# Patient Record
Sex: FEMALE | Race: WHITE | NOT HISPANIC OR LATINO | Employment: OTHER | URBAN - METROPOLITAN AREA
[De-identification: names, ages, dates, MRNs, and addresses within clinical notes are randomized per-mention and may not be internally consistent; named-entity substitution may affect disease eponyms.]

---

## 2017-01-24 ENCOUNTER — HOSPITAL ENCOUNTER (OUTPATIENT)
Dept: LAB | Facility: HOSPITAL | Age: 62
Setting detail: SPECIMEN
Discharge: HOME | End: 2017-01-24
Attending: INTERNAL MEDICINE | Admitting: INTERNAL MEDICINE

## 2017-01-24 LAB
ALBUMIN SERPL-MCNC: 4.2 G/DL (ref 3.5–4.8)
ALBUMIN/GLOB SERPL: 1.3 {RATIO} (ref 1–1.7)
ALP SERPL-CCNC: 50 IU/L (ref 32–91)
ALT SERPL-CCNC: 26 IU/L (ref 14–54)
ANION GAP SERPL CALC-SCNC: 9.9 MMOL/L (ref 10–20)
AST SERPL-CCNC: 22 IU/L (ref 15–41)
BILIRUB SERPL-MCNC: 1 MG/DL (ref 0.3–1.2)
BUN SERPL-MCNC: 9 MG/DL (ref 8–20)
BUN/CREAT SERPL: 12.9 (ref 5.4–26.2)
CALCIUM SERPL-MCNC: 10.2 MG/DL (ref 8.9–10.3)
CHLORIDE SERPL-SCNC: 109 MMOL/L (ref 101–111)
CONV CO2: 27 MMOL/L (ref 22–32)
CONV MICROALBUM.,U,RANDOM: 8 MG/L
CONV TOTAL PROTEIN: 7.5 G/DL (ref 6.1–7.9)
CREAT 24H UR-MCNC: 71.2 MG/DL
CREAT UR-MCNC: 0.7 MG/DL (ref 0.4–1)
GLOBULIN UR ELPH-MCNC: 3.3 G/DL (ref 2.5–3.8)
GLUCOSE SERPL-MCNC: 98 MG/DL (ref 65–99)
MICROALBUMIN/CREAT UR: 11.2 UG/MG
POTASSIUM SERPL-SCNC: 3.9 MMOL/L (ref 3.6–5.1)
SODIUM SERPL-SCNC: 142 MMOL/L (ref 136–144)

## 2017-07-24 ENCOUNTER — HOSPITAL ENCOUNTER (OUTPATIENT)
Dept: LAB | Facility: HOSPITAL | Age: 62
Setting detail: SPECIMEN
Discharge: HOME | End: 2017-07-24
Attending: NURSE PRACTITIONER | Admitting: NURSE PRACTITIONER

## 2017-07-24 LAB
ALBUMIN SERPL-MCNC: 4.3 G/DL (ref 3.5–4.8)
ALBUMIN/GLOB SERPL: 1.4 {RATIO} (ref 1–1.7)
ALP SERPL-CCNC: 50 IU/L (ref 32–91)
ALT SERPL-CCNC: 27 IU/L (ref 14–54)
ANION GAP SERPL CALC-SCNC: 11.8 MMOL/L (ref 10–20)
AST SERPL-CCNC: 20 IU/L (ref 15–41)
BILIRUB SERPL-MCNC: 0.7 MG/DL (ref 0.3–1.2)
BUN SERPL-MCNC: 10 MG/DL (ref 8–20)
BUN/CREAT SERPL: 12.5 (ref 5.4–26.2)
CALCIUM SERPL-MCNC: 9.8 MG/DL (ref 8.9–10.3)
CHLORIDE SERPL-SCNC: 110 MMOL/L (ref 101–111)
CHOLEST SERPL-MCNC: 134 MG/DL
CHOLEST/HDLC SERPL: 3.5 {RATIO}
CONV CO2: 25 MMOL/L (ref 22–32)
CONV LDL CHOLESTEROL DIRECT: 74 MG/DL (ref 0–100)
CONV MICROALBUM.,U,RANDOM: 20 MG/L
CONV TOTAL PROTEIN: 7.4 G/DL (ref 6.1–7.9)
CREAT 24H UR-MCNC: 86.1 MG/DL
CREAT UR-MCNC: 0.8 MG/DL (ref 0.4–1)
GLOBULIN UR ELPH-MCNC: 3.1 G/DL (ref 2.5–3.8)
GLUCOSE SERPL-MCNC: 113 MG/DL (ref 65–99)
HDLC SERPL-MCNC: 38 MG/DL
LDLC/HDLC SERPL: 1.9 {RATIO}
LIPID INTERPRETATION: ABNORMAL
MICROALBUMIN/CREAT UR: 23.2 UG/MG
POTASSIUM SERPL-SCNC: 3.8 MMOL/L (ref 3.6–5.1)
SODIUM SERPL-SCNC: 143 MMOL/L (ref 136–144)
TRIGL SERPL-MCNC: 127 MG/DL
VLDLC SERPL CALC-MCNC: 21.4 MG/DL

## 2017-07-25 ENCOUNTER — HOSPITAL ENCOUNTER (OUTPATIENT)
Dept: GENERAL RADIOLOGY | Facility: HOSPITAL | Age: 62
Discharge: HOME | End: 2017-07-25
Attending: FAMILY MEDICINE | Admitting: FAMILY MEDICINE

## 2017-08-03 ENCOUNTER — HOSPITAL ENCOUNTER (OUTPATIENT)
Dept: CARDIOLOGY | Facility: HOSPITAL | Age: 62
Discharge: HOME | End: 2017-08-03
Attending: INTERNAL MEDICINE | Admitting: INTERNAL MEDICINE

## 2017-09-06 ENCOUNTER — HOSPITAL ENCOUNTER (OUTPATIENT)
Dept: ULTRASOUND IMAGING | Facility: HOSPITAL | Age: 62
Discharge: HOME | End: 2017-09-06
Attending: OBSTETRICS & GYNECOLOGY | Admitting: OBSTETRICS & GYNECOLOGY

## 2017-11-03 ENCOUNTER — HOSPITAL ENCOUNTER (OUTPATIENT)
Dept: FAMILY MEDICINE CLINIC | Facility: CLINIC | Age: 62
Setting detail: SPECIMEN
Discharge: HOME | End: 2017-11-03
Attending: FAMILY MEDICINE | Admitting: FAMILY MEDICINE

## 2017-11-03 LAB
ALBUMIN SERPL-MCNC: 4.3 G/DL (ref 3.5–4.8)
ALBUMIN/GLOB SERPL: 1.5 {RATIO} (ref 1–1.7)
ALP SERPL-CCNC: 46 IU/L (ref 32–91)
ALT SERPL-CCNC: 28 IU/L (ref 14–54)
ANION GAP SERPL CALC-SCNC: 13.1 MMOL/L (ref 10–20)
AST SERPL-CCNC: 25 IU/L (ref 15–41)
BASOPHILS # BLD AUTO: 0.1 10*3/UL (ref 0–0.2)
BASOPHILS NFR BLD AUTO: 1 % (ref 0–2)
BILIRUB SERPL-MCNC: 0.5 MG/DL (ref 0.3–1.2)
BUN SERPL-MCNC: 10 MG/DL (ref 8–20)
BUN/CREAT SERPL: 11.1 (ref 5.4–26.2)
CALCIUM SERPL-MCNC: 10 MG/DL (ref 8.9–10.3)
CHLORIDE SERPL-SCNC: 108 MMOL/L (ref 101–111)
CONV CO2: 26 MMOL/L (ref 22–32)
CONV TOTAL PROTEIN: 7.2 G/DL (ref 6.1–7.9)
CREAT UR-MCNC: 0.9 MG/DL (ref 0.4–1)
DIFFERENTIAL METHOD BLD: (no result)
EOSINOPHIL # BLD AUTO: 0.1 10*3/UL (ref 0–0.3)
EOSINOPHIL # BLD AUTO: 1 % (ref 0–3)
ERYTHROCYTE [DISTWIDTH] IN BLOOD BY AUTOMATED COUNT: 14.5 % (ref 11.5–14.5)
ERYTHROCYTE [SEDIMENTATION RATE] IN BLOOD BY WESTERGREN METHOD: 9 MM/HR (ref 0–30)
GLOBULIN UR ELPH-MCNC: 2.9 G/DL (ref 2.5–3.8)
GLUCOSE SERPL-MCNC: 101 MG/DL (ref 65–99)
HCT VFR BLD AUTO: 46.9 % (ref 35–49)
HGB BLD-MCNC: 15.6 G/DL (ref 12–15)
LYMPHOCYTES # BLD AUTO: 3.3 10*3/UL (ref 0.8–4.8)
LYMPHOCYTES NFR BLD AUTO: 35 % (ref 18–42)
MCH RBC QN AUTO: 30.8 PG (ref 26–32)
MCHC RBC AUTO-ENTMCNC: 33.3 G/DL (ref 32–36)
MCV RBC AUTO: 92.3 FL (ref 80–94)
MONOCYTES # BLD AUTO: 0.8 10*3/UL (ref 0.1–1.3)
MONOCYTES NFR BLD AUTO: 8 % (ref 2–11)
NEUTROPHILS # BLD AUTO: 5.1 10*3/UL (ref 2.3–8.6)
NEUTROPHILS NFR BLD AUTO: 55 % (ref 50–75)
NRBC BLD AUTO-RTO: 0 /100{WBCS}
NRBC/RBC NFR BLD MANUAL: 0 10*3/UL
PLATELET # BLD AUTO: 299 10*3/UL (ref 150–450)
PMV BLD AUTO: 9.6 FL (ref 7.4–10.4)
POTASSIUM SERPL-SCNC: 4.1 MMOL/L (ref 3.6–5.1)
RBC # BLD AUTO: 5.09 10*6/UL (ref 4–5.4)
SODIUM SERPL-SCNC: 143 MMOL/L (ref 136–144)
URATE SERPL-MCNC: 3.9 MG/DL (ref 2.6–8)
WBC # BLD AUTO: 9.3 10*3/UL (ref 4.5–11.5)

## 2017-11-06 LAB
ANA SER QL IA: NORMAL
CHROMATIN AB SERPL-ACNC: <20 [IU]/ML (ref 0–20)

## 2017-11-15 ENCOUNTER — HOSPITAL ENCOUNTER (OUTPATIENT)
Dept: NUCLEAR MEDICINE | Facility: HOSPITAL | Age: 62
Discharge: HOME | End: 2017-11-15
Attending: FAMILY MEDICINE | Admitting: FAMILY MEDICINE

## 2018-01-23 ENCOUNTER — HOSPITAL ENCOUNTER (OUTPATIENT)
Dept: LAB | Facility: HOSPITAL | Age: 63
Setting detail: SPECIMEN
Discharge: HOME | End: 2018-01-23
Attending: INTERNAL MEDICINE | Admitting: INTERNAL MEDICINE

## 2018-01-23 LAB
ALBUMIN SERPL-MCNC: 4.3 G/DL (ref 3.5–4.8)
ALBUMIN/GLOB SERPL: 1.4 {RATIO} (ref 1–1.7)
ALP SERPL-CCNC: 47 IU/L (ref 32–91)
ALT SERPL-CCNC: 27 IU/L (ref 14–54)
ANION GAP SERPL CALC-SCNC: 13.9 MMOL/L (ref 10–20)
AST SERPL-CCNC: 25 IU/L (ref 15–41)
BILIRUB SERPL-MCNC: 0.8 MG/DL (ref 0.3–1.2)
BUN SERPL-MCNC: 11 MG/DL (ref 8–20)
BUN/CREAT SERPL: 13.8 (ref 5.4–26.2)
CALCIUM SERPL-MCNC: 9.8 MG/DL (ref 8.9–10.3)
CHLORIDE SERPL-SCNC: 107 MMOL/L (ref 101–111)
CHOLEST SERPL-MCNC: 125 MG/DL
CHOLEST/HDLC SERPL: 2.7 {RATIO}
CONV CO2: 24 MMOL/L (ref 22–32)
CONV LDL CHOLESTEROL DIRECT: 72 MG/DL (ref 0–100)
CONV MICROALBUM.,U,RANDOM: 19 MG/L
CONV TOTAL PROTEIN: 7.3 G/DL (ref 6.1–7.9)
CREAT 24H UR-MCNC: 50.4 MG/DL
CREAT UR-MCNC: 0.8 MG/DL (ref 0.4–1)
GLOBULIN UR ELPH-MCNC: 3 G/DL (ref 2.5–3.8)
GLUCOSE SERPL-MCNC: 102 MG/DL (ref 65–99)
HDLC SERPL-MCNC: 46 MG/DL
LDLC/HDLC SERPL: 1.6 {RATIO}
LIPID INTERPRETATION: NORMAL
MICROALBUMIN/CREAT UR: 37.7 UG/MG
POTASSIUM SERPL-SCNC: 3.9 MMOL/L (ref 3.6–5.1)
SODIUM SERPL-SCNC: 141 MMOL/L (ref 136–144)
TRIGL SERPL-MCNC: 89 MG/DL
VLDLC SERPL CALC-MCNC: 6.7 MG/DL

## 2018-01-30 ENCOUNTER — HOSPITAL ENCOUNTER (OUTPATIENT)
Dept: LAB | Facility: HOSPITAL | Age: 63
Setting detail: SPECIMEN
Discharge: HOME | End: 2018-01-30
Attending: NURSE PRACTITIONER | Admitting: NURSE PRACTITIONER

## 2018-06-05 ENCOUNTER — HOSPITAL ENCOUNTER (OUTPATIENT)
Dept: FAMILY MEDICINE CLINIC | Facility: CLINIC | Age: 63
Setting detail: SPECIMEN
Discharge: HOME | End: 2018-06-05
Attending: FAMILY MEDICINE | Admitting: FAMILY MEDICINE

## 2018-06-05 LAB
ALBUMIN SERPL-MCNC: 4 G/DL (ref 3.5–4.8)
ALBUMIN/GLOB SERPL: 1.3 {RATIO} (ref 1–1.7)
ALP SERPL-CCNC: 46 IU/L (ref 32–91)
ALT SERPL-CCNC: 34 IU/L (ref 14–54)
ANION GAP SERPL CALC-SCNC: 11.8 MMOL/L (ref 10–20)
AST SERPL-CCNC: 26 IU/L (ref 15–41)
BILIRUB SERPL-MCNC: 0.6 MG/DL (ref 0.3–1.2)
BUN SERPL-MCNC: 7 MG/DL (ref 8–20)
BUN/CREAT SERPL: 8.8 (ref 5.4–26.2)
CALCIUM SERPL-MCNC: 9.9 MG/DL (ref 8.9–10.3)
CHLORIDE SERPL-SCNC: 109 MMOL/L (ref 101–111)
CONV CO2: 25 MMOL/L (ref 22–32)
CONV TOTAL PROTEIN: 7 G/DL (ref 6.1–7.9)
CREAT UR-MCNC: 0.8 MG/DL (ref 0.4–1)
GLOBULIN UR ELPH-MCNC: 3 G/DL (ref 2.5–3.8)
GLUCOSE SERPL-MCNC: 112 MG/DL (ref 65–99)
POTASSIUM SERPL-SCNC: 3.8 MMOL/L (ref 3.6–5.1)
SODIUM SERPL-SCNC: 142 MMOL/L (ref 136–144)

## 2018-07-26 ENCOUNTER — HOSPITAL ENCOUNTER (OUTPATIENT)
Dept: LAB | Facility: HOSPITAL | Age: 63
Setting detail: SPECIMEN
Discharge: HOME | End: 2018-07-26
Attending: NURSE PRACTITIONER | Admitting: NURSE PRACTITIONER

## 2018-07-26 LAB
ALBUMIN SERPL-MCNC: 4.2 G/DL (ref 3.5–4.8)
ALBUMIN/GLOB SERPL: 1.3 {RATIO} (ref 1–1.7)
ALP SERPL-CCNC: 40 IU/L (ref 32–91)
ALT SERPL-CCNC: 31 IU/L (ref 14–54)
ANION GAP SERPL CALC-SCNC: 7.4 MMOL/L (ref 10–20)
AST SERPL-CCNC: 24 IU/L (ref 15–41)
BILIRUB SERPL-MCNC: 0.5 MG/DL (ref 0.3–1.2)
BUN SERPL-MCNC: 13 MG/DL (ref 8–20)
BUN/CREAT SERPL: 18.6 (ref 5.4–26.2)
CALCIUM SERPL-MCNC: 9.6 MG/DL (ref 8.9–10.3)
CHLORIDE SERPL-SCNC: 109 MMOL/L (ref 101–111)
CHOLEST SERPL-MCNC: 142 MG/DL
CHOLEST/HDLC SERPL: 4.1 {RATIO}
CONV CO2: 28 MMOL/L (ref 22–32)
CONV LDL CHOLESTEROL DIRECT: 82 MG/DL (ref 0–100)
CONV MICROALBUM.,U,RANDOM: 11 MG/L
CONV TOTAL PROTEIN: 7.4 G/DL (ref 6.1–7.9)
CREAT 24H UR-MCNC: 49.2 MG/DL
CREAT UR-MCNC: 0.7 MG/DL (ref 0.4–1)
GLOBULIN UR ELPH-MCNC: 3.2 G/DL (ref 2.5–3.8)
GLUCOSE SERPL-MCNC: 122 MG/DL (ref 65–99)
HDLC SERPL-MCNC: 34 MG/DL
LDLC/HDLC SERPL: 2.4 {RATIO}
LIPID INTERPRETATION: ABNORMAL
MICROALBUMIN/CREAT UR: 22.4 UG/MG
POTASSIUM SERPL-SCNC: 3.4 MMOL/L (ref 3.6–5.1)
SODIUM SERPL-SCNC: 141 MMOL/L (ref 136–144)
TRIGL SERPL-MCNC: 165 MG/DL
VLDLC SERPL CALC-MCNC: 25.3 MG/DL

## 2018-08-08 ENCOUNTER — HOSPITAL ENCOUNTER (OUTPATIENT)
Dept: FAMILY MEDICINE CLINIC | Facility: CLINIC | Age: 63
Setting detail: SPECIMEN
Discharge: HOME | End: 2018-08-08
Attending: FAMILY MEDICINE | Admitting: FAMILY MEDICINE

## 2018-08-08 LAB
ANION GAP SERPL CALC-SCNC: 10.4 MMOL/L (ref 10–20)
BUN SERPL-MCNC: 12 MG/DL (ref 8–20)
BUN/CREAT SERPL: 10.9 (ref 5.4–26.2)
CALCIUM SERPL-MCNC: 9.8 MG/DL (ref 8.9–10.3)
CHLORIDE SERPL-SCNC: 105 MMOL/L (ref 101–111)
CONV CO2: 28 MMOL/L (ref 22–32)
CREAT UR-MCNC: 1.1 MG/DL (ref 0.4–1)
GLUCOSE SERPL-MCNC: 127 MG/DL (ref 65–99)
MAGNESIUM SERPL-MCNC: 1.3 MG/DL (ref 1.8–2.5)
POTASSIUM SERPL-SCNC: 3.4 MMOL/L (ref 3.6–5.1)
SODIUM SERPL-SCNC: 140 MMOL/L (ref 136–144)

## 2018-09-14 ENCOUNTER — HOSPITAL ENCOUNTER (OUTPATIENT)
Dept: LAB | Facility: HOSPITAL | Age: 63
Setting detail: SPECIMEN
Discharge: HOME | End: 2018-09-14
Attending: NURSE PRACTITIONER | Admitting: NURSE PRACTITIONER

## 2018-09-14 LAB
MAGNESIUM SERPL-MCNC: 1.4 MG/DL (ref 1.8–2.5)
POTASSIUM SERPL-SCNC: 3.5 MMOL/L (ref 3.6–5.1)

## 2018-10-23 ENCOUNTER — HOSPITAL ENCOUNTER (OUTPATIENT)
Dept: MAMMOGRAPHY | Facility: HOSPITAL | Age: 63
Discharge: HOME | End: 2018-10-23
Attending: OBSTETRICS & GYNECOLOGY | Admitting: OBSTETRICS & GYNECOLOGY

## 2019-01-02 ENCOUNTER — HOSPITAL ENCOUNTER (OUTPATIENT)
Dept: FAMILY MEDICINE CLINIC | Facility: CLINIC | Age: 64
Setting detail: SPECIMEN
Discharge: HOME | End: 2019-01-02
Attending: FAMILY MEDICINE | Admitting: FAMILY MEDICINE

## 2019-01-04 ENCOUNTER — HOSPITAL ENCOUNTER (OUTPATIENT)
Dept: GENERAL RADIOLOGY | Facility: HOSPITAL | Age: 64
Discharge: HOME | End: 2019-01-04
Attending: FAMILY MEDICINE | Admitting: FAMILY MEDICINE

## 2019-02-05 ENCOUNTER — HOSPITAL ENCOUNTER (OUTPATIENT)
Dept: LAB | Facility: HOSPITAL | Age: 64
Setting detail: SPECIMEN
Discharge: HOME | End: 2019-02-05
Attending: NURSE PRACTITIONER | Admitting: NURSE PRACTITIONER

## 2019-02-05 LAB
ALBUMIN SERPL-MCNC: 3.8 G/DL (ref 3.5–4.8)
ALBUMIN/GLOB SERPL: 1.2 {RATIO} (ref 1–1.7)
ALP SERPL-CCNC: 44 IU/L (ref 32–91)
ALT SERPL-CCNC: 35 IU/L (ref 14–54)
ANION GAP SERPL CALC-SCNC: 13.6 MMOL/L (ref 10–20)
AST SERPL-CCNC: 28 IU/L (ref 15–41)
BILIRUB SERPL-MCNC: 0.5 MG/DL (ref 0.3–1.2)
BUN SERPL-MCNC: 14 MG/DL (ref 8–20)
BUN/CREAT SERPL: 20 (ref 5.4–26.2)
CALCIUM SERPL-MCNC: 9.4 MG/DL (ref 8.9–10.3)
CHLORIDE SERPL-SCNC: 103 MMOL/L (ref 101–111)
CHOLEST SERPL-MCNC: 137 MG/DL
CHOLEST/HDLC SERPL: 3.9 {RATIO}
CONV CO2: 25 MMOL/L (ref 22–32)
CONV LDL CHOLESTEROL DIRECT: 80 MG/DL (ref 0–100)
CONV MICROALBUM.,U,RANDOM: 3 MG/L
CONV TOTAL PROTEIN: 6.9 G/DL (ref 6.1–7.9)
CREAT 24H UR-MCNC: 56 MG/DL
CREAT UR-MCNC: 0.7 MG/DL (ref 0.4–1)
GLOBULIN UR ELPH-MCNC: 3.1 G/DL (ref 2.5–3.8)
GLUCOSE SERPL-MCNC: 129 MG/DL (ref 65–99)
HDLC SERPL-MCNC: 35 MG/DL
LDLC/HDLC SERPL: 2.3 {RATIO}
LIPID INTERPRETATION: ABNORMAL
MICROALBUMIN/CREAT UR: 5.4 UG/MG
POTASSIUM SERPL-SCNC: 3.6 MMOL/L (ref 3.6–5.1)
SODIUM SERPL-SCNC: 138 MMOL/L (ref 136–144)
TRIGL SERPL-MCNC: 158 MG/DL
VLDLC SERPL CALC-MCNC: 21.6 MG/DL

## 2019-05-08 ENCOUNTER — HOSPITAL ENCOUNTER (OUTPATIENT)
Dept: LAB | Facility: HOSPITAL | Age: 64
Setting detail: SPECIMEN
Discharge: HOME | End: 2019-05-08
Attending: INTERNAL MEDICINE | Admitting: INTERNAL MEDICINE

## 2019-05-08 LAB
ALBUMIN SERPL-MCNC: 3.9 G/DL (ref 3.5–4.8)
ALBUMIN/GLOB SERPL: 1.4 {RATIO} (ref 1–1.7)
ALP SERPL-CCNC: 45 IU/L (ref 32–91)
ALT SERPL-CCNC: 45 IU/L (ref 14–54)
ANION GAP SERPL CALC-SCNC: 14.7 MMOL/L (ref 10–20)
AST SERPL-CCNC: 27 IU/L (ref 15–41)
BILIRUB SERPL-MCNC: 0.6 MG/DL (ref 0.3–1.2)
BUN SERPL-MCNC: 11 MG/DL (ref 8–20)
BUN/CREAT SERPL: 13.8 (ref 5.4–26.2)
CALCIUM SERPL-MCNC: 9.9 MG/DL (ref 8.9–10.3)
CHLORIDE SERPL-SCNC: 107 MMOL/L (ref 101–111)
CONV CO2: 24 MMOL/L (ref 22–32)
CONV TOTAL PROTEIN: 6.7 G/DL (ref 6.1–7.9)
CREAT UR-MCNC: 0.8 MG/DL (ref 0.4–1)
GLOBULIN UR ELPH-MCNC: 2.8 G/DL (ref 2.5–3.8)
GLUCOSE SERPL-MCNC: 122 MG/DL (ref 65–99)
HBA1C MFR BLD: 6.8 % (ref 0–5.6)
POTASSIUM SERPL-SCNC: 3.7 MMOL/L (ref 3.6–5.1)
SODIUM SERPL-SCNC: 142 MMOL/L (ref 136–144)

## 2019-05-31 ENCOUNTER — CONVERSION ENCOUNTER (OUTPATIENT)
Dept: FAMILY MEDICINE CLINIC | Facility: CLINIC | Age: 64
End: 2019-05-31

## 2019-06-04 VITALS — WEIGHT: 201 LBS | BODY MASS INDEX: 35.61 KG/M2 | OXYGEN SATURATION: 96 % | HEART RATE: 77 BPM

## 2019-06-06 NOTE — PROGRESS NOTES
Visit Type:  Acute Visit  Referring Provider:  Lou Curran MD  Primary Provider:  Bina Blake MD    CC:  rt arn pain, not able to use it x 1wk, feels better today, could not walk the week before for rt knee and leg pain and memory loss and shoulder pain.    History of Present Illness:         This is a 63 years old female who presents with shoulder pain.  The problem began 1 week ago.  No fall.  No injury.  Feeling better since restarted magnesium supplement.  The patient presents with right shoulder pain, but denies neck pain and upper   back pain.  The shoulder pain is also associated with impaired ROM.  The patient describes the pain as sharp.  Pain is better with rest.  Significant past history includes shoulder pain.        Past Medical History:     Reviewed history from 2018 and no changes required:        Arthritis        [facility] - Dr. Rob        Diabetes, Type 2 - sees Dr. Archibald at [place]        G E R D        Hypertension        Sleep Apnea - wears CPAP machine, sees Darron        DJD,possible herniated disc, sees Pain Mgmt in [place]        Cataracts        Hyperlipidemia                GYN - Dr. Montero        Bradycardia - Dr. Charles        minimal CAD on heart cath done 18    Past Surgical History:     Reviewed history from 08/15/2018 and no changes required:        c section x 1        ovarian cyst        Colostomy and reversal in her 20's due to problems with childbirth        Rotator Cuff Repair x2, left, approx         T A H and B S O approx         Heart Cath and Angiograph     Family History Summary:      Reviewed history Last on 05/15/2019 and no changes required:2019  Brother - Has Family History of Diabetes - Entered On: 2015  Mother - Has Family History of Hypertension - Entered On: 2015  Mother - Has Family History of Heart Disease - Entered On: 2015  Father - Has Family History of Stroke - Entered On: 2015  Father - Has Family  History of Lung/Respiratory Disease - Entered On: 11/18/2015  Father - Has Family History of Hypertension - Entered On: 11/18/2015  Father - Has Family History of Heart Disease - Entered On: 11/18/2015  Brother - Has Family History of Hypertension - Entered On: 11/18/2015    General Comments - FH:  FH Diabetes  FH Heart Disease  FH Hypertension  FH High Cholesterol  FH Stroke- Both parents  Cancer- Sister  FH Seizure- Mom      Social History:     Reviewed history from 11/03/2017 and no changes required:        Patient currently smokes every day.        Patient has been counseled to quit.        Passive Smoke: Y        Alcohol Use: N        Drug Use: N        HIV/High Risk: N        Regular Exercise: N        Marital Status:         Children: 2        Occupation: housewife        Risk Factors:     Smoked Tobacco Use:  Current every day smoker     Cigarettes:  Yes -- 1+ pack(s) per day,      Year started:  1976  Smokeless Tobacco Use:  Never     Counseled to quit/cut down:  yes  Passive smoke exposure:  yes  Drug use:  no  HIV high-risk behavior:  no  Caffeine use:  4 drinks per day  Alcohol use:  no  Exercise:  no  Seatbelt use:  100 %  Sun Exposure:  occasionally    Family History Risk Factors:     Family History of MI in females < 65 years old:  yes     Family History of MI in males < 55 years old:  no    Previous Tobacco Use: Signed On - 05/15/2019  Smoked Tobacco Use:  Current every day smoker     Cigarettes:  Yes -- 1+ pack(s) per day,      Year started:  1976  Smokeless Tobacco Use:  Never     Counseled to quit/cut down:  yes  Passive smoke exposure:  yes  Drug use:  no  HIV high-risk behavior:  no  Caffeine use:  4 drinks per day    Previous Alcohol Use: Signed On - 05/15/2019  Alcohol use:  no  Exercise:  no  Seatbelt use:  100 %  Sun Exposure:  occasionally    Family History Risk Factors:     Family History of MI in females < 65 years old:  yes     Family History of MI in males < 55 years old:   no    Colonoscopy History:     Date of Last Colonoscopy:  05/14/2012    Mammogram History:     Date of Last Mammogram:  09/01/2016      Review of Systems        See HPI      Vital Signs:    Patient Profile:    63 Years Old Female  Height:     63 inches  Weight:     201 pounds  BMI:        35.60     O2 Sat:     96 %  Temp:       98.8 degrees F oral  Pulse rate: 77 / minute  (left arm)    Cuff size:  regular      Problems: Active problems were reviewed with the patient during this visit.  Medications: Medications were reviewed with the patient during this visit.  Allergies: Allergies were reviewed with the patient during this visit.        Vitals Entered By: Hesham Ambrosio CMA (May 31, 2019 8:22 AM)      Physical Exam    General:      well developed, well nourished, in no acute distress.    Neck:      no masses, thyromegaly, or abnormal cervical nodes.    Lungs:      clear bilaterally to auscultation.    Heart:      non-displaced PMI, chest non-tender; regular rate and rhythm, S1, S2 without murmurs, rubs, or gallops  Msk:      joint tenderness.  mild, right shoulder  Psych:      easily distracted.        Labwork:   Most Recent Lab Results:   LDL: 80 mg/dL 02/05/2019  HbA1c: : 6.8 % 05/08/2019    Active Medications (reviewed today):  ACCU-CHEK FASTCLIX LANCETS (LANCETS) use to check BS 2 times daily Dx code E11.65  ADULT ASPIRIN EC LOW STRENGTH 81 MG ORAL TABLET DELAYED RELEASE (ASPIRIN)   ACCU-CHEK CARLIE PLUS TEST STRP (GLUCOSE BLOOD) USE TO CHECK BLOOD SUGAR TWICE A DAY  CO Q 10 100 MG ORAL CAPSULE (COENZYME Q10) take 1 capsule1  daily  [employer]S D3 1000 UNIT CAPS (CHOLECALCIFEROL) TAKE 1 CAPSULE BY MOUTH TWICE A DAY  DILTIAZEM 24HR  MG CAP (DILTIAZEM HCL COATED BEADS) TAKE 1 CAPSULE BY MOUTH EVERY DAY  ELIQUIS 5 MG TABLET (APIXABAN) TAKE 1 TABLET BY MOUTH TWICE A DAY  FENOFIBRATE 145 MG TABLET (FENOFIBRATE) TAKE 1 TABLET BY MOUTH EVERY DAY  HYDRALAZINE 50 MG TABLET (HYDRALAZINE HCL) TAKE ONE TABLET BY MOUTH TWICE A  DAY  HYDROCHLOROTHIAZIDE 25 MG ORAL TABLET (HYDROCHLOROTHIAZIDE) 1/2 tablet by mouth every am.  JARDIANCE 10 MG ORAL TABLET (EMPAGLIFLOZIN) take 1 tablet by  mouth daily  KLOR-CON 10 10 MEQ ORAL TABLET EXTENDED RELEASE (POTASSIUM CHLORIDE) Take one (1) tablet by mouth twice a day  LIPITOR 40 MG ORAL TABLET (ATORVASTATIN CALCIUM) TAKE 1/2 TABLET BY MOUTH DAILY  LISINOPRIL 20 MG TABLET (LISINOPRIL) TAKE 1 TABLET BY MOUTH TWICE A DAY  LORTAB 7.5-325 MG ORAL TABLET (HYDROCODONE-ACETAMINOPHEN) Take one (1) tablet by mouth twice a day / states she takes three to four a day  MAGNESIUM OXIDE 400 (240 MG) MG ORAL TABLET (MAGNESIUM OXIDE) Take one (1) tablet by mouth twice a day  METFORMIN HCL 1000 MG ORAL TABLET (METFORMIN HCL) Take one (1) tablet by mouth twice daily.  SYMBICORT 160-4.5 MCG/ACT INHALATION AEROSOL (BUDESONIDE-FORMOTEROL FUMARATE) inhale 2 puffs twice daily  VENTOLIN HFA 90 MCG INHALER (ALBUTEROL SULFATE) INHALE 1-2 PUFFS EVERY 4 HOURS AS NEEDED FOR COUGH OR FOR SHORTNESS OF BREATH    Current Allergies (reviewed today):  * TRAMADOL (Critical)  * LYRICA (Critical)  IBUPROFEN (Severe)  PREDNISONE (PREDNISONE TABS) (Severe)  BACTRIM DS (SULFAMETHOXAZOLE-TRIMETHOPRIM) (SULFAMETHOXAZOLE-TRIMETHOPRIM) (Moderate)  LEVAQUIN (Moderate)        Impression & Recommendations:    Problem # 1:  Shoulder pain, right (ICD-719.41) (LYK06-I59.511)  Assessment: New    Problem # 2:  MEMORY LOSS (ICD-780.93) (JQP79-U21.3)  Assessment: Deteriorated    Orders:  Neuro Psych Evaluation (NeuroEval)                Medications:  MAGNESIUM OXIDE 400 (240 MG) MG ORAL TABLET (MAGNESIUM OXIDE) Take one (1) tablet by mouth twice a day  #180[Tablet] x 3      Entered by: Hesham Ambrosio CMA      Authorized by:  Lou Curran MD      Electronically signed by:   Lou Curran MD on 05/31/2019      Method used:    Electronically to               [facility]/pharmacy #[id number]* (retail)              [address] [place] [address]  [id number]               Ph: [phone]              Fax: [phone]      RxID:   [id number]                Medication Administration    Orders Added:  1)  Neuro Psych Evaluation [NeuroEval]  2)  Ofc Vst, Est Level III [26328]  ]      Electronically signed by Lou Curran MD on 05/31/2019 at 8:40 AM  ________________________________________________________________________       Disclaimer: Converted Note message may not contain all data elements that existed in the [employer] source system. Please see [employer] for the original note details.

## 2019-06-18 RX ORDER — DILTIAZEM HYDROCHLORIDE 120 MG/1
CAPSULE, COATED, EXTENDED RELEASE ORAL
Qty: 90 CAPSULE | Refills: 3 | Status: ON HOLD | OUTPATIENT
Start: 2019-06-18 | End: 2019-12-10

## 2019-06-20 ENCOUNTER — TELEPHONE (OUTPATIENT)
Dept: FAMILY MEDICINE CLINIC | Facility: CLINIC | Age: 64
End: 2019-06-20

## 2019-06-21 ENCOUNTER — TELEPHONE (OUTPATIENT)
Dept: FAMILY MEDICINE CLINIC | Facility: CLINIC | Age: 64
End: 2019-06-21

## 2019-06-21 NOTE — TELEPHONE ENCOUNTER
Pt Left message on voicemail stating that she cannot use Breo because it always gives her thrush. Will you send in a diff med?  The other med that is cover per ins is Air Duo if I remember correctly.

## 2019-06-25 RX ORDER — FLUTICASONE PROPIONATE AND SALMETEROL 113; 14 UG/1; UG/1
1 POWDER, METERED RESPIRATORY (INHALATION) 2 TIMES DAILY
Qty: 1 EACH | Refills: 5 | Status: SHIPPED | OUTPATIENT
Start: 2019-06-25 | End: 2019-08-21

## 2019-07-08 ENCOUNTER — OFFICE VISIT (OUTPATIENT)
Dept: FAMILY MEDICINE CLINIC | Facility: CLINIC | Age: 64
End: 2019-07-08

## 2019-07-08 VITALS
BODY MASS INDEX: 33.46 KG/M2 | TEMPERATURE: 98.7 F | OXYGEN SATURATION: 95 % | DIASTOLIC BLOOD PRESSURE: 43 MMHG | HEART RATE: 83 BPM | SYSTOLIC BLOOD PRESSURE: 143 MMHG | WEIGHT: 196 LBS | HEIGHT: 64 IN

## 2019-07-08 DIAGNOSIS — I48.0 PAROXYSMAL ATRIAL FIBRILLATION (HCC): ICD-10-CM

## 2019-07-08 DIAGNOSIS — G89.29 CHRONIC RIGHT SHOULDER PAIN: Primary | ICD-10-CM

## 2019-07-08 DIAGNOSIS — M19.90 ARTHRITIS: ICD-10-CM

## 2019-07-08 DIAGNOSIS — M25.511 CHRONIC RIGHT SHOULDER PAIN: Primary | ICD-10-CM

## 2019-07-08 PROBLEM — J44.9 CHRONIC OBSTRUCTIVE PULMONARY DISEASE (HCC): Status: ACTIVE | Noted: 2019-07-08

## 2019-07-08 PROBLEM — F32.A DEPRESSION: Status: ACTIVE | Noted: 2019-07-08

## 2019-07-08 PROBLEM — R00.1 BRADYCARDIA: Status: ACTIVE | Noted: 2017-01-31

## 2019-07-08 PROBLEM — E87.6 HYPOKALEMIA: Status: ACTIVE | Noted: 2018-08-08

## 2019-07-08 PROBLEM — I10 HYPERTENSION: Status: ACTIVE | Noted: 2019-07-08

## 2019-07-08 PROBLEM — G47.30 SLEEP APNEA: Status: ACTIVE | Noted: 2019-07-08

## 2019-07-08 PROBLEM — K21.9 GASTROESOPHAGEAL REFLUX DISEASE: Status: ACTIVE | Noted: 2019-07-08

## 2019-07-08 PROBLEM — E83.42 HYPOMAGNESEMIA: Status: ACTIVE | Noted: 2018-08-08

## 2019-07-08 PROCEDURE — 99214 OFFICE O/P EST MOD 30 MIN: CPT | Performed by: FAMILY MEDICINE

## 2019-07-08 RX ORDER — ATORVASTATIN CALCIUM 40 MG/1
20 TABLET, FILM COATED ORAL EVERY 24 HOURS
COMMUNITY
Start: 2017-07-18 | End: 2019-09-22 | Stop reason: SDUPTHER

## 2019-07-08 RX ORDER — HYDRALAZINE HYDROCHLORIDE 50 MG/1
25 TABLET, FILM COATED ORAL EVERY 12 HOURS
COMMUNITY
Start: 2018-01-05 | End: 2019-08-07 | Stop reason: SDUPTHER

## 2019-07-08 RX ORDER — ASPIRIN 81 MG/1
81 TABLET ORAL DAILY
COMMUNITY
Start: 2011-11-14 | End: 2019-08-15

## 2019-07-08 RX ORDER — LANCETS
EACH MISCELLANEOUS
Refills: 4 | COMMUNITY
Start: 2019-04-20 | End: 2020-09-14 | Stop reason: SDUPTHER

## 2019-07-08 RX ORDER — HYDROCHLOROTHIAZIDE 25 MG/1
TABLET ORAL EVERY 24 HOURS
COMMUNITY
Start: 2018-05-01 | End: 2019-07-24

## 2019-07-08 RX ORDER — LISINOPRIL 20 MG/1
TABLET ORAL DAILY
COMMUNITY
Start: 2018-02-13 | End: 2019-07-24

## 2019-07-08 RX ORDER — MAGNESIUM OXIDE 400 MG/1
1 TABLET ORAL 2 TIMES DAILY
Refills: 1 | COMMUNITY
Start: 2019-07-05 | End: 2019-08-28 | Stop reason: SDUPTHER

## 2019-07-08 RX ORDER — POTASSIUM CHLORIDE 750 MG/1
TABLET, FILM COATED, EXTENDED RELEASE ORAL EVERY 12 HOURS
COMMUNITY
Start: 2018-08-15 | End: 2019-07-09

## 2019-07-08 RX ORDER — HYDROCODONE BITARTRATE AND ACETAMINOPHEN 7.5; 325 MG/1; MG/1
1 TABLET ORAL EVERY 8 HOURS PRN
COMMUNITY
Start: 2014-10-27 | End: 2020-03-10

## 2019-07-08 RX ORDER — FENOFIBRATE 145 MG/1
145 TABLET, COATED ORAL EVERY 24 HOURS
COMMUNITY
Start: 2017-12-31 | End: 2019-12-04 | Stop reason: SDUPTHER

## 2019-07-08 RX ORDER — ALBUTEROL SULFATE 90 UG/1
AEROSOL, METERED RESPIRATORY (INHALATION)
COMMUNITY
Start: 2019-04-08 | End: 2019-08-07

## 2019-07-08 RX ORDER — BUDESONIDE AND FORMOTEROL FUMARATE DIHYDRATE 160; 4.5 UG/1; UG/1
2 AEROSOL RESPIRATORY (INHALATION) 2 TIMES DAILY
Refills: 4 | COMMUNITY
Start: 2019-06-25 | End: 2019-08-21 | Stop reason: SDUPTHER

## 2019-07-08 RX ORDER — BLOOD SUGAR DIAGNOSTIC
STRIP MISCELLANEOUS
Refills: 4 | COMMUNITY
Start: 2019-06-16 | End: 2020-02-17

## 2019-07-08 NOTE — PROGRESS NOTES
Subjective   Chief Complaint   Patient presents with   • Arm Pain     rt arm x 3 wks   • Hand Pain     B/L   • Foot Pain     rt foot cramps and pain     Caterina Mason is a 63 y.o. female.     Arm Pain    The incident occurred more than 1 week ago. There was no injury mechanism. The pain is present in the right shoulder and upper right arm. The quality of the pain is described as aching. The pain radiates to the right arm and back. The pain is moderate. The pain has been constant since the incident. Pertinent negatives include no chest pain, numbness or tingling. The symptoms are aggravated by movement and lifting. She has tried nothing for the symptoms.   Hand Pain    The incident occurred 12 to 24 hours ago. There was no injury mechanism. The pain is present in the right fingers and left fingers. The quality of the pain is described as aching. The pain does not radiate. The pain is moderate. Pertinent negatives include no chest pain, numbness or tingling. She has tried nothing for the symptoms.   Foot Pain   The current episode started today. The problem occurs constantly. The problem has been gradually worsening. Associated symptoms include arthralgias and myalgias. Pertinent negatives include no abdominal pain, chest pain, chills, coughing, fever, numbness, rash or sore throat. The symptoms are aggravated by standing. She has tried nothing for the symptoms.      Past Medical History:   Diagnosis Date   • Arthritis    • Bradycardia     Dr. Charles   • Cataract    • COPD (chronic obstructive pulmonary disease) (CMS/[facility])     Dr. Rob   • Diabetes mellitus, type 2 (CMS/[facility])     sees Dr. Archibald at [place]   • DJD (degenerative joint disease)     possible herniated disc, sees Pain Mgmt in [place]   •     • GERD (gastroesophageal reflux disease)    • History of combined right and left heart catheterization 2018    minimal CAD on heart cath done    • Hyperlipidemia    • Hypertension    • Sleep apnea      wears CPAP machine, sees Darron     Past Surgical History:   Procedure Laterality Date   • CARDIAC CATHETERIZATION  2018    and Angiograph    •  SECTION      x 1   • COLOSTOMY      and reversal in her 20's due to problems with childbirth   • OVARIAN CYST SURGERY     • ROTATOR CUFF REPAIR Left 2009    x2    • TOTAL ABDOMINAL HYSTERECTOMY WITH SALPINGO OOPHORECTOMY  2005     Allergies   Allergen Reactions   • Ibuprofen GI Intolerance   • Prednisone Other (See Comments)   • Pregabalin Other (See Comments)     jerking   • Tramadol Other (See Comments)     Legs jerked   • Levofloxacin Other (See Comments)     Increase sunburn   • Sulfamethoxazole-Trimethoprim Swelling     Social History     Socioeconomic History   • Marital status:      Spouse name: Not on file   • Number of children: Not on file   • Years of education: Not on file   • Highest education level: Not on file   Tobacco Use   • Smoking status: Current Every Day Smoker     Packs/day: 1.50     Years: 42.00     Pack years: 63.00   • Smokeless tobacco: Never Used   Substance and Sexual Activity   • Alcohol use: No     Frequency: Never   • Drug use: No     Social History     Tobacco Use   Smoking Status Current Every Day Smoker   • Packs/day: 1.50   • Years: 42.00   • Pack years: 63.00   Smokeless Tobacco Never Used       family history includes Cancer in her sister; Diabetes in her brother; Heart disease in her father and mother; Hyperlipidemia in her other; Hypertension in her brother, father, and mother; Lung disease in her father; Seizures in her mother; Stroke in her father and mother.  Current Outpatient Medications on File Prior to Visit   Medication Sig Dispense Refill   • ACCU-CHEK CARLIE PLUS test strip USE TO CHECK BLOOD SUGAR TWICE A DAY  4   • ACCU-CHEK SOFTCLIX LANCETS lancets USE TO CHECK BLOOD SUGAR TWICE A DAY  4   • albuterol sulfate HFA (VENTOLIN HFA) 108 (90 Base) MCG/ACT inhaler VENTOLIN  (90 Base) MCG/ACT AERS     •  apixaban (ELIQUIS) 5 MG tablet tablet ELIQUIS 5 MG TABS     • aspirin (ADULT ASPIRIN EC LOW STRENGTH) 81 MG EC tablet ADULT ASPIRIN EC LOW STRENGTH 81 MG ORAL TABLET DELAYED RELEASE     • atorvastatin (LIPITOR) 40 MG tablet Daily.     • Cholecalciferol (CVS D3) 1000 units capsule 1 capsule Every 12 (Twelve) Hours.     • Empagliflozin (JARDIANCE) 10 MG tablet Daily.     • fenofibrate (TRICOR) 145 MG tablet Daily.     • hydrALAZINE (APRESOLINE) 50 MG tablet Every 12 (Twelve) Hours.     • hydrochlorothiazide (HYDRODIURIL) 25 MG tablet Daily.     • HYDROcodone-acetaminophen (LORTAB) 7.5-325 MG per tablet LORTAB 7.5-325 MG ORAL TABLET     • lisinopril (PRINIVIL,ZESTRIL) 20 MG tablet Every 12 (Twelve) Hours.     • metFORMIN (GLUCOPHAGE) 1000 MG tablet Every 12 (Twelve) Hours.     • potassium chloride (KLOR-CON) 10 MEQ CR tablet Every 12 (Twelve) Hours.     • SYMBICORT 160-4.5 MCG/ACT inhaler Inhale 2 puffs 2 (Two) Times a Day.  4   • diltiaZEM CD (CARDIZEM CD) 120 MG 24 hr capsule TAKE 1 CAPSULE BY MOUTH EVERY DAY 90 capsule 3   • Fluticasone-Salmeterol (AIRDUO RESPICLICK 113/14) 113-14 MCG/ACT aerosol powder  Inhale 1 puff 2 (Two) Times a Day. 1 each 5   • magnesium oxide (MAG-OX) 400 MG tablet Take 1 tablet by mouth 2 (Two) Times a Day.  1     No current facility-administered medications on file prior to visit.      Patient Active Problem List   Diagnosis   • Arthritis   • Bradycardia   • Chronic obstructive pulmonary disease (CMS/HCC)   • Depression   • Diabetic peripheral neuropathy (CMS/HCC)   • Encounter for general adult medical examination without abnormal findings   • Gastroesophageal reflux disease   • Hypercalcemia   • Hyperlipidemia   • Hypertension   • Hypokalemia   • Hypomagnesemia   • Lumbar radiculopathy   • Myalgia   • Obesity   • Paroxysmal atrial fibrillation (CMS/HCC)   • Shoulder pain, right   • Sleep apnea   • Tobacco use disorder, continuous   • Type 2 diabetes mellitus with hyperglycemia (CMS/HCC)  "  • Vitamin D deficiency       The following portions of the patient's history were reviewed and updated as appropriate: allergies, current medications, past family history, past medical history, past social history, past surgical history and problem list.    Review of Systems   Constitutional: Negative for chills and fever.   HENT: Negative for sinus pressure and sore throat.    Eyes: Negative for blurred vision.   Respiratory: Negative for cough and shortness of breath.    Cardiovascular: Negative for chest pain and palpitations.   Gastrointestinal: Negative for abdominal pain.   Endocrine: Negative for polyuria.   Musculoskeletal: Positive for arthralgias and myalgias.   Skin: Negative for rash.   Neurological: Negative for dizziness, tingling, numbness and headache.   Hematological: Negative for adenopathy.   Psychiatric/Behavioral: Negative for depressed mood.       Objective   /43 (BP Location: Right arm, Patient Position: Sitting, Cuff Size: Adult)   Pulse 83   Temp 98.7 °F (37.1 °C) (Oral)   Ht 161.3 cm (63.5\")   Wt 88.9 kg (196 lb)   SpO2 95%   BMI 34.18 kg/m²   Physical Exam   Constitutional: She is oriented to person, place, and time. She appears well-developed. No distress.   HENT:   Head: Normocephalic.   Eyes: Conjunctivae and lids are normal.   Neck: Trachea normal. No thyroid mass and no thyromegaly present.   Cardiovascular: Normal rate, regular rhythm and normal heart sounds.   Pulmonary/Chest: Effort normal and breath sounds normal.   Musculoskeletal:        Right shoulder: She exhibits decreased range of motion and tenderness. She exhibits no deformity.   Lymphadenopathy:     She has no cervical adenopathy.   Neurological: She is alert and oriented to person, place, and time.   Skin: Skin is warm and dry.   Psychiatric: She has a normal mood and affect. Her speech is normal and behavior is normal. She is attentive.     Assessment/Plan   Problems Addressed this Visit        " Cardiovascular and Mediastinum    Paroxysmal atrial fibrillation (CMS/HCC)     Regular sinus rhythm now but she had an episode of elevated heart rate up to 144 recently.  Appointment scheduled tomorrow with Dr. Charles's office.            Nervous and Auditory    Shoulder pain, right - Primary     Check MRI         Relevant Orders    MRI Shoulder Right Without Contrast       Musculoskeletal and Integument    Arthritis    Relevant Orders    MRI Shoulder Right Without Contrast          Caterina was seen today for arm pain, hand pain and foot pain.    Diagnoses and all orders for this visit:    Chronic right shoulder pain  -     MRI Shoulder Right Without Contrast; Future    Arthritis  -     MRI Shoulder Right Without Contrast; Future    Paroxysmal atrial fibrillation (CMS/HCC)

## 2019-07-08 NOTE — PATIENT INSTRUCTIONS
Shoulder Pain  Many things can cause shoulder pain, including:  · An injury.  · Moving the arm in the same way again and again (overuse).  · Joint pain (arthritis).    Follow these instructions at home:  Take these actions to help with your pain:  · Squeeze a soft ball or a foam pad as much as you can. This helps to prevent swelling. It also makes the arm stronger.  · Take over-the-counter and prescription medicines only as told by your doctor.  · If told, put ice on the area:  ? Put ice in a plastic bag.  ? Place a towel between your skin and the bag.  ? Leave the ice on for 20 minutes, 2-3 times per day. Stop putting on ice if it does not help with the pain.  · If you were given a shoulder sling or immobilizer:  ? Wear it as told.  ? Remove it to shower or bathe.  ? Move your arm as little as possible.  ? Keep your hand moving. This helps prevent swelling.    Contact a doctor if:  · Your pain gets worse.  · Medicine does not help your pain.  · You have new pain in your arm, hand, or fingers.  Get help right away if:  · Your arm, hand, or fingers:  ? Tingle.  ? Are numb.  ? Are swollen.  ? Are painful.  ? Turn white or blue.  This information is not intended to replace advice given to you by your health care provider. Make sure you discuss any questions you have with your health care provider.  Document Released: 06/05/2009 Document Revised: 08/13/2017 Document Reviewed: 04/11/2016  [employer] Interactive Patient Education © 2019 [employer]

## 2019-07-08 NOTE — ASSESSMENT & PLAN NOTE
Regular sinus rhythm now but she had an episode of elevated heart rate up to 144 recently.  Appointment scheduled tomorrow with Dr. Charles's office.

## 2019-07-09 ENCOUNTER — LAB (OUTPATIENT)
Dept: LAB | Facility: HOSPITAL | Age: 64
End: 2019-07-09

## 2019-07-09 ENCOUNTER — OFFICE VISIT (OUTPATIENT)
Dept: CARDIOLOGY | Facility: CLINIC | Age: 64
End: 2019-07-09

## 2019-07-09 VITALS
HEART RATE: 86 BPM | DIASTOLIC BLOOD PRESSURE: 64 MMHG | SYSTOLIC BLOOD PRESSURE: 104 MMHG | BODY MASS INDEX: 34.91 KG/M2 | WEIGHT: 197 LBS | HEIGHT: 63 IN

## 2019-07-09 DIAGNOSIS — I49.3 PVC'S (PREMATURE VENTRICULAR CONTRACTIONS): ICD-10-CM

## 2019-07-09 DIAGNOSIS — I10 ESSENTIAL HYPERTENSION: ICD-10-CM

## 2019-07-09 DIAGNOSIS — I48.0 PAROXYSMAL ATRIAL FIBRILLATION (HCC): ICD-10-CM

## 2019-07-09 DIAGNOSIS — R00.2 PALPITATIONS: Primary | ICD-10-CM

## 2019-07-09 LAB
ANION GAP SERPL CALCULATED.3IONS-SCNC: 15.8 MMOL/L (ref 5–15)
BUN BLD-MCNC: 15 MG/DL (ref 8–20)
BUN/CREAT SERPL: 18.8 (ref 5.4–26.2)
CALCIUM SPEC-SCNC: 9.9 MG/DL (ref 8.9–10.3)
CHLORIDE SERPL-SCNC: 105 MMOL/L (ref 101–111)
CO2 SERPL-SCNC: 24 MMOL/L (ref 22–32)
CREAT BLD-MCNC: 0.8 MG/DL (ref 0.4–1)
GFR SERPL CREATININE-BSD FRML MDRD: 72 ML/MIN/1.73
GLUCOSE BLD-MCNC: 103 MG/DL (ref 65–99)
MAGNESIUM SERPL-MCNC: 1.8 MG/DL (ref 1.8–2.5)
POTASSIUM BLD-SCNC: 3.8 MMOL/L (ref 3.6–5.1)
SODIUM BLD-SCNC: 141 MMOL/L (ref 136–144)
TSH SERPL DL<=0.05 MIU/L-ACNC: 0.81 MIU/ML (ref 0.34–5.6)

## 2019-07-09 PROCEDURE — 84443 ASSAY THYROID STIM HORMONE: CPT | Performed by: NURSE PRACTITIONER

## 2019-07-09 PROCEDURE — 80048 BASIC METABOLIC PNL TOTAL CA: CPT | Performed by: NURSE PRACTITIONER

## 2019-07-09 PROCEDURE — 83735 ASSAY OF MAGNESIUM: CPT | Performed by: NURSE PRACTITIONER

## 2019-07-09 PROCEDURE — 93000 ELECTROCARDIOGRAM COMPLETE: CPT | Performed by: NURSE PRACTITIONER

## 2019-07-09 PROCEDURE — 99214 OFFICE O/P EST MOD 30 MIN: CPT | Performed by: NURSE PRACTITIONER

## 2019-07-09 PROCEDURE — 36415 COLL VENOUS BLD VENIPUNCTURE: CPT

## 2019-07-09 RX ORDER — METOPROLOL SUCCINATE 25 MG/1
25 TABLET, EXTENDED RELEASE ORAL DAILY
Qty: 30 TABLET | Refills: 5 | Status: SHIPPED | OUTPATIENT
Start: 2019-07-09 | End: 2019-11-04 | Stop reason: SDUPTHER

## 2019-07-09 RX ORDER — POTASSIUM CHLORIDE 750 MG/1
10 CAPSULE, EXTENDED RELEASE ORAL DAILY
Refills: 0 | COMMUNITY
Start: 2019-05-17 | End: 2019-08-14 | Stop reason: SDUPTHER

## 2019-07-09 RX ORDER — LANOLIN ALCOHOL/MO/W.PET/CERES
CREAM (GRAM) TOPICAL EVERY 12 HOURS
COMMUNITY
Start: 2018-08-08 | End: 2019-08-07

## 2019-07-09 RX ORDER — LANCETS
EACH MISCELLANEOUS
COMMUNITY
Start: 2017-05-22 | End: 2019-08-15 | Stop reason: SDUPTHER

## 2019-07-09 RX ORDER — LIDOCAINE 50 MG/G
OINTMENT TOPICAL AS NEEDED
Refills: 0 | COMMUNITY
Start: 2019-04-24 | End: 2019-08-07

## 2019-07-09 RX ORDER — DILTIAZEM HYDROCHLORIDE 120 MG/1
1 CAPSULE, COATED, EXTENDED RELEASE ORAL EVERY 24 HOURS
COMMUNITY
Start: 2018-10-02 | End: 2019-07-09

## 2019-07-09 NOTE — PROGRESS NOTES
Cardiology Office Follow Up Visit      Primary Care Provider:  Lou Curran MD    Reason for f/u: Palpitations      Subjective     CC: Denies chest pain, complains of increasing palpitations    History of Present Illness       Caterina Mason is a 63 y.o. female.  She presents back today for follow-up regarding complaints of palpitations    Her past medical history significant for hypertension, hyperlipidemia, diabetes mellitus, COPD, obstructive sleep apnea, tobacco abuse.    In February 2017, patient was admitted at [facility] with a symptom of possible bradycardia and dizziness.  Patient was seen by endocrinologist and was thought to have bradycardia and was sent to the hospital.  In the hospital she was never documented to have bradycardia.  She was noted to have sinus rhythm with frequent PVCs and ventricular bigeminy rhythm.  Holter monitor showed frequent PVCs greater than 30,000.  Stress test was negative for ischemia and echocardiogram showed normal left ventricular size and function and LVEF was 55%.  No significant valvular heart disease was noted. In August 2017, patient underwent Holter monitor which showed sinus rhythm with bundle branch block pattern and patient had frequent PACs.    In August 2018, patient was admitted at [facility] again with symptom of dizziness and possible extreme bradycardia.  She was found to be in sinus rhythm with bigeminy.  Holter monitor showed frequent premature ventricular contraction but no significant pause.  Ventricular bigeminy rhythm was noted.  Patient underwent cardiac catheterization showed no significant CAD.    She has been undergoing evaluation for memory loss.    She reports increasing feelings of palpitations and her heart racing and beating irregularly.  At times on her home blood pressure monitor she says her heart rate has been in the 110's to 130s    Past Medical History:   Diagnosis Date   • Arthritis    • Bradycardia       [place]   • Cataract    • COPD (chronic obstructive pulmonary disease) (CMS/HCC)     Dr. Rob   • Diabetes mellitus, type 2 (CMS/HCC)     sees Dr. Archibald at [place]   • DJD (degenerative joint disease)     possible herniated disc, sees Pain Mgmt in [place]   •     • GERD (gastroesophageal reflux disease)    • History of combined right and left heart catheterization 2018    minimal CAD on heart cath done    • Hyperlipidemia    • Hypertension    • Sleep apnea     wears CPAP machine, sees Darron       Past Surgical History:   Procedure Laterality Date   • CARDIAC CATHETERIZATION      and Angiograph    •  SECTION      x 1   • COLOSTOMY      and reversal in her 20's due to problems with childbirth   • OVARIAN CYST SURGERY     • ROTATOR CUFF REPAIR Left 2009    x2    • TOTAL ABDOMINAL HYSTERECTOMY WITH SALPINGO OOPHORECTOMY           Current Outpatient Medications:   •  ACCU-CHEK CARLIE PLUS test strip, USE TO CHECK BLOOD SUGAR TWICE A DAY, Disp: , Rfl: 4  •  ACCU-CHEK FASTCLIX LANCETS misc, ACCU-CHEK FASTCLIX LANCETS, Disp: , Rfl:   •  ACCU-CHEK SOFTCLIX LANCETS lancets, USE TO CHECK BLOOD SUGAR TWICE A DAY, Disp: , Rfl: 4  •  albuterol sulfate HFA (VENTOLIN HFA) 108 (90 Base) MCG/ACT inhaler, VENTOLIN  (90 Base) MCG/ACT AERS, Disp: , Rfl:   •  apixaban (ELIQUIS) 5 MG tablet tablet, Take 5 mg by mouth 2 (Two) Times a Day., Disp: , Rfl:   •  aspirin (ADULT ASPIRIN EC LOW STRENGTH) 81 MG EC tablet, Take 81 mg by mouth Daily., Disp: , Rfl:   •  atorvastatin (LIPITOR) 40 MG tablet, Daily., Disp: , Rfl:   •  Cholecalciferol ([employer]S D3) 1000 units capsule, 1 capsule Every 12 (Twelve) Hours., Disp: , Rfl:   •  diltiaZEM CD (CARDIZEM CD) 120 MG 24 hr capsule, TAKE 1 CAPSULE BY MOUTH EVERY DAY, Disp: 90 capsule, Rfl: 3  •  Empagliflozin (JARDIANCE) 10 MG tablet, Daily., Disp: , Rfl:   •  fenofibrate (TRICOR) 145 MG tablet, Daily., Disp: , Rfl:   •  Fluticasone-Salmeterol (AIRDUO RESPICLICK 113/14)  113-14 MCG/ACT aerosol powder , Inhale 1 puff 2 (Two) Times a Day., Disp: 1 each, Rfl: 5  •  glucose blood (ACCU-CHEK CARLIE PLUS) test strip, ACCU-CHEK CARLIE PLUS STRP, Disp: , Rfl:   •  hydrALAZINE (APRESOLINE) 50 MG tablet, 25 mg Every 12 (Twelve) Hours., Disp: , Rfl:   •  hydrochlorothiazide (HYDRODIURIL) 25 MG tablet, Daily., Disp: , Rfl:   •  HYDROcodone-acetaminophen (LORTAB) 7.5-325 MG per tablet, LORTAB 7.5-325 MG ORAL TABLET, Disp: , Rfl:   •  lidocaine (XYLOCAINE) 5 % ointment, As Needed., Disp: , Rfl: 0  •  lisinopril (PRINIVIL,ZESTRIL) 20 MG tablet, Daily., Disp: , Rfl:   •  magnesium oxide (MAG-OX) 400 MG tablet, Take 1 tablet by mouth 2 (Two) Times a Day., Disp: , Rfl: 1  •  Magnesium Oxide 400 (240 Mg) MG tablet, Every 12 (Twelve) Hours., Disp: , Rfl:   •  metFORMIN (GLUCOPHAGE) 1000 MG tablet, Every 12 (Twelve) Hours., Disp: , Rfl:   •  potassium chloride (MICRO-K) 10 MEQ CR capsule, Take 10 mEq by mouth Daily., Disp: , Rfl: 0  •  SYMBICORT 160-4.5 MCG/ACT inhaler, Inhale 2 puffs 2 (Two) Times a Day., Disp: , Rfl: 4  •  metoprolol succinate XL (TOPROL-XL) 25 MG 24 hr tablet, Take 1 tablet by mouth Daily., Disp: 30 tablet, Rfl: 5    Social History     Socioeconomic History   • Marital status:      Spouse name: Not on file   • Number of children: Not on file   • Years of education: Not on file   • Highest education level: Not on file   Tobacco Use   • Smoking status: Current Every Day Smoker     Packs/day: 1.50     Years: 42.00     Pack years: 63.00   • Smokeless tobacco: Never Used   Substance and Sexual Activity   • Alcohol use: No     Frequency: Never   • Drug use: No       Family History   Problem Relation Age of Onset   • Heart disease Mother    • Hypertension Mother    • Stroke Mother    • Seizures Mother    • Heart disease Father    • Hypertension Father    • Lung disease Father    • Stroke Father    • Cancer Sister    • Hypertension Brother    • Diabetes Brother    • Hyperlipidemia Other  "       The following portions of the patient's history were reviewed and updated as appropriate: allergies, current medications, past family history, past medical history, past social history, past surgical history and problem list.    Review of Systems   Constitution: Negative for decreased appetite, diaphoresis and weakness.   HENT: Negative for congestion, hearing loss and nosebleeds.    Cardiovascular: Positive for irregular heartbeat and palpitations. Negative for chest pain, claudication, dyspnea on exertion, leg swelling, near-syncope, orthopnea, paroxysmal nocturnal dyspnea and syncope.   Respiratory: Negative for cough, shortness of breath and sleep disturbances due to breathing.    Endocrine: Negative for polyuria.   Hematologic/Lymphatic: Does not bruise/bleed easily.   Skin: Negative for itching and rash.   Musculoskeletal: Negative for back pain, muscle weakness and myalgias.   Gastrointestinal: Negative for abdominal pain, change in bowel habit and nausea.   Genitourinary: Negative for dysuria, flank pain, frequency and hesitancy.   Neurological: Negative for dizziness and tremors.   Psychiatric/Behavioral: Negative for altered mental status. The patient does not have insomnia.      /64   Pulse 86   Ht 160 cm (63\")   Wt 89.4 kg (197 lb)   BMI 34.90 kg/m² .  Objective     Physical Exam   Constitutional: She is oriented to person, place, and time. She appears well-developed and well-nourished. No distress.   HENT:   Head: Normocephalic and atraumatic.   Eyes: Pupils are equal, round, and reactive to light.   Neck: Normal range of motion. Neck supple. No JVD present.   Cardiovascular: Normal rate, S1 normal, S2 normal, normal heart sounds and intact distal pulses. An irregular rhythm present.   No murmur heard.  Pulmonary/Chest: Effort normal and breath sounds normal.   Abdominal: Soft. Normal appearance. She exhibits no distension. There is no tenderness.   Musculoskeletal: Normal range of " motion. She exhibits no edema.   Neurological: She is alert and oriented to person, place, and time.   Skin: Skin is warm and dry.   Psychiatric: She has a normal mood and affect.         EKG: normal sinus rhythm, RBBB, frequent PVC's noted, heart rate 86.      Diagnoses and all orders for this visit:    1. Palpitations (Primary)  Comments:  Complains of increasing palpitations    2. PVC's (premature ventricular contractions)  Comments:  Frequent unifocal PVCs noted on EKG, previous Holter with very frequent PVCs in 2017.  Orders:  -     ECG 12 Lead  -     Holter Monitor - 24 Hour; Future  -     Basic Metabolic Panel; Future  -     Magnesium; Future  -     TSH; Future    3. Essential hypertension  Comments:  Stable on current medication, will reduce hydralazine and lisinopril to allow addition of low-dose beta blocker.    4. Paroxysmal atrial fibrillation (CMS/HCC)  Comments:  No evidence of atrial fibrillation    Other orders  -     metoprolol succinate XL (TOPROL-XL) 25 MG 24 hr tablet; Take 1 tablet by mouth Daily.  Dispense: 30 tablet; Refill: 5        Plan:  Decrease lisinopril to 20 mg once daily  Decrease hydralazine to 25 mg twice daily  Add Toprol-XL 25 mg once daily  Check BMP, magnesium, TSH today  Place Holter monitor today to quantitate PVC burden  Anticipate referral to electrophysiology if PVC burden is elevated.  Advised to reduce or limit caffeine and alcohol and encouraged to stop smoking  Return to clinic in 4 weeks         Next follow-up appointment: 4 weeks

## 2019-07-12 RX ORDER — APIXABAN 5 MG/1
TABLET, FILM COATED ORAL
Qty: 60 TABLET | Refills: 3 | Status: SHIPPED | OUTPATIENT
Start: 2019-07-12 | End: 2019-11-03 | Stop reason: SDUPTHER

## 2019-07-16 ENCOUNTER — TELEPHONE (OUTPATIENT)
Dept: FAMILY MEDICINE CLINIC | Facility: CLINIC | Age: 64
End: 2019-07-16

## 2019-07-16 NOTE — TELEPHONE ENCOUNTER
The pts MRI was denied by insurance.  I have cancelled the appt with the hospital and I have contacted the patient.  The patient stated that she is in pain and that something definitely messed up.  What do you want to do now?

## 2019-07-17 ENCOUNTER — APPOINTMENT (OUTPATIENT)
Dept: MRI IMAGING | Facility: HOSPITAL | Age: 64
End: 2019-07-17

## 2019-07-23 ENCOUNTER — TELEPHONE (OUTPATIENT)
Dept: FAMILY MEDICINE CLINIC | Facility: CLINIC | Age: 64
End: 2019-07-23

## 2019-07-23 NOTE — TELEPHONE ENCOUNTER
[place] with [facility] called asking that you consider Zestoretic combo med for the pt to meet compliance instead of the separate Linsinopril and HTCZ rx's that she is currently taking. If agreed pls send rx to pt pharmacy  [phone]

## 2019-07-24 RX ORDER — LISINOPRIL AND HYDROCHLOROTHIAZIDE 25; 20 MG/1; MG/1
1 TABLET ORAL DAILY
Qty: 90 TABLET | Refills: 3 | Status: SHIPPED | OUTPATIENT
Start: 2019-07-24 | End: 2019-08-07

## 2019-07-29 ENCOUNTER — HOSPITAL ENCOUNTER (EMERGENCY)
Facility: HOSPITAL | Age: 64
Discharge: HOME | End: 2019-07-29
Attending: EMERGENCY MEDICINE | Admitting: EMERGENCY MEDICINE

## 2019-07-29 ENCOUNTER — APPOINTMENT (OUTPATIENT)
Dept: GENERAL RADIOLOGY | Facility: HOSPITAL | Age: 64
End: 2019-07-29

## 2019-07-29 VITALS
OXYGEN SATURATION: 97 % | WEIGHT: 196.65 LBS | RESPIRATION RATE: 18 BRPM | SYSTOLIC BLOOD PRESSURE: 126 MMHG | HEART RATE: 76 BPM | HEIGHT: 64 IN | DIASTOLIC BLOOD PRESSURE: 57 MMHG | TEMPERATURE: 98.6 F | BODY MASS INDEX: 33.57 KG/M2

## 2019-07-29 DIAGNOSIS — I49.3 PVC (PREMATURE VENTRICULAR CONTRACTION): ICD-10-CM

## 2019-07-29 DIAGNOSIS — E87.6 HYPOKALEMIA: ICD-10-CM

## 2019-07-29 DIAGNOSIS — M54.12 CERVICAL RADICULITIS: Primary | ICD-10-CM

## 2019-07-29 LAB
ANION GAP SERPL CALCULATED.3IONS-SCNC: 14.5 MMOL/L (ref 5–15)
BUN BLD-MCNC: 16 MG/DL (ref 8–20)
BUN/CREAT SERPL: 17.8 (ref 5.4–26.2)
CALCIUM SPEC-SCNC: 9.4 MG/DL (ref 8.9–10.3)
CHLORIDE SERPL-SCNC: 105 MMOL/L (ref 101–111)
CO2 SERPL-SCNC: 22 MMOL/L (ref 22–32)
CREAT BLD-MCNC: 0.9 MG/DL (ref 0.4–1)
GFR SERPL CREATININE-BSD FRML MDRD: 63 ML/MIN/1.73
GLUCOSE BLD-MCNC: 140 MG/DL (ref 65–99)
POTASSIUM BLD-SCNC: 3.5 MMOL/L (ref 3.6–5.1)
SODIUM BLD-SCNC: 138 MMOL/L (ref 136–144)
WHOLE BLOOD HOLD SPECIMEN: NORMAL

## 2019-07-29 PROCEDURE — 99283 EMERGENCY DEPT VISIT LOW MDM: CPT

## 2019-07-29 PROCEDURE — 80048 BASIC METABOLIC PNL TOTAL CA: CPT | Performed by: EMERGENCY MEDICINE

## 2019-07-29 PROCEDURE — 93005 ELECTROCARDIOGRAM TRACING: CPT | Performed by: EMERGENCY MEDICINE

## 2019-07-29 PROCEDURE — 96374 THER/PROPH/DIAG INJ IV PUSH: CPT

## 2019-07-29 PROCEDURE — 25010000002 HYDROMORPHONE PER 4 MG: Performed by: EMERGENCY MEDICINE

## 2019-07-29 PROCEDURE — 72040 X-RAY EXAM NECK SPINE 2-3 VW: CPT

## 2019-07-29 RX ORDER — POTASSIUM CHLORIDE 20 MEQ/1
40 TABLET, EXTENDED RELEASE ORAL ONCE
Status: DISCONTINUED | OUTPATIENT
Start: 2019-07-29 | End: 2019-07-30 | Stop reason: HOSPADM

## 2019-07-29 RX ORDER — HYDROMORPHONE HCL 110MG/55ML
1 PATIENT CONTROLLED ANALGESIA SYRINGE INTRAVENOUS ONCE
Status: COMPLETED | OUTPATIENT
Start: 2019-07-29 | End: 2019-07-29

## 2019-07-29 RX ADMIN — HYDROMORPHONE HYDROCHLORIDE 1 MG: 2 INJECTION, SOLUTION INTRAMUSCULAR; INTRAVENOUS; SUBCUTANEOUS at 20:22

## 2019-08-02 ENCOUNTER — OFFICE VISIT (OUTPATIENT)
Dept: FAMILY MEDICINE CLINIC | Facility: CLINIC | Age: 64
End: 2019-08-02

## 2019-08-02 VITALS
DIASTOLIC BLOOD PRESSURE: 72 MMHG | WEIGHT: 194 LBS | BODY MASS INDEX: 33.72 KG/M2 | SYSTOLIC BLOOD PRESSURE: 138 MMHG | OXYGEN SATURATION: 95 % | HEART RATE: 76 BPM | TEMPERATURE: 99 F

## 2019-08-02 DIAGNOSIS — M54.12 CERVICAL RADICULITIS: Primary | ICD-10-CM

## 2019-08-02 PROCEDURE — 99213 OFFICE O/P EST LOW 20 MIN: CPT | Performed by: FAMILY MEDICINE

## 2019-08-04 RX ORDER — HYDROCHLOROTHIAZIDE 25 MG/1
TABLET ORAL
Qty: 45 TABLET | Refills: 1 | Status: ON HOLD | OUTPATIENT
Start: 2019-08-04 | End: 2019-12-10

## 2019-08-07 ENCOUNTER — OFFICE VISIT (OUTPATIENT)
Dept: CARDIOLOGY | Facility: CLINIC | Age: 64
End: 2019-08-07

## 2019-08-07 VITALS
SYSTOLIC BLOOD PRESSURE: 116 MMHG | HEART RATE: 74 BPM | HEIGHT: 63 IN | WEIGHT: 195 LBS | DIASTOLIC BLOOD PRESSURE: 60 MMHG | BODY MASS INDEX: 34.55 KG/M2

## 2019-08-07 DIAGNOSIS — I10 ESSENTIAL HYPERTENSION: ICD-10-CM

## 2019-08-07 DIAGNOSIS — E78.2 MIXED HYPERLIPIDEMIA: Primary | ICD-10-CM

## 2019-08-07 DIAGNOSIS — I48.0 PAROXYSMAL ATRIAL FIBRILLATION (HCC): ICD-10-CM

## 2019-08-07 DIAGNOSIS — R00.2 PALPITATIONS: ICD-10-CM

## 2019-08-07 DIAGNOSIS — E11.65 TYPE 2 DIABETES MELLITUS WITH HYPERGLYCEMIA, UNSPECIFIED WHETHER LONG TERM INSULIN USE (HCC): ICD-10-CM

## 2019-08-07 PROCEDURE — 99214 OFFICE O/P EST MOD 30 MIN: CPT | Performed by: INTERNAL MEDICINE

## 2019-08-07 RX ORDER — LISINOPRIL 20 MG/1
20 TABLET ORAL DAILY
COMMUNITY
End: 2019-08-23 | Stop reason: SDUPTHER

## 2019-08-07 RX ORDER — HYDRALAZINE HYDROCHLORIDE 50 MG/1
TABLET, FILM COATED ORAL
Qty: 60 TABLET | Refills: 1 | Status: SHIPPED | OUTPATIENT
Start: 2019-08-07 | End: 2019-09-04 | Stop reason: SDUPTHER

## 2019-08-07 RX ORDER — ALBUTEROL SULFATE 90 UG/1
2 AEROSOL, METERED RESPIRATORY (INHALATION) EVERY 4 HOURS PRN
COMMUNITY
End: 2019-08-30 | Stop reason: SDUPTHER

## 2019-08-07 NOTE — PROGRESS NOTES
Date of Office Visit: 2019  Encounter Provider: Dr. Jesse Charles  Place of Service: [facility] CARDIOLOGY [place]  Patient Name: Caterina Mason  :1955  Lou Curran MD    Chief Complaint   Patient presents with   • Chest Pain  Atrial fibrillation  Premature ventricular contraction       hospital follow up holter monitor     History of Present Illness    Caterina Mason is a 63 y.o. female.  She presents back today for follow-up regarding complaints of palpitations     Her past medical history significant for hypertension, hyperlipidemia, diabetes mellitus, COPD, obstructive sleep apnea, tobacco abuse.     In 2017, patient was admitted at [facility] with a symptom of possible bradycardia and dizziness.  Patient was seen by endocrinologist and was thought to have bradycardia and was sent to the hospital.  In the hospital she was never documented to have bradycardia.  She was noted to have sinus rhythm with frequent PVCs and ventricular bigeminy rhythm.  Holter monitor showed frequent PVCs greater than 30,000.  Stress test was negative for ischemia and echocardiogram showed normal left ventricular size and function and LVEF was 55%.  No significant valvular heart disease was noted. In 2017, patient underwent Holter monitor which showed sinus rhythm with bundle branch block pattern and patient had frequent PACs.     In 2018, patient was admitted at [facility] again with symptom of dizziness and possible extreme bradycardia.  She was found to be in sinus rhythm with bigeminy.  Holter monitor showed frequent premature ventricular contraction but no significant pause.  Ventricular bigeminy rhythm was noted.  Patient underwent cardiac catheterization showed no significant CAD..  In 2019, patient underwent Holter monitor it showed frequent PVCs greater than 18,000.  No significant bradycardia noted.  No SVT or VT was present    In 2019, patient  was evaluated at emergency room of [facility] with symptom of chest pain.  She was evaluated and was released.  Since then she is doing well.  She denies any symptom of angina pectoris or congestive heart failure.  No orthopnea, PND, syncope or presyncope.  No leg edema noted.  No significant palpitation.    At this stage I will continue current treatment.  Her symptom of fatigue and tiredness is most likely due to sleep apnea.  Patient does not have significant bradycardia at this stage.    Past Medical History:   Diagnosis Date   • Arthritis    • Bradycardia     Dr. Charles   • Cataract    • COPD (chronic obstructive pulmonary disease) (CMS/[facility])     Dr. Rob   • Diabetes mellitus, type 2 (CMS/[facility])     sees Dr. Archibald at [place]   • DJD (degenerative joint disease)     possible herniated disc, sees Pain Mgmt in [place]   •     • GERD (gastroesophageal reflux disease)    • History of combined right and left heart catheterization 2018    minimal CAD on heart cath done    • Hyperlipidemia    • Hypertension    • Sleep apnea     wears CPAP machine, sees Darron         Past Surgical History:   Procedure Laterality Date   • CARDIAC CATHETERIZATION      and Angiograph    •  SECTION      x 1   • COLOSTOMY      and reversal in her 20's due to problems with childbirth   • OVARIAN CYST SURGERY     • ROTATOR CUFF REPAIR Left 2009    x2    • TOTAL ABDOMINAL HYSTERECTOMY WITH SALPINGO OOPHORECTOMY             Current Outpatient Medications:   •  ACCU-CHEK CARLIE PLUS test strip, USE TO CHECK BLOOD SUGAR TWICE A DAY, Disp: , Rfl: 4  •  ACCU-CHEK FASTCLIX LANCETS misc, ACCU-CHEK FASTCLIX LANCETS, Disp: , Rfl:   •  ACCU-CHEK SOFTCLIX LANCETS lancets, USE TO CHECK BLOOD SUGAR TWICE A DAY, Disp: , Rfl: 4  •  albuterol sulfate  (90 Base) MCG/ACT inhaler, Inhale 2 puffs Every 4 (Four) Hours As Needed for Wheezing., Disp: , Rfl:   •  aspirin (ADULT ASPIRIN EC LOW STRENGTH) 81 MG EC tablet, Take  81 mg by mouth Daily., Disp: , Rfl:   •  atorvastatin (LIPITOR) 40 MG tablet, Take 20 mg by mouth Daily., Disp: , Rfl:   •  Cholecalciferol (CVS D3) 1000 units capsule, 1 capsule Every 12 (Twelve) Hours., Disp: , Rfl:   •  diltiaZEM CD (CARDIZEM CD) 120 MG 24 hr capsule, TAKE 1 CAPSULE BY MOUTH EVERY DAY, Disp: 90 capsule, Rfl: 3  •  ELIQUIS 5 MG tablet tablet, TAKE 1 TABLET BY MOUTH TWICE A DAY, Disp: 60 tablet, Rfl: 3  •  Empagliflozin (JARDIANCE) 10 MG tablet, 10 mg Daily., Disp: , Rfl:   •  fenofibrate (TRICOR) 145 MG tablet, Take 145 mg by mouth Daily., Disp: , Rfl:   •  Fluticasone-Salmeterol (AIRDUO RESPICLICK 113/14) 113-14 MCG/ACT aerosol powder , Inhale 1 puff 2 (Two) Times a Day., Disp: 1 each, Rfl: 5  •  hydrALAZINE (APRESOLINE) 50 MG tablet, 25 mg Every 12 (Twelve) Hours., Disp: , Rfl:   •  hydrochlorothiazide (HYDRODIURIL) 25 MG tablet, TAKE 1/2 TABLET BY MOUTH EVERY MORNING, Disp: 45 tablet, Rfl: 1  •  HYDROcodone-acetaminophen (LORTAB) 7.5-325 MG per tablet, LORTAB 7.5-325 MG ORAL TABLET, Disp: , Rfl:   •  lisinopril (PRINIVIL,ZESTRIL) 20 MG tablet, Take 20 mg by mouth Daily., Disp: , Rfl:   •  magnesium oxide (MAG-OX) 400 MG tablet, Take 1 tablet by mouth 2 (Two) Times a Day., Disp: , Rfl: 1  •  metFORMIN (GLUCOPHAGE) 1000 MG tablet, Every 12 (Twelve) Hours., Disp: , Rfl:   •  metoprolol succinate XL (TOPROL-XL) 25 MG 24 hr tablet, Take 1 tablet by mouth Daily., Disp: 30 tablet, Rfl: 5  •  potassium chloride (MICRO-K) 10 MEQ CR capsule, Take 10 mEq by mouth Daily., Disp: , Rfl: 0  •  SYMBICORT 160-4.5 MCG/ACT inhaler, Inhale 2 puffs 2 (Two) Times a Day., Disp: , Rfl: 4      Social History     Socioeconomic History   • Marital status:      Spouse name: Not on file   • Number of children: Not on file   • Years of education: Not on file   • Highest education level: Not on file   Tobacco Use   • Smoking status: Current Every Day Smoker     Packs/day: 1.50     Years: 42.00     Pack years: 63.00  "  • Smokeless tobacco: Never Used   Substance and Sexual Activity   • Alcohol use: No     Frequency: Never   • Drug use: No   • Sexual activity: Defer         Review of Systems   Constitution: Positive for malaise/fatigue. Negative for chills and fever.   HENT: Negative for ear discharge and nosebleeds.    Eyes: Negative for discharge and redness.   Cardiovascular: Negative for chest pain, orthopnea, palpitations, paroxysmal nocturnal dyspnea and syncope.   Respiratory: Positive for shortness of breath. Negative for cough and wheezing.    Endocrine: Negative for heat intolerance.   Skin: Negative for rash.   Musculoskeletal: Positive for arthritis. Negative for myalgias.   Gastrointestinal: Negative for abdominal pain, melena, nausea and vomiting.   Genitourinary: Negative for dysuria and hematuria.   Neurological: Negative for dizziness, light-headedness, numbness and tremors.   Psychiatric/Behavioral: Negative for depression. The patient is not nervous/anxious.        Procedures    Procedures    No orders to display           Objective:    /60   Pulse 74   Ht 160 cm (63\")   Wt 88.5 kg (195 lb)   BMI 34.54 kg/m²         Physical Exam   Constitutional: She is oriented to person, place, and time. She appears well-developed and well-nourished.   HENT:   Head: Normocephalic and atraumatic.   Eyes: No scleral icterus.   Neck: No thyromegaly present.   Cardiovascular: Normal rate, regular rhythm and normal heart sounds. Exam reveals no gallop and no friction rub.   No murmur heard.  Pulmonary/Chest: Effort normal and breath sounds normal. No respiratory distress. She has no wheezes. She has no rales.   Abdominal: There is no tenderness.   Musculoskeletal: She exhibits no edema.   Lymphadenopathy:     She has no cervical adenopathy.   Neurological: She is alert and oriented to person, place, and time.   Skin: No rash noted. No erythema.   Psychiatric: She has a normal mood and affect.           Assessment:       " Diagnosis Plan   1. Mixed hyperlipidemia     2. Paroxysmal atrial fibrillation (CMS/HCC)     3. Palpitations     4. Essential hypertension              Plan:       At present from a cardiac standpoint, patient is doing well.  Patient is maintaining sinus rhythm.  I will continue Eliquis.  Continue Cardizem.  Her symptom of fatigue and tiredness is most likely due to underlying sleep apnea.

## 2019-08-08 ENCOUNTER — LAB (OUTPATIENT)
Dept: LAB | Facility: HOSPITAL | Age: 64
End: 2019-08-08

## 2019-08-08 DIAGNOSIS — E78.2 MIXED HYPERLIPIDEMIA: ICD-10-CM

## 2019-08-08 DIAGNOSIS — I10 ESSENTIAL HYPERTENSION: ICD-10-CM

## 2019-08-08 DIAGNOSIS — E11.65 TYPE 2 DIABETES MELLITUS WITH HYPERGLYCEMIA, UNSPECIFIED WHETHER LONG TERM INSULIN USE (HCC): ICD-10-CM

## 2019-08-08 LAB
ALBUMIN SERPL-MCNC: 4.2 G/DL (ref 3.5–4.8)
ALBUMIN UR-MCNC: 8 MG/L
ALBUMIN/GLOB SERPL: 1.4 G/DL (ref 1–1.7)
ALP SERPL-CCNC: 46 U/L (ref 32–91)
ALT SERPL W P-5'-P-CCNC: 32 U/L (ref 14–54)
ANION GAP SERPL CALCULATED.3IONS-SCNC: 12.7 MMOL/L (ref 5–15)
ARTICHOKE IGE QN: 82 MG/DL (ref 0–100)
AST SERPL-CCNC: 25 U/L (ref 15–41)
BILIRUB SERPL-MCNC: 0.6 MG/DL (ref 0.3–1.2)
BUN BLD-MCNC: 15 MG/DL (ref 8–20)
BUN/CREAT SERPL: 21.4 (ref 5.4–26.2)
CALCIUM SPEC-SCNC: 9.9 MG/DL (ref 8.9–10.3)
CHLORIDE SERPL-SCNC: 106 MMOL/L (ref 101–111)
CHOLEST SERPL-MCNC: 133 MG/DL
CO2 SERPL-SCNC: 25 MMOL/L (ref 22–32)
CREAT BLD-MCNC: 0.7 MG/DL (ref 0.4–1)
CREAT UR-MCNC: 102.9 MG/DL
GFR SERPL CREATININE-BSD FRML MDRD: 85 ML/MIN/1.73
GLOBULIN UR ELPH-MCNC: 3 GM/DL (ref 2.5–3.8)
GLUCOSE BLD-MCNC: 125 MG/DL (ref 65–99)
HBA1C MFR BLD: 6.9 % (ref 3.5–5.6)
HDLC SERPL QL: 4.29
HDLC SERPL-MCNC: 31 MG/DL
LDLC/HDLC SERPL: 2.14 {RATIO}
MICROALBUMIN/CREAT UR: 7.8 UG/MG
POTASSIUM BLD-SCNC: 3.7 MMOL/L (ref 3.6–5.1)
PROT SERPL-MCNC: 7.2 G/DL (ref 6.1–7.9)
SODIUM BLD-SCNC: 140 MMOL/L (ref 136–144)
TRIGL SERPL-MCNC: 178 MG/DL
VLDLC SERPL-MCNC: 35.6 MG/DL

## 2019-08-08 PROCEDURE — 36415 COLL VENOUS BLD VENIPUNCTURE: CPT

## 2019-08-08 PROCEDURE — 83036 HEMOGLOBIN GLYCOSYLATED A1C: CPT | Performed by: INTERNAL MEDICINE

## 2019-08-08 PROCEDURE — 82043 UR ALBUMIN QUANTITATIVE: CPT | Performed by: INTERNAL MEDICINE

## 2019-08-08 PROCEDURE — 82570 ASSAY OF URINE CREATININE: CPT | Performed by: INTERNAL MEDICINE

## 2019-08-08 PROCEDURE — 80061 LIPID PANEL: CPT | Performed by: INTERNAL MEDICINE

## 2019-08-08 PROCEDURE — 80053 COMPREHEN METABOLIC PANEL: CPT | Performed by: INTERNAL MEDICINE

## 2019-08-12 ENCOUNTER — TELEPHONE (OUTPATIENT)
Dept: FAMILY MEDICINE CLINIC | Facility: CLINIC | Age: 64
End: 2019-08-12

## 2019-08-12 RX ORDER — FLUCONAZOLE 150 MG/1
150 TABLET ORAL ONCE
Qty: 1 TABLET | Refills: 0 | Status: SHIPPED | OUTPATIENT
Start: 2019-08-12 | End: 2019-08-12

## 2019-08-15 ENCOUNTER — OFFICE VISIT (OUTPATIENT)
Dept: ENDOCRINOLOGY | Facility: CLINIC | Age: 64
End: 2019-08-15

## 2019-08-15 VITALS
SYSTOLIC BLOOD PRESSURE: 122 MMHG | HEART RATE: 71 BPM | OXYGEN SATURATION: 95 % | BODY MASS INDEX: 34.02 KG/M2 | WEIGHT: 192 LBS | DIASTOLIC BLOOD PRESSURE: 65 MMHG | HEIGHT: 63 IN

## 2019-08-15 DIAGNOSIS — E78.5 HYPERLIPIDEMIA, UNSPECIFIED HYPERLIPIDEMIA TYPE: ICD-10-CM

## 2019-08-15 DIAGNOSIS — I10 ESSENTIAL HYPERTENSION: ICD-10-CM

## 2019-08-15 DIAGNOSIS — E11.65 TYPE 2 DIABETES MELLITUS WITH HYPERGLYCEMIA, WITHOUT LONG-TERM CURRENT USE OF INSULIN (HCC): Primary | ICD-10-CM

## 2019-08-15 PROCEDURE — 99214 OFFICE O/P EST MOD 30 MIN: CPT | Performed by: INTERNAL MEDICINE

## 2019-08-15 RX ORDER — LANCETS
1 EACH MISCELLANEOUS 2 TIMES DAILY
Qty: 100 EACH | Refills: 3 | Status: SHIPPED | OUTPATIENT
Start: 2019-08-15 | End: 2020-09-14 | Stop reason: SDUPTHER

## 2019-08-15 NOTE — PROGRESS NOTES
Endocrine Progress Note Outpatient     Patient Care Team:  Lou Curran MD as PCP - General  Provider, No Known as PCP - Family Medicine  Jesse Charles MD as Consulting Physician (Cardiology)  Bautista Archibald MD as Consulting Physician (Endocrinology)  Raymond Piper MD as Consulting Physician (Pulmonary Disease)  Jane Montero MD as Consulting Physician (Obstetrics and Gynecology)    Chief Complaint: Follow-up type 2 diabetes    HPI: 63-year-old female with history of type 2 diabetes, hypertension and hyperlipidemia is here for follow-up.  For type 2 diabetes she is currently on metformin 1000 g twice a day and Jardiance 10 mg p.o. daily.  She is tolerating her medications well.  Sugars at home are doing fairly well unfortunately did not bring blood sugar records for review.  Hypertension: Well-controlled  Hyperlipidemia: On atorvastatin and fenofibrate.    Past Medical History:   Diagnosis Date   • Arthritis    • Bradycardia     Dr. Charles   • Cataract    • COPD (chronic obstructive pulmonary disease) (CMS/[facility])     Dr. Rob   • Diabetes mellitus, type 2 (CMS/[facility])     sees Dr. Archibald at [place]   • DJD (degenerative joint disease)     possible herniated disc, sees Pain Mgmt in [place]   •     • GERD (gastroesophageal reflux disease)    • History of combined right and left heart catheterization 2018    minimal CAD on heart cath done    • Hyperlipidemia    • Hypertension    • Pain    • Sleep apnea     wears CPAP machine, sees Darron       Social History     Socioeconomic History   • Marital status:      Spouse name: Not on file   • Number of children: Not on file   • Years of education: Not on file   • Highest education level: Not on file   Tobacco Use   • Smoking status: Current Every Day Smoker     Packs/day: 1.50     Years: 42.00     Pack years: 63.00   • Smokeless tobacco: Never Used   Substance and Sexual Activity   • Alcohol use: No     Frequency: Never   • Drug use: No   • Sexual  activity: Defer       Family History   Problem Relation Age of Onset   • Heart disease Mother    • Hypertension Mother    • Stroke Mother    • Seizures Mother    • Heart disease Father    • Hypertension Father    • Lung disease Father    • Stroke Father    • Cancer Sister    • Hypertension Brother    • Diabetes Brother    • Hyperlipidemia Other        Allergies   Allergen Reactions   • Ibuprofen GI Intolerance   • Prednisone Other (See Comments)   • Pregabalin Other (See Comments)     jerking   • Tramadol Other (See Comments)     Legs jerked   • Levofloxacin Other (See Comments)     Increase sunburn   • Sulfamethoxazole-Trimethoprim Swelling       ROS:   Constitutional:  Admit fatigue, tiredness.    Eyes:  Denies change in visual acuity   HENT:  Denies nasal congestion or sore throat   Respiratory: denies cough, shortness of breath.   Cardiovascular:  denies chest pain, edema   GI:  Denies abdominal pain, nausea, vomiting.   Musculoskeletal:   Admit muscle aches and cramps.  Integument:  Denies dry skin and rash   Neurologic:  Denies headache, focal weakness or sensory changes   Endocrine:  Denies polyuria or polydipsia   Psychiatric:  Denies depression or anxiety      Vitals:    08/15/19 1000   BP: 122/65   Pulse: 71   SpO2: 95%       Physical Exam:  GEN: NAD, conversant  EYES: EOMI, PERRL, no conjunctival erythema  NECK: no thyromegaly, full ROM   CV: RRR, no murmurs/rubs/gallops, no peripheral edema  LUNG: CTAB, no wheezes/rales/ronchi  SKIN: no rashes, no acanthosis  MSK: no deformities, full ROM of all extremities  NEURO: no tremors, DTR normal  PSYCH: AOX3, appropriate mood, affect normal      Results Review:     I reviewed the patient's new clinical results.    Lab Results   Component Value Date    HGBA1C 6.9 (H) 08/08/2019    HGBA1C 6.8 (H) 05/08/2019      Lab Results   Component Value Date    GLUCOSE 125 (H) 08/08/2019    BUN 15 08/08/2019    CREATININE 0.70 08/08/2019    EGFRIFNONA 85 08/08/2019    BCR  21.4 08/08/2019    K 3.7 08/08/2019    CO2 25.0 08/08/2019    CALCIUM 9.9 08/08/2019    ALBUMIN 4.20 08/08/2019    LABIL2 1.4 05/08/2019    AST 25 08/08/2019    ALT 32 08/08/2019    CHOL 133 08/08/2019    TRIG 178 (H) 08/08/2019    LDL 82 08/08/2019    HDL 31 (L) 08/08/2019     Lab Results   Component Value Date    TSH 0.810 07/09/2019         Medication Review: Reviewed.       Current Outpatient Medications:   •  ACCU-CHEK CARLIE PLUS test strip, USE TO CHECK BLOOD SUGAR TWICE A DAY, Disp: , Rfl: 4  •  ACCU-CHEK FASTCLIX LANCETS misc, ACCU-CHEK FASTCLIX LANCETS, Disp: , Rfl:   •  ACCU-CHEK SOFTCLIX LANCETS lancets, USE TO CHECK BLOOD SUGAR TWICE A DAY, Disp: , Rfl: 4  •  albuterol sulfate  (90 Base) MCG/ACT inhaler, Inhale 2 puffs Every 4 (Four) Hours As Needed for Wheezing., Disp: , Rfl:   •  atorvastatin (LIPITOR) 40 MG tablet, Take 20 mg by mouth Daily., Disp: , Rfl:   •  Cholecalciferol ([employer]S D3) 1000 units capsule, 1 capsule Every 12 (Twelve) Hours., Disp: , Rfl:   •  diltiaZEM CD (CARDIZEM CD) 120 MG 24 hr capsule, TAKE 1 CAPSULE BY MOUTH EVERY DAY, Disp: 90 capsule, Rfl: 3  •  ELIQUIS 5 MG tablet tablet, TAKE 1 TABLET BY MOUTH TWICE A DAY, Disp: 60 tablet, Rfl: 3  •  Empagliflozin (JARDIANCE) 10 MG tablet, 10 mg Daily., Disp: , Rfl:   •  fenofibrate (TRICOR) 145 MG tablet, Take 145 mg by mouth Daily., Disp: , Rfl:   •  Fluticasone-Salmeterol (AIRDUO RESPICLICK 113/14) 113-14 MCG/ACT aerosol powder , Inhale 1 puff 2 (Two) Times a Day., Disp: 1 each, Rfl: 5  •  hydrALAZINE (APRESOLINE) 50 MG tablet, TAKE ONE TABLET BY MOUTH TWICE A DAY, Disp: 60 tablet, Rfl: 1  •  hydrochlorothiazide (HYDRODIURIL) 25 MG tablet, TAKE 1/2 TABLET BY MOUTH EVERY MORNING, Disp: 45 tablet, Rfl: 1  •  HYDROcodone-acetaminophen (LORTAB) 7.5-325 MG per tablet, LORTAB 7.5-325 MG ORAL TABLET, Disp: , Rfl:   •  lisinopril (PRINIVIL,ZESTRIL) 20 MG tablet, Take 20 mg by mouth Daily., Disp: , Rfl:   •  magnesium oxide (MAG-OX) 400 MG  "tablet, Take 1 tablet by mouth 2 (Two) Times a Day., Disp: , Rfl: 1  •  metFORMIN (GLUCOPHAGE) 1000 MG tablet, Every 12 (Twelve) Hours., Disp: , Rfl:   •  metoprolol succinate XL (TOPROL-XL) 25 MG 24 hr tablet, Take 1 tablet by mouth Daily., Disp: 30 tablet, Rfl: 5  •  potassium chloride (MICRO-K) 10 MEQ CR capsule, Take 10 mEq by mouth Daily., Disp: , Rfl: 0  •  SYMBICORT 160-4.5 MCG/ACT inhaler, Inhale 2 puffs 2 (Two) Times a Day., Disp: , Rfl: 4      Assessment/Plan   Diabetes mellitus type II: Excellent control, continue current medications  Hypertension: Well-controlled, continue current medication  Hyperlipidemia: Well-controlled however she is having some aches and pains in her ankles and wrists, I have asked her to stop Lipitor for 2 weeks and let me know if the aches and pains gets better.            Bautista Archibald MD FACE.  06/15/19  4:34 PM      EMR Dragon / transcription disclaimer:     \"Dictated utilizing Dragon dictation\".                 "

## 2019-08-15 NOTE — PATIENT INSTRUCTIONS
Hold Lipitor for 2 weeks and if your aches and pains gets better please call me however if they do not get better then go back on Lipitor.  So follow-up with your family physician for your continues aches and pains.

## 2019-08-16 RX ORDER — POTASSIUM CHLORIDE 750 MG/1
CAPSULE, EXTENDED RELEASE ORAL
Qty: 90 CAPSULE | Refills: 1 | Status: SHIPPED | OUTPATIENT
Start: 2019-08-16 | End: 2020-02-06 | Stop reason: SDUPTHER

## 2019-08-21 RX ORDER — BUDESONIDE AND FORMOTEROL FUMARATE DIHYDRATE 160; 4.5 UG/1; UG/1
AEROSOL RESPIRATORY (INHALATION)
Qty: 10.2 INHALER | Refills: 4 | Status: SHIPPED | OUTPATIENT
Start: 2019-08-21 | End: 2020-03-03

## 2019-08-23 RX ORDER — LISINOPRIL 20 MG/1
TABLET ORAL
Qty: 180 TABLET | Refills: 0 | Status: SHIPPED | OUTPATIENT
Start: 2019-08-23 | End: 2019-11-08

## 2019-08-28 RX ORDER — MAGNESIUM OXIDE 400 MG/1
1 TABLET ORAL 2 TIMES DAILY
Qty: 180 TABLET | Refills: 1 | Status: SHIPPED | OUTPATIENT
Start: 2019-08-28 | End: 2020-04-02

## 2019-08-29 ENCOUNTER — TELEPHONE (OUTPATIENT)
Dept: ENDOCRINOLOGY | Facility: CLINIC | Age: 64
End: 2019-08-29

## 2019-08-30 RX ORDER — ALBUTEROL SULFATE 90 UG/1
AEROSOL, METERED RESPIRATORY (INHALATION)
Qty: 18 INHALER | Refills: 2 | Status: SHIPPED | OUTPATIENT
Start: 2019-08-30 | End: 2019-11-20 | Stop reason: SDUPTHER

## 2019-09-04 ENCOUNTER — OFFICE VISIT (OUTPATIENT)
Dept: FAMILY MEDICINE CLINIC | Facility: CLINIC | Age: 64
End: 2019-09-04

## 2019-09-04 VITALS — OXYGEN SATURATION: 95 % | HEART RATE: 71 BPM | WEIGHT: 191 LBS | BODY MASS INDEX: 33.83 KG/M2 | TEMPERATURE: 98.5 F

## 2019-09-04 DIAGNOSIS — M65.9 TENOSYNOVITIS OF RIGHT WRIST: Primary | ICD-10-CM

## 2019-09-04 DIAGNOSIS — B37.31 CANDIDIASIS OF VAGINA: ICD-10-CM

## 2019-09-04 PROCEDURE — 99214 OFFICE O/P EST MOD 30 MIN: CPT | Performed by: FAMILY MEDICINE

## 2019-09-04 RX ORDER — HYDRALAZINE HYDROCHLORIDE 50 MG/1
TABLET, FILM COATED ORAL
Qty: 60 TABLET | Refills: 1 | Status: SHIPPED | OUTPATIENT
Start: 2019-09-04 | End: 2019-09-26 | Stop reason: SDUPTHER

## 2019-09-04 NOTE — PROGRESS NOTES
Subjective     Chief Complaint   Patient presents with   • Arm Pain     feels a knot in her wrist, she can't do much with it, hurts to  anything   • Vaginitis       Caterina Mason 64 y.o.  complains of vaginal discharge/itch.  ONSET: one week  PATTERN: worsening  Caterina denies vaginal itching  She reports vaginal itching  Prior treatments included: none  She denies any discharge or bleeding or lesions.    The following portions of the patient's history were reviewed and updated as appropriate:vital signs, allergies, current medications, past medical history, past social history, past surgical history and problem list.    Objective      Pulse 71   Temp 98.5 °F (36.9 °C) (Oral)   Wt 86.6 kg (191 lb)   SpO2 95%   BMI 33.83 kg/m²     Physical Exam   Constitutional: She appears well-developed and well-nourished.   HENT:   Head: Normocephalic and atraumatic.   Cardiovascular: Normal rate and regular rhythm.   Pulmonary/Chest: Effort normal and breath sounds normal.   Musculoskeletal:   Right forearm pain.   Neurological: She is alert.   Skin: Skin is warm.       Physical Exam:   General Appearance: awake, alert, oriented, in no acute distress and in no acute distress  Abdomen: Soft, non-tender, normal bowel sounds; no bruits, organomegaly or masses.  Pelvic Exam: pt declines pelvic exam    Lab Review   Labs: No data reviewed     Imaging   No data reviewed    Assessment/Plan     ASSESSMENT      No diagnosis found.    PLAN    1. No orders of the defined types were placed in this encounter.      2. Medications prescribed this encounter:       Current Outpatient Medications   Medication Sig Dispense Refill   • ACCU-CHEK CARLIE PLUS test strip USE TO CHECK BLOOD SUGAR TWICE A DAY  4   • ACCU-CHEK FASTCLIX LANCETS misc 1 each by Other route 2 (Two) Times a Day. 100 each 3   • ACCU-CHEK SOFTCLIX LANCETS lancets USE TO CHECK BLOOD SUGAR TWICE A DAY  4   • ALBUTEROL SULFATE  (90 Base) MCG/ACT inhaler INHALE  1-2 PUFFS EVERY 4 HOURS AS NEEDED FOR COUGH OR FOR SHORTNESS OF BREATH 18 inhaler 2   • atorvastatin (LIPITOR) 40 MG tablet Take 20 mg by mouth Daily.     • Cholecalciferol (CVS D3) 1000 units capsule 1 capsule Every 12 (Twelve) Hours.     • diltiaZEM CD (CARDIZEM CD) 120 MG 24 hr capsule TAKE 1 CAPSULE BY MOUTH EVERY DAY 90 capsule 3   • ELIQUIS 5 MG tablet tablet TAKE 1 TABLET BY MOUTH TWICE A DAY 60 tablet 3   • Empagliflozin (JARDIANCE) 10 MG tablet 10 mg Daily.     • fenofibrate (TRICOR) 145 MG tablet Take 145 mg by mouth Daily.     • glucose blood (ACCU-CHEK GUIDE) test strip 1 each by Other route 2 (Two) Times a Day. Use as instructed     • glucose blood (ACCU-CHEK GUIDE) test strip Use as instructed 100 each 3   • hydrALAZINE (APRESOLINE) 50 MG tablet TAKE ONE TABLET BY MOUTH TWICE A DAY 60 tablet 1   • hydrochlorothiazide (HYDRODIURIL) 25 MG tablet TAKE 1/2 TABLET BY MOUTH EVERY MORNING 45 tablet 1   • HYDROcodone-acetaminophen (LORTAB) 7.5-325 MG per tablet LORTAB 7.5-325 MG ORAL TABLET     • lisinopril (PRINIVIL,ZESTRIL) 20 MG tablet TAKE 1 TABLET BY MOUTH TWICE A  tablet 0   • magnesium oxide (MAG-OX) 400 MG tablet Take 1 tablet by mouth 2 (Two) Times a Day. 180 tablet 1   • metFORMIN (GLUCOPHAGE) 1000 MG tablet Every 12 (Twelve) Hours.     • metoprolol succinate XL (TOPROL-XL) 25 MG 24 hr tablet Take 1 tablet by mouth Daily. 30 tablet 5   • potassium chloride (MICRO-K) 10 MEQ CR capsule TAKE ONE CAPSULE BY MOUTH EVERY DAY 90 capsule 1   • SYMBICORT 160-4.5 MCG/ACT inhaler TAKE 2 PUFFS BY MOUTH TWICE A DAY 10.2 inhaler 4     No current facility-administered medications for this visit.        3.     Follow up: as needed     Lou Curran MD  9/4/2019

## 2019-09-04 NOTE — PROGRESS NOTES
Subjective   Chief Complaint   Patient presents with   • Arm Pain     feels a knot in her wrist, she can't do much with it, hurts to  anything   • Vaginitis     Caterina Mason is a 64 y.o. female.     Arm Pain    The incident occurred more than 1 week ago. There was no injury mechanism. The pain is present in the right forearm. The quality of the pain is described as aching. The pain does not radiate. The pain has been constant since the incident. Associated symptoms include muscle weakness. Pertinent negatives include no chest pain or tingling. The symptoms are aggravated by movement. She has tried nothing for the symptoms.   Vaginitis   Pertinent negatives include no abdominal pain, chest pain, chills, coughing, fever, rash or sore throat.      Past Medical History:   Diagnosis Date   • Arthritis    • Bradycardia     Dr. Charles   • Cataract    • COPD (chronic obstructive pulmonary disease) (CMS/[facility])     Dr. Rob   • Diabetes mellitus, type 2 (CMS/[facility])     sees Dr. Archibald at [place]   • DJD (degenerative joint disease)     possible herniated disc, sees Pain Mgmt in [place]   •     • GERD (gastroesophageal reflux disease)    • History of combined right and left heart catheterization 2018    minimal CAD on heart cath done    • Hyperlipidemia    • Hypertension    • Pain    • Sleep apnea     wears CPAP machine, sees Darron     Past Surgical History:   Procedure Laterality Date   • CARDIAC CATHETERIZATION      and Angiograph    •  SECTION      x 1   • COLOSTOMY      and reversal in her 20's due to problems with childbirth   • OVARIAN CYST SURGERY     • ROTATOR CUFF REPAIR Left 2009    x2    • TOTAL ABDOMINAL HYSTERECTOMY WITH SALPINGO OOPHORECTOMY       Allergies   Allergen Reactions   • Ibuprofen GI Intolerance   • Prednisone Other (See Comments)   • Pregabalin Other (See Comments)     jerking   • Tramadol Other (See Comments)     Legs jerked   • Levofloxacin Other (See Comments)      Increase sunburn   • Sulfamethoxazole-Trimethoprim Swelling     Social History     Socioeconomic History   • Marital status:      Spouse name: Not on file   • Number of children: Not on file   • Years of education: Not on file   • Highest education level: Not on file   Tobacco Use   • Smoking status: Current Every Day Smoker     Packs/day: 1.50     Years: 42.00     Pack years: 63.00     Types: Cigarettes   • Smokeless tobacco: Never Used   Substance and Sexual Activity   • Alcohol use: No     Frequency: Never   • Drug use: No   • Sexual activity: Defer     Social History     Tobacco Use   Smoking Status Current Every Day Smoker   • Packs/day: 1.50   • Years: 42.00   • Pack years: 63.00   • Types: Cigarettes   Smokeless Tobacco Never Used       family history includes Cancer in her sister; Diabetes in her brother; Heart disease in her father and mother; Hyperlipidemia in her other; Hypertension in her brother, father, and mother; Lung disease in her father; Seizures in her mother; Stroke in her father and mother.  Current Outpatient Medications on File Prior to Visit   Medication Sig Dispense Refill   • ACCU-CHEK CARLIE PLUS test strip USE TO CHECK BLOOD SUGAR TWICE A DAY  4   • ACCU-CHEK FASTCLIX LANCETS misc 1 each by Other route 2 (Two) Times a Day. 100 each 3   • ACCU-CHEK SOFTCLIX LANCETS lancets USE TO CHECK BLOOD SUGAR TWICE A DAY  4   • ALBUTEROL SULFATE  (90 Base) MCG/ACT inhaler INHALE 1-2 PUFFS EVERY 4 HOURS AS NEEDED FOR COUGH OR FOR SHORTNESS OF BREATH 18 inhaler 2   • atorvastatin (LIPITOR) 40 MG tablet Take 20 mg by mouth Daily.     • Cholecalciferol (CVS D3) 1000 units capsule 1 capsule Every 12 (Twelve) Hours.     • diltiaZEM CD (CARDIZEM CD) 120 MG 24 hr capsule TAKE 1 CAPSULE BY MOUTH EVERY DAY 90 capsule 3   • ELIQUIS 5 MG tablet tablet TAKE 1 TABLET BY MOUTH TWICE A DAY 60 tablet 3   • Empagliflozin (JARDIANCE) 10 MG tablet 10 mg Daily.     • fenofibrate (TRICOR) 145 MG tablet Take  145 mg by mouth Daily.     • glucose blood (ACCU-CHEK GUIDE) test strip 1 each by Other route 2 (Two) Times a Day. Use as instructed     • glucose blood (ACCU-CHEK GUIDE) test strip Use as instructed 100 each 3   • hydrochlorothiazide (HYDRODIURIL) 25 MG tablet TAKE 1/2 TABLET BY MOUTH EVERY MORNING 45 tablet 1   • HYDROcodone-acetaminophen (LORTAB) 7.5-325 MG per tablet LORTAB 7.5-325 MG ORAL TABLET     • lisinopril (PRINIVIL,ZESTRIL) 20 MG tablet TAKE 1 TABLET BY MOUTH TWICE A  tablet 0   • magnesium oxide (MAG-OX) 400 MG tablet Take 1 tablet by mouth 2 (Two) Times a Day. 180 tablet 1   • metFORMIN (GLUCOPHAGE) 1000 MG tablet Every 12 (Twelve) Hours.     • metoprolol succinate XL (TOPROL-XL) 25 MG 24 hr tablet Take 1 tablet by mouth Daily. 30 tablet 5   • potassium chloride (MICRO-K) 10 MEQ CR capsule TAKE ONE CAPSULE BY MOUTH EVERY DAY 90 capsule 1   • SYMBICORT 160-4.5 MCG/ACT inhaler TAKE 2 PUFFS BY MOUTH TWICE A DAY 10.2 inhaler 4   • [DISCONTINUED] hydrALAZINE (APRESOLINE) 50 MG tablet TAKE ONE TABLET BY MOUTH TWICE A DAY 60 tablet 1     No current facility-administered medications on file prior to visit.      Patient Active Problem List   Diagnosis   • Arthritis   • Bradycardia   • Chronic obstructive pulmonary disease (CMS/HCC)   • Depression   • Diabetic peripheral neuropathy (CMS/HCC)   • Encounter for general adult medical examination without abnormal findings   • Gastroesophageal reflux disease   • Hypercalcemia   • Hyperlipidemia   • Hypokalemia   • Hypomagnesemia   • Lumbar radiculopathy   • Myalgia   • Obesity   • Paroxysmal atrial fibrillation (CMS/HCC)   • Shoulder pain, right   • Sleep apnea   • Tobacco use disorder, continuous   • Type 2 diabetes mellitus with hyperglycemia (CMS/HCC)   • Vitamin D deficiency   • Palpitations   • PVC's (premature ventricular contractions)   • Essential hypertension   • Cervical radiculitis       The following portions of the patient's history were reviewed  and updated as appropriate: allergies, current medications, past family history, past medical history, past social history, past surgical history and problem list.    Review of Systems   Constitutional: Negative for chills and fever.   HENT: Negative for sinus pressure and sore throat.    Eyes: Negative for blurred vision.   Respiratory: Negative for cough and shortness of breath.    Cardiovascular: Negative for chest pain and palpitations.   Gastrointestinal: Negative for abdominal pain.   Endocrine: Negative for polyuria.   Skin: Negative for rash.   Neurological: Negative for dizziness, tingling and headache.   Hematological: Negative for adenopathy.   Psychiatric/Behavioral: Negative for depressed mood.       Objective   Pulse 71   Temp 98.5 °F (36.9 °C) (Oral)   Wt 86.6 kg (191 lb)   SpO2 95%   BMI 33.83 kg/m²   Physical Exam    No visits with results within 1 Week(s) from this visit.   Latest known visit with results is:   Lab on 08/08/2019   Component Date Value Ref Range Status   • Glucose 08/08/2019 125* 65 - 99 mg/dL Final   • BUN 08/08/2019 15  8 - 20 mg/dL Final   • Creatinine 08/08/2019 0.70  0.40 - 1.00 mg/dL Final   • Sodium 08/08/2019 140  136 - 144 mmol/L Final   • Potassium 08/08/2019 3.7  3.6 - 5.1 mmol/L Final   • Chloride 08/08/2019 106  101 - 111 mmol/L Final   • CO2 08/08/2019 25.0  22.0 - 32.0 mmol/L Final   • Calcium 08/08/2019 9.9  8.9 - 10.3 mg/dL Final   • Total Protein 08/08/2019 7.2  6.1 - 7.9 g/dL Final   • Albumin 08/08/2019 4.20  3.50 - 4.80 g/dL Final   • ALT (SGPT) 08/08/2019 32  14 - 54 U/L Final   • AST (SGOT) 08/08/2019 25  15 - 41 U/L Final   • Alkaline Phosphatase 08/08/2019 46  32 - 91 U/L Final   • Total Bilirubin 08/08/2019 0.6  0.3 - 1.2 mg/dL Final   • eGFR Non  Amer 08/08/2019 85  >60 mL/min/1.73 Final   • Globulin 08/08/2019 3.0  2.5 - 3.8 gm/dL Final   • A/G Ratio 08/08/2019 1.4  1.0 - 1.7 g/dL Final   • BUN/Creatinine Ratio 08/08/2019 21.4  5.4 - 26.2 Final    • Anion Gap 08/08/2019 12.7  5.0 - 15.0 mmol/L Final   • Hemoglobin A1C 08/08/2019 6.9* 3.5 - 5.6 % Final   • Total Cholesterol 08/08/2019 133  <=200 mg/dL Final   • Triglycerides 08/08/2019 178* <=150 mg/dL Final   • HDL Cholesterol 08/08/2019 31* >=39 mg/dL Final   • LDL Cholesterol  08/08/2019 82  0 - 100 mg/dL Final   • VLDL Cholesterol 08/08/2019 35.6  mg/dL Final   • LDL/HDL Ratio 08/08/2019 2.14   Final   • Chol/HDL Ratio 08/08/2019 4.29   Final   • Creatinine, Urine 08/08/2019 102.9  mg/dL Final   • Microalbumin, Urine 08/08/2019 8.0  <=20.0 mg/L Final   • Microalbumin/Creatinine Ratio 08/08/2019 7.8  <=30.0 ug/mg Final           Assessment/Plan   Problems Addressed this Visit     None      Visit Diagnoses     Tenosynovitis of right wrist    -  Primary    Relevant Orders    Ambulatory Referral to Hand Surgery    Candidiasis of vagina        Relevant Medications    terconazole (TERAZOL 3) 0.8 % vaginal cream          Caterina was seen today for arm pain and vaginitis.    Diagnoses and all orders for this visit:    Tenosynovitis of right wrist  -     Ambulatory Referral to Hand Surgery    Candidiasis of vagina  -     terconazole (TERAZOL 3) 0.8 % vaginal cream; Insert 1 applicator into the vagina Every Night.

## 2019-09-23 RX ORDER — ATORVASTATIN CALCIUM 40 MG/1
TABLET, FILM COATED ORAL
Qty: 45 TABLET | Refills: 1 | Status: SHIPPED | OUTPATIENT
Start: 2019-09-23 | End: 2020-03-10

## 2019-09-26 RX ORDER — HYDRALAZINE HYDROCHLORIDE 50 MG/1
TABLET, FILM COATED ORAL
Qty: 60 TABLET | Refills: 1 | Status: SHIPPED | OUTPATIENT
Start: 2019-09-26 | End: 2019-10-27 | Stop reason: SDUPTHER

## 2019-10-28 RX ORDER — HYDRALAZINE HYDROCHLORIDE 50 MG/1
TABLET, FILM COATED ORAL
Qty: 60 TABLET | Refills: 1 | Status: SHIPPED | OUTPATIENT
Start: 2019-10-28 | End: 2019-11-08

## 2019-11-04 RX ORDER — APIXABAN 5 MG/1
TABLET, FILM COATED ORAL
Qty: 60 TABLET | Refills: 3 | Status: SHIPPED | OUTPATIENT
Start: 2019-11-04 | End: 2020-03-03

## 2019-11-04 RX ORDER — EMPAGLIFLOZIN 10 MG/1
TABLET, FILM COATED ORAL
Qty: 30 TABLET | Refills: 1 | Status: SHIPPED | OUTPATIENT
Start: 2019-11-04 | End: 2019-12-05

## 2019-11-04 RX ORDER — METOPROLOL SUCCINATE 25 MG/1
25 TABLET, EXTENDED RELEASE ORAL DAILY
Qty: 90 TABLET | Refills: 3 | Status: ON HOLD | OUTPATIENT
Start: 2019-11-04 | End: 2019-12-10

## 2019-11-05 ENCOUNTER — TRANSCRIBE ORDERS (OUTPATIENT)
Dept: ADMINISTRATIVE | Facility: HOSPITAL | Age: 64
End: 2019-11-05

## 2019-11-05 DIAGNOSIS — Z12.39 SCREENING BREAST EXAMINATION: Primary | ICD-10-CM

## 2019-11-08 ENCOUNTER — OFFICE VISIT (OUTPATIENT)
Dept: CARDIOLOGY | Facility: CLINIC | Age: 64
End: 2019-11-08

## 2019-11-08 VITALS
HEART RATE: 73 BPM | WEIGHT: 190 LBS | SYSTOLIC BLOOD PRESSURE: 129 MMHG | HEIGHT: 63 IN | BODY MASS INDEX: 33.66 KG/M2 | DIASTOLIC BLOOD PRESSURE: 74 MMHG

## 2019-11-08 DIAGNOSIS — I49.3 PVC'S (PREMATURE VENTRICULAR CONTRACTIONS): ICD-10-CM

## 2019-11-08 DIAGNOSIS — I48.0 PAROXYSMAL ATRIAL FIBRILLATION (HCC): ICD-10-CM

## 2019-11-08 DIAGNOSIS — R00.2 PALPITATIONS: ICD-10-CM

## 2019-11-08 DIAGNOSIS — I10 ESSENTIAL HYPERTENSION: ICD-10-CM

## 2019-11-08 DIAGNOSIS — E78.2 MIXED HYPERLIPIDEMIA: Primary | ICD-10-CM

## 2019-11-08 PROCEDURE — 93000 ELECTROCARDIOGRAM COMPLETE: CPT | Performed by: INTERNAL MEDICINE

## 2019-11-08 PROCEDURE — 99214 OFFICE O/P EST MOD 30 MIN: CPT | Performed by: INTERNAL MEDICINE

## 2019-11-08 RX ORDER — MELOXICAM 15 MG/1
15 TABLET ORAL DAILY
Refills: 2 | COMMUNITY
Start: 2019-10-09 | End: 2019-11-08

## 2019-11-08 RX ORDER — LISINOPRIL 20 MG/1
20 TABLET ORAL DAILY
COMMUNITY
End: 2020-07-20 | Stop reason: SDUPTHER

## 2019-11-08 RX ORDER — HYDRALAZINE HYDROCHLORIDE 50 MG/1
25 TABLET, FILM COATED ORAL 2 TIMES DAILY
COMMUNITY
End: 2019-12-23

## 2019-11-08 RX ORDER — LISINOPRIL AND HYDROCHLOROTHIAZIDE 25; 20 MG/1; MG/1
1 TABLET ORAL DAILY
Refills: 3 | COMMUNITY
Start: 2019-10-20 | End: 2019-11-08

## 2019-11-08 NOTE — PROGRESS NOTES
Date of Office Visit: 2019  Encounter Provider: Dr. Jesse Charles  Place of Service: [facility] CARDIOLOGY [place]  Patient Name: Caterina Mason  :1955  Lou Curran MD    Chief Complaint   Patient presents with   • Atrial Fibrillation     3 month follow up /holter   • Palpitations   • Hypertension   • Hyperlipidemia   • Slow Heart Rate     History of Present Illness    Caterina Mason is a 64 y.o. female.  She presents back today for follow-up regarding complaints of palpitations     Her past medical history significant for hypertension, hyperlipidemia, diabetes mellitus, COPD, obstructive sleep apnea, tobacco abuse.     In 2017, patient was admitted at [facility] with a symptom of possible bradycardia and dizziness.  Patient was seen by endocrinologist and was thought to have bradycardia and was sent to the hospital.  In the hospital she was never documented to have bradycardia.  She was noted to have sinus rhythm with frequent PVCs and ventricular bigeminy rhythm.  Holter monitor showed frequent PVCs greater than 30,000.  Stress test was negative for ischemia and echocardiogram showed normal left ventricular size and function and LVEF was 55%.  No significant valvular heart disease was noted. In 2017, patient underwent Holter monitor which showed sinus rhythm with right bundle branch block pattern and patient had frequent PACs.     In 2018, patient was admitted at [facility] again with symptom of dizziness and possible extreme bradycardia.  She was found to be in sinus rhythm with bigeminy.  Holter monitor showed frequent premature ventricular contraction but no significant pause.  Ventricular bigeminy rhythm was noted.  Patient underwent cardiac catheterization showed no significant CAD..  In 2019, patient underwent Holter monitor it showed frequent PVCs greater than 18,000.  No significant bradycardia noted.  No SVT or VT was  present.    In 2019, patient underwent Holter monitor which showed predominantly sinus rhythm with right bundle branch block.  Frequent PVCs and bigeminy was noted.  Patient had 26% of PVC burden.    Patient continues to have symptom of palpitation.  Patient denies any chest pain.  No syncope or presyncope.  No dizziness or lightheadedness.  Patient complain of occasional dizziness with palpitation.  Patient has underlying shortness of breath.  Patient denies orthopnea, PND,'s leg edema.    EKG showed sinus rhythm with frequent PVCs.  PVCs appears to be unifocal and appears to be coming from right ventricular outflow tract.    At this stage, I would prefer the patient to EP for possible PVC ablation therapy.  She has significant PVC burden.  At this stage I believe that patient probably would benefit from PVC ablative therapy.    Past Medical History:   Diagnosis Date   • Arthritis    • Atrial fibrillation (CMS/HCC)    • Bradycardia     Dr. Charles   • Cataract    • COPD (chronic obstructive pulmonary disease) (CMS/HCC)     Dr. Rob   • Diabetes mellitus, type 2 (CMS/HCC)     sees Dr. Archibald at [place]   • DJD (degenerative joint disease)     possible herniated disc, sees Pain Mgmt in [place]   •     • GERD (gastroesophageal reflux disease)    • History of combined right and left heart catheterization 2018    minimal CAD on heart cath done    • Hyperlipidemia    • Hypertension    • Pain    • Sleep apnea     wears CPAP machine, sees Darron         Past Surgical History:   Procedure Laterality Date   • CARDIAC CATHETERIZATION      and Angiograph    •  SECTION      x 1   • COLOSTOMY      and reversal in her 20's due to problems with childbirth   • OVARIAN CYST SURGERY     • ROTATOR CUFF REPAIR Left 2009    x2    • TOTAL ABDOMINAL HYSTERECTOMY WITH SALPINGO OOPHORECTOMY             Current Outpatient Medications:   •  ACCU-CHEK CARLIE PLUS test strip, USE TO CHECK BLOOD SUGAR TWICE A DAY,  Disp: , Rfl: 4  •  ACCU-CHEK FASTCLIX LANCETS misc, 1 each by Other route 2 (Two) Times a Day., Disp: 100 each, Rfl: 3  •  ACCU-CHEK SOFTCLIX LANCETS lancets, USE TO CHECK BLOOD SUGAR TWICE A DAY, Disp: , Rfl: 4  •  ALBUTEROL SULFATE  (90 Base) MCG/ACT inhaler, INHALE 1-2 PUFFS EVERY 4 HOURS AS NEEDED FOR COUGH OR FOR SHORTNESS OF BREATH, Disp: 18 inhaler, Rfl: 2  •  atorvastatin (LIPITOR) 40 MG tablet, TAKE 1/2 TABLET BY MOUTH DAILY, Disp: 45 tablet, Rfl: 1  •  Cholecalciferol ([employer] D3) 1000 units capsule, 1 capsule Every 12 (Twelve) Hours., Disp: , Rfl:   •  diltiaZEM CD (CARDIZEM CD) 120 MG 24 hr capsule, TAKE 1 CAPSULE BY MOUTH EVERY DAY, Disp: 90 capsule, Rfl: 3  •  ELIQUIS 5 MG tablet tablet, TAKE 1 TABLET BY MOUTH TWICE A DAY, Disp: 60 tablet, Rfl: 3  •  fenofibrate (TRICOR) 145 MG tablet, Take 145 mg by mouth Daily., Disp: , Rfl:   •  glucose blood (ACCU-CHEK GUIDE) test strip, 1 each by Other route 2 (Two) Times a Day. Use as instructed, Disp: , Rfl:   •  glucose blood (ACCU-CHEK GUIDE) test strip, Use as instructed, Disp: 100 each, Rfl: 3  •  hydrALAZINE (APRESOLINE) 50 MG tablet, Take 25 mg by mouth 2 (Two) Times a Day., Disp: , Rfl:   •  hydrochlorothiazide (HYDRODIURIL) 25 MG tablet, TAKE 1/2 TABLET BY MOUTH EVERY MORNING, Disp: 45 tablet, Rfl: 1  •  HYDROcodone-acetaminophen (LORTAB) 7.5-325 MG per tablet, LORTAB 7.5-325 MG ORAL TABLET, Disp: , Rfl:   •  JARDIANCE 10 MG tablet, TAKE 1 TABLET BY MOUTH EVERY DAY, Disp: 30 tablet, Rfl: 1  •  lisinopril (PRINIVIL,ZESTRIL) 20 MG tablet, Take 20 mg by mouth Daily., Disp: , Rfl:   •  magnesium oxide (MAG-OX) 400 MG tablet, Take 1 tablet by mouth 2 (Two) Times a Day., Disp: 180 tablet, Rfl: 1  •  metFORMIN (GLUCOPHAGE) 1000 MG tablet, Every 12 (Twelve) Hours., Disp: , Rfl:   •  metoprolol succinate XL (TOPROL-XL) 25 MG 24 hr tablet, Take 1 tablet by mouth Daily., Disp: 90 tablet, Rfl: 3  •  potassium chloride (MICRO-K) 10 MEQ CR capsule, TAKE ONE CAPSULE  BY MOUTH EVERY DAY, Disp: 90 capsule, Rfl: 1  •  SYMBICORT 160-4.5 MCG/ACT inhaler, TAKE 2 PUFFS BY MOUTH TWICE A DAY, Disp: 10.2 inhaler, Rfl: 4  •  terconazole (TERAZOL 3) 0.8 % vaginal cream, Insert 1 applicator into the vagina Every Night., Disp: 20 g, Rfl: 2      Social History     Socioeconomic History   • Marital status:      Spouse name: Not on file   • Number of children: Not on file   • Years of education: Not on file   • Highest education level: Not on file   Tobacco Use   • Smoking status: Current Every Day Smoker     Packs/day: 1.50     Years: 42.00     Pack years: 63.00     Types: Cigarettes   • Smokeless tobacco: Never Used   Substance and Sexual Activity   • Alcohol use: No     Frequency: Never   • Drug use: No   • Sexual activity: Defer         Review of Systems   Constitution: Positive for malaise/fatigue. Negative for chills and fever.   HENT: Negative for ear discharge and nosebleeds.    Eyes: Negative for discharge and redness.   Cardiovascular: Positive for palpitations. Negative for chest pain, orthopnea, paroxysmal nocturnal dyspnea and syncope.   Respiratory: Positive for shortness of breath. Negative for cough and wheezing.    Endocrine: Negative for heat intolerance.   Skin: Negative for rash.   Musculoskeletal: Positive for arthritis and joint pain. Negative for myalgias.   Gastrointestinal: Negative for abdominal pain, melena, nausea and vomiting.   Genitourinary: Negative for dysuria and hematuria.   Neurological: Negative for dizziness, light-headedness, numbness and tremors.   Psychiatric/Behavioral: Negative for depression. The patient is not nervous/anxious.        Procedures      ECG 12 Lead  Date/Time: 11/8/2019 2:24 PM  Performed by: Jesse Charles MD  Authorized by: Jesse Charles MD   Comparison: compared with previous ECG   Similar to previous ECG  Rhythm: sinus rhythm  Ectopy: unifocal PVCs  Conduction: right bundle branch block    Clinical impression: abnormal  "EKG            ECG 12 Lead    (Results Pending)           Objective:    /74   Pulse 73   Ht 160 cm (63\")   Wt 86.2 kg (190 lb)   BMI 33.66 kg/m²         Physical Exam   Constitutional: She is oriented to person, place, and time. She appears well-developed and well-nourished.   HENT:   Head: Normocephalic and atraumatic.   Eyes: No scleral icterus.   Neck: No thyromegaly present.   Cardiovascular: Normal rate, regular rhythm and normal heart sounds. Exam reveals no gallop and no friction rub.   No murmur heard.  Pulmonary/Chest: Effort normal and breath sounds normal. No respiratory distress. She has no wheezes. She has no rales.   Abdominal: There is no tenderness.   Musculoskeletal: She exhibits no edema.   Lymphadenopathy:     She has no cervical adenopathy.   Neurological: She is alert and oriented to person, place, and time.   Skin: No rash noted. No erythema.   Psychiatric: She has a normal mood and affect.           Assessment:       Diagnosis Plan   1. Mixed hyperlipidemia  ECG 12 Lead   2. Paroxysmal atrial fibrillation (CMS/HCC)  ECG 12 Lead   3. Palpitations  ECG 12 Lead   4. Essential hypertension  ECG 12 Lead   5. PVC's (premature ventricular contractions)  ECG 12 Lead            Plan:       Patient is having frequent PVCs.  I would recommend to proceed with EP consultation for possible PVC ablative therapy.  Patient would see Dr. Iglesias.  Continue current treatment.  I will see the patient in 6 months  "

## 2019-11-18 ENCOUNTER — PREP FOR SURGERY (OUTPATIENT)
Dept: OTHER | Facility: HOSPITAL | Age: 64
End: 2019-11-18

## 2019-11-18 ENCOUNTER — OFFICE VISIT (OUTPATIENT)
Dept: CARDIOLOGY | Facility: CLINIC | Age: 64
End: 2019-11-18

## 2019-11-18 VITALS
WEIGHT: 193 LBS | DIASTOLIC BLOOD PRESSURE: 80 MMHG | BODY MASS INDEX: 34.19 KG/M2 | HEART RATE: 72 BPM | SYSTOLIC BLOOD PRESSURE: 147 MMHG

## 2019-11-18 DIAGNOSIS — R00.2 PALPITATIONS: Primary | ICD-10-CM

## 2019-11-18 DIAGNOSIS — I10 ESSENTIAL HYPERTENSION: ICD-10-CM

## 2019-11-18 DIAGNOSIS — I48.0 PAROXYSMAL ATRIAL FIBRILLATION (HCC): ICD-10-CM

## 2019-11-18 DIAGNOSIS — I49.3 PVC'S (PREMATURE VENTRICULAR CONTRACTIONS): ICD-10-CM

## 2019-11-18 PROCEDURE — 99214 OFFICE O/P EST MOD 30 MIN: CPT | Performed by: INTERNAL MEDICINE

## 2019-11-18 RX ORDER — SODIUM CHLORIDE 0.9 % (FLUSH) 0.9 %
3 SYRINGE (ML) INJECTION EVERY 12 HOURS SCHEDULED
Status: CANCELLED | OUTPATIENT
Start: 2019-11-18

## 2019-11-18 RX ORDER — SODIUM CHLORIDE 0.9 % (FLUSH) 0.9 %
10 SYRINGE (ML) INJECTION AS NEEDED
Status: CANCELLED | OUTPATIENT
Start: 2019-11-18

## 2019-11-18 NOTE — PROGRESS NOTES
CC--frequent and symptomatic PVCs    Sub--  64-year-old female patient with chronic medical problems of hypertension, hyperlipidemia, diabetes, COPD, obstructive sleep apnea and heavy tobacco abuse has been referred for symptomatic increasing unifocal PVCs--patient has high-volume PVCs on Holter recording and has symptoms of palpitations and fatigue and neck fullness--she also feels some tightness in her chest without any prior history of syncope  Her ejection fraction is close to normal and she had 26% of PVC burden not Holter recording  She underwent cardiac catheterization without significant coronary artery disease  Denied any worsening angina or any worsening dyspnea or significant lower extremity edema or claudication  There is also some history of atrial fibrillation without any recurrence in the recent past        Past Medical History:   Diagnosis Date   • Arthritis    • Atrial fibrillation (CMS/[facility])    • Bradycardia     Dr. Charles   • Cataract    • COPD (chronic obstructive pulmonary disease) (CMS/[facility])     Dr. Rob   • Diabetes mellitus, type 2 (CMS/[facility])     sees Dr. Archibald at [place]   • DJD (degenerative joint disease)     possible herniated disc, sees Pain Mgmt in [place]   •     • GERD (gastroesophageal reflux disease)    • History of combined right and left heart catheterization 2018    minimal CAD on heart cath done    • Hyperlipidemia    • Hypertension    • Pain    • Sleep apnea     wears CPAP machine, sees Darron     Past Surgical History:   Procedure Laterality Date   • CARDIAC CATHETERIZATION      and Angiograph    •  SECTION      x 1   • COLOSTOMY      and reversal in her 20's due to problems with childbirth   • OVARIAN CYST SURGERY     • ROTATOR CUFF REPAIR Left 2009    x2    • TOTAL ABDOMINAL HYSTERECTOMY WITH SALPINGO OOPHORECTOMY       Family History   Problem Relation Age of Onset   • Heart disease Mother    • Hypertension Mother    • Stroke Mother    • Seizures  Mother    • Heart disease Father    • Hypertension Father    • Lung disease Father    • Stroke Father    • Cancer Sister    • Hypertension Brother    • Diabetes Brother    • Hyperlipidemia Other      Social History     Tobacco Use   • Smoking status: Current Every Day Smoker     Packs/day: 1.50     Years: 42.00     Pack years: 63.00     Types: Cigarettes   • Smokeless tobacco: Never Used   Substance Use Topics   • Alcohol use: No     Frequency: Never   • Drug use: No       (Not in a hospital admission)  Allergies:  Ibuprofen; Prednisone; Pregabalin; Tramadol; Levofloxacin; and Sulfamethoxazole-trimethoprim    Review of Systems   General:  positive for fatigue and tiredness  Eyes: No redness  Cardiovascular: No chest pain, positive for  palpitations  Respiratory:   positive for class 3 shortness of breath  Gastrointestinal: No nausea or vomiting, bleeding  Genitourinary: no hematuria or dysuria  Musculoskeletal: No arthralgia or myalgia  Skin: No rash  Neurologic: No numbness, tingling, syncope  Hematologic/Lymphatic: No abnormal bleeding      Physical Exam  VITALS REVIEWED--blood pressure 147/80 pulse rate 70 patient afebrile respiration 12 times a minute    General:      well developed, well nourished, in no acute distress.    Head:      normocephalic and atraumatic.    Eyes:      PERRL/EOM intact, conjunctiva and sclera clear with out nystagmus.    Neck:      no masses, thyromegaly,  trachea central with normal respiratory effort and PMI non displaced   Lungs:      clear bilaterally to auscultation.    Heart:       underlying sinus rhythm with frequent PVCs  without any murmurs gallops or rubs  Msk:      no deformity or scoliosis noted of thoracic or lumbar spine.    Pulses:      pulses normal in all 4 extremities.    Extremities:       no cyanosis or clubbing--trace left pedal edema and trace right pedal edema.    Neurologic:      no focal deficits.   alert oriented x3  Skin:      intact without lesions or  matthew.    Psych:      alert and cooperative; normal mood and affect; normal attention span and concentration.          Assessment and plan    Frequent symptomatic PVCs--high-volume PVCs which are unifocal and appeared to come from outflow tract  COPD with heavy tobacco abuse  Obstructive sleep apnea  Hypertension  Diabetes    Patient to be scheduled for EP study and PVC ablation and risks and benefits and outcomes of PVC ablation educated  Complete smoking cessation was educated multiple times during this clinic visit

## 2019-11-19 ENCOUNTER — HOSPITAL ENCOUNTER (OUTPATIENT)
Dept: MAMMOGRAPHY | Facility: HOSPITAL | Age: 64
Discharge: HOME | End: 2019-11-19
Admitting: OBSTETRICS & GYNECOLOGY

## 2019-11-19 DIAGNOSIS — Z12.39 SCREENING BREAST EXAMINATION: ICD-10-CM

## 2019-11-19 PROCEDURE — 77067 SCR MAMMO BI INCL CAD: CPT

## 2019-11-19 PROCEDURE — 77063 BREAST TOMOSYNTHESIS BI: CPT

## 2019-11-20 RX ORDER — ALBUTEROL SULFATE 90 UG/1
AEROSOL, METERED RESPIRATORY (INHALATION)
Qty: 18 INHALER | Refills: 2 | Status: SHIPPED | OUTPATIENT
Start: 2019-11-20 | End: 2020-02-17

## 2019-11-26 RX ORDER — LISINOPRIL 20 MG/1
TABLET ORAL
Qty: 180 TABLET | Refills: 0 | Status: SHIPPED | OUTPATIENT
Start: 2019-11-26 | End: 2019-12-09

## 2019-11-26 RX ORDER — HYDRALAZINE HYDROCHLORIDE 50 MG/1
TABLET, FILM COATED ORAL
Qty: 60 TABLET | Refills: 1 | Status: SHIPPED | OUTPATIENT
Start: 2019-11-26 | End: 2019-12-09

## 2019-12-03 ENCOUNTER — TELEPHONE (OUTPATIENT)
Dept: ENDOCRINOLOGY | Facility: CLINIC | Age: 64
End: 2019-12-03

## 2019-12-03 NOTE — TELEPHONE ENCOUNTER
Patient called and LVM complaining of finger, hand, wrist and arm pain. I tried calling her back to get more information and had to leave her a VM.

## 2019-12-04 RX ORDER — FENOFIBRATE 145 MG/1
TABLET, COATED ORAL
Qty: 90 TABLET | Refills: 2 | Status: SHIPPED | OUTPATIENT
Start: 2019-12-04 | End: 2020-03-03

## 2019-12-04 NOTE — TELEPHONE ENCOUNTER
Pt notified. She saw PCP and they are sending her to hand doctor.       She also brought up how she's on Jardiance and has a yeast infection that wont go away. She stated it also can cause joint pain. Please advise.

## 2019-12-05 RX ORDER — BLOOD-GLUCOSE METER
EACH MISCELLANEOUS
Qty: 1 KIT | Refills: 0 | Status: SHIPPED | OUTPATIENT
Start: 2019-12-05 | End: 2021-10-05

## 2019-12-05 RX ORDER — LANCETS
EACH MISCELLANEOUS
Qty: 100 EACH | Refills: 2 | Status: SHIPPED | OUTPATIENT
Start: 2019-12-05 | End: 2020-10-13

## 2019-12-05 NOTE — TELEPHONE ENCOUNTER
She can stop Jardiance, send a prescription for Diflucan 150 mg p.o. x1 and ask her to send blood sugars in few weeks for review.

## 2019-12-09 ENCOUNTER — LAB (OUTPATIENT)
Dept: LAB | Facility: HOSPITAL | Age: 64
End: 2019-12-09

## 2019-12-09 ENCOUNTER — ANESTHESIA EVENT (OUTPATIENT)
Dept: CARDIOLOGY | Facility: HOSPITAL | Age: 64
End: 2019-12-09

## 2019-12-09 ENCOUNTER — TELEPHONE (OUTPATIENT)
Dept: ENDOCRINOLOGY | Facility: CLINIC | Age: 64
End: 2019-12-09

## 2019-12-09 DIAGNOSIS — I49.3 PVC'S (PREMATURE VENTRICULAR CONTRACTIONS): ICD-10-CM

## 2019-12-09 DIAGNOSIS — R00.2 PALPITATIONS: ICD-10-CM

## 2019-12-09 LAB
ALBUMIN SERPL-MCNC: 4.3 G/DL (ref 3.5–5.2)
ALBUMIN/GLOB SERPL: 1.2 G/DL
ALP SERPL-CCNC: 49 U/L (ref 39–117)
ALT SERPL W P-5'-P-CCNC: 21 U/L (ref 1–33)
ANION GAP SERPL CALCULATED.3IONS-SCNC: 13.5 MMOL/L (ref 5–15)
AST SERPL-CCNC: 19 U/L (ref 1–32)
BASOPHILS # BLD AUTO: 0.04 10*3/MM3 (ref 0–0.2)
BASOPHILS NFR BLD AUTO: 0.5 % (ref 0–1.5)
BILIRUB SERPL-MCNC: 0.5 MG/DL (ref 0.2–1.2)
BUN BLD-MCNC: 15 MG/DL (ref 8–23)
BUN/CREAT SERPL: 20 (ref 7–25)
CALCIUM SPEC-SCNC: 9.6 MG/DL (ref 8.6–10.5)
CHLORIDE SERPL-SCNC: 104 MMOL/L (ref 98–107)
CO2 SERPL-SCNC: 26.5 MMOL/L (ref 22–29)
CREAT BLD-MCNC: 0.75 MG/DL (ref 0.57–1)
DEPRECATED RDW RBC AUTO: 44.7 FL (ref 37–54)
EOSINOPHIL # BLD AUTO: 0.11 10*3/MM3 (ref 0–0.4)
EOSINOPHIL NFR BLD AUTO: 1.5 % (ref 0.3–6.2)
ERYTHROCYTE [DISTWIDTH] IN BLOOD BY AUTOMATED COUNT: 14.4 % (ref 12.3–15.4)
GFR SERPL CREATININE-BSD FRML MDRD: 78 ML/MIN/1.73
GLOBULIN UR ELPH-MCNC: 3.5 GM/DL
GLUCOSE BLD-MCNC: 123 MG/DL (ref 65–99)
HCT VFR BLD AUTO: 46.5 % (ref 34–46.6)
HGB BLD-MCNC: 15.7 G/DL (ref 12–15.9)
IMM GRANULOCYTES # BLD AUTO: 0.04 10*3/MM3 (ref 0–0.05)
IMM GRANULOCYTES NFR BLD AUTO: 0.5 % (ref 0–0.5)
LYMPHOCYTES # BLD AUTO: 2.7 10*3/MM3 (ref 0.7–3.1)
LYMPHOCYTES NFR BLD AUTO: 36.7 % (ref 19.6–45.3)
MAGNESIUM SERPL-MCNC: 1.8 MG/DL (ref 1.6–2.4)
MCH RBC QN AUTO: 29.1 PG (ref 26.6–33)
MCHC RBC AUTO-ENTMCNC: 33.8 G/DL (ref 31.5–35.7)
MCV RBC AUTO: 86.1 FL (ref 79–97)
MONOCYTES # BLD AUTO: 0.82 10*3/MM3 (ref 0.1–0.9)
MONOCYTES NFR BLD AUTO: 11.2 % (ref 5–12)
NEUTROPHILS # BLD AUTO: 3.64 10*3/MM3 (ref 1.7–7)
NEUTROPHILS NFR BLD AUTO: 49.6 % (ref 42.7–76)
NRBC BLD AUTO-RTO: 0 /100 WBC (ref 0–0.2)
PLATELET # BLD AUTO: 337 10*3/MM3 (ref 140–450)
PMV BLD AUTO: 10.9 FL (ref 6–12)
POTASSIUM BLD-SCNC: 3.8 MMOL/L (ref 3.5–5.2)
PROT SERPL-MCNC: 7.8 G/DL (ref 6–8.5)
RBC # BLD AUTO: 5.4 10*6/MM3 (ref 3.77–5.28)
SODIUM BLD-SCNC: 144 MMOL/L (ref 136–145)
WBC NRBC COR # BLD: 7.35 10*3/MM3 (ref 3.4–10.8)

## 2019-12-09 PROCEDURE — 83735 ASSAY OF MAGNESIUM: CPT

## 2019-12-09 PROCEDURE — 85025 COMPLETE CBC W/AUTO DIFF WBC: CPT

## 2019-12-09 PROCEDURE — 36415 COLL VENOUS BLD VENIPUNCTURE: CPT

## 2019-12-09 PROCEDURE — 80053 COMPREHEN METABOLIC PANEL: CPT

## 2019-12-10 ENCOUNTER — ANESTHESIA (OUTPATIENT)
Dept: CARDIOLOGY | Facility: HOSPITAL | Age: 64
End: 2019-12-10

## 2019-12-10 ENCOUNTER — HOSPITAL ENCOUNTER (OUTPATIENT)
Facility: HOSPITAL | Age: 64
Discharge: HOME | End: 2019-12-11
Attending: INTERNAL MEDICINE | Admitting: INTERNAL MEDICINE

## 2019-12-10 DIAGNOSIS — I49.3 PVC'S (PREMATURE VENTRICULAR CONTRACTIONS): ICD-10-CM

## 2019-12-10 DIAGNOSIS — R00.2 PALPITATIONS: ICD-10-CM

## 2019-12-10 LAB
ACT BLD: 131 SECONDS (ref 89–137)
ACT BLD: 131 SECONDS (ref 89–137)
ACT BLD: 191 SECONDS (ref 89–137)
ACT BLD: 230 SECONDS (ref 89–137)
ACT BLD: 235 SECONDS (ref 89–137)
ACT BLD: 235 SECONDS (ref 89–137)
ACT BLD: 274 SECONDS (ref 89–137)
GLUCOSE BLDC GLUCOMTR-MCNC: 115 MG/DL (ref 70–105)
GLUCOSE BLDC GLUCOMTR-MCNC: 133 MG/DL (ref 70–105)
GLUCOSE BLDC GLUCOMTR-MCNC: 141 MG/DL (ref 70–105)

## 2019-12-10 PROCEDURE — 25010000002 PROPOFOL 10 MG/ML EMULSION: Performed by: ANESTHESIOLOGY

## 2019-12-10 PROCEDURE — 25010000002 PROTAMINE SULFATE PER 10 MG: Performed by: ANESTHESIOLOGY

## 2019-12-10 PROCEDURE — C1730 CATH, EP, 19 OR FEW ELECT: HCPCS | Performed by: INTERNAL MEDICINE

## 2019-12-10 PROCEDURE — 93654 COMPRE EP EVAL TX VT: CPT | Performed by: INTERNAL MEDICINE

## 2019-12-10 PROCEDURE — 25010000002 HEPARIN (PORCINE) PER 1000 UNITS: Performed by: ANESTHESIOLOGY

## 2019-12-10 PROCEDURE — 93662 INTRACARDIAC ECG (ICE): CPT | Performed by: INTERNAL MEDICINE

## 2019-12-10 PROCEDURE — 25010000002 HYDROMORPHONE PER 4 MG: Performed by: ANESTHESIOLOGY

## 2019-12-10 PROCEDURE — 93655 ICAR CATH ABLTJ DSCRT ARRHYT: CPT | Performed by: INTERNAL MEDICINE

## 2019-12-10 PROCEDURE — 25010000002 DEXAMETHASONE PER 1 MG: Performed by: ANESTHESIOLOGY

## 2019-12-10 PROCEDURE — C1894 INTRO/SHEATH, NON-LASER: HCPCS | Performed by: INTERNAL MEDICINE

## 2019-12-10 PROCEDURE — 25010000002 MIDAZOLAM PER 1 MG: Performed by: ANESTHESIOLOGY

## 2019-12-10 PROCEDURE — 82962 GLUCOSE BLOOD TEST: CPT

## 2019-12-10 PROCEDURE — C1732 CATH, EP, DIAG/ABL, 3D/VECT: HCPCS | Performed by: INTERNAL MEDICINE

## 2019-12-10 PROCEDURE — 85347 COAGULATION TIME ACTIVATED: CPT

## 2019-12-10 PROCEDURE — 25010000002 FENTANYL CITRATE (PF) 100 MCG/2ML SOLUTION: Performed by: ANESTHESIOLOGY

## 2019-12-10 PROCEDURE — 93623 PRGRMD STIMJ&PACG IV RX NFS: CPT | Performed by: INTERNAL MEDICINE

## 2019-12-10 PROCEDURE — C1759 CATH, INTRA ECHOCARDIOGRAPHY: HCPCS | Performed by: INTERNAL MEDICINE

## 2019-12-10 PROCEDURE — G0378 HOSPITAL OBSERVATION PER HR: HCPCS

## 2019-12-10 RX ORDER — HEPARIN SODIUM 1000 [USP'U]/ML
INJECTION, SOLUTION INTRAVENOUS; SUBCUTANEOUS AS NEEDED
Status: DISCONTINUED | OUTPATIENT
Start: 2019-12-10 | End: 2019-12-10 | Stop reason: SURG

## 2019-12-10 RX ORDER — SODIUM CHLORIDE 9 MG/ML
9 INJECTION, SOLUTION INTRAVENOUS CONTINUOUS PRN
Status: DISCONTINUED | OUTPATIENT
Start: 2019-12-10 | End: 2019-12-11 | Stop reason: HOSPADM

## 2019-12-10 RX ORDER — NALOXONE HCL 0.4 MG/ML
0.4 VIAL (ML) INJECTION
Status: DISCONTINUED | OUTPATIENT
Start: 2019-12-10 | End: 2019-12-11 | Stop reason: HOSPADM

## 2019-12-10 RX ORDER — ACETAMINOPHEN 650 MG/1
650 SUPPOSITORY RECTAL EVERY 4 HOURS PRN
Status: DISCONTINUED | OUTPATIENT
Start: 2019-12-10 | End: 2019-12-11 | Stop reason: HOSPADM

## 2019-12-10 RX ORDER — MEPERIDINE HYDROCHLORIDE 25 MG/ML
12.5 INJECTION INTRAMUSCULAR; INTRAVENOUS; SUBCUTANEOUS
Status: DISCONTINUED | OUTPATIENT
Start: 2019-12-10 | End: 2019-12-11 | Stop reason: HOSPADM

## 2019-12-10 RX ORDER — HYDRALAZINE HYDROCHLORIDE 25 MG/1
25 TABLET, FILM COATED ORAL 2 TIMES DAILY
Status: DISCONTINUED | OUTPATIENT
Start: 2019-12-10 | End: 2019-12-11 | Stop reason: HOSPADM

## 2019-12-10 RX ORDER — IPRATROPIUM BROMIDE AND ALBUTEROL SULFATE 2.5; .5 MG/3ML; MG/3ML
3 SOLUTION RESPIRATORY (INHALATION) ONCE AS NEEDED
Status: DISCONTINUED | OUTPATIENT
Start: 2019-12-10 | End: 2019-12-11 | Stop reason: HOSPADM

## 2019-12-10 RX ORDER — KETAMINE HYDROCHLORIDE 10 MG/ML
INJECTION INTRAMUSCULAR; INTRAVENOUS AS NEEDED
Status: DISCONTINUED | OUTPATIENT
Start: 2019-12-10 | End: 2019-12-10 | Stop reason: SURG

## 2019-12-10 RX ORDER — ALBUTEROL SULFATE 90 UG/1
AEROSOL, METERED RESPIRATORY (INHALATION) AS NEEDED
Status: DISCONTINUED | OUTPATIENT
Start: 2019-12-10 | End: 2019-12-10 | Stop reason: SURG

## 2019-12-10 RX ORDER — HYDROMORPHONE HCL 110MG/55ML
PATIENT CONTROLLED ANALGESIA SYRINGE INTRAVENOUS AS NEEDED
Status: DISCONTINUED | OUTPATIENT
Start: 2019-12-10 | End: 2019-12-10 | Stop reason: SURG

## 2019-12-10 RX ORDER — POTASSIUM CHLORIDE 750 MG/1
10 TABLET, FILM COATED, EXTENDED RELEASE ORAL DAILY
Status: DISCONTINUED | OUTPATIENT
Start: 2019-12-11 | End: 2019-12-11 | Stop reason: HOSPADM

## 2019-12-10 RX ORDER — SODIUM CHLORIDE 0.9 % (FLUSH) 0.9 %
3 SYRINGE (ML) INJECTION EVERY 12 HOURS SCHEDULED
Status: DISCONTINUED | OUTPATIENT
Start: 2019-12-10 | End: 2019-12-10

## 2019-12-10 RX ORDER — ONDANSETRON 2 MG/ML
4 INJECTION INTRAMUSCULAR; INTRAVENOUS EVERY 6 HOURS PRN
Status: DISCONTINUED | OUTPATIENT
Start: 2019-12-10 | End: 2019-12-11 | Stop reason: HOSPADM

## 2019-12-10 RX ORDER — ALBUTEROL SULFATE 2.5 MG/3ML
2.5 SOLUTION RESPIRATORY (INHALATION) EVERY 6 HOURS PRN
Status: DISCONTINUED | OUTPATIENT
Start: 2019-12-10 | End: 2019-12-11 | Stop reason: HOSPADM

## 2019-12-10 RX ORDER — LISINOPRIL 20 MG/1
20 TABLET ORAL DAILY
Status: DISCONTINUED | OUTPATIENT
Start: 2019-12-11 | End: 2019-12-11 | Stop reason: HOSPADM

## 2019-12-10 RX ORDER — LIDOCAINE HYDROCHLORIDE 20 MG/ML
INJECTION, SOLUTION EPIDURAL; INFILTRATION; INTRACAUDAL; PERINEURAL AS NEEDED
Status: DISCONTINUED | OUTPATIENT
Start: 2019-12-10 | End: 2019-12-10 | Stop reason: SURG

## 2019-12-10 RX ORDER — PHENYLEPHRINE HCL IN 0.9% NACL 0.5 MG/5ML
SYRINGE (ML) INTRAVENOUS AS NEEDED
Status: DISCONTINUED | OUTPATIENT
Start: 2019-12-10 | End: 2019-12-10 | Stop reason: SURG

## 2019-12-10 RX ORDER — ATORVASTATIN CALCIUM 20 MG/1
20 TABLET, FILM COATED ORAL NIGHTLY
Status: DISCONTINUED | OUTPATIENT
Start: 2019-12-10 | End: 2019-12-11 | Stop reason: HOSPADM

## 2019-12-10 RX ORDER — ONDANSETRON 2 MG/ML
4 INJECTION INTRAMUSCULAR; INTRAVENOUS ONCE AS NEEDED
Status: DISCONTINUED | OUTPATIENT
Start: 2019-12-10 | End: 2019-12-11 | Stop reason: HOSPADM

## 2019-12-10 RX ORDER — ACETAMINOPHEN 325 MG/1
650 TABLET ORAL EVERY 4 HOURS PRN
Status: DISCONTINUED | OUTPATIENT
Start: 2019-12-10 | End: 2019-12-11 | Stop reason: HOSPADM

## 2019-12-10 RX ORDER — PROPOFOL 10 MG/ML
VIAL (ML) INTRAVENOUS AS NEEDED
Status: DISCONTINUED | OUTPATIENT
Start: 2019-12-10 | End: 2019-12-10 | Stop reason: SURG

## 2019-12-10 RX ORDER — FENTANYL CITRATE 50 UG/ML
INJECTION, SOLUTION INTRAMUSCULAR; INTRAVENOUS AS NEEDED
Status: DISCONTINUED | OUTPATIENT
Start: 2019-12-10 | End: 2019-12-10 | Stop reason: SURG

## 2019-12-10 RX ORDER — FENOFIBRATE 145 MG/1
145 TABLET, COATED ORAL DAILY
Status: DISCONTINUED | OUTPATIENT
Start: 2019-12-11 | End: 2019-12-11 | Stop reason: HOSPADM

## 2019-12-10 RX ORDER — DEXAMETHASONE SODIUM PHOSPHATE 4 MG/ML
INJECTION, SOLUTION INTRA-ARTICULAR; INTRALESIONAL; INTRAMUSCULAR; INTRAVENOUS; SOFT TISSUE AS NEEDED
Status: DISCONTINUED | OUTPATIENT
Start: 2019-12-10 | End: 2019-12-10 | Stop reason: SURG

## 2019-12-10 RX ORDER — BUDESONIDE AND FORMOTEROL FUMARATE DIHYDRATE 160; 4.5 UG/1; UG/1
2 AEROSOL RESPIRATORY (INHALATION)
Status: DISCONTINUED | OUTPATIENT
Start: 2019-12-10 | End: 2019-12-11 | Stop reason: HOSPADM

## 2019-12-10 RX ORDER — MIDAZOLAM HYDROCHLORIDE 1 MG/ML
INJECTION INTRAMUSCULAR; INTRAVENOUS AS NEEDED
Status: DISCONTINUED | OUTPATIENT
Start: 2019-12-10 | End: 2019-12-10 | Stop reason: SURG

## 2019-12-10 RX ORDER — SODIUM CHLORIDE 0.9 % (FLUSH) 0.9 %
10 SYRINGE (ML) INJECTION AS NEEDED
Status: DISCONTINUED | OUTPATIENT
Start: 2019-12-10 | End: 2019-12-10

## 2019-12-10 RX ORDER — MORPHINE SULFATE 4 MG/ML
1 INJECTION, SOLUTION INTRAMUSCULAR; INTRAVENOUS EVERY 4 HOURS PRN
Status: DISCONTINUED | OUTPATIENT
Start: 2019-12-10 | End: 2019-12-11 | Stop reason: HOSPADM

## 2019-12-10 RX ORDER — SODIUM CHLORIDE 0.9 % (FLUSH) 0.9 %
10 SYRINGE (ML) INJECTION EVERY 12 HOURS SCHEDULED
Status: DISCONTINUED | OUTPATIENT
Start: 2019-12-10 | End: 2019-12-10

## 2019-12-10 RX ORDER — PROTAMINE SULFATE 10 MG/ML
INJECTION, SOLUTION INTRAVENOUS AS NEEDED
Status: DISCONTINUED | OUTPATIENT
Start: 2019-12-10 | End: 2019-12-10 | Stop reason: SURG

## 2019-12-10 RX ORDER — HYDROCODONE BITARTRATE AND ACETAMINOPHEN 7.5; 325 MG/1; MG/1
1 TABLET ORAL EVERY 8 HOURS PRN
Status: DISCONTINUED | OUTPATIENT
Start: 2019-12-10 | End: 2019-12-11 | Stop reason: HOSPADM

## 2019-12-10 RX ORDER — LIDOCAINE HYDROCHLORIDE 10 MG/ML
INJECTION, SOLUTION EPIDURAL; INFILTRATION; INTRACAUDAL; PERINEURAL AS NEEDED
Status: DISCONTINUED | OUTPATIENT
Start: 2019-12-10 | End: 2019-12-10 | Stop reason: HOSPADM

## 2019-12-10 RX ADMIN — KETAMINE HYDROCHLORIDE 20 MG: 10 INJECTION INTRAMUSCULAR; INTRAVENOUS at 12:05

## 2019-12-10 RX ADMIN — PHENYLEPHRINE HYDROCHLORIDE 100 MCG: 10 INJECTION INTRAVENOUS at 13:14

## 2019-12-10 RX ADMIN — PHENYLEPHRINE HYDROCHLORIDE 100 MCG: 10 INJECTION INTRAVENOUS at 13:59

## 2019-12-10 RX ADMIN — SODIUM CHLORIDE: 0.9 INJECTION, SOLUTION INTRAVENOUS at 11:50

## 2019-12-10 RX ADMIN — HEPARIN SODIUM 5000 UNITS: 1000 INJECTION, SOLUTION INTRAVENOUS; SUBCUTANEOUS at 12:45

## 2019-12-10 RX ADMIN — PROPOFOL 50 MG: 10 INJECTION, EMULSION INTRAVENOUS at 11:51

## 2019-12-10 RX ADMIN — PHENYLEPHRINE HYDROCHLORIDE 100 MCG: 10 INJECTION INTRAVENOUS at 14:02

## 2019-12-10 RX ADMIN — ALBUTEROL SULFATE 4 PUFF: 90 AEROSOL, METERED RESPIRATORY (INHALATION) at 12:05

## 2019-12-10 RX ADMIN — PHENYLEPHRINE HYDROCHLORIDE 300 MCG: 10 INJECTION INTRAVENOUS at 12:50

## 2019-12-10 RX ADMIN — HYDRALAZINE HYDROCHLORIDE 25 MG: 25 TABLET, FILM COATED ORAL at 21:28

## 2019-12-10 RX ADMIN — PHENYLEPHRINE HYDROCHLORIDE 200 MCG: 10 INJECTION INTRAVENOUS at 12:58

## 2019-12-10 RX ADMIN — KETAMINE HYDROCHLORIDE 30 MG: 10 INJECTION INTRAMUSCULAR; INTRAVENOUS at 11:56

## 2019-12-10 RX ADMIN — LIDOCAINE HYDROCHLORIDE 100 MG: 20 INJECTION, SOLUTION EPIDURAL; INFILTRATION; INTRACAUDAL; PERINEURAL at 12:06

## 2019-12-10 RX ADMIN — SODIUM CHLORIDE: 0.9 INJECTION, SOLUTION INTRAVENOUS at 14:20

## 2019-12-10 RX ADMIN — PROPOFOL 100 MCG/KG/MIN: 10 INJECTION, EMULSION INTRAVENOUS at 11:53

## 2019-12-10 RX ADMIN — HYDROMORPHONE HYDROCHLORIDE 1 MG: 2 INJECTION INTRAMUSCULAR; INTRAVENOUS; SUBCUTANEOUS at 13:34

## 2019-12-10 RX ADMIN — HEPARIN SODIUM 2000 UNITS: 1000 INJECTION, SOLUTION INTRAVENOUS; SUBCUTANEOUS at 13:00

## 2019-12-10 RX ADMIN — HEPARIN SODIUM 5000 UNITS: 1000 INJECTION, SOLUTION INTRAVENOUS; SUBCUTANEOUS at 12:23

## 2019-12-10 RX ADMIN — FENTANYL CITRATE 25 MCG: 50 INJECTION, SOLUTION INTRAMUSCULAR; INTRAVENOUS at 12:37

## 2019-12-10 RX ADMIN — DEXAMETHASONE SODIUM PHOSPHATE 4 MG: 4 INJECTION, SOLUTION INTRAMUSCULAR; INTRAVENOUS at 12:09

## 2019-12-10 RX ADMIN — ALBUTEROL SULFATE 4 PUFF: 90 AEROSOL, METERED RESPIRATORY (INHALATION) at 11:59

## 2019-12-10 RX ADMIN — ALBUTEROL SULFATE 4 PUFF: 90 AEROSOL, METERED RESPIRATORY (INHALATION) at 11:55

## 2019-12-10 RX ADMIN — FENTANYL CITRATE 50 MCG: 50 INJECTION, SOLUTION INTRAMUSCULAR; INTRAVENOUS at 13:29

## 2019-12-10 RX ADMIN — FENTANYL CITRATE 50 MCG: 50 INJECTION, SOLUTION INTRAMUSCULAR; INTRAVENOUS at 13:14

## 2019-12-10 RX ADMIN — PHENYLEPHRINE HYDROCHLORIDE 200 MCG: 10 INJECTION INTRAVENOUS at 13:52

## 2019-12-10 RX ADMIN — HYDROCODONE BITARTRATE AND ACETAMINOPHEN 1 TABLET: 7.5; 325 TABLET ORAL at 21:28

## 2019-12-10 RX ADMIN — ACETAMINOPHEN 650 MG: 325 TABLET ORAL at 16:01

## 2019-12-10 RX ADMIN — FENTANYL CITRATE 25 MCG: 50 INJECTION, SOLUTION INTRAMUSCULAR; INTRAVENOUS at 12:20

## 2019-12-10 RX ADMIN — MIDAZOLAM 2 MG: 1 INJECTION INTRAMUSCULAR; INTRAVENOUS at 13:13

## 2019-12-10 RX ADMIN — HEPARIN SODIUM 4000 UNITS: 1000 INJECTION, SOLUTION INTRAVENOUS; SUBCUTANEOUS at 13:40

## 2019-12-10 RX ADMIN — FENTANYL CITRATE 50 MCG: 50 INJECTION, SOLUTION INTRAMUSCULAR; INTRAVENOUS at 12:11

## 2019-12-10 RX ADMIN — PROTAMINE SULFATE 100 MG: 10 INJECTION, SOLUTION INTRAVENOUS at 14:20

## 2019-12-10 RX ADMIN — ALBUTEROL SULFATE 4 PUFF: 90 AEROSOL, METERED RESPIRATORY (INHALATION) at 12:09

## 2019-12-10 RX ADMIN — PHENYLEPHRINE HYDROCHLORIDE 100 MCG: 10 INJECTION INTRAVENOUS at 12:42

## 2019-12-10 RX ADMIN — ISOPROTERENOL HYDROCHLORIDE 1 MCG/MIN: 1 INJECTION, SOLUTION INTRACARDIAC; INTRAMUSCULAR; INTRAVENOUS; SUBCUTANEOUS at 13:12

## 2019-12-10 RX ADMIN — ATORVASTATIN CALCIUM 20 MG: 20 TABLET, FILM COATED ORAL at 21:28

## 2019-12-10 RX ADMIN — SODIUM CHLORIDE: 0.9 INJECTION, SOLUTION INTRAVENOUS at 13:07

## 2019-12-10 NOTE — ANESTHESIA PREPROCEDURE EVALUATION
Anesthesia Evaluation     Patient summary reviewed and Nursing notes reviewed   NPO Solid Status: > 8 hours  NPO Liquid Status: > 8 hours           Airway   Mallampati: I  TM distance: >3 FB  Neck ROM: full  No difficulty expected  Dental - normal exam   (+) edentulous    Pulmonary - normal exam   (+) a smoker Current Abstained day of surgery, COPD moderate, sleep apnea,   Cardiovascular - normal exam    (+) hypertension well controlled, dysrhythmias Atrial Fib, hyperlipidemia,       Neuro/Psych  GI/Hepatic/Renal/Endo    (+) morbid obesity, GERD well controlled,  diabetes mellitus type 2 well controlled,     Musculoskeletal     Abdominal  - normal exam    Bowel sounds: normal.   Substance History      OB/GYN          Other                      Anesthesia Plan    ASA 3     general     intravenous induction     Anesthetic plan, all risks, benefits, and alternatives have been provided, discussed and informed consent has been obtained with: patient.

## 2019-12-10 NOTE — PLAN OF CARE
Problem: Patient Care Overview  Goal: Plan of Care Review  Outcome: Ongoing (interventions implemented as appropriate)  Flowsheets (Taken 12/10/2019 1640)  Progress: improving  Plan of Care Reviewed With: patient  Outcome Summary: Ablation procedure complete. Vitals stable, thought still on oxygen. Groin sites intact. Hopeful to be discharged in the morning.

## 2019-12-11 VITALS
OXYGEN SATURATION: 96 % | WEIGHT: 190.04 LBS | DIASTOLIC BLOOD PRESSURE: 59 MMHG | HEART RATE: 71 BPM | BODY MASS INDEX: 32.44 KG/M2 | TEMPERATURE: 98.2 F | RESPIRATION RATE: 19 BRPM | SYSTOLIC BLOOD PRESSURE: 142 MMHG | HEIGHT: 64 IN

## 2019-12-11 PROCEDURE — G0378 HOSPITAL OBSERVATION PER HR: HCPCS

## 2019-12-11 PROCEDURE — 93005 ELECTROCARDIOGRAM TRACING: CPT | Performed by: INTERNAL MEDICINE

## 2019-12-11 PROCEDURE — 99217 PR OBSERVATION CARE DISCHARGE MANAGEMENT: CPT | Performed by: INTERNAL MEDICINE

## 2019-12-11 RX ORDER — ACEBUTOLOL HYDROCHLORIDE 200 MG/1
200 CAPSULE ORAL 2 TIMES DAILY
Qty: 60 CAPSULE | Refills: 3 | Status: SHIPPED | OUTPATIENT
Start: 2019-12-11 | End: 2020-02-05

## 2019-12-11 RX ADMIN — HYDRALAZINE HYDROCHLORIDE 25 MG: 25 TABLET, FILM COATED ORAL at 07:35

## 2019-12-11 RX ADMIN — FENOFIBRATE 145 MG: 145 TABLET ORAL at 07:35

## 2019-12-11 RX ADMIN — POTASSIUM CHLORIDE 10 MEQ: 10 TABLET, EXTENDED RELEASE ORAL at 07:34

## 2019-12-11 RX ADMIN — LISINOPRIL 20 MG: 20 TABLET ORAL at 07:34

## 2019-12-11 NOTE — DISCHARGE SUMMARY
Admitting diagnosis    Symptomatic recurrent PVCs  Tobacco abuse with obesity  Hypertension hyperlipidemia     discharging diagnosis  Status post attempted unifocal PVC ablation arising from aortic and mitral continuity area  Above diagnosis     consultants  Not applicable     procedures done  PVC ablation     condition at discharge  good      Physical Exam  VITALS REVIEWED--pulse rate is 70 patient is afebrile    General:      well developed, well nourished, in no acute distress.    Head:      normocephalic and atraumatic.    Eyes:      PERRL/EOM intact, conjunctiva and sclera clear with out nystagmus.    Neck:      no masses, thyromegaly,  trachea central with normal respiratory effort   Lungs:      clear bilaterally to auscultation.    Heart:       Sinus rhythm with occasional PVCs without any murmurs or gallops and right groin is soft without hematoma   Msk:      no deformity or scoliosis noted of thoracic or lumbar spine.    Pulses:      pulses normal in all 4 extremities.    Extremities:       no cyanosis or clubbing--trace left pedal edema and trace right pedal edema.    Neurologic:      no focal deficits.   alert oriented x3  Skin:      intact without lesions or rashes.    Psych:      alert and cooperative; normal mood and affect; normal attention span and concentration.       Hospital course--  Patient came for elective PVC ablation and extensive mapping of the PVC was noted to be arising epicardially attempted ablation from retroaortic approach was partially successful and attempted ablation via trans-coronary sinus was not feasible because of high impedance--overnight observation was done patient continued to have on and off PVCs without any symptoms and patient was taking metoprolol and diltiazem which has been stopped and started on acebutolol postprocedure instructions and follow-up appointments made     medications and allergies reviewed     post procedure instructions given     discharge care  discussed with the nurse and the patient     post procedure appointments made

## 2019-12-11 NOTE — PLAN OF CARE
Pt ambulated multiple times throughout night, tolerated well.  Site clear, 0 s/s bleeding or hematoma.

## 2019-12-11 NOTE — DISCHARGE INSTRUCTIONS
Post Cath Instructions      1) Drink plenty of fluids for the next 24 hours.  This helps to eliminate the dye used in your procedure through urination.  You may resume a normal diet; however, try to avoid foods that would cause gas or constipation.    2) Sedative medication given to you during your catheterization may decrease your judgement and reaction time for up to 24-48 hours.  Therefore:  a. DO NOT drive or operate hazardous machinery (48 hours)  b. DO NOT consume alcoholic beverages  c. DO NOT make any important/legal decisions  d. Have someone stay with you for at least 24 hours    3) To allow proper healing and prevent bleeding, the following activities are to be strictly avoided for the next 24-48 hours:  a. Excessive bending at wound site  b. Straining (anything that would tense up muscles around the affected puncture site)  c. Lifting objects greater than 5 pounds, pushing, or pulling for 5 days  i. For Arm Cases:  1. No flexing at the puncture site, such as hammering, golfing, bowling, or swinging any objects  ii. For Groin Cases:  1. Refrain from sexual activity  2. Refrain from running or vigorous walking  3. No prolonged sitting or standing  4. Limit stair climbing as much as possible    4) Keep the puncture site clean and dry.  You may remove the dressing tomorrow and replace it with a band-aid for at least one additional day.  Gently clean the site with mild soap and water.  No scrubbing/rubbing and lightly pat the area dry.  Showers are acceptable; however, avoid submerging in water (tub baths, hot tubs, swimming pools, dishwater, etc…) for at least one week.  The site should be completely healed before resuming these activities to reduce the risk of infection.  Check the site often.  Watch for signs and symptoms of infection and notify your physician if any of the following occur:  a. Bleeding or an increase in swelling at the puncture site  b. Fever  c. Increased soreness around puncture  site  d. Foul odor or significant drainage from the puncture site  e. Swelling, redness, or warmth at the puncture site    **A bruise or small “pea sized” lump under the skin at the puncture site is not unusual.  This should disappear within 3-4 weeks.**  5) CONTACT YOUR PHYSICIAN OR CALL 911 IF YOU EXPERIENCE ANY OF THE FOLLOWING:  a. Increased angina (chest pain) or frequent sensations of pressure, burning, pain, or other discomfort in the chest, arm, jaws, or stomach  b. Lightheadedness, dizziness, faint feeling, sweating, or difficulty breathing  c. Odd sensation changes like numbness, tingling, coldness, or pain in the arm or leg in which the catheter was inserted  d. Limb in which the catheter was inserted becomes pale/bluish in color    IMPORTANT:  Although this occurs very rarely, if you should develop bright red or excessive bleeding, feel a “pop” inside at the insertion site, or notice a sudden increase in swelling larger than a walnut, you should call 911.  Hold continuous firm pressure to the access site until emergency personnel arrive.  It is best if someone else can do this for you.

## 2019-12-13 PROCEDURE — 93010 ELECTROCARDIOGRAM REPORT: CPT | Performed by: INTERNAL MEDICINE

## 2019-12-18 ENCOUNTER — OFFICE VISIT (OUTPATIENT)
Dept: CARDIOLOGY | Facility: CLINIC | Age: 64
End: 2019-12-18

## 2019-12-18 VITALS
WEIGHT: 192 LBS | DIASTOLIC BLOOD PRESSURE: 74 MMHG | HEART RATE: 63 BPM | SYSTOLIC BLOOD PRESSURE: 123 MMHG | BODY MASS INDEX: 32.96 KG/M2

## 2019-12-18 DIAGNOSIS — I10 ESSENTIAL HYPERTENSION: ICD-10-CM

## 2019-12-18 DIAGNOSIS — I49.3 FREQUENT PVCS: Primary | ICD-10-CM

## 2019-12-18 DIAGNOSIS — I49.3 PVC'S (PREMATURE VENTRICULAR CONTRACTIONS): ICD-10-CM

## 2019-12-18 PROCEDURE — 99214 OFFICE O/P EST MOD 30 MIN: CPT | Performed by: NURSE PRACTITIONER

## 2019-12-18 PROCEDURE — 93000 ELECTROCARDIOGRAM COMPLETE: CPT | Performed by: NURSE PRACTITIONER

## 2019-12-18 NOTE — PROGRESS NOTES
Cardiology Office Follow Up Visit      Primary Care Provider:  Lou Curran MD    Reason for f/u: PVCs       Lou Curran MD    History of Present Illness     It was nice to see Caterina Mason in follow-up for symptomatic PVCs. She is a 64 y.o. female of Dr. Charles's with a history of increasing symptomatic unifocal PVCs that was found to have high volume PVCs on a Holter recording with 26% of PVC burden with symptoms of palpitations, fatigue, and neck fullness with some tightness in her chest.  Chronic medical issues include hypertension, hyperlipidemia, COPD-O2 at bedtime, RUSLAN-CPAP compliant, remote paroxysmal atrial fibrillation, osteoarthritis, GERD, chronic pain issues, tobacco abuse, diabetes mellitus type 2 followed by Dr. Archibald.  She underwent comprehensive EP study with endocardial VT ablation via a retroaortic approach on 12/10/2019, ablation was partially successful and attempted ablation via trans-coronary sinus was not feasible because of the high impedance.  She continued to have on and off PVCs without symptoms and Dr. Iglesias placed her on acebutolol 200 mg twice daily and her metoprolol and diltiazem were discontinued.  She comes in today for routine follow-up for post procedure evaluation.  She denies shortness of breath, chest/back pain, syncopal or near syncopal events since her procedure.  She states that she no longer has any symptomology.    Social history: Smokes 1 pack/day which is decreased from 2 packs/day, denies EtOH, denies illicits.  Lives with spouse of 35 years, occasionally utilizes a cane for mobility    8/2018 cardiac cath: No obstructive coronary artery disease      Past Medical History:   Diagnosis Date   • Arthritis    • Atrial fibrillation (CMS/HCC)    • Bradycardia     Dr. Charles   • Cataract    • COPD (chronic obstructive pulmonary disease) (CMS/HCC)     Dr. Rob   • Diabetes mellitus, type 2 (CMS/HCC)     sees Dr. Archibald at [place]   • DJD (degenerative  joint disease)     possible herniated disc, sees Pain Mgmt in [place]   •     • GERD (gastroesophageal reflux disease)    • History of combined right and left heart catheterization 2018    minimal CAD on heart cath done    • Hyperlipidemia    • Hypertension    • Pain    • Sleep apnea     wears CPAP machine, flora Rob       Past Surgical History:   Procedure Laterality Date   • ABLATION OF DYSRHYTHMIC FOCUS     • CARDIAC CATHETERIZATION      and Angiograph    • CARDIAC ELECTROPHYSIOLOGY PROCEDURE N/A 12/10/2019    Procedure: pvc ablation;  Surgeon: Alfredo Iglesias MD;  Location: [facility] INVAS[facility] LOC[facility];  Service: Cardiovascular   •  SECTION      x 1   • COLON RESECTION     • COLOSTOMY      and reversal in her 20's due to problems with childbirth   • COLOSTOMY CLOSURE     • OVARIAN CYST SURGERY     • ROTATOR CUFF REPAIR Left 2009    x2    • TOTAL ABDOMINAL HYSTERECTOMY WITH SALPINGO OOPHORECTOMY           Current Outpatient Medications:   •  ACCU-CHEK CARLIE PLUS test strip, USE TO CHECK BLOOD SUGAR TWICE A DAY, Disp: , Rfl: 4  •  ACCU-CHEK FASTCLIX LANCETS misc, 1 each by Other route 2 (Two) Times a Day., Disp: 100 each, Rfl: 3  •  ACCU-CHEK SOFTCLIX LANCETS lancets, USE TO CHECK BLOOD SUGAR TWICE A DAY, Disp: , Rfl: 4  •  ACCU-CHEK SOFTCLIX LANCETS lancets, USE TO CHECK BS 3 TIMES DAILY, Disp: 100 each, Rfl: 2  •  acebutolol (SECTRAL) 200 MG capsule, Take 1 capsule by mouth 2 (Two) Times a Day., Disp: 60 capsule, Rfl: 3  •  ALBUTEROL SULFATE  (90 Base) MCG/ACT inhaler, INHALE 1-2 PUFFS EVERY 4 HOURS AS NEEDED FOR COUGH OR FOR SHORTNESS OF BREATH, Disp: 18 inhaler, Rfl: 2  •  atorvastatin (LIPITOR) 40 MG tablet, TAKE 1/2 TABLET BY MOUTH DAILY, Disp: 45 tablet, Rfl: 1  •  Blood Glucose Monitoring Suppl (ACCU-CHEK CARLIE PLUS) w/Device kit, USE TO CHECK BS 3 TIMES DAILY, Disp: 1 kit, Rfl: 0  •  Cholecalciferol ([employer] D3) 1000 units capsule, 1 capsule Every 12 (Twelve) Hours.,  Disp: , Rfl:   •  ELIQUIS 5 MG tablet tablet, TAKE 1 TABLET BY MOUTH TWICE A DAY, Disp: 60 tablet, Rfl: 3  •  fenofibrate (TRICOR) 145 MG tablet, TAKE 1 TABLET BY MOUTH EVERY DAY, Disp: 90 tablet, Rfl: 2  •  glucose blood (ACCU-CHEK CARLIE PLUS) test strip, USE TO CHECK BS 3 TIMES DAILY, Disp: 100 each, Rfl: 2  •  glucose blood (ACCU-CHEK GUIDE) test strip, 1 each by Other route 2 (Two) Times a Day. Use as instructed, Disp: , Rfl:   •  glucose blood (ACCU-CHEK GUIDE) test strip, Use as instructed, Disp: 100 each, Rfl: 3  •  hydrALAZINE (APRESOLINE) 50 MG tablet, Take 25 mg by mouth 2 (Two) Times a Day., Disp: , Rfl:   •  HYDROcodone-acetaminophen (LORTAB) 7.5-325 MG per tablet, Take 1 tablet by mouth Every 8 (Eight) Hours As Needed for Moderate Pain ., Disp: , Rfl:   •  lisinopril (PRINIVIL,ZESTRIL) 20 MG tablet, Take 20 mg by mouth Daily., Disp: , Rfl:   •  magnesium oxide (MAG-OX) 400 MG tablet, Take 1 tablet by mouth 2 (Two) Times a Day., Disp: 180 tablet, Rfl: 1  •  metFORMIN (GLUCOPHAGE) 1000 MG tablet, TAKE 1 TABLET BY MOUTH TWICE A DAY, Disp: 180 tablet, Rfl: 1  •  potassium chloride (MICRO-K) 10 MEQ CR capsule, TAKE ONE CAPSULE BY MOUTH EVERY DAY, Disp: 90 capsule, Rfl: 1  •  SYMBICORT 160-4.5 MCG/ACT inhaler, TAKE 2 PUFFS BY MOUTH TWICE A DAY, Disp: 10.2 inhaler, Rfl: 4    Social History     Socioeconomic History   • Marital status:      Spouse name: Not on file   • Number of children: Not on file   • Years of education: Not on file   • Highest education level: Not on file   Tobacco Use   • Smoking status: Current Every Day Smoker     Packs/day: 1.50     Years: 42.00     Pack years: 63.00     Types: Cigarettes   • Smokeless tobacco: Never Used   Substance and Sexual Activity   • Alcohol use: No     Frequency: Never   • Drug use: No   • Sexual activity: Defer       Family History   Problem Relation Age of Onset   • Heart disease Mother    • Hypertension Mother    • Stroke Mother    • Seizures Mother     • Heart disease Father    • Hypertension Father    • Lung disease Father    • Stroke Father    • Cancer Sister    • Hypertension Brother    • Diabetes Brother    • Hyperlipidemia Other        The following portions of the patient's history were reviewed and updated as appropriate: allergies, current medications, past family history, past medical history, past social history, past surgical history and problem list.        Review of Systems   Constitution: Negative for diaphoresis, malaise/fatigue, weight gain and weight loss.   Cardiovascular: Negative for chest pain, dyspnea on exertion, leg swelling, near-syncope, orthopnea, palpitations and syncope.   Respiratory: Negative for hemoptysis, shortness of breath, sputum production and wheezing.    Skin: Negative for rash.   Musculoskeletal: Positive for arthritis.   Gastrointestinal: Negative for abdominal pain, hematemesis, hematochezia, nausea and vomiting.   Neurological: Negative for dizziness, light-headedness, numbness and seizures.   Psychiatric/Behavioral: Negative.    All other systems reviewed and are negative.    /74   Pulse 63   Wt 87.1 kg (192 lb)   BMI 32.96 kg/m² .  Objective     Physical Exam   Constitutional: She is oriented to person, place, and time. She appears well-developed and well-nourished. She is cooperative.   Neck: Normal carotid pulses and no JVD present. Carotid bruit is not present.   Cardiovascular: Normal rate, regular rhythm, normal heart sounds and intact distal pulses. Exam reveals no gallop and no friction rub.   No murmur heard.  Pulses:       Carotid pulses are 2+ on the right side, and 2+ on the left side.       Radial pulses are 2+ on the right side, and 2+ on the left side.        Posterior tibial pulses are 2+ on the right side, and 2+ on the left side.   Pulmonary/Chest: Effort normal and breath sounds normal. She has no decreased breath sounds. She has no wheezes. She has no rhonchi. She has no rales.    Abdominal: Soft. Bowel sounds are normal. She exhibits no distension and no mass. There is no hepatosplenomegaly. There is no tenderness. There is no guarding.   Musculoskeletal: She exhibits no edema.   Neurological: She is alert and oriented to person, place, and time.   Skin: Skin is warm and dry. No rash noted. No erythema. No pallor.   Psychiatric: She has a normal mood and affect. Her speech is normal and behavior is normal. Judgment and thought content normal.           ECG 12 Lead  Date/Time: 12/18/2019 6:36 PM  Performed by: Karey Marinelli APRN  Authorized by: Karey Marinelli APRN   Comparison: compared with previous ECG from 12/11/2019  Similar to previous ECG  Comparison to previous ECG: Normal sinus rhythm with right bundle branch block with frequent PVCs on previous EKG  Rhythm: sinus rhythm  BPM: 65  Conduction: right bundle branch block        Assessment/Plan:    1.  Symptomatic PVCs status post partial PVC ablation 12/10/2019-----symptom resolution on acebutolol  2.  Hypertension----controlled on current medications  3.  COPD with ongoing tobacco use----encouraged cessation  4.  RUSLAN---CPAP compliant  5.  Osteoarthritis with mild mobility issues    Continue current treatment plan, return to clinic in 1 month for repeat evaluation.    YAW Rae  EP cardiology  **All problems new to this practitioner

## 2019-12-23 RX ORDER — HYDRALAZINE HYDROCHLORIDE 50 MG/1
TABLET, FILM COATED ORAL
Qty: 60 TABLET | Refills: 1 | Status: SHIPPED | OUTPATIENT
Start: 2019-12-23 | End: 2020-01-15

## 2020-01-06 RX ORDER — CHOLECALCIFEROL (VITAMIN D3) 25 MCG
CAPSULE ORAL
Qty: 180 CAPSULE | Refills: 2 | Status: SHIPPED | OUTPATIENT
Start: 2020-01-06 | End: 2020-03-10

## 2020-01-15 RX ORDER — HYDRALAZINE HYDROCHLORIDE 50 MG/1
TABLET, FILM COATED ORAL
Qty: 60 TABLET | Refills: 1 | Status: SHIPPED | OUTPATIENT
Start: 2020-01-15 | End: 2020-02-14

## 2020-01-24 ENCOUNTER — OFFICE VISIT (OUTPATIENT)
Dept: CARDIOLOGY | Facility: CLINIC | Age: 65
End: 2020-01-24

## 2020-01-24 VITALS
HEART RATE: 64 BPM | DIASTOLIC BLOOD PRESSURE: 80 MMHG | OXYGEN SATURATION: 97 % | SYSTOLIC BLOOD PRESSURE: 136 MMHG | BODY MASS INDEX: 34.5 KG/M2 | WEIGHT: 201 LBS

## 2020-01-24 DIAGNOSIS — R00.2 PALPITATIONS: ICD-10-CM

## 2020-01-24 DIAGNOSIS — I10 ESSENTIAL HYPERTENSION: ICD-10-CM

## 2020-01-24 DIAGNOSIS — E78.2 MIXED HYPERLIPIDEMIA: ICD-10-CM

## 2020-01-24 DIAGNOSIS — I49.3 PVC'S (PREMATURE VENTRICULAR CONTRACTIONS): Primary | ICD-10-CM

## 2020-01-24 DIAGNOSIS — M19.90 ARTHRITIS: ICD-10-CM

## 2020-01-24 PROCEDURE — 99214 OFFICE O/P EST MOD 30 MIN: CPT | Performed by: INTERNAL MEDICINE

## 2020-01-24 NOTE — PROGRESS NOTES
CC--frequent and symptomatic PVCs    Sub--  64-year-old female patient with chronic medical problems of hypertension, hyperlipidemia, diabetes, COPD, obstructive sleep apnea and heavy tobacco abuse has been referred for symptomatic increasing unifocal PVCs--patient has high-volume PVCs on Holter recording and has symptoms of palpitations and fatigue and neck fullness--she also feels some tightness in her chest without any prior history of syncope  Her ejection fraction is close to normal and she had 26% of PVC burden not Holter recording  She underwent cardiac catheterization without significant coronary artery disease  Denied any worsening angina or any worsening dyspnea or significant lower extremity edema or claudication  There is also some history of atrial fibrillation without any recurrence in the recent past  Patient underwent attempted PVC ablation and PVCs were noted come from [facility] area with only partial suppression and recurrence post ablation start on acebutolol and denies any new symptoms  sHe has significant bilateral hand arthritis and knee arthritis and chronic back problems  In 2017 her rheumatoid markers and ESR were within normal limits  She has seen a hand surgeon who recommended steroid injection but continues to have significant bilateral hand discomfort to the extent of unable to drive          Past Medical History:   Diagnosis Date   • Arthritis    • Atrial fibrillation (CMS/[facility])    • Bradycardia     Dr. Charles   • Cataract    • COPD (chronic obstructive pulmonary disease) (CMS/[facility])     Dr. Rob   • Diabetes mellitus, type 2 (CMS/[facility])     sees Dr. Archibald at [place]   • DJD (degenerative joint disease)     possible herniated disc, sees Pain Mgmt in [place]   •     • GERD (gastroesophageal reflux disease)    • History of combined right and left heart catheterization 2018    minimal CAD on heart cath done    • Hyperlipidemia    • Hypertension    • Pain    • Sleep apnea     wears CPAP  kianna, flora Rob     Past Surgical History:   Procedure Laterality Date   • ABLATION OF DYSRHYTHMIC FOCUS     • CARDIAC CATHETERIZATION  2018    and Angiograph    • CARDIAC ELECTROPHYSIOLOGY PROCEDURE N/A 12/10/2019    Procedure: pvc ablation;  Surgeon: Alfredo Iglesias MD;  Location: [facility] INVASIVE LOCATION;  Service: Cardiovascular   •  SECTION      x 1   • COLON RESECTION     • COLOSTOMY      and reversal in her 20's due to problems with childbirth   • COLOSTOMY CLOSURE     • OVARIAN CYST SURGERY     • ROTATOR CUFF REPAIR Left 2009    x2    • TOTAL ABDOMINAL HYSTERECTOMY WITH SALPINGO OOPHORECTOMY       Family History   Problem Relation Age of Onset   • Heart disease Mother    • Hypertension Mother    • Stroke Mother    • Seizures Mother    • Heart disease Father    • Hypertension Father    • Lung disease Father    • Stroke Father    • Cancer Sister    • Hypertension Brother    • Diabetes Brother    • Hyperlipidemia Other      Social History     Tobacco Use   • Smoking status: Current Every Day Smoker     Packs/day: 1.00     Years: 42.00     Pack years: 42.00     Types: Cigarettes   • Smokeless tobacco: Never Used   • Tobacco comment: Pt states she is trying to quit   Substance Use Topics   • Alcohol use: No     Frequency: Never   • Drug use: No       (Not in a hospital admission)  Allergies:  Ibuprofen; Prednisone; Pregabalin; Tramadol; Levofloxacin; and Sulfamethoxazole-trimethoprim    Review of Systems   General:  positive for fatigue and tiredness  Eyes: No redness  Cardiovascular: No chest pain, positive for  palpitations  Respiratory:   positive for class 3 shortness of breath  Gastrointestinal: No nausea or vomiting, bleeding  Genitourinary: no hematuria or dysuria  Musculoskeletal: No arthralgia or myalgia  Skin: No rash  Neurologic: No numbness, tingling, syncope  Hematologic/Lymphatic: No abnormal bleeding      Physical Exam  VITALS REVIEWED--blood pressure 136/80 pulse rate 64  patient afebrile respiration 12 times a minute    General:      well developed, well nourished, in no acute distress.    Head:      normocephalic and atraumatic.    Eyes:      PERRL/EOM intact, conjunctiva and sclera clear with out nystagmus.    Neck:      no masses, thyromegaly,  trachea central with normal respiratory effort and PMI non displaced   Lungs:      clear bilaterally to auscultation.    Heart:       underlying sinus rhythm with  PVCs  without any murmurs gallops or rubs  Msk:      no deformity or scoliosis noted of thoracic or lumbar spine.    Pulses:      pulses normal in all 4 extremities.    Extremities:       no cyanosis or clubbing--trace left pedal edema and trace right pedal edema.    Neurologic:      no focal deficits.   alert oriented x3  Skin:      intact without lesions or rashes.    Psych:      alert and cooperative; normal mood and affect; normal attention span and concentration.          Assessment and plan    Frequent symptomatic PVCs--high-volume PVCs which are unifocal and attempted ablation endocardially unsuccessful since PVCs were coming from [facility] area currently on acebutolol without any symptoms   COPD with heavy tobacco abuse  Obstructive sleep apnea  Hypertension  Diabetes  Complete smoking cessation was educated multiple times during this clinic visit  Significant hand arthritis, knee arthritis--labs ordered and referred to rheumatologist  Follow-up in few months and medications reviewed    Electronically signed by Alfredo Iglesias MD, 01/24/20, 1:00 PM.

## 2020-01-27 ENCOUNTER — LAB (OUTPATIENT)
Dept: LAB | Facility: HOSPITAL | Age: 65
End: 2020-01-27

## 2020-01-27 DIAGNOSIS — M19.90 ARTHRITIS: ICD-10-CM

## 2020-01-27 LAB
CHROMATIN AB SERPL-ACNC: <10 IU/ML (ref 0–14)
ERYTHROCYTE [SEDIMENTATION RATE] IN BLOOD: 8 MM/HR (ref 0–30)
URATE SERPL-MCNC: 4.4 MG/DL (ref 2.4–5.7)

## 2020-01-27 PROCEDURE — 86235 NUCLEAR ANTIGEN ANTIBODY: CPT

## 2020-01-27 PROCEDURE — 86431 RHEUMATOID FACTOR QUANT: CPT

## 2020-01-27 PROCEDURE — 86225 DNA ANTIBODY NATIVE: CPT

## 2020-01-27 PROCEDURE — 36415 COLL VENOUS BLD VENIPUNCTURE: CPT

## 2020-01-27 PROCEDURE — 85652 RBC SED RATE AUTOMATED: CPT

## 2020-01-27 PROCEDURE — 84550 ASSAY OF BLOOD/URIC ACID: CPT

## 2020-01-27 PROCEDURE — 83516 IMMUNOASSAY NONANTIBODY: CPT

## 2020-01-28 LAB
CENTROMERE B AB SER-ACNC: <0.2 AI (ref 0–0.9)
CHROMATIN AB SERPL-ACNC: 0.2 AI (ref 0–0.9)
DSDNA AB SER-ACNC: 12 IU/ML (ref 0–9)
ENA JO1 AB SER-ACNC: <0.2 AI (ref 0–0.9)
ENA RNP AB SER-ACNC: 0.2 AI (ref 0–0.9)
ENA SCL70 AB SER-ACNC: <0.2 AI (ref 0–0.9)
ENA SM AB SER-ACNC: 0.2 AI (ref 0–0.9)
ENA SS-A AB SER-ACNC: <0.2 AI (ref 0–0.9)
ENA SS-B AB SER-ACNC: <0.2 AI (ref 0–0.9)
Lab: ABNORMAL
RIBOSOMAL P AB SER-ACNC: <0.2 AI (ref 0–0.9)
RIBOSOMAL P AB SER-ACNC: <0.2 AI (ref 0–0.9)

## 2020-01-30 ENCOUNTER — TELEPHONE (OUTPATIENT)
Dept: CARDIOLOGY | Facility: CLINIC | Age: 65
End: 2020-01-30

## 2020-01-30 NOTE — TELEPHONE ENCOUNTER
Pt called regarding lab results and referral to Rheumatology.  I let pt know results and was discussing rheumatologist with pt she said she wants to wait because she does not want to go see the md in [place], I let her know it could be months before the rheumatologist we originally referred to would be able to see her.  She states she understands and will wait until then.

## 2020-01-31 ENCOUNTER — LAB (OUTPATIENT)
Dept: LAB | Facility: HOSPITAL | Age: 65
End: 2020-01-31

## 2020-01-31 DIAGNOSIS — E11.65 TYPE 2 DIABETES MELLITUS WITH HYPERGLYCEMIA, WITHOUT LONG-TERM CURRENT USE OF INSULIN (HCC): ICD-10-CM

## 2020-01-31 DIAGNOSIS — E78.5 HYPERLIPIDEMIA, UNSPECIFIED HYPERLIPIDEMIA TYPE: ICD-10-CM

## 2020-01-31 DIAGNOSIS — I10 ESSENTIAL HYPERTENSION: ICD-10-CM

## 2020-01-31 LAB
ALBUMIN SERPL-MCNC: 4.2 G/DL (ref 3.5–5.2)
ALBUMIN UR-MCNC: 2.4 MG/DL
ALBUMIN/GLOB SERPL: 1.6 G/DL
ALP SERPL-CCNC: 48 U/L (ref 39–117)
ALT SERPL W P-5'-P-CCNC: 35 U/L (ref 1–33)
ANION GAP SERPL CALCULATED.3IONS-SCNC: 13.2 MMOL/L (ref 5–15)
AST SERPL-CCNC: 20 U/L (ref 1–32)
BILIRUB SERPL-MCNC: 0.3 MG/DL (ref 0.2–1.2)
BUN BLD-MCNC: 15 MG/DL (ref 8–23)
BUN/CREAT SERPL: 18.1 (ref 7–25)
CALCIUM SPEC-SCNC: 9.9 MG/DL (ref 8.6–10.5)
CHLORIDE SERPL-SCNC: 105 MMOL/L (ref 98–107)
CHOLEST SERPL-MCNC: 107 MG/DL (ref 0–200)
CO2 SERPL-SCNC: 24.8 MMOL/L (ref 22–29)
CREAT BLD-MCNC: 0.83 MG/DL (ref 0.57–1)
CREAT UR-MCNC: 87.9 MG/DL
GFR SERPL CREATININE-BSD FRML MDRD: 69 ML/MIN/1.73
GLOBULIN UR ELPH-MCNC: 2.7 GM/DL
GLUCOSE BLD-MCNC: 151 MG/DL (ref 65–99)
HBA1C MFR BLD: 7.8 % (ref 3.5–5.6)
HDLC SERPL-MCNC: 34 MG/DL (ref 40–60)
LDLC SERPL CALC-MCNC: 35 MG/DL (ref 0–100)
LDLC/HDLC SERPL: 1.04 {RATIO}
MICROALBUMIN/CREAT UR: 27.3 MG/G
POTASSIUM BLD-SCNC: 3.9 MMOL/L (ref 3.5–5.2)
PROT SERPL-MCNC: 6.9 G/DL (ref 6–8.5)
SODIUM BLD-SCNC: 143 MMOL/L (ref 136–145)
TRIGL SERPL-MCNC: 188 MG/DL (ref 0–150)
VLDLC SERPL-MCNC: 37.6 MG/DL (ref 5–40)

## 2020-01-31 PROCEDURE — 83036 HEMOGLOBIN GLYCOSYLATED A1C: CPT

## 2020-01-31 PROCEDURE — 80061 LIPID PANEL: CPT

## 2020-01-31 PROCEDURE — 36415 COLL VENOUS BLD VENIPUNCTURE: CPT

## 2020-01-31 PROCEDURE — 82043 UR ALBUMIN QUANTITATIVE: CPT

## 2020-01-31 PROCEDURE — 82570 ASSAY OF URINE CREATININE: CPT

## 2020-01-31 PROCEDURE — 80053 COMPREHEN METABOLIC PANEL: CPT

## 2020-02-04 ENCOUNTER — HOSPITAL ENCOUNTER (EMERGENCY)
Facility: HOSPITAL | Age: 65
Discharge: HOME | End: 2020-02-04
Admitting: EMERGENCY MEDICINE

## 2020-02-04 ENCOUNTER — APPOINTMENT (OUTPATIENT)
Dept: GENERAL RADIOLOGY | Facility: HOSPITAL | Age: 65
End: 2020-02-04

## 2020-02-04 VITALS
BODY MASS INDEX: 34.02 KG/M2 | OXYGEN SATURATION: 98 % | HEIGHT: 64 IN | TEMPERATURE: 98.4 F | SYSTOLIC BLOOD PRESSURE: 139 MMHG | WEIGHT: 199.3 LBS | RESPIRATION RATE: 15 BRPM | HEART RATE: 72 BPM | DIASTOLIC BLOOD PRESSURE: 74 MMHG

## 2020-02-04 DIAGNOSIS — R68.84 JAW PAIN: Primary | ICD-10-CM

## 2020-02-04 LAB
BASOPHILS # BLD AUTO: 0.1 10*3/MM3 (ref 0–0.2)
BASOPHILS NFR BLD AUTO: 0.7 % (ref 0–1.5)
DEPRECATED RDW RBC AUTO: 45.9 FL (ref 37–54)
EOSINOPHIL # BLD AUTO: 0.1 10*3/MM3 (ref 0–0.4)
EOSINOPHIL NFR BLD AUTO: 1.4 % (ref 0.3–6.2)
ERYTHROCYTE [DISTWIDTH] IN BLOOD BY AUTOMATED COUNT: 14.6 % (ref 12.3–15.4)
ERYTHROCYTE [SEDIMENTATION RATE] IN BLOOD: 17 MM/HR (ref 0–30)
HCT VFR BLD AUTO: 43.1 % (ref 34–46.6)
HGB BLD-MCNC: 14.6 G/DL (ref 12–15.9)
HOLD SPECIMEN: NORMAL
LYMPHOCYTES # BLD AUTO: 2.7 10*3/MM3 (ref 0.7–3.1)
LYMPHOCYTES NFR BLD AUTO: 30.3 % (ref 19.6–45.3)
MCH RBC QN AUTO: 30.3 PG (ref 26.6–33)
MCHC RBC AUTO-ENTMCNC: 33.9 G/DL (ref 31.5–35.7)
MCV RBC AUTO: 89.5 FL (ref 79–97)
MONOCYTES # BLD AUTO: 1 10*3/MM3 (ref 0.1–0.9)
MONOCYTES NFR BLD AUTO: 10.9 % (ref 5–12)
NEUTROPHILS # BLD AUTO: 5 10*3/MM3 (ref 1.7–7)
NEUTROPHILS NFR BLD AUTO: 56.7 % (ref 42.7–76)
NRBC BLD AUTO-RTO: 0 /100 WBC (ref 0–0.2)
PLATELET # BLD AUTO: 325 10*3/MM3 (ref 140–450)
PMV BLD AUTO: 8.5 FL (ref 6–12)
RBC # BLD AUTO: 4.81 10*6/MM3 (ref 3.77–5.28)
WBC NRBC COR # BLD: 8.8 10*3/MM3 (ref 3.4–10.8)

## 2020-02-04 PROCEDURE — 85025 COMPLETE CBC W/AUTO DIFF WBC: CPT | Performed by: NURSE PRACTITIONER

## 2020-02-04 PROCEDURE — 70355 PANORAMIC X-RAY OF JAWS: CPT

## 2020-02-04 PROCEDURE — 99283 EMERGENCY DEPT VISIT LOW MDM: CPT

## 2020-02-04 PROCEDURE — 85652 RBC SED RATE AUTOMATED: CPT | Performed by: NURSE PRACTITIONER

## 2020-02-04 NOTE — ED PROVIDER NOTES
Subjective   Patient is a 64-year-old white female looking older than current age comes in with complaints of right ear pain more so on the outside by her jaw hurts to open and close her jaw took 2 hydrocodone did not help been having some hot flashes but no fever no chest pain no shortness of breath no nausea vomiting or diarrhea no dizziness no rash states is been going on for 3 days      Earache   Location:  Right  Behind ear:  No abnormality  Quality:  Aching  Severity:  Moderate  Onset quality:  Gradual  Duration:  3 days  Timing:  Constant  Progression:  Worsening  Chronicity:  Recurrent  Context: not direct blow, not elevation change, not foreign body in ear, not loud noise, not recent URI and not water in ear    Relieved by:  Nothing  Worsened by:  Nothing  Ineffective treatments: 2 hydrocodone.  Associated symptoms: no abdominal pain, no congestion, no cough, no diarrhea, no ear discharge, no fever, no headaches, no hearing loss, no neck pain, no rash, no rhinorrhea, no sore throat, no tinnitus and no vomiting    Risk factors: no recent travel, no chronic ear infection and no prior ear surgery        Review of Systems   Constitutional: Negative for activity change, appetite change, fatigue and fever.   HENT: Positive for ear pain. Negative for congestion, dental problem, drooling, ear discharge, facial swelling, hearing loss, rhinorrhea, sinus pressure, sinus pain, sore throat, tinnitus and trouble swallowing.    Eyes: Negative for discharge and redness.   Respiratory: Negative for apnea, cough, chest tightness, shortness of breath and stridor.    Cardiovascular: Negative for chest pain, palpitations and leg swelling.   Gastrointestinal: Negative for abdominal distention, abdominal pain, diarrhea, nausea and vomiting.   Musculoskeletal: Negative for back pain, joint swelling and neck pain.   Skin: Negative for color change, pallor and rash.   Neurological: Negative for dizziness, numbness and headaches.        Past Medical History:   Diagnosis Date   • Arthritis    • Atrial fibrillation (CMS/HCC)    • Bradycardia     Dr. Charles   • Cataract    • COPD (chronic obstructive pulmonary disease) (CMS/[facility])     Dr. Rob   • Diabetes mellitus, type 2 (CMS/HCC)     sees Dr. Archibald at [place]   • DJD (degenerative joint disease)     possible herniated disc, sees Pain Mgmt in [place]   •     • GERD (gastroesophageal reflux disease)    • History of combined right and left heart catheterization 2018    minimal CAD on heart cath done    • Hyperlipidemia    • Hypertension    • Pain    • Sleep apnea     wears CPAP machine, sees [facility]       Allergies   Allergen Reactions   • Ibuprofen GI Intolerance   • Prednisone Other (See Comments)   • Pregabalin Other (See Comments)     jerking   • Tramadol Other (See Comments)     Legs jerked   • Levofloxacin Other (See Comments)     Increase sunburn   • Sulfamethoxazole-Trimethoprim Swelling       Past Surgical History:   Procedure Laterality Date   • ABLATION OF DYSRHYTHMIC FOCUS     • CARDIAC CATHETERIZATION      and Angiograph    • CARDIAC ELECTROPHYSIOLOGY PROCEDURE N/A 12/10/2019    Procedure: pvc ablation;  Surgeon: Alfredo Iglesias MD;  Location: [facility] INVASIVE LOCATION;  Service: Cardiovascular   •  SECTION      x 1   • COLON RESECTION     • COLOSTOMY      and reversal in her 20's due to problems with childbirth   • COLOSTOMY CLOSURE     • OVARIAN CYST SURGERY     • ROTATOR CUFF REPAIR Left 2009    x2    • TOTAL ABDOMINAL HYSTERECTOMY WITH SALPINGO OOPHORECTOMY         Family History   Problem Relation Age of Onset   • Heart disease Mother    • Hypertension Mother    • Stroke Mother    • Seizures Mother    • Heart disease Father    • Hypertension Father    • Lung disease Father    • Stroke Father    • Cancer Sister    • Hypertension Brother    • Diabetes Brother    • Hyperlipidemia Other        Social History     Socioeconomic History   • Marital  status:      Spouse name: Not on file   • Number of children: Not on file   • Years of education: Not on file   • Highest education level: Not on file   Tobacco Use   • Smoking status: Current Every Day Smoker     Packs/day: 1.00     Years: 42.00     Pack years: 42.00     Types: Cigarettes   • Smokeless tobacco: Never Used   • Tobacco comment: Pt states she is trying to quit   Substance and Sexual Activity   • Alcohol use: No     Frequency: Never   • Drug use: No   • Sexual activity: Defer           Objective   Physical Exam   Constitutional: She is oriented to person, place, and time. She appears well-developed and well-nourished. No distress.   HENT:   Head: Normocephalic and atraumatic.   Right Ear: External ear normal.   Left Ear: External ear normal.   Nose: Nose normal.   Mouth/Throat: Oropharynx is clear and moist. No oropharyngeal exudate.   Eyes: Pupils are equal, round, and reactive to light. Conjunctivae and EOM are normal.   Neck: Normal range of motion. Neck supple.   Cardiovascular: Normal rate, regular rhythm, normal heart sounds and intact distal pulses. Exam reveals no gallop and no friction rub.   No murmur heard.  Pulmonary/Chest: Effort normal and breath sounds normal. No stridor. No respiratory distress. She has no wheezes. She has no rales. She exhibits no tenderness.   Abdominal: Soft. Bowel sounds are normal. She exhibits no distension. There is no tenderness.   Musculoskeletal: Normal range of motion.   Lymphadenopathy:     She has no cervical adenopathy.   Neurological: She is alert and oriented to person, place, and time.   Skin: Skin is warm and dry. No rash noted. She is not diaphoretic.   Psychiatric: She has a normal mood and affect. Her behavior is normal. Judgment and thought content normal.   Nursing note and vitals reviewed.      Procedures           ED Course  ED Course as of Feb 04 1843   Tue Feb 04, 2020   1833 Sed rate normal    []   1835 CBC normal    []      ED  Course User Index  [JM] Chante Roth, APRN      Xr Panorex    Result Date: 2/4/2020  1. No evidence of fracture or dislocation.  Electronically Signed By-Milton Carter On:2/4/2020 5:59 PM This report was finalized on [id number] by  Milton Carter, .    Medications - No data to display  Labs Reviewed   CBC WITH AUTO DIFFERENTIAL - Abnormal; Notable for the following components:       Result Value    Monocytes, Absolute 1.00 (*)     All other components within normal limits   SEDIMENTATION RATE - Normal   CBC AND DIFFERENTIAL    Narrative:     The following orders were created for panel order CBC & Differential.  Procedure                               Abnormality         Status                     ---------                               -----------         ------                     CBC Auto Differential[[id number]]        Abnormal            Final result                 Please view results for these tests on the individual orders.   EXTRA TUBES    Narrative:     The following orders were created for panel order Extra Tubes.  Procedure                               Abnormality         Status                     ---------                               -----------         ------                     Green Top (Gel)[[id number]]                                  Final result                 Please view results for these tests on the individual orders.   GREEN TOP                                              MDM  Number of Diagnoses or Management Options  Jaw pain:   Diagnosis management comments: Disposition: Discharged.    Patient discharged in stable condition.    Reviewed implications of results, diagnosis, meds, responsibility to follow up, warning signs and symptoms of possible worsening, potential complications and reasons to return to ER increased symptoms fever chills rash chest pain shortness of breath    Patient/Family voiced understanding of above instructions.    Discussed plan for discharge, as there  is no emergent indication for admission.  Pt/family is agreeable and understands need for follow up and repeat testing.  Pt is aware that discharge does not mean that nothing is wrong but it indicates no emergency is present and they must continue care with follow-up as given below or physician of their choice.     FOLLOW-UP  Lou Curran MD[address] [facility] [address] [id number] an appointment as soon as possible for a visit in 2 daysIf symptoms worsen  [facility] EMERGENCY [id number] [facility] [id number] symptoms worsen       Medication List      No changes were made to your prescriptions during this visit.            Amount and/or Complexity of Data Reviewed  Clinical lab tests: reviewed  Tests in the radiology section of CPT®: reviewed        Final diagnoses:   Jaw pain            Chante Roth, APRN  02/04/20 1845

## 2020-02-04 NOTE — DISCHARGE INSTRUCTIONS
Take Tylenol or ibuprofen use as directed on the bottle follow-up with PCP return to ED if increased symptoms

## 2020-02-04 NOTE — ED NOTES
Pt c/o pain in right jaw right in front of ear x2-3 days; states it hurts to talk, eat, etc.     Lou Espinoza RN  02/04/20 3947

## 2020-02-05 RX ORDER — ACEBUTOLOL HYDROCHLORIDE 200 MG/1
CAPSULE ORAL
Qty: 180 CAPSULE | Refills: 1 | Status: SHIPPED | OUTPATIENT
Start: 2020-02-05 | End: 2020-03-10

## 2020-02-06 ENCOUNTER — OFFICE VISIT (OUTPATIENT)
Dept: ENDOCRINOLOGY | Facility: CLINIC | Age: 65
End: 2020-02-06

## 2020-02-06 VITALS
SYSTOLIC BLOOD PRESSURE: 120 MMHG | HEART RATE: 38 BPM | DIASTOLIC BLOOD PRESSURE: 68 MMHG | WEIGHT: 199 LBS | OXYGEN SATURATION: 97 % | BODY MASS INDEX: 35.26 KG/M2 | HEIGHT: 63 IN

## 2020-02-06 DIAGNOSIS — E11.65 TYPE 2 DIABETES MELLITUS WITH HYPERGLYCEMIA, WITHOUT LONG-TERM CURRENT USE OF INSULIN (HCC): Primary | ICD-10-CM

## 2020-02-06 DIAGNOSIS — I10 ESSENTIAL HYPERTENSION: ICD-10-CM

## 2020-02-06 DIAGNOSIS — E78.2 MIXED HYPERLIPIDEMIA: ICD-10-CM

## 2020-02-06 DIAGNOSIS — E11.42 DIABETIC PERIPHERAL NEUROPATHY (HCC): ICD-10-CM

## 2020-02-06 PROCEDURE — 99214 OFFICE O/P EST MOD 30 MIN: CPT | Performed by: INTERNAL MEDICINE

## 2020-02-06 RX ORDER — LIDOCAINE 50 MG/G
OINTMENT TOPICAL
COMMUNITY
Start: 2020-01-17 | End: 2020-02-06

## 2020-02-06 RX ORDER — METHOCARBAMOL 750 MG/1
TABLET, FILM COATED ORAL
COMMUNITY
Start: 2020-01-08 | End: 2020-03-10

## 2020-02-06 RX ORDER — LISINOPRIL AND HYDROCHLOROTHIAZIDE 25; 20 MG/1; MG/1
TABLET ORAL
COMMUNITY
Start: 2020-01-09 | End: 2020-02-06

## 2020-02-06 RX ORDER — POTASSIUM CHLORIDE 750 MG/1
10 CAPSULE, EXTENDED RELEASE ORAL DAILY
Qty: 90 CAPSULE | Refills: 1 | Status: SHIPPED | OUTPATIENT
Start: 2020-02-06 | End: 2020-08-10

## 2020-02-06 NOTE — PROGRESS NOTES
Endocrine Progress Note Outpatient     Patient Care Team:  Lou Curran MD as PCP - General  Jesse Charles MD as Consulting Physician (Cardiology)  Bautista Archibald MD as Consulting Physician (Endocrinology)  Raymond Piper MD as Consulting Physician (Pulmonary Disease)  Jane Montero MD as Consulting Physician (Obstetrics and Gynecology)    Chief Complaint: Follow-up type 2 diabetes    HPI: 64-year-old female with history of type 2 diabetes, hypertension and hyperlipidemia is here for follow-up.  For type 2 diabetes she is currently on metformin 1000 g twice a day.  He stopped Jardiance due to excessive yeast infections.  She is tolerating her medications well.  Sugars at home are doing fairly well unfortunately did not bring blood sugar records for review.  Hypertension: Well-controlled  Hyperlipidemia: On atorvastatin and fenofibrate.    Past Medical History:   Diagnosis Date   • Arthritis    • Atrial fibrillation (CMS/[facility])    • Bradycardia     Dr. Charles   • Cataract    • COPD (chronic obstructive pulmonary disease) (CMS/[facility])     Dr. Rob   • Diabetes mellitus, type 2 (CMS/[facility])     sees Dr. Archibald at [place]   • DJD (degenerative joint disease)     possible herniated disc, sees Pain Mgmt in [place]   •     • GERD (gastroesophageal reflux disease)    • History of combined right and left heart catheterization 2018    minimal CAD on heart cath done    • Hyperlipidemia    • Hypertension    • Pain    • Sleep apnea     wears CPAP machine, sees Darron       Social History     Socioeconomic History   • Marital status:      Spouse name: Not on file   • Number of children: Not on file   • Years of education: Not on file   • Highest education level: Not on file   Tobacco Use   • Smoking status: Current Every Day Smoker     Packs/day: 1.00     Years: 42.00     Pack years: 42.00     Types: Cigarettes   • Smokeless tobacco: Never Used   • Tobacco comment: Pt states she is trying to quit   Substance  and Sexual Activity   • Alcohol use: No     Frequency: Never   • Drug use: No   • Sexual activity: Defer       Family History   Problem Relation Age of Onset   • Heart disease Mother    • Hypertension Mother    • Stroke Mother    • Seizures Mother    • Heart disease Father    • Hypertension Father    • Lung disease Father    • Stroke Father    • Cancer Sister    • Hypertension Brother    • Diabetes Brother    • Hyperlipidemia Other        Allergies   Allergen Reactions   • Ibuprofen GI Intolerance   • Prednisone Other (See Comments)   • Pregabalin Other (See Comments)     jerking   • Tramadol Other (See Comments)     Legs jerked   • Levofloxacin Other (See Comments)     Increase sunburn   • Sulfamethoxazole-Trimethoprim Swelling       ROS:   Constitutional:  Admit fatigue, tiredness.    Eyes:  Denies change in visual acuity   HENT:  Denies nasal congestion or sore throat   Respiratory: denies cough, admit shortness of breath.   Cardiovascular:  denies chest pain, edema   GI:  Denies abdominal pain, nausea, vomiting.   Musculoskeletal:   Admit muscle aches and cramps.  Integument:  Denies dry skin and rash   Neurologic:  Denies headache, focal weakness or sensory changes   Endocrine:  Denies polyuria or polydipsia   Psychiatric:  Denies depression or anxiety      Vitals:    02/06/20 0904   BP: 120/68   Pulse: (!) 38   SpO2: 97%       Physical Exam:  GEN: NAD, conversant  EYES: EOMI, PERRL, no conjunctival erythema  NECK: no thyromegaly, full ROM   CV: RRR, no murmurs/rubs/gallops, no peripheral edema  LUNG: CTAB, no wheezes/rales/ronchi  SKIN: no rashes, no acanthosis  MSK: no deformities, full ROM of all extremities  NEURO: no tremors, DTR normal  PSYCH: AOX3, appropriate mood, affect normal      Results Review:     I reviewed the patient's new clinical results.    Lab Results   Component Value Date    HGBA1C 7.8 (H) 01/31/2020    HGBA1C 6.9 (H) 08/08/2019    HGBA1C 6.8 (H) 05/08/2019      Lab Results   Component  Value Date    GLUCOSE 151 (H) 01/31/2020    BUN 15 01/31/2020    CREATININE 0.83 01/31/2020    EGFRIFNONA 69 01/31/2020    BCR 18.1 01/31/2020    K 3.9 01/31/2020    CO2 24.8 01/31/2020    CALCIUM 9.9 01/31/2020    ALBUMIN 4.20 01/31/2020    LABIL2 1.4 05/08/2019    AST 20 01/31/2020    ALT 35 (H) 01/31/2020    CHOL 107 01/31/2020    TRIG 188 (H) 01/31/2020    LDL 35 01/31/2020    HDL 34 (L) 01/31/2020     Lab Results   Component Value Date    TSH 0.810 07/09/2019         Medication Review: Reviewed.       Current Outpatient Medications:   •  ACCU-CHEK CARLIE PLUS test strip, USE TO CHECK BLOOD SUGAR TWICE A DAY, Disp: , Rfl: 4  •  ACCU-CHEK FASTCLIX LANCETS misc, 1 each by Other route 2 (Two) Times a Day., Disp: 100 each, Rfl: 3  •  ACCU-CHEK SOFTCLIX LANCETS lancets, USE TO CHECK BLOOD SUGAR TWICE A DAY, Disp: , Rfl: 4  •  ACCU-CHEK SOFTCLIX LANCETS lancets, USE TO CHECK BS 3 TIMES DAILY, Disp: 100 each, Rfl: 2  •  acebutolol (SECTRAL) 200 MG capsule, TAKE 1 CAPSULE BY MOUTH TWICE A DAY, Disp: 180 capsule, Rfl: 1  •  ALBUTEROL SULFATE  (90 Base) MCG/ACT inhaler, INHALE 1-2 PUFFS EVERY 4 HOURS AS NEEDED FOR COUGH OR FOR SHORTNESS OF BREATH, Disp: 18 inhaler, Rfl: 2  •  atorvastatin (LIPITOR) 40 MG tablet, TAKE 1/2 TABLET BY MOUTH DAILY, Disp: 45 tablet, Rfl: 1  •  Blood Glucose Monitoring Suppl (ACCU-CHEK CARLIE PLUS) w/Device kit, USE TO CHECK BS 3 TIMES DAILY, Disp: 1 kit, Rfl: 0  •  [employer] D3 25 MCG (1000 UT) capsule, TAKE 1 CAPSULE BY MOUTH TWICE A DAY, Disp: 180 capsule, Rfl: 2  •  ELIQUIS 5 MG tablet tablet, TAKE 1 TABLET BY MOUTH TWICE A DAY, Disp: 60 tablet, Rfl: 3  •  fenofibrate (TRICOR) 145 MG tablet, TAKE 1 TABLET BY MOUTH EVERY DAY, Disp: 90 tablet, Rfl: 2  •  glucose blood (ACCU-CHEK CARLIE PLUS) test strip, USE TO CHECK BS 3 TIMES DAILY, Disp: 100 each, Rfl: 2  •  glucose blood (ACCU-CHEK GUIDE) test strip, 1 each by Other route 2 (Two) Times a Day. Use as instructed, Disp: , Rfl:   •  glucose blood  (ACCU-CHEK GUIDE) test strip, Use as instructed, Disp: 100 each, Rfl: 3  •  hydrALAZINE (APRESOLINE) 50 MG tablet, TAKE ONE TABLET BY MOUTH TWICE A DAY, Disp: 60 tablet, Rfl: 1  •  HYDROcodone-acetaminophen (LORTAB) 7.5-325 MG per tablet, Take 1 tablet by mouth Every 8 (Eight) Hours As Needed for Moderate Pain ., Disp: , Rfl:   •  lisinopril (PRINIVIL,ZESTRIL) 20 MG tablet, Take 20 mg by mouth Daily., Disp: , Rfl:   •  magnesium oxide (MAG-OX) 400 MG tablet, Take 1 tablet by mouth 2 (Two) Times a Day., Disp: 180 tablet, Rfl: 1  •  metFORMIN (GLUCOPHAGE) 1000 MG tablet, TAKE 1 TABLET BY MOUTH TWICE A DAY, Disp: 180 tablet, Rfl: 1  •  methocarbamol (ROBAXIN) 750 MG tablet, , Disp: , Rfl:   •  potassium chloride (MICRO-K) 10 MEQ CR capsule, TAKE ONE CAPSULE BY MOUTH EVERY DAY, Disp: 90 capsule, Rfl: 1  •  SYMBICORT 160-4.5 MCG/ACT inhaler, TAKE 2 PUFFS BY MOUTH TWICE A DAY, Disp: 10.2 inhaler, Rfl: 4      Assessment/Plan   1.  Diabetes mellitus type II: Uncontrolled with high A1c of 7.8%.  She is off Jardiance due to yeast infections.  Will change metformin to Janumet XR 50/thousand, 2 tablets daily and continue to follow blood sugars and A1c.  She is advised to continue to work on diet.      2.  Hypertension: Well-controlled, continue current medication    3.  Hyperlipidemia: Well-controlled, continue current medication  4.  Bradycardia: Heart rate is 51.  She is asymptomatic.            Bautista Archibald MD FACE.

## 2020-02-06 NOTE — PATIENT INSTRUCTIONS
ANKUR metformin start Janumet 50/1000, 2 tablets p.o. daily  Please stop smoking  Please continue to work on your diet and activity  Follow-up in 4 months with labs.

## 2020-02-14 RX ORDER — HYDRALAZINE HYDROCHLORIDE 50 MG/1
TABLET, FILM COATED ORAL
Qty: 60 TABLET | Refills: 1 | Status: SHIPPED | OUTPATIENT
Start: 2020-02-14 | End: 2020-03-10

## 2020-02-17 DIAGNOSIS — E11.65 TYPE 2 DIABETES MELLITUS WITH HYPERGLYCEMIA, WITHOUT LONG-TERM CURRENT USE OF INSULIN (HCC): Primary | ICD-10-CM

## 2020-02-17 RX ORDER — ALBUTEROL SULFATE 90 UG/1
AEROSOL, METERED RESPIRATORY (INHALATION)
Qty: 18 INHALER | Refills: 2 | Status: SHIPPED | OUTPATIENT
Start: 2020-02-17 | End: 2020-03-10

## 2020-02-17 RX ORDER — BLOOD SUGAR DIAGNOSTIC
STRIP MISCELLANEOUS
Qty: 100 EACH | Refills: 3 | Status: SHIPPED | OUTPATIENT
Start: 2020-02-17 | End: 2020-12-18

## 2020-03-03 ENCOUNTER — TELEPHONE (OUTPATIENT)
Dept: FAMILY MEDICINE CLINIC | Facility: CLINIC | Age: 65
End: 2020-03-03

## 2020-03-03 ENCOUNTER — OFFICE VISIT (OUTPATIENT)
Dept: FAMILY MEDICINE CLINIC | Facility: CLINIC | Age: 65
End: 2020-03-03

## 2020-03-03 VITALS
HEART RATE: 38 BPM | DIASTOLIC BLOOD PRESSURE: 73 MMHG | OXYGEN SATURATION: 91 % | SYSTOLIC BLOOD PRESSURE: 133 MMHG | BODY MASS INDEX: 35.78 KG/M2 | WEIGHT: 202 LBS | TEMPERATURE: 98.4 F

## 2020-03-03 DIAGNOSIS — M19.90 ARTHRITIS: ICD-10-CM

## 2020-03-03 DIAGNOSIS — M26.621 ARTHRALGIA OF RIGHT TEMPOROMANDIBULAR JOINT: Primary | ICD-10-CM

## 2020-03-03 DIAGNOSIS — H66.001 NON-RECURRENT ACUTE SUPPURATIVE OTITIS MEDIA OF RIGHT EAR WITHOUT SPONTANEOUS RUPTURE OF TYMPANIC MEMBRANE: ICD-10-CM

## 2020-03-03 PROCEDURE — 99213 OFFICE O/P EST LOW 20 MIN: CPT | Performed by: FAMILY MEDICINE

## 2020-03-03 RX ORDER — OXYCODONE HYDROCHLORIDE AND ACETAMINOPHEN 5; 325 MG/1; MG/1
TABLET ORAL
COMMUNITY
Start: 2020-02-22 | End: 2020-03-10

## 2020-03-03 RX ORDER — APIXABAN 5 MG/1
TABLET, FILM COATED ORAL
Qty: 60 TABLET | Refills: 3 | Status: SHIPPED | OUTPATIENT
Start: 2020-03-03 | End: 2020-03-10

## 2020-03-03 RX ORDER — AMOXICILLIN 500 MG/1
500 CAPSULE ORAL 3 TIMES DAILY
Qty: 30 CAPSULE | Refills: 0 | Status: SHIPPED | OUTPATIENT
Start: 2020-03-03 | End: 2020-05-01

## 2020-03-03 RX ORDER — FENOFIBRATE 160 MG/1
160 TABLET ORAL DAILY
Qty: 90 TABLET | Refills: 3 | Status: SHIPPED | OUTPATIENT
Start: 2020-03-03 | End: 2021-02-12

## 2020-03-03 RX ORDER — MELOXICAM 15 MG/1
15 TABLET ORAL DAILY
Qty: 30 TABLET | Refills: 0 | Status: SHIPPED | OUTPATIENT
Start: 2020-03-03 | End: 2020-05-01

## 2020-03-03 NOTE — PROGRESS NOTES
Subjective   Chief Complaint   Patient presents with   • Cough   • Pain     pain in groin   • Sore Throat   • Jaw Pain     locks up and causes pain in ear     Caterina Mason is a 64 y.o. female.     Cough   This is a chronic problem. The current episode started 1 to 4 weeks ago. The problem has been gradually worsening. The problem occurs every few minutes. The cough is productive of sputum. Associated symptoms include ear pain and a sore throat. Pertinent negatives include no chest pain, chills, fever, nasal congestion, postnasal drip, rash or shortness of breath. Risk factors for lung disease include smoking/tobacco exposure. She has tried nothing for the symptoms.   Sore Throat    This is a new problem. The current episode started in the past 7 days. The problem has been waxing and waning. There has been no fever. Associated symptoms include coughing and ear pain. Pertinent negatives include no abdominal pain or shortness of breath. She has had no exposure to strep or mono. She has tried nothing for the symptoms.   Jaw Pain   This is a new problem. The current episode started in the past 7 days. The problem occurs constantly. The problem has been unchanged. Associated symptoms include coughing and a sore throat. Pertinent negatives include no abdominal pain, chest pain, chills, fever or rash. The symptoms are aggravated by eating.      Past Medical History:   Diagnosis Date   • Arthritis    • Atrial fibrillation (CMS/HCC)    • Bradycardia     Dr. Charles   • Cataract    • COPD (chronic obstructive pulmonary disease) (CMS/HCC)     Dr. Rob   • Diabetes mellitus, type 2 (CMS/HCC)     sees Dr. Archibald at [place]   • DJD (degenerative joint disease)     possible herniated disc, sees Pain Mgmt in [place]   •     • GERD (gastroesophageal reflux disease)    • History of combined right and left heart catheterization 2018    minimal CAD on heart cath done    • Hyperlipidemia    • Hypertension    • Pain    •  Sleep apnea     wears CPAP machine, sees Darron     Past Surgical History:   Procedure Laterality Date   • ABLATION OF DYSRHYTHMIC FOCUS     • CARDIAC CATHETERIZATION      and Angiograph    • CARDIAC ELECTROPHYSIOLOGY PROCEDURE N/A 12/10/2019    Procedure: pvc ablation;  Surgeon: Alfredo Iglesias MD;  Location: [facility] INVASIVE LOCATION;  Service: Cardiovascular   •  SECTION      x 1   • COLON RESECTION     • COLONOSCOPY  2012   • COLOSTOMY      and reversal in her 20's due to problems with childbirth   • COLOSTOMY CLOSURE     • OVARIAN CYST SURGERY     • ROTATOR CUFF REPAIR Left 2009    x2    • TOTAL ABDOMINAL HYSTERECTOMY WITH SALPINGO OOPHORECTOMY       Allergies   Allergen Reactions   • Ibuprofen GI Intolerance   • Prednisone Other (See Comments)   • Pregabalin Other (See Comments)     jerking   • Tramadol Other (See Comments)     Legs jerked   • Levofloxacin Other (See Comments)     Increase sunburn   • Sulfamethoxazole-Trimethoprim Swelling     Social History     Socioeconomic History   • Marital status:      Spouse name: Not on file   • Number of children: Not on file   • Years of education: Not on file   • Highest education level: Not on file   Tobacco Use   • Smoking status: Current Every Day Smoker     Packs/day: 1.00     Years: 42.00     Pack years: 42.00     Types: Cigarettes   • Smokeless tobacco: Never Used   • Tobacco comment: Pt states she is trying to quit   Substance and Sexual Activity   • Alcohol use: No     Frequency: Never   • Drug use: No   • Sexual activity: Defer     Social History     Tobacco Use   Smoking Status Current Every Day Smoker   • Packs/day: 1.00   • Years: 42.00   • Pack years: 42.00   • Types: Cigarettes   Smokeless Tobacco Never Used   Tobacco Comment    Pt states she is trying to quit       family history includes Cancer in her sister; Diabetes in her brother; Heart disease in her father and mother; Hyperlipidemia in an other family member;  Hypertension in her brother, father, and mother; Lung disease in her father; Seizures in her mother; Stroke in her father and mother.  Current Outpatient Medications on File Prior to Visit   Medication Sig Dispense Refill   • ACCU-CHEK CARLIE PLUS test strip USE TO CHECK BLOOD SUGAR TWICE A  each 3   • ACCU-CHEK FASTCLIX LANCETS misc 1 each by Other route 2 (Two) Times a Day. 100 each 3   • ACCU-CHEK SOFTCLIX LANCETS lancets USE TO CHECK BLOOD SUGAR TWICE A DAY  4   • ACCU-CHEK SOFTCLIX LANCETS lancets USE TO CHECK BS 3 TIMES DAILY 100 each 2   • Blood Glucose Monitoring Suppl (ACCU-CHEK CARLIE PLUS) w/Device kit USE TO CHECK BS 3 TIMES DAILY 1 kit 0   • glucose blood (ACCU-CHEK CARLIE PLUS) test strip USE TO CHECK BS 3 TIMES DAILY 100 each 2   • glucose blood (ACCU-CHEK GUIDE) test strip 1 each by Other route 2 (Two) Times a Day. Use as instructed     • glucose blood (ACCU-CHEK GUIDE) test strip Use as instructed 100 each 3   • lisinopril (PRINIVIL,ZESTRIL) 20 MG tablet Take 20 mg by mouth Daily.     • magnesium oxide (MAG-OX) 400 MG tablet Take 1 tablet by mouth 2 (Two) Times a Day. 180 tablet 1   • potassium chloride (MICRO-K) 10 MEQ CR capsule Take 1 capsule by mouth Daily. 90 capsule 1     No current facility-administered medications on file prior to visit.      Patient Active Problem List   Diagnosis   • Arthritis   • Bradycardia   • Chronic obstructive pulmonary disease (CMS/HCC)   • Depression   • Diabetic peripheral neuropathy (CMS/[facility])   • Encounter for general adult medical examination without abnormal findings   • Gastroesophageal reflux disease   • Hypercalcemia   • Mixed hyperlipidemia   • Hypokalemia   • Hypomagnesemia   • Lumbar radiculopathy   • Myalgia   • Obesity   • Paroxysmal atrial fibrillation (CMS/HCC)   • Shoulder pain, right   • Sleep apnea   • Tobacco use disorder, continuous   • Type 2 diabetes mellitus with hyperglycemia, without long-term current use of insulin (CMS/HCC)   • Vitamin  D deficiency   • Palpitations   • PVC's (premature ventricular contractions)   • Essential hypertension   • Cervical radiculitis   • Arthralgia of right temporomandibular joint   • Non-recurrent acute suppurative otitis media of right ear without spontaneous rupture of tympanic membrane   • Acute respiratory distress   • COPD exacerbation (CMS/HCC)   • Shortness of breath   • Bilateral leg edema   • Acute diastolic CHF (congestive heart failure) (CMS/HCC)       The following portions of the patient's history were reviewed and updated as appropriate: allergies, current medications, past family history, past medical history, past social history, past surgical history and problem list.    Review of Systems   Constitutional: Negative for chills and fever.   HENT: Positive for ear pain and sore throat. Negative for postnasal drip and sinus pressure.    Eyes: Negative for blurred vision.   Respiratory: Positive for cough. Negative for shortness of breath.    Cardiovascular: Negative for chest pain and palpitations.   Gastrointestinal: Negative for abdominal pain.   Endocrine: Negative for polyuria.   Skin: Negative for rash.   Neurological: Negative for dizziness and headache.   Hematological: Negative for adenopathy.   Psychiatric/Behavioral: Negative for depressed mood.       Objective   /73 (BP Location: Left arm, Patient Position: Sitting, Cuff Size: Adult)   Pulse (!) 38   Temp 98.4 °F (36.9 °C) (Oral)   Wt 91.6 kg (202 lb)   SpO2 91%   BMI 35.78 kg/m²   Physical Exam   Constitutional: She is oriented to person, place, and time. She appears well-developed. No distress.   HENT:   Head: Normocephalic.   Right Ear: Tympanic membrane is injected.   Mouth/Throat: Oropharynx is clear and moist.   Eyes: Conjunctivae and lids are normal.   Neck: Trachea normal and normal range of motion. No thyroid mass and no thyromegaly present.   Cardiovascular: Normal rate, regular rhythm and normal heart sounds.    Pulmonary/Chest: Effort normal and breath sounds normal.   Lymphadenopathy:     She has no cervical adenopathy.   Neurological: She is alert and oriented to person, place, and time.   Skin: Skin is warm and dry.   Psychiatric: She has a normal mood and affect. Her speech is normal and behavior is normal. She is attentive.       No visits with results within 1 Week(s) from this visit.   Latest known visit with results is:   Admission on 02/04/2020, Discharged on 02/04/2020   Component Date Value Ref Range Status   • Sed Rate 02/04/2020 17  0 - 30 mm/hr Final   • WBC 02/04/2020 8.80  3.40 - 10.80 10*3/mm3 Final   • RBC 02/04/2020 4.81  3.77 - 5.28 10*6/mm3 Final   • Hemoglobin 02/04/2020 14.6  12.0 - 15.9 g/dL Final   • Hematocrit 02/04/2020 43.1  34.0 - 46.6 % Final   • MCV 02/04/2020 89.5  79.0 - 97.0 fL Final   • MCH 02/04/2020 30.3  26.6 - 33.0 pg Final   • MCHC 02/04/2020 33.9  31.5 - 35.7 g/dL Final   • RDW 02/04/2020 14.6  12.3 - 15.4 % Final   • RDW-SD 02/04/2020 45.9  37.0 - 54.0 fl Final   • MPV 02/04/2020 8.5  6.0 - 12.0 fL Final   • Platelets 02/04/2020 325  140 - 450 10*3/mm3 Final   • Neutrophil % 02/04/2020 56.7  42.7 - 76.0 % Final   • Lymphocyte % 02/04/2020 30.3  19.6 - 45.3 % Final   • Monocyte % 02/04/2020 10.9  5.0 - 12.0 % Final   • Eosinophil % 02/04/2020 1.4  0.3 - 6.2 % Final   • Basophil % 02/04/2020 0.7  0.0 - 1.5 % Final   • Neutrophils, Absolute 02/04/2020 5.00  1.70 - 7.00 10*3/mm3 Final   • Lymphocytes, Absolute 02/04/2020 2.70  0.70 - 3.10 10*3/mm3 Final   • Monocytes, Absolute 02/04/2020 1.00* 0.10 - 0.90 10*3/mm3 Final   • Eosinophils, Absolute 02/04/2020 0.10  0.00 - 0.40 10*3/mm3 Final   • Basophils, Absolute 02/04/2020 0.10  0.00 - 0.20 10*3/mm3 Final   • nRBC 02/04/2020 0.0  0.0 - 0.2 /100 WBC Final   • Extra Tube 02/04/2020 Hold for add-ons.   Final    Auto resulted.           Assessment/Plan   Problems Addressed this Visit        Nervous and Auditory    Arthralgia of right  temporomandibular joint - Primary    Relevant Medications    meloxicam (MOBIC) 15 MG tablet    Non-recurrent acute suppurative otitis media of right ear without spontaneous rupture of tympanic membrane       Musculoskeletal and Integument    Arthritis

## 2020-03-03 NOTE — TELEPHONE ENCOUNTER
Patients insurance will not cover Fenofibrate 145mg, alternatives are Fenofibrate 134 mg, 160 mg, or 200 mg. Can this medication be switched?

## 2020-03-10 ENCOUNTER — APPOINTMENT (OUTPATIENT)
Dept: GENERAL RADIOLOGY | Facility: HOSPITAL | Age: 65
End: 2020-03-10

## 2020-03-10 ENCOUNTER — HOSPITAL ENCOUNTER (INPATIENT)
Facility: HOSPITAL | Age: 65
LOS: 2 days | Discharge: HOME | End: 2020-03-12
Attending: EMERGENCY MEDICINE | Admitting: INTERNAL MEDICINE

## 2020-03-10 DIAGNOSIS — R06.03 ACUTE RESPIRATORY DISTRESS: Primary | ICD-10-CM

## 2020-03-10 DIAGNOSIS — E11.65 TYPE 2 DIABETES MELLITUS WITH HYPERGLYCEMIA, WITHOUT LONG-TERM CURRENT USE OF INSULIN (HCC): ICD-10-CM

## 2020-03-10 DIAGNOSIS — I50.9 ACUTE CONGESTIVE HEART FAILURE, UNSPECIFIED HEART FAILURE TYPE (HCC): ICD-10-CM

## 2020-03-10 DIAGNOSIS — J44.1 CHRONIC OBSTRUCTIVE PULMONARY DISEASE WITH ACUTE EXACERBATION (HCC): ICD-10-CM

## 2020-03-10 LAB
ANION GAP SERPL CALCULATED.3IONS-SCNC: 12 MMOL/L (ref 5–15)
BASOPHILS # BLD AUTO: 0 10*3/MM3 (ref 0–0.2)
BASOPHILS NFR BLD AUTO: 0.6 % (ref 0–1.5)
BUN BLD-MCNC: 11 MG/DL (ref 8–23)
BUN/CREAT SERPL: 14.1 (ref 7–25)
CALCIUM SPEC-SCNC: 10.1 MG/DL (ref 8.6–10.5)
CHLORIDE SERPL-SCNC: 105 MMOL/L (ref 98–107)
CO2 SERPL-SCNC: 26 MMOL/L (ref 22–29)
CREAT BLD-MCNC: 0.78 MG/DL (ref 0.57–1)
DEPRECATED RDW RBC AUTO: 43.8 FL (ref 37–54)
EOSINOPHIL # BLD AUTO: 0.1 10*3/MM3 (ref 0–0.4)
EOSINOPHIL NFR BLD AUTO: 1.6 % (ref 0.3–6.2)
ERYTHROCYTE [DISTWIDTH] IN BLOOD BY AUTOMATED COUNT: 14 % (ref 12.3–15.4)
GFR SERPL CREATININE-BSD FRML MDRD: 74 ML/MIN/1.73
GLUCOSE BLD-MCNC: 255 MG/DL (ref 65–99)
HCT VFR BLD AUTO: 41.9 % (ref 34–46.6)
HGB BLD-MCNC: 14.5 G/DL (ref 12–15.9)
HOLD SPECIMEN: NORMAL
LYMPHOCYTES # BLD AUTO: 2.6 10*3/MM3 (ref 0.7–3.1)
LYMPHOCYTES NFR BLD AUTO: 34.8 % (ref 19.6–45.3)
MCH RBC QN AUTO: 30.7 PG (ref 26.6–33)
MCHC RBC AUTO-ENTMCNC: 34.7 G/DL (ref 31.5–35.7)
MCV RBC AUTO: 88.5 FL (ref 79–97)
MONOCYTES # BLD AUTO: 0.9 10*3/MM3 (ref 0.1–0.9)
MONOCYTES NFR BLD AUTO: 11.8 % (ref 5–12)
NEUTROPHILS # BLD AUTO: 3.8 10*3/MM3 (ref 1.7–7)
NEUTROPHILS NFR BLD AUTO: 51.2 % (ref 42.7–76)
NRBC BLD AUTO-RTO: 0.1 /100 WBC (ref 0–0.2)
NT-PROBNP SERPL-MCNC: 1863 PG/ML (ref 5–900)
PLATELET # BLD AUTO: 306 10*3/MM3 (ref 140–450)
PMV BLD AUTO: 8.8 FL (ref 6–12)
POTASSIUM BLD-SCNC: 3.6 MMOL/L (ref 3.5–5.2)
RBC # BLD AUTO: 4.73 10*6/MM3 (ref 3.77–5.28)
SODIUM BLD-SCNC: 143 MMOL/L (ref 136–145)
TROPONIN T SERPL-MCNC: <0.01 NG/ML (ref 0–0.03)
WBC NRBC COR # BLD: 7.4 10*3/MM3 (ref 3.4–10.8)

## 2020-03-10 PROCEDURE — 85025 COMPLETE CBC W/AUTO DIFF WBC: CPT | Performed by: EMERGENCY MEDICINE

## 2020-03-10 PROCEDURE — 80048 BASIC METABOLIC PNL TOTAL CA: CPT | Performed by: EMERGENCY MEDICINE

## 2020-03-10 PROCEDURE — 25010000002 FUROSEMIDE PER 20 MG: Performed by: EMERGENCY MEDICINE

## 2020-03-10 PROCEDURE — 99222 1ST HOSP IP/OBS MODERATE 55: CPT | Performed by: NURSE PRACTITIONER

## 2020-03-10 PROCEDURE — 94799 UNLISTED PULMONARY SVC/PX: CPT

## 2020-03-10 PROCEDURE — 83880 ASSAY OF NATRIURETIC PEPTIDE: CPT | Performed by: EMERGENCY MEDICINE

## 2020-03-10 PROCEDURE — 84484 ASSAY OF TROPONIN QUANT: CPT | Performed by: EMERGENCY MEDICINE

## 2020-03-10 PROCEDURE — 71045 X-RAY EXAM CHEST 1 VIEW: CPT

## 2020-03-10 PROCEDURE — 99284 EMERGENCY DEPT VISIT MOD MDM: CPT

## 2020-03-10 PROCEDURE — 93005 ELECTROCARDIOGRAM TRACING: CPT | Performed by: EMERGENCY MEDICINE

## 2020-03-10 PROCEDURE — 25010000002 METHYLPREDNISOLONE PER 125 MG: Performed by: EMERGENCY MEDICINE

## 2020-03-10 PROCEDURE — 94640 AIRWAY INHALATION TREATMENT: CPT

## 2020-03-10 PROCEDURE — 93005 ELECTROCARDIOGRAM TRACING: CPT

## 2020-03-10 RX ORDER — FUROSEMIDE 10 MG/ML
40 INJECTION INTRAMUSCULAR; INTRAVENOUS ONCE
Status: COMPLETED | OUTPATIENT
Start: 2020-03-10 | End: 2020-03-10

## 2020-03-10 RX ORDER — METHYLPREDNISOLONE SODIUM SUCCINATE 125 MG/2ML
125 INJECTION, POWDER, LYOPHILIZED, FOR SOLUTION INTRAMUSCULAR; INTRAVENOUS ONCE
Status: COMPLETED | OUTPATIENT
Start: 2020-03-10 | End: 2020-03-10

## 2020-03-10 RX ORDER — OXYCODONE HYDROCHLORIDE AND ACETAMINOPHEN 5; 325 MG/1; MG/1
1 TABLET ORAL EVERY 8 HOURS PRN
COMMUNITY
End: 2020-05-27

## 2020-03-10 RX ORDER — ALBUTEROL SULFATE 2.5 MG/3ML
2.5 SOLUTION RESPIRATORY (INHALATION) ONCE
Status: COMPLETED | OUTPATIENT
Start: 2020-03-10 | End: 2020-03-10

## 2020-03-10 RX ORDER — ACEBUTOLOL HYDROCHLORIDE 200 MG/1
200 CAPSULE ORAL 2 TIMES DAILY
Status: DISCONTINUED | OUTPATIENT
Start: 2020-03-10 | End: 2020-03-12

## 2020-03-10 RX ORDER — AMOXICILLIN 250 MG/1
500 CAPSULE ORAL EVERY 8 HOURS SCHEDULED
Status: DISPENSED | OUTPATIENT
Start: 2020-03-10 | End: 2020-03-11

## 2020-03-10 RX ORDER — HYDRALAZINE HYDROCHLORIDE 50 MG/1
50 TABLET, FILM COATED ORAL 2 TIMES DAILY
COMMUNITY
End: 2020-04-02

## 2020-03-10 RX ORDER — ALBUTEROL SULFATE 90 UG/1
2 AEROSOL, METERED RESPIRATORY (INHALATION) EVERY 4 HOURS PRN
COMMUNITY
End: 2020-08-17

## 2020-03-10 RX ORDER — HYDRALAZINE HYDROCHLORIDE 25 MG/1
50 TABLET, FILM COATED ORAL 2 TIMES DAILY
Status: DISCONTINUED | OUTPATIENT
Start: 2020-03-10 | End: 2020-03-12 | Stop reason: HOSPADM

## 2020-03-10 RX ORDER — OMEGA-3S/DHA/EPA/FISH OIL/D3 300MG-1000
1000 CAPSULE ORAL 2 TIMES DAILY
COMMUNITY
End: 2020-05-27

## 2020-03-10 RX ORDER — FENOFIBRATE 145 MG/1
145 TABLET, COATED ORAL DAILY
Status: DISCONTINUED | OUTPATIENT
Start: 2020-03-11 | End: 2020-03-12 | Stop reason: HOSPADM

## 2020-03-10 RX ORDER — MELOXICAM 15 MG/1
15 TABLET ORAL
Status: DISCONTINUED | OUTPATIENT
Start: 2020-03-11 | End: 2020-03-12 | Stop reason: HOSPADM

## 2020-03-10 RX ORDER — POTASSIUM CHLORIDE 20 MEQ/1
10 TABLET, EXTENDED RELEASE ORAL
Status: DISCONTINUED | OUTPATIENT
Start: 2020-03-11 | End: 2020-03-12 | Stop reason: HOSPADM

## 2020-03-10 RX ORDER — SODIUM CHLORIDE 0.9 % (FLUSH) 0.9 %
10 SYRINGE (ML) INJECTION AS NEEDED
Status: DISCONTINUED | OUTPATIENT
Start: 2020-03-10 | End: 2020-03-12 | Stop reason: HOSPADM

## 2020-03-10 RX ORDER — ATORVASTATIN CALCIUM 40 MG/1
20 TABLET, FILM COATED ORAL DAILY
COMMUNITY
End: 2020-03-20

## 2020-03-10 RX ORDER — ATORVASTATIN CALCIUM 20 MG/1
20 TABLET, FILM COATED ORAL NIGHTLY
Status: DISCONTINUED | OUTPATIENT
Start: 2020-03-11 | End: 2020-03-12 | Stop reason: HOSPADM

## 2020-03-10 RX ORDER — ACEBUTOLOL HYDROCHLORIDE 200 MG/1
200 CAPSULE ORAL 2 TIMES DAILY
Status: ON HOLD | COMMUNITY
End: 2020-03-12 | Stop reason: ALTCHOICE

## 2020-03-10 RX ORDER — LISINOPRIL 20 MG/1
20 TABLET ORAL DAILY
Status: DISCONTINUED | OUTPATIENT
Start: 2020-03-11 | End: 2020-03-12 | Stop reason: HOSPADM

## 2020-03-10 RX ORDER — ALBUTEROL SULFATE 2.5 MG/3ML
5 SOLUTION RESPIRATORY (INHALATION) EVERY 4 HOURS PRN
Status: DISCONTINUED | OUTPATIENT
Start: 2020-03-10 | End: 2020-03-12 | Stop reason: HOSPADM

## 2020-03-10 RX ORDER — IPRATROPIUM BROMIDE AND ALBUTEROL SULFATE 2.5; .5 MG/3ML; MG/3ML
3 SOLUTION RESPIRATORY (INHALATION) ONCE
Status: COMPLETED | OUTPATIENT
Start: 2020-03-10 | End: 2020-03-10

## 2020-03-10 RX ADMIN — ALBUTEROL SULFATE 2.5 MG: 2.5 SOLUTION RESPIRATORY (INHALATION) at 23:11

## 2020-03-10 RX ADMIN — FUROSEMIDE 40 MG: 10 INJECTION, SOLUTION INTRAMUSCULAR; INTRAVENOUS at 22:48

## 2020-03-10 RX ADMIN — METHYLPREDNISOLONE SODIUM SUCCINATE 125 MG: 125 INJECTION, POWDER, FOR SOLUTION INTRAMUSCULAR; INTRAVENOUS at 21:32

## 2020-03-10 RX ADMIN — IPRATROPIUM BROMIDE AND ALBUTEROL SULFATE 3 ML: .5; 3 SOLUTION RESPIRATORY (INHALATION) at 21:30

## 2020-03-10 RX ADMIN — ALBUTEROL SULFATE 2.5 MG: 2.5 SOLUTION RESPIRATORY (INHALATION) at 21:34

## 2020-03-11 PROBLEM — R60.0 BILATERAL LEG EDEMA: Status: ACTIVE | Noted: 2020-03-11

## 2020-03-11 PROBLEM — J44.1 COPD EXACERBATION (HCC): Status: ACTIVE | Noted: 2020-03-11

## 2020-03-11 PROBLEM — I50.31 ACUTE DIASTOLIC CHF (CONGESTIVE HEART FAILURE): Status: ACTIVE | Noted: 2020-03-11

## 2020-03-11 PROBLEM — R06.02 SHORTNESS OF BREATH: Status: ACTIVE | Noted: 2020-03-11

## 2020-03-11 LAB
ANION GAP SERPL CALCULATED.3IONS-SCNC: 15 MMOL/L (ref 5–15)
BUN BLD-MCNC: 14 MG/DL (ref 8–23)
BUN/CREAT SERPL: 16.5 (ref 7–25)
CALCIUM SPEC-SCNC: 9.8 MG/DL (ref 8.6–10.5)
CHLORIDE SERPL-SCNC: 102 MMOL/L (ref 98–107)
CO2 SERPL-SCNC: 24 MMOL/L (ref 22–29)
CREAT BLD-MCNC: 0.85 MG/DL (ref 0.57–1)
DEPRECATED RDW RBC AUTO: 42.9 FL (ref 37–54)
ERYTHROCYTE [DISTWIDTH] IN BLOOD BY AUTOMATED COUNT: 13.8 % (ref 12.3–15.4)
GFR SERPL CREATININE-BSD FRML MDRD: 67 ML/MIN/1.73
GLUCOSE BLD-MCNC: 307 MG/DL (ref 65–99)
GLUCOSE BLDC GLUCOMTR-MCNC: 197 MG/DL (ref 70–105)
GLUCOSE BLDC GLUCOMTR-MCNC: 230 MG/DL (ref 70–105)
GLUCOSE BLDC GLUCOMTR-MCNC: 254 MG/DL (ref 70–105)
GLUCOSE BLDC GLUCOMTR-MCNC: 272 MG/DL (ref 70–105)
GLUCOSE BLDC GLUCOMTR-MCNC: 273 MG/DL (ref 70–105)
HBA1C MFR BLD: 8.2 % (ref 3.5–5.6)
HCT VFR BLD AUTO: 43.3 % (ref 34–46.6)
HGB BLD-MCNC: 14.5 G/DL (ref 12–15.9)
MAGNESIUM SERPL-MCNC: 1.5 MG/DL (ref 1.6–2.4)
MCH RBC QN AUTO: 29.8 PG (ref 26.6–33)
MCHC RBC AUTO-ENTMCNC: 33.4 G/DL (ref 31.5–35.7)
MCV RBC AUTO: 89.1 FL (ref 79–97)
NT-PROBNP SERPL-MCNC: 2836 PG/ML (ref 5–900)
PLATELET # BLD AUTO: 298 10*3/MM3 (ref 140–450)
PMV BLD AUTO: 8.7 FL (ref 6–12)
POTASSIUM BLD-SCNC: 3.9 MMOL/L (ref 3.5–5.2)
RBC # BLD AUTO: 4.86 10*6/MM3 (ref 3.77–5.28)
SODIUM BLD-SCNC: 141 MMOL/L (ref 136–145)
TROPONIN T SERPL-MCNC: <0.01 NG/ML (ref 0–0.03)
TROPONIN T SERPL-MCNC: <0.01 NG/ML (ref 0–0.03)
WBC NRBC COR # BLD: 7.6 10*3/MM3 (ref 3.4–10.8)

## 2020-03-11 PROCEDURE — 94640 AIRWAY INHALATION TREATMENT: CPT

## 2020-03-11 PROCEDURE — 25010000002 FUROSEMIDE PER 20 MG: Performed by: HOSPITALIST

## 2020-03-11 PROCEDURE — 82962 GLUCOSE BLOOD TEST: CPT

## 2020-03-11 PROCEDURE — 94664 DEMO&/EVAL PT USE INHALER: CPT

## 2020-03-11 PROCEDURE — 83880 ASSAY OF NATRIURETIC PEPTIDE: CPT | Performed by: NURSE PRACTITIONER

## 2020-03-11 PROCEDURE — 80048 BASIC METABOLIC PNL TOTAL CA: CPT | Performed by: NURSE PRACTITIONER

## 2020-03-11 PROCEDURE — 94799 UNLISTED PULMONARY SVC/PX: CPT

## 2020-03-11 PROCEDURE — 99406 BEHAV CHNG SMOKING 3-10 MIN: CPT

## 2020-03-11 PROCEDURE — 5A09357 ASSISTANCE WITH RESPIRATORY VENTILATION, LESS THAN 24 CONSECUTIVE HOURS, CONTINUOUS POSITIVE AIRWAY PRESSURE: ICD-10-PCS | Performed by: HOSPITALIST

## 2020-03-11 PROCEDURE — 83036 HEMOGLOBIN GLYCOSYLATED A1C: CPT | Performed by: NURSE PRACTITIONER

## 2020-03-11 PROCEDURE — 63710000001 INSULIN LISPRO (HUMAN) PER 5 UNITS: Performed by: NURSE PRACTITIONER

## 2020-03-11 PROCEDURE — 94660 CPAP INITIATION&MGMT: CPT

## 2020-03-11 PROCEDURE — 85027 COMPLETE CBC AUTOMATED: CPT | Performed by: NURSE PRACTITIONER

## 2020-03-11 PROCEDURE — 84484 ASSAY OF TROPONIN QUANT: CPT | Performed by: NURSE PRACTITIONER

## 2020-03-11 PROCEDURE — 83735 ASSAY OF MAGNESIUM: CPT | Performed by: NURSE PRACTITIONER

## 2020-03-11 PROCEDURE — 99222 1ST HOSP IP/OBS MODERATE 55: CPT | Performed by: INTERNAL MEDICINE

## 2020-03-11 PROCEDURE — 99232 SBSQ HOSP IP/OBS MODERATE 35: CPT | Performed by: HOSPITALIST

## 2020-03-11 RX ORDER — NICOTINE 21 MG/24HR
1 PATCH, TRANSDERMAL 24 HOURS TRANSDERMAL
Status: DISCONTINUED | OUTPATIENT
Start: 2020-03-11 | End: 2020-03-12 | Stop reason: HOSPADM

## 2020-03-11 RX ORDER — DEXTROSE MONOHYDRATE 25 G/50ML
25 INJECTION, SOLUTION INTRAVENOUS
Status: DISCONTINUED | OUTPATIENT
Start: 2020-03-11 | End: 2020-03-12 | Stop reason: HOSPADM

## 2020-03-11 RX ORDER — GUAIFENESIN 600 MG/1
1200 TABLET, EXTENDED RELEASE ORAL EVERY 12 HOURS
Status: DISCONTINUED | OUTPATIENT
Start: 2020-03-11 | End: 2020-03-12 | Stop reason: HOSPADM

## 2020-03-11 RX ORDER — IPRATROPIUM BROMIDE AND ALBUTEROL SULFATE 2.5; .5 MG/3ML; MG/3ML
3 SOLUTION RESPIRATORY (INHALATION)
Status: DISCONTINUED | OUTPATIENT
Start: 2020-03-11 | End: 2020-03-12 | Stop reason: HOSPADM

## 2020-03-11 RX ORDER — NICOTINE POLACRILEX 4 MG
15 LOZENGE BUCCAL
Status: DISCONTINUED | OUTPATIENT
Start: 2020-03-11 | End: 2020-03-12 | Stop reason: HOSPADM

## 2020-03-11 RX ORDER — HYDROCODONE BITARTRATE AND ACETAMINOPHEN 5; 325 MG/1; MG/1
1 TABLET ORAL EVERY 8 HOURS PRN
Status: DISCONTINUED | OUTPATIENT
Start: 2020-03-11 | End: 2020-03-12 | Stop reason: HOSPADM

## 2020-03-11 RX ORDER — FUROSEMIDE 10 MG/ML
20 INJECTION INTRAMUSCULAR; INTRAVENOUS
Status: DISCONTINUED | OUTPATIENT
Start: 2020-03-11 | End: 2020-03-12 | Stop reason: HOSPADM

## 2020-03-11 RX ORDER — SODIUM CHLORIDE 0.9 % (FLUSH) 0.9 %
10 SYRINGE (ML) INJECTION EVERY 12 HOURS SCHEDULED
Status: DISCONTINUED | OUTPATIENT
Start: 2020-03-11 | End: 2020-03-12 | Stop reason: HOSPADM

## 2020-03-11 RX ORDER — SODIUM CHLORIDE 0.9 % (FLUSH) 0.9 %
10 SYRINGE (ML) INJECTION AS NEEDED
Status: DISCONTINUED | OUTPATIENT
Start: 2020-03-11 | End: 2020-03-12 | Stop reason: HOSPADM

## 2020-03-11 RX ADMIN — INSULIN LISPRO 4 UNITS: 100 INJECTION, SOLUTION INTRAVENOUS; SUBCUTANEOUS at 18:53

## 2020-03-11 RX ADMIN — IPRATROPIUM BROMIDE AND ALBUTEROL SULFATE 3 ML: .5; 3 SOLUTION RESPIRATORY (INHALATION) at 23:02

## 2020-03-11 RX ADMIN — APIXABAN 5 MG: 5 TABLET, FILM COATED ORAL at 08:12

## 2020-03-11 RX ADMIN — FENOFIBRATE 145 MG: 145 TABLET ORAL at 08:12

## 2020-03-11 RX ADMIN — AMOXICILLIN 500 MG: 250 CAPSULE ORAL at 06:24

## 2020-03-11 RX ADMIN — IPRATROPIUM BROMIDE AND ALBUTEROL SULFATE 3 ML: .5; 3 SOLUTION RESPIRATORY (INHALATION) at 07:41

## 2020-03-11 RX ADMIN — APIXABAN 5 MG: 5 TABLET, FILM COATED ORAL at 21:28

## 2020-03-11 RX ADMIN — LISINOPRIL 20 MG: 20 TABLET ORAL at 08:12

## 2020-03-11 RX ADMIN — Medication 10 ML: at 08:13

## 2020-03-11 RX ADMIN — HYDRALAZINE HYDROCHLORIDE 50 MG: 25 TABLET, FILM COATED ORAL at 21:28

## 2020-03-11 RX ADMIN — IPRATROPIUM BROMIDE AND ALBUTEROL SULFATE 3 ML: .5; 3 SOLUTION RESPIRATORY (INHALATION) at 12:07

## 2020-03-11 RX ADMIN — IPRATROPIUM BROMIDE AND ALBUTEROL SULFATE 3 ML: .5; 3 SOLUTION RESPIRATORY (INHALATION) at 03:40

## 2020-03-11 RX ADMIN — HYDROCODONE BITARTRATE AND ACETAMINOPHEN 1 TABLET: 5; 325 TABLET ORAL at 21:28

## 2020-03-11 RX ADMIN — HYDROCODONE BITARTRATE AND ACETAMINOPHEN 1 TABLET: 5; 325 TABLET ORAL at 02:34

## 2020-03-11 RX ADMIN — INSULIN LISPRO 4 UNITS: 100 INJECTION, SOLUTION INTRAVENOUS; SUBCUTANEOUS at 08:22

## 2020-03-11 RX ADMIN — MAGNESIUM OXIDE TAB 400 MG (241.3 MG ELEMENTAL MG) 400 MG: 400 (241.3 MG) TAB at 08:11

## 2020-03-11 RX ADMIN — HYDRALAZINE HYDROCHLORIDE 50 MG: 25 TABLET, FILM COATED ORAL at 08:12

## 2020-03-11 RX ADMIN — INSULIN LISPRO 4 UNITS: 100 INJECTION, SOLUTION INTRAVENOUS; SUBCUTANEOUS at 13:17

## 2020-03-11 RX ADMIN — FUROSEMIDE 20 MG: 10 INJECTION INTRAMUSCULAR; INTRAVENOUS at 13:16

## 2020-03-11 RX ADMIN — POTASSIUM CHLORIDE 10 MEQ: 1500 TABLET, EXTENDED RELEASE ORAL at 08:12

## 2020-03-11 RX ADMIN — MELOXICAM 15 MG: 15 TABLET ORAL at 08:12

## 2020-03-11 RX ADMIN — AMOXICILLIN 500 MG: 250 CAPSULE ORAL at 16:19

## 2020-03-11 RX ADMIN — MAGNESIUM OXIDE TAB 400 MG (241.3 MG ELEMENTAL MG) 400 MG: 400 (241.3 MG) TAB at 21:28

## 2020-03-11 RX ADMIN — Medication 10 ML: at 21:29

## 2020-03-11 RX ADMIN — Medication 10 ML: at 02:35

## 2020-03-11 RX ADMIN — IPRATROPIUM BROMIDE AND ALBUTEROL SULFATE 3 ML: .5; 3 SOLUTION RESPIRATORY (INHALATION) at 20:08

## 2020-03-11 RX ADMIN — GUAIFENESIN 1200 MG: 600 TABLET, EXTENDED RELEASE ORAL at 02:34

## 2020-03-11 RX ADMIN — INSULIN LISPRO 3 UNITS: 100 INJECTION, SOLUTION INTRAVENOUS; SUBCUTANEOUS at 21:29

## 2020-03-11 RX ADMIN — FUROSEMIDE 20 MG: 10 INJECTION INTRAMUSCULAR; INTRAVENOUS at 18:53

## 2020-03-11 RX ADMIN — IPRATROPIUM BROMIDE AND ALBUTEROL SULFATE 3 ML: .5; 3 SOLUTION RESPIRATORY (INHALATION) at 16:07

## 2020-03-11 RX ADMIN — ATORVASTATIN CALCIUM 20 MG: 20 TABLET, FILM COATED ORAL at 22:48

## 2020-03-11 RX ADMIN — GUAIFENESIN 1200 MG: 600 TABLET, EXTENDED RELEASE ORAL at 16:19

## 2020-03-11 NOTE — ED PROVIDER NOTES
Subjective   Chief complaint shortness of breath    History of present illness 64-year-old female with several health problems complains of a couple day history of cough congestion nonproductive and increasing shortness of breath.  Patient's been on amoxicillin without any improvement.  Denies any fever chills no foreign travels.  No recent long car ride plane or immobilization no leg pain or swelling.  Symptoms are moderate worse with exertion better with rest ongoing for the last couple days.  No chest pain neck arm or jaw pain.          Review of Systems   Constitutional: Negative for chills and fever.   HENT: Negative for congestion and sinus pressure.    Eyes: Negative for photophobia and visual disturbance.   Respiratory: Positive for cough and shortness of breath. Negative for chest tightness.    Cardiovascular: Negative for chest pain and leg swelling.   Gastrointestinal: Negative for abdominal pain and vomiting.   Endocrine: Negative for cold intolerance and heat intolerance.   Genitourinary: Negative for difficulty urinating and dysuria.   Musculoskeletal: Positive for arthralgias. Negative for back pain.   Skin: Negative for color change and pallor.   Neurological: Negative for dizziness and light-headedness.   Psychiatric/Behavioral: Negative for agitation and behavioral problems.       Past Medical History:   Diagnosis Date   • Arthritis    • Atrial fibrillation (CMS/[facility])    • Bradycardia     Dr. Charles   • Cataract    • COPD (chronic obstructive pulmonary disease) (CMS/[facility])     Dr. Rob   • Diabetes mellitus, type 2 (CMS/[facility])     sees Dr. Archibald at [place]   • DJD (degenerative joint disease)     possible herniated disc, sees Pain Mgmt in [place]   •     • GERD (gastroesophageal reflux disease)    • History of combined right and left heart catheterization 2018    minimal CAD on heart cath done    • Hyperlipidemia    • Hypertension    • Pain    • Sleep apnea     wears CPAP machine, sees [place]        Allergies   Allergen Reactions   • Ibuprofen GI Intolerance   • Prednisone Other (See Comments)   • Pregabalin Other (See Comments)     jerking   • Tramadol Other (See Comments)     Legs jerked   • Levofloxacin Other (See Comments)     Increase sunburn   • Sulfamethoxazole-Trimethoprim Swelling       Past Surgical History:   Procedure Laterality Date   • ABLATION OF DYSRHYTHMIC FOCUS     • CARDIAC CATHETERIZATION      and Angiograph    • CARDIAC ELECTROPHYSIOLOGY PROCEDURE N/A 12/10/2019    Procedure: pvc ablation;  Surgeon: Alfredo Iglesias MD;  Location: [facility] INVASIVE LOCATION;  Service: Cardiovascular   •  SECTION      x 1   • COLON RESECTION     • COLONOSCOPY  2012   • COLOSTOMY      and reversal in her 20's due to problems with childbirth   • COLOSTOMY CLOSURE     • OVARIAN CYST SURGERY     • ROTATOR CUFF REPAIR Left 2009    x2    • TOTAL ABDOMINAL HYSTERECTOMY WITH SALPINGO OOPHORECTOMY         Family History   Problem Relation Age of Onset   • Heart disease Mother    • Hypertension Mother    • Stroke Mother    • Seizures Mother    • Heart disease Father    • Hypertension Father    • Lung disease Father    • Stroke Father    • Cancer Sister    • Hypertension Brother    • Diabetes Brother    • Hyperlipidemia Other        Social History     Socioeconomic History   • Marital status:      Spouse name: Not on file   • Number of children: Not on file   • Years of education: Not on file   • Highest education level: Not on file   Tobacco Use   • Smoking status: Current Every Day Smoker     Packs/day: 1.00     Years: 42.00     Pack years: 42.00     Types: Cigarettes   • Smokeless tobacco: Never Used   • Tobacco comment: Pt states she is trying to quit   Substance and Sexual Activity   • Alcohol use: No     Frequency: Never   • Drug use: No   • Sexual activity: Defer     Prior to Admission medications    Medication Sig Start Date End Date Taking? Authorizing Provider    ACCU-CHEK CARLIE PLUS test strip USE TO CHECK BLOOD SUGAR TWICE A DAY 2/17/20   Bautista Archibald MD   ACCU-CHEK FASTCLIX LANCETS misc 1 each by Other route 2 (Two) Times a Day. 8/15/19   Bautista Archibald MD   ACCU-CHEK SOFTCLIX LANCETS lancets USE TO CHECK BLOOD SUGAR TWICE A DAY 4/20/19   Robson Boothe MD   ACCU-CHEK SOFTCLIX LANCETS lancets USE TO CHECK BS 3 TIMES DAILY 12/5/19 12/4/20  Bautista Archibald MD   acebutolol (SECTRAL) 200 MG capsule TAKE 1 CAPSULE BY MOUTH TWICE A DAY 2/5/20   Alfredo Iglesias MD   ALBUTEROL SULFATE  (90 Base) MCG/ACT inhaler INHALE 1-2 PUFFS EVERY 4 HOURS AS NEEDED FOR COUGH OR FOR SHORTNESS OF BREATH 2/17/20   Lou Curran MD   amoxicillin (AMOXIL) 500 MG capsule Take 1 capsule by mouth 3 (Three) Times a Day. 3/3/20   Lou Curran MD   atorvastatin (LIPITOR) 40 MG tablet TAKE 1/2 TABLET BY MOUTH DAILY 9/23/19   Lou Curran MD   Blood Glucose Monitoring Suppl (ACCU-CHEK CARLIE PLUS) w/Device kit USE TO CHECK BS 3 TIMES DAILY 12/5/19   Bautista Archibald MD   [employer] D3 25 MCG (1000 UT) capsule TAKE 1 CAPSULE BY MOUTH TWICE A DAY 1/6/20   Lou Curran MD   ELIQUIS 5 MG tablet tablet TAKE 1 TABLET BY MOUTH TWICE A DAY 3/3/20   Jesse Charles MD   fenofibrate 160 MG tablet Take 1 tablet by mouth Daily. 3/3/20   Lou Curran MD   glucose blood (ACCU-CHEK CARLIE PLUS) test strip USE TO CHECK BS 3 TIMES DAILY 12/5/19   Bautista Archibald MD   glucose blood (ACCU-CHEK GUIDE) test strip 1 each by Other route 2 (Two) Times a Day. Use as instructed    Robson Boothe MD   glucose blood (ACCU-CHEK GUIDE) test strip Use as instructed 8/15/19   Bautista Archibald MD   hydrALAZINE (APRESOLINE) 50 MG tablet TAKE ONE TABLET BY MOUTH TWICE A DAY 2/14/20   Jesse Charles MD   HYDROcodone-acetaminophen (LORTAB) 7.5-325 MG per tablet Take 1 tablet by mouth Every 8 (Eight) Hours As Needed for Moderate Pain . 10/27/14   Provider, MD Robson   lisinopril  (PRINIVIL,ZESTRIL) 20 MG tablet Take 20 mg by mouth Daily.    Robson Boothe MD   magnesium oxide (MAG-OX) 400 MG tablet Take 1 tablet by mouth 2 (Two) Times a Day. 8/28/19   Lou Curran MD   meloxicam (MOBIC) 15 MG tablet Take 1 tablet by mouth Daily. 3/3/20   Lou Curran MD   methocarbamol (ROBAXIN) 750 MG tablet  1/8/20   Robson Boothe MD   oxyCODONE-acetaminophen (PERCOCET) 5-325 MG per tablet TAKE 1 TABLE BY MOUTH THREE TIMES A DAY AS NEEDED. 2/22/20   Robson Boothe MD   potassium chloride (MICRO-K) 10 MEQ CR capsule Take 1 capsule by mouth Daily. 2/6/20   Bautista Archibald MD   SITagliptin-metFORMIN HCl ER (JANUMET XR)  MG tablet Take 2 tablets by mouth Daily. 3/3/20   Lou Curran MD     Patient does not take metformin      Objective   Physical Exam  64-year-old awake alert moderate distress.  HEENT extraocular muscles intact pupils equal reactive mouth is clear neck is supple no adenopathy no renal bruits lungs diffuse scattered wheezes and rales throughout no retractions heart regular without murmur abdomen soft without tenderness no pulsatile masses.  Extremities pulses are equal throughout upper and lower extremities no edema cords or Homans sign or evidence of DVT.  Patient's awake alert follows commands motor strength normal without focal weakness  Procedures           ED Course      Results for orders placed or performed during the hospital encounter of 03/10/20   Basic Metabolic Panel   Result Value Ref Range    Glucose 255 (H) 65 - 99 mg/dL    BUN 11 8 - 23 mg/dL    Creatinine 0.78 0.57 - 1.00 mg/dL    Sodium 143 136 - 145 mmol/L    Potassium 3.6 3.5 - 5.2 mmol/L    Chloride 105 98 - 107 mmol/L    CO2 26.0 22.0 - 29.0 mmol/L    Calcium 10.1 8.6 - 10.5 mg/dL    eGFR Non African Amer 74 >60 mL/min/1.73    BUN/Creatinine Ratio 14.1 7.0 - 25.0    Anion Gap 12.0 5.0 - 15.0 mmol/L   BNP   Result Value Ref Range    proBNP 1,863.0 (H) 5.0 - 900.0 pg/mL    Troponin   Result Value Ref Range    Troponin T <0.010 0.000 - 0.030 ng/mL   CBC Auto Differential   Result Value Ref Range    WBC 7.40 3.40 - 10.80 10*3/mm3    RBC 4.73 3.77 - 5.28 10*6/mm3    Hemoglobin 14.5 12.0 - 15.9 g/dL    Hematocrit 41.9 34.0 - 46.6 %    MCV 88.5 79.0 - 97.0 fL    MCH 30.7 26.6 - 33.0 pg    MCHC 34.7 31.5 - 35.7 g/dL    RDW 14.0 12.3 - 15.4 %    RDW-SD 43.8 37.0 - 54.0 fl    MPV 8.8 6.0 - 12.0 fL    Platelets 306 140 - 450 10*3/mm3    Neutrophil % 51.2 42.7 - 76.0 %    Lymphocyte % 34.8 19.6 - 45.3 %    Monocyte % 11.8 5.0 - 12.0 %    Eosinophil % 1.6 0.3 - 6.2 %    Basophil % 0.6 0.0 - 1.5 %    Neutrophils, Absolute 3.80 1.70 - 7.00 10*3/mm3    Lymphocytes, Absolute 2.60 0.70 - 3.10 10*3/mm3    Monocytes, Absolute 0.90 0.10 - 0.90 10*3/mm3    Eosinophils, Absolute 0.10 0.00 - 0.40 10*3/mm3    Basophils, Absolute 0.00 0.00 - 0.20 10*3/mm3    nRBC 0.1 0.0 - 0.2 /100 WBC   Gold Top - SST   Result Value Ref Range    Extra Tube Hold for add-ons.      Xr Chest 1 View    Result Date: 3/10/2020  Heart and megaly with mild pulmonary edema.  Electronically Signed By-Rhys Louise On:3/10/2020 9:59 PM This report was finalized on [id number] by  Rhys Louise, .    Medications   sodium chloride 0.9 % flush 10 mL (has no administration in time range)   furosemide (LASIX) injection 40 mg (has no administration in time range)   albuterol (PROVENTIL) nebulizer solution 0.083% 2.5 mg/3mL (has no administration in time range)   ipratropium-albuterol (DUO-NEB) nebulizer solution 3 mL (3 mL Nebulization Given 3/10/20 2130)   albuterol (PROVENTIL) nebulizer solution 0.083% 2.5 mg/3mL (2.5 mg Nebulization Given 3/10/20 2134)   methylPREDNISolone sodium succinate (SOLU-Medrol) injection 125 mg (125 mg Intravenous Given 3/10/20 2132)     EKG my interpretation normal sinus rhythm rate of 78 ventricular trigeminy right bundle branch block left anterior fascicular block QTC of 483 but no significant change from  previous abnormal                                       MDM  Number of Diagnoses or Management Options  Acute congestive heart failure, unspecified heart failure type (CMS/HCC):   Acute respiratory distress:   Chronic obstructive pulmonary disease with acute exacerbation (CMS/HCC):   Diagnosis management comments: Medical decision making.  Patient IV established placed on the monitor placed on nasal cannula of 3 L patient was given DuoNeb x1 albuterol x1 and 125 mg Solu-Medrol IV and had the above exam and evaluation.  EKG shows no acute findings she does have some PVCs and ventricular trigeminy the patient CBC was unremarkable proBNP 1863 troponin was negative blood sugar was 255.  Chest x-ray shows some low-grade edema.  Patient on repeat exams was feeling much better sats in the mid 90s.  She still diffuse scattered wheezes throughout but was improved.  She is given additional albuterol she was given Lasix 40 mg IV she was made aware of the findings.  I see no evidence that suggest acute DVT or pulmonary embolism or aortic dissection.  She will be placed in the hospital hospitalist nurse practitioner paged stable unremarkable improved ER course      Final diagnoses:   Acute respiratory distress   Chronic obstructive pulmonary disease with acute exacerbation (CMS/HCC)   Acute congestive heart failure, unspecified heart failure type (CMS/HCC)            Ronak Ruiz MD  03/10/20 [id number]

## 2020-03-11 NOTE — PROGRESS NOTES
Discharge Planning Assessment  [facility]     Patient Name: Caterina Mason  MRN: [id number]  Today's Date: 3/11/2020    Admit Date: 3/10/2020    Discharge Needs Assessment     Row Name 03/11/20 1106       Living Environment    Lives With  spouse    Current Living Arrangements  home/apartment/condo    Primary Care Provided by  self    Provides Primary Care For  no one    Family Caregiver if Needed  spouse    Quality of Family Relationships  helpful;supportive    Able to Return to Prior Arrangements  yes       Resource/Environmental Concerns    Resource/Environmental Concerns  none    Transportation Concerns  car, none       Transition Planning    Patient/Family Anticipates Transition to  home with family    Patient/Family Anticipated Services at Transition  durable medical equipment    Transportation Anticipated  family or friend will provide       Discharge Needs Assessment    Readmission Within the Last 30 Days  no previous admission in last 30 days    Concerns to be Addressed  denies needs/concerns at this time    Equipment Currently Used at Home  walker, rolling;cane, straight;bipap/cpap;glucometer;oxygen Oxygen at night only-[facility]    Anticipated Changes Related to Illness  none    Equipment Needed After Discharge  oxygen        Discharge Plan     Row Name 03/11/20 1109       Plan    Plan  D/C Plan: Home with family. 6 minute walk pending-current with [facility], referral sent in [employer] and [employer] notified.     Plan Comments  Spoke with patient at bedside. Patient IADLs and drives. Patient denies any difficulty affording meds. Patient is current with [facility] [place] for oxygen at night. Referral to [facility] [place] sent via [employer] and [employer], notified-6 minute walk pending. Barrier to D/C:  cardiology consult pending, O2 at 2L.         Durable Medical Equipment      Service Provider Request Status Selected Services Address Phone Number Fax Number    [facility] Pending - Request Sent N/A [address], [place]  [place] [address] [phone]        Expected Discharge Date and Time     Expected Discharge Date Expected Discharge Time    Mar 12, 2020         Demographic Summary     Row Name 03/11/20 1105       General Information    Admission Type  inpatient    Arrived From  emergency department    Referral Source  admission list    Reason for Consult  discharge planning    Preferred Language  English     Used During This Interaction  no        Functional Status     Row Name 03/11/20 1106       Functional Status    Usual Activity Tolerance  good    Current Activity Tolerance  good       Functional Status, IADL    Medications  independent    Meal Preparation  independent    Housekeeping  independent    Laundry  independent    Shopping  independent       Mental Status    General Appearance WDL  WDL       Mental Status Summary    Recent Changes in Mental Status/Cognitive Functioning  no changes        Charo Coello

## 2020-03-11 NOTE — CONSULTS
"Diabetes Education  Assessment/Teaching    Patient Name:  Caterina Mason  YOB: 1955  MRN: [id number]  Admit Date:  3/10/2020      Assessment Date:  3/11/2020    Most Recent Value   General Information    Referral From:  A1c, Other (comment) [On 3/11/2020 A1C was 8.2% and admission blood sugar of 255.]   Height  160 cm (63\")   Height Method  Stated   Weight  92.7 kg (204 lb 6.4 oz)   Weight Method  Standing scale   Pregnancy Assessment   Diabetes History   What type of diabetes do you have?  Type 2   Current DM knowledge  fair   Do you test your blood sugar at home?  yes   Frequency of checks  3X a day [Haven't been able to check for the last 2 weeks due to having no test strips.]   Meter type  Accu-chek a few months old   Who performs the test?  patient   Typical readings  >150   Have you had low blood sugar? (<70mg/dl)  no   Have you had high blood sugar? (>140mg/dl)  yes   How often do you have high blood sugar?  frequently   When was your last high blood sugar?  Admission blood sugar 255.   Do you have any diabetes complications?  heart disease   Education Preferences   What areas of diabetes would you like to learn about?  avoiding high blood sugar, diabetes complications, testing my blood sugar at home   Nutrition Information   Assessment Topics   Problem Solving - Assessment  Needs education   Reducing Risk - Assessment  Needs education   Monitoring - Assessment  Needs education   DM Goals   Problem Solving - Goal  Today   Reducing Risk - Goal  Today   Monitoring - Goal  Today            Most Recent Value   DM Education Needs   Meter  Has own   Meter Type  Accuchek   Frequency of Testing  AC meals   Medication  Oral [Patient on Janumet 50/1,000 2 tabs daily at home.  Patient states she just started this 2 weeks ago.]   Problem Solving  Hyperglycemia, Signs, Symptoms, Treatment   Reducing Risks  A1C testing [On 3/11/2020 A1C was 8.2%. A1C info sheet given with discussion on A1C target and " healthy blood sugar range.]   Healthy Eating  Other (comment) [Handouts given with discussion on healthy eating guidelines for adults, portion sizes, carbohydrate counting, healthy food choices, low carbohydrate snacks, reading food labels, and the one-plate method.]   Discharge Plan  Home   Motivation  Engaged, Strong   Teaching Method  Discussion, Handouts   Patient Response  Verbalized understanding            Other Comments:  Prescriptions started in discharge orders for Accu-chek Aurea plus test strips.        Electronically signed by:  Yenifer Hartley RN  03/11/20 16:49

## 2020-03-11 NOTE — PAYOR COMM NOTE
"AUTHORIZATION PENDING:   PLEASE CALL OR FAX DETERMINATION TO CONTACT BELOW. THANK YOU.        Deb Cherry RN MSN  /UR  [facility]  [phone] office  [phone] fax  [email or web address]    [facility]  NPI: [id number]  Tax: 365-      Martin Mason (64 y.o. Female) 1955  Member ID # [id number]        Date of Birth Social Security Number Address Home Phone MRN    1955  [id number]  [place] [address] [phone] [id number]    [employer] Marital Status          None        Admission Date Admission Type Admitting Provider Attending Provider Department, Room/Bed    3/10/20 Emergency Evans Sorensen MD Park, Hyung Dong, MD [facility] 3C MEDICAL INPATIENT, 367/1    Discharge Date Discharge Disposition Discharge Destination                       Attending Provider:  Evans Sorensen MD    Allergies:  Ibuprofen, Prednisone, Pregabalin, Tramadol, Levofloxacin, Sulfamethoxazole-trimethoprim    Isolation:  None   Infection:  None   Code Status:  CPR    Ht:  160 cm (63\")   Wt:  92.7 kg (204 lb 6.4 oz)    Admission Cmt:  None   Principal Problem:  None                Active Insurance as of 3/10/2020     Primary Coverage     Payor Plan Insurance Group Employer/Plan Group    [employer]HE[employer] MEDICAID [place] [employer] - AN[employer] [id number]     Payor Plan Address Payor Plan Phone Number Payor Plan Fax Number Effective Dates    MAIL STOP:   4/1/2017 - None Entered    [address] [address]       [facility] [address]       Subscriber Name Subscriber Birth Date Member ID       MARTIN MASON 1955 [id number]                 Emergency Contacts      (Rel.) Home Phone Work Phone Mobile Phone    LEIDY PEREZ (Daughter) [phone] -- [phone]    BARBARA MASON (Spouse) [phone] -- --        03/10/20 2241  Inpatient Admission Once    Completed   Level of Care: Telemetry    Diagnosis: Acute respiratory " distress [976182]    Admitting Physician: YEE DUKE [[id number]]    Attending Physician: YEE DUKE [[id number]]    Certification: I certify that inpatient hospital services are medically necessary for greater than 2 midnights.            DX:   Acute respiratory distress ICD-10-CM: R06.03     COPD exacerbation (CMS/[facility]) ICD-10-CM: J44.1     CHF (CMS/[facility]) ICD-10-CM: I50.9     Criteria Review: [facility]   Return to top of Chronic Obstructive Pulmonary Disease - [facility]     Clinical Indications for Admission to Inpatient Care     Return to top of Chronic Obstructive Pulmonary Disease - [facility]  •Admission is indicated for 1 or more of the following(1)(2)(3):?High-risk active acute comorbidity (eg, dysrhythmia, heart failure, pleural effusion, pneumothorax, myopathy, rib fracture) with new or worsened (eg, from baseline) signs or symptoms of COPD (eg, dyspnea, Tachypnea, cough, sputum production              History & Physical      Essing-Linnea Kwong APRN at 03/10/20 [address] [place] [address] Medicine Services      Patient Name: Caterina Mason  : 1955  MRN: [id number]  Primary Care Physician: Lou Curran MD  Date of admission: 3/10/2020    Patient Care Team:  Lou Curran MD as PCP - General  Jesse Charles MD as Consulting Physician (Cardiology)  Bautista Archibald MD as Consulting Physician (Endocrinology)  Raymond Piper MD as Consulting Physician (Pulmonary Disease)  Jane Montero MD as Consulting Physician (Obstetrics and Gynecology)          Subjective   History Present Illness     Chief Complaint:   Chief Complaint   Patient presents with   • Shortness of Breath                64 year old obese BMI 36 female with PMH of COPD and 2 ppd smoker, HFpEF , PVC arrhythmia s/p ablation 2019, DM2, RUSLAN CPap  Compliant, HLD, HTN, GERD, DJD presents with complaints of dyspnea past few days. She denies home oxygen use other than Cpap at night. She denies  "chest pain , dizziness, cough, congestion, nausea. She reports \" I just couldn't breath, I had to do something\". She reports dyspnea at rest. Troponin was negative, BNP 1863, now 2863 after Lasix 40 mg IV, serum glucose 255, lactic 1.5, wbc not elevated. CXR per radiology:    \"Heart is enlarged. There is mild pulmonary vascular congestion. There  are no focal infiltrates.     IMPRESSION:  Heart and megaly with mild pulmonary edema.\"    She was given duo nebs in ED. She is allergic to steroids. She will be admitted for CHF and COPD exacerbation .       Review of Systems   Constitution: Negative.   HENT: Negative.    Eyes: Negative.    Cardiovascular: Negative.    Respiratory: Positive for shortness of breath.    Endocrine: Negative.    Hematologic/Lymphatic: Negative.    Skin: Negative.    Musculoskeletal: Positive for arthritis.   Gastrointestinal: Negative.    Genitourinary: Negative.    Neurological: Negative.    Psychiatric/Behavioral: Negative.    Allergic/Immunologic: Negative.            Personal History     Past Medical History:   Past Medical History:   Diagnosis Date   • Arthritis    • Atrial fibrillation (CMS/[facility])    • Bradycardia     Dr. Charles   • Cataract    • COPD (chronic obstructive pulmonary disease) (CMS/HCC)     Dr. Rob   • Diabetes mellitus, type 2 (CMS/[facility])     sees Dr. Archibald at [place]   • DJD (degenerative joint disease)     possible herniated disc, sees Pain Mgmt in [place]   •     • GERD (gastroesophageal reflux disease)    • History of combined right and left heart catheterization 2018    minimal CAD on heart cath done    • Hyperlipidemia    • Hypertension    • Pain    • Sleep apnea     wears CPAP machine, sees Darron       Surgical History:      Past Surgical History:   Procedure Laterality Date   • ABLATION OF DYSRHYTHMIC FOCUS     • CARDIAC CATHETERIZATION      and Angiograph    • CARDIAC ELECTROPHYSIOLOGY PROCEDURE N/A 12/10/2019    Procedure: pvc ablation;  Surgeon: " Alfredo Iglesias MD;  Location: [facility] INVASIVE LOCATION;  Service: Cardiovascular   •  SECTION      x 1   • COLON RESECTION     • COLONOSCOPY  2012   • COLOSTOMY      and reversal in her 20's due to problems with childbirth   • COLOSTOMY CLOSURE     • OVARIAN CYST SURGERY     • ROTATOR CUFF REPAIR Left 2009    x2    • TOTAL ABDOMINAL HYSTERECTOMY WITH SALPINGO OOPHORECTOMY             Family History: family history includes Cancer in her sister; Diabetes in her brother; Heart disease in her father and mother; Hyperlipidemia in an other family member; Hypertension in her brother, father, and mother; Lung disease in her father; Seizures in her mother; Stroke in her father and mother. Otherwise pertinent FHx was reviewed and unremarkable.     Social History:  reports that she has been smoking cigarettes. She has a 42.00 pack-year smoking history. She has never used smokeless tobacco. She reports that she does not drink alcohol or use drugs.      Medications:  Prior to Admission medications    Medication Sig Start Date End Date Taking? Authorizing Provider   ACCU-CHEK CARLIE PLUS test strip USE TO CHECK BLOOD SUGAR TWICE A DAY 20  Yes Bautista Archibald MD   ACCU-CHEROBERT FASTCLIX LANCETS misc 1 each by Other route 2 (Two) Times a Day. 8/15/19  Yes Bautista Archibald MD   ACCU-CHEROBERT SOFTCLIX LANCETS lancets USE TO CHECK BLOOD SUGAR TWICE A DAY 19  Yes Robson Boothe MD   ACCU-CHEROBERT SOFTCLIX LANCETS lancets USE TO CHECK BS 3 TIMES DAILY 19 Yes Bautista Archibald MD   acebutolol (SECTRAL) 200 MG capsule Take 200 mg by mouth 2 (Two) Times a Day.   Yes Robson Boothe MD   albuterol sulfate  (90 Base) MCG/ACT inhaler Inhale 2 puffs Every 4 (Four) Hours As Needed for Wheezing.   Yes Robson Boothe MD   apixaban (ELIQUIS) 5 MG tablet tablet Take 5 mg by mouth 2 (Two) Times a Day.   Yes Robson Boothe MD   atorvastatin (LIPITOR) 40 MG tablet Take 20 mg by  mouth Daily.   Yes Robson Boothe MD   Blood Glucose Monitoring Suppl (ACCU-CHEK CARLIE PLUS) w/Device kit USE TO CHECK BS 3 TIMES DAILY 12/5/19  Yes Bautista Archibald MD   cholecalciferol (VITAMIN D3) 10 MCG (400 UNIT) tablet Take 1,000 Units by mouth 2 (Two) Times a Day.   Yes Robson Boothe MD   fenofibrate 160 MG tablet Take 1 tablet by mouth Daily. 3/3/20  Yes Lou Curran MD   glucose blood (ACCU-CHEK CARLIE PLUS) test strip USE TO CHECK BS 3 TIMES DAILY 12/5/19  Yes Bautista Archibald MD   glucose blood (ACCU-CHEK GUIDE) test strip 1 each by Other route 2 (Two) Times a Day. Use as instructed   Yes Robson Boothe MD   glucose blood (ACCU-CHEK GUIDE) test strip Use as instructed 8/15/19  Yes Bautista Archibald MD   hydrALAZINE (APRESOLINE) 50 MG tablet Take 50 mg by mouth 2 (Two) Times a Day.   Yes Robson Boothe MD   lisinopril (PRINIVIL,ZESTRIL) 20 MG tablet Take 20 mg by mouth Daily.   Yes Robson Boothe MD   magnesium oxide (MAG-OX) 400 MG tablet Take 1 tablet by mouth 2 (Two) Times a Day. 8/28/19  Yes Lou Curran MD   meloxicam (MOBIC) 15 MG tablet Take 1 tablet by mouth Daily. 3/3/20  Yes Lou Curran MD   oxyCODONE-acetaminophen (PERCOCET) 5-325 MG per tablet Take 1 tablet by mouth Every 8 (Eight) Hours As Needed.   Yes Robson Boothe MD   potassium chloride (MICRO-K) 10 MEQ CR capsule Take 1 capsule by mouth Daily. 2/6/20  Yes Bautista Archibald MD   SITagliptin-metFORMIN HCl ER (JANUMET XR)  MG tablet Take 2 tablets by mouth Daily. 3/3/20  Yes Lou Curran MD   amoxicillin (AMOXIL) 500 MG capsule Take 1 capsule by mouth 3 (Three) Times a Day. 3/3/20   Lou Curran MD   acebutolol (SECTRAL) 200 MG capsule TAKE 1 CAPSULE BY MOUTH TWICE A DAY 2/5/20 3/10/20  Alfredo Iglesias MD   ALBUTEROL SULFATE  (90 Base) MCG/ACT inhaler INHALE 1-2 PUFFS EVERY 4 HOURS AS NEEDED FOR COUGH OR FOR SHORTNESS OF BREATH 2/17/20 3/10/20  Bina  Lou Carrizales MD   atorvastatin (LIPITOR) 40 MG tablet TAKE 1/2 TABLET BY MOUTH DAILY 9/23/19 3/10/20  Lou Curran MD   [facility]S D3 25 MCG (1000 UT) capsule TAKE 1 CAPSULE BY MOUTH TWICE A DAY 1/6/20 3/10/20  Lou Curran MD   ELIQUIS 5 MG tablet tablet TAKE 1 TABLET BY MOUTH TWICE A DAY 3/3/20 3/10/20  Jesse Charles MD   hydrALAZINE (APRESOLINE) 50 MG tablet TAKE ONE TABLET BY MOUTH TWICE A DAY 2/14/20 3/10/20  Jesse Charles MD   HYDROcodone-acetaminophen (LORTAB) 7.5-325 MG per tablet Take 1 tablet by mouth Every 8 (Eight) Hours As Needed for Moderate Pain . 10/27/14 3/10/20  Robson Boothe MD   methocarbamol (ROBAXIN) 750 MG tablet  1/8/20 3/10/20  Robson Boothe MD   oxyCODONE-acetaminophen (PERCOCET) 5-325 MG per tablet TAKE 1 TABLE BY MOUTH THREE TIMES A DAY AS NEEDED. 2/22/20 3/10/20  Robson Boothe MD       Allergies:    Allergies   Allergen Reactions   • Ibuprofen GI Intolerance   • Prednisone Other (See Comments)   • Pregabalin Other (See Comments)     jerking   • Tramadol Other (See Comments)     Legs jerked   • Levofloxacin Other (See Comments)     Increase sunburn   • Sulfamethoxazole-Trimethoprim Swelling       Objective   Objective     Vital Signs  Temp:  [97.4 °F (36.3 °C)-98.9 °F (37.2 °C)] 97.4 °F (36.3 °C)  Heart Rate:  [67-75] 70  Resp:  [15-23] 23  BP: (151-176)/(57-78) 151/57  SpO2:  [94 %-100 %] 99 %  on  Flow (L/min):  [2] 2;   Device (Oxygen Therapy): NPPV/NIV  Body mass index is 36.21 kg/m².    Physical Exam   Constitutional: She is oriented to person, place, and time. She appears well-developed and well-nourished.   HENT:   Head: Normocephalic and atraumatic.   Eyes: Pupils are equal, round, and reactive to light. EOM are normal.   Neck: Normal range of motion. Neck supple.   Cardiovascular: Normal rate, regular rhythm and normal heart sounds.   Pulmonary/Chest: Effort normal. She has wheezes.   Abdominal: Soft. Bowel sounds are normal.   Genitourinary:      Genitourinary Comments: deferred   Musculoskeletal: Normal range of motion.   Neurological: She is alert and oriented to person, place, and time.   Skin: Skin is warm and dry.   Psychiatric: She has a normal mood and affect. Her behavior is normal. Judgment and thought content normal.   Vitals reviewed.      Results Review:  I have personally reviewed most recent radiology images and interpretations and agree with findings, most notably: CXR.    Results from last 7 days   Lab Units 03/11/20  0342   WBC 10*3/mm3 7.60   HEMOGLOBIN g/dL 14.5   HEMATOCRIT % 43.3   PLATELETS 10*3/mm3 298     Results from last 7 days   Lab Units 03/11/20  0341 03/10/20  2132   SODIUM mmol/L 141 143   POTASSIUM mmol/L 3.9 3.6   CHLORIDE mmol/L 102 105   CO2 mmol/L 24.0 26.0   BUN mg/dL 14 11   CREATININE mg/dL 0.85 0.78   GLUCOSE mg/dL 307* 255*   CALCIUM mg/dL 9.8 10.1   TROPONIN T ng/mL <0.010 <0.010   PROBNP pg/mL 2,836.0* 1,863.0*     Estimated Creatinine Clearance: 72.3 mL/min (by C-G formula based on SCr of 0.85 mg/dL).  Brief Urine Lab Results  (Last result in the past 365 days)      Color   Clarity   Blood   Leuk Est   Nitrite   Protein   CREAT   Urine HCG        01/31/20 0743             87.9             Microbiology Results (last 10 days)     ** No results found for the last 240 hours. **          ECG/EMG Results (most recent)     Procedure Component Value Units Date/Time    ECG 12 Lead [[id number]] Collected:  03/10/20 2114     Updated:  03/10/20 2116    Narrative:       HEART RATE= 78  bpm  RR Interval= 772  ms  IL Interval= 157  ms  P Horizontal Axis=   deg  P Front Axis= 41  deg  QRSD Interval= 161  ms  QT Interval= 424  ms  QRS Axis= -79  deg  T Wave Axis= 21  deg  - ABNORMAL ECG -  Sinus rhythm  Ventricular trigeminy  RBBB and LAFB  Electronically Signed By:   Date and Time of Study: 2020-03-10 21:14:51                    Xr Chest 1 View    Result Date: 3/10/2020  Heart and megaly with mild pulmonary edema.  Electronically  Signed By-Rhys Louise On:3/10/2020 9:59 PM This report was finalized on [id number] by  Rhys Louise, .        Estimated Creatinine Clearance: 72.3 mL/min (by C-G formula based on SCr of 0.85 mg/dL).    Assessment/Plan   Assessment/Plan       Active Hospital Problems    Diagnosis  POA   • COPD exacerbation (CMS/HCC) [J44.1]  Yes   • Acute respiratory distress [R06.03]  Yes      Resolved Hospital Problems   No resolved problems to display.     Dyspnea without chest pain troponin negative likely multifactorial :    1.COPD exacerbation - duo nebs , no steroids as allergic to prednisone no infiltrates per CXR and wbc not elevated may finish out po amoxicillin given outpatient , on home albuterol     2. CHF BNP 1863 cardiomegaly and vascular congestion on CXR, Lasix x1 40 mg IV, continue to monitor     DM2 with hyperglycemia 255 on home ( on Janumet per pt not on home list not ordered in case of contrast - add SSI prn and accuchecks ACHS and LCS diet     Cardiac arrhythmia - afib in past had PVC ablation in December , trigeminy tonight no chest pain or elevated troponin on apixaban and Sectral- not in pharmacy  to bring as patient specifically taken off metoprolol by cardiology in past     HLD- on statin and fenofibrate     HTN- controlled on BB, hydralazine, lisinopril     RUSLAN- using Cpap    Chronic pain on Norco     Hypo MG - on Mg check Mg     On K - replacement home dose K ok                    VTE Prophylaxis -   Mechanical Order History:     None      Pharmalogical Order History:     None          CODE STATUS:    Code Status and Medical Interventions:   Ordered at: 03/10/20 4133     Code Status:    CPR     Medical Interventions (Level of Support Prior to Arrest):    Full       Admission Status:  I believe this patient meets observation  criteria.      I discussed the patient's findings and my recommendations with patient.        Electronically signed by YAW Arreola, 03/10/20, 11:17  PM.  [facility] Hospitalist Team          Electronically signed by Linnea Acuña APRN at 03/11/20 0559          Emergency Department Notes      Nicky Sanchez LPN at 03/10/20 2123        Pt c/o soa x 1 week.     Nicky Sanchez LPN  03/10/20 2123      Electronically signed by Nicky Sanchez LPN at 03/10/20 2123     Ronak Ruiz MD at 03/10/20 2233          Subjective   Chief complaint shortness of breath    History of present illness 64-year-old female with several health problems complains of a couple day history of cough congestion nonproductive and increasing shortness of breath.  Patient's been on amoxicillin without any improvement.  Denies any fever chills no foreign travels.  No recent long car ride plane or immobilization no leg pain or swelling.  Symptoms are moderate worse with exertion better with rest ongoing for the last couple days.  No chest pain neck arm or jaw pain.          Review of Systems   Constitutional: Negative for chills and fever.   HENT: Negative for congestion and sinus pressure.    Eyes: Negative for photophobia and visual disturbance.   Respiratory: Positive for cough and shortness of breath. Negative for chest tightness.    Cardiovascular: Negative for chest pain and leg swelling.   Gastrointestinal: Negative for abdominal pain and vomiting.   Endocrine: Negative for cold intolerance and heat intolerance.   Genitourinary: Negative for difficulty urinating and dysuria.   Musculoskeletal: Positive for arthralgias. Negative for back pain.   Skin: Negative for color change and pallor.   Neurological: Negative for dizziness and light-headedness.   Psychiatric/Behavioral: Negative for agitation and behavioral problems.       Past Medical History:   Diagnosis Date   • Arthritis    • Atrial fibrillation (CMS/[facility])    • Bradycardia     Dr. Charles   • Cataract    • COPD (chronic obstructive pulmonary disease) (CMS/[facility])     Dr. Rob   • Diabetes mellitus, type 2 (CMS/[facility])      sees Dr. Archibald at [place]   • DJD (degenerative joint disease)     possible herniated disc, sees Pain Mgmt in [place]   •     • GERD (gastroesophageal reflux disease)    • History of combined right and left heart catheterization 2018    minimal CAD on heart cath done    • Hyperlipidemia    • Hypertension    • Pain    • Sleep apnea     wears CPAP machine, sees Darron       Allergies   Allergen Reactions   • Ibuprofen GI Intolerance   • Prednisone Other (See Comments)   • Pregabalin Other (See Comments)     jerking   • Tramadol Other (See Comments)     Legs jerked   • Levofloxacin Other (See Comments)     Increase sunburn   • Sulfamethoxazole-Trimethoprim Swelling       Past Surgical History:   Procedure Laterality Date   • ABLATION OF DYSRHYTHMIC FOCUS     • CARDIAC CATHETERIZATION      and Angiograph    • CARDIAC ELECTROPHYSIOLOGY PROCEDURE N/A 12/10/2019    Procedure: pvc ablation;  Surgeon: Alfredo Iglesias MD;  Location: [facility] INVASIVE LOCATION;  Service: Cardiovascular   •  SECTION      x 1   • COLON RESECTION     • COLONOSCOPY  2012   • COLOSTOMY      and reversal in her 20's due to problems with childbirth   • COLOSTOMY CLOSURE     • OVARIAN CYST SURGERY     • ROTATOR CUFF REPAIR Left 2009    x2    • TOTAL ABDOMINAL HYSTERECTOMY WITH SALPINGO OOPHORECTOMY         Family History   Problem Relation Age of Onset   • Heart disease Mother    • Hypertension Mother    • Stroke Mother    • Seizures Mother    • Heart disease Father    • Hypertension Father    • Lung disease Father    • Stroke Father    • Cancer Sister    • Hypertension Brother    • Diabetes Brother    • Hyperlipidemia Other        Social History     Socioeconomic History   • Marital status:      Spouse name: Not on file   • Number of children: Not on file   • Years of education: Not on file   • Highest education level: Not on file   Tobacco Use   • Smoking status: Current Every Day Smoker      Packs/day: 1.00     Years: 42.00     Pack years: 42.00     Types: Cigarettes   • Smokeless tobacco: Never Used   • Tobacco comment: Pt states she is trying to quit   Substance and Sexual Activity   • Alcohol use: No     Frequency: Never   • Drug use: No   • Sexual activity: Defer     Prior to Admission medications    Medication Sig Start Date End Date Taking? Authorizing Provider   ACCU-CHEK CARLIE PLUS test strip USE TO CHECK BLOOD SUGAR TWICE A DAY 2/17/20   Bautista Archibald MD   ACCU-CHEK FASTCLIX LANCETS misc 1 each by Other route 2 (Two) Times a Day. 8/15/19   Bautista Archibald MD   ACCU-CHEK SOFTCLIX LANCETS lancets USE TO CHECK BLOOD SUGAR TWICE A DAY 4/20/19   ProviderRobson MD   ACCU-CHEK SOFTCLIX LANCETS lancets USE TO CHECK BS 3 TIMES DAILY 12/5/19 12/4/20  Bautista Archibald MD   acebutolol (SECTRAL) 200 MG capsule TAKE 1 CAPSULE BY MOUTH TWICE A DAY 2/5/20   Alfredo Iglesias MD   ALBUTEROL SULFATE  (90 Base) MCG/ACT inhaler INHALE 1-2 PUFFS EVERY 4 HOURS AS NEEDED FOR COUGH OR FOR SHORTNESS OF BREATH 2/17/20   Lou Curran MD   amoxicillin (AMOXIL) 500 MG capsule Take 1 capsule by mouth 3 (Three) Times a Day. 3/3/20   Lou Curran MD   atorvastatin (LIPITOR) 40 MG tablet TAKE 1/2 TABLET BY MOUTH DAILY 9/23/19   Lou Curran MD   Blood Glucose Monitoring Suppl (ACCU-CHEK CARLIE PLUS) w/Device kit USE TO CHECK BS 3 TIMES DAILY 12/5/19   Bautista Archibald MD   CVS D3 25 MCG (1000 UT) capsule TAKE 1 CAPSULE BY MOUTH TWICE A DAY 1/6/20   Lou Curran MD   ELIQUIS 5 MG tablet tablet TAKE 1 TABLET BY MOUTH TWICE A DAY 3/3/20   Jesse Charles MD   fenofibrate 160 MG tablet Take 1 tablet by mouth Daily. 3/3/20   Lou Curran MD   glucose blood (ACCU-CHEK CARLIE PLUS) test strip USE TO CHECK BS 3 TIMES DAILY 12/5/19   Bautista Archibald MD   glucose blood (ACCU-CHEK GUIDE) test strip 1 each by Other route 2 (Two) Times a Day. Use as instructed    Provider, MD Robson   glucose  blood (ACCU-CHEK GUIDE) test strip Use as instructed 8/15/19   Bautista Archibald MD   hydrALAZINE (APRESOLINE) 50 MG tablet TAKE ONE TABLET BY MOUTH TWICE A DAY 2/14/20   Jesse Charles MD   HYDROcodone-acetaminophen (LORTAB) 7.5-325 MG per tablet Take 1 tablet by mouth Every 8 (Eight) Hours As Needed for Moderate Pain . 10/27/14   Robson Boothe MD   lisinopril (PRINIVIL,ZESTRIL) 20 MG tablet Take 20 mg by mouth Daily.    Robson Boothe MD   magnesium oxide (MAG-OX) 400 MG tablet Take 1 tablet by mouth 2 (Two) Times a Day. 8/28/19   Lou Curran MD   meloxicam (MOBIC) 15 MG tablet Take 1 tablet by mouth Daily. 3/3/20   Lou Curran MD   methocarbamol (ROBAXIN) 750 MG tablet  1/8/20   Robson Boothe MD   oxyCODONE-acetaminophen (PERCOCET) 5-325 MG per tablet TAKE 1 TABLE BY MOUTH THREE TIMES A DAY AS NEEDED. 2/22/20   Robson Boothe MD   potassium chloride (MICRO-K) 10 MEQ CR capsule Take 1 capsule by mouth Daily. 2/6/20   Bautista Archibald MD   SITagliptin-metFORMIN HCl ER (JANUMET XR)  MG tablet Take 2 tablets by mouth Daily. 3/3/20   Lou Curran MD     Patient does not take metformin      Objective   Physical Exam  64-year-old awake alert moderate distress.  HEENT extraocular muscles intact pupils equal reactive mouth is clear neck is supple no adenopathy no renal bruits lungs diffuse scattered wheezes and rales throughout no retractions heart regular without murmur abdomen soft without tenderness no pulsatile masses.  Extremities pulses are equal throughout upper and lower extremities no edema cords or Homans sign or evidence of DVT.  Patient's awake alert follows commands motor strength normal without focal weakness  Procedures          ED Course      Results for orders placed or performed during the hospital encounter of 03/10/20   Basic Metabolic Panel   Result Value Ref Range    Glucose 255 (H) 65 - 99 mg/dL    BUN 11 8 - 23 mg/dL    Creatinine 0.78 0.57 -  1.00 mg/dL    Sodium 143 136 - 145 mmol/L    Potassium 3.6 3.5 - 5.2 mmol/L    Chloride 105 98 - 107 mmol/L    CO2 26.0 22.0 - 29.0 mmol/L    Calcium 10.1 8.6 - 10.5 mg/dL    eGFR Non African Amer 74 >60 mL/min/1.73    BUN/Creatinine Ratio 14.1 7.0 - 25.0    Anion Gap 12.0 5.0 - 15.0 mmol/L   BNP   Result Value Ref Range    proBNP 1,863.0 (H) 5.0 - 900.0 pg/mL   Troponin   Result Value Ref Range    Troponin T <0.010 0.000 - 0.030 ng/mL   CBC Auto Differential   Result Value Ref Range    WBC 7.40 3.40 - 10.80 10*3/mm3    RBC 4.73 3.77 - 5.28 10*6/mm3    Hemoglobin 14.5 12.0 - 15.9 g/dL    Hematocrit 41.9 34.0 - 46.6 %    MCV 88.5 79.0 - 97.0 fL    MCH 30.7 26.6 - 33.0 pg    MCHC 34.7 31.5 - 35.7 g/dL    RDW 14.0 12.3 - 15.4 %    RDW-SD 43.8 37.0 - 54.0 fl    MPV 8.8 6.0 - 12.0 fL    Platelets 306 140 - 450 10*3/mm3    Neutrophil % 51.2 42.7 - 76.0 %    Lymphocyte % 34.8 19.6 - 45.3 %    Monocyte % 11.8 5.0 - 12.0 %    Eosinophil % 1.6 0.3 - 6.2 %    Basophil % 0.6 0.0 - 1.5 %    Neutrophils, Absolute 3.80 1.70 - 7.00 10*3/mm3    Lymphocytes, Absolute 2.60 0.70 - 3.10 10*3/mm3    Monocytes, Absolute 0.90 0.10 - 0.90 10*3/mm3    Eosinophils, Absolute 0.10 0.00 - 0.40 10*3/mm3    Basophils, Absolute 0.00 0.00 - 0.20 10*3/mm3    nRBC 0.1 0.0 - 0.2 /100 WBC   Gold Top - SST   Result Value Ref Range    Extra Tube Hold for add-ons.      Xr Chest 1 View    Result Date: 3/10/2020  Heart and megaly with mild pulmonary edema.  Electronically Signed By-Rhys Louise On:3/10/2020 9:59 PM This report was finalized on [id number] by  Rhys Louise, .    Medications   sodium chloride 0.9 % flush 10 mL (has no administration in time range)   furosemide (LASIX) injection 40 mg (has no administration in time range)   albuterol (PROVENTIL) nebulizer solution 0.083% 2.5 mg/3mL (has no administration in time range)   ipratropium-albuterol (DUO-NEB) nebulizer solution 3 mL (3 mL Nebulization Given 3/10/20 2130)   albuterol (PROVENTIL)  nebulizer solution 0.083% 2.5 mg/3mL (2.5 mg Nebulization Given 3/10/20 2134)   methylPREDNISolone sodium succinate (SOLU-Medrol) injection 125 mg (125 mg Intravenous Given 3/10/20 2132)     EKG my interpretation normal sinus rhythm rate of 78 ventricular trigeminy right bundle branch block left anterior fascicular block QTC of 483 but no significant change from previous abnormal                                       MDM  Number of Diagnoses or Management Options  Acute congestive heart failure, unspecified heart failure type (CMS/HCC):   Acute respiratory distress:   Chronic obstructive pulmonary disease with acute exacerbation (CMS/HCC):   Diagnosis management comments: Medical decision making.  Patient IV established placed on the monitor placed on nasal cannula of 3 L patient was given DuoNeb x1 albuterol x1 and 125 mg Solu-Medrol IV and had the above exam and evaluation.  EKG shows no acute findings she does have some PVCs and ventricular trigeminy the patient CBC was unremarkable proBNP 1863 troponin was negative blood sugar was 255.  Chest x-ray shows some low-grade edema.  Patient on repeat exams was feeling much better sats in the mid 90s.  She still diffuse scattered wheezes throughout but was improved.  She is given additional albuterol she was given Lasix 40 mg IV she was made aware of the findings.  I see no evidence that suggest acute DVT or pulmonary embolism or aortic dissection.  She will be placed in the hospital hospitalist nurse practitioner paged stable unremarkable improved ER course      Final diagnoses:   Acute respiratory distress   Chronic obstructive pulmonary disease with acute exacerbation (CMS/HCC)   Acute congestive heart failure, unspecified heart failure type (CMS/HCC)            Ronak Ruiz MD  03/10/20 2240      Electronically signed by Ronak Ruiz MD at 03/10/20 8821

## 2020-03-11 NOTE — PLAN OF CARE
Problem: Patient Care Overview  Goal: Plan of Care Review  Outcome: Ongoing (interventions implemented as appropriate)  Flowsheets  Taken 3/11/2020 0222 by [place] LPN  Progress: no change  Outcome Summary: new admitted  Taken 3/11/2020 0049 by Monroe Trotter RN  Plan of Care Reviewed With: patient     Problem: Breathing Pattern Ineffective (Adult)  Goal: Identify Related Risk Factors and Signs and Symptoms  Flowsheets (Taken 3/11/2020 0222)  Related Risk Factors (Breathing Pattern Ineffective): advanced age; fatigue; underlying condition  Goal: Effective Oxygenation/Ventilation  Flowsheets (Taken 3/11/2020 0222)  Effective Oxygenation/Ventilation: making progress toward outcome  Goal: Anxiety/Fear Reduction  Flowsheets (Taken 3/11/2020 0222)  Anxiety/Fear Reduction: making progress toward outcome     Problem: Skin Injury Risk (Adult)  Goal: Identify Related Risk Factors and Signs and Symptoms  Flowsheets (Taken 3/11/2020 0222)  Related Risk Factors (Skin Injury Risk): advanced age  Goal: Skin Health and Integrity  Flowsheets (Taken 3/11/2020 0222)  Skin Health and Integrity: making progress toward outcome     Problem: Fall Risk (Adult)  Goal: Identify Related Risk Factors and Signs and Symptoms  Flowsheets (Taken 3/11/2020 0222)  Related Risk Factors (Fall Risk): age-related changes; environment unfamiliar  Signs and Symptoms (Fall Risk): presence of risk factors  Goal: Absence of Fall  Flowsheets (Taken 3/11/2020 0222)  Absence of Fall: making progress toward outcome     Problem: Pain, Chronic (Adult)  Goal: Identify Related Risk Factors and Signs and Symptoms  Flowsheets (Taken 3/11/2020 0222)  Related Risk Factors (Chronic Pain): disease process; surgery  Signs and Symptoms (Chronic Pain): alteration in muscle tone; other (see comments)  Note:   Shoulder sx

## 2020-03-11 NOTE — PROGRESS NOTES
[address] [address]    Pulmonary Disease Educator  Discharge Planning    Patient Name: Caterina Mason  : 1955  Room #: 367/1  Pulmonologist: none  Admit Diagnosis: Acute Respiratory Distress  Current Admission Date: 3/10/2020   Previous Admit: Chest Pain  Previous Admission Date: 12-10-19  Body mass index is 36.21 kg/m².      Caterina Mason is a current smoker. PPY: 42    PFT’s in the last year?  No       Room Air O2 Sat: 91% Current O2: 3L 93%  Home O2: 3L at night  Home BiPap/CPAP: yes - home cpap  ABG: No results found for: SITE, ALLENTEST, PHART, FYX9SDX, PO2ART, UKU5HDQ, BASEEXCESS, O0LLOJTA, HGBBG, HCTABG, OXYHEMOGLOBI, METHHGBN, CARBOXYHGB, CO2CT, BAROMETRIC, MODALITY, FIO2      Current Home Medications:  Prior to Admission medications    Medication Sig Start Date End Date Taking? Authorizing Provider   ACCU-CHEK CARLIE PLUS test strip USE TO CHECK BLOOD SUGAR TWICE A DAY 20  Yes Bautista Archibald MD   ACCU-CHEK FASTCLIX LANCETS misc 1 each by Other route 2 (Two) Times a Day. 8/15/19  Yes Bautista Archibald MD   ACCU-CHEK SOFTCLIX LANCETS lancets USE TO CHECK BLOOD SUGAR TWICE A DAY 19  Yes Robson Boothe MD   ACCU-CHEK SOFTCLIX LANCETS lancets USE TO CHECK BS 3 TIMES DAILY 19 Yes Bautista Archibald MD   acebutolol (SECTRAL) 200 MG capsule Take 200 mg by mouth 2 (Two) Times a Day.   Yes Robson Boothe MD   albuterol sulfate  (90 Base) MCG/ACT inhaler Inhale 2 puffs Every 4 (Four) Hours As Needed for Wheezing.   Yes Robson Boothe MD   apixaban (ELIQUIS) 5 MG tablet tablet Take 5 mg by mouth 2 (Two) Times a Day.   Yes Robson Boothe MD   atorvastatin (LIPITOR) 40 MG tablet Take 20 mg by mouth Daily.   Yes Robson Boothe MD   Blood Glucose Monitoring Suppl (ACCU-CHEK CARLIE PLUS) w/Device kit USE TO CHECK BS 3 TIMES DAILY 19  Yes Bautista Archibald MD   cholecalciferol (VITAMIN D3) 10 MCG (400 UNIT)  tablet Take 1,000 Units by mouth 2 (Two) Times a Day.   Yes Robson Boothe MD   fenofibrate 160 MG tablet Take 1 tablet by mouth Daily. 3/3/20  Yes Lou Curran MD   glucose blood (ACCU-CHEK CARLIE PLUS) test strip USE TO CHECK BS 3 TIMES DAILY 12/5/19  Yes Bautista Archibald MD   glucose blood (ACCU-CHEK GUIDE) test strip 1 each by Other route 2 (Two) Times a Day. Use as instructed   Yes Robson Boothe MD   glucose blood (ACCU-CHEK GUIDE) test strip Use as instructed 8/15/19  Yes Bautista Archibald MD   hydrALAZINE (APRESOLINE) 50 MG tablet Take 50 mg by mouth 2 (Two) Times a Day.   Yes Robson Boothe MD   lisinopril (PRINIVIL,ZESTRIL) 20 MG tablet Take 20 mg by mouth Daily.   Yes Robson Boothe MD   magnesium oxide (MAG-OX) 400 MG tablet Take 1 tablet by mouth 2 (Two) Times a Day. 8/28/19  Yes Lou Curran MD   meloxicam (MOBIC) 15 MG tablet Take 1 tablet by mouth Daily. 3/3/20  Yes Lou Curran MD   oxyCODONE-acetaminophen (PERCOCET) 5-325 MG per tablet Take 1 tablet by mouth Every 8 (Eight) Hours As Needed.   Yes Robson Boothe MD   potassium chloride (MICRO-K) 10 MEQ CR capsule Take 1 capsule by mouth Daily. 2/6/20  Yes Bautista Archibald MD   SITagliptin-metFORMIN HCl ER (JANUMET XR)  MG tablet Take 2 tablets by mouth Daily. 3/3/20 3/11/20 Yes Lou Curran MD   amoxicillin (AMOXIL) 500 MG capsule Take 1 capsule by mouth 3 (Three) Times a Day. 3/3/20   Lou Curran MD       Recommendations/Testing:  Caterina Mason was seen by the pulmonary disease educator for evaluation of care gaps according to the COPD GOLD Guidelines.  After evaluation the patient's cardiopulmonary status, it is the recommendation of the care coordinator that the patient receive the following testing, equipment, or consults.    PFT    6 Minute Walk     Reconciled RT Home Medications:  After evaluation the patient's cardiopulmonary status, it is the recommendation of the care  coordinator that the patient receive the following medication changes or additions.    Duoneb Q4PRN with home neb    Prior COPD Education?   no Prior Pulmonary Rehab?   no  History of COPD admission in the past year?    no        Other Notes: Pt did very well on COPD education and breathing exercises. Recommending 6 minute walk to assess home 02 at all times and Home Neb with Duoneb Q6PRN. Pt states she is ready to quit smoking and talked to pt about smoking cessation and pt was given a smoking cessation packet. Pt states she is interested in nicotine patches. ROSALIO Nieves advised.    Insurance: [employer]       EDUCATION DONE WITH PATIENT:     IMT:  Smoking Cessation        COPD          Pursed Lip Breathing        Diaphragmatic Breathing  Harmonica  Relaxation Techniques      STOP BANG (=/> 3 is HIGH RISK):   Do you snore loudly?   Tired/Fatigued during the day?   Stop Breathing in sleeping?   High B/P or treated B/P?   BMI greater than 35?    Body mass index is 36.21 kg/m².  More than 50 years old?   64 y.o.  Neck Circumference > 40cm      Male?  female       TOTAL Pt has HOME CPAP    Has patient seen a sleep Dr.? (Who/When)   Sleep Study Done? (When)   Would you like a sleep tech talk to you about RUSLAN?

## 2020-03-11 NOTE — DISCHARGE PLACEMENT REQUEST
"Martin Mason (64 y.o. Female)     Date of Birth Social Security Number Address Home Phone MRN    1955  [id number]  [place] [address] [phone] [id number]    Restorationist Marital Status          None        Admission Date Admission Type Admitting Provider Attending Provider Department, Room/Bed    3/10/20 Emergency Evans Sorensen MD Park, Hyung Dong, MD [facility] 3C MEDICAL INPATIENT, 367/1    Discharge Date Discharge Disposition Discharge Destination                       Attending Provider:  Evans Sorensen MD    Allergies:  Ibuprofen, Prednisone, Pregabalin, Tramadol, Levofloxacin, Sulfamethoxazole-trimethoprim    Isolation:  None   Infection:  None   Code Status:  CPR    Ht:  160 cm (63\")   Wt:  92.7 kg (204 lb 6.4 oz)    Admission Cmt:  None   Principal Problem:  None                Active Insurance as of 3/10/2020     Primary Coverage     Payor Plan Insurance Group Employer/Plan Group    [employer] [place] [id number]     Payor Plan Address Payor Plan Phone Number Payor Plan Fax Number Effective Dates    MAIL STOP:   4/1/2017 - None Entered    [address] [address]       [facility] [id number]       Subscriber Name Subscriber Birth Date Member ID       MARTIN MASON 1955 [id number]                 Emergency Contacts      (Rel.) Home Phone Work Phone Mobile Phone    LEIDY PEREZ (Daughter) [phone] -- [phone]    INDIOBARBARA (Spouse) [phone] -- --          "

## 2020-03-11 NOTE — PLAN OF CARE
Pt will need a 6 min walk before DC.  She will also need a home nebulizer and duo neb.  She had requested an order for a nicotine patch but then refused the application.   Pt has cardiology on board now.   Will cont to monitor.

## 2020-03-11 NOTE — PROGRESS NOTES
"      [facility] Medicine Services Daily Progress Note      Hospitalist Team  LOS 1 days      Patient Care Team:  Lou Curran MD as PCP - General  Jesse Charles MD as Consulting Physician (Cardiology)  Bautista Archibald MD as Consulting Physician (Endocrinology)  Raymond Piper MD as Consulting Physician (Pulmonary Disease)  Jane Montero MD as Consulting Physician (Obstetrics and Gynecology)    Patient Location: 367/1      Subjective   Subjective   Patient feels better with short of breath, denies for any chest pain, no nausea or vomiting.       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 She denies home oxygen use other than Cpap at night. She denies chest pain , dizziness, cough, congestion, nausea. She reports \" I just couldn't breath, I had to do something\". She reports dyspnea at rest. Troponin was negative, BNP 1863, now 2863 after Lasix 40 mg IV, serum glucose 255, lactic 1.5, wbc not elevated. CXR per radiology:     \"Heart is enlarged. There is mild pulmonary vascular congestion. There  are no focal infiltrates.     IMPRESSION:  Heart and megaly with mild pulmonary edema.\"     She was given duo nebs in ED. She is allergic to steroids. She will be admitted for CHF and COPD exacerbation .              Date::          ROS      Objective   Objective      Vital Signs  Temp:  [97.4 °F (36.3 °C)-98.9 °F (37.2 °C)] 97.4 °F (36.3 °C)  Heart Rate:  [67-76] 76  Resp:  [15-23] 20  BP: (149-176)/(57-79) 149/79  Oxygen Therapy  SpO2: 97 %  Pulse Oximetry Type: Intermittent  Device (Oxygen Therapy): nasal cannula  Flow (L/min): 2  Oxygen Concentration (%): 40  Flowsheet Rows      " "First Filed Value   Admission Height  160 cm (63\") Documented at 03/10/2020 2102   Admission Weight  95.3 kg (210 lb 1.6 oz) Documented at 03/10/2020 2102        Intake & Output (last 3 days)       03/08 0701 - 03/09 0700 03/09 0701 - 03/10 0700 03/10 0701 - 03/11 0700 03/11 0701 - 03/12 0700    P.O.    240    Total Intake(mL/kg)    240 (2.6)    Net    +240                Lines, Drains & Airways    Active LDAs     Name:   Placement date:   Placement time:   Site:   Days:    Peripheral IV 03/10/20 2123 Right Arm   03/10/20    2123    Arm   less than 1                  Physical Exam:    Physical Exam   Constitutional: She is oriented to person, place, and time. She appears well-developed and well-nourished. No distress.   HENT:   Head: Normocephalic and atraumatic.   Right Ear: External ear normal.   Left Ear: External ear normal.   Nose: Nose normal.   Mouth/Throat: Oropharynx is clear and moist. No oropharyngeal exudate.   Eyes: Pupils are equal, round, and reactive to light. Conjunctivae and EOM are normal. Right eye exhibits no discharge. Left eye exhibits no discharge. No scleral icterus.   Neck: Normal range of motion. No JVD present. No tracheal deviation present. No thyromegaly present.   Cardiovascular: Normal rate, regular rhythm, normal heart sounds and intact distal pulses. Exam reveals no gallop and no friction rub.   No murmur heard.  Pulmonary/Chest: Effort normal. No stridor. No respiratory distress. She has no wheezes. She has rales. She exhibits no tenderness.   Abdominal: Soft. Bowel sounds are normal. She exhibits no distension and no mass. There is no tenderness. There is no rebound and no guarding. No hernia.   Musculoskeletal: Normal range of motion. She exhibits no edema, tenderness or deformity.   Lymphadenopathy:     She has no cervical adenopathy.   Neurological: She is alert and oriented to person, place, and time. No cranial nerve deficit or sensory deficit. She exhibits normal muscle " tone. Coordination normal.   Skin: Skin is warm and dry. No rash noted. She is not diaphoretic. No erythema.   Psychiatric: She has a normal mood and affect. Her behavior is normal.   Nursing note and vitals reviewed.            Procedures:              Results Review:     I reviewed the patient's new clinical results.      Lab Results (last 24 hours)     Procedure Component Value Units Date/Time    Troponin [[id number]]  (Normal) Collected:  03/11/20 0919    Specimen:  Blood Updated:  03/11/20 1123     Troponin T <0.010 ng/mL     Narrative:       Troponin T Reference Range:  <= 0.03 ng/mL-   Negative for AMI  >0.03 ng/mL-     Abnormal for myocardial necrosis.  Clinicians would have to utilize clinical acumen, EKG, Troponin and serial changes to determine if it is an Acute Myocardial Infarction or myocardial injury due to an underlying chronic condition.       Results may be falsely decreased if patient taking Biotin.      POC Glucose Once [[id number]]  (Abnormal) Collected:  03/11/20 0718    Specimen:  Blood Updated:  03/11/20 0723     Glucose 254 mg/dL      Comment: Serial Number: [id number]:  [id number]       Basic Metabolic Panel [[id number]]  (Abnormal) Collected:  03/11/20 0341    Specimen:  Blood Updated:  03/11/20 0443     Glucose 307 mg/dL      BUN 14 mg/dL      Creatinine 0.85 mg/dL      Sodium 141 mmol/L      Potassium 3.9 mmol/L      Chloride 102 mmol/L      CO2 24.0 mmol/L      Calcium 9.8 mg/dL      eGFR Non African Amer 67 mL/min/1.73      BUN/Creatinine Ratio 16.5     Anion Gap 15.0 mmol/L     Narrative:       GFR Normal >60  Chronic Kidney Disease <60  Kidney Failure <15      Magnesium [[id number]]  (Abnormal) Collected:  03/11/20 0341    Specimen:  Blood Updated:  03/11/20 0443     Magnesium 1.5 mg/dL     Troponin [[id number]]  (Normal) Collected:  03/11/20 0341    Specimen:  Blood Updated:  03/11/20 0443     Troponin T <0.010 ng/mL     Narrative:       Troponin T Reference Range:  <= 0.03 ng/mL-    Negative for AMI  >0.03 ng/mL-     Abnormal for myocardial necrosis.  Clinicians would have to utilize clinical acumen, EKG, Troponin and serial changes to determine if it is an Acute Myocardial Infarction or myocardial injury due to an underlying chronic condition.       Results may be falsely decreased if patient taking Biotin.      BNP [[id number]]  (Abnormal) Collected:  03/11/20 0341    Specimen:  Blood Updated:  03/11/20 0438     proBNP 2,836.0 pg/mL     Narrative:       Among patients with dyspnea, NT-proBNP is highly sensitive for the detection of acute congestive heart failure. In addition NT-proBNP of <300 pg/ml effectively rules out acute congestive heart failure with 99% negative predictive value.    Results may be falsely decreased if patient taking Biotin.      CBC (No Diff) [[id number]]  (Normal) Collected:  03/11/20 0342    Specimen:  Blood Updated:  03/11/20 0422     WBC 7.60 10*3/mm3      RBC 4.86 10*6/mm3      Hemoglobin 14.5 g/dL      Hematocrit 43.3 %      MCV 89.1 fL      MCH 29.8 pg      MCHC 33.4 g/dL      RDW 13.8 %      RDW-SD 42.9 fl      MPV 8.7 fL      Platelets 298 10*3/mm3     Hemoglobin A1c [[id number]] Collected:  03/11/20 0342    Specimen:  Blood Updated:  03/11/20 0407    POC Glucose Once [[id number]]  (Abnormal) Collected:  03/11/20 0015    Specimen:  Blood Updated:  03/11/20 0016     Glucose 197 mg/dL      Comment: Serial Number: [id number]:  [id number]       Extra Tubes [[id number]] Collected:  03/10/20 2130    Specimen:  Blood, Venous Line Updated:  03/10/20 2230    Narrative:       The following orders were created for panel order Extra Tubes.  Procedure                               Abnormality         Status                     ---------                               -----------         ------                     Light Blue Top[[id number]]                                                              Gold Top - [facility][[id number]]                                   Final result                  Please view results for these tests on the individual orders.    Gold Top - SST [[id number]] Collected:  03/10/20 2130    Specimen:  Blood Updated:  03/10/20 2230     Extra Tube Hold for add-ons.     Comment: Auto resulted.       Basic Metabolic Panel [[id number]]  (Abnormal) Collected:  03/10/20 2132    Specimen:  Blood from Arm, Right Updated:  03/10/20 2203     Glucose 255 mg/dL      BUN 11 mg/dL      Creatinine 0.78 mg/dL      Sodium 143 mmol/L      Potassium 3.6 mmol/L      Chloride 105 mmol/L      CO2 26.0 mmol/L      Calcium 10.1 mg/dL      eGFR Non African Amer 74 mL/min/1.73      BUN/Creatinine Ratio 14.1     Anion Gap 12.0 mmol/L     Narrative:       GFR Normal >60  Chronic Kidney Disease <60  Kidney Failure <15      Troponin [[id number]]  (Normal) Collected:  03/10/20 2132    Specimen:  Blood from Arm, Right Updated:  03/10/20 2203     Troponin T <0.010 ng/mL     Narrative:       Troponin T Reference Range:  <= 0.03 ng/mL-   Negative for AMI  >0.03 ng/mL-     Abnormal for myocardial necrosis.  Clinicians would have to utilize clinical acumen, EKG, Troponin and serial changes to determine if it is an Acute Myocardial Infarction or myocardial injury due to an underlying chronic condition.       Results may be falsely decreased if patient taking Biotin.      BNP [[id number]]  (Abnormal) Collected:  03/10/20 2132    Specimen:  Blood from Arm, Right Updated:  03/10/20 2201     proBNP 1,863.0 pg/mL     Narrative:       Among patients with dyspnea, NT-proBNP is highly sensitive for the detection of acute congestive heart failure. In addition NT-proBNP of <300 pg/ml effectively rules out acute congestive heart failure with 99% negative predictive value.    Results may be falsely decreased if patient taking Biotin.      CBC & Differential [[id number]] Collected:  03/10/20 2132    Specimen:  Blood from Arm, Right Updated:  03/10/20 2155    Narrative:       The following orders were created for panel order  CBC & Differential.  Procedure                               Abnormality         Status                     ---------                               -----------         ------                     CBC Auto Differential[[id number]]        Normal              Final result                 Please view results for these tests on the individual orders.    CBC Auto Differential [[id number]]  (Normal) Collected:  03/10/20 2132    Specimen:  Blood from Arm, Right Updated:  03/10/20 2155     WBC 7.40 10*3/mm3      RBC 4.73 10*6/mm3      Hemoglobin 14.5 g/dL      Hematocrit 41.9 %      MCV 88.5 fL      MCH 30.7 pg      MCHC 34.7 g/dL      RDW 14.0 %      RDW-SD 43.8 fl      MPV 8.8 fL      Platelets 306 10*3/mm3      Neutrophil % 51.2 %      Lymphocyte % 34.8 %      Monocyte % 11.8 %      Eosinophil % 1.6 %      Basophil % 0.6 %      Neutrophils, Absolute 3.80 10*3/mm3      Lymphocytes, Absolute 2.60 10*3/mm3      Monocytes, Absolute 0.90 10*3/mm3      Eosinophils, Absolute 0.10 10*3/mm3      Basophils, Absolute 0.00 10*3/mm3      nRBC 0.1 /100 WBC         No results found for: HGBA1C            No results found for: LIPASE  Lab Results   Component Value Date    CHOL 107 01/31/2020    TRIG 188 (H) 01/31/2020    HDL 34 (L) 01/31/2020    LDL 35 01/31/2020       No results found for: INTRAOP, PREDX, FINALDX, COMDX    Microbiology Results (last 10 days)     ** No results found for the last 240 hours. **          ECG/EMG Results (most recent)     Procedure Component Value Units Date/Time    ECG 12 Lead [[id number]] Collected:  03/10/20 2114     Updated:  03/11/20 0820    Narrative:       HEART RATE= 78  bpm  RR Interval= 772  ms  CO Interval= 157  ms  P Horizontal Axis=   deg  P Front Axis= 41  deg  QRSD Interval= 161  ms  QT Interval= 424  ms  QRS Axis= -79  deg  T Wave Axis= 21  deg  - ABNORMAL ECG -  Sinus rhythm  Ventricular trigeminy  When compared with ECG of 11-Dec-2019 5:46:57,  No significant change  Electronically Signed By:  Ronak Ruiz ([facility]) 11-Mar-2020 08:20:26  Date and Time of Study: 2020-03-10 21:14:51                    Xr Chest 1 View    Result Date: 3/10/2020  Heart and megaly with mild pulmonary edema.  Electronically Signed By-Rhys Louise On:3/10/2020 9:59 PM This report was finalized on [id number] by  Rhys Louise, .          Xrays, labs reviewed personally by physician.    Medication Review:   I have reviewed the patient's current medication list      Scheduled Meds    acebutolol 200 mg Oral BID   amoxicillin 500 mg Oral Q8H   apixaban 5 mg Oral Q12H   atorvastatin 20 mg Oral Nightly   fenofibrate 145 mg Oral Daily   guaiFENesin 1,200 mg Oral Q12H   hydrALAZINE 50 mg Oral BID   insulin lispro 0-7 Units Subcutaneous 4x Daily With Meals & Nightly   ipratropium-albuterol 3 mL Nebulization Q4H - RT   lisinopril 20 mg Oral Daily   magnesium oxide 400 mg Oral BID   meloxicam 15 mg Oral Daily With Breakfast   nicotine 1 patch Transdermal Q24H   potassium chloride 10 mEq Oral Daily With Breakfast   sodium chloride 10 mL Intravenous Q12H       Meds Infusions       Meds PRN  albuterol  •  dextrose  •  dextrose  •  glucagon (human recombinant)  •  HYDROcodone-acetaminophen  •  insulin lispro **AND** insulin lispro  •  ipratropium-albuterol  •  [COMPLETED] Insert peripheral IV **AND** sodium chloride  •  sodium chloride        Assessment/Plan   Assessment/Plan     Active Hospital Problems    Diagnosis  POA   • COPD exacerbation (CMS/[facility]) [J44.1]  Yes   • Acute respiratory distress [R06.03]  Yes      Resolved Hospital Problems   No resolved problems to display.       MEDICAL DECISION MAKING COMPLEXITY BY PROBLEM:     Dyspnea without chest pain troponin negative likely multifactorial :     1.Possible mild acute flare up of COPD improved, continue duonebs.      2. Acute on chronic diastolic heart failure improving ..... Iv lasix, cardiology consult, recent 2 d echo revealed normal left ventricle function.      DM2 with hyperglycemia  255 on home ( on Janumet per pt not on home list not ordered in case of contrast - add SSI prn and accuchecks ACHS and LCS diet      Cardiac arrhythmia - afib in past had PVC ablation in December , trigeminy tonight no chest pain or elevated troponin on apixaban and Sectral- not in pharmacy  to bring as patient specifically taken off metoprolol by cardiology in past      HLD- on statin and fenofibrate      HTN- controlled on BB, hydralazine, lisinopril      RUSLAN- using Cpap     Chronic pain on Norco      Hypo MG - on Mg check Mg      On K - replacement home dose K ok    VTE Prophylaxis -   Mechanical Order History:     None      Pharmalogical Order History:     Ordered     Dose Route Frequency Stop    03/10/20 2328  apixaban (ELIQUIS) tablet 5 mg      5 mg PO Every 12 Hours Scheduled --            Code Status -   Code Status and Medical Interventions:   Ordered at: 03/10/20 2317     Code Status:    CPR     Medical Interventions (Level of Support Prior to Arrest):    Full       Discharge Planning          Destination      Coordination has not been started for this encounter.      Durable Medical Equipment      Coordination has not been started for this encounter.      Dialysis/Infusion      Coordination has not been started for this encounter.      Home Medical Care      Coordination has not been started for this encounter.      Therapy      Coordination has not been started for this encounter.      Community Resources      Coordination has not been started for this encounter.            Electronically signed by Leann Roper MD, 03/11/20, 11:34.  [facility] Hospitalist Team

## 2020-03-11 NOTE — CONSULTS
Referring Provider: Lou nielsen    Attending: Evans Sorensen MD    Reason for Consultation:    Shortness of breath  Bilateral leg edema  History of paroxysmal atrial fibrillation  Hypertension  Frequent PVCs    Chief complaint:    Shortness of breath  Leg edema       History of present illness:     Patient is 64-year-old white female whose past medical history significant for hypertension, hyperlipidemia, paroxysmal atrial fibrillation, COPD, tobacco abuse, PVCs, who is admitted with symptom of shortness of breath and leg edema.    Patient reports that from last 1 week she was getting short of breath.  It was associated with bilateral leg edema.  Patient was also wheezing.  Patient had cough with expectoration which was clear.  No fever and chills was reported.  Patient could not finally able to breathe and decided to come to the hospital.  She denies any chest pain.  No orthopnea, PND, syncope or presyncope noted.    In 2017, patient was diagnosed with frequent PVCs.  Stress test initially was negative for ischemia or myocardial infarction.  Holter monitor showed frequent PVCs.  Patient was treated symptomatically.  In 2018, patient underwent cardiac catheterization which showed no significant coronary artery disease.  In 2019 patient had repeat Holter monitor which showed 26% of PVC burden.  Patient was referred to EP and patient underwent EP study and ablation of PVCs.  However this ablation did not completely resolve PVCs as these PVCs was noted to come from epicardial surfaces.  Patient was started on acebutolol.    Patient is ingesting significant amount of soda.  She drinks 2 L of big red or Coke      Review of Systems  Review of Systems   Constitution: Negative for chills and fever.   HENT: Negative for ear discharge and nosebleeds.    Eyes: Negative for discharge and redness.   Cardiovascular: Positive for leg swelling. Negative for chest pain, orthopnea, palpitations, paroxysmal nocturnal dyspnea and  syncope.   Respiratory: Positive for shortness of breath. Negative for cough and wheezing.    Endocrine: Negative for heat intolerance.   Skin: Negative for rash.   Musculoskeletal: Positive for arthritis and joint pain. Negative for myalgias.   Gastrointestinal: Negative for abdominal pain, melena, nausea and vomiting.   Genitourinary: Negative for dysuria and hematuria.   Neurological: Negative for dizziness, light-headedness, numbness and tremors.   Psychiatric/Behavioral: Negative for depression. The patient is not nervous/anxious.        Past Medical History  Past Medical History:   Diagnosis Date   • Arthritis    • Atrial fibrillation (CMS/[facility])    • Bradycardia     Dr. Charles   • Cataract    • COPD (chronic obstructive pulmonary disease) (CMS/[facility])     Dr. Rob   • Diabetes mellitus, type 2 (CMS/HCC)     sees Dr. Archibald at [place]   • DJD (degenerative joint disease)     possible herniated disc, sees Pain Mgmt in [place]   •     • GERD (gastroesophageal reflux disease)    • History of combined right and left heart catheterization 2018    minimal CAD on heart cath done    • Hyperlipidemia    • Hypertension    • Pain    • Sleep apnea     wears CPAP machine, seefelipe Rob    and   Past Surgical History:   Procedure Laterality Date   • ABLATION OF DYSRHYTHMIC FOCUS     • CARDIAC CATHETERIZATION      and Angiograph    • CARDIAC ELECTROPHYSIOLOGY PROCEDURE N/A 12/10/2019    Procedure: pvc ablation;  Surgeon: Alfredo Iglesias MD;  Location: [facility] INVASIVE LOCATION;  Service: Cardiovascular   •  SECTION      x 1   • COLON RESECTION     • COLONOSCOPY  2012   • COLOSTOMY      and reversal in her 20's due to problems with childbirth   • COLOSTOMY CLOSURE     • OVARIAN CYST SURGERY     • ROTATOR CUFF REPAIR Left 2009    x2    • TOTAL ABDOMINAL HYSTERECTOMY WITH SALPINGO OOPHORECTOMY         Family History  Family History   Problem Relation Age of Onset   • Heart disease Mother    •  "Hypertension Mother    • Stroke Mother    • Seizures Mother    • Heart disease Father    • Hypertension Father    • Lung disease Father    • Stroke Father    • Cancer Sister    • Hypertension Brother    • Diabetes Brother    • Hyperlipidemia Other        Social History  Social History     Socioeconomic History   • Marital status:      Spouse name: Not on file   • Number of children: Not on file   • Years of education: Not on file   • Highest education level: Not on file   Tobacco Use   • Smoking status: Current Every Day Smoker     Packs/day: 1.00     Years: 42.00     Pack years: 42.00     Types: Cigarettes   • Smokeless tobacco: Never Used   • Tobacco comment: Pt states she is trying to quit   Substance and Sexual Activity   • Alcohol use: No     Frequency: Never   • Drug use: No   • Sexual activity: Defer       Objective     Physical Exam:    Vital Signs  Vitals:    03/11/20 1207 03/11/20 1214 03/11/20 1607 03/11/20 1610   BP:       BP Location:       Patient Position:       Pulse: 86 84 87 85   Resp: 18 18 18 18   Temp:       TempSrc:       SpO2: (!) 89% 96% 97%    Weight:       Height:           Weight  Flowsheet Rows      First Filed Value   Admission Height  160 cm (63\") Documented at 03/10/2020 2102   Admission Weight  95.3 kg (210 lb 1.6 oz) Documented at 03/10/2020 2102           Physical Exam   Constitutional: She is oriented to person, place, and time. She appears well-developed and well-nourished.   HENT:   Head: Normocephalic and atraumatic.   Eyes: Right eye exhibits no discharge. No scleral icterus.   Neck: No thyromegaly present.   Cardiovascular: Normal rate, regular rhythm and normal heart sounds. Exam reveals no gallop and no friction rub.   No murmur heard.  Pulmonary/Chest: Effort normal. No respiratory distress. She has wheezes. She has no rales.   Abdominal: There is no tenderness.   Musculoskeletal: She exhibits edema.   Lymphadenopathy:     She has no cervical adenopathy. "   Neurological: She is alert and oriented to person, place, and time.   Skin: No rash noted. No erythema.   Psychiatric: She has a normal mood and affect.       Results Review:  Lab Results (last 24 hours)     Procedure Component Value Units Date/Time    POC Glucose Once [[id number]]  (Abnormal) Collected:  03/11/20 1618    Specimen:  Blood Updated:  03/11/20 1619     Glucose 272 mg/dL      Comment: Serial Number: [id number]:  [id number]       Hemoglobin A1c [[id number]]  (Abnormal) Collected:  03/11/20 0342    Specimen:  Blood Updated:  03/11/20 1203     Hemoglobin A1C 8.2 %     Narrative:       Hemoglobin A1C Reference Range:    <5.7 %        Normal  5.7-6.4 %     Increased risk for diabetes  > 6.4 %        Diabetes       These guidelines have been recommended by the American Diabetic Association for Hgb A1c.      The following 2010 guidelines have been recommended by the American Diabetes Association for Hemoglobin A1c.    HBA1c 5.7-6.4% Increased risk for future diabetes (pre-diabetes)  HBA1c     >6.4% Diabetes      POC Glucose Once [[id number]]  (Abnormal) Collected:  03/11/20 1130    Specimen:  Blood Updated:  03/11/20 1134     Glucose 273 mg/dL      Comment: Serial Number: [id number]:  [id number]       Troponin [[id number]]  (Normal) Collected:  03/11/20 0919    Specimen:  Blood Updated:  03/11/20 1123     Troponin T <0.010 ng/mL     Narrative:       Troponin T Reference Range:  <= 0.03 ng/mL-   Negative for AMI  >0.03 ng/mL-     Abnormal for myocardial necrosis.  Clinicians would have to utilize clinical acumen, EKG, Troponin and serial changes to determine if it is an Acute Myocardial Infarction or myocardial injury due to an underlying chronic condition.       Results may be falsely decreased if patient taking Biotin.      POC Glucose Once [[id number]]  (Abnormal) Collected:  03/11/20 0718    Specimen:  Blood Updated:  03/11/20 0723     Glucose 254 mg/dL      Comment: Serial Number:  [id number]:  [id number]       Basic Metabolic Panel [[id number]]  (Abnormal) Collected:  03/11/20 0341    Specimen:  Blood Updated:  03/11/20 0443     Glucose 307 mg/dL      BUN 14 mg/dL      Creatinine 0.85 mg/dL      Sodium 141 mmol/L      Potassium 3.9 mmol/L      Chloride 102 mmol/L      CO2 24.0 mmol/L      Calcium 9.8 mg/dL      eGFR Non African Amer 67 mL/min/1.73      BUN/Creatinine Ratio 16.5     Anion Gap 15.0 mmol/L     Narrative:       GFR Normal >60  Chronic Kidney Disease <60  Kidney Failure <15      Magnesium [[id number]]  (Abnormal) Collected:  03/11/20 0341    Specimen:  Blood Updated:  03/11/20 0443     Magnesium 1.5 mg/dL     Troponin [[id number]]  (Normal) Collected:  03/11/20 0341    Specimen:  Blood Updated:  03/11/20 0443     Troponin T <0.010 ng/mL     Narrative:       Troponin T Reference Range:  <= 0.03 ng/mL-   Negative for AMI  >0.03 ng/mL-     Abnormal for myocardial necrosis.  Clinicians would have to utilize clinical acumen, EKG, Troponin and serial changes to determine if it is an Acute Myocardial Infarction or myocardial injury due to an underlying chronic condition.       Results may be falsely decreased if patient taking Biotin.      BNP [[id number]]  (Abnormal) Collected:  03/11/20 0341    Specimen:  Blood Updated:  03/11/20 0438     proBNP 2,836.0 pg/mL     Narrative:       Among patients with dyspnea, NT-proBNP is highly sensitive for the detection of acute congestive heart failure. In addition NT-proBNP of <300 pg/ml effectively rules out acute congestive heart failure with 99% negative predictive value.    Results may be falsely decreased if patient taking Biotin.      CBC (No Diff) [[id number]]  (Normal) Collected:  03/11/20 0342    Specimen:  Blood Updated:  03/11/20 0422     WBC 7.60 10*3/mm3      RBC 4.86 10*6/mm3      Hemoglobin 14.5 g/dL      Hematocrit 43.3 %      MCV 89.1 fL      MCH 29.8 pg      MCHC 33.4 g/dL      RDW 13.8 %      RDW-SD 42.9 fl      MPV 8.7 fL       Platelets 298 10*3/mm3     POC Glucose Once [[id number]]  (Abnormal) Collected:  03/11/20 0015    Specimen:  Blood Updated:  03/11/20 0016     Glucose 197 mg/dL      Comment: Serial Number: [id number]:  [id number]       Extra Tubes [[id number]] Collected:  03/10/20 2130    Specimen:  Blood, Venous Line Updated:  03/10/20 2230    Narrative:       The following orders were created for panel order Extra Tubes.  Procedure                               Abnormality         Status                     ---------                               -----------         ------                     Light Blue Top[[id number]]                                                              Gold Top - SST[[id number]]                                   Final result                 Please view results for these tests on the individual orders.    Gold Top - SST [[id number]] Collected:  03/10/20 2130    Specimen:  Blood Updated:  03/10/20 2230     Extra Tube Hold for add-ons.     Comment: Auto resulted.       Basic Metabolic Panel [[id number]]  (Abnormal) Collected:  03/10/20 2132    Specimen:  Blood from Arm, Right Updated:  03/10/20 2203     Glucose 255 mg/dL      BUN 11 mg/dL      Creatinine 0.78 mg/dL      Sodium 143 mmol/L      Potassium 3.6 mmol/L      Chloride 105 mmol/L      CO2 26.0 mmol/L      Calcium 10.1 mg/dL      eGFR Non African Amer 74 mL/min/1.73      BUN/Creatinine Ratio 14.1     Anion Gap 12.0 mmol/L     Narrative:       GFR Normal >60  Chronic Kidney Disease <60  Kidney Failure <15      Troponin [[id number]]  (Normal) Collected:  03/10/20 2132    Specimen:  Blood from Arm, Right Updated:  03/10/20 2203     Troponin T <0.010 ng/mL     Narrative:       Troponin T Reference Range:  <= 0.03 ng/mL-   Negative for AMI  >0.03 ng/mL-     Abnormal for myocardial necrosis.  Clinicians would have to utilize clinical acumen, EKG, Troponin and serial changes to determine if it is an Acute Myocardial Infarction or myocardial injury due to an  underlying chronic condition.       Results may be falsely decreased if patient taking Biotin.      BNP [[id number]]  (Abnormal) Collected:  03/10/20 2132    Specimen:  Blood from Arm, Right Updated:  03/10/20 2201     proBNP 1,863.0 pg/mL     Narrative:       Among patients with dyspnea, NT-proBNP is highly sensitive for the detection of acute congestive heart failure. In addition NT-proBNP of <300 pg/ml effectively rules out acute congestive heart failure with 99% negative predictive value.    Results may be falsely decreased if patient taking Biotin.      CBC & Differential [[id number]] Collected:  03/10/20 2132    Specimen:  Blood from Arm, Right Updated:  03/10/20 2155    Narrative:       The following orders were created for panel order CBC & Differential.  Procedure                               Abnormality         Status                     ---------                               -----------         ------                     CBC Auto Differential[[id number]]        Normal              Final result                 Please view results for these tests on the individual orders.    CBC Auto Differential [[id number]]  (Normal) Collected:  03/10/20 2132    Specimen:  Blood from Arm, Right Updated:  03/10/20 2155     WBC 7.40 10*3/mm3      RBC 4.73 10*6/mm3      Hemoglobin 14.5 g/dL      Hematocrit 41.9 %      MCV 88.5 fL      MCH 30.7 pg      MCHC 34.7 g/dL      RDW 14.0 %      RDW-SD 43.8 fl      MPV 8.8 fL      Platelets 306 10*3/mm3      Neutrophil % 51.2 %      Lymphocyte % 34.8 %      Monocyte % 11.8 %      Eosinophil % 1.6 %      Basophil % 0.6 %      Neutrophils, Absolute 3.80 10*3/mm3      Lymphocytes, Absolute 2.60 10*3/mm3      Monocytes, Absolute 0.90 10*3/mm3      Eosinophils, Absolute 0.10 10*3/mm3      Basophils, Absolute 0.00 10*3/mm3      nRBC 0.1 /100 WBC         Imaging Results (Last 72 Hours)     Procedure Component Value Units Date/Time    XR Chest 1 View [[id number]] Collected:  03/10/20 2158      Updated:  03/10/20 2201    Narrative:       Examination: XR CHEST 1 VW-     Date of Exam: 3/10/2020 9:34 PM     Indication: Short of breath cough COPD.     Comparison: 07/31/2018     Technique: 1 view of the chest      Findings:  Heart is enlarged. There is mild pulmonary vascular congestion. There  are no focal infiltrates.       Impression:       Heart and megaly with mild pulmonary edema.     Electronically Signed By-Rhys Louise On:3/10/2020 9:59 PM  This report was finalized on [id number] by  Rhys Louise, .        ECG 12 Lead   Final Result   HEART RATE= 78  bpm   RR Interval= 772  ms   NJ Interval= 157  ms   P Horizontal Axis=   deg   P Front Axis= 41  deg   QRSD Interval= 161  ms   QT Interval= 424  ms   QRS Axis= -79  deg   T Wave Axis= 21  deg   - ABNORMAL ECG -   Sinus rhythm   Ventricular trigeminy   When compared with ECG of 11-Dec-2019 5:46:57,   No significant change   Electronically Signed By: Ronak Ruiz (FORTUNATO) 11-Mar-2020 08:20:26   Date and Time of Study: 2020-03-10 21:14:51        CBC    Results from last 7 days   Lab Units 03/11/20  0342 03/10/20  2132   WBC 10*3/mm3 7.60 7.40   HEMOGLOBIN g/dL 14.5 14.5   PLATELETS 10*3/mm3 298 306     BMP   Results from last 7 days   Lab Units 03/11/20  0341 03/10/20  2132   SODIUM mmol/L 141 143   POTASSIUM mmol/L 3.9 3.6   CHLORIDE mmol/L 102 105   CO2 mmol/L 24.0 26.0   BUN mg/dL 14 11   CREATININE mg/dL 0.85 0.78   GLUCOSE mg/dL 307* 255*   MAGNESIUM mg/dL 1.5*  --      CMP   Results from last 7 days   Lab Units 03/11/20  0341 03/10/20  2132   SODIUM mmol/L 141 143   POTASSIUM mmol/L 3.9 3.6   CHLORIDE mmol/L 102 105   CO2 mmol/L 24.0 26.0   BUN mg/dL 14 11   CREATININE mg/dL 0.85 0.78   GLUCOSE mg/dL 307* 255*     Medication Review  Scheduled Meds:    acebutolol 200 mg Oral BID   amoxicillin 500 mg Oral Q8H   apixaban 5 mg Oral Q12H   atorvastatin 20 mg Oral Nightly   fenofibrate 145 mg Oral Daily   furosemide 20 mg Intravenous BID   guaiFENesin 1,200  mg Oral Q12H   hydrALAZINE 50 mg Oral BID   insulin lispro 0-7 Units Subcutaneous 4x Daily With Meals & Nightly   ipratropium-albuterol 3 mL Nebulization Q4H - RT   lisinopril 20 mg Oral Daily   magnesium oxide 400 mg Oral BID   meloxicam 15 mg Oral Daily With Breakfast   nicotine 1 patch Transdermal Q24H   potassium chloride 10 mEq Oral Daily With Breakfast   sodium chloride 10 mL Intravenous Q12H     Continuous Infusions:   PRN Meds:.albuterol  •  dextrose  •  dextrose  •  glucagon (human recombinant)  •  HYDROcodone-acetaminophen  •  insulin lispro **AND** insulin lispro  •  ipratropium-albuterol  •  [COMPLETED] Insert peripheral IV **AND** sodium chloride  •  sodium chloride    Assessment:      Chronic obstructive pulmonary disease (CMS/HCC)    Paroxysmal atrial fibrillation (CMS/[facility])    PVC's (premature ventricular contractions)    Acute respiratory distress    COPD exacerbation (CMS/[facility])    Shortness of breath    Bilateral leg edema    Acute diastolic CHF (congestive heart failure) (CMS/[facility])        Plan:    Patient is admitted with bilateral leg swelling and shortness of breath consistent with possible volume overload and acute diastolic congestive heart failure.  Patient recently had PVC ablative therapy which was partially successful.  I would recommend to continue diurese the patient.  Patient is consuming significant amount of soda which has both fluids as well as sodium.  I advised her to discontinue that.  I will continue diuresis.  I would continue acebutolol.  Echocardiogram would be done to assess the left ventricular function.    I discussed the patients findings and my recommendations with patient and her     Jesse Charles MD  03/11/20  18:24

## 2020-03-11 NOTE — H&P
"      [facility] Medicine Services      Patient Name: Caterina Mason  : 1955  MRN: [id number]  Primary Care Physician: Lou Curran MD  Date of admission: 3/10/2020    Patient Care Team:  Lou Curran MD as PCP - General  Jesse Charles MD as Consulting Physician (Cardiology)  Bautista Archibald MD as Consulting Physician (Endocrinology)  Raymond Piper MD as Consulting Physician (Pulmonary Disease)  Jane Montero MD as Consulting Physician (Obstetrics and Gynecology)          Subjective   History Present Illness     Chief Complaint:   Chief Complaint   Patient presents with   • Shortness of Breath                64 year old obese BMI 36 female with PMH of COPD and 2 ppd smoker, HFpEF , PVC arrhythmia s/p ablation 2019, DM2, RUSLAN CPap  Compliant, HLD, HTN, GERD, DJD presents with complaints of dyspnea past few days. She denies home oxygen use other than Cpap at night. She denies chest pain , dizziness, cough, congestion, nausea. She reports \" I just couldn't breath, I had to do something\". She reports dyspnea at rest. Troponin was negative, BNP 1863, now 2863 after Lasix 40 mg IV, serum glucose 255, lactic 1.5, wbc not elevated. CXR per radiology:    \"Heart is enlarged. There is mild pulmonary vascular congestion. There  are no focal infiltrates.     IMPRESSION:  Heart and megaly with mild pulmonary edema.\"    She was given duo nebs in ED. She is allergic to steroids. She will be admitted for CHF and COPD exacerbation .       Review of Systems   Constitution: Negative.   HENT: Negative.    Eyes: Negative.    Cardiovascular: Negative.    Respiratory: Positive for shortness of breath.    Endocrine: Negative.    Hematologic/Lymphatic: Negative.    Skin: Negative.    Musculoskeletal: Positive for arthritis.   Gastrointestinal: Negative.    Genitourinary: Negative.    Neurological: Negative.    Psychiatric/Behavioral: Negative.    Allergic/Immunologic: Negative.  "           Personal History     Past Medical History:   Past Medical History:   Diagnosis Date   • Arthritis    • Atrial fibrillation (CMS/HCC)    • Bradycardia     Dr. Charles   • Cataract    • COPD (chronic obstructive pulmonary disease) (CMS/HCC)     Dr. Rob   • Diabetes mellitus, type 2 (CMS/HCC)     sees Dr. Archibald at [place]   • DJD (degenerative joint disease)     possible herniated disc, sees Pain Mgmt in [place]   •     • GERD (gastroesophageal reflux disease)    • History of combined right and left heart catheterization 2018    minimal CAD on heart cath done    • Hyperlipidemia    • Hypertension    • Pain    • Sleep apnea     wears CPAP machine, sees Darron       Surgical History:      Past Surgical History:   Procedure Laterality Date   • ABLATION OF DYSRHYTHMIC FOCUS     • CARDIAC CATHETERIZATION      and Angiograph    • CARDIAC ELECTROPHYSIOLOGY PROCEDURE N/A 12/10/2019    Procedure: pvc ablation;  Surgeon: Alfredo Iglesias MD;  Location: [facility] INVASIVE LOCATION;  Service: Cardiovascular   •  SECTION      x 1   • COLON RESECTION     • COLONOSCOPY  2012   • COLOSTOMY      and reversal in her 20's due to problems with childbirth   • COLOSTOMY CLOSURE     • OVARIAN CYST SURGERY     • ROTATOR CUFF REPAIR Left 2009    x2    • TOTAL ABDOMINAL HYSTERECTOMY WITH SALPINGO OOPHORECTOMY             Family History: family history includes Cancer in her sister; Diabetes in her brother; Heart disease in her father and mother; Hyperlipidemia in an other family member; Hypertension in her brother, father, and mother; Lung disease in her father; Seizures in her mother; Stroke in her father and mother. Otherwise pertinent FHx was reviewed and unremarkable.     Social History:  reports that she has been smoking cigarettes. She has a 42.00 pack-year smoking history. She has never used smokeless tobacco. She reports that she does not drink alcohol or use drugs.      Medications:  Prior  to Admission medications    Medication Sig Start Date End Date Taking? Authorizing Provider   ACCU-CHEK CARLIE PLUS test strip USE TO CHECK BLOOD SUGAR TWICE A DAY 2/17/20  Yes Bautista Archibald MD   ACCU-CHEK FASTCLIX LANCETS misc 1 each by Other route 2 (Two) Times a Day. 8/15/19  Yes Bautista Archibald MD   ACCU-CHEK SOFTCLIX LANCETS lancets USE TO CHECK BLOOD SUGAR TWICE A DAY 4/20/19  Yes Robson Boothe MD   ACCU-CHEROBERT SOFTCLIX LANCETS lancets USE TO CHECK BS 3 TIMES DAILY 12/5/19 12/4/20 Yes Bautista Archibald MD   acebutolol (SECTRAL) 200 MG capsule Take 200 mg by mouth 2 (Two) Times a Day.   Yes Robson Boothe MD   albuterol sulfate  (90 Base) MCG/ACT inhaler Inhale 2 puffs Every 4 (Four) Hours As Needed for Wheezing.   Yes Robson Boothe MD   apixaban (ELIQUIS) 5 MG tablet tablet Take 5 mg by mouth 2 (Two) Times a Day.   Yes Robson Boothe MD   atorvastatin (LIPITOR) 40 MG tablet Take 20 mg by mouth Daily.   Yes Robson Boothe MD   Blood Glucose Monitoring Suppl (ACCU-CHEK CARLIE PLUS) w/Device kit USE TO CHECK BS 3 TIMES DAILY 12/5/19  Yes Bautista Archibald MD   cholecalciferol (VITAMIN D3) 10 MCG (400 UNIT) tablet Take 1,000 Units by mouth 2 (Two) Times a Day.   Yes Robson Boothe MD   fenofibrate 160 MG tablet Take 1 tablet by mouth Daily. 3/3/20  Yes Lou Curran MD   glucose blood (ACCU-CHEK CARLIE PLUS) test strip USE TO CHECK BS 3 TIMES DAILY 12/5/19  Yes Bautista Archibald MD   glucose blood (ACCU-CHEK GUIDE) test strip 1 each by Other route 2 (Two) Times a Day. Use as instructed   Yes Robson Boothe MD   glucose blood (ACCU-CHEK GUIDE) test strip Use as instructed 8/15/19  Yes Bautista Archibald MD   hydrALAZINE (APRESOLINE) 50 MG tablet Take 50 mg by mouth 2 (Two) Times a Day.   Yes Robson Boothe MD   lisinopril (PRINIVIL,ZESTRIL) 20 MG tablet Take 20 mg by mouth Daily.   Yes Provider, MD Robson   magnesium oxide (MAG-OX) 400 MG tablet Take 1  tablet by mouth 2 (Two) Times a Day. 8/28/19  Yes Lou Curran MD   meloxicam (MOBIC) 15 MG tablet Take 1 tablet by mouth Daily. 3/3/20  Yes Lou Curran MD   oxyCODONE-acetaminophen (PERCOCET) 5-325 MG per tablet Take 1 tablet by mouth Every 8 (Eight) Hours As Needed.   Yes Robson Boothe MD   potassium chloride (MICRO-K) 10 MEQ CR capsule Take 1 capsule by mouth Daily. 2/6/20  Yes Bautista Archibald MD   SITagliptin-metFORMIN HCl ER (JANUMET XR)  MG tablet Take 2 tablets by mouth Daily. 3/3/20  Yes Lou Curran MD   amoxicillin (AMOXIL) 500 MG capsule Take 1 capsule by mouth 3 (Three) Times a Day. 3/3/20   Lou Curran MD   acebutolol (SECTRAL) 200 MG capsule TAKE 1 CAPSULE BY MOUTH TWICE A DAY 2/5/20 3/10/20  Alfredo Iglesias MD   ALBUTEROL SULFATE  (90 Base) MCG/ACT inhaler INHALE 1-2 PUFFS EVERY 4 HOURS AS NEEDED FOR COUGH OR FOR SHORTNESS OF BREATH 2/17/20 3/10/20  Lou Curran MD   atorvastatin (LIPITOR) 40 MG tablet TAKE 1/2 TABLET BY MOUTH DAILY 9/23/19 3/10/20  Lou Curran MD   [employer] D3 25 MCG (1000 UT) capsule TAKE 1 CAPSULE BY MOUTH TWICE A DAY 1/6/20 3/10/20  Lou Curran MD   ELIQUIS 5 MG tablet tablet TAKE 1 TABLET BY MOUTH TWICE A DAY 3/3/20 3/10/20  Jesse Charles MD   hydrALAZINE (APRESOLINE) 50 MG tablet TAKE ONE TABLET BY MOUTH TWICE A DAY 2/14/20 3/10/20  Jesse Charles MD   HYDROcodone-acetaminophen (LORTAB) 7.5-325 MG per tablet Take 1 tablet by mouth Every 8 (Eight) Hours As Needed for Moderate Pain . 10/27/14 3/10/20  Robson Boothe MD   methocarbamol (ROBAXIN) 750 MG tablet  1/8/20 3/10/20  Robson Boothe MD   oxyCODONE-acetaminophen (PERCOCET) 5-325 MG per tablet TAKE 1 TABLE BY MOUTH THREE TIMES A DAY AS NEEDED. 2/22/20 3/10/20  ProviderRobson MD       Allergies:    Allergies   Allergen Reactions   • Ibuprofen GI Intolerance   • Prednisone Other (See Comments)   • Pregabalin Other (See Comments)     jasmin    • Tramadol Other (See Comments)     Legs jerked   • Levofloxacin Other (See Comments)     Increase sunburn   • Sulfamethoxazole-Trimethoprim Swelling       Objective   Objective     Vital Signs  Temp:  [97.4 °F (36.3 °C)-98.9 °F (37.2 °C)] 97.4 °F (36.3 °C)  Heart Rate:  [67-75] 70  Resp:  [15-23] 23  BP: (151-176)/(57-78) 151/57  SpO2:  [94 %-100 %] 99 %  on  Flow (L/min):  [2] 2;   Device (Oxygen Therapy): NPPV/NIV  Body mass index is 36.21 kg/m².    Physical Exam   Constitutional: She is oriented to person, place, and time. She appears well-developed and well-nourished.   HENT:   Head: Normocephalic and atraumatic.   Eyes: Pupils are equal, round, and reactive to light. EOM are normal.   Neck: Normal range of motion. Neck supple.   Cardiovascular: Normal rate, regular rhythm and normal heart sounds.   Pulmonary/Chest: Effort normal. She has wheezes.   Abdominal: Soft. Bowel sounds are normal.   Genitourinary:   Genitourinary Comments: deferred   Musculoskeletal: Normal range of motion.   Neurological: She is alert and oriented to person, place, and time.   Skin: Skin is warm and dry.   Psychiatric: She has a normal mood and affect. Her behavior is normal. Judgment and thought content normal.   Vitals reviewed.      Results Review:  I have personally reviewed most recent radiology images and interpretations and agree with findings, most notably: CXR.    Results from last 7 days   Lab Units 03/11/20  0342   WBC 10*3/mm3 7.60   HEMOGLOBIN g/dL 14.5   HEMATOCRIT % 43.3   PLATELETS 10*3/mm3 298     Results from last 7 days   Lab Units 03/11/20  0341 03/10/20  2132   SODIUM mmol/L 141 143   POTASSIUM mmol/L 3.9 3.6   CHLORIDE mmol/L 102 105   CO2 mmol/L 24.0 26.0   BUN mg/dL 14 11   CREATININE mg/dL 0.85 0.78   GLUCOSE mg/dL 307* 255*   CALCIUM mg/dL 9.8 10.1   TROPONIN T ng/mL <0.010 <0.010   PROBNP pg/mL 2,836.0* 1,863.0*     Estimated Creatinine Clearance: 72.3 mL/min (by C-G formula based on SCr of 0.85  mg/dL).  Brief Urine Lab Results  (Last result in the past 365 days)      Color   Clarity   Blood   Leuk Est   Nitrite   Protein   CREAT   Urine HCG        01/31/20 0743             87.9             Microbiology Results (last 10 days)     ** No results found for the last 240 hours. **          ECG/EMG Results (most recent)     Procedure Component Value Units Date/Time    ECG 12 Lead [[id number]] Collected:  03/10/20 2114     Updated:  03/10/20 2116    Narrative:       HEART RATE= 78  bpm  RR Interval= 772  ms  VA Interval= 157  ms  P Horizontal Axis=   deg  P Front Axis= 41  deg  QRSD Interval= 161  ms  QT Interval= 424  ms  QRS Axis= -79  deg  T Wave Axis= 21  deg  - ABNORMAL ECG -  Sinus rhythm  Ventricular trigeminy  RBBB and LAFB  Electronically Signed By:   Date and Time of Study: 2020-03-10 21:14:51                    Xr Chest 1 View    Result Date: 3/10/2020  Heart and megaly with mild pulmonary edema.  Electronically Signed By-Rhys Louise On:3/10/2020 9:59 PM This report was finalized on [id number] by  Rhys Louise, .        Estimated Creatinine Clearance: 72.3 mL/min (by C-G formula based on SCr of 0.85 mg/dL).    Assessment/Plan   Assessment/Plan       Active Hospital Problems    Diagnosis  POA   • COPD exacerbation (CMS/[facility]) [J44.1]  Yes   • Acute respiratory distress [R06.03]  Yes      Resolved Hospital Problems   No resolved problems to display.     Dyspnea without chest pain troponin negative likely multifactorial :    1.COPD exacerbation - duo nebs , no steroids as allergic to prednisone no infiltrates per CXR and wbc not elevated may finish out po amoxicillin given outpatient , on home albuterol     2. CHF BNP 1863 cardiomegaly and vascular congestion on CXR, Lasix x1 40 mg IV, continue to monitor     DM2 with hyperglycemia 255 on home ( on Janumet per pt not on home list not ordered in case of contrast - add SSI prn and accuchecks ACHS and LCS diet     Cardiac arrhythmia - afib in past had PVC  ablation in December , trigeminy tonight no chest pain or elevated troponin on apixaban and Sectral- not in pharmacy  to bring as patient specifically taken off metoprolol by cardiology in past     HLD- on statin and fenofibrate     HTN- controlled on BB, hydralazine, lisinopril     RUSLAN- using Cpap    Chronic pain on Norco     Hypo MG - on Mg check Mg     On K - replacement home dose K ok                    VTE Prophylaxis -   Mechanical Order History:     None      Pharmalogical Order History:     None          CODE STATUS:    Code Status and Medical Interventions:   Ordered at: 03/10/20 0695     Code Status:    CPR     Medical Interventions (Level of Support Prior to Arrest):    Full       Admission Status:  I believe this patient meets observation  criteria.      I discussed the patient's findings and my recommendations with patient.        Electronically signed by YAW Arreola, 03/10/20, 11:17 PM.  [facility] Hospitalist Team

## 2020-03-12 ENCOUNTER — APPOINTMENT (OUTPATIENT)
Dept: CARDIOLOGY | Facility: HOSPITAL | Age: 65
End: 2020-03-12

## 2020-03-12 VITALS
BODY MASS INDEX: 36.5 KG/M2 | WEIGHT: 206 LBS | SYSTOLIC BLOOD PRESSURE: 122 MMHG | RESPIRATION RATE: 18 BRPM | HEIGHT: 63 IN | DIASTOLIC BLOOD PRESSURE: 62 MMHG | OXYGEN SATURATION: 98 % | HEART RATE: 54 BPM | TEMPERATURE: 98.5 F

## 2020-03-12 LAB
BH CV ECHO MEAS - ACS: 1.7 CM
BH CV ECHO MEAS - AO MAX PG (FULL): 4.5 MMHG
BH CV ECHO MEAS - AO MAX PG: 6.6 MMHG
BH CV ECHO MEAS - AO MEAN PG (FULL): 2.4 MMHG
BH CV ECHO MEAS - AO MEAN PG: 3.6 MMHG
BH CV ECHO MEAS - AO ROOT AREA (BSA CORRECTED): 1.4
BH CV ECHO MEAS - AO ROOT AREA: 6.3 CM^2
BH CV ECHO MEAS - AO ROOT DIAM: 2.8 CM
BH CV ECHO MEAS - AO V2 MAX: 128.8 CM/SEC
BH CV ECHO MEAS - AO V2 MEAN: 90.7 CM/SEC
BH CV ECHO MEAS - AO V2 VTI: 26.4 CM
BH CV ECHO MEAS - AVA(I,A): 2.6 CM^2
BH CV ECHO MEAS - AVA(I,D): 2.6 CM^2
BH CV ECHO MEAS - AVA(V,A): 2.5 CM^2
BH CV ECHO MEAS - AVA(V,D): 2.5 CM^2
BH CV ECHO MEAS - BSA(HAYCOCK): 2.1 M^2
BH CV ECHO MEAS - BSA: 2 M^2
BH CV ECHO MEAS - BZI_BMI: 35.5 KILOGRAMS/M^2
BH CV ECHO MEAS - BZI_METRIC_HEIGHT: 162.6 CM
BH CV ECHO MEAS - BZI_METRIC_WEIGHT: 93.9 KG
BH CV ECHO MEAS - EDV(CUBED): 92.1 ML
BH CV ECHO MEAS - EDV(MOD-SP4): 107.6 ML
BH CV ECHO MEAS - EDV(TEICH): 93.2 ML
BH CV ECHO MEAS - EF(CUBED): 61.5 %
BH CV ECHO MEAS - EF(MOD-SP4): 39.4 %
BH CV ECHO MEAS - EF(TEICH): 53.1 %
BH CV ECHO MEAS - ESV(CUBED): 35.5 ML
BH CV ECHO MEAS - ESV(MOD-SP4): 65.2 ML
BH CV ECHO MEAS - ESV(TEICH): 43.7 ML
BH CV ECHO MEAS - FS: 27.2 %
BH CV ECHO MEAS - IVS/LVPW: 0.94
BH CV ECHO MEAS - IVSD: 1.2 CM
BH CV ECHO MEAS - LA DIMENSION(2D): 4.1 CM
BH CV ECHO MEAS - LV DIASTOLIC VOL/BSA (35-75): 54.2 ML/M^2
BH CV ECHO MEAS - LV MASS(C)D: 212.8 GRAMS
BH CV ECHO MEAS - LV MASS(C)DI: 107.2 GRAMS/M^2
BH CV ECHO MEAS - LV MAX PG: 2.1 MMHG
BH CV ECHO MEAS - LV MEAN PG: 1.2 MMHG
BH CV ECHO MEAS - LV SYSTOLIC VOL/BSA (12-30): 32.8 ML/M^2
BH CV ECHO MEAS - LV V1 MAX: 72.7 CM/SEC
BH CV ECHO MEAS - LV V1 MEAN: 52 CM/SEC
BH CV ECHO MEAS - LV V1 VTI: 15.6 CM
BH CV ECHO MEAS - LVIDD: 4.5 CM
BH CV ECHO MEAS - LVIDS: 3.3 CM
BH CV ECHO MEAS - LVOT AREA: 4.4 CM^2
BH CV ECHO MEAS - LVOT DIAM: 2.4 CM
BH CV ECHO MEAS - LVPWD: 1.3 CM
BH CV ECHO MEAS - PA MAX PG (FULL): 1.4 MMHG
BH CV ECHO MEAS - PA MAX PG: 3.4 MMHG
BH CV ECHO MEAS - PA V2 MAX: 92.6 CM/SEC
BH CV ECHO MEAS - PVA(V,A): 2.5 CM^2
BH CV ECHO MEAS - PVA(V,D): 2.5 CM^2
BH CV ECHO MEAS - QP/QS: 0.57
BH CV ECHO MEAS - RV MAX PG: 2 MMHG
BH CV ECHO MEAS - RV MEAN PG: 1 MMHG
BH CV ECHO MEAS - RV V1 MAX: 71.1 CM/SEC
BH CV ECHO MEAS - RV V1 MEAN: 47.5 CM/SEC
BH CV ECHO MEAS - RV V1 VTI: 12.1 CM
BH CV ECHO MEAS - RVDD: 3.5 CM
BH CV ECHO MEAS - RVOT AREA: 3.2 CM^2
BH CV ECHO MEAS - RVOT DIAM: 2 CM
BH CV ECHO MEAS - SI(AO): 83.9 ML/M^2
BH CV ECHO MEAS - SI(CUBED): 28.5 ML/M^2
BH CV ECHO MEAS - SI(LVOT): 34.3 ML/M^2
BH CV ECHO MEAS - SI(MOD-SP4): 21.4 ML/M^2
BH CV ECHO MEAS - SI(TEICH): 25 ML/M^2
BH CV ECHO MEAS - SV(AO): 166.5 ML
BH CV ECHO MEAS - SV(CUBED): 56.6 ML
BH CV ECHO MEAS - SV(LVOT): 68.1 ML
BH CV ECHO MEAS - SV(MOD-SP4): 42.4 ML
BH CV ECHO MEAS - SV(RVOT): 39 ML
BH CV ECHO MEAS - SV(TEICH): 49.5 ML
GLUCOSE BLDC GLUCOMTR-MCNC: 140 MG/DL (ref 70–105)
GLUCOSE BLDC GLUCOMTR-MCNC: 201 MG/DL (ref 70–105)
GLUCOSE BLDC GLUCOMTR-MCNC: 237 MG/DL (ref 70–105)
LV EF 2D ECHO EST: 55 %

## 2020-03-12 PROCEDURE — 93005 ELECTROCARDIOGRAM TRACING: CPT | Performed by: INTERNAL MEDICINE

## 2020-03-12 PROCEDURE — 25010000002 FUROSEMIDE PER 20 MG: Performed by: HOSPITALIST

## 2020-03-12 PROCEDURE — 93306 TTE W/DOPPLER COMPLETE: CPT | Performed by: INTERNAL MEDICINE

## 2020-03-12 PROCEDURE — 94799 UNLISTED PULMONARY SVC/PX: CPT

## 2020-03-12 PROCEDURE — 63710000001 INSULIN LISPRO (HUMAN) PER 5 UNITS: Performed by: NURSE PRACTITIONER

## 2020-03-12 PROCEDURE — 93306 TTE W/DOPPLER COMPLETE: CPT

## 2020-03-12 PROCEDURE — 99232 SBSQ HOSP IP/OBS MODERATE 35: CPT | Performed by: INTERNAL MEDICINE

## 2020-03-12 PROCEDURE — 82962 GLUCOSE BLOOD TEST: CPT

## 2020-03-12 PROCEDURE — 99239 HOSP IP/OBS DSCHRG MGMT >30: CPT | Performed by: INTERNAL MEDICINE

## 2020-03-12 RX ORDER — FUROSEMIDE 40 MG/1
40 TABLET ORAL EVERY MORNING
Qty: 30 TABLET | Refills: 0 | Status: SHIPPED | OUTPATIENT
Start: 2020-03-12 | End: 2020-04-11

## 2020-03-12 RX ORDER — METOPROLOL TARTRATE 50 MG/1
50 TABLET, FILM COATED ORAL ONCE
Status: COMPLETED | OUTPATIENT
Start: 2020-03-12 | End: 2020-03-12

## 2020-03-12 RX ORDER — METOPROLOL TARTRATE 50 MG/1
100 TABLET, FILM COATED ORAL EVERY 12 HOURS SCHEDULED
Status: DISCONTINUED | OUTPATIENT
Start: 2020-03-12 | End: 2020-03-12

## 2020-03-12 RX ORDER — METOPROLOL TARTRATE 50 MG/1
50 TABLET, FILM COATED ORAL EVERY 12 HOURS SCHEDULED
Status: DISCONTINUED | OUTPATIENT
Start: 2020-03-12 | End: 2020-03-12 | Stop reason: HOSPADM

## 2020-03-12 RX ORDER — POTASSIUM CHLORIDE 20 MEQ/1
20 TABLET, EXTENDED RELEASE ORAL DAILY
Qty: 30 TABLET | Refills: 0 | Status: SHIPPED | OUTPATIENT
Start: 2020-03-12 | End: 2020-05-01

## 2020-03-12 RX ADMIN — Medication 10 ML: at 07:40

## 2020-03-12 RX ADMIN — INSULIN LISPRO 3 UNITS: 100 INJECTION, SOLUTION INTRAVENOUS; SUBCUTANEOUS at 07:47

## 2020-03-12 RX ADMIN — FUROSEMIDE 20 MG: 10 INJECTION INTRAMUSCULAR; INTRAVENOUS at 07:41

## 2020-03-12 RX ADMIN — METOPROLOL TARTRATE 25 MG: 25 TABLET, FILM COATED ORAL at 00:47

## 2020-03-12 RX ADMIN — GUAIFENESIN 1200 MG: 600 TABLET, EXTENDED RELEASE ORAL at 00:47

## 2020-03-12 RX ADMIN — METOPROLOL TARTRATE 50 MG: 50 TABLET, FILM COATED ORAL at 11:02

## 2020-03-12 RX ADMIN — MAGNESIUM OXIDE TAB 400 MG (241.3 MG ELEMENTAL MG) 400 MG: 400 (241.3 MG) TAB at 07:42

## 2020-03-12 RX ADMIN — IPRATROPIUM BROMIDE AND ALBUTEROL SULFATE 3 ML: .5; 3 SOLUTION RESPIRATORY (INHALATION) at 03:00

## 2020-03-12 RX ADMIN — LISINOPRIL 20 MG: 20 TABLET ORAL at 07:40

## 2020-03-12 RX ADMIN — POTASSIUM CHLORIDE 10 MEQ: 1500 TABLET, EXTENDED RELEASE ORAL at 07:43

## 2020-03-12 RX ADMIN — IPRATROPIUM BROMIDE AND ALBUTEROL SULFATE 3 ML: .5; 3 SOLUTION RESPIRATORY (INHALATION) at 15:48

## 2020-03-12 RX ADMIN — FUROSEMIDE 20 MG: 10 INJECTION INTRAMUSCULAR; INTRAVENOUS at 17:08

## 2020-03-12 RX ADMIN — MELOXICAM 15 MG: 15 TABLET ORAL at 07:41

## 2020-03-12 RX ADMIN — IPRATROPIUM BROMIDE AND ALBUTEROL SULFATE 3 ML: .5; 3 SOLUTION RESPIRATORY (INHALATION) at 11:18

## 2020-03-12 RX ADMIN — IPRATROPIUM BROMIDE AND ALBUTEROL SULFATE 3 ML: .5; 3 SOLUTION RESPIRATORY (INHALATION) at 07:32

## 2020-03-12 RX ADMIN — HYDROCODONE BITARTRATE AND ACETAMINOPHEN 1 TABLET: 5; 325 TABLET ORAL at 15:41

## 2020-03-12 RX ADMIN — INSULIN LISPRO 3 UNITS: 100 INJECTION, SOLUTION INTRAVENOUS; SUBCUTANEOUS at 11:13

## 2020-03-12 RX ADMIN — GUAIFENESIN 1200 MG: 600 TABLET, EXTENDED RELEASE ORAL at 14:08

## 2020-03-12 RX ADMIN — FENOFIBRATE 145 MG: 145 TABLET ORAL at 07:43

## 2020-03-12 RX ADMIN — HYDRALAZINE HYDROCHLORIDE 50 MG: 25 TABLET, FILM COATED ORAL at 07:42

## 2020-03-12 RX ADMIN — METOPROLOL TARTRATE 100 MG: 50 TABLET, FILM COATED ORAL at 02:25

## 2020-03-12 RX ADMIN — APIXABAN 5 MG: 5 TABLET, FILM COATED ORAL at 07:42

## 2020-03-12 NOTE — PROGRESS NOTES
Continued Stay Note  MARIEL Roach     Patient Name: Caterina Mason  MRN: [id number]  Today's Date: 3/12/2020    Admit Date: 3/10/2020    Discharge Plan     Row Name 03/12/20 1057       Plan    Plan  D/C Plan: Home with family. Watch for continuous home O2 needs-will need 6 minute walk 24-48 hours prior to discharge. Current with [facility] for night O2.     Patient/Family in Agreement with Plan  yes    Plan Comments  Barrier to D/C: ECHO pending, 2L O2, medication adjustments for elevated HR.         Expected Discharge Date and Time     Expected Discharge Date Expected Discharge Time    Mar 13, 2020             Charo Coello

## 2020-03-12 NOTE — PAYOR COMM NOTE
"AUTHORIZATION PENDING:   PLEASE CALL OR FAX DETERMINATION TO CONTACT BELOW. THANK YOU.        Deb Cherry RN MSN  /UR  [facility]  [phone] office  [phone] fax  [email or web address]    [facility]  NPI: [id number]  Tax: 254-      Martin Mason (64 y.o. Female) 1955  Member ID # [id number]        Clinical update as requested.        Date of Birth Social Security Number Address Home Phone MRN    1955  [id number]  [place] [address] [phone] [id number]    Buddhism Marital Status          None        Admission Date Admission Type Admitting Provider Attending Provider Department, Room/Bed    3/10/20 Emergency Evans Sorensen MD Park, Hyung Dong, MD [facility] 3C MEDICAL INPATIENT, 367/1    Discharge Date Discharge Disposition Discharge Destination                       Attending Provider:  Evans Sorensen MD    Allergies:  Ibuprofen, Prednisone, Pregabalin, Tramadol, Levofloxacin, Sulfamethoxazole-trimethoprim    Isolation:  None   Infection:  None   Code Status:  CPR    Ht:  160 cm (63\")   Wt:  93.4 kg (206 lb)    Admission Cmt:  None   Principal Problem:  None                Active Insurance as of 3/10/2020     Primary Coverage     Payor Plan Insurance Group Employer/Plan Group    [employer] - [employer] [id number]     Payor Plan Address Payor Plan Phone Number Payor Plan Fax Number Effective Dates    MAIL STOP:   4/1/2017 - None Entered    [address] [address]       [facility] [address]       Subscriber Name Subscriber Birth Date Member ID       MARTIN MASON 1955 [id number]                 Emergency Contacts      (Rel.) Home Phone Work Phone Mobile Phone    LEIDY PEREZ (Daughter) [phone] -- [phone]    BARBARA MASON (Spouse) [phone] -- --               Physician Progress Notes (last 24 hours) (Notes from 03/11/20 0914 through " "03/12/20 0914)      Leann Roper MD at 03/11/20 1133                [facility] Medicine Services Daily Progress Note      Hospitalist Team  LOS 1 days      Patient Care Team:  Lou Curran MD as PCP - General  Jesse Charles MD as Consulting Physician (Cardiology)  Bautista Archibald MD as Consulting Physician (Endocrinology)  Raymond Piper MD as Consulting Physician (Pulmonary Disease)  Jane Montero MD as Consulting Physician (Obstetrics and Gynecology)    Patient Location: 367/1      Subjective   Subjective   Patient feels better with short of breath, denies for any chest pain, no nausea or vomiting.       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 She denies home oxygen use other than Cpap at night. She denies chest pain , dizziness, cough, congestion, nausea. She reports \" I just couldn't breath, I had to do something\". She reports dyspnea at rest. Troponin was negative, BNP 1863, now 2863 after Lasix 40 mg IV, serum glucose 255, lactic 1.5, wbc not elevated. CXR per radiology:     \"Heart is enlarged. There is mild pulmonary vascular congestion. There  are no focal infiltrates.     IMPRESSION:  Heart and megaly with mild pulmonary edema.\"     She was given duo nebs in ED. She is allergic to steroids. She will be admitted for CHF and COPD exacerbation .              Date::          ROS      Objective   Objective      Vital Signs  Temp:  [97.4 °F (36.3 °C)-98.9 °F (37.2 °C)] 97.4 °F (36.3 °C)  Heart Rate:  [67-76] 76  Resp:  [15-23] 20  BP: (149-176)/(57-79) 149/79  Oxygen Therapy  SpO2: 97 %  Pulse Oximetry Type: Intermittent  Device (Oxygen Therapy): nasal " "cannula  Flow (L/min): 2  Oxygen Concentration (%): 40  Flowsheet Rows      First Filed Value   Admission Height  160 cm (63\") Documented at 03/10/2020 2102   Admission Weight  95.3 kg (210 lb 1.6 oz) Documented at 03/10/2020 2102        Intake & Output (last 3 days)       03/08 0701 - 03/09 0700 03/09 0701 - 03/10 0700 03/10 0701 - 03/11 0700 03/11 0701 - 03/12 0700    P.O.    240    Total Intake(mL/kg)    240 (2.6)    Net    +240                Lines, Drains & Airways    Active LDAs     Name:   Placement date:   Placement time:   Site:   Days:    Peripheral IV 03/10/20 2123 Right Arm   03/10/20    2123    Arm   less than 1                  Physical Exam:    Physical Exam   Constitutional: She is oriented to person, place, and time. She appears well-developed and well-nourished. No distress.   HENT:   Head: Normocephalic and atraumatic.   Right Ear: External ear normal.   Left Ear: External ear normal.   Nose: Nose normal.   Mouth/Throat: Oropharynx is clear and moist. No oropharyngeal exudate.   Eyes: Pupils are equal, round, and reactive to light. Conjunctivae and EOM are normal. Right eye exhibits no discharge. Left eye exhibits no discharge. No scleral icterus.   Neck: Normal range of motion. No JVD present. No tracheal deviation present. No thyromegaly present.   Cardiovascular: Normal rate, regular rhythm, normal heart sounds and intact distal pulses. Exam reveals no gallop and no friction rub.   No murmur heard.  Pulmonary/Chest: Effort normal. No stridor. No respiratory distress. She has no wheezes. She has rales. She exhibits no tenderness.   Abdominal: Soft. Bowel sounds are normal. She exhibits no distension and no mass. There is no tenderness. There is no rebound and no guarding. No hernia.   Musculoskeletal: Normal range of motion. She exhibits no edema, tenderness or deformity.   Lymphadenopathy:     She has no cervical adenopathy.   Neurological: She is alert and oriented to person, place, and time. " No cranial nerve deficit or sensory deficit. She exhibits normal muscle tone. Coordination normal.   Skin: Skin is warm and dry. No rash noted. She is not diaphoretic. No erythema.   Psychiatric: She has a normal mood and affect. Her behavior is normal.   Nursing note and vitals reviewed.            Procedures:              Results Review:     I reviewed the patient's new clinical results.      Lab Results (last 24 hours)     Procedure Component Value Units Date/Time    Troponin [[id number]]  (Normal) Collected:  03/11/20 0919    Specimen:  Blood Updated:  03/11/20 1123     Troponin T <0.010 ng/mL     Narrative:       Troponin T Reference Range:  <= 0.03 ng/mL-   Negative for AMI  >0.03 ng/mL-     Abnormal for myocardial necrosis.  Clinicians would have to utilize clinical acumen, EKG, Troponin and serial changes to determine if it is an Acute Myocardial Infarction or myocardial injury due to an underlying chronic condition.       Results may be falsely decreased if patient taking Biotin.      POC Glucose Once [[id number]]  (Abnormal) Collected:  03/11/20 0718    Specimen:  Blood Updated:  03/11/20 0723     Glucose 254 mg/dL      Comment: Serial Number: [id number]:  [id number]       Basic Metabolic Panel [[id number]]  (Abnormal) Collected:  03/11/20 0341    Specimen:  Blood Updated:  03/11/20 0443     Glucose 307 mg/dL      BUN 14 mg/dL      Creatinine 0.85 mg/dL      Sodium 141 mmol/L      Potassium 3.9 mmol/L      Chloride 102 mmol/L      CO2 24.0 mmol/L      Calcium 9.8 mg/dL      eGFR Non African Amer 67 mL/min/1.73      BUN/Creatinine Ratio 16.5     Anion Gap 15.0 mmol/L     Narrative:       GFR Normal >60  Chronic Kidney Disease <60  Kidney Failure <15      Magnesium [[id number]]  (Abnormal) Collected:  03/11/20 0341    Specimen:  Blood Updated:  03/11/20 0443     Magnesium 1.5 mg/dL     Troponin [[id number]]  (Normal) Collected:  03/11/20 0341    Specimen:  Blood Updated:  03/11/20 0443     Troponin T <0.010  ng/mL     Narrative:       Troponin T Reference Range:  <= 0.03 ng/mL-   Negative for AMI  >0.03 ng/mL-     Abnormal for myocardial necrosis.  Clinicians would have to utilize clinical acumen, EKG, Troponin and serial changes to determine if it is an Acute Myocardial Infarction or myocardial injury due to an underlying chronic condition.       Results may be falsely decreased if patient taking Biotin.      BNP [[id number]]  (Abnormal) Collected:  03/11/20 0341    Specimen:  Blood Updated:  03/11/20 0438     proBNP 2,836.0 pg/mL     Narrative:       Among patients with dyspnea, NT-proBNP is highly sensitive for the detection of acute congestive heart failure. In addition NT-proBNP of <300 pg/ml effectively rules out acute congestive heart failure with 99% negative predictive value.    Results may be falsely decreased if patient taking Biotin.      CBC (No Diff) [[id number]]  (Normal) Collected:  03/11/20 0342    Specimen:  Blood Updated:  03/11/20 0422     WBC 7.60 10*3/mm3      RBC 4.86 10*6/mm3      Hemoglobin 14.5 g/dL      Hematocrit 43.3 %      MCV 89.1 fL      MCH 29.8 pg      MCHC 33.4 g/dL      RDW 13.8 %      RDW-SD 42.9 fl      MPV 8.7 fL      Platelets 298 10*3/mm3     Hemoglobin A1c [[id number]] Collected:  03/11/20 0342    Specimen:  Blood Updated:  03/11/20 0407    POC Glucose Once [[id number]]  (Abnormal) Collected:  03/11/20 0015    Specimen:  Blood Updated:  03/11/20 0016     Glucose 197 mg/dL      Comment: Serial Number: [id number]:  [id number]       Extra Tubes [[id number]] Collected:  03/10/20 2130    Specimen:  Blood, Venous Line Updated:  03/10/20 2230    Narrative:       The following orders were created for panel order Extra Tubes.  Procedure                               Abnormality         Status                     ---------                               -----------         ------                     Light Blue Top[[id number]]                                                                 Gold Top - SST[[id number]]                                   Final result                 Please view results for these tests on the individual orders.    Gold Top - SST [[id number]] Collected:  03/10/20 2130    Specimen:  Blood Updated:  03/10/20 2230     Extra Tube Hold for add-ons.     Comment: Auto resulted.       Basic Metabolic Panel [[id number]]  (Abnormal) Collected:  03/10/20 2132    Specimen:  Blood from Arm, Right Updated:  03/10/20 2203     Glucose 255 mg/dL      BUN 11 mg/dL      Creatinine 0.78 mg/dL      Sodium 143 mmol/L      Potassium 3.6 mmol/L      Chloride 105 mmol/L      CO2 26.0 mmol/L      Calcium 10.1 mg/dL      eGFR Non African Amer 74 mL/min/1.73      BUN/Creatinine Ratio 14.1     Anion Gap 12.0 mmol/L     Narrative:       GFR Normal >60  Chronic Kidney Disease <60  Kidney Failure <15      Troponin [[id number]]  (Normal) Collected:  03/10/20 2132    Specimen:  Blood from Arm, Right Updated:  03/10/20 2203     Troponin T <0.010 ng/mL     Narrative:       Troponin T Reference Range:  <= 0.03 ng/mL-   Negative for AMI  >0.03 ng/mL-     Abnormal for myocardial necrosis.  Clinicians would have to utilize clinical acumen, EKG, Troponin and serial changes to determine if it is an Acute Myocardial Infarction or myocardial injury due to an underlying chronic condition.       Results may be falsely decreased if patient taking Biotin.      BNP [[id number]]  (Abnormal) Collected:  03/10/20 2132    Specimen:  Blood from Arm, Right Updated:  03/10/20 2201     proBNP 1,863.0 pg/mL     Narrative:       Among patients with dyspnea, NT-proBNP is highly sensitive for the detection of acute congestive heart failure. In addition NT-proBNP of <300 pg/ml effectively rules out acute congestive heart failure with 99% negative predictive value.    Results may be falsely decreased if patient taking Biotin.      CBC & Differential [[id number]] Collected:  03/10/20 2132    Specimen:  Blood from Arm, Right Updated:   03/10/20 2155    Narrative:       The following orders were created for panel order CBC & Differential.  Procedure                               Abnormality         Status                     ---------                               -----------         ------                     CBC Auto Differential[[id number]]        Normal              Final result                 Please view results for these tests on the individual orders.    CBC Auto Differential [[id number]]  (Normal) Collected:  03/10/20 2132    Specimen:  Blood from Arm, Right Updated:  03/10/20 2155     WBC 7.40 10*3/mm3      RBC 4.73 10*6/mm3      Hemoglobin 14.5 g/dL      Hematocrit 41.9 %      MCV 88.5 fL      MCH 30.7 pg      MCHC 34.7 g/dL      RDW 14.0 %      RDW-SD 43.8 fl      MPV 8.8 fL      Platelets 306 10*3/mm3      Neutrophil % 51.2 %      Lymphocyte % 34.8 %      Monocyte % 11.8 %      Eosinophil % 1.6 %      Basophil % 0.6 %      Neutrophils, Absolute 3.80 10*3/mm3      Lymphocytes, Absolute 2.60 10*3/mm3      Monocytes, Absolute 0.90 10*3/mm3      Eosinophils, Absolute 0.10 10*3/mm3      Basophils, Absolute 0.00 10*3/mm3      nRBC 0.1 /100 WBC         No results found for: HGBA1C            No results found for: LIPASE  Lab Results   Component Value Date    CHOL 107 01/31/2020    TRIG 188 (H) 01/31/2020    HDL 34 (L) 01/31/2020    LDL 35 01/31/2020       No results found for: INTRAOP, PREDX, FINALDX, COMDX    Microbiology Results (last 10 days)     ** No results found for the last 240 hours. **          ECG/EMG Results (most recent)     Procedure Component Value Units Date/Time    ECG 12 Lead [[id number]] Collected:  03/10/20 2114     Updated:  03/11/20 0820    Narrative:       HEART RATE= 78  bpm  RR Interval= 772  ms  MI Interval= 157  ms  P Horizontal Axis=   deg  P Front Axis= 41  deg  QRSD Interval= 161  ms  QT Interval= 424  ms  QRS Axis= -79  deg  T Wave Axis= 21  deg  - ABNORMAL ECG -  Sinus rhythm  Ventricular trigeminy  When compared  with ECG of 11-Dec-2019 5:46:57,  No significant change  Electronically Signed By: Ronak Ruiz (FORTUNATO) 11-Mar-2020 08:20:26  Date and Time of Study: 2020-03-10 21:14:51                    Xr Chest 1 View    Result Date: 3/10/2020  Heart and megaly with mild pulmonary edema.  Electronically Signed By-Rhys Louise On:3/10/2020 9:59 PM This report was finalized on [id number] by  Rhys Louise, .          Xrays, labs reviewed personally by physician.    Medication Review:   I have reviewed the patient's current medication list      Scheduled Meds    acebutolol 200 mg Oral BID   amoxicillin 500 mg Oral Q8H   apixaban 5 mg Oral Q12H   atorvastatin 20 mg Oral Nightly   fenofibrate 145 mg Oral Daily   guaiFENesin 1,200 mg Oral Q12H   hydrALAZINE 50 mg Oral BID   insulin lispro 0-7 Units Subcutaneous 4x Daily With Meals & Nightly   ipratropium-albuterol 3 mL Nebulization Q4H - RT   lisinopril 20 mg Oral Daily   magnesium oxide 400 mg Oral BID   meloxicam 15 mg Oral Daily With Breakfast   nicotine 1 patch Transdermal Q24H   potassium chloride 10 mEq Oral Daily With Breakfast   sodium chloride 10 mL Intravenous Q12H       Meds Infusions       Meds PRN  albuterol  •  dextrose  •  dextrose  •  glucagon (human recombinant)  •  HYDROcodone-acetaminophen  •  insulin lispro **AND** insulin lispro  •  ipratropium-albuterol  •  [COMPLETED] Insert peripheral IV **AND** sodium chloride  •  sodium chloride        Assessment/Plan   Assessment/Plan     Active Hospital Problems    Diagnosis  POA   • COPD exacerbation (CMS/[facility]) [J44.1]  Yes   • Acute respiratory distress [R06.03]  Yes      Resolved Hospital Problems   No resolved problems to display.       MEDICAL DECISION MAKING COMPLEXITY BY PROBLEM:     Dyspnea without chest pain troponin negative likely multifactorial :     1.Possible mild acute flare up of COPD improved, continue duonebs.      2. Acute on chronic diastolic heart failure improving ..... Iv lasix, cardiology consult,  recent 2 d echo revealed normal left ventricle function.      DM2 with hyperglycemia 255 on home ( on Janumet per pt not on home list not ordered in case of contrast - add SSI prn and accuchecks ACHS and LCS diet      Cardiac arrhythmia - afib in past had PVC ablation in December , trigeminy tonight no chest pain or elevated troponin on apixaban and Sectral- not in pharmacy  to bring as patient specifically taken off metoprolol by cardiology in past      HLD- on statin and fenofibrate      HTN- controlled on BB, hydralazine, lisinopril      RUSLAN- using Cpap     Chronic pain on Norco      Hypo MG - on Mg check Mg      On K - replacement home dose K ok    VTE Prophylaxis -   Mechanical Order History:     None      Pharmalogical Order History:     Ordered     Dose Route Frequency Stop    03/10/20 2328  apixaban (ELIQUIS) tablet 5 mg      5 mg PO Every 12 Hours Scheduled --            Code Status -   Code Status and Medical Interventions:   Ordered at: 03/10/20 2317     Code Status:    CPR     Medical Interventions (Level of Support Prior to Arrest):    Full       Discharge Planning          Destination      Coordination has not been started for this encounter.      Durable Medical Equipment      Coordination has not been started for this encounter.      Dialysis/Infusion      Coordination has not been started for this encounter.      Home Medical Care      Coordination has not been started for this encounter.      Therapy      Coordination has not been started for this encounter.      Community Resources      Coordination has not been started for this encounter.            Electronically signed by Leann Roper MD, 03/11/20, 11:34.  [facility] Hospitalist Team        Electronically signed by Leann Roper MD at 03/11/20 1143          Consult Notes (last 24 hours) (Notes from 03/11/20 0914 through 03/12/20 0914)      Jesse Charles MD at 03/11/20 1816      Consult Orders    1. Inpatient Cardiology  Consult [[id number]] ordered by Leann Roper MD at 03/11/20 1140                Referring Provider: Lou nielsen    Attending: Evans Sorensen MD    Reason for Consultation:    Shortness of breath  Bilateral leg edema  History of paroxysmal atrial fibrillation  Hypertension  Frequent PVCs    Chief complaint:    Shortness of breath  Leg edema       History of present illness:     Patient is 64-year-old white female whose past medical history significant for hypertension, hyperlipidemia, paroxysmal atrial fibrillation, COPD, tobacco abuse, PVCs, who is admitted with symptom of shortness of breath and leg edema.    Patient reports that from last 1 week she was getting short of breath.  It was associated with bilateral leg edema.  Patient was also wheezing.  Patient had cough with expectoration which was clear.  No fever and chills was reported.  Patient could not finally able to breathe and decided to come to the hospital.  She denies any chest pain.  No orthopnea, PND, syncope or presyncope noted.    In 2017, patient was diagnosed with frequent PVCs.  Stress test initially was negative for ischemia or myocardial infarction.  Holter monitor showed frequent PVCs.  Patient was treated symptomatically.  In 2018, patient underwent cardiac catheterization which showed no significant coronary artery disease.  In 2019 patient had repeat Holter monitor which showed 26% of PVC burden.  Patient was referred to EP and patient underwent EP study and ablation of PVCs.  However this ablation did not completely resolve PVCs as these PVCs was noted to come from epicardial surfaces.  Patient was started on acebutolol.    Patient is ingesting significant amount of soda.  She drinks 2 L of big red or Coke      Review of Systems  Review of Systems   Constitution: Negative for chills and fever.   HENT: Negative for ear discharge and nosebleeds.    Eyes: Negative for discharge and redness.   Cardiovascular: Positive for leg  swelling. Negative for chest pain, orthopnea, palpitations, paroxysmal nocturnal dyspnea and syncope.   Respiratory: Positive for shortness of breath. Negative for cough and wheezing.    Endocrine: Negative for heat intolerance.   Skin: Negative for rash.   Musculoskeletal: Positive for arthritis and joint pain. Negative for myalgias.   Gastrointestinal: Negative for abdominal pain, melena, nausea and vomiting.   Genitourinary: Negative for dysuria and hematuria.   Neurological: Negative for dizziness, light-headedness, numbness and tremors.   Psychiatric/Behavioral: Negative for depression. The patient is not nervous/anxious.        Past Medical History  Past Medical History:   Diagnosis Date   • Arthritis    • Atrial fibrillation (CMS/[facility])    • Bradycardia     Dr. Charles   • Cataract    • COPD (chronic obstructive pulmonary disease) (CMS/[facility])     Dr. Rob   • Diabetes mellitus, type 2 (CMS/[facility])     sees Dr. Archibald at [place]   • DJD (degenerative joint disease)     possible herniated disc, sees Pain Mgmt in [place]   •     • GERD (gastroesophageal reflux disease)    • History of combined right and left heart catheterization 2018    minimal CAD on heart cath done    • Hyperlipidemia    • Hypertension    • Pain    • Sleep apnea     wears CPAP machine, sees Darron    and   Past Surgical History:   Procedure Laterality Date   • ABLATION OF DYSRHYTHMIC FOCUS     • CARDIAC CATHETERIZATION      and Angiograph    • CARDIAC ELECTROPHYSIOLOGY PROCEDURE N/A 12/10/2019    Procedure: pvc ablation;  Surgeon: Alfredo Iglesias MD;  Location: [facility] INVASIVE LOCATION;  Service: Cardiovascular   •  SECTION      x 1   • COLON RESECTION     • COLONOSCOPY  2012   • COLOSTOMY      and reversal in her 20's due to problems with childbirth   • COLOSTOMY CLOSURE     • OVARIAN CYST SURGERY     • ROTATOR CUFF REPAIR Left 2009    x2    • TOTAL ABDOMINAL HYSTERECTOMY WITH SALPINGO OOPHORECTOMY    "      Family History  Family History   Problem Relation Age of Onset   • Heart disease Mother    • Hypertension Mother    • Stroke Mother    • Seizures Mother    • Heart disease Father    • Hypertension Father    • Lung disease Father    • Stroke Father    • Cancer Sister    • Hypertension Brother    • Diabetes Brother    • Hyperlipidemia Other        Social History  Social History     Socioeconomic History   • Marital status:      Spouse name: Not on file   • Number of children: Not on file   • Years of education: Not on file   • Highest education level: Not on file   Tobacco Use   • Smoking status: Current Every Day Smoker     Packs/day: 1.00     Years: 42.00     Pack years: 42.00     Types: Cigarettes   • Smokeless tobacco: Never Used   • Tobacco comment: Pt states she is trying to quit   Substance and Sexual Activity   • Alcohol use: No     Frequency: Never   • Drug use: No   • Sexual activity: Defer       Objective     Physical Exam:    Vital Signs  Vitals:    03/11/20 1207 03/11/20 1214 03/11/20 1607 03/11/20 1610   BP:       BP Location:       Patient Position:       Pulse: 86 84 87 85   Resp: 18 18 18 18   Temp:       TempSrc:       SpO2: (!) 89% 96% 97%    Weight:       Height:           Weight  Flowsheet Rows      First Filed Value   Admission Height  160 cm (63\") Documented at 03/10/2020 2102   Admission Weight  95.3 kg (210 lb 1.6 oz) Documented at 03/10/2020 2102           Physical Exam   Constitutional: She is oriented to person, place, and time. She appears well-developed and well-nourished.   HENT:   Head: Normocephalic and atraumatic.   Eyes: Right eye exhibits no discharge. No scleral icterus.   Neck: No thyromegaly present.   Cardiovascular: Normal rate, regular rhythm and normal heart sounds. Exam reveals no gallop and no friction rub.   No murmur heard.  Pulmonary/Chest: Effort normal. No respiratory distress. She has wheezes. She has no rales.   Abdominal: There is no tenderness. "   Musculoskeletal: She exhibits edema.   Lymphadenopathy:     She has no cervical adenopathy.   Neurological: She is alert and oriented to person, place, and time.   Skin: No rash noted. No erythema.   Psychiatric: She has a normal mood and affect.       Results Review:  Lab Results (last 24 hours)     Procedure Component Value Units Date/Time    POC Glucose Once [[id number]]  (Abnormal) Collected:  03/11/20 1618    Specimen:  Blood Updated:  03/11/20 1619     Glucose 272 mg/dL      Comment: Serial Number: [id number]:  [id number]       Hemoglobin A1c [[id number]]  (Abnormal) Collected:  03/11/20 0342    Specimen:  Blood Updated:  03/11/20 1203     Hemoglobin A1C 8.2 %     Narrative:       Hemoglobin A1C Reference Range:    <5.7 %        Normal  5.7-6.4 %     Increased risk for diabetes  > 6.4 %        Diabetes       These guidelines have been recommended by the American Diabetic Association for Hgb A1c.      The following 2010 guidelines have been recommended by the American Diabetes Association for Hemoglobin A1c.    HBA1c 5.7-6.4% Increased risk for future diabetes (pre-diabetes)  HBA1c     >6.4% Diabetes      POC Glucose Once [[id number]]  (Abnormal) Collected:  03/11/20 1130    Specimen:  Blood Updated:  03/11/20 1134     Glucose 273 mg/dL      Comment: Serial Number: [id number]:  [id number]       Troponin [[id number]]  (Normal) Collected:  03/11/20 0919    Specimen:  Blood Updated:  03/11/20 1123     Troponin T <0.010 ng/mL     Narrative:       Troponin T Reference Range:  <= 0.03 ng/mL-   Negative for AMI  >0.03 ng/mL-     Abnormal for myocardial necrosis.  Clinicians would have to utilize clinical acumen, EKG, Troponin and serial changes to determine if it is an Acute Myocardial Infarction or myocardial injury due to an underlying chronic condition.       Results may be falsely decreased if patient taking Biotin.      POC Glucose Once [[id number]]  (Abnormal) Collected:  03/11/20 0718    Specimen:   Blood Updated:  03/11/20 0723     Glucose 254 mg/dL      Comment: Serial Number: [id number]:  [id number]       Basic Metabolic Panel [[id number]]  (Abnormal) Collected:  03/11/20 0341    Specimen:  Blood Updated:  03/11/20 0443     Glucose 307 mg/dL      BUN 14 mg/dL      Creatinine 0.85 mg/dL      Sodium 141 mmol/L      Potassium 3.9 mmol/L      Chloride 102 mmol/L      CO2 24.0 mmol/L      Calcium 9.8 mg/dL      eGFR Non African Amer 67 mL/min/1.73      BUN/Creatinine Ratio 16.5     Anion Gap 15.0 mmol/L     Narrative:       GFR Normal >60  Chronic Kidney Disease <60  Kidney Failure <15      Magnesium [[id number]]  (Abnormal) Collected:  03/11/20 0341    Specimen:  Blood Updated:  03/11/20 0443     Magnesium 1.5 mg/dL     Troponin [[id number]]  (Normal) Collected:  03/11/20 0341    Specimen:  Blood Updated:  03/11/20 0443     Troponin T <0.010 ng/mL     Narrative:       Troponin T Reference Range:  <= 0.03 ng/mL-   Negative for AMI  >0.03 ng/mL-     Abnormal for myocardial necrosis.  Clinicians would have to utilize clinical acumen, EKG, Troponin and serial changes to determine if it is an Acute Myocardial Infarction or myocardial injury due to an underlying chronic condition.       Results may be falsely decreased if patient taking Biotin.      BNP [[id number]]  (Abnormal) Collected:  03/11/20 0341    Specimen:  Blood Updated:  03/11/20 0438     proBNP 2,836.0 pg/mL     Narrative:       Among patients with dyspnea, NT-proBNP is highly sensitive for the detection of acute congestive heart failure. In addition NT-proBNP of <300 pg/ml effectively rules out acute congestive heart failure with 99% negative predictive value.    Results may be falsely decreased if patient taking Biotin.      CBC (No Diff) [[id number]]  (Normal) Collected:  03/11/20 0342    Specimen:  Blood Updated:  03/11/20 0422     WBC 7.60 10*3/mm3      RBC 4.86 10*6/mm3      Hemoglobin 14.5 g/dL      Hematocrit 43.3 %      MCV 89.1 fL      MCH 29.8  pg      MCHC 33.4 g/dL      RDW 13.8 %      RDW-SD 42.9 fl      MPV 8.7 fL      Platelets 298 10*3/mm3     POC Glucose Once [[id number]]  (Abnormal) Collected:  03/11/20 0015    Specimen:  Blood Updated:  03/11/20 0016     Glucose 197 mg/dL      Comment: Serial Number: [id number]:  [id number]       Extra Tubes [[id number]] Collected:  03/10/20 2130    Specimen:  Blood, Venous Line Updated:  03/10/20 2230    Narrative:       The following orders were created for panel order Extra Tubes.  Procedure                               Abnormality         Status                     ---------                               -----------         ------                     Light Blue Top[[id number]]                                                              Gold Top - SST[[id number]]                                   Final result                 Please view results for these tests on the individual orders.    Gold Top - SST [[id number]] Collected:  03/10/20 2130    Specimen:  Blood Updated:  03/10/20 2230     Extra Tube Hold for add-ons.     Comment: Auto resulted.       Basic Metabolic Panel [[id number]]  (Abnormal) Collected:  03/10/20 2132    Specimen:  Blood from Arm, Right Updated:  03/10/20 2203     Glucose 255 mg/dL      BUN 11 mg/dL      Creatinine 0.78 mg/dL      Sodium 143 mmol/L      Potassium 3.6 mmol/L      Chloride 105 mmol/L      CO2 26.0 mmol/L      Calcium 10.1 mg/dL      eGFR Non African Amer 74 mL/min/1.73      BUN/Creatinine Ratio 14.1     Anion Gap 12.0 mmol/L     Narrative:       GFR Normal >60  Chronic Kidney Disease <60  Kidney Failure <15      Troponin [[id number]]  (Normal) Collected:  03/10/20 2132    Specimen:  Blood from Arm, Right Updated:  03/10/20 2203     Troponin T <0.010 ng/mL     Narrative:       Troponin T Reference Range:  <= 0.03 ng/mL-   Negative for AMI  >0.03 ng/mL-     Abnormal for myocardial necrosis.  Clinicians would have to utilize clinical acumen, EKG, Troponin and serial changes to  determine if it is an Acute Myocardial Infarction or myocardial injury due to an underlying chronic condition.       Results may be falsely decreased if patient taking Biotin.      BNP [[id number]]  (Abnormal) Collected:  03/10/20 2132    Specimen:  Blood from Arm, Right Updated:  03/10/20 2201     proBNP 1,863.0 pg/mL     Narrative:       Among patients with dyspnea, NT-proBNP is highly sensitive for the detection of acute congestive heart failure. In addition NT-proBNP of <300 pg/ml effectively rules out acute congestive heart failure with 99% negative predictive value.    Results may be falsely decreased if patient taking Biotin.      CBC & Differential [[id number]] Collected:  03/10/20 2132    Specimen:  Blood from Arm, Right Updated:  03/10/20 2155    Narrative:       The following orders were created for panel order CBC & Differential.  Procedure                               Abnormality         Status                     ---------                               -----------         ------                     CBC Auto Differential[[id number]]        Normal              Final result                 Please view results for these tests on the individual orders.    CBC Auto Differential [[id number]]  (Normal) Collected:  03/10/20 2132    Specimen:  Blood from Arm, Right Updated:  03/10/20 2155     WBC 7.40 10*3/mm3      RBC 4.73 10*6/mm3      Hemoglobin 14.5 g/dL      Hematocrit 41.9 %      MCV 88.5 fL      MCH 30.7 pg      MCHC 34.7 g/dL      RDW 14.0 %      RDW-SD 43.8 fl      MPV 8.8 fL      Platelets 306 10*3/mm3      Neutrophil % 51.2 %      Lymphocyte % 34.8 %      Monocyte % 11.8 %      Eosinophil % 1.6 %      Basophil % 0.6 %      Neutrophils, Absolute 3.80 10*3/mm3      Lymphocytes, Absolute 2.60 10*3/mm3      Monocytes, Absolute 0.90 10*3/mm3      Eosinophils, Absolute 0.10 10*3/mm3      Basophils, Absolute 0.00 10*3/mm3      nRBC 0.1 /100 WBC         Imaging Results (Last 72 Hours)     Procedure Component  Value Units Date/Time    XR Chest 1 View [[id number]] Collected:  03/10/20 2158     Updated:  03/10/20 2201    Narrative:       Examination: XR CHEST 1 VW-     Date of Exam: 3/10/2020 9:34 PM     Indication: Short of breath cough COPD.     Comparison: 07/31/2018     Technique: 1 view of the chest      Findings:  Heart is enlarged. There is mild pulmonary vascular congestion. There  are no focal infiltrates.       Impression:       Heart and megaly with mild pulmonary edema.     Electronically Signed By-Rhys Louise On:3/10/2020 9:59 PM  This report was finalized on [id number] by  Rhys Louise, .        ECG 12 Lead   Final Result   HEART RATE= 78  bpm   RR Interval= 772  ms   DE Interval= 157  ms   P Horizontal Axis=   deg   P Front Axis= 41  deg   QRSD Interval= 161  ms   QT Interval= 424  ms   QRS Axis= -79  deg   T Wave Axis= 21  deg   - ABNORMAL ECG -   Sinus rhythm   Ventricular trigeminy   When compared with ECG of 11-Dec-2019 5:46:57,   No significant change   Electronically Signed By: Ronak Ruiz (FORTUNATO) 11-Mar-2020 08:20:26   Date and Time of Study: 2020-03-10 21:14:51        CBC    Results from last 7 days   Lab Units 03/11/20  0342 03/10/20  2132   WBC 10*3/mm3 7.60 7.40   HEMOGLOBIN g/dL 14.5 14.5   PLATELETS 10*3/mm3 298 306     BMP   Results from last 7 days   Lab Units 03/11/20  0341 03/10/20  2132   SODIUM mmol/L 141 143   POTASSIUM mmol/L 3.9 3.6   CHLORIDE mmol/L 102 105   CO2 mmol/L 24.0 26.0   BUN mg/dL 14 11   CREATININE mg/dL 0.85 0.78   GLUCOSE mg/dL 307* 255*   MAGNESIUM mg/dL 1.5*  --      CMP   Results from last 7 days   Lab Units 03/11/20  0341 03/10/20  2132   SODIUM mmol/L 141 143   POTASSIUM mmol/L 3.9 3.6   CHLORIDE mmol/L 102 105   CO2 mmol/L 24.0 26.0   BUN mg/dL 14 11   CREATININE mg/dL 0.85 0.78   GLUCOSE mg/dL 307* 255*     Medication Review  Scheduled Meds:    acebutolol 200 mg Oral BID   amoxicillin 500 mg Oral Q8H   apixaban 5 mg Oral Q12H   atorvastatin 20 mg Oral Nightly    fenofibrate 145 mg Oral Daily   furosemide 20 mg Intravenous BID   guaiFENesin 1,200 mg Oral Q12H   hydrALAZINE 50 mg Oral BID   insulin lispro 0-7 Units Subcutaneous 4x Daily With Meals & Nightly   ipratropium-albuterol 3 mL Nebulization Q4H - RT   lisinopril 20 mg Oral Daily   magnesium oxide 400 mg Oral BID   meloxicam 15 mg Oral Daily With Breakfast   nicotine 1 patch Transdermal Q24H   potassium chloride 10 mEq Oral Daily With Breakfast   sodium chloride 10 mL Intravenous Q12H     Continuous Infusions:   PRN Meds:.albuterol  •  dextrose  •  dextrose  •  glucagon (human recombinant)  •  HYDROcodone-acetaminophen  •  insulin lispro **AND** insulin lispro  •  ipratropium-albuterol  •  [COMPLETED] Insert peripheral IV **AND** sodium chloride  •  sodium chloride    Assessment:      Chronic obstructive pulmonary disease (CMS/[facility])    Paroxysmal atrial fibrillation (CMS/[facility])    PVC's (premature ventricular contractions)    Acute respiratory distress    COPD exacerbation (CMS/[facility])    Shortness of breath    Bilateral leg edema    Acute diastolic CHF (congestive heart failure) (CMS/[facility])        Plan:    Patient is admitted with bilateral leg swelling and shortness of breath consistent with possible volume overload and acute diastolic congestive heart failure.  Patient recently had PVC ablative therapy which was partially successful.  I would recommend to continue diurese the patient.  Patient is consuming significant amount of soda which has both fluids as well as sodium.  I advised her to discontinue that.  I will continue diuresis.  I would continue acebutolol.  Echocardiogram would be done to assess the left ventricular function.    I discussed the patients findings and my recommendations with patient and her     Jesse SWAN. MD [place]  03/11/20  18:24              Electronically signed by Jesse Charles MD at 03/11/20 7593

## 2020-03-12 NOTE — PLAN OF CARE
Problem: Patient Care Overview  Goal: Plan of Care Review  Outcome: Ongoing (interventions implemented as appropriate)  Flowsheets (Taken 3/12/2020 0214)  Plan of Care Reviewed With: patient  Outcome Summary: patient has converted to afib with elevated heart rate, started patient on metoprolol, patient to have a echo today, will continue to monitor

## 2020-03-12 NOTE — PROGRESS NOTES
CARDIOLOGY FOLLOW-UP PROGRESS NOTE      Reason for follow-up:    Shortness of breath  Bilateral leg edema  History of paroxysmal atrial fibrillation  Hypertension  Frequent PVCs          Attending: Evans Sorensen MD      Subjective .     Denies chest pain or dyspnea at rest       Review of Systems   Constitution: Negative for decreased appetite and diaphoresis.   HENT: Negative for congestion, hearing loss and nosebleeds.    Cardiovascular: Positive for dyspnea on exertion and leg swelling. Negative for chest pain, claudication, irregular heartbeat, near-syncope, orthopnea, palpitations, paroxysmal nocturnal dyspnea and syncope.   Respiratory: Negative for cough, shortness of breath and sleep disturbances due to breathing.    Endocrine: Negative for polyuria.   Hematologic/Lymphatic: Does not bruise/bleed easily.   Skin: Negative for itching and rash.   Musculoskeletal: Negative for back pain, muscle weakness and myalgias.   Gastrointestinal: Negative for abdominal pain, change in bowel habit and nausea.   Genitourinary: Negative for dysuria, flank pain, frequency and hesitancy.   Neurological: Negative for dizziness, tremors and weakness.   Psychiatric/Behavioral: Negative for altered mental status. The patient does not have insomnia.        Allergies: Ibuprofen; Prednisone; Pregabalin; Tramadol; Levofloxacin; and Sulfamethoxazole-trimethoprim    Scheduled Meds:    apixaban 5 mg Oral Q12H   atorvastatin 20 mg Oral Nightly   fenofibrate 145 mg Oral Daily   furosemide 20 mg Intravenous BID   guaiFENesin 1,200 mg Oral Q12H   hydrALAZINE 50 mg Oral BID   insulin lispro 0-7 Units Subcutaneous 4x Daily With Meals & Nightly   ipratropium-albuterol 3 mL Nebulization Q4H - RT   lisinopril 20 mg Oral Daily   magnesium oxide 400 mg Oral BID   meloxicam 15 mg Oral Daily With Breakfast   metoprolol tartrate 50 mg Oral Q12H   nicotine 1 patch Transdermal Q24H   potassium chloride 10 mEq Oral Daily With Breakfast   sodium  "chloride 10 mL Intravenous Q12H       Continuous Infusions:     PRN Meds:.•  albuterol  •  dextrose  •  dextrose  •  glucagon (human recombinant)  •  HYDROcodone-acetaminophen  •  insulin lispro **AND** insulin lispro  •  ipratropium-albuterol  •  [COMPLETED] Insert peripheral IV **AND** sodium chloride  •  sodium chloride    Objective     VITAL SIGNS  Patient Vitals for the past 24 hrs:   BP Temp Temp src Pulse Resp SpO2 Height Weight   03/12/20 1554 -- -- -- 54 18 98 % -- --   03/12/20 1548 -- -- -- 60 16 94 % -- --   03/12/20 1153 122/62 98.5 °F (36.9 °C) Oral 54 16 92 % -- --   03/12/20 1121 -- -- -- (!) 123 18 95 % -- --   03/12/20 1118 -- -- -- 107 16 93 % -- --   03/12/20 0905 109/79 -- -- -- -- -- 160 cm (63\") 93.4 kg (206 lb)   03/12/20 0740 109/79 -- -- 79 -- -- -- --   03/12/20 0737 -- -- -- 97 16 96 % -- --   03/12/20 0732 -- -- -- 119 16 92 % -- --   03/12/20 0401 113/73 97.5 °F (36.4 °C) Oral 98 20 96 % -- 93.6 kg (206 lb 5.6 oz)   03/12/20 0302 -- -- -- 102 18 97 % -- --   03/12/20 0300 -- -- -- 105 18 95 % -- --   03/12/20 0158 134/85 -- -- (!) 126 18 94 % -- --   03/12/20 0032 123/79 -- -- (!) 132 17 98 % -- --   03/11/20 2304 -- -- -- 85 18 97 % -- --   03/11/20 2302 -- -- -- 84 18 95 % -- --   03/11/20 2030 153/69 99.1 °F (37.3 °C) Oral 88 18 92 % -- --   03/11/20 2014 -- -- -- 83 18 96 % -- --   03/11/20 2008 -- -- -- 85 18 92 % -- --        Flowsheet Rows      First Filed Value   Admission Height  160 cm (63\") Documented at 03/10/2020 2102   Admission Weight  95.3 kg (210 lb 1.6 oz) Documented at 03/10/2020 2102            Intake/Output Summary (Last 24 hours) at 3/12/2020 1802  Last data filed at 3/12/2020 1400  Gross per 24 hour   Intake 2320 ml   Output 1050 ml   Net 1270 ml        TELEMETRY:     SR    Physical Exam:  Physical Exam   Constitutional: She is oriented to person, place, and time. She appears well-developed and well-nourished. No distress.   HENT:   Head: Normocephalic and " atraumatic.   Eyes: Pupils are equal, round, and reactive to light.   Neck: Normal range of motion. Neck supple. No JVD present.   Cardiovascular: Normal rate, regular rhythm, S1 normal, S2 normal, normal heart sounds and intact distal pulses.   No murmur heard.  Pulmonary/Chest: Effort normal. She has wheezes.   Abdominal: Soft. Normal appearance. She exhibits no distension. There is no tenderness.   Musculoskeletal: Normal range of motion. She exhibits no edema.   Neurological: She is alert and oriented to person, place, and time.   Skin: Skin is warm and dry.   Psychiatric: She has a normal mood and affect.          Results Review:   I reviewed the patient's new clinical results.    CBC    Results from last 7 days   Lab Units 03/11/20  0342 03/10/20  2132   WBC 10*3/mm3 7.60 7.40   HEMOGLOBIN g/dL 14.5 14.5   PLATELETS 10*3/mm3 298 306     BMP   Results from last 7 days   Lab Units 03/11/20  0341 03/10/20  2132   SODIUM mmol/L 141 143   POTASSIUM mmol/L 3.9 3.6   CHLORIDE mmol/L 102 105   CO2 mmol/L 24.0 26.0   BUN mg/dL 14 11   CREATININE mg/dL 0.85 0.78   GLUCOSE mg/dL 307* 255*   MAGNESIUM mg/dL 1.5*  --      Cr Clearance Estimated Creatinine Clearance: 72.6 mL/min (by C-G formula based on SCr of 0.85 mg/dL).  Coag     HbA1C   Lab Results   Component Value Date    HGBA1C 8.2 (H) 03/11/2020    HGBA1C 7.8 (H) 01/31/2020    HGBA1C 6.9 (H) 08/08/2019     Blood Glucose   Glucose   Date/Time Value Ref Range Status   03/12/2020 1649 140 (H) 70 - 105 mg/dL Final     Comment:     Serial Number: [id number]:  [id number]   03/12/2020 1107 237 (H) 70 - 105 mg/dL Final     Comment:     Serial Number: [id number]:  [id number]   03/12/2020 0724 201 (H) 70 - 105 mg/dL Final     Comment:     Serial Number: [id number]:  [id number]   03/11/2020 2034 230 (H) 70 - 105 mg/dL Final     Comment:     Serial Number: [id number]:  [id number]   03/11/2020 1618 272 (H) 70 - 105 mg/dL Final     Comment:     Serial  Number: [id number]:  [id number]   03/11/2020 1130 273 (H) 70 - 105 mg/dL Final     Comment:     Serial Number: [id number]:  [id number]   03/11/2020 0718 254 (H) 70 - 105 mg/dL Final     Comment:     Serial Number: [id number]:  [id number]   03/11/2020 0015 197 (H) 70 - 105 mg/dL Final     Comment:     Serial Number: [id number]:  [id number]     Infection     CMP   Results from last 7 days   Lab Units 03/11/20  0341 03/10/20  2132   SODIUM mmol/L 141 143   POTASSIUM mmol/L 3.9 3.6   CHLORIDE mmol/L 102 105   CO2 mmol/L 24.0 26.0   BUN mg/dL 14 11   CREATININE mg/dL 0.85 0.78   GLUCOSE mg/dL 307* 255*     ABG      UA      BRUNA  No results found for: POCMETH, POCAMPHET, POCBARBITUR, POCBENZO, POCCOCAINE, POCOPIATES, POCOXYCODO, POCPHENCYC, POCPROPOXY, POCTHC, POCTRICYC  Lysis Labs   Results from last 7 days   Lab Units 03/11/20  0342 03/11/20  0341 03/10/20  2132   HEMOGLOBIN g/dL 14.5  --  14.5   PLATELETS 10*3/mm3 298  --  306   CREATININE mg/dL  --  0.85 0.78     Radiology(recent) Xr Chest 1 View    Result Date: 3/10/2020  Heart and megaly with mild pulmonary edema.  Electronically Signed By-Rhys Louise On:3/10/2020 9:59 PM This report was finalized on [id number] by  Rhys Louise, .      Imaging Results (Last 24 Hours)     ** No results found for the last 24 hours. **          Results from last 7 days   Lab Units 03/11/20  0919   TROPONIN T ng/mL <0.010       EKG             I personally viewed and interpreted the patient's EKG/Telemetry data:        ECHOCARDIOGRAM:     Results for orders placed during the hospital encounter of 03/10/20   Adult Transthoracic Echo Complete W/ Cont if Necessary Per Protocol    Narrative · Left ventricular systolic function is normal.  · Estimated EF = 55%.            STRESS MYOVIEW:      CARDIAC CATHETERIZATION:      OTHER:         Assessment/Plan            Chronic obstructive pulmonary disease (CMS/HCC)    Paroxysmal atrial fibrillation (CMS/HCC)    PVC's  (premature ventricular contractions)    Acute respiratory distress    COPD exacerbation (CMS/[facility])    Shortness of breath    Bilateral leg edema    Acute diastolic CHF (congestive heart failure) (CMS/[facility])        ASSESSMENT:    Shortness of breath  Bilateral leg edema  History of paroxysmal atrial fibrillation  Hypertension  Frequent PVCs    PLAN:    Patient is seen and examined and findings are verified.  Patient had tachycardia last night.  Unfortunately patient was not getting her acebutolol in the hospital because of lack of stocking of this medicine in [facility].  Patient was not given any beta-blocker.  This morning she got beta-blockers and her heart rate is improved.  She is on Lopressor right now.  Clinically she is not in congestive heart failure.  Echocardiogram showed EF of 55% and no significant valvular heart disease.  I discussed in detail with the patient to decrease fluid and soda uptake.  She drinks 2 L of big red every day.      Jesse Charles MD  03/12/20  18:02

## 2020-03-12 NOTE — PROGRESS NOTES
[address] [address]    Pulmonary Disease Educator  Discharge Planning    Patient Name: Caterina Mason  : 1955  Room #: 367/1  Pulmonologist: none  Admit Diagnosis: Acute Respiratory Distress  Current Admission Date: 3/10/2020   Previous Admit: Chest Pain  Previous Admission Date: 12-10-19  Body mass index is 36.49 kg/m².      Caterina Mason is a current smoker. PPY: 42    PFT’s in the last year?  No       Room Air O2 Sat: 92% Current O2: 3L 93%  Home O2: 3L at night  Home BiPap/CPAP: yes - home cpap  ABG: No results found for: SITE, ALLENTEST, PHART, EXJ2IDU, PO2ART, CGF9GWZ, BASEEXCESS, Z6RZGSZO, HGBBG, HCTABG, OXYHEMOGLOBI, METHHGBN, CARBOXYHGB, CO2CT, BAROMETRIC, MODALITY, FIO2      Current Home Medications:  Prior to Admission medications    Medication Sig Start Date End Date Taking? Authorizing Provider   ACCU-CHEK CARLIE PLUS test strip USE TO CHECK BLOOD SUGAR TWICE A DAY 20  Yes Bautista Archibald MD   ACCU-CHEK FASTCLIX LANCETS misc 1 each by Other route 2 (Two) Times a Day. 8/15/19  Yes Bautista Archibald MD   ACCU-CHEK SOFTCLIX LANCETS lancets USE TO CHECK BLOOD SUGAR TWICE A DAY 19  Yes Robson Boothe MD   ACCU-CHEK SOFTCLIX LANCETS lancets USE TO CHECK BS 3 TIMES DAILY 19 Yes Bautista Archibald MD   acebutolol (SECTRAL) 200 MG capsule Take 200 mg by mouth 2 (Two) Times a Day.   Yes Robson Boothe MD   albuterol sulfate  (90 Base) MCG/ACT inhaler Inhale 2 puffs Every 4 (Four) Hours As Needed for Wheezing.   Yes Robson Boothe MD   apixaban (ELIQUIS) 5 MG tablet tablet Take 5 mg by mouth 2 (Two) Times a Day.   Yes Robson Boothe MD   atorvastatin (LIPITOR) 40 MG tablet Take 20 mg by mouth Daily.   Yes Robson Boothe MD   Blood Glucose Monitoring Suppl (ACCU-CHEK CARLIE PLUS) w/Device kit USE TO CHECK BS 3 TIMES DAILY 19  Yes Bautista Archibald MD   cholecalciferol (VITAMIN D3) 10 MCG (400 UNIT)  tablet Take 1,000 Units by mouth 2 (Two) Times a Day.   Yes Robson Boothe MD   fenofibrate 160 MG tablet Take 1 tablet by mouth Daily. 3/3/20  Yes Lou Curran MD   glucose blood (ACCU-CHEK CARLIE PLUS) test strip USE TO CHECK BS 3 TIMES DAILY 12/5/19  Yes Bautista Archibald MD   glucose blood (ACCU-CHEK GUIDE) test strip 1 each by Other route 2 (Two) Times a Day. Use as instructed   Yes Robson Boothe MD   glucose blood (ACCU-CHEK GUIDE) test strip Use as instructed 8/15/19  Yes Bautista Archibald MD   hydrALAZINE (APRESOLINE) 50 MG tablet Take 50 mg by mouth 2 (Two) Times a Day.   Yes Robson Boothe MD   lisinopril (PRINIVIL,ZESTRIL) 20 MG tablet Take 20 mg by mouth Daily.   Yes Robson Boothe MD   magnesium oxide (MAG-OX) 400 MG tablet Take 1 tablet by mouth 2 (Two) Times a Day. 8/28/19  Yes Lou Curran MD   meloxicam (MOBIC) 15 MG tablet Take 1 tablet by mouth Daily. 3/3/20  Yes Lou Curran MD   oxyCODONE-acetaminophen (PERCOCET) 5-325 MG per tablet Take 1 tablet by mouth Every 8 (Eight) Hours As Needed.   Yes Robson Boothe MD   potassium chloride (MICRO-K) 10 MEQ CR capsule Take 1 capsule by mouth Daily. 2/6/20  Yes Bautista Archibald MD   SITagliptin-metFORMIN HCl ER (JANUMET XR)  MG tablet Take 2 tablets by mouth Daily. 3/3/20 3/11/20 Yes Lou Curran MD   amoxicillin (AMOXIL) 500 MG capsule Take 1 capsule by mouth 3 (Three) Times a Day. 3/3/20   Lou Curran MD       Recommendations/Testing:  Caterina Mason was seen by the pulmonary disease educator for evaluation of care gaps according to the COPD GOLD Guidelines.  After evaluation the patient's cardiopulmonary status, it is the recommendation of the care coordinator that the patient receive the following testing, equipment, or consults.    PFT    6 Minute Walk     Reconciled RT Home Medications:  After evaluation the patient's cardiopulmonary status, it is the recommendation of the care  coordinator that the patient receive the following medication changes or additions.    Duoneb Q4PRN with home neb    Prior COPD Education?   no Prior Pulmonary Rehab?   no  History of COPD admission in the past year?    no        Other Notes: Pt sitting comfortably in bed and eating lunch. Pt did not want to work on breathing exercises and states she does not have any questions or needs for COPD education today. Was able to get pt to demonstrate PLB. Recommending 6 minute walk to assess home 02 at all times and Home Neb with Duoneb Q6PRN. Pt states she is ready to quit smoking and talked to pt about smoking cessation and pt was given a smoking cessation packet. Pt states she is interested in nicotine patches. ROSALIO Nieves advised.    Insurance: [employer]       EDUCATION DONE WITH PATIENT:     IMT:  Smoking Cessation        COPD          Pursed Lip Breathing        Diaphragmatic Breathing  Harmonica  Relaxation Techniques      STOP BANG (=/> 3 is HIGH RISK):   Do you snore loudly?   Tired/Fatigued during the day?   Stop Breathing in sleeping?   High B/P or treated B/P?   BMI greater than 35?    Body mass index is 36.49 kg/m².  More than 50 years old?   64 y.o.  Neck Circumference > 40cm      Male?  female       TOTAL Pt has HOME CPAP    Has patient seen a sleep Dr.? (Who/When)   Sleep Study Done? (When)   Would you like a sleep tech talk to you about RUSLAN?

## 2020-03-12 NOTE — NURSING NOTE
Got orders from dr. mayes to put patient on metoprolol 100mg bid and to give dose now.  
Patient's heart rate jumped up to the 150's. Ordered a stat ekg and placed a call out to cardiology. Spoke with Dr. Downey on the phone and informed of patient not have home beta blocker (acebutolol). Got orders for metoprolol 25mg BID.  
Placed a call out to dr. mayes due to paitent's HR still elevated and in afib. Awaiting a call back  
need for outpatient follow-up

## 2020-03-12 NOTE — PLAN OF CARE
Problem: Patient Care Overview  Goal: Plan of Care Review  Outcome: Ongoing (interventions implemented as appropriate)  Flowsheets  Taken 3/11/2020 0222 by [place] LPN  Progress: no change  Taken 3/12/2020 1354 by Atul Herrmann RN  Outcome Summary: heart rate remains elevated. Cardiology follows & med changes made. echo completed with results pending. Plans to return home with spouse

## 2020-03-12 NOTE — SIGNIFICANT NOTE
03/12/20 1722   Discharge of Care   Discharge Mode wheel chair   Discharge Destination home   Discharged Accompanied by spouse   Discharge Contact Information if Applicable [phone]   Discharge Teaching Done  Yes   Learning Method Explanation;Written Materials

## 2020-03-12 NOTE — DISCHARGE SUMMARY
[facility]   DISCHARGE SUMMARY    Patient Name: Caterina Mason  : 1955  MRN: [id number]    Date of Admission: 3/10/2020  Date of Discharge:  3/12/2020    Primary Care Physician: Lou Curran MD    Consults     Date and Time Order Name Status Description    3/11/2020 1142 Inpatient Cardiology Consult Completed     3/10/2020 2232 Hospitalist (on-call MD unless specified) Completed           Hospital Course     Presenting Problem:   Acute respiratory distress [R06.03]  Chronic obstructive pulmonary disease with acute exacerbation (CMS/HCC) [J44.1]  Acute congestive heart failure, unspecified heart failure type (CMS/HCC) [I50.9]   -probable diastolic dysfunction  Tobacco abuse    Active Hospital Problems:  No notes have been filed under this hospital service.  Service: Hospitalist      Resolved Hospital Problems:  No notes have been filed under this hospital service.  Service: Hospitalist        Hospital Course:  Caterina Mason is a 64 y.o. female     IV Lasix 40 bid was continued. Cardiology was consulted.  Nebulizer treatment was continued.  There was immediate improvement.   ECHO was done.. EF is 55%  Left ventricular systolic function is normal. Estimated EF = 55%. Normal left ventricular cavity size and wall thickness noted. All left ventricular wall segments contract normally. Left ventricular diastolic function is normal. There is no evidence of a left ventricular mass or thrombus present.  No pulmonary Hypertension although she has COPD.    We presume current problem is  Acute diastolic heart failure.   Cardiology cleared the patient to be discharged and will be followed in 4 weeks.(Dr. Charles)  She will continue moderate dose of oral Lasix 40 mg daily with Potassium 20. As these are new, she needs BMP within a week.  Stop smoking could be most important. (Emphasized again)    Her condition is good. She doesn't need home oxygen yet.        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 She denies home oxygen use other than Cpap at night. She denies chest pain , dizziness, cough, congestion, nausea. She reports \" I just couldn't breath, I had to do something\". She reports dyspnea at rest. Troponin was negative, BNP 1863, now 2863 after Lasix 40 mg IV, serum glucose 255, lactic 1.5, wbc not elevated. CXR per radiology:     \"Heart is enlarged. There is mild pulmonary vascular congestion. There  are no focal infiltrates.     IMPRESSION:  Heart and megaly with mild pulmonary edema.\"         Vital Signs:   Temp:  [97.5 °F (36.4 °C)-99.1 °F (37.3 °C)] 98.5 °F (36.9 °C)  Heart Rate:  [] 54  Resp:  [16-20] 18  BP: (109-153)/(62-85) 122/62     Physical Exam:    HENT:   Head: Normocephalic and atraumatic.   Right Ear: External ear normal.   Left Ear: External ear normal.   Nose: Nose normal.   Mouth/Throat: Oropharynx is clear and moist. No oropharyngeal exudate.   Eyes: Pupils are equal, round, and reactive to light. Conjunctivae and EOM are normal. Right eye exhibits no discharge. Left eye exhibits no discharge. No scleral icterus.   Neck: Normal range of motion. No JVD present. No tracheal deviation present. No thyromegaly present.   Cardiovascular: Normal rate, regular rhythm, normal heart sounds and intact distal pulses. Exam reveals no gallop and no friction rub.   No murmur heard.  Pulmonary/Chest: Effort normal. No stridor. No respiratory distress. She has no wheezes. She has rales. She exhibits no tenderness.   Abdominal: Soft. Bowel sounds are normal. She exhibits no distension and no mass. There is no tenderness. There is no rebound and no guarding. No hernia  Pertinent  and/or Most Recent Results     Results from last 7 days   Lab Units 03/11/20  0342 03/11/20  0341 03/10/20  2132   WBC 10*3/mm3 " 7.60  --  7.40   HEMOGLOBIN g/dL 14.5  --  14.5   HEMATOCRIT % 43.3  --  41.9   PLATELETS 10*3/mm3 298  --  306   SODIUM mmol/L  --  141 143   POTASSIUM mmol/L  --  3.9 3.6   CHLORIDE mmol/L  --  102 105   CO2 mmol/L  --  24.0 26.0   BUN mg/dL  --  14 11   CREATININE mg/dL  --  0.85 0.78   GLUCOSE mg/dL  --  307* 255*   CALCIUM mg/dL  --  9.8 10.1           Invalid input(s): PROT, LABALBU        Invalid input(s): TG, LDLCALC, LDLREALC  Results from last 7 days   Lab Units 03/11/20  0919 03/11/20  0342 03/11/20  0341 03/10/20  2132   HEMOGLOBIN A1C %  --  8.2*  --   --    PROBNP pg/mL  --   --  2,836.0* 1,863.0*   TROPONIN T ng/mL <0.010  --  <0.010 <0.010       Brief Urine Lab Results  (Last result in the past 365 days)      Color   Clarity   Blood   Leuk Est   Nitrite   Protein   CREAT   Urine HCG        01/31/20 0743             87.9             Microbiology Results Abnormal     None          Xr Chest 1 View    Result Date: 3/10/2020  Impression: Heart and megaly with mild pulmonary edema.  Electronically Signed By-Rhys Louise On:3/10/2020 9:59 PM This report was finalized on [id number] by  Rhys Louise, .                Results for orders placed during the hospital encounter of 03/10/20   Adult Transthoracic Echo Complete W/ Cont if Necessary Per Protocol    Narrative · Left ventricular systolic function is normal.  · Estimated EF = 55%.           Order Current Status    Extra Tubes In process        Discharge Details        Discharge Medications      New Medications      Instructions Start Date   furosemide 40 MG tablet  Commonly known as:  LASIX   40 mg, Oral, Every Morning      potassium chloride 20 MEQ CR tablet  Commonly known as:  K-DUR,KLOR-CON   20 mEq, Oral, Daily         Continue These Medications      Instructions Start Date   ACCU-CHEK CARLIE PLUS w/Device kit   USE TO CHECK BS 3 TIMES DAILY      ACCU-CHEK GUIDE test strip  Generic drug:  glucose blood   1 each, Other, 2 Times Daily, Use as  instructed       glucose blood test strip  Commonly known as:  ACCU-CHEK GUIDE   Use as instructed      glucose blood test strip  Commonly known as:  ACCU-CHEK CARLIE PLUS   USE TO CHECK BS 3 TIMES DAILY      ACCU-CHEK CARLIE PLUS test strip  Generic drug:  glucose blood   USE TO CHECK BLOOD SUGAR TWICE A DAY      ACCU-CHEK SOFTCLIX LANCETS lancets   USE TO CHECK BLOOD SUGAR TWICE A DAY      ACCU-CHEK FASTCLIX LANCETS misc   1 each, Other, 2 Times Daily      ACCU-CHEK SOFTCLIX LANCETS lancets   USE TO CHECK BS 3 TIMES DAILY      albuterol sulfate  (90 Base) MCG/ACT inhaler  Commonly known as:  PROVENTIL HFA;VENTOLIN HFA;PROAIR HFA   2 puffs, Inhalation, Every 4 Hours PRN      amoxicillin 500 MG capsule  Commonly known as:  AMOXIL   500 mg, Oral, 3 Times Daily      apixaban 5 MG tablet tablet  Commonly known as:  ELIQUIS   5 mg, Oral, 2 Times Daily      atorvastatin 40 MG tablet  Commonly known as:  LIPITOR   20 mg, Oral, Daily      cholecalciferol 10 MCG (400 UNIT) tablet  Commonly known as:  VITAMIN D3   1,000 Units, Oral, 2 Times Daily      fenofibrate 160 MG tablet   160 mg, Oral, Daily      hydrALAZINE 50 MG tablet  Commonly known as:  APRESOLINE   50 mg, Oral, 2 Times Daily      lisinopril 20 MG tablet  Commonly known as:  PRINIVIL,ZESTRIL   20 mg, Oral, Daily      magnesium oxide 400 MG tablet  Commonly known as:  MAG-OX   400 mg, Oral, 2 Times Daily      meloxicam 15 MG tablet  Commonly known as:  MOBIC   15 mg, Oral, Daily      oxyCODONE-acetaminophen 5-325 MG per tablet  Commonly known as:  PERCOCET   1 tablet, Oral, Every 8 Hours PRN      potassium chloride 10 MEQ CR capsule  Commonly known as:  MICRO-K   10 mEq, Oral, Daily             Allergies   Allergen Reactions   • Ibuprofen GI Intolerance   • Prednisone Other (See Comments)   • Pregabalin Other (See Comments)     jerking   • Tramadol Other (See Comments)     Legs jerked   • Levofloxacin Other (See Comments)     Increase sunburn   •  Sulfamethoxazole-Trimethoprim Swelling         Discharge Disposition:  Home or Self Care    Diet:  Hospital:  Diet Order   Procedures   • Diet Cardiac, Diabetic/Consistent Carbs; Healthy Heart; Diabetic - Consistent Carb         Discharge Activity:   as tolerated      CODE STATUS:    Code Status and Medical Interventions:   Ordered at: 03/10/20 2691     Code Status:    CPR     Medical Interventions (Level of Support Prior to Arrest):    Full         Future Appointments   Date Time Provider Department Center   5/15/2020 10:30 AM Jesse Charles MD [facility] CARD CD None   5/29/2020  7:30 AM LAB  [facility] None   6/5/2020 10:40 AM Bautista Archibald MD [facility] END NA None   7/24/2020  9:15 AM Alfredo Iglesias MD [facility] CAR [facility] None           Time spent on Discharge including face to face service:  35 minutes    Electronically signed by Evans Sorensen MD, 03/12/20, 5:11 PM.

## 2020-03-13 ENCOUNTER — READMISSION MANAGEMENT (OUTPATIENT)
Dept: CALL CENTER | Facility: HOSPITAL | Age: 65
End: 2020-03-13

## 2020-03-13 NOTE — OUTREACH NOTE
Prep Survey      Responses   [facility]   Is LACE score < 7 ?  No   Eligibility  Readm Mgmt   Discharge diagnosis  COPD   Does the patient have one of the following disease processes/diagnoses(primary or secondary)?  COPD/Pneumonia   Does the patient have Home health ordered?  No   Is there a DME ordered?  Yes   What DME was ordered?  O2   Medication alerts for this patient  LASIX, POTASSIUM   Prep survey completed?  Yes          Hiral Wright LPN

## 2020-03-16 ENCOUNTER — READMISSION MANAGEMENT (OUTPATIENT)
Dept: CALL CENTER | Facility: HOSPITAL | Age: 65
End: 2020-03-16

## 2020-03-16 NOTE — OUTREACH NOTE
"COPD/PN Week 1 Survey      Responses   [facility] patient discharged from?  Doc   Does the patient have one of the following disease processes/diagnoses(primary or secondary)?  COPD/Pneumonia   Is there a successful TCM telephone encounter documented?  No   Was the primary reason for admission:  COPD exacerbation   Week 1 attempt successful?  Yes   Call start time  1030   Call end time  1050   Discharge diagnosis  COPD   Medication alerts for this patient  LASIX, POTASSIUM   Meds reviewed with patient/caregiver?  Yes   Is the patient having any side effects they believe may be caused by any medication additions or changes?  No   Does the patient have all medications ordered at discharge?  Yes   Is the patient taking all medications as directed (includes completed medication regime)?  Yes   Does the patient have a primary care provider?   Yes   Does the patient have an appointment with their PCP or pulmonologist within 7 days of discharge?  No   Nursing Interventions  Educated patient on importance of making appointment   Has the patient kept scheduled appointments due by today?  N/A   Comments  Patient states she will wait until later to make appt.  Knows to contact her PCP if she has any issues   Has home health visited the patient within 72 hours of discharge?  N/A   What DME was ordered?  O2   Has all DME been delivered?  Yes   Did the patient receive a copy of their discharge instructions?  Yes   Nursing interventions  Reviewed instructions with patient   What is the patient's perception of their health status since discharge?  Improving   Nursing Interventions  Nurse provided patient education   Is the patient/caregiver able to teach back the hierarchy of who to call/visit for symptoms/problems? PCP, Specialist, Home health nurse, Urgent Care, ED, 911  Yes   Additional teach back comments  Patient states she wants to know if she was test for the \"virus\" while here.  Explained that she was not and that " she would have to have met certain criteria to have testing done at this time.  She states she has a running nose and a slight cough but is feeling well other than that.  Denies having a fever at this time.  Explain the importance of contacting her PCP if symptoms worsen.  Voiced her understanding.   Is the patient able to teach back COPD zones?  Yes   Nursing interventions  Education provided on various zones   Patient reports what zone on this call?  Green Zone   Green Zone  Reports doing well, Breathing without shortness of breath, Usual activity and exercise level, Usual amount of phlegm/mucus without difficulty coughing up, Sleeping well, Appetite is good   Green Zone interventions:  Take daily medications, Use oxygen as prescribed, Avoid indoor/outdoor triggers   Week 1 call completed?  Yes   Wrap up additional comments  Questions answered with no needs at this time.          Dyan Skelton LPN

## 2020-03-20 ENCOUNTER — READMISSION MANAGEMENT (OUTPATIENT)
Dept: CALL CENTER | Facility: HOSPITAL | Age: 65
End: 2020-03-20

## 2020-03-20 RX ORDER — ATORVASTATIN CALCIUM 40 MG/1
TABLET, FILM COATED ORAL
Qty: 45 TABLET | Refills: 1 | Status: SHIPPED | OUTPATIENT
Start: 2020-03-20 | End: 2020-09-15

## 2020-03-20 NOTE — OUTREACH NOTE
COPD/PN Week 2 Survey      Responses   [facility] patient discharged from?  Doc   Does the patient have one of the following disease processes/diagnoses(primary or secondary)?  COPD/Pneumonia   Was the primary reason for admission:  COPD exacerbation   Week 2 attempt successful?  No   Unsuccessful attempts  Attempt 1 [NO ANSWER, NO VM LEFT]          Hiral Wright LPN

## 2020-03-23 ENCOUNTER — READMISSION MANAGEMENT (OUTPATIENT)
Dept: CALL CENTER | Facility: HOSPITAL | Age: 65
End: 2020-03-23

## 2020-03-23 NOTE — OUTREACH NOTE
COPD/PN Week 2 Survey      Responses   [facility] patient discharged from?  Doc   Does the patient have one of the following disease processes/diagnoses(primary or secondary)?  COPD/Pneumonia   Was the primary reason for admission:  COPD exacerbation   Week 2 attempt successful?  No   Unsuccessful attempts  Attempt 2 [NO ANSWER, NO VM LEFT]          Hiral Wright LPN

## 2020-03-30 ENCOUNTER — READMISSION MANAGEMENT (OUTPATIENT)
Dept: CALL CENTER | Facility: HOSPITAL | Age: 65
End: 2020-03-30

## 2020-03-30 NOTE — OUTREACH NOTE
"COPD/PN Week 3 Survey      Responses   [facility] [place] patient discharged from?  Doc   Does the patient have one of the following disease processes/diagnoses(primary or secondary)?  COPD/Pneumonia   Was the primary reason for admission:  COPD exacerbation   Week 3 attempt successful?  No   Unsuccessful attempts  Attempt 1 [PERSON ANSWERING STATES \"SHE IS ASLEEP\"]          Hiral Wright LPN  "

## 2020-04-02 ENCOUNTER — READMISSION MANAGEMENT (OUTPATIENT)
Dept: CALL CENTER | Facility: HOSPITAL | Age: 65
End: 2020-04-02

## 2020-04-02 PROCEDURE — 93010 ELECTROCARDIOGRAM REPORT: CPT | Performed by: INTERNAL MEDICINE

## 2020-04-02 RX ORDER — MAGNESIUM OXIDE 400 MG/1
TABLET ORAL
Qty: 180 TABLET | Refills: 0 | Status: SHIPPED | OUTPATIENT
Start: 2020-04-02 | End: 2020-07-01

## 2020-04-02 RX ORDER — HYDRALAZINE HYDROCHLORIDE 50 MG/1
TABLET, FILM COATED ORAL
Qty: 180 TABLET | Refills: 0 | Status: SHIPPED | OUTPATIENT
Start: 2020-04-02 | End: 2020-04-03 | Stop reason: SDUPTHER

## 2020-04-02 NOTE — OUTREACH NOTE
COPD/PN Week 3 Survey      Responses   [facility] patient discharged from?  Doc   Does the patient have one of the following disease processes/diagnoses(primary or secondary)?  COPD/Pneumonia   Was the primary reason for admission:  COPD exacerbation   Week 3 attempt successful?  No   Unsuccessful attempts  Attempt 2          Hiral Wright LPN

## 2020-04-03 ENCOUNTER — TELEPHONE (OUTPATIENT)
Dept: FAMILY MEDICINE CLINIC | Facility: CLINIC | Age: 65
End: 2020-04-03

## 2020-04-03 RX ORDER — HYDRALAZINE HYDROCHLORIDE 50 MG/1
50 TABLET, FILM COATED ORAL 2 TIMES DAILY
Qty: 180 TABLET | Refills: 0 | Status: SHIPPED | OUTPATIENT
Start: 2020-04-03 | End: 2020-08-12 | Stop reason: SDUPTHER

## 2020-04-03 NOTE — TELEPHONE ENCOUNTER
I believe they are referring to hydralazine, I just approved what ever came from the pharmacy which of course makes no sense.  Hydralazine is a blood pressure medicine, please call the patient to clarify how many times a day she takes this and I will approve her medicine.  Thank you.

## 2020-04-10 PROCEDURE — 93010 ELECTROCARDIOGRAM REPORT: CPT | Performed by: INTERNAL MEDICINE

## 2020-05-01 ENCOUNTER — OFFICE VISIT (OUTPATIENT)
Dept: CARDIOLOGY | Facility: CLINIC | Age: 65
End: 2020-05-01

## 2020-05-01 VITALS
BODY MASS INDEX: 36.5 KG/M2 | SYSTOLIC BLOOD PRESSURE: 143 MMHG | HEART RATE: 68 BPM | DIASTOLIC BLOOD PRESSURE: 76 MMHG | HEIGHT: 63 IN | WEIGHT: 206 LBS

## 2020-05-01 DIAGNOSIS — E11.65 TYPE 2 DIABETES MELLITUS WITH HYPERGLYCEMIA, WITHOUT LONG-TERM CURRENT USE OF INSULIN (HCC): ICD-10-CM

## 2020-05-01 DIAGNOSIS — I50.31 ACUTE DIASTOLIC CHF (CONGESTIVE HEART FAILURE) (HCC): ICD-10-CM

## 2020-05-01 DIAGNOSIS — I10 ESSENTIAL HYPERTENSION: ICD-10-CM

## 2020-05-01 DIAGNOSIS — R00.1 BRADYCARDIA: Primary | ICD-10-CM

## 2020-05-01 DIAGNOSIS — I49.3 PVC (PREMATURE VENTRICULAR CONTRACTION): ICD-10-CM

## 2020-05-01 DIAGNOSIS — R00.2 PALPITATIONS: ICD-10-CM

## 2020-05-01 DIAGNOSIS — I48.0 PAROXYSMAL ATRIAL FIBRILLATION (HCC): ICD-10-CM

## 2020-05-01 DIAGNOSIS — E78.2 MIXED HYPERLIPIDEMIA: ICD-10-CM

## 2020-05-01 PROCEDURE — 99213 OFFICE O/P EST LOW 20 MIN: CPT | Performed by: INTERNAL MEDICINE

## 2020-05-01 RX ORDER — FUROSEMIDE 40 MG/1
40 TABLET ORAL DAILY
Qty: 90 TABLET | Refills: 3 | Status: SHIPPED | OUTPATIENT
Start: 2020-05-01 | End: 2021-04-06

## 2020-05-01 RX ORDER — ACEBUTOLOL HYDROCHLORIDE 200 MG/1
200 CAPSULE ORAL 2 TIMES DAILY
COMMUNITY
Start: 2020-04-01 | End: 2020-05-01

## 2020-05-01 RX ORDER — HYDROCODONE BITARTRATE AND ACETAMINOPHEN 7.5; 325 MG/1; MG/1
TABLET ORAL AS NEEDED
COMMUNITY
Start: 2020-04-22 | End: 2020-05-01

## 2020-05-01 RX ORDER — ACEBUTOLOL HYDROCHLORIDE 200 MG/1
200 CAPSULE ORAL 2 TIMES DAILY
Qty: 60 CAPSULE | Refills: 2 | Status: SHIPPED | OUTPATIENT
Start: 2020-05-01 | End: 2020-09-21

## 2020-05-01 RX ORDER — CHOLECALCIFEROL (VITAMIN D3) 25 MCG
1000 CAPSULE ORAL 2 TIMES DAILY
COMMUNITY
Start: 2020-04-03 | End: 2020-09-23

## 2020-05-01 RX ORDER — FENOFIBRATE 145 MG/1
145 TABLET, COATED ORAL DAILY
COMMUNITY
End: 2020-05-27

## 2020-05-01 NOTE — PROGRESS NOTES
Date of Office Visit: 2020  Encounter Provider: Dr. Jesse Charles  Place of Service: [facility] CARDIOLOGY [place]  Patient Name: Caterina Mason  :1955  Lou Curran MD  You have chosen to receive care through a telephone visit. Do you consent to use a telephone visit for your medical care today? Yes  Chief Complaint   Patient presents with   • Hyperlipidemia     6 months follow up    • Hypertension   • Palpitations   • Slow Heart Rate   • Shortness of Breath     History of Present Illness    Caterina Mason is a 64 y.o. female whose past medical history is significant for past medical history of hypertension, hyperlipidemia, diabetes mellitus, COPD, obstructive sleep apnea, tobacco abuse.  Patient had telephone visit today.    This visit has been rescheduled as a phone visit to comply with patient safety concerns in accordance with CDC recommendations. Total time of discussion was 11 minutes.    In 2017, patient was admitted at [facility] with a symptom of possible bradycardia and dizziness.  Patient was seen by endocrinologist and was thought to have bradycardia and was sent to the hospital.  In the hospital she was never documented to have bradycardia.  She was noted to have sinus rhythm with frequent PVCs and ventricular bigeminy rhythm.  Holter monitor showed frequent PVCs greater than 30,000.  Stress test was negative for ischemia and echocardiogram showed normal left ventricular size and function and LVEF was 55%.  No significant valvular heart disease was noted. In 2017, patient underwent Holter monitor which showed sinus rhythm with right bundle branch block pattern and patient had frequent PACs.     In 2018, patient was admitted at [facility] again with symptom of dizziness and possible extreme bradycardia.  She was found to be in sinus rhythm with bigeminy.  Holter monitor showed frequent premature ventricular contraction but no  significant pause.  Ventricular bigeminy rhythm was noted.  Patient underwent cardiac catheterization showed no significant CAD..  In 2019, patient underwent Holter monitor it showed frequent PVCs greater than 18,000.  No significant bradycardia noted.  No SVT or VT was present. In 2019, patient underwent Holter monitor which showed predominantly sinus rhythm with right bundle branch block.  Frequent PVCs and bigeminy was noted.  Patient had 26% of PVC burden.    In 2019, patient underwent EP study and possible ablation of PVCs.  However it was unsuccessful.  Patient was started on Sectral 200 mg twice daily.  Patient is feeling better.  Her palpitations are relatively low.  Patient denies any symptom of chest pain.  Patient does have shortness of breath.  Patient has trace to 1+ leg edema.    At this stage I will continue current treatment.  Patient is on Sectral 200 mg twice daily.  I will continue that I will refill Lasix 40 mg daily.  Patient is advised to monitor the blood pressure.  Patient may need stress test in future.  Holter monitor on next visit    Past Medical History:   Diagnosis Date   • Arthritis    • Atrial fibrillation (CMS/[facility])    • Bradycardia     Dr. Charles   • Cataract    • COPD (chronic obstructive pulmonary disease) (CMS/HCC)     Dr. Rob   • Diabetes mellitus, type 2 (CMS/[facility])     sees Dr. Archibald at [place]   • DJD (degenerative joint disease)     possible herniated disc, sees Pain Mgmt in [place]   •     • GERD (gastroesophageal reflux disease)    • History of combined right and left heart catheterization 2018    minimal CAD on heart cath done    • Hyperlipidemia    • Hypertension    • Pain    • Sleep apnea     wears CPAP machine, sees Darron         Past Surgical History:   Procedure Laterality Date   • ABLATION OF DYSRHYTHMIC FOCUS     • CARDIAC CATHETERIZATION      and Angiograph    • CARDIAC ELECTROPHYSIOLOGY PROCEDURE N/A 12/10/2019    Procedure: pvc  ablation;  Surgeon: Alfredo Iglesias MD;  Location: [facility] INVASIVE LOCATION;  Service: Cardiovascular   •  SECTION      x 1   • COLON RESECTION     • COLONOSCOPY  2012   • COLOSTOMY      and reversal in her 20's due to problems with childbirth   • COLOSTOMY CLOSURE     • OVARIAN CYST SURGERY     • ROTATOR CUFF REPAIR Left 2009    x2    • TOTAL ABDOMINAL HYSTERECTOMY WITH SALPINGO OOPHORECTOMY             Current Outpatient Medications:   •  ACCU-CHEK CARLIE PLUS test strip, USE TO CHECK BLOOD SUGAR TWICE A DAY, Disp: 100 each, Rfl: 3  •  ACCU-CHEK FASTCLIX LANCETS misc, 1 each by Other route 2 (Two) Times a Day., Disp: 100 each, Rfl: 3  •  ACCU-CHEK SOFTCLIX LANCETS lancets, USE TO CHECK BLOOD SUGAR TWICE A DAY, Disp: , Rfl: 4  •  ACCU-CHEK SOFTCLIX LANCETS lancets, USE TO CHECK BS 3 TIMES DAILY, Disp: 100 each, Rfl: 2  •  albuterol sulfate  (90 Base) MCG/ACT inhaler, Inhale 2 puffs Every 4 (Four) Hours As Needed for Wheezing., Disp: , Rfl:   •  apixaban (ELIQUIS) 5 MG tablet tablet, Take 5 mg by mouth 2 (Two) Times a Day., Disp: , Rfl:   •  atorvastatin (LIPITOR) 40 MG tablet, TAKE 1/2 TABLET BY MOUTH DAILY, Disp: 45 tablet, Rfl: 1  •  Blood Glucose Monitoring Suppl (ACCU-CHEK CARLIE PLUS) w/Device kit, USE TO CHECK BS 3 TIMES DAILY, Disp: 1 kit, Rfl: 0  •  cholecalciferol (VITAMIN D3) 10 MCG (400 UNIT) tablet, Take 1,000 Units by mouth 2 (Two) Times a Day., Disp: , Rfl:   •  CVS D3 25 MCG (1000 UT) capsule, Take 1,000 Units by mouth 2 (Two) Times a Day., Disp: , Rfl:   •  fenofibrate (TRICOR) 145 MG tablet, Take 145 mg by mouth Daily., Disp: , Rfl:   •  fenofibrate 160 MG tablet, Take 1 tablet by mouth Daily., Disp: 90 tablet, Rfl: 3  •  glucose blood (ACCU-CHEK CARLIE PLUS) test strip, USE TO CHECK BS 3 TIMES DAILY, Disp: 100 each, Rfl: 2  •  glucose blood (ACCU-CHEK GUIDE) test strip, 1 each by Other route 2 (Two) Times a Day. Use as instructed, Disp: , Rfl:   •  glucose blood  (ACCU-CHEK GUIDE) test strip, Use as instructed, Disp: 100 each, Rfl: 3  •  hydrALAZINE (APRESOLINE) 50 MG tablet, Take 1 tablet by mouth 2 (Two) Times a Day. (Patient taking differently: Take 25 mg by mouth 2 (Two) Times a Day.), Disp: 180 tablet, Rfl: 0  •  lisinopril (PRINIVIL,ZESTRIL) 20 MG tablet, Take 20 mg by mouth Daily., Disp: , Rfl:   •  magnesium oxide (MAG-OX) 400 MG tablet, TAKE 1 TABLET BY MOUTH TWICE A DAY, Disp: 180 tablet, Rfl: 0  •  oxyCODONE-acetaminophen (PERCOCET) 5-325 MG per tablet, Take 1 tablet by mouth Every 8 (Eight) Hours As Needed., Disp: , Rfl:   •  potassium chloride (MICRO-K) 10 MEQ CR capsule, Take 1 capsule by mouth Daily., Disp: 90 capsule, Rfl: 1  •  sitaGLIPtin-metFORMIN (Janumet)  MG per tablet, Take 1 tablet by mouth 2 (Two) Times a Day With Meals., Disp: , Rfl:   •  acebutolol (SECTRAL) 200 MG capsule, Take 1 capsule by mouth 2 (Two) Times a Day., Disp: 60 capsule, Rfl: 2  •  furosemide (LASIX) 40 MG tablet, Take 1 tablet by mouth Daily., Disp: 90 tablet, Rfl: 3      Social History     Socioeconomic History   • Marital status:      Spouse name: Not on file   • Number of children: Not on file   • Years of education: Not on file   • Highest education level: Not on file   Tobacco Use   • Smoking status: Former Smoker     Packs/day: 1.00     Years: 42.00     Pack years: 42.00     Types: Cigarettes     Last attempt to quit: 3/1/2020     Years since quittin.1   • Smokeless tobacco: Never Used   • Tobacco comment: Pt states she is trying to quit   Substance and Sexual Activity   • Alcohol use: No     Frequency: Never   • Drug use: No   • Sexual activity: Defer         Review of Systems   Constitution: Negative for chills and fever.   HENT: Negative for ear discharge and nosebleeds.    Eyes: Negative for discharge and redness.   Cardiovascular: Positive for palpitations. Negative for chest pain, orthopnea, paroxysmal nocturnal dyspnea and syncope.   Respiratory:  "Positive for shortness of breath. Negative for cough and wheezing.    Endocrine: Negative for heat intolerance.   Skin: Negative for rash.   Musculoskeletal: Positive for arthritis and joint pain. Negative for myalgias.   Gastrointestinal: Negative for abdominal pain, melena, nausea and vomiting.   Genitourinary: Negative for dysuria and hematuria.   Neurological: Negative for dizziness, light-headedness, numbness and tremors.   Psychiatric/Behavioral: Negative for depression. The patient is not nervous/anxious.        Procedures    Procedures    No orders to display           Objective:    /76   Pulse 68   Ht 160 cm (62.99\")   Wt 93.4 kg (206 lb)   BMI 36.50 kg/m²         Physical Exam   Constitutional: She is oriented to person, place, and time.   Musculoskeletal: She exhibits no edema.   Neurological: She is alert and oriented to person, place, and time.           Assessment:       Diagnosis Plan   1. Bradycardia  acebutolol (SECTRAL) 200 MG capsule    furosemide (LASIX) 40 MG tablet   2. Mixed hyperlipidemia  acebutolol (SECTRAL) 200 MG capsule    furosemide (LASIX) 40 MG tablet   3. Paroxysmal atrial fibrillation (CMS/HCC)  acebutolol (SECTRAL) 200 MG capsule    furosemide (LASIX) 40 MG tablet   4. Palpitations  acebutolol (SECTRAL) 200 MG capsule    furosemide (LASIX) 40 MG tablet   5. Essential hypertension  acebutolol (SECTRAL) 200 MG capsule    furosemide (LASIX) 40 MG tablet   6. Acute diastolic CHF (congestive heart failure) (CMS/HCC)  acebutolol (SECTRAL) 200 MG capsule    furosemide (LASIX) 40 MG tablet   7. Type 2 diabetes mellitus with hyperglycemia, without long-term current use of insulin (CMS/[facility])  acebutolol (SECTRAL) 200 MG capsule    furosemide (LASIX) 40 MG tablet   8. PVC (premature ventricular contraction)  furosemide (LASIX) 40 MG tablet            Plan:       At this stage, I would continue current treatment.  Patient is advised to monitor the blood pressure.  I would repeat " Holter monitor on next visit.  I will see the patient in 3 months

## 2020-05-11 RX ORDER — MELOXICAM 15 MG/1
TABLET ORAL
Qty: 30 TABLET | Refills: 0 | Status: SHIPPED | OUTPATIENT
Start: 2020-05-11 | End: 2020-05-27 | Stop reason: SDUPTHER

## 2020-05-27 ENCOUNTER — OFFICE VISIT (OUTPATIENT)
Dept: FAMILY MEDICINE CLINIC | Facility: CLINIC | Age: 65
End: 2020-05-27

## 2020-05-27 VITALS
OXYGEN SATURATION: 95 % | DIASTOLIC BLOOD PRESSURE: 75 MMHG | TEMPERATURE: 97.1 F | SYSTOLIC BLOOD PRESSURE: 126 MMHG | WEIGHT: 208 LBS | HEART RATE: 68 BPM | BODY MASS INDEX: 36.85 KG/M2

## 2020-05-27 DIAGNOSIS — M79.10 MYALGIA: Primary | ICD-10-CM

## 2020-05-27 DIAGNOSIS — M19.90 ARTHRITIS: ICD-10-CM

## 2020-05-27 PROCEDURE — 99214 OFFICE O/P EST MOD 30 MIN: CPT | Performed by: FAMILY MEDICINE

## 2020-05-27 RX ORDER — MELOXICAM 15 MG/1
15 TABLET ORAL DAILY
Qty: 30 TABLET | Refills: 2 | Status: SHIPPED | OUTPATIENT
Start: 2020-05-27 | End: 2020-08-28

## 2020-05-27 RX ORDER — TIZANIDINE 2 MG/1
TABLET ORAL
COMMUNITY
Start: 2020-05-21 | End: 2020-06-05

## 2020-05-27 RX ORDER — HYDROCODONE BITARTRATE AND ACETAMINOPHEN 7.5; 325 MG/1; MG/1
1 TABLET ORAL 3 TIMES DAILY PRN
COMMUNITY
Start: 2020-05-22

## 2020-05-29 ENCOUNTER — LAB (OUTPATIENT)
Dept: LAB | Facility: HOSPITAL | Age: 65
End: 2020-05-29

## 2020-05-29 DIAGNOSIS — E11.42 DIABETIC PERIPHERAL NEUROPATHY (HCC): ICD-10-CM

## 2020-05-29 DIAGNOSIS — E78.2 MIXED HYPERLIPIDEMIA: ICD-10-CM

## 2020-05-29 DIAGNOSIS — E11.65 TYPE 2 DIABETES MELLITUS WITH HYPERGLYCEMIA, WITHOUT LONG-TERM CURRENT USE OF INSULIN (HCC): ICD-10-CM

## 2020-05-29 DIAGNOSIS — I10 ESSENTIAL HYPERTENSION: ICD-10-CM

## 2020-05-29 LAB
ALBUMIN SERPL-MCNC: 4.2 G/DL (ref 3.5–5.2)
ALBUMIN UR-MCNC: <1.2 MG/DL
ALBUMIN/GLOB SERPL: 1.3 G/DL
ALP SERPL-CCNC: 45 U/L (ref 39–117)
ALT SERPL W P-5'-P-CCNC: 43 U/L (ref 1–33)
ANION GAP SERPL CALCULATED.3IONS-SCNC: 10.5 MMOL/L (ref 5–15)
AST SERPL-CCNC: 25 U/L (ref 1–32)
BILIRUB SERPL-MCNC: 0.4 MG/DL (ref 0.2–1.2)
BUN BLD-MCNC: 25 MG/DL (ref 8–23)
BUN/CREAT SERPL: 26.3 (ref 7–25)
CALCIUM SPEC-SCNC: 10.1 MG/DL (ref 8.6–10.5)
CHLORIDE SERPL-SCNC: 97 MMOL/L (ref 98–107)
CHOLEST SERPL-MCNC: 120 MG/DL (ref 0–200)
CO2 SERPL-SCNC: 28.5 MMOL/L (ref 22–29)
CREAT BLD-MCNC: 0.95 MG/DL (ref 0.57–1)
CREAT UR-MCNC: 90.9 MG/DL
GFR SERPL CREATININE-BSD FRML MDRD: 59 ML/MIN/1.73
GLOBULIN UR ELPH-MCNC: 3.3 GM/DL
GLUCOSE BLD-MCNC: 265 MG/DL (ref 65–99)
HBA1C MFR BLD: 10.6 % (ref 3.5–5.6)
HDLC SERPL-MCNC: 31 MG/DL (ref 40–60)
LDLC SERPL CALC-MCNC: 34 MG/DL (ref 0–100)
LDLC/HDLC SERPL: 1.09 {RATIO}
MICROALBUMIN/CREAT UR: NORMAL MG/G{CREAT}
POTASSIUM BLD-SCNC: 3.7 MMOL/L (ref 3.5–5.2)
PROT SERPL-MCNC: 7.5 G/DL (ref 6–8.5)
SODIUM BLD-SCNC: 136 MMOL/L (ref 136–145)
TRIGL SERPL-MCNC: 276 MG/DL (ref 0–150)
VLDLC SERPL-MCNC: 55.2 MG/DL (ref 5–40)

## 2020-05-29 PROCEDURE — 80061 LIPID PANEL: CPT

## 2020-05-29 PROCEDURE — 80053 COMPREHEN METABOLIC PANEL: CPT

## 2020-05-29 PROCEDURE — 82570 ASSAY OF URINE CREATININE: CPT

## 2020-05-29 PROCEDURE — 36415 COLL VENOUS BLD VENIPUNCTURE: CPT

## 2020-05-29 PROCEDURE — 83036 HEMOGLOBIN GLYCOSYLATED A1C: CPT

## 2020-05-29 PROCEDURE — 82043 UR ALBUMIN QUANTITATIVE: CPT

## 2020-06-05 ENCOUNTER — OFFICE VISIT (OUTPATIENT)
Dept: ENDOCRINOLOGY | Facility: CLINIC | Age: 65
End: 2020-06-05

## 2020-06-05 VITALS
SYSTOLIC BLOOD PRESSURE: 122 MMHG | WEIGHT: 209 LBS | OXYGEN SATURATION: 95 % | TEMPERATURE: 97.7 F | HEIGHT: 63 IN | HEART RATE: 70 BPM | DIASTOLIC BLOOD PRESSURE: 70 MMHG | BODY MASS INDEX: 37.03 KG/M2

## 2020-06-05 DIAGNOSIS — E78.2 MIXED HYPERLIPIDEMIA: ICD-10-CM

## 2020-06-05 DIAGNOSIS — I10 ESSENTIAL HYPERTENSION: ICD-10-CM

## 2020-06-05 DIAGNOSIS — E11.65 TYPE 2 DIABETES MELLITUS WITH HYPERGLYCEMIA, WITHOUT LONG-TERM CURRENT USE OF INSULIN (HCC): Primary | ICD-10-CM

## 2020-06-05 LAB — GLUCOSE BLDC GLUCOMTR-MCNC: 416 MG/DL (ref 70–130)

## 2020-06-05 PROCEDURE — 82962 GLUCOSE BLOOD TEST: CPT | Performed by: INTERNAL MEDICINE

## 2020-06-05 PROCEDURE — 99214 OFFICE O/P EST MOD 30 MIN: CPT | Performed by: INTERNAL MEDICINE

## 2020-06-05 RX ORDER — INSULIN LISPRO 100 [IU]/ML
7 INJECTION, SOLUTION INTRAVENOUS; SUBCUTANEOUS 3 TIMES DAILY
Qty: 5 PEN | Refills: 6 | Status: SHIPPED | OUTPATIENT
Start: 2020-06-05 | End: 2021-05-27 | Stop reason: SDUPTHER

## 2020-06-05 RX ORDER — PEN NEEDLE, DIABETIC 30 GX3/16"
150 NEEDLE, DISPOSABLE MISCELLANEOUS 4 TIMES DAILY
Qty: 150 EACH | Refills: 3 | Status: SHIPPED | OUTPATIENT
Start: 2020-06-05 | End: 2020-12-30

## 2020-06-05 NOTE — PROGRESS NOTES
Endocrine Progress Note Outpatient     Patient Care Team:  Lou Curran MD as PCP - General  Jesse Charles MD as Consulting Physician (Cardiology)  Bautista Archibald MD as Consulting Physician (Endocrinology)  Raymond Piper MD as Consulting Physician (Pulmonary Disease)  Jane Montero MD as Consulting Physician (Obstetrics and Gynecology)    Chief Complaint: Follow-up type 2 diabetes    HPI: 64-year-old female with history of type 2 diabetes, hypertension and hyperlipidemia is here for follow-up.  For type 2 diabetes she is currently on Janumet XR 50/thousand 2 tablets daily.  He stopped Jardiance due to excessive yeast infections.  She is tolerating her medications well.  Fortunately I do not have any blood sugar records for review at this time.   Hypertension: Well-controlled  Hyperlipidemia: On atorvastatin and fenofibrate.    Past Medical History:   Diagnosis Date   • Arthritis    • Atrial fibrillation (CMS/[facility])    • Bradycardia     Dr. Charles   • Cataract    • COPD (chronic obstructive pulmonary disease) (CMS/[facility])     Dr. Rob   • Diabetes mellitus, type 2 (CMS/[facility])     sees Dr. Archibald at [place]   • DJD (degenerative joint disease)     possible herniated disc, sees Pain Mgmt in [place]   •     • GERD (gastroesophageal reflux disease)    • History of combined right and left heart catheterization 2018    minimal CAD on heart cath done    • Hyperlipidemia    • Hypertension    • Pain    • Sleep apnea     wears CPAP machine, sees Darron       Social History     Socioeconomic History   • Marital status:      Spouse name: Not on file   • Number of children: Not on file   • Years of education: Not on file   • Highest education level: Not on file   Tobacco Use   • Smoking status: Former Smoker     Packs/day: 1.00     Years: 42.00     Pack years: 42.00     Types: Cigarettes     Last attempt to quit: 3/1/2020     Years since quittin.2   • Smokeless tobacco: Never Used   • Tobacco comment:  Pt states she is trying to quit   Substance and Sexual Activity   • Alcohol use: No     Frequency: Never   • Drug use: No   • Sexual activity: Defer       Family History   Problem Relation Age of Onset   • Heart disease Mother    • Hypertension Mother    • Stroke Mother    • Seizures Mother    • Heart disease Father    • Hypertension Father    • Lung disease Father    • Stroke Father    • Cancer Sister    • Hypertension Brother    • Diabetes Brother    • Hyperlipidemia Other        Allergies   Allergen Reactions   • Ibuprofen GI Intolerance   • Prednisone Other (See Comments)   • Pregabalin Other (See Comments)     jerking   • Tramadol Other (See Comments)     Legs jerked   • Levofloxacin Other (See Comments)     Increase sunburn   • Sulfamethoxazole-Trimethoprim Swelling       ROS:   Constitutional:  Admit fatigue, tiredness.    Eyes:  Denies change in visual acuity   HENT:  Denies nasal congestion or sore throat   Respiratory: denies cough, admit shortness of breath.   Cardiovascular:  denies chest pain, edema   GI:  Denies abdominal pain, nausea, vomiting.   Musculoskeletal:   Admit muscle aches and cramps.  Integument:  Denies dry skin and rash   Neurologic:  Denies headache, focal weakness or sensory changes   Endocrine:  Denies polyuria or polydipsia   Psychiatric:  Denies depression or anxiety      Vitals:    06/05/20 1048   BP: 122/70   Pulse: 70   Temp: 97.7 °F (36.5 °C)   SpO2: 95%       Physical Exam:  GEN: NAD, conversant, obese  EYES: EOMI, PERRL, no conjunctival erythema  NECK: no thyromegaly, full ROM   CV: RRR, no murmurs/rubs/gallops, no peripheral edema  LUNG: CTAB, no wheezes/rales/ronchi  SKIN: no rashes, no acanthosis  MSK: no deformities, full ROM of all extremities  NEURO: no tremors, DTR normal  PSYCH: AOX3, appropriate mood, affect normal      Results Review:     I reviewed the patient's new clinical results.    Lab Results   Component Value Date    HGBA1C 10.6 (H) 05/29/2020    HGBA1C 8.2  (H) 03/11/2020    HGBA1C 7.8 (H) 01/31/2020      Lab Results   Component Value Date    GLUCOSE 265 (H) 05/29/2020    BUN 25 (H) 05/29/2020    CREATININE 0.95 05/29/2020    EGFRIFNONA 59 (L) 05/29/2020    BCR 26.3 (H) 05/29/2020    K 3.7 05/29/2020    CO2 28.5 05/29/2020    CALCIUM 10.1 05/29/2020    ALBUMIN 4.20 05/29/2020    LABIL2 1.4 05/08/2019    AST 25 05/29/2020    ALT 43 (H) 05/29/2020    CHOL 120 05/29/2020    TRIG 276 (H) 05/29/2020    LDL 34 05/29/2020    HDL 31 (L) 05/29/2020     Lab Results   Component Value Date    TSH 0.810 07/09/2019         Medication Review: Reviewed.       Current Outpatient Medications:   •  ACCU-CHEK CARLIE PLUS test strip, USE TO CHECK BLOOD SUGAR TWICE A DAY, Disp: 100 each, Rfl: 3  •  ACCU-CHEK FASTCLIX LANCETS misc, 1 each by Other route 2 (Two) Times a Day., Disp: 100 each, Rfl: 3  •  ACCU-CHEK SOFTCLIX LANCETS lancets, USE TO CHECK BLOOD SUGAR TWICE A DAY, Disp: , Rfl: 4  •  ACCU-CHEK SOFTCLIX LANCETS lancets, USE TO CHECK BS 3 TIMES DAILY, Disp: 100 each, Rfl: 2  •  acebutolol (SECTRAL) 200 MG capsule, Take 1 capsule by mouth 2 (Two) Times a Day., Disp: 60 capsule, Rfl: 2  •  albuterol sulfate  (90 Base) MCG/ACT inhaler, Inhale 2 puffs Every 4 (Four) Hours As Needed for Wheezing., Disp: , Rfl:   •  apixaban (ELIQUIS) 5 MG tablet tablet, Take 5 mg by mouth 2 (Two) Times a Day., Disp: , Rfl:   •  atorvastatin (LIPITOR) 40 MG tablet, TAKE 1/2 TABLET BY MOUTH DAILY, Disp: 45 tablet, Rfl: 1  •  Blood Glucose Monitoring Suppl (ACCU-CHEK CARLIE PLUS) w/Device kit, USE TO CHECK BS 3 TIMES DAILY, Disp: 1 kit, Rfl: 0  •  CVS D3 25 MCG (1000 UT) capsule, Take 1,000 Units by mouth 2 (Two) Times a Day., Disp: , Rfl:   •  fenofibrate 160 MG tablet, Take 1 tablet by mouth Daily., Disp: 90 tablet, Rfl: 3  •  furosemide (LASIX) 40 MG tablet, Take 1 tablet by mouth Daily., Disp: 90 tablet, Rfl: 3  •  glucose blood (ACCU-CHEK CARLIE PLUS) test strip, USE TO CHECK BS 3 TIMES DAILY, Disp:  100 each, Rfl: 2  •  glucose blood (ACCU-CHEK GUIDE) test strip, 1 each by Other route 2 (Two) Times a Day. Use as instructed, Disp: , Rfl:   •  glucose blood (ACCU-CHEK GUIDE) test strip, Use as instructed, Disp: 100 each, Rfl: 3  •  hydrALAZINE (APRESOLINE) 50 MG tablet, Take 1 tablet by mouth 2 (Two) Times a Day. (Patient taking differently: Take 25 mg by mouth 2 (Two) Times a Day.), Disp: 180 tablet, Rfl: 0  •  HYDROcodone-acetaminophen (NORCO) 7.5-325 MG per tablet, Take 1 tablet by mouth 3 (Three) Times a Day As Needed., Disp: , Rfl:   •  lisinopril (PRINIVIL,ZESTRIL) 20 MG tablet, Take 20 mg by mouth Daily., Disp: , Rfl:   •  magnesium oxide (MAG-OX) 400 MG tablet, TAKE 1 TABLET BY MOUTH TWICE A DAY, Disp: 180 tablet, Rfl: 0  •  meloxicam (MOBIC) 15 MG tablet, Take 1 tablet by mouth Daily., Disp: 30 tablet, Rfl: 2  •  potassium chloride (MICRO-K) 10 MEQ CR capsule, Take 1 capsule by mouth Daily., Disp: 90 capsule, Rfl: 1  •  sitaGLIPtin-metFORMIN (Janumet)  MG per tablet, Take 1 tablet by mouth 2 (Two) Times a Day With Meals., Disp: , Rfl:       Assessment/Plan   1.  Diabetes mellitus type II: Uncontrolled with high A1c of 10.6%.  She is off Jardiance due to yeast infections.  Will add Lantus 20 units subcu nightly and Humalog 7 units with each meal.  She will continue Janumet and will follow blood sugars and A1c.  I am also going to refer her for the nutrition consult.  She is advised to always keep glucose source in case of low blood sugar and get regular eye exam and flu vaccine.    2.  Hypertension: Well-controlled, continue current medication    3.  Hyperlipidemia: LDL 34 with triglycerides 276.  He needs to have better control of diabetes.  Continue current medication              Bautista Archibald MD FACE.

## 2020-06-05 NOTE — PATIENT INSTRUCTIONS
Start Lantus 20 units subcu nightly  Start Humalog 7 units before each meal  Always keep glucose source with you in case of low blood sugar  Arrange for nutrition consult  Get regular eye exam and flu vaccine  Follow-up in 3 months with labs with blood sugar records.

## 2020-06-08 ENCOUNTER — TELEPHONE (OUTPATIENT)
Dept: ENDOCRINOLOGY | Facility: CLINIC | Age: 65
End: 2020-06-08

## 2020-06-08 NOTE — TELEPHONE ENCOUNTER
Patient called and stated none of her insulin is covered. I informed her she would need to call the pharmacy/insurance to find out what's covered. She stated she can go doing all of that. She stated she was taught how to do the insulin shots and can't remember how to do them. She stated her memory is not good. Is there something else you wanted to do? Please advise.

## 2020-06-08 NOTE — TELEPHONE ENCOUNTER
DC Lantus and Humalog and add RelionNovolin 70/30 insulin, she can use either viral or a pen it is her choice.  [facility].  She can take 20 units half an hour before breakfast and 10 units half an hour before supper and make sure she keeps glucose source with her in case of low blood sugar and send blood sugar records for review in 2 to 4 weeks.

## 2020-06-09 NOTE — TELEPHONE ENCOUNTER
Spoke with patient, she stated she can't afford the $25 for a vial of insulin. She stated she feels weak. She stated her BS was high (280 before lunch). Please advise.

## 2020-06-10 NOTE — TELEPHONE ENCOUNTER
TTC patient, spoke with gentleman who states patient is in bed. He states he would have patient contact [facility] when she gets up.

## 2020-06-29 RX ORDER — LISINOPRIL AND HYDROCHLOROTHIAZIDE 25; 20 MG/1; MG/1
TABLET ORAL
Qty: 90 TABLET | Refills: 3 | OUTPATIENT
Start: 2020-06-29

## 2020-07-01 RX ORDER — MAGNESIUM OXIDE 400 MG/1
TABLET ORAL
Qty: 180 TABLET | Refills: 0 | Status: SHIPPED | OUTPATIENT
Start: 2020-07-01 | End: 2020-10-03

## 2020-07-01 RX ORDER — LISINOPRIL AND HYDROCHLOROTHIAZIDE 25; 20 MG/1; MG/1
TABLET ORAL
Qty: 90 TABLET | Refills: 3 | OUTPATIENT
Start: 2020-07-01

## 2020-07-10 RX ORDER — LISINOPRIL AND HYDROCHLOROTHIAZIDE 25; 20 MG/1; MG/1
TABLET ORAL
Qty: 90 TABLET | Refills: 3 | OUTPATIENT
Start: 2020-07-10

## 2020-07-13 RX ORDER — LISINOPRIL AND HYDROCHLOROTHIAZIDE 25; 20 MG/1; MG/1
TABLET ORAL
Qty: 90 TABLET | Refills: 3 | OUTPATIENT
Start: 2020-07-13

## 2020-07-13 RX ORDER — APIXABAN 5 MG/1
TABLET, FILM COATED ORAL
Qty: 60 TABLET | Refills: 3 | Status: SHIPPED | OUTPATIENT
Start: 2020-07-13 | End: 2020-11-04

## 2020-07-20 ENCOUNTER — TELEPHONE (OUTPATIENT)
Dept: FAMILY MEDICINE CLINIC | Facility: CLINIC | Age: 65
End: 2020-07-20

## 2020-07-20 RX ORDER — LISINOPRIL 20 MG/1
20 TABLET ORAL DAILY
Qty: 90 TABLET | Refills: 1 | Status: SHIPPED | OUTPATIENT
Start: 2020-07-20 | End: 2021-01-11

## 2020-07-28 RX ORDER — LISINOPRIL AND HYDROCHLOROTHIAZIDE 25; 20 MG/1; MG/1
TABLET ORAL
Qty: 90 TABLET | Refills: 3 | OUTPATIENT
Start: 2020-07-28

## 2020-07-31 ENCOUNTER — OFFICE VISIT (OUTPATIENT)
Dept: CARDIOLOGY | Facility: CLINIC | Age: 65
End: 2020-07-31

## 2020-07-31 VITALS — HEIGHT: 63 IN | WEIGHT: 209 LBS | BODY MASS INDEX: 37.03 KG/M2

## 2020-07-31 DIAGNOSIS — I49.3 PVC (PREMATURE VENTRICULAR CONTRACTION): Primary | ICD-10-CM

## 2020-07-31 DIAGNOSIS — R00.2 PALPITATIONS: ICD-10-CM

## 2020-07-31 DIAGNOSIS — R06.09 DYSPNEA ON EXERTION: ICD-10-CM

## 2020-07-31 DIAGNOSIS — I48.0 PAROXYSMAL ATRIAL FIBRILLATION (HCC): ICD-10-CM

## 2020-07-31 DIAGNOSIS — I50.30 DIASTOLIC CONGESTIVE HEART FAILURE, UNSPECIFIED HF CHRONICITY (HCC): ICD-10-CM

## 2020-07-31 PROCEDURE — 99212 OFFICE O/P EST SF 10 MIN: CPT | Performed by: INTERNAL MEDICINE

## 2020-08-07 ENCOUNTER — OFFICE VISIT (OUTPATIENT)
Dept: CARDIOLOGY | Facility: CLINIC | Age: 65
End: 2020-08-07

## 2020-08-07 VITALS
OXYGEN SATURATION: 93 % | WEIGHT: 209 LBS | SYSTOLIC BLOOD PRESSURE: 116 MMHG | DIASTOLIC BLOOD PRESSURE: 74 MMHG | HEART RATE: 52 BPM | BODY MASS INDEX: 37.02 KG/M2

## 2020-08-07 DIAGNOSIS — I10 ESSENTIAL HYPERTENSION: ICD-10-CM

## 2020-08-07 DIAGNOSIS — G47.33 OBSTRUCTIVE SLEEP APNEA SYNDROME: ICD-10-CM

## 2020-08-07 DIAGNOSIS — I49.3 PVC'S (PREMATURE VENTRICULAR CONTRACTIONS): ICD-10-CM

## 2020-08-07 DIAGNOSIS — R00.2 PALPITATIONS: ICD-10-CM

## 2020-08-07 DIAGNOSIS — J42 CHRONIC BRONCHITIS, UNSPECIFIED CHRONIC BRONCHITIS TYPE (HCC): ICD-10-CM

## 2020-08-07 DIAGNOSIS — R00.1 BRADYCARDIA: Primary | ICD-10-CM

## 2020-08-07 DIAGNOSIS — R06.02 SHORTNESS OF BREATH: ICD-10-CM

## 2020-08-07 DIAGNOSIS — I48.0 PAROXYSMAL ATRIAL FIBRILLATION (HCC): ICD-10-CM

## 2020-08-07 PROCEDURE — 99214 OFFICE O/P EST MOD 30 MIN: CPT | Performed by: INTERNAL MEDICINE

## 2020-08-07 PROCEDURE — 93000 ELECTROCARDIOGRAM COMPLETE: CPT | Performed by: INTERNAL MEDICINE

## 2020-08-07 NOTE — PROGRESS NOTES
Date of Office Visit: 2020  Encounter Provider: Jesse Charles MD  Place of Service: [facility] CARDIOLOGY [place]  Patient Name: Caterina Mason  :1955  Lou Curran MD    Chief Complaint   Patient presents with   • Hypertension     3 mo f/u no cardiac complaints other SOA    • Hyperlipidemia   • Atrial Fibrillation     History of Present Illness:    I am pleased to see Mrs. Mason in my office today as a follow-up.    As you know, patient is 64-year-old white female whose past medical history significant for hypertension, hyperlipidemia, COPD, diabetes mellitus, paroxysmal atrial fibrillation, who came today for follow-up.    In 2017, patient was admitted at [facility] with a symptom of possible bradycardia and dizziness.  Patient was seen by endocrinologist and was thought to have bradycardia and was sent to the hospital.  In the hospital she was never documented to have bradycardia.  She was noted to have sinus rhythm with frequent PVCs and ventricular bigeminy rhythm.  Holter monitor showed frequent PVCs greater than 30,000.  Stress test was negative for ischemia and echocardiogram showed normal left ventricular size and function and LVEF was 55%.  No significant valvular heart disease was noted. In 2017, patient underwent Holter monitor which showed sinus rhythm with right bundle branch block pattern and patient had frequent PACs.     In 2018, patient was admitted at [facility] again with symptom of dizziness and possible extreme bradycardia.  She was found to be in sinus rhythm with bigeminy.  Holter monitor showed frequent premature ventricular contraction but no significant pause.  Ventricular bigeminy rhythm was noted.  Patient underwent cardiac catheterization showed no significant CAD..  In 2019, patient underwent Holter monitor it showed frequent PVCs greater than 18,000.  No significant bradycardia noted.  No SVT or VT was  present. In 2019, patient underwent Holter monitor which showed predominantly sinus rhythm with right bundle branch block.  Frequent PVCs and bigeminy was noted.  Patient had 26% of PVC burden. In 2019, patient underwent EP study and possible ablation of PVCs.  However it was unsuccessful.  Patient was started on Sectral 200 mg twice daily.    Since the previous visit, patient is doing fairly well from cardiac standpoint.  She does not have any significant palpitation.  Patient denies any symptom of angina pectoris or congestive heart failure.  Patient does complain of shortness of breath due to underlying COPD.  No syncope or presyncope.  No orthopnea or PND.  No significant leg edema.    EKG showed normal sinus rhythm with isolated PVCs.    Her symptoms of palpitations are contained.  Patient is in sinus rhythm with isolated PVCs.  Patient has failed PVC ablation in the past.  I will continue current therapy.    Past Medical History:   Diagnosis Date   • Arthritis    • Atrial fibrillation (CMS/HCC)    • Bradycardia     Dr. Charles   • Cataract    • COPD (chronic obstructive pulmonary disease) (CMS/HCC)     Dr. Rob   • Diabetes mellitus, type 2 (CMS/HCC)     sees Dr. Archibald at [place]   • DJD (degenerative joint disease)     possible herniated disc, sees Pain Mgmt in [place]   •     • GERD (gastroesophageal reflux disease)    • History of combined right and left heart catheterization 2018    minimal CAD on heart cath done    • Hyperlipidemia    • Hypertension    • Pain    • Sleep apnea     wears CPAP machine, sees Darron         Past Surgical History:   Procedure Laterality Date   • ABLATION OF DYSRHYTHMIC FOCUS     • CARDIAC CATHETERIZATION      and Angiograph    • CARDIAC ELECTROPHYSIOLOGY PROCEDURE N/A 12/10/2019    Procedure: pvc ablation;  Surgeon: Alfredo Iglesias MD;  Location: [facility] INVASIVE LOCATION;  Service: Cardiovascular   •  SECTION      x 1   • COLON  RESECTION     • COLONOSCOPY  05/14/2012   • COLOSTOMY      and reversal in her 20's due to problems with childbirth   • COLOSTOMY CLOSURE     • OVARIAN CYST SURGERY     • ROTATOR CUFF REPAIR Left 2009    x2    • TOTAL ABDOMINAL HYSTERECTOMY WITH SALPINGO OOPHORECTOMY  2005           Current Outpatient Medications:   •  ACCU-CHEK CARLIE PLUS test strip, USE TO CHECK BLOOD SUGAR TWICE A DAY, Disp: 100 each, Rfl: 3  •  ACCU-CHEK FASTCLIX LANCETS misc, 1 each by Other route 2 (Two) Times a Day., Disp: 100 each, Rfl: 3  •  ACCU-CHEK SOFTCLIX LANCETS lancets, USE TO CHECK BLOOD SUGAR TWICE A DAY, Disp: , Rfl: 4  •  ACCU-CHEK SOFTCLIX LANCETS lancets, USE TO CHECK BS 3 TIMES DAILY, Disp: 100 each, Rfl: 2  •  acebutolol (SECTRAL) 200 MG capsule, Take 1 capsule by mouth 2 (Two) Times a Day., Disp: 60 capsule, Rfl: 2  •  albuterol sulfate  (90 Base) MCG/ACT inhaler, Inhale 2 puffs Every 4 (Four) Hours As Needed for Wheezing., Disp: , Rfl:   •  atorvastatin (LIPITOR) 40 MG tablet, TAKE 1/2 TABLET BY MOUTH DAILY, Disp: 45 tablet, Rfl: 1  •  Blood Glucose Monitoring Suppl (ACCU-CHEK CARLIE PLUS) w/Device kit, USE TO CHECK BS 3 TIMES DAILY, Disp: 1 kit, Rfl: 0  •  CVS D3 25 MCG (1000 UT) capsule, Take 1,000 Units by mouth 2 (Two) Times a Day., Disp: , Rfl:   •  ELIQUIS 5 MG tablet tablet, TAKE 1 TABLET BY MOUTH TWICE A DAY, Disp: 60 tablet, Rfl: 3  •  fenofibrate 160 MG tablet, Take 1 tablet by mouth Daily., Disp: 90 tablet, Rfl: 3  •  furosemide (LASIX) 40 MG tablet, Take 1 tablet by mouth Daily., Disp: 90 tablet, Rfl: 3  •  glucose blood (Accu-Chek Carlie Plus) test strip, USE TO CHECK BS 3 TIMES DAILY DX E11.65, Disp: 100 each, Rfl: 2  •  glucose blood (ACCU-CHEK GUIDE) test strip, 1 each by Other route 2 (Two) Times a Day. Use as instructed, Disp: , Rfl:   •  glucose blood (ACCU-CHEK GUIDE) test strip, Use as instructed, Disp: 100 each, Rfl: 3  •  hydrALAZINE (APRESOLINE) 50 MG tablet, Take 1 tablet by mouth 2 (Two) Times  a Day. (Patient taking differently: Take 25 mg by mouth 2 (Two) Times a Day.), Disp: 180 tablet, Rfl: 0  •  HYDROcodone-acetaminophen (NORCO) 7.5-325 MG per tablet, Take 1 tablet by mouth 3 (Three) Times a Day As Needed., Disp: , Rfl:   •  Insulin Glargine (Lantus SoloStar) 100 UNIT/ML injection pen, Inject 20 Units under the skin into the appropriate area as directed Every Night., Disp: 5 pen, Rfl: 6  •  Insulin Lispro, 1 Unit Dial, (HumaLOG KwikPen) 100 UNIT/ML solution pen-injector, Inject 7 Units under the skin into the appropriate area as directed 3 (Three) Times a Day., Disp: 5 pen, Rfl: 6  •  Insulin Pen Needle (PEN NEEDLES) 32G X 4 MM misc, 150 each 4 (Four) Times a Day. Use to inject insulin 4 times daily  / DX E11.65, Disp: 150 each, Rfl: 3  •  lisinopril (PRINIVIL,ZESTRIL) 20 MG tablet, Take 1 tablet by mouth Daily., Disp: 90 tablet, Rfl: 1  •  magnesium oxide (MAG-OX) 400 MG tablet, TAKE 1 TABLET BY MOUTH TWICE A DAY, Disp: 180 tablet, Rfl: 0  •  meloxicam (MOBIC) 15 MG tablet, Take 1 tablet by mouth Daily., Disp: 30 tablet, Rfl: 2  •  potassium chloride (MICRO-K) 10 MEQ CR capsule, Take 1 capsule by mouth Daily., Disp: 90 capsule, Rfl: 1  •  sitaGLIPtin-metFORMIN (Janumet)  MG per tablet, Take 1 tablet by mouth 2 (Two) Times a Day With Meals., Disp: , Rfl:       Social History     Socioeconomic History   • Marital status:      Spouse name: Not on file   • Number of children: Not on file   • Years of education: Not on file   • Highest education level: Not on file   Tobacco Use   • Smoking status: Former Smoker     Packs/day: 1.00     Years: 42.00     Pack years: 42.00     Types: Cigarettes     Last attempt to quit: 3/1/2020     Years since quittin.4   • Smokeless tobacco: Never Used   • Tobacco comment: Pt states she is trying to quit   Substance and Sexual Activity   • Alcohol use: No     Frequency: Never   • Drug use: No   • Sexual activity: Defer         Review of Systems    Constitution: Negative for chills and fever.   HENT: Negative for ear discharge and nosebleeds.    Eyes: Negative for discharge and redness.   Cardiovascular: Negative for chest pain, orthopnea, palpitations, paroxysmal nocturnal dyspnea and syncope.   Respiratory: Positive for shortness of breath. Negative for cough and wheezing.    Endocrine: Negative for heat intolerance.   Skin: Negative for rash.   Musculoskeletal: Positive for arthritis and joint pain. Negative for myalgias.   Gastrointestinal: Negative for abdominal pain, melena, nausea and vomiting.   Genitourinary: Negative for dysuria and hematuria.   Neurological: Negative for dizziness, light-headedness, numbness and tremors.   Psychiatric/Behavioral: Negative for depression. The patient is not nervous/anxious.        Procedures      ECG 12 Lead  Date/Time: 8/7/2020 9:50 AM  Performed by: Jesse Charles MD  Authorized by: Jesse Charles MD   Comparison: compared with previous ECG   Similar to previous ECG  Rhythm: sinus rhythm  Ectopy: unifocal PVCs    Clinical impression: abnormal EKG            ECG 12 Lead    (Results Pending)           Objective:    /74   Pulse 52   Wt 94.8 kg (209 lb)   SpO2 93%   BMI 37.02 kg/m²         Physical Exam   Constitutional: She is oriented to person, place, and time. She appears well-developed and well-nourished.   HENT:   Head: Normocephalic and atraumatic.   Eyes: No scleral icterus.   Neck: No thyromegaly present.   Cardiovascular: Normal rate, regular rhythm and normal heart sounds. Exam reveals no gallop and no friction rub.   No murmur heard.  Pulmonary/Chest: Effort normal and breath sounds normal. No respiratory distress. She has no wheezes. She has no rales.   Abdominal: There is no tenderness.   Musculoskeletal: She exhibits no edema.   Lymphadenopathy:     She has no cervical adenopathy.   Neurological: She is alert and oriented to person, place, and time.   Skin: No rash noted. No erythema.    Psychiatric: She has a normal mood and affect.           Assessment:       Diagnosis Plan   1. Bradycardia     2. Paroxysmal atrial fibrillation (CMS/HCC)     3. Palpitations     4. PVC's (premature ventricular contractions)     5. Essential hypertension     6. Chronic bronchitis, unspecified chronic bronchitis type (CMS/HCC)     7. Obstructive sleep apnea syndrome     8. Shortness of breath              Plan:       Patient has shortness of breath but they are baseline.  Patient has bradycardia but it is stable and patient is asymptomatic.  Patient still have PVCs and patient is on acebutolol.  I will continue that.  Patient has failed PVC ablation in the past.  Patient is advised to discuss with PCP for possible referral to pulmonologist.

## 2020-08-10 RX ORDER — POTASSIUM CHLORIDE 750 MG/1
CAPSULE, EXTENDED RELEASE ORAL
Qty: 90 CAPSULE | Refills: 1 | Status: SHIPPED | OUTPATIENT
Start: 2020-08-10 | End: 2021-02-01

## 2020-08-12 RX ORDER — HYDRALAZINE HYDROCHLORIDE 50 MG/1
50 TABLET, FILM COATED ORAL 2 TIMES DAILY
Qty: 180 TABLET | Refills: 0 | Status: SHIPPED | OUTPATIENT
Start: 2020-08-12 | End: 2021-02-15

## 2020-08-17 RX ORDER — ALBUTEROL SULFATE 90 UG/1
AEROSOL, METERED RESPIRATORY (INHALATION)
Qty: 1 G | Refills: 5 | Status: SHIPPED | OUTPATIENT
Start: 2020-08-17 | End: 2021-05-27

## 2020-08-28 ENCOUNTER — OFFICE VISIT (OUTPATIENT)
Dept: FAMILY MEDICINE CLINIC | Facility: CLINIC | Age: 65
End: 2020-08-28

## 2020-08-28 VITALS
BODY MASS INDEX: 38.62 KG/M2 | HEART RATE: 70 BPM | DIASTOLIC BLOOD PRESSURE: 58 MMHG | TEMPERATURE: 97.3 F | SYSTOLIC BLOOD PRESSURE: 149 MMHG | OXYGEN SATURATION: 92 % | WEIGHT: 218 LBS

## 2020-08-28 DIAGNOSIS — Z72.0 TOBACCO USE: ICD-10-CM

## 2020-08-28 DIAGNOSIS — I10 ESSENTIAL HYPERTENSION: Primary | ICD-10-CM

## 2020-08-28 DIAGNOSIS — N32.81 OAB (OVERACTIVE BLADDER): ICD-10-CM

## 2020-08-28 DIAGNOSIS — Z23 NEED FOR VACCINATION: ICD-10-CM

## 2020-08-28 DIAGNOSIS — J44.1 COPD EXACERBATION (HCC): ICD-10-CM

## 2020-08-28 PROCEDURE — 99214 OFFICE O/P EST MOD 30 MIN: CPT | Performed by: FAMILY MEDICINE

## 2020-08-28 PROCEDURE — 90670 PCV13 VACCINE IM: CPT | Performed by: FAMILY MEDICINE

## 2020-08-28 PROCEDURE — 90471 IMMUNIZATION ADMIN: CPT | Performed by: FAMILY MEDICINE

## 2020-09-13 PROBLEM — Z23 NEED FOR VACCINATION: Status: ACTIVE | Noted: 2020-09-13

## 2020-09-14 ENCOUNTER — OFFICE VISIT (OUTPATIENT)
Dept: FAMILY MEDICINE CLINIC | Facility: CLINIC | Age: 65
End: 2020-09-14

## 2020-09-14 VITALS
WEIGHT: 220 LBS | BODY MASS INDEX: 38.97 KG/M2 | DIASTOLIC BLOOD PRESSURE: 92 MMHG | SYSTOLIC BLOOD PRESSURE: 171 MMHG | TEMPERATURE: 97.3 F | HEART RATE: 77 BPM | OXYGEN SATURATION: 93 %

## 2020-09-14 DIAGNOSIS — J18.9 PNEUMONIA OF LEFT LOWER LOBE DUE TO INFECTIOUS ORGANISM: Primary | ICD-10-CM

## 2020-09-14 PROCEDURE — 99213 OFFICE O/P EST LOW 20 MIN: CPT | Performed by: FAMILY MEDICINE

## 2020-09-14 RX ORDER — DOXYCYCLINE 100 MG/1
100 CAPSULE ORAL 2 TIMES DAILY
Qty: 20 CAPSULE | Refills: 0 | Status: SHIPPED | OUTPATIENT
Start: 2020-09-14 | End: 2021-01-13

## 2020-09-14 NOTE — PROGRESS NOTES
Subjective   Chief Complaint   Patient presents with   • lung pain     x 9/10, can't take a deep breath   • Cough     Caterina Mason is a 65 y.o. female.     Her son has pneumonia now.    Cough  This is a new problem. The current episode started in the past 7 days. The problem has been gradually worsening. The problem occurs every few minutes. The cough is non-productive. Associated symptoms include chest pain, ear pain and shortness of breath. Pertinent negatives include no chills, fever, nasal congestion, postnasal drip, rash, rhinorrhea, sore throat or wheezing. The symptoms are aggravated by lying down and exercise. She has tried a beta-agonist inhaler and rest for the symptoms.      Past Medical History:   Diagnosis Date   • Arthritis    • Atrial fibrillation (CMS/[facility])    • Bradycardia     Dr. Charles   • Cataract    • COPD (chronic obstructive pulmonary disease) (CMS/[facility])     Dr. Rob   • Diabetes mellitus, type 2 (CMS/[facility])     sees Dr. Archibald at [place]   • DJD (degenerative joint disease)     possible herniated disc, sees Pain Mgmt in [place]   •     • GERD (gastroesophageal reflux disease)    • History of combined right and left heart catheterization 2018    minimal CAD on heart cath done    • Hyperlipidemia    • Hypertension    • Pain    • Sleep apnea     wears CPAP machine, sees Darron     Past Surgical History:   Procedure Laterality Date   • ABLATION OF DYSRHYTHMIC FOCUS     • CARDIAC CATHETERIZATION      and Angiograph    • CARDIAC ELECTROPHYSIOLOGY PROCEDURE N/A 12/10/2019    Procedure: pvc ablation;  Surgeon: Alfredo Iglesias MD;  Location: [facility] INVASIVE LOCATION;  Service: Cardiovascular   •  SECTION      x 1   • COLON RESECTION     • COLONOSCOPY  2012   • COLOSTOMY      and reversal in her 20's due to problems with childbirth   • COLOSTOMY CLOSURE     • OVARIAN CYST SURGERY     • ROTATOR CUFF REPAIR Left 2009    x2    • TOTAL ABDOMINAL HYSTERECTOMY WITH  SALPINGO OOPHORECTOMY       Allergies   Allergen Reactions   • Ibuprofen GI Intolerance   • Prednisone Other (See Comments)   • Pregabalin Other (See Comments)     jerking   • Tramadol Other (See Comments)     Legs jerked   • Levofloxacin Other (See Comments)     Increase sunburn   • Sulfamethoxazole-Trimethoprim Swelling     Social History     Socioeconomic History   • Marital status:      Spouse name: Not on file   • Number of children: Not on file   • Years of education: Not on file   • Highest education level: Not on file   Tobacco Use   • Smoking status: Former Smoker     Packs/day: 1.00     Years: 48.00     Pack years: 48.00     Types: Cigarettes     Quit date: 3/1/2020     Years since quittin.5   • Smokeless tobacco: Never Used   • Tobacco comment: Pt states she is trying to quit   Substance and Sexual Activity   • Alcohol use: No     Frequency: Never   • Drug use: No   • Sexual activity: Defer     Social History     Tobacco Use   Smoking Status Former Smoker   • Packs/day: 1.00   • Years: 48.00   • Pack years: 48.00   • Types: Cigarettes   • Quit date: 3/1/2020   • Years since quittin.5   Smokeless Tobacco Never Used   Tobacco Comment    Pt states she is trying to quit       family history includes Cancer in her sister; Diabetes in her brother; Heart disease in her father and mother; Hyperlipidemia in an other family member; Hypertension in her brother, father, and mother; Lung disease in her father; Seizures in her mother; Stroke in her father and mother.  Current Outpatient Medications on File Prior to Visit   Medication Sig Dispense Refill   • ACCU-CHEK CARLIE PLUS test strip USE TO CHECK BLOOD SUGAR TWICE A  each 3   • ACCU-CHEK SOFTCLIX LANCETS lancets USE TO CHECK BS 3 TIMES DAILY 100 each 2   • acebutolol (SECTRAL) 200 MG capsule Take 1 capsule by mouth 2 (Two) Times a Day. 60 capsule 2   • ALBUTEROL SULFATE  (90 Base) MCG/ACT inhaler INHALE 1-2 PUFFS EVERY 4 HOURS AS  NEEDED FOR COUGH OR FOR SHORTNESS OF BREATH 1 g 5   • atorvastatin (LIPITOR) 40 MG tablet TAKE 1/2 TABLET BY MOUTH DAILY 45 tablet 1   • Blood Glucose Monitoring Suppl (ACCU-CHEK CARLIE PLUS) w/Device kit USE TO CHECK BS 3 TIMES DAILY 1 kit 0   • CVS D3 25 MCG (1000 UT) capsule Take 1,000 Units by mouth 2 (Two) Times a Day.     • ELIQUIS 5 MG tablet tablet TAKE 1 TABLET BY MOUTH TWICE A DAY 60 tablet 3   • fenofibrate 160 MG tablet Take 1 tablet by mouth Daily. 90 tablet 3   • furosemide (LASIX) 40 MG tablet Take 1 tablet by mouth Daily. 90 tablet 3   • glucose blood (Accu-Chek Carlie Plus) test strip USE TO CHECK BS 3 TIMES DAILY DX E11.65 100 each 2   • hydrALAZINE (APRESOLINE) 50 MG tablet Take 1 tablet by mouth 2 (Two) Times a Day. 180 tablet 0   • HYDROcodone-acetaminophen (NORCO) 7.5-325 MG per tablet Take 1 tablet by mouth 3 (Three) Times a Day As Needed.     • Insulin Lispro, 1 Unit Dial, (HumaLOG KwikPen) 100 UNIT/ML solution pen-injector Inject 7 Units under the skin into the appropriate area as directed 3 (Three) Times a Day. 5 pen 6   • Insulin Pen Needle (PEN NEEDLES) 32G X 4 MM misc 150 each 4 (Four) Times a Day. Use to inject insulin 4 times daily  / DX E11.65 150 each 3   • lisinopril (PRINIVIL,ZESTRIL) 20 MG tablet Take 1 tablet by mouth Daily. 90 tablet 1   • magnesium oxide (MAG-OX) 400 MG tablet TAKE 1 TABLET BY MOUTH TWICE A  tablet 0   • potassium chloride (MICRO-K) 10 MEQ CR capsule TAKE 1 CAPSULE BY MOUTH EVERY DAY 90 capsule 1   • sitaGLIPtin-metFORMIN (Janumet)  MG per tablet Take 1 tablet by mouth 2 (Two) Times a Day With Meals.     • [DISCONTINUED] ACCU-CHEK FASTCLIX LANCETS misc 1 each by Other route 2 (Two) Times a Day. 100 each 3   • [DISCONTINUED] ACCU-CHEK SOFTCLIX LANCETS lancets USE TO CHECK BLOOD SUGAR TWICE A DAY  4   • [DISCONTINUED] glucose blood (ACCU-CHEK GUIDE) test strip 1 each by Other route 2 (Two) Times a Day. Use as instructed     • [DISCONTINUED] glucose  blood (ACCU-CHEK GUIDE) test strip Use as instructed 100 each 3     No current facility-administered medications on file prior to visit.      Patient Active Problem List   Diagnosis   • Arthritis   • Bradycardia   • Chronic obstructive pulmonary disease (CMS/[facility])   • Depression   • Diabetic peripheral neuropathy (CMS/[facility])   • Encounter for general adult medical examination without abnormal findings   • Gastroesophageal reflux disease   • Hypercalcemia   • Mixed hyperlipidemia   • Hypokalemia   • Hypomagnesemia   • Lumbar radiculopathy   • Myalgia   • Obesity   • Paroxysmal atrial fibrillation (CMS/[facility])   • Shoulder pain, right   • Sleep apnea   • Tobacco use disorder, continuous   • Type 2 diabetes mellitus with hyperglycemia, without long-term current use of insulin (CMS/[facility])   • Vitamin D deficiency   • Palpitations   • PVC's (premature ventricular contractions)   • Essential hypertension   • Cervical radiculitis   • Arthralgia of right temporomandibular joint   • Non-recurrent acute suppurative otitis media of right ear without spontaneous rupture of tympanic membrane   • Acute respiratory distress   • COPD exacerbation (CMS/[facility])   • Shortness of breath   • Bilateral leg edema   • Acute diastolic CHF (congestive heart failure) (CMS/[facility])   • Tobacco use   • OAB (overactive bladder)   • Need for vaccination       The following portions of the patient's history were reviewed and updated as appropriate: allergies, current medications, past family history, past medical history, past social history, past surgical history and problem list.    Review of Systems   Constitutional: Negative for chills and fever.   HENT: Positive for ear pain. Negative for postnasal drip, rhinorrhea, sinus pressure, sore throat and swollen glands.    Eyes: Negative for blurred vision.   Respiratory: Positive for cough and shortness of breath. Negative for wheezing.    Cardiovascular: Positive for chest pain. Negative for palpitations.    Gastrointestinal: Negative for abdominal pain.   Endocrine: Negative for polyuria.   Genitourinary: Negative for difficulty urinating.   Skin: Negative for rash.   Neurological: Negative for dizziness, seizures and headache.   Hematological: Negative for adenopathy.   Psychiatric/Behavioral: Negative for depressed mood.       Objective   /92 (BP Location: Left arm, Patient Position: Sitting, Cuff Size: Adult)   Pulse 77   Temp 97.3 °F (36.3 °C)   Wt 99.8 kg (220 lb)   SpO2 93%   BMI 38.97 kg/m²   Physical Exam  Constitutional:       General: She is not in acute distress.     Appearance: She is well-developed.   HENT:      Head: Normocephalic.   Eyes:      General: Lids are normal.      Conjunctiva/sclera: Conjunctivae normal.   Neck:      Musculoskeletal: Normal range of motion.      Thyroid: No thyroid mass or thyromegaly.      Trachea: Trachea normal.   Cardiovascular:      Rate and Rhythm: Normal rate and regular rhythm.      Heart sounds: Normal heart sounds.   Pulmonary:      Effort: Pulmonary effort is normal.      Breath sounds: Rhonchi present.   Abdominal:      Palpations: Abdomen is soft.   Lymphadenopathy:      Cervical: No cervical adenopathy.   Skin:     General: Skin is warm and dry.   Neurological:      Mental Status: She is alert and oriented to person, place, and time.   Psychiatric:         Attention and Perception: She is attentive.         Mood and Affect: Mood normal.         Speech: Speech normal.         Behavior: Behavior normal.         No visits with results within 1 Week(s) from this visit.   Latest known visit with results is:   Office Visit on 06/05/2020   Component Date Value Ref Range Status   • Glucose 06/05/2020 416* 70 - 130 mg/dL Final           Assessment/Plan   Caterina was seen today for lung pain and cough.    Diagnoses and all orders for this visit:    Pneumonia of left lower lobe due to infectious organism  -     doxycycline (MONODOX) 100 MG capsule; Take 1  capsule by mouth 2 (Two) Times a Day.  -     XR Chest 2 View

## 2020-09-15 RX ORDER — SITAGLIPTIN AND METFORMIN HYDROCHLORIDE 1000; 50 MG/1; MG/1
TABLET, FILM COATED, EXTENDED RELEASE ORAL
Qty: 60 TABLET | Refills: 3 | Status: SHIPPED | OUTPATIENT
Start: 2020-09-15 | End: 2021-01-13

## 2020-09-15 RX ORDER — ATORVASTATIN CALCIUM 40 MG/1
TABLET, FILM COATED ORAL
Qty: 45 TABLET | Refills: 1 | Status: SHIPPED | OUTPATIENT
Start: 2020-09-15 | End: 2021-03-14

## 2020-09-18 ENCOUNTER — TELEPHONE (OUTPATIENT)
Dept: FAMILY MEDICINE CLINIC | Facility: CLINIC | Age: 65
End: 2020-09-18

## 2020-09-18 NOTE — TELEPHONE ENCOUNTER
Her chest x-ray is stable.  No pneumonia.  Her lung CT shows no sign of lung cancer.  Recheck CT scan in one year.

## 2020-09-20 DIAGNOSIS — R00.1 BRADYCARDIA: ICD-10-CM

## 2020-09-20 DIAGNOSIS — I10 ESSENTIAL HYPERTENSION: ICD-10-CM

## 2020-09-20 DIAGNOSIS — E11.65 TYPE 2 DIABETES MELLITUS WITH HYPERGLYCEMIA, WITHOUT LONG-TERM CURRENT USE OF INSULIN (HCC): ICD-10-CM

## 2020-09-20 DIAGNOSIS — I48.0 PAROXYSMAL ATRIAL FIBRILLATION (HCC): ICD-10-CM

## 2020-09-20 DIAGNOSIS — R00.2 PALPITATIONS: ICD-10-CM

## 2020-09-20 DIAGNOSIS — I50.31 ACUTE DIASTOLIC CHF (CONGESTIVE HEART FAILURE) (HCC): ICD-10-CM

## 2020-09-20 DIAGNOSIS — E78.2 MIXED HYPERLIPIDEMIA: ICD-10-CM

## 2020-09-21 RX ORDER — ACEBUTOLOL HYDROCHLORIDE 200 MG/1
CAPSULE ORAL
Qty: 180 CAPSULE | Refills: 0 | Status: SHIPPED | OUTPATIENT
Start: 2020-09-21 | End: 2020-12-21

## 2020-09-22 ENCOUNTER — LAB (OUTPATIENT)
Dept: LAB | Facility: HOSPITAL | Age: 65
End: 2020-09-22

## 2020-09-22 DIAGNOSIS — I10 ESSENTIAL HYPERTENSION: ICD-10-CM

## 2020-09-22 DIAGNOSIS — E78.2 MIXED HYPERLIPIDEMIA: ICD-10-CM

## 2020-09-22 DIAGNOSIS — E11.65 TYPE 2 DIABETES MELLITUS WITH HYPERGLYCEMIA, WITHOUT LONG-TERM CURRENT USE OF INSULIN (HCC): ICD-10-CM

## 2020-09-22 LAB
ALBUMIN SERPL-MCNC: 4.4 G/DL (ref 3.5–5.2)
ALBUMIN UR-MCNC: <1.2 MG/DL
ALBUMIN/GLOB SERPL: 1.3 G/DL
ALP SERPL-CCNC: 68 U/L (ref 39–117)
ALT SERPL W P-5'-P-CCNC: 47 U/L (ref 1–33)
ANION GAP SERPL CALCULATED.3IONS-SCNC: 10 MMOL/L (ref 5–15)
AST SERPL-CCNC: 34 U/L (ref 1–32)
BILIRUB SERPL-MCNC: 0.3 MG/DL (ref 0–1.2)
BUN SERPL-MCNC: 19 MG/DL (ref 8–23)
BUN/CREAT SERPL: 22.4 (ref 7–25)
CALCIUM SPEC-SCNC: 10.1 MG/DL (ref 8.6–10.5)
CHLORIDE SERPL-SCNC: 104 MMOL/L (ref 98–107)
CHOLEST SERPL-MCNC: 128 MG/DL (ref 0–200)
CO2 SERPL-SCNC: 25 MMOL/L (ref 22–29)
CREAT SERPL-MCNC: 0.85 MG/DL (ref 0.57–1)
CREAT UR-MCNC: 16.6 MG/DL
GFR SERPL CREATININE-BSD FRML MDRD: 67 ML/MIN/1.73
GLOBULIN UR ELPH-MCNC: 3.4 GM/DL
GLUCOSE SERPL-MCNC: 234 MG/DL (ref 65–99)
HBA1C MFR BLD: 9.9 % (ref 3.5–5.6)
HDLC SERPL-MCNC: 34 MG/DL (ref 40–60)
LDLC SERPL CALC-MCNC: 49 MG/DL (ref 0–100)
LDLC/HDLC SERPL: 1.44 {RATIO}
MICROALBUMIN/CREAT UR: NORMAL MG/G{CREAT}
POTASSIUM SERPL-SCNC: 3.9 MMOL/L (ref 3.5–5.2)
PROT SERPL-MCNC: 7.8 G/DL (ref 6–8.5)
SODIUM SERPL-SCNC: 139 MMOL/L (ref 136–145)
TRIGL SERPL-MCNC: 226 MG/DL (ref 0–150)
VLDLC SERPL-MCNC: 45.2 MG/DL (ref 5–40)

## 2020-09-22 PROCEDURE — 82043 UR ALBUMIN QUANTITATIVE: CPT

## 2020-09-22 PROCEDURE — 80053 COMPREHEN METABOLIC PANEL: CPT

## 2020-09-22 PROCEDURE — 82570 ASSAY OF URINE CREATININE: CPT

## 2020-09-22 PROCEDURE — 36415 COLL VENOUS BLD VENIPUNCTURE: CPT

## 2020-09-22 PROCEDURE — 80061 LIPID PANEL: CPT

## 2020-09-22 PROCEDURE — 83036 HEMOGLOBIN GLYCOSYLATED A1C: CPT

## 2020-09-23 RX ORDER — CHOLECALCIFEROL (VITAMIN D3) 25 MCG
CAPSULE ORAL
Qty: 180 CAPSULE | Refills: 2 | Status: SHIPPED | OUTPATIENT
Start: 2020-09-23 | End: 2021-05-27

## 2020-09-24 DIAGNOSIS — J44.1 COPD EXACERBATION (HCC): ICD-10-CM

## 2020-09-24 DIAGNOSIS — Z72.0 TOBACCO USE: ICD-10-CM

## 2020-09-29 PROBLEM — E78.5 HYPERLIPIDEMIA: Status: ACTIVE | Noted: 2020-09-29

## 2020-09-29 RX ORDER — INFLUENZA A VIRUS A/MICHIGAN/45/2015 X-275 (H1N1) ANTIGEN (FORMALDEHYDE INACTIVATED), INFLUENZA A VIRUS A/SINGAPORE/INFIMH-16-0019/2016 IVR-186 (H3N2) ANTIGEN (FORMALDEHYDE INACTIVATED), INFLUENZA B VIRUS B/PHUKET/3073/2013 ANTIGEN (FORMALDEHYDE INACTIVATED), AND INFLUENZA B VIRUS B/MARYLAND/15/2016 BX-69A ANTIGEN (FORMALDEHYDE INACTIVATED) 60; 60; 60; 60 UG/.7ML; UG/.7ML; UG/.7ML; UG/.7ML
INJECTION, SUSPENSION INTRAMUSCULAR
COMMUNITY
Start: 2020-08-28 | End: 2020-11-30

## 2020-09-29 RX ORDER — ONDANSETRON 4 MG/1
TABLET, ORALLY DISINTEGRATING ORAL
COMMUNITY
Start: 2020-09-19 | End: 2020-11-30

## 2020-09-29 RX ORDER — AZITHROMYCIN 250 MG/1
TABLET, FILM COATED ORAL SEE ADMIN INSTRUCTIONS
COMMUNITY
Start: 2020-09-19 | End: 2020-11-30

## 2020-10-03 RX ORDER — MAGNESIUM OXIDE 400 MG/1
TABLET ORAL
Qty: 180 TABLET | Refills: 0 | Status: SHIPPED | OUTPATIENT
Start: 2020-10-03 | End: 2021-01-13

## 2020-10-13 DIAGNOSIS — E11.65 TYPE 2 DIABETES MELLITUS WITH HYPERGLYCEMIA, WITHOUT LONG-TERM CURRENT USE OF INSULIN (HCC): Primary | ICD-10-CM

## 2020-10-13 RX ORDER — LANCETS
EACH MISCELLANEOUS
Qty: 100 EACH | Refills: 2 | Status: SHIPPED | OUTPATIENT
Start: 2020-10-13 | End: 2021-10-05

## 2020-11-04 RX ORDER — APIXABAN 5 MG/1
TABLET, FILM COATED ORAL
Qty: 60 TABLET | Refills: 3 | Status: SHIPPED | OUTPATIENT
Start: 2020-11-04 | End: 2021-03-02

## 2020-11-30 ENCOUNTER — OFFICE VISIT (OUTPATIENT)
Dept: ENDOCRINOLOGY | Facility: CLINIC | Age: 65
End: 2020-11-30

## 2020-11-30 VITALS
TEMPERATURE: 97.1 F | HEART RATE: 74 BPM | BODY MASS INDEX: 39.69 KG/M2 | WEIGHT: 224 LBS | HEIGHT: 63 IN | OXYGEN SATURATION: 98 % | SYSTOLIC BLOOD PRESSURE: 130 MMHG | DIASTOLIC BLOOD PRESSURE: 85 MMHG

## 2020-11-30 DIAGNOSIS — E11.65 TYPE 2 DIABETES MELLITUS WITH HYPERGLYCEMIA, WITH LONG-TERM CURRENT USE OF INSULIN (HCC): Primary | ICD-10-CM

## 2020-11-30 DIAGNOSIS — Z79.4 TYPE 2 DIABETES MELLITUS WITH HYPERGLYCEMIA, WITH LONG-TERM CURRENT USE OF INSULIN (HCC): Primary | ICD-10-CM

## 2020-11-30 DIAGNOSIS — E78.2 MIXED HYPERLIPIDEMIA: ICD-10-CM

## 2020-11-30 DIAGNOSIS — I10 ESSENTIAL HYPERTENSION: ICD-10-CM

## 2020-11-30 LAB — GLUCOSE BLDC GLUCOMTR-MCNC: 200 MG/DL (ref 70–105)

## 2020-11-30 PROCEDURE — 82962 GLUCOSE BLOOD TEST: CPT | Performed by: INTERNAL MEDICINE

## 2020-11-30 PROCEDURE — 99214 OFFICE O/P EST MOD 30 MIN: CPT | Performed by: INTERNAL MEDICINE

## 2020-11-30 NOTE — PROGRESS NOTES
Endocrine Progress Note Outpatient     Patient Care Team:  Lou Curran MD as PCP - General  Jesse Charles MD as Consulting Physician (Cardiology)  Bautista Archibald MD as Consulting Physician (Endocrinology)  Raymond Piper MD as Consulting Physician (Pulmonary Disease)  Jane Montero MD as Consulting Physician (Obstetrics and Gynecology)    Chief Complaint: Follow-up type 2 diabetes    HPI: 64-year-old female with history of type 2 diabetes, hypertension and hyperlipidemia is here for follow-up.    For type 2 diabetes she is currently on Janumet XR 50/thousand 2 tablets daily.  She is on Humalog 7 units with each meal but unfortunately did not start Lantus.  She did bring in blood sugar records for review and they are running more than 200 pretty much all the time.  She does admit fatigue and tiredness and sleeps all the time. She stopped Jardiance due to excessive yeast infections.  She is tolerating her medications well.  Fortunately I do not have any blood sugar records for review at this time.  She did not do the dietitian consult that was requested last visit.    Hypertension: Well-controlled.    Hyperlipidemia: On atorvastatin and fenofibrate.    Past Medical History:   Diagnosis Date   • Arthritis    • Atrial fibrillation (CMS/[facility])    • Bradycardia     Dr. Charles   • Cataract    • COPD (chronic obstructive pulmonary disease) (CMS/[facility])     Dr. Rob   • Diabetes mellitus, type 2 (CMS/HCC)     sees Dr. Archibald at [place]   • DJD (degenerative joint disease)     possible herniated disc, sees Pain Mgmt in [place]   •     • GERD (gastroesophageal reflux disease)    • History of combined right and left heart catheterization 2018    minimal CAD on heart cath done    • Hyperlipidemia    • Hypertension    • Pain    • Sleep apnea     wears CPAP machine, sees Dar[facility]       Social History     Socioeconomic History   • Marital status:      Spouse name: Not on file   • Number of children: Not on  file   • Years of education: Not on file   • Highest education level: Not on file   Tobacco Use   • Smoking status: Former Smoker     Packs/day: 1.00     Years: 48.00     Pack years: 48.00     Types: Cigarettes     Quit date: 3/1/2020     Years since quittin.7   • Smokeless tobacco: Never Used   • Tobacco comment: Pt states she is trying to quit   Substance and Sexual Activity   • Alcohol use: No     Frequency: Never   • Drug use: No   • Sexual activity: Defer       Family History   Problem Relation Age of Onset   • Heart disease Mother    • Hypertension Mother    • Stroke Mother    • Seizures Mother    • Heart disease Father    • Hypertension Father    • Lung disease Father    • Stroke Father    • Cancer Sister    • Hypertension Brother    • Diabetes Brother    • Hyperlipidemia Other        Allergies   Allergen Reactions   • Ibuprofen GI Intolerance   • Prednisone Other (See Comments)   • Pregabalin Other (See Comments)     jerking   • Tramadol Other (See Comments)     Legs jerked   • Levofloxacin Other (See Comments)     Increase sunburn   • Sulfamethoxazole-Trimethoprim Swelling       ROS:   Constitutional:  Admit fatigue, tiredness.    Eyes:  Denies change in visual acuity   HENT:  Denies nasal congestion or sore throat   Respiratory: denies cough, admit shortness of breath.   Cardiovascular:  denies chest pain, edema   GI:  Denies abdominal pain, nausea, vomiting.   Musculoskeletal:   Admit muscle aches and cramps.  Integument:  Denies dry skin and rash   Neurologic:  Denies headache, focal weakness or sensory changes   Endocrine:  Denies polyuria or polydipsia   Psychiatric:  Denies depression or anxiety      Vitals:    20 0922   BP: 130/85   Pulse: 74   Temp: 97.1 °F (36.2 °C)   SpO2: 98%       Physical Exam:  GEN: NAD, conversant, obese  EYES: EOMI, PERRL, no conjunctival erythema  NECK: no thyromegaly, full ROM   CV: RRR, no murmurs/rubs/gallops, no peripheral edema  LUNG: CTAB, no  wheezes/rales/ronchi  SKIN: no rashes, no acanthosis  MSK: no deformities, full ROM of all extremities  NEURO: no tremors, DTR normal  PSYCH: AOX3, appropriate mood, affect normal      Results Review:     I reviewed the patient's new clinical results.    Lab Results   Component Value Date    HGBA1C 9.9 (H) 09/22/2020    HGBA1C 10.6 (H) 05/29/2020    HGBA1C 8.2 (H) 03/11/2020      Lab Results   Component Value Date    GLUCOSE 234 (H) 09/22/2020    BUN 19 09/22/2020    CREATININE 0.85 09/22/2020    EGFRIFNONA 67 09/22/2020    BCR 22.4 09/22/2020    K 3.9 09/22/2020    CO2 25.0 09/22/2020    CALCIUM 10.1 09/22/2020    ALBUMIN 4.40 09/22/2020    LABIL2 1.4 05/08/2019    AST 34 (H) 09/22/2020    ALT 47 (H) 09/22/2020    CHOL 128 09/22/2020    TRIG 226 (H) 09/22/2020    LDL 49 09/22/2020    HDL 34 (L) 09/22/2020     Lab Results   Component Value Date    TSH 0.810 07/09/2019         Medication Review: Reviewed.       Current Outpatient Medications:   •  ACCU-CHEK CARLIE PLUS test strip, USE TO CHECK BLOOD SUGAR TWICE A DAY, Disp: 100 each, Rfl: 3  •  Accu-Chek Softclix Lancets lancets, USE TO CHECK BS 3 TIMES DAILY, Disp: 100 each, Rfl: 2  •  acebutolol (SECTRAL) 200 MG capsule, TAKE 1 CAPSULE BY MOUTH TWICE A DAY, Disp: 180 capsule, Rfl: 0  •  ALBUTEROL SULFATE  (90 Base) MCG/ACT inhaler, INHALE 1-2 PUFFS EVERY 4 HOURS AS NEEDED FOR COUGH OR FOR SHORTNESS OF BREATH, Disp: 1 g, Rfl: 5  •  atorvastatin (LIPITOR) 40 MG tablet, TAKE 1/2 TABLET BY MOUTH DAILY, Disp: 45 tablet, Rfl: 1  •  Blood Glucose Monitoring Suppl (ACCU-CHEK CARLIE PLUS) w/Device kit, USE TO CHECK BS 3 TIMES DAILY, Disp: 1 kit, Rfl: 0  •  CVS D3 25 MCG (1000 UT) capsule, TAKE 1 CAPSULE BY MOUTH TWICE A DAY, Disp: 180 capsule, Rfl: 2  •  doxycycline (MONODOX) 100 MG capsule, Take 1 capsule by mouth 2 (Two) Times a Day., Disp: 20 capsule, Rfl: 0  •  Eliquis 5 MG tablet tablet, TAKE 1 TABLET BY MOUTH TWICE A DAY, Disp: 60 tablet, Rfl: 3  •  fenofibrate  160 MG tablet, Take 1 tablet by mouth Daily., Disp: 90 tablet, Rfl: 3  •  furosemide (LASIX) 40 MG tablet, Take 1 tablet by mouth Daily., Disp: 90 tablet, Rfl: 3  •  glucose blood (Accu-Chek Aurea Plus) test strip, USE TO CHECK BS 3 TIMES DAILY DX E11.65, Disp: 100 each, Rfl: 2  •  hydrALAZINE (APRESOLINE) 50 MG tablet, Take 1 tablet by mouth 2 (Two) Times a Day., Disp: 180 tablet, Rfl: 0  •  HYDROcodone-acetaminophen (NORCO) 7.5-325 MG per tablet, Take 1 tablet by mouth 3 (Three) Times a Day As Needed., Disp: , Rfl:   •  Insulin Lispro, 1 Unit Dial, (HumaLOG KwikPen) 100 UNIT/ML solution pen-injector, Inject 7 Units under the skin into the appropriate area as directed 3 (Three) Times a Day., Disp: 5 pen, Rfl: 6  •  Insulin Pen Needle (PEN NEEDLES) 32G X 4 MM misc, 150 each 4 (Four) Times a Day. Use to inject insulin 4 times daily  / DX E11.65, Disp: 150 each, Rfl: 3  •  Janumet XR  MG tablet, TAKE 2 TABLETS BY MOUTH EVERY DAY, Disp: 60 tablet, Rfl: 3  •  lisinopril (PRINIVIL,ZESTRIL) 20 MG tablet, Take 1 tablet by mouth Daily., Disp: 90 tablet, Rfl: 1  •  magnesium oxide (MAG-OX) 400 MG tablet, TAKE 1 TABLET BY MOUTH TWICE A DAY, Disp: 180 tablet, Rfl: 0  •  potassium chloride (MICRO-K) 10 MEQ CR capsule, TAKE 1 CAPSULE BY MOUTH EVERY DAY, Disp: 90 capsule, Rfl: 1      Assessment/Plan   1.  Diabetes mellitus type II: Uncontrolled with high A1c of 9.9% and high blood sugars.  She is off Jardiance due to yeast infections.  I will add Lantus 20 units subcu nightly and continue Humalog 7 units with each meal.  She will continue Janumet and will follow blood sugars and A1c.  I am also going to refer her for the nutrition consult again.  She is advised to always keep glucose source in case of low blood sugar and get regular eye exam and flu vaccine.    2.  Hypertension: Well-controlled, continue current medication    3.  Hyperlipidemia: LDL 49 with triglycerides 226.  He needs to have better control of diabetes.   Continue current medication              Bautista Archibald MD FACE.

## 2020-11-30 NOTE — PATIENT INSTRUCTIONS
Please start Lantus 20 units subcu nightly  Continue Humalog 7 units with each meal  Continue rest of the medication  Arrange for nutrition consult  Always keep glucose source in case of low blood sugar  Annual eye exam and flu vaccine  Labs before follow-up.

## 2020-12-03 ENCOUNTER — TELEPHONE (OUTPATIENT)
Dept: ENDOCRINOLOGY | Facility: CLINIC | Age: 65
End: 2020-12-03

## 2020-12-11 RX ORDER — BUDESONIDE AND FORMOTEROL FUMARATE DIHYDRATE 160; 4.5 UG/1; UG/1
2 AEROSOL RESPIRATORY (INHALATION)
Qty: 10.2 G | Refills: 12 | Status: SHIPPED | OUTPATIENT
Start: 2020-12-11 | End: 2021-05-27

## 2020-12-18 DIAGNOSIS — E78.2 MIXED HYPERLIPIDEMIA: ICD-10-CM

## 2020-12-18 DIAGNOSIS — E11.65 TYPE 2 DIABETES MELLITUS WITH HYPERGLYCEMIA, WITHOUT LONG-TERM CURRENT USE OF INSULIN (HCC): ICD-10-CM

## 2020-12-18 DIAGNOSIS — I10 ESSENTIAL HYPERTENSION: ICD-10-CM

## 2020-12-18 DIAGNOSIS — I48.0 PAROXYSMAL ATRIAL FIBRILLATION (HCC): ICD-10-CM

## 2020-12-18 DIAGNOSIS — R00.1 BRADYCARDIA: ICD-10-CM

## 2020-12-18 DIAGNOSIS — I50.31 ACUTE DIASTOLIC CHF (CONGESTIVE HEART FAILURE) (HCC): ICD-10-CM

## 2020-12-18 DIAGNOSIS — R00.2 PALPITATIONS: ICD-10-CM

## 2020-12-18 RX ORDER — BLOOD SUGAR DIAGNOSTIC
STRIP MISCELLANEOUS
Qty: 50 EACH | Refills: 7 | Status: SHIPPED | OUTPATIENT
Start: 2020-12-18 | End: 2021-08-04

## 2020-12-21 RX ORDER — ACEBUTOLOL HYDROCHLORIDE 200 MG/1
CAPSULE ORAL
Qty: 180 CAPSULE | Refills: 0 | Status: SHIPPED | OUTPATIENT
Start: 2020-12-21 | End: 2021-04-11

## 2020-12-30 DIAGNOSIS — Z79.4 TYPE 2 DIABETES MELLITUS WITH HYPERGLYCEMIA, WITH LONG-TERM CURRENT USE OF INSULIN (HCC): Primary | ICD-10-CM

## 2020-12-30 DIAGNOSIS — E11.65 TYPE 2 DIABETES MELLITUS WITH HYPERGLYCEMIA, WITH LONG-TERM CURRENT USE OF INSULIN (HCC): Primary | ICD-10-CM

## 2020-12-30 RX ORDER — PEN NEEDLE, DIABETIC 32GX 5/32"
NEEDLE, DISPOSABLE MISCELLANEOUS
Qty: 120 EACH | Refills: 6 | Status: SHIPPED | OUTPATIENT
Start: 2020-12-30 | End: 2021-01-04 | Stop reason: SDUPTHER

## 2021-01-04 DIAGNOSIS — Z79.4 TYPE 2 DIABETES MELLITUS WITH HYPERGLYCEMIA, WITH LONG-TERM CURRENT USE OF INSULIN (HCC): ICD-10-CM

## 2021-01-04 DIAGNOSIS — E11.65 TYPE 2 DIABETES MELLITUS WITH HYPERGLYCEMIA, WITH LONG-TERM CURRENT USE OF INSULIN (HCC): ICD-10-CM

## 2021-01-04 RX ORDER — PEN NEEDLE, DIABETIC 32GX 5/32"
NEEDLE, DISPOSABLE MISCELLANEOUS
Qty: 150 EACH | Refills: 4 | Status: SHIPPED | OUTPATIENT
Start: 2021-01-04 | End: 2021-10-05

## 2021-01-04 RX ORDER — INSULIN GLARGINE 100 [IU]/ML
20 INJECTION, SOLUTION SUBCUTANEOUS NIGHTLY
Qty: 15 ML | Refills: 2 | Status: SHIPPED | OUTPATIENT
Start: 2021-01-04 | End: 2021-04-30

## 2021-01-11 RX ORDER — LISINOPRIL 20 MG/1
TABLET ORAL
Qty: 90 TABLET | Refills: 1 | Status: SHIPPED | OUTPATIENT
Start: 2021-01-11 | End: 2021-07-02

## 2021-01-13 RX ORDER — SITAGLIPTIN AND METFORMIN HYDROCHLORIDE 1000; 50 MG/1; MG/1
TABLET, FILM COATED, EXTENDED RELEASE ORAL
Qty: 60 TABLET | Refills: 3 | Status: SHIPPED | OUTPATIENT
Start: 2021-01-13 | End: 2021-05-07

## 2021-01-13 RX ORDER — MAGNESIUM OXIDE 400 MG/1
TABLET ORAL
Qty: 60 TABLET | Refills: 2 | Status: SHIPPED | OUTPATIENT
Start: 2021-01-13 | End: 2021-04-25

## 2021-02-01 RX ORDER — POTASSIUM CHLORIDE 750 MG/1
CAPSULE, EXTENDED RELEASE ORAL
Qty: 90 CAPSULE | Refills: 1 | Status: SHIPPED | OUTPATIENT
Start: 2021-02-01 | End: 2021-07-22

## 2021-02-03 ENCOUNTER — TRANSCRIBE ORDERS (OUTPATIENT)
Dept: ADMINISTRATIVE | Facility: HOSPITAL | Age: 66
End: 2021-02-03

## 2021-02-03 DIAGNOSIS — Z12.31 VISIT FOR SCREENING MAMMOGRAM: Primary | ICD-10-CM

## 2021-02-05 ENCOUNTER — OFFICE VISIT (OUTPATIENT)
Dept: FAMILY MEDICINE CLINIC | Facility: CLINIC | Age: 66
End: 2021-02-05

## 2021-02-05 VITALS
BODY MASS INDEX: 40.04 KG/M2 | TEMPERATURE: 97.1 F | OXYGEN SATURATION: 93 % | DIASTOLIC BLOOD PRESSURE: 72 MMHG | HEIGHT: 63 IN | SYSTOLIC BLOOD PRESSURE: 145 MMHG | WEIGHT: 226 LBS | HEART RATE: 72 BPM

## 2021-02-05 DIAGNOSIS — M25.559 HIP PAIN: ICD-10-CM

## 2021-02-05 DIAGNOSIS — J44.1 COPD EXACERBATION (HCC): ICD-10-CM

## 2021-02-05 DIAGNOSIS — Z00.00 WELLNESS EXAMINATION: Primary | ICD-10-CM

## 2021-02-05 DIAGNOSIS — E66.01 MORBIDLY OBESE (HCC): ICD-10-CM

## 2021-02-05 DIAGNOSIS — F32.A DEPRESSION, UNSPECIFIED DEPRESSION TYPE: ICD-10-CM

## 2021-02-05 PROCEDURE — 99397 PER PM REEVAL EST PAT 65+ YR: CPT | Performed by: FAMILY MEDICINE

## 2021-02-05 RX ORDER — CLOBETASOL PROPIONATE 0.5 MG/G
OINTMENT TOPICAL
COMMUNITY
Start: 2021-02-04 | End: 2021-04-30

## 2021-02-05 NOTE — PROGRESS NOTES
Initial Medicare Wellness Visit  The patient is not sure if she has Medicare but she is 65 years old and is sure she has Medicaid.  The ABC's of the Annual Wellness Visit    Chief Complaint   Patient presents with   • Medicare Wellness-Initial Visit       HPI:  Caterina Mason, CARLITA-[id number], is a 65 y.o. female who presents for an Initial Medicare Wellness Visit.    Recent Hospitalizations:  No hospitalization(s) within the last year..    Current Medical Providers:  Patient Care Team:  Lou Curran MD as PCP - General  Jesse Charles MD as Consulting Physician (Cardiology)  Bautista Archibald MD as Consulting Physician (Endocrinology)  Jane Montero MD as Consulting Physician (Obstetrics and Gynecology)    Health Habits and Functional and Cognitive Screening and Depression Screening:  Functional & Cognitive Status 2021   Do you have difficulty preparing food and eating? Yes   Do you have difficulty bathing yourself, getting dressed or grooming yourself? Yes   Do you have difficulty using the toilet? Yes   Do you have difficulty moving around from place to place? No   Do you have trouble with steps or getting out of a bed or a chair? Yes   Current Diet Unhealthy Diet   Dental Exam Not up to date   Eye Exam Up to date   Exercise (times per week) 0 times per week   Do you need help using the phone?  No   Are you deaf or do you have serious difficulty hearing?  Yes   Do you need help with transportation? Yes   Do you need help shopping? Yes   Do you need help preparing meals?  Yes   Do you need help with housework?  Yes   Do you need help with laundry? Yes   Do you ever drive or ride in a car without wearing a seat belt? No   Have you felt unusual stress, anger or loneliness in the last month? No   Who do you live with? Spouse   If you need help, do you have trouble finding someone available to you? No   Do you have difficulty concentrating, remembering or making decisions? Yes       Compared to one year  ago, the patient feels her physical health is worse and her mental health is worse.    Depression Screen:  PHQ-2/PHQ-9 Depression Screening 9/14/2020   Little interest or pleasure in doing things 0   Feeling down, depressed, or hopeless 0   Total Score 0         Past Medical/Family/Social History:  The following portions of the patient's history were reviewed and updated as appropriate: allergies, current medications, past family history, past medical history, past social history, past surgical history and problem list.    Allergies   Allergen Reactions   • Ibuprofen GI Intolerance   • Prednisone Other (See Comments)   • Pregabalin Other (See Comments)     jerking   • Tramadol Other (See Comments)     Legs jerked   • Levofloxacin Other (See Comments)     Increase sunburn   • Sulfamethoxazole-Trimethoprim Swelling         Current Outpatient Medications:   •  clobetasol (TEMOVATE) 0.05 % ointment, , Disp: , Rfl:   •  Accu-Chek Aurea Plus test strip, USE TO CHECK BLOOD SUGAR TWICE A DAY, Disp: 50 each, Rfl: 7  •  Accu-Chek Softclix Lancets lancets, USE TO CHECK BS 3 TIMES DAILY, Disp: 100 each, Rfl: 2  •  acebutolol (SECTRAL) 200 MG capsule, TAKE 1 CAPSULE BY MOUTH TWICE A DAY, Disp: 180 capsule, Rfl: 0  •  ALBUTEROL SULFATE  (90 Base) MCG/ACT inhaler, INHALE 1-2 PUFFS EVERY 4 HOURS AS NEEDED FOR COUGH OR FOR SHORTNESS OF BREATH, Disp: 1 g, Rfl: 5  •  atorvastatin (LIPITOR) 40 MG tablet, TAKE 1/2 TABLET BY MOUTH DAILY, Disp: 45 tablet, Rfl: 1  •  Blood Glucose Monitoring Suppl (ACCU-CHEK AUREA PLUS) w/Device kit, USE TO CHECK BS 3 TIMES DAILY, Disp: 1 kit, Rfl: 0  •  budesonide-formoterol (Symbicort) 160-4.5 MCG/ACT inhaler, Inhale 2 puffs 2 (Two) Times a Day., Disp: 10.2 g, Rfl: 12  •  CVS D3 25 MCG (1000 UT) capsule, TAKE 1 CAPSULE BY MOUTH TWICE A DAY, Disp: 180 capsule, Rfl: 2  •  Eliquis 5 MG tablet tablet, TAKE 1 TABLET BY MOUTH TWICE A DAY, Disp: 60 tablet, Rfl: 3  •  fenofibrate 160 MG tablet, Take 1  tablet by mouth Daily., Disp: 90 tablet, Rfl: 3  •  furosemide (LASIX) 40 MG tablet, Take 1 tablet by mouth Daily., Disp: 90 tablet, Rfl: 3  •  glucose blood (Accu-Chek Aurea Plus) test strip, USE TO CHECK BS 3 TIMES DAILY DX E11.65, Disp: 100 each, Rfl: 2  •  hydrALAZINE (APRESOLINE) 50 MG tablet, Take 1 tablet by mouth 2 (Two) Times a Day., Disp: 180 tablet, Rfl: 0  •  HYDROcodone-acetaminophen (NORCO) 7.5-325 MG per tablet, Take 1 tablet by mouth 3 (Three) Times a Day As Needed., Disp: , Rfl:   •  Insulin Glargine (BASAGLAR KWIKPEN) 100 UNIT/ML injection pen, Inject 20 Units under the skin into the appropriate area as directed Every Night., Disp: 15 mL, Rfl: 2  •  Insulin Lispro, 1 Unit Dial, (HumaLOG KwikPen) 100 UNIT/ML solution pen-injector, Inject 7 Units under the skin into the appropriate area as directed 3 (Three) Times a Day., Disp: 5 pen, Rfl: 6  •  Insulin Pen Needle (BD Pen Needle Lorene U/F) 32G X 4 MM misc, Inject insulin 4 times daily / DX E11.65, Disp: 150 each, Rfl: 4  •  Janumet XR  MG tablet, TAKE 2 TABLETS BY MOUTH EVERY DAY, Disp: 60 tablet, Rfl: 3  •  lisinopril (PRINIVIL,ZESTRIL) 20 MG tablet, TAKE 1 TABLET BY MOUTH EVERY DAY, Disp: 90 tablet, Rfl: 1  •  magnesium oxide (MAG-OX) 400 MG tablet, TAKE 1 TABLET BY MOUTH TWICE A DAY, Disp: 60 tablet, Rfl: 2  •  potassium chloride (MICRO-K) 10 MEQ CR capsule, TAKE 1 CAPSULE BY MOUTH EVERY DAY, Disp: 90 capsule, Rfl: 1    Aspirin use counseling: Does not need ASA (and currently is not on it)    Current medication list contains no high risk medications.  No harmful drug interactions have been identified.     Family History   Problem Relation Age of Onset   • Heart disease Mother    • Hypertension Mother    • Stroke Mother    • Seizures Mother    • Heart disease Father    • Hypertension Father    • Lung disease Father    • Stroke Father    • Cancer Sister    • Hypertension Brother    • Diabetes Brother    • Hyperlipidemia Other        Social  History     Tobacco Use   • Smoking status: Former Smoker     Packs/day: 1.00     Years: 48.00     Pack years: 48.00     Types: Cigarettes     Quit date: 3/1/2020     Years since quittin.9   • Smokeless tobacco: Never Used   • Tobacco comment: Pt states she is trying to quit   Substance Use Topics   • Alcohol use: No     Frequency: Never       Past Surgical History:   Procedure Laterality Date   • ABLATION OF DYSRHYTHMIC FOCUS     • CARDIAC CATHETERIZATION      and Angiograph    • CARDIAC ELECTROPHYSIOLOGY PROCEDURE N/A 12/10/2019    Procedure: pvc ablation;  Surgeon: Alfredo Iglesias MD;  Location: [facility] INVASIVE LOCATION;  Service: Cardiovascular   •  SECTION      x 1   • COLON RESECTION     • COLONOSCOPY  2012   • COLOSTOMY      and reversal in her 20's due to problems with childbirth   • COLOSTOMY CLOSURE     • OVARIAN CYST SURGERY     • ROTATOR CUFF REPAIR Left 2009    x2    • TOTAL ABDOMINAL HYSTERECTOMY WITH SALPINGO OOPHORECTOMY         Patient Active Problem List   Diagnosis   • Arthritis   • Bradycardia   • Chronic obstructive pulmonary disease (CMS/HCC)   • Depression   • Diabetic peripheral neuropathy (CMS/HCC)   • Encounter for general adult medical examination without abnormal findings   • Gastroesophageal reflux disease   • Hypercalcemia   • Mixed hyperlipidemia   • Hypokalemia   • Hypomagnesemia   • Lumbar radiculopathy   • Myalgia   • Obesity   • Paroxysmal atrial fibrillation (CMS/HCC)   • Shoulder pain, right   • Sleep apnea   • Tobacco use disorder, continuous   • Type 2 diabetes mellitus with hyperglycemia, with long-term current use of insulin (CMS/HCC)   • Vitamin D deficiency   • Palpitations   • PVC's (premature ventricular contractions)   • Essential hypertension   • Cervical radiculitis   • Arthralgia of right temporomandibular joint   • Non-recurrent acute suppurative otitis media of right ear without spontaneous rupture of tympanic membrane   • Acute  "respiratory distress   • COPD exacerbation (CMS/[facility])   • Shortness of breath   • Bilateral leg edema   • Acute diastolic CHF (congestive heart failure) (CMS/[facility])   • Tobacco use   • OAB (overactive bladder)   • Need for vaccination   • Pneumonia of left lower lobe due to infectious organism   • Hyperlipidemia   • Hypertension   • Welcome to Medicare preventive visit   • Morbidly obese (CMS/[facility])   • Hip pain   • Wellness examination       Review of Systems   Constitutional: Negative for chills and fever.   HENT: Negative for sinus pressure, sore throat and swollen glands.    Eyes: Negative for blurred vision.   Respiratory: Negative for cough, shortness of breath and wheezing.    Cardiovascular: Negative for chest pain and palpitations.   Gastrointestinal: Negative for abdominal pain.   Endocrine: Negative for polyuria.   Genitourinary: Negative for difficulty urinating.   Musculoskeletal: Positive for arthralgias and myalgias.   Skin: Negative for rash.   Neurological: Negative for dizziness, seizures and headache.   Hematological: Negative for adenopathy.   Psychiatric/Behavioral: Positive for agitation. Negative for depressed mood.       Objective     Vitals:    02/05/21 0904   BP: 145/72   BP Location: Left arm   Patient Position: Sitting   Cuff Size: Adult   Pulse: 72   Temp: 97.1 °F (36.2 °C)   SpO2: 93%   Weight: 103 kg (226 lb)   Height: 160.7 cm (63.25\")       Patient's Body mass index is 39.72 kg/m². BMI is above normal parameters. Recommendations include: exercise counseling.      No exam data present    The patient has no evidence of cognitve impairment.     Physical Exam  Constitutional:       General: She is not in acute distress.     Appearance: She is well-developed.   HENT:      Head: Normocephalic.   Eyes:      General: Lids are normal.      Conjunctiva/sclera: Conjunctivae normal.   Neck:      Musculoskeletal: Normal range of motion.      Thyroid: No thyroid mass or thyromegaly.      Trachea: " Trachea normal.   Cardiovascular:      Rate and Rhythm: Normal rate and regular rhythm.      Heart sounds: Normal heart sounds.   Pulmonary:      Effort: Pulmonary effort is normal.      Breath sounds: Normal breath sounds.   Abdominal:      Palpations: Abdomen is soft.   Lymphadenopathy:      Cervical: No cervical adenopathy.   Skin:     General: Skin is warm and dry.   Neurological:      Mental Status: She is alert and oriented to person, place, and time.      Gait: Gait abnormal.   Psychiatric:         Attention and Perception: She is attentive.         Mood and Affect: Mood normal.         Speech: Speech normal.         Behavior: Behavior normal.         Recent Lab Results:     Lab Results   Component Value Date    CHOL 128 09/22/2020    TRIG 226 (H) 09/22/2020    HDL 34 (L) 09/22/2020    VLDL 45.2 (H) 09/22/2020    LDLHDL 1.44 09/22/2020     No visits with results within 1 Week(s) from this visit.   Latest known visit with results is:   Office Visit on 11/30/2020   Component Date Value Ref Range Status   • Glucose 11/30/2020 200* 70 - 105 mg/dL Final    Serial Number: [id number]:  [id number]         Assessment/Plan   Age-appropriate Screening Schedule:  Refer to the list below for future screening recommendations based on patient's age, sex and/or medical conditions.      Health Maintenance   Topic Date Due   • TDAP/TD VACCINES (1 - Tdap) 08/18/1974   • ZOSTER VACCINE (1 of 2) 08/18/2005   • DIABETIC FOOT EXAM  06/20/2019   • PAP SMEAR  06/20/2019   • HEMOGLOBIN A1C  03/22/2021   • DIABETIC EYE EXAM  07/16/2021   • LIPID PANEL  09/22/2021   • URINE MICROALBUMIN  09/22/2021   • MAMMOGRAM  11/19/2021   • COLONOSCOPY  05/14/2022   • INFLUENZA VACCINE  Completed       Medicare Risks and Personalized Health Plan:  Advance Directive Discussion  Breast Cancer/Mammogram Screening  Colon Cancer Screening  Diabetic Lab Screening       CMS-Preventive Services Quick Reference  Medicare Preventive Services  Addressed:  Annual Wellness Visit (AWV)    Advance Care Planning:  ACP discussion was held with the patient during this visit. Patient does not have an advance directive, information provided.    Diagnoses and all orders for this visit:    1. Wellness examination (Primary)  Assessment & Plan:  Patient will contact the [employer] security office and see if she has Medicare.      2. COPD exacerbation (CMS/[facility])  Assessment & Plan:  COPD is unchanged.  Continue current medications.  refer to pulmonology        Orders:  -     Ambulatory Referral to Pulmonology    3. Morbidly obese (CMS/[facility])  Assessment & Plan:  Obesity is worsening.  Discussed the patient's BMI.  The BMI is above average; BMI management plan is completed.  General weight loss/lifestyle modification strategies discussed (elicit support from others; identify saboteurs; non-food rewards, etc).      4. Hip pain  Assessment & Plan:  Likely arthritis.  Check x-ray.    Orders:  -     XR Hips Bilateral With or Without Pelvis 2 View; Future    5. Depression, unspecified depression type  Assessment & Plan:  Psychological condition is newly identified.  Regular aerobic exercise.  patient declines medicine.  Psychological condition  will be reassessed at the next regular appointment.        An After Visit Summary and PPPS with all of these plans were given to the patient.      Follow Up:  Return in about 1 year (around 2/5/2022) for Medicare Wellness.

## 2021-02-05 NOTE — ASSESSMENT & PLAN NOTE
Psychological condition is newly identified.  Regular aerobic exercise.  patient declines medicine.  Psychological condition  will be reassessed at the next regular appointment.

## 2021-02-10 DIAGNOSIS — E11.65 TYPE 2 DIABETES MELLITUS WITH HYPERGLYCEMIA, WITH LONG-TERM CURRENT USE OF INSULIN (HCC): Primary | ICD-10-CM

## 2021-02-10 DIAGNOSIS — Z79.4 TYPE 2 DIABETES MELLITUS WITH HYPERGLYCEMIA, WITH LONG-TERM CURRENT USE OF INSULIN (HCC): Primary | ICD-10-CM

## 2021-02-11 RX ORDER — BLOOD SUGAR DIAGNOSTIC
STRIP MISCELLANEOUS
Qty: 100 EACH | Refills: 2 | Status: SHIPPED | OUTPATIENT
Start: 2021-02-11 | End: 2021-07-29

## 2021-02-12 ENCOUNTER — OFFICE VISIT (OUTPATIENT)
Dept: CARDIOLOGY | Facility: CLINIC | Age: 66
End: 2021-02-12

## 2021-02-12 VITALS
SYSTOLIC BLOOD PRESSURE: 144 MMHG | WEIGHT: 226 LBS | HEART RATE: 78 BPM | BODY MASS INDEX: 40.04 KG/M2 | HEIGHT: 63 IN | DIASTOLIC BLOOD PRESSURE: 84 MMHG

## 2021-02-12 DIAGNOSIS — Z79.4 TYPE 2 DIABETES MELLITUS WITH HYPERGLYCEMIA, WITH LONG-TERM CURRENT USE OF INSULIN (HCC): ICD-10-CM

## 2021-02-12 DIAGNOSIS — R00.2 PALPITATIONS: ICD-10-CM

## 2021-02-12 DIAGNOSIS — E78.2 MIXED HYPERLIPIDEMIA: ICD-10-CM

## 2021-02-12 DIAGNOSIS — I49.3 PVC (PREMATURE VENTRICULAR CONTRACTION): ICD-10-CM

## 2021-02-12 DIAGNOSIS — I50.31 ACUTE DIASTOLIC CHF (CONGESTIVE HEART FAILURE) (HCC): ICD-10-CM

## 2021-02-12 DIAGNOSIS — E11.65 TYPE 2 DIABETES MELLITUS WITH HYPERGLYCEMIA, WITH LONG-TERM CURRENT USE OF INSULIN (HCC): ICD-10-CM

## 2021-02-12 DIAGNOSIS — R00.1 BRADYCARDIA: Primary | ICD-10-CM

## 2021-02-12 DIAGNOSIS — I10 ESSENTIAL HYPERTENSION: ICD-10-CM

## 2021-02-12 DIAGNOSIS — I48.0 PAROXYSMAL ATRIAL FIBRILLATION (HCC): ICD-10-CM

## 2021-02-12 PROCEDURE — 93000 ELECTROCARDIOGRAM COMPLETE: CPT | Performed by: INTERNAL MEDICINE

## 2021-02-12 PROCEDURE — 99214 OFFICE O/P EST MOD 30 MIN: CPT | Performed by: INTERNAL MEDICINE

## 2021-02-12 RX ORDER — FENOFIBRATE 160 MG/1
TABLET ORAL
Qty: 90 TABLET | Refills: 3 | Status: SHIPPED | OUTPATIENT
Start: 2021-02-12 | End: 2021-10-05

## 2021-02-12 NOTE — PROGRESS NOTES
Date of Office Visit: 2021  Encounter Provider: Dr. Jesse Charles  Place of Service: [facility] CARDIOLOGY [place]  Patient Name: Caterina Mason  :1955  Lou Curran MD    Chief Complaint   Patient presents with   • Atrial Fibrillation     6 month follow up   • Slow Heart Rate   • Palpitations   • Congestive Heart Failure   • Hyperlipidemia     History of Present Illness    I am pleased to see Mrs. Mason in my office today as a follow-up.    As you know, patient is 65 year-old white female whose past medical history significant for hypertension, hyperlipidemia, COPD, diabetes mellitus, paroxysmal atrial fibrillation, who came today for follow-up.    In 2017, patient was admitted at [facility] with a symptom of possible bradycardia and dizziness.  Patient was seen by endocrinologist and was thought to have bradycardia and was sent to the hospital.  In the hospital she was never documented to have bradycardia.  She was noted to have sinus rhythm with frequent PVCs and ventricular bigeminy rhythm.  Holter monitor showed frequent PVCs greater than 30,000.  Stress test was negative for ischemia and echocardiogram showed normal left ventricular size and function and LVEF was 55%.  No significant valvular heart disease was noted. In 2017, patient underwent Holter monitor which showed sinus rhythm with right bundle branch block pattern and patient had frequent PACs.     In 2018, patient was admitted at [facility] again with symptom of dizziness and possible extreme bradycardia.  She was found to be in sinus rhythm with bigeminy.  Holter monitor showed frequent premature ventricular contraction but no significant pause.  Ventricular bigeminy rhythm was noted.  Patient underwent cardiac catheterization showed no significant CAD..  In 2019, patient underwent Holter monitor it showed frequent PVCs greater than 18,000.  No significant bradycardia noted.   No SVT or VT was present. In 2019, patient underwent Holter monitor which showed predominantly sinus rhythm with right bundle branch block.  Frequent PVCs and bigeminy was noted.  Patient had 26% of PVC burden. In 2019, patient underwent EP study and possible ablation of PVCs.  However it was unsuccessful.  Patient was started on Sectral 200 mg twice daily.    Patient came today for 6 months follow-up.  Patient reports that she is achy all over the body.  She has arthritis.  Patient also complain of anxiety and stress.  Patient has baseline shortness of breath.  Patient denies any orthopnea or PND no syncope or presyncope.  No palpitation.  No leg edema.    EKG showed sinus rhythm.  PVCs is noted.  Right bundle branch block is present.    At this stage, I would recommend to proceed with Holter monitor.  Continue current therapy.      Past Medical History:   Diagnosis Date   • Arthritis    • Atrial fibrillation (CMS/[facility])    • Bradycardia     Dr. Charles   • Cataract    • COPD (chronic obstructive pulmonary disease) (CMS/[facility])     Dr. Rob   • Diabetes mellitus, type 2 (CMS/HCC)     sees Dr. Archibald at [place]   • DJD (degenerative joint disease)     possible herniated disc, sees Pain Mgmt in [place]   •     • GERD (gastroesophageal reflux disease)    • History of combined right and left heart catheterization 2018    minimal CAD on heart cath done    • Hyperlipidemia    • Hypertension    • Pain    • Sleep apnea     wears CPAP machine, sees Darron         Past Surgical History:   Procedure Laterality Date   • ABLATION OF DYSRHYTHMIC FOCUS     • CARDIAC CATHETERIZATION      and Angiograph    • CARDIAC ELECTROPHYSIOLOGY PROCEDURE N/A 12/10/2019    Procedure: pvc ablation;  Surgeon: Alfredo Iglesias MD;  Location: [facility] INVASIVE LOCATION;  Service: Cardiovascular   •  SECTION      x 1   • COLON RESECTION     • COLONOSCOPY  2012   • COLOSTOMY      and reversal in her 20's due  to problems with childbirth   • COLOSTOMY CLOSURE     • OVARIAN CYST SURGERY     • ROTATOR CUFF REPAIR Left 2009    x2    • TOTAL ABDOMINAL HYSTERECTOMY WITH SALPINGO OOPHORECTOMY  2005           Current Outpatient Medications:   •  Accu-Chek Aurea Plus test strip, USE TO CHECK BLOOD SUGAR TWICE A DAY, Disp: 50 each, Rfl: 7  •  Accu-Chek Softclix Lancets lancets, USE TO CHECK BS 3 TIMES DAILY, Disp: 100 each, Rfl: 2  •  acebutolol (SECTRAL) 200 MG capsule, TAKE 1 CAPSULE BY MOUTH TWICE A DAY, Disp: 180 capsule, Rfl: 0  •  ALBUTEROL SULFATE  (90 Base) MCG/ACT inhaler, INHALE 1-2 PUFFS EVERY 4 HOURS AS NEEDED FOR COUGH OR FOR SHORTNESS OF BREATH, Disp: 1 g, Rfl: 5  •  atorvastatin (LIPITOR) 40 MG tablet, TAKE 1/2 TABLET BY MOUTH DAILY, Disp: 45 tablet, Rfl: 1  •  Blood Glucose Monitoring Suppl (ACCU-CHEK AUREA PLUS) w/Device kit, USE TO CHECK BS 3 TIMES DAILY, Disp: 1 kit, Rfl: 0  •  budesonide-formoterol (Symbicort) 160-4.5 MCG/ACT inhaler, Inhale 2 puffs 2 (Two) Times a Day., Disp: 10.2 g, Rfl: 12  •  clobetasol (TEMOVATE) 0.05 % ointment, , Disp: , Rfl:   •  CVS D3 25 MCG (1000 UT) capsule, TAKE 1 CAPSULE BY MOUTH TWICE A DAY, Disp: 180 capsule, Rfl: 2  •  Eliquis 5 MG tablet tablet, TAKE 1 TABLET BY MOUTH TWICE A DAY, Disp: 60 tablet, Rfl: 3  •  fenofibrate 160 MG tablet, Take 1 tablet by mouth Daily., Disp: 90 tablet, Rfl: 3  •  furosemide (LASIX) 40 MG tablet, Take 1 tablet by mouth Daily., Disp: 90 tablet, Rfl: 3  •  glucose blood (Accu-Chek Aurea Plus) test strip, CHECK BLOOD SUGAR 3 TIMES E11.65, Disp: 100 each, Rfl: 2  •  hydrALAZINE (APRESOLINE) 50 MG tablet, Take 1 tablet by mouth 2 (Two) Times a Day., Disp: 180 tablet, Rfl: 0  •  HYDROcodone-acetaminophen (NORCO) 7.5-325 MG per tablet, Take 1 tablet by mouth 3 (Three) Times a Day As Needed., Disp: , Rfl:   •  Insulin Glargine (BASAGLAR KWIKPEN) 100 UNIT/ML injection pen, Inject 20 Units under the skin into the appropriate area as directed Every  Night., Disp: 15 mL, Rfl: 2  •  Insulin Lispro, 1 Unit Dial, (HumaLOG KwikPen) 100 UNIT/ML solution pen-injector, Inject 7 Units under the skin into the appropriate area as directed 3 (Three) Times a Day., Disp: 5 pen, Rfl: 6  •  Insulin Pen Needle (BD Pen Needle Lorene U/F) 32G X 4 MM misc, Inject insulin 4 times daily / DX E11.65, Disp: 150 each, Rfl: 4  •  Janumet XR  MG tablet, TAKE 2 TABLETS BY MOUTH EVERY DAY, Disp: 60 tablet, Rfl: 3  •  lisinopril (PRINIVIL,ZESTRIL) 20 MG tablet, TAKE 1 TABLET BY MOUTH EVERY DAY, Disp: 90 tablet, Rfl: 1  •  magnesium oxide (MAG-OX) 400 MG tablet, TAKE 1 TABLET BY MOUTH TWICE A DAY, Disp: 60 tablet, Rfl: 2  •  potassium chloride (MICRO-K) 10 MEQ CR capsule, TAKE 1 CAPSULE BY MOUTH EVERY DAY, Disp: 90 capsule, Rfl: 1      Social History     Socioeconomic History   • Marital status:      Spouse name: Not on file   • Number of children: Not on file   • Years of education: Not on file   • Highest education level: Not on file   Tobacco Use   • Smoking status: Former Smoker     Packs/day: 1.00     Years: 48.00     Pack years: 48.00     Types: Cigarettes     Quit date: 3/1/2020     Years since quittin.9   • Smokeless tobacco: Never Used   • Tobacco comment: Pt states she is trying to quit   Substance and Sexual Activity   • Alcohol use: No     Frequency: Never   • Drug use: No   • Sexual activity: Defer         Review of Systems   Constitution: Negative for chills and fever.   HENT: Negative for ear discharge and nosebleeds.    Eyes: Negative for discharge and redness.   Cardiovascular: Negative for chest pain, orthopnea, palpitations, paroxysmal nocturnal dyspnea and syncope.   Respiratory: Positive for shortness of breath. Negative for cough and wheezing.    Endocrine: Negative for heat intolerance.   Skin: Negative for rash.   Musculoskeletal: Positive for arthritis, back pain and joint swelling. Negative for myalgias.   Gastrointestinal: Negative for abdominal  "pain, melena, nausea and vomiting.   Genitourinary: Negative for dysuria and hematuria.   Neurological: Negative for dizziness, light-headedness, numbness and tremors.   Psychiatric/Behavioral: Negative for depression. The patient is not nervous/anxious.        Procedures      ECG 12 Lead    Date/Time: 2/12/2021 9:57 AM  Performed by: Jesse Charles MD  Authorized by: Jesse Charels MD   Comparison: compared with previous ECG   Similar to previous ECG  Rhythm: sinus rhythm  Ectopy: unifocal PVCs    Clinical impression: abnormal EKG            ECG 12 Lead    (Results Pending)           Objective:    /84   Pulse 78   Ht 160.7 cm (63.27\")   Wt 103 kg (226 lb)   BMI 39.70 kg/m²         Constitutional:       Appearance: Well-developed.   Eyes:      General: No scleral icterus.        Right eye: No discharge.   HENT:      Head: Normocephalic and atraumatic.   Neck:      Thyroid: No thyromegaly.      Lymphadenopathy: No cervical adenopathy.   Pulmonary:      Effort: Pulmonary effort is normal. No respiratory distress.      Breath sounds: Normal breath sounds. No wheezing. No rales.   Cardiovascular:      Normal rate. Regular rhythm.      No gallop.   Abdominal:      Tenderness: There is no abdominal tenderness.   Skin:     Findings: No erythema or rash.   Neurological:      Mental Status: Alert and oriented to person, place, and time.             Assessment:       Diagnosis Plan   1. Bradycardia  ECG 12 Lead    Holter Monitor - 24 Hour   2. Mixed hyperlipidemia  ECG 12 Lead    Holter Monitor - 24 Hour   3. Paroxysmal atrial fibrillation (CMS/HCC)  ECG 12 Lead    Holter Monitor - 24 Hour   4. Palpitations  ECG 12 Lead    Holter Monitor - 24 Hour   5. Essential hypertension  ECG 12 Lead    Holter Monitor - 24 Hour   6. Acute diastolic CHF (congestive heart failure) (CMS/HCC)  ECG 12 Lead    Holter Monitor - 24 Hour   7. Type 2 diabetes mellitus with hyperglycemia, with long-term current use of insulin (CMS/HCC)  ECG " 12 Lead    Holter Monitor - 24 Hour   8. PVC (premature ventricular contraction)  Holter Monitor - 24 Hour            Plan:       MDM:    1.  PVCs:    Patient continues to have PVCs and she had extensive work-up in the past.  Patient also underwent PVC ablative therapy but it was unsuccessful.  At this stage patient is on Sectral.  I will continue that.  Holter monitor would be done.    2.  Paroxysmal atrial fibrillation:    Patient is maintaining sinus rhythm.  Patient is on Eliquis and Sectral.    3.  Hypertension:    Her blood pressure is borderline high.  Continue to monitor blood pressure at home.    4.  Bradycardia:    Patient does not have bradycardia.  Continue to observe    5.  Diabetes mellitus:    Patient is on insulin as well as Janumet.  Blood sugar is not well controlled.

## 2021-02-15 RX ORDER — HYDRALAZINE HYDROCHLORIDE 50 MG/1
TABLET, FILM COATED ORAL
Qty: 180 TABLET | Refills: 0 | Status: SHIPPED | OUTPATIENT
Start: 2021-02-15 | End: 2021-04-30

## 2021-03-01 RX ORDER — INSULIN GLARGINE 100 [IU]/ML
INJECTION, SOLUTION SUBCUTANEOUS
COMMUNITY
Start: 2021-02-07 | End: 2021-05-27 | Stop reason: SDUPTHER

## 2021-03-02 RX ORDER — APIXABAN 5 MG/1
TABLET, FILM COATED ORAL
Qty: 60 TABLET | Refills: 3 | Status: SHIPPED | OUTPATIENT
Start: 2021-03-02 | End: 2021-05-27

## 2021-03-14 RX ORDER — ATORVASTATIN CALCIUM 40 MG/1
TABLET, FILM COATED ORAL
Qty: 45 TABLET | Refills: 1 | Status: SHIPPED | OUTPATIENT
Start: 2021-03-14 | End: 2021-09-11

## 2021-03-16 ENCOUNTER — TELEPHONE (OUTPATIENT)
Dept: ENDOCRINOLOGY | Facility: CLINIC | Age: 66
End: 2021-03-16

## 2021-03-16 NOTE — TELEPHONE ENCOUNTER
For pain in Knees, leg and arm, she needs to see her primary care physician or urgent care.  If she absolutely needs to see us then tell her that there will be a wait time but will try to get her in as soon as we can.

## 2021-03-16 NOTE — TELEPHONE ENCOUNTER
Patient left a message on voicemail stating she is needing to get in to see Dr. Archibald. She states she is having severe pain in her knees, legs, and arms. I tried to call her back and got her voicemail. I left her a message to call us.

## 2021-03-17 NOTE — TELEPHONE ENCOUNTER
Patient informed. States she will probably just go to her primary care physician. Had called here first because she thought maybe the pain was being caused by the insulin.

## 2021-03-22 ENCOUNTER — TELEPHONE (OUTPATIENT)
Dept: CARDIOLOGY | Facility: CLINIC | Age: 66
End: 2021-03-22

## 2021-04-04 DIAGNOSIS — R00.1 BRADYCARDIA: ICD-10-CM

## 2021-04-04 DIAGNOSIS — I10 ESSENTIAL HYPERTENSION: ICD-10-CM

## 2021-04-04 DIAGNOSIS — R00.2 PALPITATIONS: ICD-10-CM

## 2021-04-04 DIAGNOSIS — E11.65 TYPE 2 DIABETES MELLITUS WITH HYPERGLYCEMIA, WITHOUT LONG-TERM CURRENT USE OF INSULIN (HCC): ICD-10-CM

## 2021-04-04 DIAGNOSIS — E78.2 MIXED HYPERLIPIDEMIA: ICD-10-CM

## 2021-04-04 DIAGNOSIS — I48.0 PAROXYSMAL ATRIAL FIBRILLATION (HCC): ICD-10-CM

## 2021-04-04 DIAGNOSIS — I49.3 PVC (PREMATURE VENTRICULAR CONTRACTION): ICD-10-CM

## 2021-04-04 DIAGNOSIS — I50.31 ACUTE DIASTOLIC CHF (CONGESTIVE HEART FAILURE) (HCC): ICD-10-CM

## 2021-04-06 RX ORDER — FUROSEMIDE 40 MG/1
TABLET ORAL
Qty: 90 TABLET | Refills: 3 | Status: SHIPPED | OUTPATIENT
Start: 2021-04-06 | End: 2021-10-05

## 2021-04-08 DIAGNOSIS — R00.1 BRADYCARDIA: ICD-10-CM

## 2021-04-08 DIAGNOSIS — E11.65 TYPE 2 DIABETES MELLITUS WITH HYPERGLYCEMIA, WITHOUT LONG-TERM CURRENT USE OF INSULIN (HCC): ICD-10-CM

## 2021-04-08 DIAGNOSIS — I48.0 PAROXYSMAL ATRIAL FIBRILLATION (HCC): ICD-10-CM

## 2021-04-08 DIAGNOSIS — I50.31 ACUTE DIASTOLIC CHF (CONGESTIVE HEART FAILURE) (HCC): ICD-10-CM

## 2021-04-08 DIAGNOSIS — R00.2 PALPITATIONS: ICD-10-CM

## 2021-04-08 DIAGNOSIS — E78.2 MIXED HYPERLIPIDEMIA: ICD-10-CM

## 2021-04-08 DIAGNOSIS — I10 ESSENTIAL HYPERTENSION: ICD-10-CM

## 2021-04-11 RX ORDER — ACEBUTOLOL HYDROCHLORIDE 200 MG/1
CAPSULE ORAL
Qty: 180 CAPSULE | Refills: 0 | Status: SHIPPED | OUTPATIENT
Start: 2021-04-11 | End: 2021-05-27

## 2021-04-25 RX ORDER — MAGNESIUM OXIDE 400 MG/1
TABLET ORAL
Qty: 60 TABLET | Refills: 2 | Status: SHIPPED | OUTPATIENT
Start: 2021-04-25 | End: 2021-05-27 | Stop reason: SDUPTHER

## 2021-04-30 ENCOUNTER — OFFICE VISIT (OUTPATIENT)
Dept: CARDIOLOGY | Facility: CLINIC | Age: 66
End: 2021-04-30

## 2021-04-30 VITALS
SYSTOLIC BLOOD PRESSURE: 118 MMHG | DIASTOLIC BLOOD PRESSURE: 88 MMHG | HEIGHT: 63 IN | BODY MASS INDEX: 40.4 KG/M2 | HEART RATE: 119 BPM | OXYGEN SATURATION: 93 % | WEIGHT: 228 LBS

## 2021-04-30 DIAGNOSIS — I50.31 ACUTE DIASTOLIC CHF (CONGESTIVE HEART FAILURE) (HCC): Primary | ICD-10-CM

## 2021-04-30 DIAGNOSIS — I10 ESSENTIAL HYPERTENSION: ICD-10-CM

## 2021-04-30 DIAGNOSIS — R06.02 SHORTNESS OF BREATH: ICD-10-CM

## 2021-04-30 DIAGNOSIS — R00.1 BRADYCARDIA: ICD-10-CM

## 2021-04-30 DIAGNOSIS — R60.0 BILATERAL LEG EDEMA: ICD-10-CM

## 2021-04-30 DIAGNOSIS — R00.2 PALPITATIONS: ICD-10-CM

## 2021-04-30 DIAGNOSIS — I48.0 PAROXYSMAL ATRIAL FIBRILLATION (HCC): ICD-10-CM

## 2021-04-30 PROCEDURE — 99214 OFFICE O/P EST MOD 30 MIN: CPT | Performed by: INTERNAL MEDICINE

## 2021-04-30 PROCEDURE — 93000 ELECTROCARDIOGRAM COMPLETE: CPT | Performed by: INTERNAL MEDICINE

## 2021-04-30 RX ORDER — DILTIAZEM HYDROCHLORIDE 120 MG/1
120 CAPSULE, COATED, EXTENDED RELEASE ORAL DAILY
Qty: 90 CAPSULE | Refills: 1 | Status: SHIPPED | OUTPATIENT
Start: 2021-04-30 | End: 2021-09-24 | Stop reason: SDUPTHER

## 2021-04-30 NOTE — PROGRESS NOTES
Date of Office Visit: 2021  Encounter Provider: Dr. Jesse Charles    Place of Service: [facility] CARDIOLOGY [place]  Patient Name: Caterina Mason  :1955  Lou Curran MD    Chief Complaint   Patient presents with   • Slow Heart Rate     holter     History of Present Illness    I am pleased to see Mrs. Mason in my office today as a follow-up.    As you know, patient is 65 year-old white female whose past medical history significant for hypertension, hyperlipidemia, COPD, diabetes mellitus, paroxysmal atrial fibrillation, who came today for follow-up.    In 2017, patient was admitted at [facility] with a symptom of possible bradycardia and dizziness.  Patient was seen by endocrinologist and was thought to have bradycardia and was sent to the hospital.  In the hospital she was never documented to have bradycardia.  She was noted to have sinus rhythm with frequent PVCs and ventricular bigeminy rhythm.  Holter monitor showed frequent PVCs greater than 30,000.  Stress test was negative for ischemia and echocardiogram showed normal left ventricular size and function and LVEF was 55%.  No significant valvular heart disease was noted. In 2017, patient underwent Holter monitor which showed sinus rhythm with right bundle branch block pattern and patient had frequent PACs.     In 2018, patient was admitted at [facility] again with symptom of dizziness and possible extreme bradycardia.  She was found to be in sinus rhythm with bigeminy.  Holter monitor showed frequent premature ventricular contraction but no significant pause.  Ventricular bigeminy rhythm was noted.  Patient underwent cardiac catheterization showed no significant CAD..  In 2019, patient underwent Holter monitor it showed frequent PVCs greater than 18,000.  No significant bradycardia noted.  No SVT or VT was present. In 2019, patient underwent Holter monitor which showed  predominantly sinus rhythm with right bundle branch block.  Frequent PVCs and bigeminy was noted.  Patient had 26% of PVC burden. In 2019, patient underwent EP study and possible ablation of PVCs.  However it was unsuccessful.  Patient was started on Sectral 200 mg twice daily.    Patient came today and reporting symptom of palpitation.  Patient appears to be in atrial fibrillation.  Patient had Holter monitor in 2021 which showed sinus rhythm with frequent runs of atrial tachycardia and PVCs noted.  Patient complain of shortness of breath.  Patient has sleep apnea.  She uses her mask.  No significant leg edema noted.  Patient denies any orthopnea PND    EKG showed atrial fibrillation with right bundle branch block.    At this stage I would recommend to discontinue hydralazine.  I would start Cardizem  mg daily.  Patient is on Eliquis.  Achieving and maintaining sinus rhythm in this patient would be difficult because of her comorbid condition.  I would proceed with echocardiogram.  I will see the patient in 3 to 4 months.          Past Medical History:   Diagnosis Date   • Arthritis    • Atrial fibrillation (CMS/[facility])    • Bradycardia     Dr. Charles   • Cataract    • COPD (chronic obstructive pulmonary disease) (CMS/HCC)     Dr. Rob   • Diabetes mellitus, type 2 (CMS/HCC)     sees Dr. Archibald at [place]   • DJD (degenerative joint disease)     possible herniated disc, sees Pain Mgmt in [place]   •     • GERD (gastroesophageal reflux disease)    • History of combined right and left heart catheterization 2018    minimal CAD on heart cath done    • Hyperlipidemia    • Hypertension    • Pain    • Sleep apnea     wears CPAP machine, sees Darron         Past Surgical History:   Procedure Laterality Date   • ABLATION OF DYSRHYTHMIC FOCUS     • CARDIAC CATHETERIZATION      and Angiograph    • CARDIAC ELECTROPHYSIOLOGY PROCEDURE N/A 12/10/2019    Procedure: pvc ablation;  Surgeon:  Alfredo Iglesias MD;  Location: [facility] INVASIVE LOCATION;  Service: Cardiovascular   •  SECTION      x 1   • COLON RESECTION     • COLONOSCOPY  2012   • COLOSTOMY      and reversal in her 20's due to problems with childbirth   • COLOSTOMY CLOSURE     • OVARIAN CYST SURGERY     • ROTATOR CUFF REPAIR Left 2009    x2    • TOTAL ABDOMINAL HYSTERECTOMY WITH SALPINGO OOPHORECTOMY             Current Outpatient Medications:   •  Accu-Chek Aurea Plus test strip, USE TO CHECK BLOOD SUGAR TWICE A DAY, Disp: 50 each, Rfl: 7  •  Accu-Chek Softclix Lancets lancets, USE TO CHECK BS 3 TIMES DAILY, Disp: 100 each, Rfl: 2  •  acebutolol (SECTRAL) 200 MG capsule, TAKE 1 CAPSULE BY MOUTH TWICE A DAY, Disp: 180 capsule, Rfl: 0  •  ALBUTEROL SULFATE  (90 Base) MCG/ACT inhaler, INHALE 1-2 PUFFS EVERY 4 HOURS AS NEEDED FOR COUGH OR FOR SHORTNESS OF BREATH, Disp: 1 g, Rfl: 5  •  atorvastatin (LIPITOR) 40 MG tablet, TAKE 1/2 TABLET BY MOUTH DAILY, Disp: 45 tablet, Rfl: 1  •  Blood Glucose Monitoring Suppl (ACCU-CHEK AUREA PLUS) w/Device kit, USE TO CHECK BS 3 TIMES DAILY, Disp: 1 kit, Rfl: 0  •  CVS D3 25 MCG (1000 UT) capsule, TAKE 1 CAPSULE BY MOUTH TWICE A DAY, Disp: 180 capsule, Rfl: 2  •  Eliquis 5 MG tablet tablet, TAKE 1 TABLET BY MOUTH TWICE A DAY, Disp: 60 tablet, Rfl: 3  •  fenofibrate 160 MG tablet, TAKE 1 TABLET BY MOUTH EVERY DAY, Disp: 90 tablet, Rfl: 3  •  furosemide (LASIX) 40 MG tablet, TAKE 1 TABLET BY MOUTH EVERY DAY, Disp: 90 tablet, Rfl: 3  •  glucose blood (Accu-Chek Aurea Plus) test strip, CHECK BLOOD SUGAR 3 TIMES E11.65, Disp: 100 each, Rfl: 2  •  HYDROcodone-acetaminophen (NORCO) 7.5-325 MG per tablet, Take 1 tablet by mouth 3 (Three) Times a Day As Needed., Disp: , Rfl:   •  Insulin Glargine (BASAGLAR KWIKPEN) 100 UNIT/ML injection pen, INJECT 20 UNITS UNDER THE SKIN INTO THE APPROPRIATE AREA AS DIRECTED EVERY NIGHT, Disp: , Rfl:   •  Insulin Lispro, 1 Unit Dial, (HumaLOG KwikPen)  100 UNIT/ML solution pen-injector, Inject 7 Units under the skin into the appropriate area as directed 3 (Three) Times a Day., Disp: 5 pen, Rfl: 6  •  Insulin Pen Needle (BD Pen Needle Lorene U/F) 32G X 4 MM misc, Inject insulin 4 times daily / DX E11.65, Disp: 150 each, Rfl: 4  •  Janumet XR  MG tablet, TAKE 2 TABLETS BY MOUTH EVERY DAY, Disp: 60 tablet, Rfl: 3  •  lisinopril (PRINIVIL,ZESTRIL) 20 MG tablet, TAKE 1 TABLET BY MOUTH EVERY DAY, Disp: 90 tablet, Rfl: 1  •  magnesium oxide (MAG-OX) 400 MG tablet, TAKE 1 TABLET BY MOUTH TWICE A DAY, Disp: 60 tablet, Rfl: 2  •  potassium chloride (MICRO-K) 10 MEQ CR capsule, TAKE 1 CAPSULE BY MOUTH EVERY DAY, Disp: 90 capsule, Rfl: 1  •  budesonide-formoterol (Symbicort) 160-4.5 MCG/ACT inhaler, Inhale 2 puffs 2 (Two) Times a Day., Disp: 10.2 g, Rfl: 12  •  dilTIAZem CD (Cardizem CD) 120 MG 24 hr capsule, Take 1 capsule by mouth Daily., Disp: 90 capsule, Rfl: 1      Social History     Socioeconomic History   • Marital status:      Spouse name: Not on file   • Number of children: Not on file   • Years of education: Not on file   • Highest education level: Not on file   Tobacco Use   • Smoking status: Former Smoker     Packs/day: 1.00     Years: 48.00     Pack years: 48.00     Types: Cigarettes     Quit date: 3/1/2020     Years since quittin.1   • Smokeless tobacco: Never Used   • Tobacco comment: Pt states she is trying to quit   Vaping Use   • Vaping Use: Never used   Substance and Sexual Activity   • Alcohol use: No   • Drug use: No   • Sexual activity: Defer         Review of Systems   Constitutional: Negative for chills and fever.   HENT: Negative for ear discharge and nosebleeds.    Eyes: Negative for discharge and redness.   Cardiovascular: Positive for palpitations. Negative for chest pain, orthopnea, paroxysmal nocturnal dyspnea and syncope.   Respiratory: Positive for shortness of breath. Negative for cough and wheezing.    Endocrine: Negative for  "heat intolerance.   Skin: Negative for rash.   Musculoskeletal: Positive for arthritis and joint pain. Negative for myalgias.   Gastrointestinal: Negative for abdominal pain, melena, nausea and vomiting.   Genitourinary: Negative for dysuria and hematuria.   Neurological: Negative for dizziness, light-headedness, numbness and tremors.   Psychiatric/Behavioral: Negative for depression. The patient is not nervous/anxious.        Procedures      ECG 12 Lead    Date/Time: 4/30/2021 1:58 PM  Performed by: Jesse Charles MD  Authorized by: Jesse Charles MD   Comparison: compared with previous ECG   Comparison to previous ECG: Compared to previous EKG, patient appears to be in atrial fibrillation  Rhythm: atrial fibrillation    Clinical impression: abnormal EKG            ECG 12 Lead    (Results Pending)           Objective:    /88   Pulse 119   Ht 160.7 cm (63.27\")   Wt 103 kg (228 lb)   SpO2 93%   BMI 40.05 kg/m²         Constitutional:       Appearance: Well-developed.   Eyes:      General: No scleral icterus.        Right eye: No discharge.   HENT:      Head: Normocephalic and atraumatic.   Neck:      Thyroid: No thyromegaly.      Lymphadenopathy: No cervical adenopathy.   Pulmonary:      Effort: Pulmonary effort is normal. No respiratory distress.      Breath sounds: Normal breath sounds. No wheezing. No rales.   Cardiovascular:      Normal rate. Irregularly irregular rhythm.      No gallop.   Abdominal:      Tenderness: There is no abdominal tenderness.   Skin:     Findings: No erythema or rash.   Neurological:      Mental Status: Alert and oriented to person, place, and time.             Assessment:       Diagnosis Plan   1. Acute diastolic CHF (congestive heart failure) (CMS/[facility])  ECG 12 Lead    dilTIAZem CD (Cardizem CD) 120 MG 24 hr capsule    Adult Transthoracic Echo Complete W/ Cont if Necessary Per Protocol   2. Bilateral leg edema  ECG 12 Lead    dilTIAZem CD (Cardizem CD) 120 MG 24 hr capsule    " Adult Transthoracic Echo Complete W/ Cont if Necessary Per Protocol   3. Shortness of breath  ECG 12 Lead    dilTIAZem CD (Cardizem CD) 120 MG 24 hr capsule    Adult Transthoracic Echo Complete W/ Cont if Necessary Per Protocol   4. Palpitations  ECG 12 Lead    dilTIAZem CD (Cardizem CD) 120 MG 24 hr capsule    Adult Transthoracic Echo Complete W/ Cont if Necessary Per Protocol   5. Essential hypertension  ECG 12 Lead    dilTIAZem CD (Cardizem CD) 120 MG 24 hr capsule    Adult Transthoracic Echo Complete W/ Cont if Necessary Per Protocol   6. Bradycardia  ECG 12 Lead    dilTIAZem CD (Cardizem CD) 120 MG 24 hr capsule    Adult Transthoracic Echo Complete W/ Cont if Necessary Per Protocol   7. Paroxysmal atrial fibrillation (CMS/HCC)  dilTIAZem CD (Cardizem CD) 120 MG 24 hr capsule    Adult Transthoracic Echo Complete W/ Cont if Necessary Per Protocol            Plan:       MDM:    1.  Atrial fibrillation:    Patient appears to be in atrial fibrillation.  Patient is on Eliquis.  I would continue that.  I would add Cardizem  mg daily.  I doubt that I can maintain sinus rhythm in this patient because of her comorbid condition.  However I would talk about cardioversion on next visit.    2.  Bradycardia:    At present patient is in atrial fibrillation and heart rate is fast.  I would start Cardizem CD.  If patient became bradycardic, patient may need pacemaker.  Patient probably has tachybradycardia syndrome.    3.  Hypertension:    Blood pressure is controlled.  I would discontinue hydralazine and add Cardizem CD    4.  COPD/obstructive sleep apnea:    Patient follows with pulmonologist and uses her CPAP.    5.  History of diastolic heart failure:    Patient does not seem to be in volume overload at present.  Continue current therapy

## 2021-05-07 RX ORDER — SITAGLIPTIN AND METFORMIN HYDROCHLORIDE 1000; 50 MG/1; MG/1
TABLET, FILM COATED, EXTENDED RELEASE ORAL
Qty: 60 TABLET | Refills: 3 | Status: SHIPPED | OUTPATIENT
Start: 2021-05-07 | End: 2021-09-25

## 2021-05-20 ENCOUNTER — LAB (OUTPATIENT)
Dept: LAB | Facility: HOSPITAL | Age: 66
End: 2021-05-20

## 2021-05-20 ENCOUNTER — HOSPITAL ENCOUNTER (OUTPATIENT)
Dept: CARDIOLOGY | Facility: HOSPITAL | Age: 66
End: 2021-05-20

## 2021-05-20 DIAGNOSIS — E11.65 TYPE 2 DIABETES MELLITUS WITH HYPERGLYCEMIA, WITH LONG-TERM CURRENT USE OF INSULIN (HCC): ICD-10-CM

## 2021-05-20 DIAGNOSIS — Z79.4 TYPE 2 DIABETES MELLITUS WITH HYPERGLYCEMIA, WITH LONG-TERM CURRENT USE OF INSULIN (HCC): ICD-10-CM

## 2021-05-20 LAB
ALBUMIN SERPL-MCNC: 4.3 G/DL (ref 3.5–5.2)
ALBUMIN UR-MCNC: 3.4 MG/DL
ALBUMIN/GLOB SERPL: 1.2 G/DL
ALP SERPL-CCNC: 56 U/L (ref 39–117)
ALT SERPL W P-5'-P-CCNC: 34 U/L (ref 1–33)
ANION GAP SERPL CALCULATED.3IONS-SCNC: 9.8 MMOL/L (ref 5–15)
AST SERPL-CCNC: 32 U/L (ref 1–32)
BILIRUB SERPL-MCNC: 0.4 MG/DL (ref 0–1.2)
BUN SERPL-MCNC: 19 MG/DL (ref 8–23)
BUN/CREAT SERPL: 21.6 (ref 7–25)
CALCIUM SPEC-SCNC: 10 MG/DL (ref 8.6–10.5)
CHLORIDE SERPL-SCNC: 103 MMOL/L (ref 98–107)
CHOLEST SERPL-MCNC: 115 MG/DL (ref 0–200)
CO2 SERPL-SCNC: 27.2 MMOL/L (ref 22–29)
CREAT SERPL-MCNC: 0.88 MG/DL (ref 0.57–1)
CREAT UR-MCNC: 14.2 MG/DL
GFR SERPL CREATININE-BSD FRML MDRD: 64 ML/MIN/1.73
GLOBULIN UR ELPH-MCNC: 3.5 GM/DL
GLUCOSE SERPL-MCNC: 276 MG/DL (ref 65–99)
HBA1C MFR BLD: 10.6 % (ref 3.5–5.6)
HDLC SERPL-MCNC: 28 MG/DL (ref 40–60)
LDLC SERPL CALC-MCNC: 55 MG/DL (ref 0–100)
LDLC/HDLC SERPL: 1.75 {RATIO}
MICROALBUMIN/CREAT UR: 239.4 MG/G
POTASSIUM SERPL-SCNC: 4.1 MMOL/L (ref 3.5–5.2)
PROT SERPL-MCNC: 7.8 G/DL (ref 6–8.5)
SODIUM SERPL-SCNC: 140 MMOL/L (ref 136–145)
TRIGL SERPL-MCNC: 190 MG/DL (ref 0–150)
VLDLC SERPL-MCNC: 32 MG/DL (ref 5–40)

## 2021-05-20 PROCEDURE — 36415 COLL VENOUS BLD VENIPUNCTURE: CPT

## 2021-05-20 PROCEDURE — 80061 LIPID PANEL: CPT

## 2021-05-20 PROCEDURE — 83036 HEMOGLOBIN GLYCOSYLATED A1C: CPT

## 2021-05-20 PROCEDURE — 82570 ASSAY OF URINE CREATININE: CPT

## 2021-05-20 PROCEDURE — 80053 COMPREHEN METABOLIC PANEL: CPT

## 2021-05-20 PROCEDURE — 82043 UR ALBUMIN QUANTITATIVE: CPT

## 2021-05-26 ENCOUNTER — OFFICE VISIT (OUTPATIENT)
Dept: PULMONOLOGY | Facility: HOSPITAL | Age: 66
End: 2021-05-26

## 2021-05-26 VITALS — HEIGHT: 63 IN | BODY MASS INDEX: 39.69 KG/M2 | WEIGHT: 224 LBS

## 2021-05-26 DIAGNOSIS — J42 CHRONIC BRONCHITIS, UNSPECIFIED CHRONIC BRONCHITIS TYPE (HCC): Primary | ICD-10-CM

## 2021-05-26 DIAGNOSIS — G47.33 OBSTRUCTIVE SLEEP APNEA SYNDROME: ICD-10-CM

## 2021-05-26 DIAGNOSIS — K21.9 GASTROESOPHAGEAL REFLUX DISEASE WITHOUT ESOPHAGITIS: ICD-10-CM

## 2021-05-26 PROCEDURE — G0463 HOSPITAL OUTPT CLINIC VISIT: HCPCS

## 2021-05-26 RX ORDER — BUDESONIDE AND FORMOTEROL FUMARATE DIHYDRATE 160; 4.5 UG/1; UG/1
2 AEROSOL RESPIRATORY (INHALATION) 2 TIMES DAILY
Qty: 1 EACH | Refills: 11 | Status: SHIPPED | OUTPATIENT
Start: 2021-05-26 | End: 2021-10-05

## 2021-05-26 RX ORDER — IPRATROPIUM BROMIDE AND ALBUTEROL SULFATE 2.5; .5 MG/3ML; MG/3ML
3 SOLUTION RESPIRATORY (INHALATION) 4 TIMES DAILY PRN
Qty: 120 ML | Refills: 11 | Status: SHIPPED | OUTPATIENT
Start: 2021-05-26 | End: 2021-09-24

## 2021-05-26 RX ORDER — ALBUTEROL SULFATE 90 UG/1
2 AEROSOL, METERED RESPIRATORY (INHALATION) EVERY 4 HOURS PRN
Qty: 6.7 G | Refills: 5 | Status: SHIPPED | OUTPATIENT
Start: 2021-05-26 | End: 2021-10-05

## 2021-05-26 RX ORDER — ARFORMOTEROL TARTRATE 15 UG/2ML
15 SOLUTION RESPIRATORY (INHALATION)
Qty: 120 ML | Refills: 11 | Status: SHIPPED | OUTPATIENT
Start: 2021-05-26 | End: 2021-10-05

## 2021-05-26 NOTE — PROGRESS NOTES
PULMONARY  CONSULT NOTE      PATIENT IDENTIFICATION:  Name: Caterina Mason  Age: 65 y.o.  Sex: female  :  1955  MRN: [id number]    DATE OF CONSULTATION:  2021                     CHIEF COMPLAINT: Chronic obstructive airway disease    History of Present Illness:   Caterina Mason is a 65 y.o. female Pt on CPAP feeling better more energy especially the night he use it more than 4 hours, no sleepiness no fatigue no tiredness, no mask irritation no dryness, compliance report reviewed with pt AHI< 5 with good usage.   Patient history of COPD quit smoking a year ago still having some fatigue and dizziness exertion and feeling tight not able to take deep breath increased tiredness and fatigues also, and significant dyspnea exertion for 1 block, patient on albuterol inhaler only and she feels not helping her    Patient has CT scan of the chest that was negative for lung nodule      Review of Systems:   Constitutional:  As above   Eyes: negative   ENT/oropharynx: negative   Cardiovascular: negative   Respiratory:  As above   Gastrointestinal: negative   Genitourinary: negative   Neurological: negative   Musculoskeletal: negative   Integument/breast: negative   Endocrine: negative   Allergic/Immunologic: negative     Past Medical History:  Past Medical History:   Diagnosis Date   • Arthritis    • Atrial fibrillation (CMS/[facility])    • Bradycardia     Dr. Charles   • Cataract    • COPD (chronic obstructive pulmonary disease) (CMS/[facility])     Dr. Rob   • Diabetes mellitus, type 2 (CMS/[facility])     sees Dr. Archibald at [place]   • DJD (degenerative joint disease)     possible herniated disc, sees Pain Mgmt in [place]   •     • GERD (gastroesophageal reflux disease)    • History of combined right and left heart catheterization 2018    minimal CAD on heart cath done    • Hyperlipidemia    • Hypertension    • Pain    • Sleep apnea     wears CPAP machine, sees Darron       Past Surgical History:  Past Surgical  History:   Procedure Laterality Date   • ABLATION OF DYSRHYTHMIC FOCUS     • CARDIAC CATHETERIZATION      and Angiograph    • CARDIAC ELECTROPHYSIOLOGY PROCEDURE N/A 12/10/2019    Procedure: pvc ablation;  Surgeon: Alfredo Iglesias MD;  Location: [facility] INVASIVE LOCATION;  Service: Cardiovascular   •  SECTION      x 1   • COLON RESECTION     • COLONOSCOPY  2012   • COLOSTOMY      and reversal in her 20's due to problems with childbirth   • COLOSTOMY CLOSURE     • OVARIAN CYST SURGERY     • ROTATOR CUFF REPAIR Left 2009    x2    • TOTAL ABDOMINAL HYSTERECTOMY WITH SALPINGO OOPHORECTOMY          Family History:  Family History   Problem Relation Age of Onset   • Heart disease Mother    • Hypertension Mother    • Stroke Mother    • Seizures Mother    • Heart disease Father    • Hypertension Father    • Lung disease Father    • Stroke Father    • Cancer Sister    • Hypertension Brother    • Diabetes Brother    • Hyperlipidemia Other         Social History:   Social History     Tobacco Use   • Smoking status: Former Smoker     Packs/day: 1.00     Years: 48.00     Pack years: 48.00     Types: Cigarettes     Quit date: 3/1/2020     Years since quittin.2   • Smokeless tobacco: Never Used   Substance Use Topics   • Alcohol use: No        Allergies:  Allergies   Allergen Reactions   • Ibuprofen GI Intolerance   • Prednisone Other (See Comments)   • Pregabalin Other (See Comments)     jerking   • Tramadol Other (See Comments)     Legs jerked   • Levofloxacin Other (See Comments)     Increase sunburn   • Sulfamethoxazole-Trimethoprim Swelling       Home Meds:  (Not in a hospital admission)      Objective:    Vitals Ranges:      Body mass index is 39.34 kg/m².     Exam:  General Appearance:  WDWN    HEENT:   without obvious abnormality,  Conjunctiva/corneas clear,  Normal external ear canals, no drainage    Clear orsalmucosa,  Mallampati score 3    Neck:  Supple, symmetrical, trachea midline. No  JVD.  Lungs:   Bilateral basal rhonchi bilaterally, respirations unlabored symmetrical wall movement.    Chest wall:  No tenderness or deformity.    Heart:  Regular rate and rhythm, S1 and S2 normal.  Extremities: Trace edema no clubbing or Cyanosis        Data Review:  All labs (24hrs): No results found for this or any previous visit (from the past 24 hour(s)).     Imaging:  Holter Monitor - 24 Hour  This is 24-hour Holter monitor and total beats analyzed was 92,590.    Patient was predominantly in sinus rhythm with minimum heart rate of 59   bpm and a maximum heart rate of 86 bpm and average heart rate of 71 bpm.    Patient had 5068 PVCs and patient had 10 beat run of atrial tachycardia at   a slower heart rate.  Patient had 8944 PVCs.  Patient had 64 couplets in   bigeminy rhythm was noted.  No atrial fibrillation was noted.    Impression:    This Holter was abnormal because of frequent PVCs and PACs.  Short run of   atrial tachycardia was noted.  Clinical correlation recommended       ASSESSMENT:  Diagnoses and all orders for this visit:    Chronic bronchitis, unspecified chronic bronchitis type (CMS/HCC)  -     Pulmonary Function Test; Future  -     6 Minute Walk Test; Future    Obstructive sleep apnea syndrome    Gastroesophageal reflux disease without esophagitis    Other orders  -     budesonide-formoterol (SYMBICORT) 160-4.5 MCG/ACT inhaler; Inhale 2 puffs 2 (Two) Times a Day.  -     albuterol sulfate  (90 Base) MCG/ACT inhaler; Inhale 2 puffs Every 4 (Four) Hours As Needed for Wheezing.  -     arformoterol (Brovana) 15 MCG/2ML nebulizer solution; Take 2 mL by nebulization 2 (Two) Times a Day.  -     ipratropium-albuterol (DUO-NEB) 0.5-2.5 mg/3 ml nebulizer; Take 3 mL by nebulization 4 (Four) Times a Day As Needed for Wheezing for up to 30 days.        PLAN:  Get PFT and 6-minute O2 monitor  Bronchodilator inhaled corticosteroid Symbicort Brovana DuoNeb    Education how to use inhalers    Encouraged  to use incentive spirometer    Continue to exercise slowly as tolerated    Monitor for any change in the color of the sputum    Avoid any exposure to fumes, gas or any irritant        This patient with obstructive sleep apnea, compliance is improved. Encourage to use it more frequent, I re-emphasized on pt the long and short term benefit of treating RUSLAN.     Treating RUSLAN will improve BP control     Follow-up 4 weeks    Margot Rob MD. D, ABSM.  5/26/2021  15:44 EDT

## 2021-05-27 ENCOUNTER — OFFICE VISIT (OUTPATIENT)
Dept: ENDOCRINOLOGY | Facility: CLINIC | Age: 66
End: 2021-05-27

## 2021-05-27 VITALS
BODY MASS INDEX: 39.51 KG/M2 | HEART RATE: 105 BPM | TEMPERATURE: 96.8 F | OXYGEN SATURATION: 90 % | SYSTOLIC BLOOD PRESSURE: 105 MMHG | HEIGHT: 63 IN | WEIGHT: 223 LBS | DIASTOLIC BLOOD PRESSURE: 72 MMHG

## 2021-05-27 DIAGNOSIS — Z79.4 TYPE 2 DIABETES MELLITUS WITH HYPERGLYCEMIA, WITH LONG-TERM CURRENT USE OF INSULIN (HCC): Primary | ICD-10-CM

## 2021-05-27 DIAGNOSIS — B35.1 ONYCHOMYCOSIS: ICD-10-CM

## 2021-05-27 DIAGNOSIS — E78.2 MIXED HYPERLIPIDEMIA: ICD-10-CM

## 2021-05-27 DIAGNOSIS — I10 ESSENTIAL HYPERTENSION: ICD-10-CM

## 2021-05-27 DIAGNOSIS — E66.09 CLASS 2 OBESITY DUE TO EXCESS CALORIES WITHOUT SERIOUS COMORBIDITY WITH BODY MASS INDEX (BMI) OF 39.0 TO 39.9 IN ADULT: ICD-10-CM

## 2021-05-27 DIAGNOSIS — E11.65 TYPE 2 DIABETES MELLITUS WITH HYPERGLYCEMIA, WITH LONG-TERM CURRENT USE OF INSULIN (HCC): Primary | ICD-10-CM

## 2021-05-27 LAB — GLUCOSE BLDC GLUCOMTR-MCNC: 301 MG/DL (ref 70–105)

## 2021-05-27 PROCEDURE — 82962 GLUCOSE BLOOD TEST: CPT | Performed by: INTERNAL MEDICINE

## 2021-05-27 PROCEDURE — 99214 OFFICE O/P EST MOD 30 MIN: CPT | Performed by: INTERNAL MEDICINE

## 2021-05-27 RX ORDER — INSULIN LISPRO 100 [IU]/ML
12 INJECTION, SOLUTION INTRAVENOUS; SUBCUTANEOUS 3 TIMES DAILY
Qty: 10 PEN | Refills: 6 | Status: SHIPPED | OUTPATIENT
Start: 2021-05-27 | End: 2021-10-05

## 2021-05-27 RX ORDER — MAGNESIUM OXIDE 400 MG/1
1 TABLET ORAL 2 TIMES DAILY
Qty: 60 TABLET | Refills: 6 | Status: SHIPPED | OUTPATIENT
Start: 2021-05-27 | End: 2021-10-05

## 2021-05-27 RX ORDER — MELATONIN
1000 DAILY
COMMUNITY
End: 2021-08-04

## 2021-05-27 RX ORDER — INSULIN GLARGINE 100 [IU]/ML
INJECTION, SOLUTION SUBCUTANEOUS
Qty: 10 PEN | Refills: 6 | Status: SHIPPED | OUTPATIENT
Start: 2021-05-27 | End: 2021-10-05

## 2021-05-27 NOTE — PATIENT INSTRUCTIONS
Increase Basaglar to 32 units subcu nightly  Increase Humalog to 12 with each meal  Continue rest of the medications  Arrange for nutrition consult  Arrange for podiatry consult  Continue to work on diet and activity  Always keep glucose source in case of low blood sugar  Annual eye exam and flu vaccine  Labs before follow-up.

## 2021-05-27 NOTE — PROGRESS NOTES
Endocrine Progress Note Outpatient     Patient Care Team:  Lou Curran MD as PCP - General  Jesse Charles MD as Consulting Physician (Cardiology)  Bautista Archibald MD as Consulting Physician (Endocrinology)  Jane Montero MD as Consulting Physician (Obstetrics and Gynecology)    Chief Complaint: Follow-up type 2 diabetes    HPI: 65-year-old female with history of type 2 diabetes, hypertension and hyperlipidemia is here for follow-up.    For type 2 diabetes she is currently on Janumet XR 50/thousand 2 tablets daily.  She is Lantus 21 units sq qhs, Humalog 7 units with each meal. She did not bring in blood sugar records for review.   She does admit fatigue and tiredness and sleeps all the time. She stopped Jardiance due to excessive yeast infections.  She is tolerating her medications well.  She did not do the dietitian consult that was requested last visit.    Hypertension: Well-controlled.    Hyperlipidemia: On atorvastatin and fenofibrate.    Past Medical History:   Diagnosis Date   • Arthritis    • Atrial fibrillation (CMS/[facility])    • Bradycardia     Dr. Charles   • Cataract    • COPD (chronic obstructive pulmonary disease) (CMS/[facility])     Dr. Rob   • Diabetes mellitus, type 2 (CMS/[facility])     sees Dr. Archibald at [place]   • DJD (degenerative joint disease)     possible herniated disc, sees Pain Mgmt in [place]   •     • GERD (gastroesophageal reflux disease)    • History of combined right and left heart catheterization 2018    minimal CAD on heart cath done    • Hyperlipidemia    • Hypertension    • Pain    • Sleep apnea     wears CPAP machine, sees Darron       Social History     Socioeconomic History   • Marital status:      Spouse name: Not on file   • Number of children: Not on file   • Years of education: Not on file   • Highest education level: Not on file   Tobacco Use   • Smoking status: Former Smoker     Packs/day: 1.00     Years: 48.00     Pack years: 48.00     Types: Cigarettes      Quit date: 3/1/2020     Years since quittin.2   • Smokeless tobacco: Never Used   Vaping Use   • Vaping Use: Never used   Substance and Sexual Activity   • Alcohol use: No   • Drug use: No   • Sexual activity: Defer       Family History   Problem Relation Age of Onset   • Heart disease Mother    • Hypertension Mother    • Stroke Mother    • Seizures Mother    • Heart disease Father    • Hypertension Father    • Lung disease Father    • Stroke Father    • Cancer Sister    • Hypertension Brother    • Diabetes Brother    • Hyperlipidemia Other        Allergies   Allergen Reactions   • Ibuprofen GI Intolerance   • Prednisone Other (See Comments)   • Pregabalin Other (See Comments)     jerking   • Tramadol Other (See Comments)     Legs jerked   • Levofloxacin Other (See Comments)     Increase sunburn   • Sulfamethoxazole-Trimethoprim Swelling       ROS:   Constitutional:  Admit fatigue, tiredness.    Eyes:  Denies change in visual acuity   HENT:  Denies nasal congestion or sore throat   Respiratory: denies cough, admit shortness of breath.   Cardiovascular:  denies chest pain, edema   GI:  Denies abdominal pain, nausea, vomiting.   Musculoskeletal:   Admit muscle aches and cramps.  Integument:  Denies dry skin and rash   Neurologic:  Denies headache, focal weakness or sensory changes   Endocrine:  Denies polyuria or polydipsia   Psychiatric:  Denies depression or anxiety      Vitals:    21 1043   BP: 105/72   Pulse: 105   Temp: 96.8 °F (36 °C)   SpO2: 90%       Physical Exam:  GEN: NAD, conversant, obese  EYES: EOMI, PERRL, no conjunctival erythema  NECK: no thyromegaly, full ROM   CV: RRR, no murmurs/rubs/gallops, no peripheral edema  LUNG: CTAB, no wheezes/rales/ronchi  SKIN: no rashes, no acanthosis  MSK: no deformities, full ROM of all extremities  NEURO: no tremors, DTR normal  PSYCH: AOX3, appropriate mood, affect normal  FEET: Has Onychomycosis    Results Review:     I reviewed the patient's new clinical  results.    Lab Results   Component Value Date    HGBA1C 10.6 (H) 05/20/2021    HGBA1C 9.9 (H) 09/22/2020    HGBA1C 10.6 (H) 05/29/2020      Lab Results   Component Value Date    GLUCOSE 276 (H) 05/20/2021    BUN 19 05/20/2021    CREATININE 0.88 05/20/2021    EGFRIFNONA 64 05/20/2021    BCR 21.6 05/20/2021    K 4.1 05/20/2021    CO2 27.2 05/20/2021    CALCIUM 10.0 05/20/2021    ALBUMIN 4.30 05/20/2021    LABIL2 1.4 05/08/2019    AST 32 05/20/2021    ALT 34 (H) 05/20/2021    CHOL 115 05/20/2021    TRIG 190 (H) 05/20/2021    LDL 55 05/20/2021    HDL 28 (L) 05/20/2021     Lab Results   Component Value Date    TSH 0.810 07/09/2019         Medication Review: Reviewed.       Current Outpatient Medications:   •  Accu-Chek Uarea Plus test strip, USE TO CHECK BLOOD SUGAR TWICE A DAY, Disp: 50 each, Rfl: 7  •  Accu-Chek Softclix Lancets lancets, USE TO CHECK BS 3 TIMES DAILY, Disp: 100 each, Rfl: 2  •  albuterol sulfate  (90 Base) MCG/ACT inhaler, Inhale 2 puffs Every 4 (Four) Hours As Needed for Wheezing., Disp: 6.7 g, Rfl: 5  •  apixaban (ELIQUIS) 5 MG tablet tablet, Take 5 mg by mouth 2 (Two) Times a Day., Disp: , Rfl:   •  arformoterol (Brovana) 15 MCG/2ML nebulizer solution, Take 2 mL by nebulization 2 (Two) Times a Day., Disp: 120 mL, Rfl: 11  •  atorvastatin (LIPITOR) 40 MG tablet, TAKE 1/2 TABLET BY MOUTH DAILY, Disp: 45 tablet, Rfl: 1  •  Blood Glucose Monitoring Suppl (ACCU-CHEK AUREA PLUS) w/Device kit, USE TO CHECK BS 3 TIMES DAILY, Disp: 1 kit, Rfl: 0  •  budesonide-formoterol (SYMBICORT) 160-4.5 MCG/ACT inhaler, Inhale 2 puffs 2 (Two) Times a Day., Disp: 1 each, Rfl: 11  •  cholecalciferol (Vitamin D, Cholecalciferol,) 25 MCG (1000 UT) tablet, Take 1,000 Units by mouth Daily., Disp: , Rfl:   •  dilTIAZem CD (Cardizem CD) 120 MG 24 hr capsule, Take 1 capsule by mouth Daily., Disp: 90 capsule, Rfl: 1  •  fenofibrate 160 MG tablet, TAKE 1 TABLET BY MOUTH EVERY DAY, Disp: 90 tablet, Rfl: 3  •  furosemide  (LASIX) 40 MG tablet, TAKE 1 TABLET BY MOUTH EVERY DAY, Disp: 90 tablet, Rfl: 3  •  glucose blood (Accu-Chek Aurea Plus) test strip, CHECK BLOOD SUGAR 3 TIMES E11.65, Disp: 100 each, Rfl: 2  •  HYDROcodone-acetaminophen (NORCO) 7.5-325 MG per tablet, Take 1 tablet by mouth 3 (Three) Times a Day As Needed., Disp: , Rfl:   •  Insulin Glargine (BASAGLAR KWIKPEN) 100 UNIT/ML injection pen, INJECT 20 UNITS UNDER THE SKIN INTO THE APPROPRIATE AREA AS DIRECTED EVERY NIGHT, Disp: , Rfl:   •  Insulin Lispro, 1 Unit Dial, (HumaLOG KwikPen) 100 UNIT/ML solution pen-injector, Inject 7 Units under the skin into the appropriate area as directed 3 (Three) Times a Day., Disp: 5 pen, Rfl: 6  •  Insulin Pen Needle (BD Pen Needle Lorene U/F) 32G X 4 MM misc, Inject insulin 4 times daily / DX E11.65, Disp: 150 each, Rfl: 4  •  ipratropium-albuterol (DUO-NEB) 0.5-2.5 mg/3 ml nebulizer, Take 3 mL by nebulization 4 (Four) Times a Day As Needed for Wheezing for up to 30 days., Disp: 120 mL, Rfl: 11  •  Janumet XR  MG tablet, TAKE 2 TABLETS BY MOUTH EVERY DAY, Disp: 60 tablet, Rfl: 3  •  lisinopril (PRINIVIL,ZESTRIL) 20 MG tablet, TAKE 1 TABLET BY MOUTH EVERY DAY, Disp: 90 tablet, Rfl: 1  •  magnesium oxide (MAG-OX) 400 MG tablet, TAKE 1 TABLET BY MOUTH TWICE A DAY, Disp: 60 tablet, Rfl: 2  •  potassium chloride (MICRO-K) 10 MEQ CR capsule, TAKE 1 CAPSULE BY MOUTH EVERY DAY, Disp: 90 capsule, Rfl: 1      Assessment/Plan   1.  Diabetes mellitus type II: Uncontrolled with worsening of A1c at 10.6%.  Will increase Basaglar to 32 units subcu nightly and increase Humalog to 12 with each meal.  She will continue Janumet..  I am also going to refer her for the nutrition consult again.  She is advised to always keep glucose source in case of low blood sugar and get regular eye exam and flu vaccine.    2.  Hypertension: Well-controlled, continue current medication    3.  Hyperlipidemia: Well-controlled, will continue atorvastatin with  fenofibrate.    4.  Onychomycosis: We will refer for podiatry evaluation    5.  Obesity: She will be referred for nutrition consult and she needs to work on her diet and activity.              Bautista Archibald MD FACE.

## 2021-05-28 RX ORDER — PROCHLORPERAZINE 25 MG/1
1 SUPPOSITORY RECTAL DAILY
Qty: 1 EACH | Refills: 0 | Status: SHIPPED | OUTPATIENT
Start: 2021-05-28 | End: 2021-08-04

## 2021-05-28 RX ORDER — PROCHLORPERAZINE 25 MG/1
SUPPOSITORY RECTAL
Qty: 2 EACH | Refills: 6 | Status: SHIPPED | OUTPATIENT
Start: 2021-05-28 | End: 2021-08-04

## 2021-05-28 RX ORDER — PROCHLORPERAZINE 25 MG/1
1 SUPPOSITORY RECTAL DAILY
Qty: 1 EACH | Refills: 1 | Status: SHIPPED | OUTPATIENT
Start: 2021-05-28 | End: 2021-08-04

## 2021-06-02 ENCOUNTER — TRANSCRIBE ORDERS (OUTPATIENT)
Dept: ADMINISTRATIVE | Facility: HOSPITAL | Age: 66
End: 2021-06-02

## 2021-06-03 ENCOUNTER — TELEPHONE (OUTPATIENT)
Dept: ENDOCRINOLOGY | Facility: CLINIC | Age: 66
End: 2021-06-03

## 2021-06-03 NOTE — TELEPHONE ENCOUNTER
PA required for Dexcom system    PA submitted via [email or web address]    PA approved Approved today  PA Case: [id number], Status: Approved, Coverage Starts on: 6/3/2021 12:00:00 AM, Coverage Ends on: 6/3/2022 12:00:00 AM.

## 2021-07-01 RX ORDER — APIXABAN 5 MG/1
TABLET, FILM COATED ORAL
Qty: 60 TABLET | Refills: 3 | Status: SHIPPED | OUTPATIENT
Start: 2021-07-01 | End: 2021-10-05

## 2021-07-02 RX ORDER — LISINOPRIL 20 MG/1
TABLET ORAL
Qty: 90 TABLET | Refills: 1 | Status: SHIPPED | OUTPATIENT
Start: 2021-07-02 | End: 2021-10-05

## 2021-07-02 RX ORDER — CHOLECALCIFEROL (VITAMIN D3) 25 MCG
CAPSULE ORAL
Qty: 180 CAPSULE | Refills: 2 | Status: SHIPPED | OUTPATIENT
Start: 2021-07-02 | End: 2021-10-05

## 2021-07-20 RX ORDER — ACEBUTOLOL HYDROCHLORIDE 200 MG/1
200 CAPSULE ORAL 2 TIMES DAILY
Qty: 60 CAPSULE | Refills: 2 | Status: SHIPPED | OUTPATIENT
Start: 2021-07-20 | End: 2021-10-05

## 2021-07-20 NOTE — TELEPHONE ENCOUNTER
Patient called and stated that she is completely out of the Acebutolol HCL 200mg she said that [facility] prescribed it but Dr. Charles has been the one filling it.

## 2021-07-22 RX ORDER — POTASSIUM CHLORIDE 750 MG/1
CAPSULE, EXTENDED RELEASE ORAL
Qty: 90 CAPSULE | Refills: 1 | Status: SHIPPED | OUTPATIENT
Start: 2021-07-22 | End: 2021-10-05

## 2021-07-29 DIAGNOSIS — E11.65 TYPE 2 DIABETES MELLITUS WITH HYPERGLYCEMIA, WITH LONG-TERM CURRENT USE OF INSULIN (HCC): ICD-10-CM

## 2021-07-29 DIAGNOSIS — Z79.4 TYPE 2 DIABETES MELLITUS WITH HYPERGLYCEMIA, WITH LONG-TERM CURRENT USE OF INSULIN (HCC): ICD-10-CM

## 2021-07-29 RX ORDER — BLOOD SUGAR DIAGNOSTIC
STRIP MISCELLANEOUS
Qty: 100 EACH | Refills: 5 | Status: SHIPPED | OUTPATIENT
Start: 2021-07-29 | End: 2021-10-05

## 2021-08-02 ENCOUNTER — TELEPHONE (OUTPATIENT)
Dept: FAMILY MEDICINE CLINIC | Facility: CLINIC | Age: 66
End: 2021-08-02

## 2021-08-02 ENCOUNTER — NURSE TRIAGE (OUTPATIENT)
Dept: CALL CENTER | Facility: HOSPITAL | Age: 66
End: 2021-08-02

## 2021-08-02 NOTE — TELEPHONE ENCOUNTER
Please see if you can get her in for an appt ASAP.  If she cannot be seen here she needs to go to the ER.

## 2021-08-02 NOTE — TELEPHONE ENCOUNTER
Hub staff attempted to follow warm transfer process and was unsuccessful     Caller: Caterina Mason    Relationship to patient: Self    Best call back number: [phone]    Patient is needing: PATIENT CALLED IN TO MAKE AN APPOINTMENT. SHE SAID HER RIGHT BIG TOE WAS TURNING BLACK AND SHE NEEDED AN APPOINTMENT. SHE SAID IT WAS ORGINIALLY INFECTED, BURST, THEN STARTED TURNING BLACK. [facility] ATTEMPTED TO WARM TRANSFER AND WAS UNSUCCESSFUL. PLEASE CALL PATIENT AND ADVISE.

## 2021-08-04 ENCOUNTER — LAB (OUTPATIENT)
Dept: FAMILY MEDICINE CLINIC | Facility: CLINIC | Age: 66
End: 2021-08-04

## 2021-08-04 ENCOUNTER — OFFICE VISIT (OUTPATIENT)
Dept: FAMILY MEDICINE CLINIC | Facility: CLINIC | Age: 66
End: 2021-08-04

## 2021-08-04 VITALS
SYSTOLIC BLOOD PRESSURE: 136 MMHG | BODY MASS INDEX: 40.04 KG/M2 | DIASTOLIC BLOOD PRESSURE: 90 MMHG | HEART RATE: 88 BPM | WEIGHT: 228 LBS | OXYGEN SATURATION: 92 % | TEMPERATURE: 97.5 F

## 2021-08-04 DIAGNOSIS — B35.1 ONYCHOMYCOSIS: ICD-10-CM

## 2021-08-04 DIAGNOSIS — M19.90 ARTHRITIS: Primary | ICD-10-CM

## 2021-08-04 DIAGNOSIS — M19.90 ARTHRITIS: ICD-10-CM

## 2021-08-04 LAB
CHROMATIN AB SERPL-ACNC: <10 IU/ML (ref 0–14)
CRP SERPL-MCNC: 1.08 MG/DL (ref 0–0.5)
URATE SERPL-MCNC: 4.4 MG/DL (ref 2.4–5.7)

## 2021-08-04 PROCEDURE — 36415 COLL VENOUS BLD VENIPUNCTURE: CPT

## 2021-08-04 PROCEDURE — 84550 ASSAY OF BLOOD/URIC ACID: CPT | Performed by: FAMILY MEDICINE

## 2021-08-04 PROCEDURE — 86140 C-REACTIVE PROTEIN: CPT | Performed by: FAMILY MEDICINE

## 2021-08-04 PROCEDURE — 86038 ANTINUCLEAR ANTIBODIES: CPT | Performed by: FAMILY MEDICINE

## 2021-08-04 PROCEDURE — 99213 OFFICE O/P EST LOW 20 MIN: CPT | Performed by: FAMILY MEDICINE

## 2021-08-04 PROCEDURE — 86431 RHEUMATOID FACTOR QUANT: CPT | Performed by: FAMILY MEDICINE

## 2021-08-04 NOTE — PROGRESS NOTES
Chief Complaint  Foot Problem (toe is black, rt foot 3rd digit), Edema, and Pain (whole body)    Subjective          Caterina Mason presents to [facility]  Constant pain.  24/7.  All over her body.  She sees Pain Management and says they told her to come here and get some labs for possible arthritis.     She says her right 3rd toe is black.  About 3 weeks.  No known injury.  She says she is not going to a foot doctor because they make her toes bleed.    Foot Problem    Pain        Objective   Vital Signs:   /90 (BP Location: Left arm, Patient Position: Sitting, Cuff Size: Adult)   Pulse 88   Temp 97.5 °F (36.4 °C) (Infrared)   Wt 103 kg (228 lb)   SpO2 92%   BMI 40.04 kg/m²     Physical Exam  Vitals reviewed.   Constitutional:       General: She is not in acute distress.     Appearance: She is well-developed. She is obese.   HENT:      Head: Normocephalic.   Eyes:      General: Lids are normal.      Conjunctiva/sclera: Conjunctivae normal.   Neck:      Thyroid: No thyroid mass or thyromegaly.      Trachea: Trachea normal.   Cardiovascular:      Rate and Rhythm: Normal rate and regular rhythm.      Heart sounds: Normal heart sounds.   Pulmonary:      Effort: Pulmonary effort is normal.      Breath sounds: Normal breath sounds.   Musculoskeletal:      Cervical back: Normal range of motion.      Right foot: Decreased range of motion.      Left foot: Decreased range of motion.   Feet:      Right foot:      Skin integrity: Ulcer and dry skin present.      Toenail Condition: Right toenails are abnormally thick.      Left foot:      Skin integrity: Dry skin present.      Toenail Condition: Left toenails are abnormally thick.   Lymphadenopathy:      Cervical: No cervical adenopathy.   Skin:     General: Skin is warm and dry.   Neurological:      Mental Status: She is alert and oriented to person, place, and time.   Psychiatric:         Attention and Perception: She is  attentive.         Mood and Affect: Mood normal.         Speech: Speech normal.         Behavior: Behavior normal.        Result Review :   The following data was reviewed by: Lou Curran MD on 08/04/2021:  Common labs    Common Labsle 9/22/20 9/22/20 9/22/20 9/22/20 5/20/21 5/20/21 5/20/21 5/20/21 8/4/21    0732 0732 0732 0732 0740 0740 0740 0740    Glucose  234 (A)     276 (A)     BUN  19     19     Creatinine  0.85     0.88     eGFR Non  Am  67     64     Sodium  139     140     Potassium  3.9     4.1     Chloride  104     103     Calcium  10.1     10.0     Albumin  4.40     4.30     Total Bilirubin  0.3     0.4     Alkaline Phosphatase  68     56     AST (SGOT)  34 (A)     32     ALT (SGPT)  47 (A)     34 (A)     Total Cholesterol 128     115      Triglycerides 226 (A)     190 (A)      HDL Cholesterol 34 (A)     28 (A)      LDL Cholesterol  49     55      Hemoglobin A1C    9.9 (A) 10.6 (A)       Microalbumin, Urine   <1.2     3.4    Uric Acid         4.4   (A) Abnormal value                      Assessment and Plan    Diagnoses and all orders for this visit:    1. Arthritis (Primary)  -     Uric acid; Future  -     Rheumatoid factor; Future  -     C-reactive protein; Future  -     VLADIMIR; Future    2. Onychomycosis  -     Ambulatory Referral to Podiatry        Follow Up   No follow-ups on file.  Patient was given instructions and counseling regarding her condition or for health maintenance advice. Please see specific information pulled into the AVS if appropriate.

## 2021-08-06 LAB — ANA SER QL: NEGATIVE

## 2021-09-02 NOTE — PROGRESS NOTES
Date of Office Visit: 2021  Encounter Provider: Dr. Jesse Charles  Place of Service: [facility] CARDIOLOGY [place]  Patient Name: Caterina Mason  :1955  Lou Curran MD    Chief Complaint   Patient presents with   • Atrial Fibrillation     4 month follow up    • Slow Heart Rate   • Hypertension   • Palpitations   • Hyperlipidemia     History of Present Illness    I am pleased to see Mrs. Mason in my office today as a follow-up.    As you know, patient is 66 year-old white female whose past medical history significant for hypertension, hyperlipidemia, COPD, diabetes mellitus, paroxysmal atrial fibrillation, who came today for follow-up.    In 2017, patient was admitted at [facility] with a symptom of possible bradycardia and dizziness.  Patient was seen by endocrinologist and was thought to have bradycardia and was sent to the hospital.  In the hospital she was never documented to have bradycardia.  She was noted to have sinus rhythm with frequent PVCs and ventricular bigeminy rhythm.  Holter monitor showed frequent PVCs greater than 30,000.  Stress test was negative for ischemia and echocardiogram showed normal left ventricular size and function and LVEF was 55%.  No significant valvular heart disease was noted. In 2017, patient underwent Holter monitor which showed sinus rhythm with right bundle branch block pattern and patient had frequent PACs.     In 2018, patient was admitted at [facility] again with symptom of dizziness and possible extreme bradycardia.  She was found to be in sinus rhythm with bigeminy.  Holter monitor showed frequent premature ventricular contraction but no significant pause.  Ventricular bigeminy rhythm was noted.  Patient underwent cardiac catheterization showed no significant CAD..  In 2019, patient underwent Holter monitor it showed frequent PVCs greater than 18,000.  No significant bradycardia noted.  No SVT or  VT was present. In 2019, patient underwent Holter monitor which showed predominantly sinus rhythm with right bundle branch block.  Frequent PVCs and bigeminy was noted.  Patient had 26% of PVC burden. In 2019, patient underwent EP study and possible ablation of PVCs.  However it was unsuccessful.  Patient was started on Sectral 200 mg twice daily.    Patient came today and reporting symptom of palpitation.  Patient appears to be in atrial fibrillation.  Patient had Holter monitor in 2021 which showed sinus rhythm with frequent runs of atrial tachycardia and PVCs noted.  Patient complain of shortness of breath.  Patient has sleep apnea.  She uses her mask.  No significant leg edema noted.  Patient denies any orthopnea PND    EKG showed atrial fibrillation with right bundle branch block.    At this stage I would recommend to discontinue hydralazine.  I would start Cardizem  mg daily.  Patient is on Eliquis.  Achieving and maintaining sinus rhythm in this patient would be difficult because of her comorbid condition.  I would proceed with echocardiogram.  I will see the patient in 3 to 4 months.        Past Medical History:   Diagnosis Date   • Arthritis    • Atrial fibrillation (CMS/[facility])    • Bradycardia     Dr. Charles   • Cataract    • COPD (chronic obstructive pulmonary disease) (CMS/HCC)     Dr. Rob   • Diabetes mellitus, type 2 (CMS/HCC)     sees Dr. Archibald at [place]   • DJD (degenerative joint disease)     possible herniated disc, sees Pain Mgmt in [place]   •     • GERD (gastroesophageal reflux disease)    • History of combined right and left heart catheterization 2018    minimal CAD on heart cath done    • Hyperlipidemia    • Hypertension    • Pain    • Sleep apnea     wears CPAP machine, sees Darron         Past Surgical History:   Procedure Laterality Date   • ABLATION OF DYSRHYTHMIC FOCUS     • CARDIAC CATHETERIZATION  2018    and Angiograph    • CARDIAC  ELECTROPHYSIOLOGY PROCEDURE N/A 12/10/2019    Procedure: pvc ablation;  Surgeon: Alfredo Iglesias MD;  Location: [facility] INVASIVE LOCATION;  Service: Cardiovascular   •  SECTION      x 1   • COLON RESECTION     • COLONOSCOPY  2012   • COLOSTOMY      and reversal in her 20's due to problems with childbirth   • COLOSTOMY CLOSURE     • OVARIAN CYST SURGERY     • ROTATOR CUFF REPAIR Left 2009    x2    • TOTAL ABDOMINAL HYSTERECTOMY WITH SALPINGO OOPHORECTOMY             Current Outpatient Medications:   •  Accu-Chek Aurea Plus test strip, CHECK BLOOD SUGAR 3 TIMES E11.65, Disp: 100 each, Rfl: 5  •  Accu-Chek Softclix Lancets lancets, USE TO CHECK BS 3 TIMES DAILY, Disp: 100 each, Rfl: 2  •  acebutolol (SECTRAL) 200 MG capsule, Take 1 capsule by mouth 2 (Two) Times a Day., Disp: 60 capsule, Rfl: 2  •  albuterol sulfate  (90 Base) MCG/ACT inhaler, Inhale 2 puffs Every 4 (Four) Hours As Needed for Wheezing., Disp: 6.7 g, Rfl: 5  •  arformoterol (Brovana) 15 MCG/2ML nebulizer solution, Take 2 mL by nebulization 2 (Two) Times a Day., Disp: 120 mL, Rfl: 11  •  atorvastatin (LIPITOR) 40 MG tablet, TAKE 1/2 TABLET BY MOUTH DAILY, Disp: 45 tablet, Rfl: 1  •  Blood Glucose Monitoring Suppl (ACCU-CHEK AUREA PLUS) w/Device kit, USE TO CHECK BS 3 TIMES DAILY, Disp: 1 kit, Rfl: 0  •  budesonide-formoterol (SYMBICORT) 160-4.5 MCG/ACT inhaler, Inhale 2 puffs 2 (Two) Times a Day., Disp: 1 each, Rfl: 11  •  calcium-vitamin D (Oscal 500/200 D-3) 500-200 MG-UNIT per tablet, Take 1 tablet by mouth 2 (Two) Times a Day., Disp: 180 tablet, Rfl: 3  •  CVS D3 25 MCG (1000 UT) capsule, TAKE 1 CAPSULE BY MOUTH TWICE A DAY, Disp: 180 capsule, Rfl: 2  •  dilTIAZem CD (Cardizem CD) 120 MG 24 hr capsule, Take 1 capsule by mouth Daily., Disp: 90 capsule, Rfl: 1  •  Eliquis 5 MG tablet tablet, TAKE 1 TABLET BY MOUTH TWICE A DAY, Disp: 60 tablet, Rfl: 3  •  fenofibrate 160 MG tablet, TAKE 1 TABLET BY MOUTH EVERY DAY, Disp:  90 tablet, Rfl: 3  •  furosemide (LASIX) 40 MG tablet, TAKE 1 TABLET BY MOUTH EVERY DAY, Disp: 90 tablet, Rfl: 3  •  HYDROcodone-acetaminophen (NORCO) 7.5-325 MG per tablet, Take 1 tablet by mouth 3 (Three) Times a Day As Needed., Disp: , Rfl:   •  Insulin Glargine (BASAGLAR KWIKPEN) 100 UNIT/ML injection pen, Inject 32 units subcu nightly, Disp: 10 pen, Rfl: 6  •  Insulin Lispro, 1 Unit Dial, (HumaLOG KwikPen) 100 UNIT/ML solution pen-injector, Inject 12 Units under the skin into the appropriate area as directed 3 (Three) Times a Day. (Patient taking differently: Inject 7 Units under the skin into the appropriate area as directed 3 (Three) Times a Day.), Disp: 10 pen, Rfl: 6  •  Insulin Pen Needle (BD Pen Needle Lorene U/F) 32G X 4 MM misc, Inject insulin 4 times daily / DX E11.65, Disp: 150 each, Rfl: 4  •  ipratropium-albuterol (DUO-NEB) 0.5-2.5 mg/3 ml nebulizer, Take 3 mL by nebulization 4 (Four) Times a Day As Needed for Wheezing for up to 30 days., Disp: 120 mL, Rfl: 11  •  Janumet XR  MG tablet, TAKE 2 TABLETS BY MOUTH EVERY DAY, Disp: 60 tablet, Rfl: 3  •  lisinopril (PRINIVIL,ZESTRIL) 20 MG tablet, TAKE 1 TABLET BY MOUTH EVERY DAY, Disp: 90 tablet, Rfl: 1  •  magnesium oxide (MAG-OX) 400 MG tablet, Take 1 tablet by mouth 2 (Two) Times a Day., Disp: 60 tablet, Rfl: 6  •  potassium chloride (MICRO-K) 10 MEQ CR capsule, TAKE 1 CAPSULE BY MOUTH EVERY DAY, Disp: 90 capsule, Rfl: 1      Social History     Socioeconomic History   • Marital status:      Spouse name: Not on file   • Number of children: Not on file   • Years of education: Not on file   • Highest education level: Not on file   Tobacco Use   • Smoking status: Former Smoker     Packs/day: 1.00     Years: 48.00     Pack years: 48.00     Types: Cigarettes     Quit date: 3/1/2020     Years since quittin.5   • Smokeless tobacco: Never Used   Vaping Use   • Vaping Use: Never used   Substance and Sexual Activity   • Alcohol use: No   • Drug  "use: No   • Sexual activity: Defer         ROS    Procedures    Procedures    No orders to display           Objective:    Ht 160.7 cm (63.27\")   BMI 40.05 kg/m²         Physical Exam        Assessment:       Diagnosis Plan   1. Bradycardia     2. Mixed hyperlipidemia     3. Paroxysmal atrial fibrillation (CMS/[facility])     4. Type 2 diabetes mellitus with hyperglycemia, with long-term current use of insulin (CMS/[facility])     5. Palpitations     6. PVC's (premature ventricular contractions)     7. Essential hypertension     8. Shortness of breath     9. Acute diastolic CHF (congestive heart failure) (CMS/[facility])              Plan:         "

## 2021-09-03 ENCOUNTER — OFFICE VISIT (OUTPATIENT)
Dept: CARDIOLOGY | Facility: CLINIC | Age: 66
End: 2021-09-03

## 2021-09-03 VITALS — BODY MASS INDEX: 40.05 KG/M2 | HEIGHT: 63 IN

## 2021-09-03 DIAGNOSIS — R00.2 PALPITATIONS: ICD-10-CM

## 2021-09-03 DIAGNOSIS — I49.3 PVC'S (PREMATURE VENTRICULAR CONTRACTIONS): ICD-10-CM

## 2021-09-03 DIAGNOSIS — R06.02 SHORTNESS OF BREATH: ICD-10-CM

## 2021-09-03 DIAGNOSIS — I10 ESSENTIAL HYPERTENSION: ICD-10-CM

## 2021-09-03 DIAGNOSIS — Z79.4 TYPE 2 DIABETES MELLITUS WITH HYPERGLYCEMIA, WITH LONG-TERM CURRENT USE OF INSULIN (HCC): ICD-10-CM

## 2021-09-03 DIAGNOSIS — I48.0 PAROXYSMAL ATRIAL FIBRILLATION (HCC): ICD-10-CM

## 2021-09-03 DIAGNOSIS — I50.31 ACUTE DIASTOLIC CHF (CONGESTIVE HEART FAILURE) (HCC): ICD-10-CM

## 2021-09-03 DIAGNOSIS — E11.65 TYPE 2 DIABETES MELLITUS WITH HYPERGLYCEMIA, WITH LONG-TERM CURRENT USE OF INSULIN (HCC): ICD-10-CM

## 2021-09-03 DIAGNOSIS — R00.1 BRADYCARDIA: Primary | ICD-10-CM

## 2021-09-03 DIAGNOSIS — E78.2 MIXED HYPERLIPIDEMIA: ICD-10-CM

## 2021-09-11 RX ORDER — ATORVASTATIN CALCIUM 40 MG/1
TABLET, FILM COATED ORAL
Qty: 45 TABLET | Refills: 1 | Status: SHIPPED | OUTPATIENT
Start: 2021-09-11 | End: 2021-10-05

## 2021-09-23 ENCOUNTER — TELEPHONE (OUTPATIENT)
Dept: FAMILY MEDICINE CLINIC | Facility: CLINIC | Age: 66
End: 2021-09-23

## 2021-09-23 NOTE — TELEPHONE ENCOUNTER
Caller: Caterina Mason    Relationship: Self    Best call back number: [phone]    What medication are you requesting: ANTIBIOTIC    What are your current symptoms: VAGINAL ITCHING     How long have you been experiencing symptoms: SINCE Monday 9/20/21    Have you had these symptoms before:    [] Yes  [x] No    Have you been treated for these symptoms before:   [] Yes  [x] No    If a prescription is needed, what is your preferred pharmacy and phone number: [facility]/pharmacy #[id number] - [facility] [place] [address] [phone] [facility] [phone]      Additional notes: PATIENT ALSO STATES SHE'S BEEN HAVING BREATHING TROUBLE AFTER MOVING AROUND SINCE SHE HAS STOPPED SMOKING.

## 2021-09-23 NOTE — TELEPHONE ENCOUNTER
Caller: Caterina Mason (Self)    Pharmacy: [facility]/pharmacy #[id number] - [facility] [place] - [address] [facility] [phone] [facility] [phone] [phone]    Phone Number: [phone] (G)    Reason for Call:     PATIENT CALLED BACK STATING THE FIRST CALL GOT DISCONNECTED AND SHE WANTED TO MAKE SURE WE COMPLETED THE MESSAGE FOR THE DR.  SHE STATES SHE IS STARTING TO ALSO GET CHEST CONGESTION- AND IS COUGHING UP LIGHT GREEN PHLEGM (NO OTHER SYMPTOMS)- AND WANTS TO SEE ABOUT GETTING A PRESCRIPTION CALLED IN FOR THIS AS WELL.    PLEASE ADVISE

## 2021-09-23 NOTE — PROGRESS NOTES
Date of Office Visit: 2021  Encounter Provider: Dr. Jesse Charles  Place of Service: [facility] CARDIOLOGY [place]  Patient Name: Caterina Mason  :1955  Lou Curran MD    Chief Complaint   Patient presents with   • Slow Heart Rate     5 month follow up    • Atrial Fibrillation   • Congestive Heart Failure   • Hypertension   • Hyperlipidemia     History of Present Illness    I am pleased to see Mrs. Mason in my office today as a follow-up.    As you know, patient is 66 year-old white female whose past medical history significant for hypertension, hyperlipidemia, COPD, diabetes mellitus, paroxysmal atrial fibrillation, who came today for follow-up.    In 2017, patient was admitted at [facility] with a symptom of possible bradycardia and dizziness.  Patient was seen by endocrinologist and was thought to have bradycardia and was sent to the hospital.  In the hospital she was never documented to have bradycardia.  She was noted to have sinus rhythm with frequent PVCs and ventricular bigeminy rhythm.  Holter monitor showed frequent PVCs greater than 30,000.  Stress test was negative for ischemia and echocardiogram showed normal left ventricular size and function and LVEF was 55%.  No significant valvular heart disease was noted. In 2017, patient underwent Holter monitor which showed sinus rhythm with right bundle branch block pattern and patient had frequent PACs.     [facility], [facility] with symptom of dizziness and possible extreme bradycardia.  She was found to be in sinus rhythm with bigeminy.  Holter monitor showed frequent premature ventricular contraction but no significant pause.  Ventricular bigeminy rhythm was noted.  Patient underwent cardiac catheterization showed no significant CAD..  In 2019, patient underwent Holter monitor it showed frequent PVCs greater than 18,000.  No significant bradycardia noted.   No SVT or VT was present. In 2019, patient underwent Holter monitor which showed predominantly sinus rhythm with right bundle branch block.  Frequent PVCs and bigeminy was noted.  Patient had 26% of PVC burden. In 2019, patient underwent EP study and possible ablation of PVCs.  However it was unsuccessful.  Patient was started on Sectral 200 mg twice daily.  Patient has sleep apnea and she is on CPAP.    On previous visit, patient was noted to be in atrial fibrillation.  Patient was started on Cardizem  mg daily for control of heart rate.  Patient is on Eliquis.  Patient is feeling better.  Her heart rate is improving.  Palpitation has been less frequent.  Patient denies any orthopnea PND no syncope or presyncope.    EKG showed atrial fibrillation    I would recommend to increase Cardizem  mg twice daily.  Continue Eliquis.  I discussed with the patient and we agreed not to proceed with cardioversion.  Patient had failed ablation in the past.  Because of her comorbid condition, patient would not be successful candidate for maintaining sinus rhythm.  Rate control and anticoagulation strategy is adopted.  Repeat echocardiogram next visit        Past Medical History:   Diagnosis Date   • Arthritis    • Atrial fibrillation (CMS/HCC)    • Bradycardia     Dr. Charles   • Cataract    • COPD (chronic obstructive pulmonary disease) (CMS/HCC)     Dr. Rob   • Diabetes mellitus, type 2 (CMS/HCC)     sees Dr. Archibald at [place]   • DJD (degenerative joint disease)     possible herniated disc, sees Pain Mgmt in [place]   •     • GERD (gastroesophageal reflux disease)    • History of combined right and left heart catheterization 2018    minimal CAD on heart cath done    • Hyperlipidemia    • Hypertension    • Pain    • Sleep apnea     wears CPAP machine, sees Darron         Past Surgical History:   Procedure Laterality Date   • ABLATION OF DYSRHYTHMIC FOCUS     • CARDIAC CATHETERIZATION       and Angiograph    • CARDIAC ELECTROPHYSIOLOGY PROCEDURE N/A 12/10/2019    Procedure: pvc ablation;  Surgeon: Alfredo Iglesias MD;  Location: [facility] INVASIVE LOCATION;  Service: Cardiovascular   •  SECTION      x 1   • COLON RESECTION     • COLONOSCOPY  2012   • COLOSTOMY      and reversal in her 20's due to problems with childbirth   • COLOSTOMY CLOSURE     • OVARIAN CYST SURGERY     • ROTATOR CUFF REPAIR Left 2009    x2    • TOTAL ABDOMINAL HYSTERECTOMY WITH SALPINGO OOPHORECTOMY             Current Outpatient Medications:   •  Accu-Chek Aurea Plus test strip, CHECK BLOOD SUGAR 3 TIMES E11.65, Disp: 100 each, Rfl: 5  •  Accu-Chek Softclix Lancets lancets, USE TO CHECK BS 3 TIMES DAILY, Disp: 100 each, Rfl: 2  •  acebutolol (SECTRAL) 200 MG capsule, Take 1 capsule by mouth 2 (Two) Times a Day., Disp: 60 capsule, Rfl: 2  •  albuterol sulfate  (90 Base) MCG/ACT inhaler, Inhale 2 puffs Every 4 (Four) Hours As Needed for Wheezing., Disp: 6.7 g, Rfl: 5  •  arformoterol (Brovana) 15 MCG/2ML nebulizer solution, Take 2 mL by nebulization 2 (Two) Times a Day., Disp: 120 mL, Rfl: 11  •  atorvastatin (LIPITOR) 40 MG tablet, TAKE 1/2 TABLET BY MOUTH DAILY, Disp: 45 tablet, Rfl: 1  •  Blood Glucose Monitoring Suppl (ACCU-CHEK AUREA PLUS) w/Device kit, USE TO CHECK BS 3 TIMES DAILY, Disp: 1 kit, Rfl: 0  •  budesonide-formoterol (SYMBICORT) 160-4.5 MCG/ACT inhaler, Inhale 2 puffs 2 (Two) Times a Day., Disp: 1 each, Rfl: 11  •  calcium-vitamin D (Oscal 500/200 D-3) 500-200 MG-UNIT per tablet, Take 1 tablet by mouth 2 (Two) Times a Day., Disp: 180 tablet, Rfl: 3  •  CVS D3 25 MCG (1000 UT) capsule, TAKE 1 CAPSULE BY MOUTH TWICE A DAY, Disp: 180 capsule, Rfl: 2  •  dilTIAZem CD (Cardizem CD) 120 MG 24 hr capsule, Take 1 capsule by mouth 2 (Two) Times a Day., Disp: 180 capsule, Rfl: 1  •  Eliquis 5 MG tablet tablet, TAKE 1 TABLET BY MOUTH TWICE A DAY, Disp: 60 tablet, Rfl: 3  •  fenofibrate 160 MG  tablet, TAKE 1 TABLET BY MOUTH EVERY DAY, Disp: 90 tablet, Rfl: 3  •  furosemide (LASIX) 40 MG tablet, TAKE 1 TABLET BY MOUTH EVERY DAY, Disp: 90 tablet, Rfl: 3  •  HYDROcodone-acetaminophen (NORCO) 7.5-325 MG per tablet, Take 1 tablet by mouth 3 (Three) Times a Day As Needed., Disp: , Rfl:   •  Insulin Glargine (BASAGLAR KWIKPEN) 100 UNIT/ML injection pen, Inject 32 units subcu nightly, Disp: 10 pen, Rfl: 6  •  Insulin Lispro, 1 Unit Dial, (HumaLOG KwikPen) 100 UNIT/ML solution pen-injector, Inject 12 Units under the skin into the appropriate area as directed 3 (Three) Times a Day. (Patient taking differently: Inject 7 Units under the skin into the appropriate area as directed 3 (Three) Times a Day.), Disp: 10 pen, Rfl: 6  •  Insulin Pen Needle (BD Pen Needle Lorene U/F) 32G X 4 MM misc, Inject insulin 4 times daily / DX E11.65, Disp: 150 each, Rfl: 4  •  Janumet XR  MG tablet, TAKE 2 TABLETS BY MOUTH EVERY DAY, Disp: 60 tablet, Rfl: 3  •  lisinopril (PRINIVIL,ZESTRIL) 20 MG tablet, TAKE 1 TABLET BY MOUTH EVERY DAY, Disp: 90 tablet, Rfl: 1  •  magnesium oxide (MAG-OX) 400 MG tablet, Take 1 tablet by mouth 2 (Two) Times a Day., Disp: 60 tablet, Rfl: 6  •  potassium chloride (MICRO-K) 10 MEQ CR capsule, TAKE 1 CAPSULE BY MOUTH EVERY DAY, Disp: 90 capsule, Rfl: 1      Social History     Socioeconomic History   • Marital status:      Spouse name: Not on file   • Number of children: Not on file   • Years of education: Not on file   • Highest education level: Not on file   Tobacco Use   • Smoking status: Former Smoker     Packs/day: 1.00     Years: 48.00     Pack years: 48.00     Types: Cigarettes     Quit date: 3/1/2020     Years since quittin.5   • Smokeless tobacco: Never Used   Vaping Use   • Vaping Use: Never used   Substance and Sexual Activity   • Alcohol use: No   • Drug use: No   • Sexual activity: Defer         Review of Systems   Constitutional: Negative for chills and fever.   HENT: Negative  "for ear discharge and nosebleeds.    Eyes: Negative for discharge and redness.   Cardiovascular: Positive for palpitations. Negative for chest pain, orthopnea, paroxysmal nocturnal dyspnea and syncope.   Respiratory: Positive for shortness of breath. Negative for cough and wheezing.    Endocrine: Negative for heat intolerance.   Skin: Negative for rash.   Musculoskeletal: Positive for arthritis and joint pain. Negative for myalgias.   Gastrointestinal: Negative for abdominal pain, melena, nausea and vomiting.   Genitourinary: Negative for dysuria and hematuria.   Neurological: Negative for dizziness, light-headedness, numbness and tremors.   Psychiatric/Behavioral: Negative for depression. The patient is not nervous/anxious.        Procedures      ECG 12 Lead    Date/Time: 9/24/2021 2:14 PM  Performed by: Jesse Charles MD  Authorized by: Jesse Charles MD   Comparison: compared with previous ECG   Similar to previous ECG  Rhythm: atrial fibrillation  Conduction: right bundle branch block    Clinical impression: abnormal EKG            ECG 12 Lead    (Results Pending)           Objective:    /85   Pulse 111   Ht 160.7 cm (63.27\")   Wt 103 kg (228 lb)   BMI 40.05 kg/m²         Constitutional:       Appearance: Well-developed.   Eyes:      General: No scleral icterus.        Right eye: No discharge.   HENT:      Head: Normocephalic and atraumatic.   Neck:      Thyroid: No thyromegaly.      Lymphadenopathy: No cervical adenopathy.   Pulmonary:      Effort: Pulmonary effort is normal. No respiratory distress.      Breath sounds: Normal breath sounds. No wheezing. No rales.   Cardiovascular:      Normal rate. Regular rhythm.      No gallop.   Abdominal:      Tenderness: There is no abdominal tenderness.   Skin:     Findings: No erythema or rash.   Neurological:      Mental Status: Alert and oriented to person, place, and time.             Assessment:       Diagnosis Plan   1. Bradycardia  ECG 12 Lead    dilTIAZem " CD (Cardizem CD) 120 MG 24 hr capsule   2. Mixed hyperlipidemia  ECG 12 Lead   3. Type 2 diabetes mellitus with hyperglycemia, with long-term current use of insulin (CMS/[facility])  ECG 12 Lead   4. Palpitations  ECG 12 Lead    dilTIAZem CD (Cardizem CD) 120 MG 24 hr capsule   5. PVC's (premature ventricular contractions)  ECG 12 Lead   6. Essential hypertension  ECG 12 Lead    dilTIAZem CD (Cardizem CD) 120 MG 24 hr capsule   7. Shortness of breath  ECG 12 Lead    dilTIAZem CD (Cardizem CD) 120 MG 24 hr capsule   8. Acute diastolic CHF (congestive heart failure) (CMS/[facility])  ECG 12 Lead   9. Acute diastolic CHF (congestive heart failure) ([facility])  dilTIAZem CD (Cardizem CD) 120 MG 24 hr capsule   10. Bilateral leg edema  dilTIAZem CD (Cardizem CD) 120 MG 24 hr capsule   11. Paroxysmal atrial fibrillation (HCC)  dilTIAZem CD (Cardizem CD) 120 MG 24 hr capsule            Plan:       MDM:    1.  Atrial fibrillation:    I would increase Cardizem  mg twice daily.  Continue Eliquis.  Rate control and anticoagulation therapy would be adopted    2.  Hypertension:    Continue current therapy.  Cardizem CD will be increased    3.  CAD:    In 2018 patient underwent cardiac catheterization and minimal coronary artery disease was noted.    4.  Diabetes mellitus:    Patient is on Janumet.  Patient is also on insulin.  Diet modification discussed.  Weight loss is emphasized

## 2021-09-24 ENCOUNTER — OFFICE VISIT (OUTPATIENT)
Dept: CARDIOLOGY | Facility: CLINIC | Age: 66
End: 2021-09-24

## 2021-09-24 VITALS
DIASTOLIC BLOOD PRESSURE: 85 MMHG | HEART RATE: 111 BPM | WEIGHT: 228 LBS | HEIGHT: 63 IN | BODY MASS INDEX: 40.4 KG/M2 | SYSTOLIC BLOOD PRESSURE: 146 MMHG

## 2021-09-24 DIAGNOSIS — R00.2 PALPITATIONS: ICD-10-CM

## 2021-09-24 DIAGNOSIS — R06.02 SHORTNESS OF BREATH: ICD-10-CM

## 2021-09-24 DIAGNOSIS — I49.3 PVC'S (PREMATURE VENTRICULAR CONTRACTIONS): ICD-10-CM

## 2021-09-24 DIAGNOSIS — I10 ESSENTIAL HYPERTENSION: ICD-10-CM

## 2021-09-24 DIAGNOSIS — R00.1 BRADYCARDIA: Primary | ICD-10-CM

## 2021-09-24 DIAGNOSIS — I50.31 ACUTE DIASTOLIC CHF (CONGESTIVE HEART FAILURE) (HCC): ICD-10-CM

## 2021-09-24 DIAGNOSIS — E78.2 MIXED HYPERLIPIDEMIA: ICD-10-CM

## 2021-09-24 DIAGNOSIS — E11.65 TYPE 2 DIABETES MELLITUS WITH HYPERGLYCEMIA, WITH LONG-TERM CURRENT USE OF INSULIN (HCC): ICD-10-CM

## 2021-09-24 DIAGNOSIS — I48.0 PAROXYSMAL ATRIAL FIBRILLATION (HCC): ICD-10-CM

## 2021-09-24 DIAGNOSIS — R60.0 BILATERAL LEG EDEMA: ICD-10-CM

## 2021-09-24 DIAGNOSIS — Z79.4 TYPE 2 DIABETES MELLITUS WITH HYPERGLYCEMIA, WITH LONG-TERM CURRENT USE OF INSULIN (HCC): ICD-10-CM

## 2021-09-24 PROCEDURE — 99214 OFFICE O/P EST MOD 30 MIN: CPT | Performed by: INTERNAL MEDICINE

## 2021-09-24 PROCEDURE — 93000 ELECTROCARDIOGRAM COMPLETE: CPT | Performed by: INTERNAL MEDICINE

## 2021-09-24 RX ORDER — AZITHROMYCIN 250 MG/1
TABLET, FILM COATED ORAL
Qty: 6 TABLET | Refills: 0 | Status: SHIPPED | OUTPATIENT
Start: 2021-09-24 | End: 2021-10-05

## 2021-09-24 RX ORDER — DILTIAZEM HYDROCHLORIDE 120 MG/1
120 CAPSULE, COATED, EXTENDED RELEASE ORAL 2 TIMES DAILY
Qty: 180 CAPSULE | Refills: 1 | Status: SHIPPED | OUTPATIENT
Start: 2021-09-24 | End: 2021-10-05

## 2021-09-24 NOTE — TELEPHONE ENCOUNTER
I sent in a Rx for a Zpak to [facility] on [facility].  If she does not improve in a few days she will need to be seen.

## 2021-09-25 RX ORDER — SITAGLIPTIN AND METFORMIN HYDROCHLORIDE 1000; 50 MG/1; MG/1
TABLET, FILM COATED, EXTENDED RELEASE ORAL
Qty: 60 TABLET | Refills: 3 | Status: SHIPPED | OUTPATIENT
Start: 2021-09-25 | End: 2021-10-05

## 2021-10-05 ENCOUNTER — APPOINTMENT (OUTPATIENT)
Dept: GENERAL RADIOLOGY | Facility: HOSPITAL | Age: 66
End: 2021-10-05

## 2021-10-05 ENCOUNTER — APPOINTMENT (OUTPATIENT)
Dept: CARDIOLOGY | Facility: HOSPITAL | Age: 66
End: 2021-10-05

## 2021-10-05 ENCOUNTER — HOSPITAL ENCOUNTER (INPATIENT)
Facility: HOSPITAL | Age: 66
LOS: 2 days | Discharge: HOME | End: 2021-10-07
Attending: EMERGENCY MEDICINE | Admitting: INTERNAL MEDICINE

## 2021-10-05 ENCOUNTER — APPOINTMENT (OUTPATIENT)
Dept: ULTRASOUND IMAGING | Facility: HOSPITAL | Age: 66
End: 2021-10-05

## 2021-10-05 ENCOUNTER — APPOINTMENT (OUTPATIENT)
Dept: CT IMAGING | Facility: HOSPITAL | Age: 66
End: 2021-10-05

## 2021-10-05 DIAGNOSIS — R07.9 CHEST PAIN, UNSPECIFIED TYPE: ICD-10-CM

## 2021-10-05 DIAGNOSIS — I48.91 ATRIAL FIBRILLATION WITH RVR (HCC): Primary | ICD-10-CM

## 2021-10-05 PROBLEM — E66.09 CLASS 2 OBESITY DUE TO EXCESS CALORIES WITHOUT SERIOUS COMORBIDITY WITH BODY MASS INDEX (BMI) OF 39.0 TO 39.9 IN ADULT: Chronic | Status: ACTIVE | Noted: 2021-02-05

## 2021-10-05 PROBLEM — J44.9 CHRONIC OBSTRUCTIVE PULMONARY DISEASE: Chronic | Status: ACTIVE | Noted: 2019-07-08

## 2021-10-05 PROBLEM — J18.9 PNEUMONIA OF LEFT LOWER LOBE DUE TO INFECTIOUS ORGANISM: Status: RESOLVED | Noted: 2020-09-14 | Resolved: 2021-10-05

## 2021-10-05 PROBLEM — K80.20 CHOLELITHIASIS: Status: ACTIVE | Noted: 2021-10-05

## 2021-10-05 PROBLEM — E83.42 HYPOMAGNESEMIA: Status: ACTIVE | Noted: 2021-10-05

## 2021-10-05 PROBLEM — Z72.0 TOBACCO USE: Status: RESOLVED | Noted: 2020-08-28 | Resolved: 2021-10-05

## 2021-10-05 PROBLEM — J44.1 COPD EXACERBATION (HCC): Status: RESOLVED | Noted: 2020-03-11 | Resolved: 2021-10-05

## 2021-10-05 PROBLEM — I48.0 PAROXYSMAL ATRIAL FIBRILLATION (HCC): Chronic | Status: ACTIVE | Noted: 2018-03-23

## 2021-10-05 PROBLEM — I10 ESSENTIAL HYPERTENSION: Chronic | Status: ACTIVE | Noted: 2019-07-09

## 2021-10-05 PROBLEM — F32.A DEPRESSION: Chronic | Status: ACTIVE | Noted: 2019-07-08

## 2021-10-05 PROBLEM — H66.001 NON-RECURRENT ACUTE SUPPURATIVE OTITIS MEDIA OF RIGHT EAR WITHOUT SPONTANEOUS RUPTURE OF TYMPANIC MEMBRANE: Status: RESOLVED | Noted: 2020-03-03 | Resolved: 2021-10-05

## 2021-10-05 PROBLEM — G47.30 SLEEP APNEA: Chronic | Status: ACTIVE | Noted: 2019-07-08

## 2021-10-05 PROBLEM — R79.89 ELEVATED LFTS: Status: ACTIVE | Noted: 2021-10-05

## 2021-10-05 PROBLEM — R06.03 ACUTE RESPIRATORY DISTRESS: Status: RESOLVED | Noted: 2020-03-10 | Resolved: 2021-10-05

## 2021-10-05 PROBLEM — E87.6 HYPOKALEMIA: Status: RESOLVED | Noted: 2018-08-08 | Resolved: 2021-10-05

## 2021-10-05 PROBLEM — I50.32 CHRONIC DIASTOLIC CHF (CONGESTIVE HEART FAILURE): Chronic | Status: ACTIVE | Noted: 2020-03-11

## 2021-10-05 PROBLEM — K21.9 GASTROESOPHAGEAL REFLUX DISEASE: Chronic | Status: ACTIVE | Noted: 2019-07-08

## 2021-10-05 PROBLEM — R07.89 CHEST PAIN, ATYPICAL: Status: ACTIVE | Noted: 2021-10-05

## 2021-10-05 LAB
ALBUMIN SERPL-MCNC: 4.4 G/DL (ref 3.5–5.2)
ALBUMIN/GLOB SERPL: 1.2 G/DL
ALP SERPL-CCNC: 81 U/L (ref 39–117)
ALT SERPL W P-5'-P-CCNC: 54 U/L (ref 1–33)
ANION GAP SERPL CALCULATED.3IONS-SCNC: 17 MMOL/L (ref 5–15)
APTT PPP: 29 SECONDS (ref 24–31)
AST SERPL-CCNC: 51 U/L (ref 1–32)
BASOPHILS # BLD AUTO: 0 10*3/MM3 (ref 0–0.2)
BASOPHILS NFR BLD AUTO: 0.4 % (ref 0–1.5)
BILIRUB SERPL-MCNC: 0.8 MG/DL (ref 0–1.2)
BUN SERPL-MCNC: 18 MG/DL (ref 8–23)
BUN/CREAT SERPL: 19.1 (ref 7–25)
CALCIUM SPEC-SCNC: 10.4 MG/DL (ref 8.6–10.5)
CHLORIDE SERPL-SCNC: 99 MMOL/L (ref 98–107)
CO2 SERPL-SCNC: 22 MMOL/L (ref 22–29)
CREAT SERPL-MCNC: 0.94 MG/DL (ref 0.57–1)
D DIMER PPP FEU-MCNC: 1.48 MG/L (FEU) (ref 0–0.59)
DEPRECATED RDW RBC AUTO: 45.1 FL (ref 37–54)
EOSINOPHIL # BLD AUTO: 0.1 10*3/MM3 (ref 0–0.4)
EOSINOPHIL NFR BLD AUTO: 0.5 % (ref 0.3–6.2)
ERYTHROCYTE [DISTWIDTH] IN BLOOD BY AUTOMATED COUNT: 15.1 % (ref 12.3–15.4)
GFR SERPL CREATININE-BSD FRML MDRD: 60 ML/MIN/1.73
GLOBULIN UR ELPH-MCNC: 3.8 GM/DL
GLUCOSE SERPL-MCNC: 356 MG/DL (ref 65–99)
HCT VFR BLD AUTO: 47.6 % (ref 34–46.6)
HGB BLD-MCNC: 15.8 G/DL (ref 12–15.9)
HOLD SPECIMEN: NORMAL
HOLD SPECIMEN: NORMAL
INR PPP: 1.03 (ref 0.93–1.1)
LYMPHOCYTES # BLD AUTO: 2.1 10*3/MM3 (ref 0.7–3.1)
LYMPHOCYTES NFR BLD AUTO: 17.2 % (ref 19.6–45.3)
MAGNESIUM SERPL-MCNC: 1.4 MG/DL (ref 1.6–2.4)
MCH RBC QN AUTO: 28.7 PG (ref 26.6–33)
MCHC RBC AUTO-ENTMCNC: 33.2 G/DL (ref 31.5–35.7)
MCV RBC AUTO: 86.6 FL (ref 79–97)
MONOCYTES # BLD AUTO: 1.2 10*3/MM3 (ref 0.1–0.9)
MONOCYTES NFR BLD AUTO: 9.4 % (ref 5–12)
NEUTROPHILS NFR BLD AUTO: 72.5 % (ref 42.7–76)
NEUTROPHILS NFR BLD AUTO: 9 10*3/MM3 (ref 1.7–7)
NRBC BLD AUTO-RTO: 0 /100 WBC (ref 0–0.2)
NT-PROBNP SERPL-MCNC: 1170 PG/ML (ref 0–900)
PLATELET # BLD AUTO: 323 10*3/MM3 (ref 140–450)
PMV BLD AUTO: 9.1 FL (ref 6–12)
POTASSIUM SERPL-SCNC: 4.5 MMOL/L (ref 3.5–5.2)
PROCALCITONIN SERPL-MCNC: 0.11 NG/ML (ref 0–0.25)
PROT SERPL-MCNC: 8.2 G/DL (ref 6–8.5)
PROTHROMBIN TIME: 11.4 SECONDS (ref 9.6–11.7)
QT INTERVAL: 330 MS
RBC # BLD AUTO: 5.5 10*6/MM3 (ref 3.77–5.28)
SARS-COV-2 RNA PNL SPEC NAA+PROBE: NOT DETECTED
SODIUM SERPL-SCNC: 138 MMOL/L (ref 136–145)
TROPONIN T SERPL-MCNC: <0.01 NG/ML (ref 0–0.03)
TROPONIN T SERPL-MCNC: <0.01 NG/ML (ref 0–0.03)
TSH SERPL DL<=0.05 MIU/L-ACNC: 1.83 UIU/ML (ref 0.27–4.2)
WBC # BLD AUTO: 12.4 10*3/MM3 (ref 3.4–10.8)
WHOLE BLOOD HOLD SPECIMEN: NORMAL
WHOLE BLOOD HOLD SPECIMEN: NORMAL

## 2021-10-05 PROCEDURE — 94660 CPAP INITIATION&MGMT: CPT

## 2021-10-05 PROCEDURE — 63710000001 INSULIN GLARGINE PER 5 UNITS: Performed by: INTERNAL MEDICINE

## 2021-10-05 PROCEDURE — 83880 ASSAY OF NATRIURETIC PEPTIDE: CPT | Performed by: EMERGENCY MEDICINE

## 2021-10-05 PROCEDURE — 85379 FIBRIN DEGRADATION QUANT: CPT | Performed by: EMERGENCY MEDICINE

## 2021-10-05 PROCEDURE — 25010000002 MAGNESIUM SULFATE IN D5W 1G/100ML (PREMIX) 1-5 GM/100ML-% SOLUTION: Performed by: EMERGENCY MEDICINE

## 2021-10-05 PROCEDURE — 99223 1ST HOSP IP/OBS HIGH 75: CPT | Performed by: INTERNAL MEDICINE

## 2021-10-05 PROCEDURE — 83735 ASSAY OF MAGNESIUM: CPT | Performed by: EMERGENCY MEDICINE

## 2021-10-05 PROCEDURE — 25010000002 ENOXAPARIN PER 10 MG: Performed by: NURSE PRACTITIONER

## 2021-10-05 PROCEDURE — 0 IOPAMIDOL PER 1 ML: Performed by: EMERGENCY MEDICINE

## 2021-10-05 PROCEDURE — 71045 X-RAY EXAM CHEST 1 VIEW: CPT

## 2021-10-05 PROCEDURE — 93005 ELECTROCARDIOGRAM TRACING: CPT | Performed by: NURSE PRACTITIONER

## 2021-10-05 PROCEDURE — 36415 COLL VENOUS BLD VENIPUNCTURE: CPT | Performed by: NURSE PRACTITIONER

## 2021-10-05 PROCEDURE — 93005 ELECTROCARDIOGRAM TRACING: CPT | Performed by: EMERGENCY MEDICINE

## 2021-10-05 PROCEDURE — 84145 PROCALCITONIN (PCT): CPT | Performed by: EMERGENCY MEDICINE

## 2021-10-05 PROCEDURE — 84484 ASSAY OF TROPONIN QUANT: CPT | Performed by: EMERGENCY MEDICINE

## 2021-10-05 PROCEDURE — 85730 THROMBOPLASTIN TIME PARTIAL: CPT | Performed by: EMERGENCY MEDICINE

## 2021-10-05 PROCEDURE — 25010000002 ONDANSETRON PER 1 MG: Performed by: EMERGENCY MEDICINE

## 2021-10-05 PROCEDURE — U0003 INFECTIOUS AGENT DETECTION BY NUCLEIC ACID (DNA OR RNA); SEVERE ACUTE RESPIRATORY SYNDROME CORONAVIRUS 2 (SARS-COV-2) (CORONAVIRUS DISEASE [COVID-19]), AMPLIFIED PROBE TECHNIQUE, MAKING USE OF HIGH THROUGHPUT TECHNOLOGIES AS DESCRIBED BY CMS-2020-01-R: HCPCS | Performed by: EMERGENCY MEDICINE

## 2021-10-05 PROCEDURE — 93306 TTE W/DOPPLER COMPLETE: CPT | Performed by: INTERNAL MEDICINE

## 2021-10-05 PROCEDURE — G0108 DIAB MANAGE TRN  PER INDIV: HCPCS

## 2021-10-05 PROCEDURE — 84484 ASSAY OF TROPONIN QUANT: CPT | Performed by: NURSE PRACTITIONER

## 2021-10-05 PROCEDURE — 99284 EMERGENCY DEPT VISIT MOD MDM: CPT

## 2021-10-05 PROCEDURE — 99222 1ST HOSP IP/OBS MODERATE 55: CPT | Performed by: INTERNAL MEDICINE

## 2021-10-05 PROCEDURE — 93306 TTE W/DOPPLER COMPLETE: CPT

## 2021-10-05 PROCEDURE — 71275 CT ANGIOGRAPHY CHEST: CPT

## 2021-10-05 PROCEDURE — 94799 UNLISTED PULMONARY SVC/PX: CPT

## 2021-10-05 PROCEDURE — 25010000002 MORPHINE PER 10 MG: Performed by: EMERGENCY MEDICINE

## 2021-10-05 PROCEDURE — 80050 GENERAL HEALTH PANEL: CPT | Performed by: EMERGENCY MEDICINE

## 2021-10-05 PROCEDURE — 76705 ECHO EXAM OF ABDOMEN: CPT

## 2021-10-05 PROCEDURE — 85610 PROTHROMBIN TIME: CPT | Performed by: EMERGENCY MEDICINE

## 2021-10-05 RX ORDER — ACEBUTOLOL HYDROCHLORIDE 200 MG/1
200 CAPSULE ORAL 2 TIMES DAILY
COMMUNITY
End: 2021-10-07 | Stop reason: HOSPADM

## 2021-10-05 RX ORDER — INSULIN GLARGINE 100 [IU]/ML
32 INJECTION, SOLUTION SUBCUTANEOUS NIGHTLY
Status: DISCONTINUED | OUTPATIENT
Start: 2021-10-05 | End: 2021-10-07 | Stop reason: HOSPADM

## 2021-10-05 RX ORDER — SODIUM CHLORIDE 0.9 % (FLUSH) 0.9 %
10 SYRINGE (ML) INJECTION AS NEEDED
Status: DISCONTINUED | OUTPATIENT
Start: 2021-10-05 | End: 2021-10-07 | Stop reason: HOSPADM

## 2021-10-05 RX ORDER — HYDROCODONE BITARTRATE AND ACETAMINOPHEN 7.5; 325 MG/1; MG/1
1 TABLET ORAL EVERY 8 HOURS PRN
Status: DISCONTINUED | OUTPATIENT
Start: 2021-10-05 | End: 2021-10-07 | Stop reason: HOSPADM

## 2021-10-05 RX ORDER — DILTIAZEM HYDROCHLORIDE 120 MG/1
120 CAPSULE, COATED, EXTENDED RELEASE ORAL
Status: DISCONTINUED | OUTPATIENT
Start: 2021-10-05 | End: 2021-10-07 | Stop reason: HOSPADM

## 2021-10-05 RX ORDER — INSULIN GLARGINE 100 [IU]/ML
32 INJECTION, SOLUTION SUBCUTANEOUS NIGHTLY
COMMUNITY
End: 2021-11-23 | Stop reason: SDUPTHER

## 2021-10-05 RX ORDER — MAGNESIUM SULFATE 1 G/100ML
1 INJECTION INTRAVENOUS ONCE
Status: COMPLETED | OUTPATIENT
Start: 2021-10-05 | End: 2021-10-05

## 2021-10-05 RX ORDER — HYDROCODONE BITARTRATE AND ACETAMINOPHEN 7.5; 325 MG/1; MG/1
1 TABLET ORAL ONCE
Status: COMPLETED | OUTPATIENT
Start: 2021-10-05 | End: 2021-10-05

## 2021-10-05 RX ORDER — CHOLECALCIFEROL (VITAMIN D3) 125 MCG
5 CAPSULE ORAL NIGHTLY PRN
Status: DISCONTINUED | OUTPATIENT
Start: 2021-10-05 | End: 2021-10-07 | Stop reason: HOSPADM

## 2021-10-05 RX ORDER — LISINOPRIL 20 MG/1
20 TABLET ORAL DAILY
COMMUNITY
End: 2022-01-25

## 2021-10-05 RX ORDER — ACETAMINOPHEN 325 MG/1
650 TABLET ORAL EVERY 4 HOURS PRN
Status: DISCONTINUED | OUTPATIENT
Start: 2021-10-05 | End: 2021-10-07 | Stop reason: HOSPADM

## 2021-10-05 RX ORDER — ATORVASTATIN CALCIUM 20 MG/1
20 TABLET, FILM COATED ORAL DAILY
Status: DISCONTINUED | OUTPATIENT
Start: 2021-10-05 | End: 2021-10-07 | Stop reason: HOSPADM

## 2021-10-05 RX ORDER — SITAGLIPTIN AND METFORMIN HYDROCHLORIDE 1000; 50 MG/1; MG/1
2 TABLET, FILM COATED, EXTENDED RELEASE ORAL DAILY
COMMUNITY
End: 2022-01-30

## 2021-10-05 RX ORDER — DILTIAZEM HYDROCHLORIDE 120 MG/1
120 CAPSULE, COATED, EXTENDED RELEASE ORAL 2 TIMES DAILY
COMMUNITY
End: 2021-10-07 | Stop reason: HOSPADM

## 2021-10-05 RX ORDER — ONDANSETRON 4 MG/1
4 TABLET, FILM COATED ORAL EVERY 6 HOURS PRN
Status: DISCONTINUED | OUTPATIENT
Start: 2021-10-05 | End: 2021-10-07 | Stop reason: HOSPADM

## 2021-10-05 RX ORDER — ALBUTEROL SULFATE 90 UG/1
2 AEROSOL, METERED RESPIRATORY (INHALATION) EVERY 4 HOURS PRN
COMMUNITY
End: 2022-04-11 | Stop reason: SDUPTHER

## 2021-10-05 RX ORDER — POTASSIUM CHLORIDE 20 MEQ/1
10 TABLET, EXTENDED RELEASE ORAL DAILY
Status: DISCONTINUED | OUTPATIENT
Start: 2021-10-06 | End: 2021-10-07 | Stop reason: HOSPADM

## 2021-10-05 RX ORDER — LISINOPRIL 20 MG/1
20 TABLET ORAL DAILY
Status: DISCONTINUED | OUTPATIENT
Start: 2021-10-05 | End: 2021-10-07 | Stop reason: HOSPADM

## 2021-10-05 RX ORDER — ONDANSETRON 2 MG/ML
4 INJECTION INTRAMUSCULAR; INTRAVENOUS EVERY 6 HOURS PRN
Status: DISCONTINUED | OUTPATIENT
Start: 2021-10-05 | End: 2021-10-07 | Stop reason: HOSPADM

## 2021-10-05 RX ORDER — IPRATROPIUM BROMIDE AND ALBUTEROL SULFATE 2.5; .5 MG/3ML; MG/3ML
3 SOLUTION RESPIRATORY (INHALATION) EVERY 4 HOURS PRN
Status: DISCONTINUED | OUTPATIENT
Start: 2021-10-05 | End: 2021-10-07 | Stop reason: HOSPADM

## 2021-10-05 RX ORDER — SODIUM CHLORIDE 0.9 % (FLUSH) 0.9 %
10 SYRINGE (ML) INJECTION EVERY 12 HOURS SCHEDULED
Status: DISCONTINUED | OUTPATIENT
Start: 2021-10-05 | End: 2021-10-07 | Stop reason: HOSPADM

## 2021-10-05 RX ORDER — FENOFIBRATE 160 MG/1
160 TABLET ORAL DAILY
COMMUNITY
End: 2021-11-05

## 2021-10-05 RX ORDER — MORPHINE SULFATE 2 MG/ML
2 INJECTION, SOLUTION INTRAMUSCULAR; INTRAVENOUS ONCE
Status: COMPLETED | OUTPATIENT
Start: 2021-10-05 | End: 2021-10-05

## 2021-10-05 RX ORDER — ACETAMINOPHEN 650 MG/1
650 SUPPOSITORY RECTAL EVERY 4 HOURS PRN
Status: DISCONTINUED | OUTPATIENT
Start: 2021-10-05 | End: 2021-10-07 | Stop reason: HOSPADM

## 2021-10-05 RX ORDER — ACETAMINOPHEN 160 MG/5ML
650 SOLUTION ORAL EVERY 4 HOURS PRN
Status: DISCONTINUED | OUTPATIENT
Start: 2021-10-05 | End: 2021-10-07 | Stop reason: HOSPADM

## 2021-10-05 RX ORDER — DILTIAZEM HYDROCHLORIDE 5 MG/ML
20 INJECTION INTRAVENOUS ONCE
Status: COMPLETED | OUTPATIENT
Start: 2021-10-05 | End: 2021-10-05

## 2021-10-05 RX ORDER — POTASSIUM CHLORIDE 750 MG/1
10 TABLET, EXTENDED RELEASE ORAL DAILY
Status: ON HOLD | COMMUNITY
End: 2022-12-01 | Stop reason: SDUPTHER

## 2021-10-05 RX ORDER — MAGNESIUM SULFATE HEPTAHYDRATE 40 MG/ML
2 INJECTION, SOLUTION INTRAVENOUS ONCE
Status: DISCONTINUED | OUTPATIENT
Start: 2021-10-05 | End: 2021-10-05

## 2021-10-05 RX ORDER — ALUMINA, MAGNESIA, AND SIMETHICONE 2400; 2400; 240 MG/30ML; MG/30ML; MG/30ML
15 SUSPENSION ORAL EVERY 6 HOURS PRN
Status: DISCONTINUED | OUTPATIENT
Start: 2021-10-05 | End: 2021-10-07 | Stop reason: HOSPADM

## 2021-10-05 RX ORDER — ONDANSETRON 2 MG/ML
4 INJECTION INTRAMUSCULAR; INTRAVENOUS ONCE
Status: COMPLETED | OUTPATIENT
Start: 2021-10-05 | End: 2021-10-05

## 2021-10-05 RX ORDER — NITROGLYCERIN 0.4 MG/1
0.4 TABLET SUBLINGUAL
Status: DISCONTINUED | OUTPATIENT
Start: 2021-10-05 | End: 2021-10-07 | Stop reason: HOSPADM

## 2021-10-05 RX ORDER — FUROSEMIDE 40 MG/1
40 TABLET ORAL DAILY
COMMUNITY
End: 2022-05-03 | Stop reason: SDUPTHER

## 2021-10-05 RX ORDER — ATORVASTATIN CALCIUM 20 MG/1
20 TABLET, FILM COATED ORAL DAILY
COMMUNITY
End: 2022-02-04

## 2021-10-05 RX ORDER — FUROSEMIDE 40 MG/1
40 TABLET ORAL DAILY
Status: DISCONTINUED | OUTPATIENT
Start: 2021-10-05 | End: 2021-10-07 | Stop reason: HOSPADM

## 2021-10-05 RX ORDER — MELATONIN
1000 DAILY
COMMUNITY
End: 2022-04-22

## 2021-10-05 RX ORDER — ACEBUTOLOL HYDROCHLORIDE 200 MG/1
200 CAPSULE ORAL EVERY 12 HOURS SCHEDULED
Status: CANCELLED | OUTPATIENT
Start: 2021-10-05

## 2021-10-05 RX ADMIN — MORPHINE SULFATE 2 MG: 2 INJECTION, SOLUTION INTRAMUSCULAR; INTRAVENOUS at 08:11

## 2021-10-05 RX ADMIN — ONDANSETRON 4 MG: 2 INJECTION INTRAMUSCULAR; INTRAVENOUS at 08:11

## 2021-10-05 RX ADMIN — LISINOPRIL 20 MG: 20 TABLET ORAL at 20:23

## 2021-10-05 RX ADMIN — FUROSEMIDE 40 MG: 40 TABLET ORAL at 20:24

## 2021-10-05 RX ADMIN — DILTIAZEM HYDROCHLORIDE 120 MG: 120 CAPSULE, COATED, EXTENDED RELEASE ORAL at 20:24

## 2021-10-05 RX ADMIN — DILTIAZEM HYDROCHLORIDE 20 MG: 5 INJECTION INTRAVENOUS at 05:38

## 2021-10-05 RX ADMIN — ATORVASTATIN CALCIUM 20 MG: 20 TABLET, FILM COATED ORAL at 20:24

## 2021-10-05 RX ADMIN — HYDROCODONE BITARTRATE AND ACETAMINOPHEN 1 TABLET: 7.5; 325 TABLET ORAL at 22:33

## 2021-10-05 RX ADMIN — MAGNESIUM SULFATE IN DEXTROSE 1 G: 10 INJECTION, SOLUTION INTRAVENOUS at 06:45

## 2021-10-05 RX ADMIN — METOPROLOL TARTRATE 25 MG: 25 TABLET, FILM COATED ORAL at 20:23

## 2021-10-05 RX ADMIN — INSULIN GLARGINE 32 UNITS: 100 INJECTION, SOLUTION SUBCUTANEOUS at 20:22

## 2021-10-05 RX ADMIN — Medication 10 ML: at 20:27

## 2021-10-05 RX ADMIN — ACETAMINOPHEN 650 MG: 325 TABLET, FILM COATED ORAL at 15:01

## 2021-10-05 RX ADMIN — ENOXAPARIN SODIUM 100 MG: 100 INJECTION, SOLUTION INTRAVENOUS; SUBCUTANEOUS at 11:49

## 2021-10-05 RX ADMIN — SODIUM CHLORIDE 5 MG/HR: 900 INJECTION, SOLUTION INTRAVENOUS at 07:25

## 2021-10-05 RX ADMIN — HYDROCODONE BITARTRATE AND ACETAMINOPHEN 1 TABLET: 7.5; 325 TABLET ORAL at 09:57

## 2021-10-05 RX ADMIN — APIXABAN 5 MG: 5 TABLET, FILM COATED ORAL at 22:26

## 2021-10-05 RX ADMIN — Medication 10 ML: at 11:50

## 2021-10-05 RX ADMIN — SODIUM CHLORIDE 10 MG/HR: 900 INJECTION, SOLUTION INTRAVENOUS at 05:38

## 2021-10-05 RX ADMIN — NITROGLYCERIN 0.4 MG: 0.4 TABLET SUBLINGUAL at 10:50

## 2021-10-05 RX ADMIN — IOPAMIDOL 100 ML: 755 INJECTION, SOLUTION INTRAVENOUS at 08:34

## 2021-10-05 RX ADMIN — SODIUM CHLORIDE 10 MG/HR: 900 INJECTION, SOLUTION INTRAVENOUS at 22:25

## 2021-10-05 NOTE — ED PROVIDER NOTES
Subjective   66-year-old female with atrial fibrillation complains of moderate right upper chest pain radiating to her back, sharp, worse with movement, deep breath.  Patient denies any cough or fever.  Patient was unaware she was in rapid ventricular response pain onset 9:30 PM at rest.          Review of Systems   Cardiovascular: Positive for chest pain.   All other systems reviewed and are negative.      Past Medical History:   Diagnosis Date   • Arthritis    • Atrial fibrillation (HCC)    • Bradycardia     Dr. Charles   • Cataract    • COPD (chronic obstructive pulmonary disease) ([facility])     Dr. Rob   • Diabetes mellitus, type 2 ([facility])     sees Dr. Archibald at [place]   • DJD (degenerative joint disease)     possible herniated disc, sees Pain Mgmt in [place]   •     • GERD (gastroesophageal reflux disease)    • History of combined right and left heart catheterization 2018    minimal CAD on heart cath done    • Hyperlipidemia    • Hypertension    • Pain    • Sleep apnea     wears CPAP machine, sees Darron       Allergies   Allergen Reactions   • Ibuprofen GI Intolerance   • Prednisone Other (See Comments)   • Pregabalin Other (See Comments)     jerking   • Tramadol Other (See Comments)     Legs jerked   • Levofloxacin Other (See Comments)     Increase sunburn   • Sulfamethoxazole-Trimethoprim Swelling       Past Surgical History:   Procedure Laterality Date   • ABLATION OF DYSRHYTHMIC FOCUS     • CARDIAC CATHETERIZATION      and Angiograph    • CARDIAC ELECTROPHYSIOLOGY PROCEDURE N/A 12/10/2019    Procedure: pvc ablation;  Surgeon: Alfredo Iglesias MD;  Location: [facility] INVASIVE LOCATION;  Service: Cardiovascular   •  SECTION      x 1   • COLON RESECTION     • COLONOSCOPY  2012   • COLOSTOMY      and reversal in her 20's due to problems with childbirth   • COLOSTOMY CLOSURE     • OVARIAN CYST SURGERY     • ROTATOR CUFF REPAIR Left 2009    x2    • TOTAL ABDOMINAL HYSTERECTOMY WITH  SALPINGO OOPHORECTOMY  2005       Family History   Problem Relation Age of Onset   • Heart disease Mother    • Hypertension Mother    • Stroke Mother    • Seizures Mother    • Heart disease Father    • Hypertension Father    • Lung disease Father    • Stroke Father    • Cancer Sister    • Hypertension Brother    • Diabetes Brother    • Hyperlipidemia Other        Social History     Socioeconomic History   • Marital status:      Spouse name: Not on file   • Number of children: Not on file   • Years of education: Not on file   • Highest education level: Not on file   Tobacco Use   • Smoking status: Former Smoker     Packs/day: 1.00     Years: 48.00     Pack years: 48.00     Types: Cigarettes     Quit date: 3/1/2020     Years since quittin.5   • Smokeless tobacco: Never Used   Vaping Use   • Vaping Use: Never used   Substance and Sexual Activity   • Alcohol use: No   • Drug use: No   • Sexual activity: Defer           Objective   Physical Exam  Constitutional:       Appearance: She is well-developed.   HENT:      Head: Normocephalic and atraumatic.      Mouth/Throat:      Mouth: Mucous membranes are moist.      Pharynx: Oropharynx is clear.   Eyes:      Conjunctiva/sclera: Conjunctivae normal.      Pupils: Pupils are equal, round, and reactive to light.   Cardiovascular:      Rate and Rhythm: Tachycardia present.      Heart sounds: Normal heart sounds.   Pulmonary:      Effort: Pulmonary effort is normal.      Breath sounds: Normal breath sounds.      Comments: Right sided chest with normal inspection, there is tenderness to palpation which does reproduce pain  Abdominal:      General: Bowel sounds are normal. There is no distension.      Palpations: Abdomen is soft.      Tenderness: There is no abdominal tenderness.   Musculoskeletal:         General: No swelling or tenderness. Normal range of motion.      Cervical back: Normal range of motion and neck supple.   Skin:     General: Skin is warm and dry.       "Capillary Refill: Capillary refill takes less than 2 seconds.   Neurological:      General: No focal deficit present.      Mental Status: She is alert and oriented to person, place, and time.   Psychiatric:         Mood and Affect: Mood normal.         Behavior: Behavior normal.         Procedures       Initial EKG was performed at 0501 and showed atrial fibrillation with RVR with a rate of 144 and a right bundle branch block which is old this EKG was reviewed by myself and read by Dr. Hammer    Repeat EKG performed at 0944 shows the patient has a rate of 87 with atrial flutter/atrial fib.  Was also reviewed by myself and read by Dr. Ortega    ED Course  ED Course as of Oct 05 1134   Tue Oct 05, 2021   0511 EKG interpretation: Atrial fibrillation with rapid ventricular response, rate 144, right bundle branch block, no STEMI seen    [JR]   0800 Patient care was assumed from Dr. Hammer for results of CT and disposition    [KW]   0937 CT pulmonary emboli shows no pulmonary emboli no acute findings this was discussed with the patient    [KW]   0946 Patient was discussed with Taina the nurse practitioner with the hospitalist and admitted to Dr. Go    [KW]   1116 Nursing staff performing enema now they were informed to repeat troponin and obtain Covid swab    [KW]   1133 Spoke to Dr. Charles in person and he will consult this patient    [KW]      ED Course User Index  [JR] Ronak Hammer MD  [KW] Mary Cummings, APRN    /73   Pulse 89   Temp 97.7 °F (36.5 °C)   Resp 17   Ht 162.6 cm (64\")   Wt 104 kg (228 lb 2.8 oz)   SpO2 100%   BMI 39.17 kg/m²   Labs Reviewed   COMPREHENSIVE METABOLIC PANEL - Abnormal; Notable for the following components:       Result Value    Glucose 356 (*)     ALT (SGPT) 54 (*)     AST (SGOT) 51 (*)     eGFR Non African Amer 60 (*)     Anion Gap 17.0 (*)     All other components within normal limits    Narrative:     GFR Normal >60  Chronic Kidney Disease <60  Kidney Failure <15   "   D-DIMER, QUANTITATIVE - Abnormal; Notable for the following components:    D-Dimer, Quantitative 1.48 (*)     All other components within normal limits    Narrative:     Reference Range  --------------------------------------------------------------------     < 0.50   Negative Predictive Value  0.50-0.59   Indeterminate    >= 0.60   Probable VTE             A very low percentage of patients with DVT may yield D-Dimer results   below the cut-off of 0.50 mg/L FEU.  This is known to be more   prevalent in patients with distal DVT.             Results of this test should always be interpreted in conjunction with   the patient's medical history, clinical presentation and other   findings.  Clinical diagnosis should not be based on the result of   INNOVANCE D-Dimer alone.   MAGNESIUM - Abnormal; Notable for the following components:    Magnesium 1.4 (*)     All other components within normal limits   CBC WITH AUTO DIFFERENTIAL - Abnormal; Notable for the following components:    WBC 12.40 (*)     RBC 5.50 (*)     Hematocrit 47.6 (*)     Lymphocyte % 17.2 (*)     Neutrophils, Absolute 9.00 (*)     Monocytes, Absolute 1.20 (*)     All other components within normal limits   BNP (IN-HOUSE) - Abnormal; Notable for the following components:    proBNP 1,170.0 (*)     All other components within normal limits    Narrative:     Among patients with dyspnea, NT-proBNP is highly sensitive for the detection of acute congestive heart failure. In addition NT-proBNP of <300 pg/ml effectively rules out acute congestive heart failure with 99% negative predictive value.    Results may be falsely decreased if patient taking Biotin.     COVID-19,CEPHEID/FRANDY/BDMAX,COR/FORTUNATO/PAD/[place] IN-HOUSE,NP SWAB IN TRANSPORT MEDIA 3-4 HR TAT, RT-PCR - Normal    Narrative:     Fact sheet for providers: https://www.fda.gov/media/075355/download     Fact sheet for patients: https://www.fda.gov/media/672634/download  Fact sheet for providers:  "https://www.fda.gov/media/553260/download     Fact sheet for patients: https://www.fda.gov/media/009601/download   PROTIME-INR - Normal   APTT - Normal   TROPONIN (IN-HOUSE) - Normal    Narrative:     Troponin T Reference Range:  <= 0.03 ng/mL-   Negative for AMI  >0.03 ng/mL-     Abnormal for myocardial necrosis.  Clinicians would have to utilize clinical acumen, EKG, Troponin and serial changes to determine if it is an Acute Myocardial Infarction or myocardial injury due to an underlying chronic condition.       Results may be falsely decreased if patient taking Biotin.     TSH - Normal   PROCALCITONIN - Normal    Narrative:     As a Marker for Sepsis (Non-Neonates):     1. <0.5 ng/mL represents a low risk of severe sepsis and/or septic shock.  2. >2 ng/mL represents a high risk of severe sepsis and/or septic shock.    As a Marker for Lower Respiratory Tract Infections that require antibiotic therapy:  PCT on Admission     Antibiotic Therapy             6-12 Hrs later  >0.5                          Strongly Recommended            >0.25 - <0.5             Recommended  0.1 - 0.25                  Discouraged                       Remeasure/reassess PCT  <0.1                         Strongly Discouraged         Remeasure/reassess PCT      As 28 day mortality risk marker: \"Change in Procalcitonin Result\" (>80% or <=80%) if Day 0 (or Day 1) and Day 4 values are available. Refer to http://www.[employer]-pct-calculator.com/    Change in PCT <=80 %   A decrease of PCT levels below or equal to 80% defines a positive change in PCT test result representing a higher risk for 28-day all-cause mortality of patients diagnosed with severe sepsis or septic shock.    Change in PCT >80 %   A decrease of PCT levels of more than 80% defines a negative change in PCT result representing a lower risk for 28-day all-cause mortality of patients diagnosed with severe sepsis or septic shock.              Results may be falsely decreased if " patient taking Biotin.    RAINBOW DRAW    Narrative:     The following orders were created for panel order [place] Draw.  Procedure                               Abnormality         Status                     ---------                               -----------         ------                     Green Top (Gel)[[id number]]                                  Final result               Lavender Top[[id number]]                                     Final result               Gold Top - SST[[id number]]                                   Final result               Light Blue Top[[id number]]                                   Final result                 Please view results for these tests on the individual orders.   TROPONIN (IN-HOUSE)   TROPONIN (IN-HOUSE)   GREEN TOP   LAVENDER TOP   GOLD TOP - SST   LIGHT BLUE TOP   CBC AND DIFFERENTIAL    Narrative:     The following orders were created for panel order CBC & Differential.  Procedure                               Abnormality         Status                     ---------                               -----------         ------                     CBC Auto Differential[[id number]]        Abnormal            Final result                 Please view results for these tests on the individual orders.     Medications   dilTIAZem (CARDIZEM) 100 mg in 100 mL NS infusion (ADV) (5 mg/hr Intravenous Currently Infusing 10/5/21 4966)   nitroglycerin (NITROSTAT) SL tablet 0.4 mg (0.4 mg Sublingual Given 10/5/21 1050)   sodium chloride 0.9 % flush 10 mL (has no administration in time range)   sodium chloride 0.9 % flush 10 mL (has no administration in time range)   acetaminophen (TYLENOL) tablet 650 mg (has no administration in time range)     Or   acetaminophen (TYLENOL) 160 MG/5ML solution 650 mg (has no administration in time range)     Or   acetaminophen (TYLENOL) suppository 650 mg (has no administration in time range)   aluminum-magnesium hydroxide-simethicone (MAALOX MAX) 400-400-40 MG/5ML  suspension 15 mL (has no administration in time range)   ondansetron (ZOFRAN) tablet 4 mg (has no administration in time range)     Or   ondansetron (ZOFRAN) injection 4 mg (has no administration in time range)   melatonin tablet 5 mg (has no administration in time range)   enoxaparin (LOVENOX) syringe 100 mg (has no administration in time range)   HYDROcodone-acetaminophen (NORCO) 7.5-325 MG per tablet 1 tablet (has no administration in time range)   ipratropium-albuterol (DUO-NEB) nebulizer solution 3 mL (has no administration in time range)   dilTIAZem (CARDIZEM) injection 20 mg (20 mg Intravenous Given 10/5/21 0538)   magnesium sulfate in D5W 1g/100mL (PREMIX) (0 g Intravenous Stopped 10/5/21 0753)   morphine injection 2 mg (2 mg Intravenous Given 10/5/21 0811)   ondansetron (ZOFRAN) injection 4 mg (4 mg Intravenous Given 10/5/21 0811)   iopamidol (ISOVUE-370) 76 % injection 100 mL (100 mL Intravenous Given 10/5/21 0834)   HYDROcodone-acetaminophen (NORCO) 7.5-325 MG per tablet 1 tablet (1 tablet Oral Given 10/5/21 0957)     XR Chest 1 View    Result Date: 10/5/2021  Marked cardiomegaly. No acute cardiopulmonary process.   Electronically Signed By-Rudy Lopez DO On:10/5/2021 7:17 AM This report was finalized on [id number] by  Rudy Lopez DO.    CT Chest Pulmonary Embolism    Result Date: 10/5/2021  1. No pulmonary embolism. 2. Mild bilateral upper lobe medial subsegmental atelectasis. 3. Mild to moderate cardiomegaly. 4. Marked diffuse hepatic steatosis. 5. Uncomplicated cholelithiasis.    Electronically Signed By-Emeli Corona MD On:10/5/2021 8:41 AM This report was finalized on [id number] by  Emeli Corona MD.                                           MDM  Number of Diagnoses or Management Options  Atrial fibrillation with RVR (HCC)  Chest pain, unspecified type  Diagnosis management comments: ForPatient had IV established and blood work was obtained initial EKG showed that the patient was in  with RVR with a rate of 144 and the patient was started on a Cardizem drip she continued to complain of chest pain and was treated for such.  It was noted that she had an elevated D-dimer and a subsequent CT PE protocol was performed and found to be negative for pulmonary embolus at this time the patient will be admitted she is on a Cardizem drip and her rate is controlled at around 100 at this time.  She still complaining of chest pain at the time of reevaluation and she will be given her home dose of 7.5 Norco-was also observed that the patient's magnesium was low at 1.4 and Dr. Hammer ordered 2 g of magnesium to be given around 630 this morning.    The patient continued to be stable throughout her emergency room stay she is a patient of Dr. Charles is the cardiologist and I will consult him as well to take over the patient's care    Patient was discussed with Taina the nurse practitioner with the hospitalist and admitted to Dr. Peterson       Amount and/or Complexity of Data Reviewed  Clinical lab tests: reviewed  Tests in the radiology section of CPT®: reviewed  Tests in the medicine section of CPT®: reviewed    Risk of Complications, Morbidity, and/or Mortality  Presenting problems: low  Diagnostic procedures: low  Management options: low    Patient Progress  Patient progress: improved      Final diagnoses:   Atrial fibrillation with RVR (HCC)   Chest pain, unspecified type       ED Disposition  ED Disposition     ED Disposition Condition Comment    Decision to Admit  Level of Care: Progressive Care [20]   Diagnosis: Atrial fibrillation with RVR (HCC) [[id number]]   Admitting Physician: ROBERT PETERSON [[id number]]   Attending Physician: ROBERT PETERSON [[id number]]   Bed Request Comments: PCU/QUANG   Certification: I Certify That Inpatient Hospital Services Are Medically Necessary For Greater Than 2 Midnights            No follow-up provider specified.       Medication List      No changes were  made to your prescriptions during this visit.          Mary Cummings, YAW  10/05/21 0951       Mary Cummings, YAW  10/05/21 1138

## 2021-10-05 NOTE — CONSULTS
Referring Provider: Dr. Go    Attending: Rodrigo Go *    Reason for Consultation:    Chest pain  Shortness of breath  Atrial fibrillation with rapid ventricular response  Hypertension  Hyperlipidemia  Diabetes mellitus      Chief complaint:    Chest discomfort  Palpitation  Shortness of breath       History of present illness:     Patient is 66-year-old white gentleman whose past medical history is significant for hypertension, hyperlipidemia, COPD, diabetes mellitus, persistent atrial fibrillation, who is admitted with symptom of chest pain and shortness of breath and palpitation.    Patient reports that she woke up with chest pain and palpitation.  She was short of breath.  She came to the hospital.  Her heart rate was noted to be elevated.  She is started on intravenous Cardizem and rate is relatively improved.    In 2017, patient noted to have ventricular bigeminy and frequent PVC.  Patient underwent Holter monitor and was noted to have PVCs greater than 30,000.  Patient underwent stress test which was negative.  In 2018, patient was again admitted with palpitation and dizziness and underwent cardiac catheterization which showed no significant CAD.  In 2019 patient underwent EP study and possible ablation of PVCs but it was unsuccessful.  Patient was started on Sectral 200 mg twice daily.  Patient has sleep apnea and uses CPAP.  Subsequently patient developed atrial fibrillation which was initially paroxysmal but subsequently it was persistent.  Patient is recommended to have rate control and anticoagulation with Eliquis.    Review of Systems  Review of Systems   Constitutional: Negative for chills and fever.   HENT: Negative for ear discharge and nosebleeds.    Eyes: Negative for discharge and redness.   Cardiovascular: Positive for chest pain and palpitations. Negative for orthopnea, paroxysmal nocturnal dyspnea and syncope.   Respiratory: Positive for shortness of breath. Negative for cough and  wheezing.    Endocrine: Negative for heat intolerance.   Skin: Negative for rash.   Musculoskeletal: Positive for arthritis and joint pain. Negative for myalgias.   Gastrointestinal: Negative for abdominal pain, melena, nausea and vomiting.   Genitourinary: Negative for dysuria and hematuria.   Neurological: Negative for dizziness, light-headedness, numbness and tremors.   Psychiatric/Behavioral: Negative for depression. The patient is not nervous/anxious.        Past Medical History  Past Medical History:   Diagnosis Date   • Arthritis    • Atrial fibrillation (HCC)    • Bradycardia     Dr. Charles   • Cataract    • COPD (chronic obstructive pulmonary disease) ([facility])     Dr. Rob   • Diabetes mellitus, type 2 (HCC)     sees Dr. Archibald at [place]   • DJD (degenerative joint disease)     possible herniated disc, sees Pain Mgmt in [place]   •     • GERD (gastroesophageal reflux disease)    • History of combined right and left heart catheterization 2018    minimal CAD on heart cath done    • Hyperlipidemia    • Hypertension    • Pain    • Sleep apnea     wears CPAP machine, sees Darron    and   Past Surgical History:   Procedure Laterality Date   • ABLATION OF DYSRHYTHMIC FOCUS     • CARDIAC CATHETERIZATION      and Angiograph    • CARDIAC ELECTROPHYSIOLOGY PROCEDURE N/A 12/10/2019    Procedure: pvc ablation;  Surgeon: Alfredo Iglesias MD;  Location: [facility] INVASIVE LOCATION;  Service: Cardiovascular   •  SECTION      x 1   • COLON RESECTION     • COLONOSCOPY  2012   • COLOSTOMY      and reversal in her 20's due to problems with childbirth   • COLOSTOMY CLOSURE     • OVARIAN CYST SURGERY     • ROTATOR CUFF REPAIR Left 2009    x2    • TOTAL ABDOMINAL HYSTERECTOMY WITH SALPINGO OOPHORECTOMY         Family History  Family History   Problem Relation Age of Onset   • Heart disease Mother    • Hypertension Mother    • Stroke Mother    • Seizures Mother    • Heart disease Father    •  "Hypertension Father    • Lung disease Father    • Stroke Father    • Cancer Sister    • Hypertension Brother    • Diabetes Brother    • Hyperlipidemia Other        Social History  Social History     Socioeconomic History   • Marital status:      Spouse name: Not on file   • Number of children: Not on file   • Years of education: Not on file   • Highest education level: Not on file   Tobacco Use   • Smoking status: Former Smoker     Packs/day: 1.00     Years: 48.00     Pack years: 48.00     Types: Cigarettes     Quit date: 3/1/2020     Years since quittin.5   • Smokeless tobacco: Never Used   Vaping Use   • Vaping Use: Never used   Substance and Sexual Activity   • Alcohol use: No   • Drug use: No   • Sexual activity: Defer       Objective     Physical Exam:    Vital Signs  Vitals:    10/05/21 1517 10/05/21 1600 10/05/21 1631 10/05/21 1637   BP: 152/75  143/67 143/67   BP Location:       Patient Position:       Pulse: 87 86 95 85   Resp:       Temp:       TempSrc:       SpO2:  99%  98%   Weight:    103 kg (228 lb)   Height:    162.6 cm (64\")       Weight  Flowsheet Rows      First Filed Value   Admission Height  162.6 cm (64\") Documented at 10/05/2021 0452   Admission Weight  104 kg (228 lb 2.8 oz) Documented at 10/05/2021 0452           Constitutional:       Appearance: Well-developed.   Eyes:      General: No scleral icterus.        Right eye: No discharge.   HENT:      Head: Normocephalic and atraumatic.   Neck:      Thyroid: No thyromegaly.      Lymphadenopathy: No cervical adenopathy.   Pulmonary:      Effort: Pulmonary effort is normal. No respiratory distress.      Breath sounds: Normal breath sounds. No wheezing. No rales.   Cardiovascular:      Normal rate. Irregularly irregular rhythm.      No gallop.   Edema:     Peripheral edema absent.   Abdominal:      Tenderness: There is no abdominal tenderness.   Skin:     Findings: No erythema or rash.   Neurological:      Mental Status: Alert and " oriented to person, place, and time.         Results Review:  Lab Results (last 24 hours)     Procedure Component Value Units Date/Time    Troponin [[id number]]  (Normal) Collected: 10/05/21 1209    Specimen: Blood Updated: 10/05/21 1243     Troponin T <0.010 ng/mL     Narrative:      Troponin T Reference Range:  <= 0.03 ng/mL-   Negative for AMI  >0.03 ng/mL-     Abnormal for myocardial necrosis.  Clinicians would have to utilize clinical acumen, EKG, Troponin and serial changes to determine if it is an Acute Myocardial Infarction or myocardial injury due to an underlying chronic condition.       Results may be falsely decreased if patient taking Biotin.      Comprehensive Metabolic Panel [[id number]]  (Abnormal) Collected: 10/05/21 0519    Specimen: Blood Updated: 10/05/21 0705     Glucose 356 mg/dL      BUN 18 mg/dL      Creatinine 0.94 mg/dL      Sodium 138 mmol/L      Potassium 4.5 mmol/L      Comment: Slight hemolysis detected by analyzer. Results may be affected.        Chloride 99 mmol/L      CO2 22.0 mmol/L      Calcium 10.4 mg/dL      Total Protein 8.2 g/dL      Albumin 4.40 g/dL      ALT (SGPT) 54 U/L      AST (SGOT) 51 U/L      Alkaline Phosphatase 81 U/L      Total Bilirubin 0.8 mg/dL      eGFR Non African Amer 60 mL/min/1.73      Globulin 3.8 gm/dL      A/G Ratio 1.2 g/dL      BUN/Creatinine Ratio 19.1     Anion Gap 17.0 mmol/L     Narrative:      GFR Normal >60  Chronic Kidney Disease <60  Kidney Failure <15      Troponin [[id number]]  (Normal) Collected: 10/05/21 0519    Specimen: Blood Updated: 10/05/21 0705     Troponin T <0.010 ng/mL     Narrative:      Troponin T Reference Range:  <= 0.03 ng/mL-   Negative for AMI  >0.03 ng/mL-     Abnormal for myocardial necrosis.  Clinicians would have to utilize clinical acumen, EKG, Troponin and serial changes to determine if it is an Acute Myocardial Infarction or myocardial injury due to an underlying chronic condition.       Results may be falsely decreased  if patient taking Biotin.      aPTT [[id number]]  (Normal) Collected: 10/05/21 0519    Specimen: Blood Updated: 10/05/21 0702     PTT 29.0 seconds     Protime-INR [[id number]]  (Normal) Collected: 10/05/21 0519    Specimen: Blood Updated: 10/05/21 0702     Protime 11.4 Seconds      INR 1.03    D-dimer, Quantitative [[id number]]  (Abnormal) Collected: 10/05/21 0519    Specimen: Blood Updated: 10/05/21 0702     D-Dimer, Quantitative 1.48 mg/L (FEU)     Narrative:      Reference Range  --------------------------------------------------------------------     < 0.50   Negative Predictive Value  0.50-0.59   Indeterminate    >= 0.60   Probable VTE             A very low percentage of patients with DVT may yield D-Dimer results   below the cut-off of 0.50 mg/L FEU.  This is known to be more   prevalent in patients with distal DVT.             Results of this test should always be interpreted in conjunction with   the patient's medical history, clinical presentation and other   findings.  Clinical diagnosis should not be based on the result of   IN[place]OV[place] D-Dimer alone.    CBC & Differential [[id number]]  (Abnormal) Collected: 10/05/21 0519    Specimen: Blood Updated: 10/05/21 0653    Narrative:      The following orders were created for panel order CBC & Differential.  Procedure                               Abnormality         Status                     ---------                               -----------         ------                     CBC Auto Differential[[id number]]        Abnormal            Final result                 Please view results for these tests on the individual orders.    CBC Auto Differential [[id number]]  (Abnormal) Collected: 10/05/21 0519    Specimen: Blood Updated: 10/05/21 0653     WBC 12.40 10*3/mm3      RBC 5.50 10*6/mm3      Hemoglobin 15.8 g/dL      Hematocrit 47.6 %      MCV 86.6 fL      MCH 28.7 pg      MCHC 33.2 g/dL      RDW 15.1 %      RDW-SD 45.1 fl      MPV 9.1 fL      Platelets 323 10*3/mm3       Neutrophil % 72.5 %      Lymphocyte % 17.2 %      Monocyte % 9.4 %      Eosinophil % 0.5 %      Basophil % 0.4 %      Neutrophils, Absolute 9.00 10*3/mm3      Lymphocytes, Absolute 2.10 10*3/mm3      Monocytes, Absolute 1.20 10*3/mm3      Eosinophils, Absolute 0.10 10*3/mm3      Basophils, Absolute 0.00 10*3/mm3      nRBC 0.0 /100 WBC     COVID-19,CEPHEID/FRANDY/BDMAX,COR/FORTUNATO/PAD/[place] IN-HOUSE(OR EMERGENT/ADD-ON),NP SWAB IN TRANSPORT MEDIA 3-4 HR TAT, RT-PCR - Swab, Nasopharynx [[id number]]  (Normal) Collected: 10/05/21 0505    Specimen: Swab from Nasopharynx Updated: 10/05/21 0643     COVID19 Not Detected    Narrative:      Fact sheet for providers: https://www.fda.gov/media/259951/download     Fact sheet for patients: https://www.fda.gov/media/169779/download  Fact sheet for providers: https://www.fda.gov/media/712986/download     Fact sheet for patients: https://www.fda.gov/media/353055/download    Richardson Draw [[id number]] Collected: 10/05/21 0519    Specimen: Blood Updated: 10/05/21 0630    Narrative:      The following orders were created for panel order Richardson Draw.  Procedure                               Abnormality         Status                     ---------                               -----------         ------                     Green Top (Gel)[[id number]]                                  Final result               Lavender Top[[id number]]                                     Final result               Gold Top - SST[[id number]]                                   Final result               Light Blue Top[[id number]]                                   Final result                 Please view results for these tests on the individual orders.    Lavender Top [[id number]] Collected: 10/05/21 0519    Specimen: Blood Updated: 10/05/21 0630     Extra Tube hold for add-on     Comment: Auto resulted       Gold Top - SST [[id number]] Collected: 10/05/21 0519    Specimen: Blood Updated: 10/05/21 0630     Extra Tube Hold  "for add-ons.     Comment: Auto resulted.       Light Blue Top [[id number]] Collected: 10/05/21 0519    Specimen: Blood Updated: 10/05/21 0630     Extra Tube hold for add-on     Comment: Auto resulted       Procalcitonin [[id number]]  (Normal) Collected: 10/05/21 0519    Specimen: Blood Updated: 10/05/21 0627     Procalcitonin 0.11 ng/mL     Narrative:      As a Marker for Sepsis (Non-Neonates):     1. <0.5 ng/mL represents a low risk of severe sepsis and/or septic shock.  2. >2 ng/mL represents a high risk of severe sepsis and/or septic shock.    As a Marker for Lower Respiratory Tract Infections that require antibiotic therapy:  PCT on Admission     Antibiotic Therapy             6-12 Hrs later  >0.5                          Strongly Recommended            >0.25 - <0.5             Recommended  0.1 - 0.25                  Discouraged                       Remeasure/reassess PCT  <0.1                         Strongly Discouraged         Remeasure/reassess PCT      As 28 day mortality risk marker: \"Change in Procalcitonin Result\" (>80% or <=80%) if Day 0 (or Day 1) and Day 4 values are available. Refer to http://www.[employer]-pct-calculator.com/    Change in PCT <=80 %   A decrease of PCT levels below or equal to 80% defines a positive change in PCT test result representing a higher risk for 28-day all-cause mortality of patients diagnosed with severe sepsis or septic shock.    Change in PCT >80 %   A decrease of PCT levels of more than 80% defines a negative change in PCT result representing a lower risk for 28-day all-cause mortality of patients diagnosed with severe sepsis or septic shock.              Results may be falsely decreased if patient taking Biotin.     Green Top (Gel) [[id number]] Collected: 10/05/21 0519    Specimen: Blood Updated: 10/05/21 0625     Extra Tube done    BNP [[id number]]  (Abnormal) Collected: 10/05/21 0519    Specimen: Blood Updated: 10/05/21 0619     proBNP 1,170.0 pg/mL     Narrative:      " Among patients with dyspnea, NT-proBNP is highly sensitive for the detection of acute congestive heart failure. In addition NT-proBNP of <300 pg/ml effectively rules out acute congestive heart failure with 99% negative predictive value.    Results may be falsely decreased if patient taking Biotin.      TSH [[id number]]  (Normal) Collected: 10/05/21 0519    Specimen: Blood Updated: 10/05/21 0600     TSH 1.830 uIU/mL     Magnesium [[id number]]  (Abnormal) Collected: 10/05/21 0519    Specimen: Blood Updated: 10/05/21 0549     Magnesium 1.4 mg/dL         Imaging Results (Last 72 Hours)     Procedure Component Value Units Date/Time    US Abdomen Limited [[id number]] Collected: 10/05/21 1203     Updated: 10/05/21 1205    Narrative:      US ABDOMEN LIMITED-     Date of Exam: 10/5/2021 10:54 AM     Indication: elevated LFTs, atypical chest pain; I48.91-Unspecified  atrial fibrillation; R07.9-Chest pain, unspecified.     Comparison: None available.     Technique: Transverse and sagittal ultrasound images of the right upper  quadrant were obtained. Doppler evaluation was also conducted.     FINDINGS:  Visualized portions of the head and body of the pancreas are normal.  Evaluation is limited due to surrounding bowel gas.     Liver has homogenous echogenicity and normal echotexture.  No focal  hepatic lesions.  Portal and hepatic veins are patent and have normal  flow direction and waveforms.      Gallbladder is normal in caliber with no evidence of stones, wall  thickening or pericholecystic fluid. Negative sonographic Mauricio's sign.     The biliary system is nondilated.      The right kidney is normal in size with no evidence of hydronephrosis.  No suspicious focal lesions.        Impression:      No acute sonographic abnormality within the right upper quadrant.     Electronically Signed By-Rudy Lopez DO On:10/5/2021 12:03 PM  This report was finalized on [id number] by  Rudy Lopez DO.    CT Chest Pulmonary Embolism  [[id number]] Collected: 10/05/21 0838     Updated: 10/05/21 0843    Narrative:      CT CHEST PULMONARY EMBOLISM-     Date of Exam: 10/5/2021 8:24 AM     Indication: cp.     Comparison: AP portable chest 10/5/2021. No prior CT chest for  comparison.     Technique: Serial and axial CT images of the chest were obtained  following the uneventful intravenous administration of 100 cc Isovue-370  contrast. Reconstructions in the coronal and sagittal planes were also  performed.  Automated exposure control and iterative reconstruction  methods were used.     FINDINGS:     There is no pulmonary embolism. There is no thoracic aortic aneurysm or  aortic dissection. There is mild to moderate cardiac enlargement. No  pericardial effusion or pleural effusion is seen. Trace fluid is seen  within the pericardial recesses. There are no pathologically enlarged  nodes. Benign calcified lymph node is seen within the precarinal region.     Mild subsegmental atelectasis is demonstrated medially within the upper  lobes. No dense lung consolidations are identified. There is no abnormal  bronchial wall thickening or bronchiectasis.     Thyroid gland is within normal limits. The liver is markedly and  diffusely steatotic. Small gallstones are present. Nonspecific  low-density lesion in the left upper renal pole measures 7 mm,  statistically favored to represent a cyst. The remainder of included  upper abdominal organs are within normal limits.     No acute osseous abnormalities identified.          Impression:      1. No pulmonary embolism.  2. Mild bilateral upper lobe medial subsegmental atelectasis.  3. Mild to moderate cardiomegaly.  4. Marked diffuse hepatic steatosis.  5. Uncomplicated cholelithiasis.           Electronically Signed By-Emeli Corona MD On:10/5/2021 8:41 AM  This report was finalized on [id number] by  Emeli Corona MD.    XR Chest 1 View [[id number]] Collected: 10/05/21 0716     Updated: 10/05/21 0719    Narrative:       XR CHEST 1 VW-     Date of Exam: 10/5/2021 5:15 AM     Indication: cp  68-year-old, history of atrial fibrillation, chest pain,  COPD, shortness of breath     Comparison: Portable chest dated 3/10/2020 and 1/31/2017     Technique: 1 view(s) of the chest were obtained.     FINDINGS: There is marked cardiomegaly not significantly changed  given differences in technical factors. Mediastinal and hilar  silhouettes are unremarkable. The lungs are grossly clear. No  significant pneumothorax, pleural effusion or focal pulmonary  parenchymal opacity. Pulmonary vascularity is within normal limits. The  trachea is midline. Visualized bony structures are intact.       Impression:      Marked cardiomegaly. No acute cardiopulmonary process.        Electronically Signed By-Rudy Lopez DO On:10/5/2021 7:17 AM  This report was finalized on [id number] by  Rudy Lopez DO.        ECG 12 Lead   Preliminary Result   HEART RATE= 87  bpm   RR Interval= 686  ms   ND Interval=   ms   P Horizontal Axis=   deg   P Front Axis=   deg   QRSD Interval= 168  ms   QT Interval= 435  ms   QRS Axis= -84  deg   T Wave Axis= -47  deg   - ABNORMAL ECG -   Atrial flutter/fibrillation   Right bundle branch block   Left anterior fascicular block   When compared with ECG of 05-Oct-2021 5:01:31,   Significant change in rhythm   New conduction abnormality   Electronically Signed By:    Date and Time of Study: 2021-10-05 09:44:48      ECG 12 Lead   Preliminary Result   HEART RATE= 144  bpm   RR Interval= 415  ms   ND Interval=   ms   P Horizontal Axis=   deg   P Front Axis=   deg   QRSD Interval= 154  ms   QT Interval= 330  ms   QRS Axis= -121  deg   T Wave Axis= -7  deg   - ABNORMAL ECG -   Atrial fibrillation   Right bundle branch block   Electronically Signed By:    Date and Time of Study: 2021-10-05 05:01:31        CBC    Results from last 7 days   Lab Units 10/05/21  0519   WBC 10*3/mm3 12.40*   HEMOGLOBIN g/dL 15.8   PLATELETS 10*3/mm3 323      BMP   Results from last 7 days   Lab Units 10/05/21  0519   SODIUM mmol/L 138   POTASSIUM mmol/L 4.5   CHLORIDE mmol/L 99   CO2 mmol/L 22.0   BUN mg/dL 18   CREATININE mg/dL 0.94   GLUCOSE mg/dL 356*   MAGNESIUM mg/dL 1.4*     CMP   Results from last 7 days   Lab Units 10/05/21  0519   SODIUM mmol/L 138   POTASSIUM mmol/L 4.5   CHLORIDE mmol/L 99   CO2 mmol/L 22.0   BUN mg/dL 18   CREATININE mg/dL 0.94   GLUCOSE mg/dL 356*   ALBUMIN g/dL 4.40   BILIRUBIN mg/dL 0.8   ALK PHOS U/L 81   AST (SGOT) U/L 51*   ALT (SGPT) U/L 54*     Medication Review  Scheduled Meds:dilTIAZem CD, 120 mg, Oral, Q24H  enoxaparin, 1 mg/kg, Subcutaneous, Q12H  metoprolol tartrate, 25 mg, Oral, Q12H  sodium chloride, 10 mL, Intravenous, Q12H      Continuous Infusions:dilTIAZem, 5-15 mg/hr, Last Rate: 10 mg/hr (10/05/21 1451)      PRN Meds:.•  acetaminophen **OR** acetaminophen **OR** acetaminophen  •  aluminum-magnesium hydroxide-simethicone  •  HYDROcodone-acetaminophen  •  ipratropium-albuterol  •  melatonin  •  nitroglycerin  •  ondansetron **OR** ondansetron  •  sodium chloride    Assessment:      Atrial fibrillation with RVR ([facility])    Chronic obstructive pulmonary disease ([facility])    Depression    Diabetic peripheral neuropathy ([facility])    Gastroesophageal reflux disease    Mixed hyperlipidemia    Hypomagnesemia    Paroxysmal atrial fibrillation ([facility])    Sleep apnea    Type 2 diabetes mellitus with hyperglycemia, with long-term current use of insulin ([facility])    Essential hypertension    Chronic diastolic CHF (congestive heart failure) ([facility])    Class 2 obesity due to excess calories without serious comorbidity with body mass index (BMI) of 39.0 to 39.9 in adult    Chest pain, atypical    Elevated LFTs    Cholelithiasis        Plan:    MDM:    1.  Atrial fibrillation with rapid ventricular response:    Patient is on intravenous Cardizem.  I would start Cardizem  mg daily.  Lopressor 25 mg twice daily would be started.  Sectral would be  discontinued.  Continue Eliquis.    2.  Chest discomfort:    Patient had previous cardiac catheterization in 2018 which was unremarkable.  I would proceed with stress test in the morning    3.  Hypertension:    I would start Cardizem CD and add beta-blocker.  Patient is on lisinopril.    4.  Obstructive sleep apnea:    Continue CPAP.    I discussed the patients findings and my recommendations with patient    Jesse Charles MD  10/05/21  17:59 EDT

## 2021-10-05 NOTE — CONSULTS
"Diabetes Education  Assessment/Teaching    Patient Name:  Caterina Mason  YOB: 1955  MRN: [id number]  Admit Date:  10/5/2021      Assessment Date:  10/5/2021    Most Recent Value   General Information    Referral From:  MD order, Blood glucose [Received consult due to bs >200. Adm bs 356. Last A1c in [facility] was from 5/20/2021 and result 10.6%]   Height  162.6 cm (64\")   Height Method  Stated   Weight  103 kg (228 lb)   Weight Method  Standing scale   Pregnancy Assessment   Diabetes History   What type of diabetes do you have?  Type 2   Length of Diabetes Diagnosis  -- [Pt states diagnosed over 20 years ago]   Have you had diabetes education/teaching in the past?  yes   When and where was your diabetes education?  Attended a class when first diagnosed with diabetes. Pt was scheduled to see RD at the [facility] on 6/16/2021 but did not show. Pt states she does not feel comfortable going to classes during Covid Pandemic   Do you test your blood sugar at home?  yes   Frequency of checks  3 times/day   Meter type  unsure of name. She received from [facility]   Who performs the test?  self   Have you had low blood sugar? (<70mg/dl)  yes   How often do you have low blood sugar?  rare   Education Preferences   What areas of diabetes would you like to learn about?  avoiding high blood sugar, avoiding low blood sugar, diabetes complications, diet information, exercise information, medications for diabetes   Nutrition Information   Assessment Topics   Healthy Eating - Assessment  Needs education   Being Active - Assessment  Needs education   Taking Medication - Assessment  Needs education   Problem Solving - Assessment  Needs education   Reducing Risk - Assessment  Needs education   DM Goals   Healthy Eating - Goal  Today   Being Active - Goal  Today   Taking Medication - Goal  Today   Problem Solving - Goal  Today   Reducing Risk - Goal  Today            Most Recent Value   DM Education Needs   Meter  " Has own   Frequency of Testing  3 times a day   Blood Glucose Target Range  Discussed past A1c result of 10.6%. Reviewed healthy A1c target and healthy bs range. Discussed importance of bs control.   Medication  Oral, Insulin, Pen [Pt taking Janumet XR 50/1000 mg, 2 pills daily,  Lantus 32 units at hs and Humalog 7 units 3 times/day.]   Problem Solving  Hypoglycemia, Hyperglycemia, Signs, Symptoms, Treatment [Discussed always carrying low bs tx.]   Reducing Risks  A1C testing [Gave A1c info sheet]   Healthy Eating  -- [Pt states BS has been elevated because she drinks three-four 16 oz pepsi every day. Pt also drinking 3-4 cups of milk daily. Discussed she should eliminate sodas from her meal plan]   Physical Activity  -- [Reviewed importance of routine exercise in controlling bs. Pt states she is unable to exercise due to getting short of breath.]   Motivation  Engaged   Teaching Method  Explanation, Discussion, Handouts   Patient Response  Verbalized understanding            Other Comments:  Pt follows at the [facility]. Her next scheduled appt is 11/23/2021. Discussed importance of reporting bs readings to MD office in between visits if running outside healthy range. Pt states she is not sure if she is supposed to be taking Humalog 7 units premeals or 12 units premeals. Discussed at last MD visit, instructions were to increase Humalog to 12 units premeals. Pt states she is only eating 1 meal/day at supper but has still been taking Humalog 3 times/day since she is drinking the regular soft drinks. Discussed she should be eating 3 meals/day and taking insulin as prescribed. Pt states she is sleepy most of the time. Discussed this may be the result of high bs. Pt states bs usually >200 mg/dl. Pt going to try to stop drinking the pepsi. Gave First Steps booklet, Planning Healthy Meals packet and log book. Pt with no additional questions.       Electronically signed by:  Laura Oliver RN  10/05/21 19:13 EDT

## 2021-10-05 NOTE — H&P
[facility] Medicine Services      Patient Name: Caterina Mason  : 1955  MRN: [id number]  Primary Care Physician:  Lou Curran MD  Date of admission: 10/5/2021      Subjective      Chief Complaint:  Chest pain    History of Present Illness: Caterina Mason is a 66 y.o. female with a history of paroxysmal a-fib on chronic anticoagulation with Eliquis, chronic diastolic CHF, Type 2 DM, and sleep apnea on CPAP with supplemental oxygen who presented to [facility] ED on 10/5/2021 complaining of chest pain. The patient states her pain started last night. She states it is located in her right chest, radiates into her back, and down her right arm. She states the pain is worse with movement and deep breaths. She reports associated shortness of breath that is worse with exertion. She denies cough, fever, or chills. She reports some leg swelling. She denies any other complaints. She states she quit smoking 2 years ago.    Workup in the ER revealed atrial fibrillation with rapid ventricular response. She was given Cardizem 20 mg IV bolus and started on continuous drip. Her magnesium was low, so she was given Mag sulfate 1 g IV. Cardiology was consulted. She was admitted to the Hospitalist group for further evaluation and treatment.       Review of Systems   Constitutional: Negative for chills and fever.   Cardiovascular: Positive for chest pain, dyspnea on exertion and leg swelling.   Respiratory: Positive for shortness of breath.    All other systems reviewed and are negative.       Personal History     Past Medical History:   Diagnosis Date   • Arthritis    • Atrial fibrillation (HCC)    • Bradycardia     Dr. Charles   • Cataract    • COPD (chronic obstructive pulmonary disease) ([facility])     Dr. Rob   • Diabetes mellitus, type 2 (HCC)     sees Dr. Archibald at [place]   • DJD (degenerative joint disease)     possible herniated disc, sees Pain Mgmt in [place]   •     • GERD  (gastroesophageal reflux disease)    • History of combined right and left heart catheterization 2018    minimal CAD on heart cath done    • Hyperlipidemia    • Hypertension    • Pain    • Sleep apnea     wears CPAP machine, sees Darron       Past Surgical History:   Procedure Laterality Date   • ABLATION OF DYSRHYTHMIC FOCUS     • CARDIAC CATHETERIZATION      and Angiograph    • CARDIAC ELECTROPHYSIOLOGY PROCEDURE N/A 12/10/2019    Procedure: pvc ablation;  Surgeon: Alfredo Iglesias MD;  Location: [facility] CATH INVASIVE LOCATION;  Service: Cardiovascular   •  SECTION      x 1   • COLON RESECTION     • COLONOSCOPY  2012   • COLOSTOMY      and reversal in her 20's due to problems with childbirth   • COLOSTOMY CLOSURE     • OVARIAN CYST SURGERY     • ROTATOR CUFF REPAIR Left 2009    x2    • TOTAL ABDOMINAL HYSTERECTOMY WITH SALPINGO OOPHORECTOMY         Family History: family history includes Cancer in her sister; Diabetes in her brother; Heart disease in her father and mother; Hyperlipidemia in an other family member; Hypertension in her brother, father, and mother; Lung disease in her father; Seizures in her mother; Stroke in her father and mother. Otherwise pertinent FHx was reviewed and not pertinent to current issue.    Social History:  reports that she quit smoking about 19 months ago. Her smoking use included cigarettes. She has a 48.00 pack-year smoking history. She has never used smokeless tobacco. She reports that she does not drink alcohol and does not use drugs.    Home Medications:  Prior to Admission Medications     Prescriptions Last Dose Informant Patient Reported? Taking?    Accu-Chek Aurea Plus test strip   No No    CHECK BLOOD SUGAR 3 TIMES E11.65    Accu-Chek Softclix Lancets lancets   No No    USE TO CHECK BS 3 TIMES DAILY    acebutolol (SECTRAL) 200 MG capsule   No No    Take 1 capsule by mouth 2 (Two) Times a Day.    albuterol sulfate  (90 Base) MCG/ACT inhaler    No No    Inhale 2 puffs Every 4 (Four) Hours As Needed for Wheezing.    arformoterol (Brovana) 15 MCG/2ML nebulizer solution   No No    Take 2 mL by nebulization 2 (Two) Times a Day.    atorvastatin (LIPITOR) 40 MG tablet   No No    TAKE 1/2 TABLET BY MOUTH DAILY    azithromycin (Zithromax Z-Ekmar) 250 MG tablet   No No    Take 2 tablets the first day, then 1 tablet daily for 4 days.    Blood Glucose Monitoring Suppl (ACCU-CHEK CARLIE PLUS) w/Device kit   No No    USE TO CHECK BS 3 TIMES DAILY    budesonide-formoterol (SYMBICORT) 160-4.5 MCG/ACT inhaler   No No    Inhale 2 puffs 2 (Two) Times a Day.    calcium-vitamin D (Oscal 500/200 D-3) 500-200 MG-UNIT per tablet   No No    Take 1 tablet by mouth 2 (Two) Times a Day.    [employer] D3 25 MCG (1000 UT) capsule   No No    TAKE 1 CAPSULE BY MOUTH TWICE A DAY    dilTIAZem CD (Cardizem CD) 120 MG 24 hr capsule   No No    Take 1 capsule by mouth 2 (Two) Times a Day.    Eliquis 5 MG tablet tablet   No No    TAKE 1 TABLET BY MOUTH TWICE A DAY    fenofibrate 160 MG tablet   No No    TAKE 1 TABLET BY MOUTH EVERY DAY    furosemide (LASIX) 40 MG tablet   No No    TAKE 1 TABLET BY MOUTH EVERY DAY    HYDROcodone-acetaminophen (NORCO) 7.5-325 MG per tablet   Yes No    Take 1 tablet by mouth 3 (Three) Times a Day As Needed.    Insulin Glargine (BASAGLAR KWIKPEN) 100 UNIT/ML injection pen   No No    Inject 32 units subcu nightly    Insulin Lispro, 1 Unit Dial, (HumaLOG KwikPen) 100 UNIT/ML solution pen-injector   No No    Inject 12 Units under the skin into the appropriate area as directed 3 (Three) Times a Day.    Patient taking differently:  Inject 7 Units under the skin into the appropriate area as directed 3 (Three) Times a Day.    Insulin Pen Needle (BD Pen Needle Lorene U/F) 32G X 4 MM misc   No No    Inject insulin 4 times daily / DX E11.65    Janumet XR  MG tablet   No No    TAKE 2 TABLETS BY MOUTH EVERY DAY    lisinopril (PRINIVIL,ZESTRIL) 20 MG tablet   No No    TAKE 1  TABLET BY MOUTH EVERY DAY    magnesium oxide (MAG-OX) 400 MG tablet   No No    Take 1 tablet by mouth 2 (Two) Times a Day.    potassium chloride (MICRO-K) 10 MEQ CR capsule   No No    TAKE 1 CAPSULE BY MOUTH EVERY DAY            Allergies:  Allergies   Allergen Reactions   • Ibuprofen GI Intolerance   • Prednisone Other (See Comments)   • Pregabalin Other (See Comments)     jerking   • Tramadol Other (See Comments)     Legs jerked   • Levofloxacin Other (See Comments)     Increase sunburn   • Sulfamethoxazole-Trimethoprim Swelling       Objective      Vitals:   Temp:  [97.7 °F (36.5 °C)-98.6 °F (37 °C)] 97.7 °F (36.5 °C)  Heart Rate:  [] 88  Resp:  [17-24] 17  BP: (129-159)/() 134/68  Flow (L/min):  [2] 2    Physical Exam  Vitals reviewed.   Constitutional:       Appearance: She is obese.   HENT:      Head: Normocephalic.   Eyes:      Extraocular Movements: Extraocular movements intact.      Conjunctiva/sclera: Conjunctivae normal.      Pupils: Pupils are equal, round, and reactive to light.   Cardiovascular:      Rate and Rhythm: Normal rate. Rhythm irregularly irregular.      Pulses: Normal pulses.   Pulmonary:      Effort: Pulmonary effort is normal.      Breath sounds: Rhonchi present.      Comments: O2 @ 2 L per NC  Abdominal:      General: Bowel sounds are normal. There is no distension.      Palpations: Abdomen is soft.      Tenderness: There is no abdominal tenderness.   Musculoskeletal:         General: Normal range of motion.      Cervical back: Normal range of motion.      Right lower leg: No edema.      Left lower leg: No edema.   Skin:     General: Skin is warm and dry.      Capillary Refill: Capillary refill takes less than 2 seconds.   Neurological:      Mental Status: She is alert and oriented to person, place, and time.   Psychiatric:         Mood and Affect: Mood normal.         Behavior: Behavior normal.          Result Review    Result Review:  I have personally reviewed the results  from the time of this admission to 10/5/2021 10:41 EDT and agree with these findings:  [x]  Laboratory  [x]  Microbiology  [x]  Radiology  [x]  EKG/Telemetry   []  Cardiology/Vascular   []  Pathology  [x]  Old records  []  Other:    Most notable findings include:   WBC 12.40, procalcitonin 0.11, K 4.5, Mg 1.4, TSH 1.830, glucose 356, ALT 54, AST 51, dimer 1.48, proBNP 1,170, troponin <0.010    EKG:  A-fib w/ RVR, RBBB,     Covid-19 negative    CT chest:  1. No pulmonary embolism.  2. Mild bilateral upper lobe medial subsegmental atelectasis.  3. Mild to moderate cardiomegaly.  4. Marked diffuse hepatic steatosis.  5. Uncomplicated cholelithiasis.      Assessment/Plan        Active Hospital Problems:  Active Hospital Problems    Diagnosis    • **Atrial fibrillation with RVR (HCC)    • Chest pain, atypical    • Elevated LFTs    • Hypomagnesemia    • Cholelithiasis    • Class 2 obesity due to excess calories without serious comorbidity with body mass index (BMI) of 39.0 to 39.9 in adult    • Chronic diastolic CHF (congestive heart failure) (HCC)    • Essential hypertension    • Chronic obstructive pulmonary disease (HCC)    • Depression    • Gastroesophageal reflux disease    • Sleep apnea    • Paroxysmal atrial fibrillation (HCC)    • Diabetic peripheral neuropathy (HCC)    • Type 2 diabetes mellitus with hyperglycemia, with long-term current use of insulin (HCC)    • Mixed hyperlipidemia      Plan:     Atrial fibrillation with RVR   Paroxysmal atrial fibrillation   -given Cardizem 20 mg IV bolus in ER and started on continuous drip  -HR now improved, but remains in a-fib; continue Cardizem drip  -Lovenox 1 mg/kg sq q12h, hold Eliquis for now  -cardiology consulted  -obtain 2D echo  -K 4.5, Mg 1.4, TSH 1.830---replace Mg  -continuous cardiac monitoring     Chest pain, atypical  -r/o ACS  -check serial troponin  -EKG:  A-fib, RBBB  -cardiology consulted  -SL NTG PRN pain    Hypomagnesemia  -replace per protocol  and monitor     Elevated LFTs  Cholelithiasis  -ALT 54, AST 51 on admission  -obtain gallbladder US  -monitor and trend LFTs    Type 2 diabetes mellitus with hyperglycemia, with long-term current use of insulin, complicated with Diabetic peripheral neuropathy  -check A1c  -accu checks AC&HS with SSI  -diabetic consistent carb diet  -consult diabetes educator   -continue long-acting insulin once home med list verified     Chronic obstructive pulmonary disease  -stable without exacerbation  -DuoNebs PRN  -continue home medication once list verified     Depression  -continue home medication once list verified     Gastroesophageal reflux disease  -continue home medication once list verified     Mixed hyperlipidemia  -continue home medication once list verified     Sleep apnea  -patient reports she wears CPAP with 2 L O2 when sleeping    Essential hypertension, chronic  -continue home medication once list verified   -monitor BP    Chronic diastolic CHF (congestive heart failure)  -continue home medication once list verified     Class 2 obesity due to excess calories without serious comorbidity with body mass index (BMI) of 39.0 to 39.9 in adult  -BMI 39.17 on admission  -encourage lifestyle modifications       DVT prophylaxis:  Medical DVT prophylaxis orders are present.    CODE STATUS:    Code Status: CPR  Medical Interventions (Level of Support Prior to Arrest): Full    Admission Status:  I believe this patient meets inpatient status.    I discussed the patient's findings and my recommendations with patient.    This patient has been examined wearing appropriate Personal Protective Equipment. 10/05/21      Signature: Electronically signed by YAW Kaplan, 10/05/21, 10:56 AM EDT.  Hospitalist Physician Assessment/Plan    History:*Patient seen exam  HPI reviewed with the patient    Exam:**  Regular irregular rhythm.  Diminished air entry bilaterally      Medical Decision Making:  Agree with above plan      Attending  Physician Attestation    For this patient encounter, I have reviewed the mid-level provider documentation, medical decision making and treatment plan, and I have personally spent time with the patient.  All procedures were done by me, and/or all procedures were performed by the mid-level under my supervision.

## 2021-10-06 ENCOUNTER — APPOINTMENT (OUTPATIENT)
Dept: GENERAL RADIOLOGY | Facility: HOSPITAL | Age: 66
End: 2021-10-06

## 2021-10-06 LAB
ALBUMIN SERPL-MCNC: 4 G/DL (ref 3.5–5.2)
ALP SERPL-CCNC: 75 U/L (ref 39–117)
ALT SERPL W P-5'-P-CCNC: 39 U/L (ref 1–33)
ANION GAP SERPL CALCULATED.3IONS-SCNC: 15 MMOL/L (ref 5–15)
AST SERPL-CCNC: 31 U/L (ref 1–32)
BASOPHILS # BLD AUTO: 0 10*3/MM3 (ref 0–0.2)
BASOPHILS NFR BLD AUTO: 0.5 % (ref 0–1.5)
BILIRUB CONJ SERPL-MCNC: 0.2 MG/DL (ref 0–0.3)
BILIRUB INDIRECT SERPL-MCNC: 0.9 MG/DL
BILIRUB SERPL-MCNC: 1.1 MG/DL (ref 0–1.2)
BUN SERPL-MCNC: 18 MG/DL (ref 8–23)
BUN/CREAT SERPL: 23.1 (ref 7–25)
CALCIUM SPEC-SCNC: 9.5 MG/DL (ref 8.6–10.5)
CHLORIDE SERPL-SCNC: 99 MMOL/L (ref 98–107)
CO2 SERPL-SCNC: 22 MMOL/L (ref 22–29)
CREAT SERPL-MCNC: 0.78 MG/DL (ref 0.57–1)
DEPRECATED RDW RBC AUTO: 47.7 FL (ref 37–54)
EOSINOPHIL # BLD AUTO: 0.1 10*3/MM3 (ref 0–0.4)
EOSINOPHIL NFR BLD AUTO: 1.1 % (ref 0.3–6.2)
ERYTHROCYTE [DISTWIDTH] IN BLOOD BY AUTOMATED COUNT: 15.5 % (ref 12.3–15.4)
GFR SERPL CREATININE-BSD FRML MDRD: 74 ML/MIN/1.73
GLUCOSE BLDC GLUCOMTR-MCNC: 297 MG/DL (ref 70–105)
GLUCOSE BLDC GLUCOMTR-MCNC: 351 MG/DL (ref 70–105)
GLUCOSE BLDC GLUCOMTR-MCNC: 366 MG/DL (ref 70–105)
GLUCOSE BLDC GLUCOMTR-MCNC: 385 MG/DL (ref 70–105)
GLUCOSE SERPL-MCNC: 310 MG/DL (ref 65–99)
HCT VFR BLD AUTO: 42.5 % (ref 34–46.6)
HGB BLD-MCNC: 14.1 G/DL (ref 12–15.9)
LYMPHOCYTES # BLD AUTO: 3.1 10*3/MM3 (ref 0.7–3.1)
LYMPHOCYTES NFR BLD AUTO: 33.6 % (ref 19.6–45.3)
MCH RBC QN AUTO: 28.9 PG (ref 26.6–33)
MCHC RBC AUTO-ENTMCNC: 33.2 G/DL (ref 31.5–35.7)
MCV RBC AUTO: 87 FL (ref 79–97)
MONOCYTES # BLD AUTO: 1.3 10*3/MM3 (ref 0.1–0.9)
MONOCYTES NFR BLD AUTO: 14.5 % (ref 5–12)
NEUTROPHILS NFR BLD AUTO: 4.6 10*3/MM3 (ref 1.7–7)
NEUTROPHILS NFR BLD AUTO: 50.3 % (ref 42.7–76)
NRBC BLD AUTO-RTO: 0.1 /100 WBC (ref 0–0.2)
PLATELET # BLD AUTO: 284 10*3/MM3 (ref 140–450)
PMV BLD AUTO: 7.9 FL (ref 6–12)
POTASSIUM SERPL-SCNC: 3.7 MMOL/L (ref 3.5–5.2)
PROT SERPL-MCNC: 7.7 G/DL (ref 6–8.5)
QT INTERVAL: 435 MS
RBC # BLD AUTO: 4.88 10*6/MM3 (ref 3.77–5.28)
SODIUM SERPL-SCNC: 136 MMOL/L (ref 136–145)
WBC # BLD AUTO: 9.2 10*3/MM3 (ref 3.4–10.8)

## 2021-10-06 PROCEDURE — 99233 SBSQ HOSP IP/OBS HIGH 50: CPT | Performed by: INTERNAL MEDICINE

## 2021-10-06 PROCEDURE — 63710000001 INSULIN LISPRO (HUMAN) PER 5 UNITS: Performed by: INTERNAL MEDICINE

## 2021-10-06 PROCEDURE — 99232 SBSQ HOSP IP/OBS MODERATE 35: CPT | Performed by: NURSE PRACTITIONER

## 2021-10-06 PROCEDURE — 63710000001 INSULIN GLARGINE PER 5 UNITS: Performed by: INTERNAL MEDICINE

## 2021-10-06 PROCEDURE — 73060 X-RAY EXAM OF HUMERUS: CPT

## 2021-10-06 PROCEDURE — 80048 BASIC METABOLIC PNL TOTAL CA: CPT | Performed by: NURSE PRACTITIONER

## 2021-10-06 PROCEDURE — 85025 COMPLETE CBC W/AUTO DIFF WBC: CPT | Performed by: NURSE PRACTITIONER

## 2021-10-06 PROCEDURE — 80076 HEPATIC FUNCTION PANEL: CPT | Performed by: NURSE PRACTITIONER

## 2021-10-06 PROCEDURE — 82962 GLUCOSE BLOOD TEST: CPT

## 2021-10-06 RX ORDER — DILTIAZEM HYDROCHLORIDE 120 MG/1
120 CAPSULE, COATED, EXTENDED RELEASE ORAL
Qty: 30 CAPSULE | Refills: 0 | Status: SHIPPED | OUTPATIENT
Start: 2021-10-07 | End: 2021-12-03 | Stop reason: SDUPTHER

## 2021-10-06 RX ORDER — METOPROLOL TARTRATE 50 MG/1
50 TABLET, FILM COATED ORAL EVERY 12 HOURS SCHEDULED
Status: DISCONTINUED | OUTPATIENT
Start: 2021-10-06 | End: 2021-10-07 | Stop reason: HOSPADM

## 2021-10-06 RX ORDER — OLANZAPINE 10 MG/2ML
1 INJECTION, POWDER, LYOPHILIZED, FOR SOLUTION INTRAMUSCULAR
Status: DISCONTINUED | OUTPATIENT
Start: 2021-10-06 | End: 2021-10-07 | Stop reason: HOSPADM

## 2021-10-06 RX ORDER — DEXTROSE MONOHYDRATE 25 G/50ML
25 INJECTION, SOLUTION INTRAVENOUS
Status: DISCONTINUED | OUTPATIENT
Start: 2021-10-06 | End: 2021-10-07 | Stop reason: HOSPADM

## 2021-10-06 RX ORDER — INSULIN LISPRO 100 [IU]/ML
0-14 INJECTION, SOLUTION INTRAVENOUS; SUBCUTANEOUS
Status: DISCONTINUED | OUTPATIENT
Start: 2021-10-06 | End: 2021-10-07 | Stop reason: HOSPADM

## 2021-10-06 RX ORDER — LIDOCAINE 50 MG/G
1 PATCH TOPICAL
Status: DISCONTINUED | OUTPATIENT
Start: 2021-10-06 | End: 2021-10-07 | Stop reason: HOSPADM

## 2021-10-06 RX ORDER — NICOTINE POLACRILEX 4 MG
15 LOZENGE BUCCAL
Status: DISCONTINUED | OUTPATIENT
Start: 2021-10-06 | End: 2021-10-07 | Stop reason: HOSPADM

## 2021-10-06 RX ORDER — BACLOFEN 10 MG/1
5 TABLET ORAL 3 TIMES DAILY PRN
Status: DISCONTINUED | OUTPATIENT
Start: 2021-10-06 | End: 2021-10-07 | Stop reason: HOSPADM

## 2021-10-06 RX ORDER — INSULIN LISPRO 100 [IU]/ML
0-14 INJECTION, SOLUTION INTRAVENOUS; SUBCUTANEOUS AS NEEDED
Status: DISCONTINUED | OUTPATIENT
Start: 2021-10-06 | End: 2021-10-07 | Stop reason: HOSPADM

## 2021-10-06 RX ORDER — BACLOFEN 5 MG/1
5 TABLET ORAL 3 TIMES DAILY PRN
Qty: 30 TABLET | Refills: 0 | Status: SHIPPED | OUTPATIENT
Start: 2021-10-06 | End: 2021-11-05

## 2021-10-06 RX ADMIN — INSULIN GLARGINE 32 UNITS: 100 INJECTION, SOLUTION SUBCUTANEOUS at 22:21

## 2021-10-06 RX ADMIN — Medication 10 ML: at 09:04

## 2021-10-06 RX ADMIN — INSULIN LISPRO 12 UNITS: 100 INJECTION, SOLUTION INTRAVENOUS; SUBCUTANEOUS at 22:34

## 2021-10-06 RX ADMIN — FUROSEMIDE 40 MG: 40 TABLET ORAL at 09:04

## 2021-10-06 RX ADMIN — POTASSIUM CHLORIDE 10 MEQ: 1500 TABLET, EXTENDED RELEASE ORAL at 09:04

## 2021-10-06 RX ADMIN — APIXABAN 5 MG: 5 TABLET, FILM COATED ORAL at 22:16

## 2021-10-06 RX ADMIN — METOPROLOL TARTRATE 50 MG: 50 TABLET, FILM COATED ORAL at 22:17

## 2021-10-06 RX ADMIN — INSULIN LISPRO 12 UNITS: 100 INJECTION, SOLUTION INTRAVENOUS; SUBCUTANEOUS at 17:42

## 2021-10-06 RX ADMIN — ATORVASTATIN CALCIUM 20 MG: 20 TABLET, FILM COATED ORAL at 09:04

## 2021-10-06 RX ADMIN — LINAGLIPTIN 5 MG: 5 TABLET, FILM COATED ORAL at 17:42

## 2021-10-06 RX ADMIN — Medication 10 ML: at 22:22

## 2021-10-06 RX ADMIN — LIDOCAINE 1 PATCH: 50 PATCH TOPICAL at 11:18

## 2021-10-06 RX ADMIN — BACLOFEN 5 MG: 10 TABLET ORAL at 22:16

## 2021-10-06 RX ADMIN — BACLOFEN 5 MG: 10 TABLET ORAL at 11:17

## 2021-10-06 RX ADMIN — METOPROLOL TARTRATE 25 MG: 25 TABLET, FILM COATED ORAL at 09:04

## 2021-10-06 RX ADMIN — APIXABAN 5 MG: 5 TABLET, FILM COATED ORAL at 11:17

## 2021-10-06 RX ADMIN — Medication 5 MG: at 22:22

## 2021-10-06 RX ADMIN — HYDROCODONE BITARTRATE AND ACETAMINOPHEN 1 TABLET: 7.5; 325 TABLET ORAL at 04:54

## 2021-10-06 RX ADMIN — HYDROCODONE BITARTRATE AND ACETAMINOPHEN 1 TABLET: 7.5; 325 TABLET ORAL at 17:48

## 2021-10-06 RX ADMIN — LISINOPRIL 20 MG: 20 TABLET ORAL at 09:04

## 2021-10-06 RX ADMIN — DILTIAZEM HYDROCHLORIDE 120 MG: 120 CAPSULE, COATED, EXTENDED RELEASE ORAL at 09:04

## 2021-10-06 NOTE — PAYOR COMM NOTE
"Inpatient order 10/5  ER admit with c/o Chest pain  Meds: IV cardizem drip    EKG: - ABNORMAL ECG -  Atrial flutter/fibrillation  Right bundle branch block  Left anterior fascicular block  When compared with ECG of 05-Oct-2021 5:01:31,  Significant change in rhythm  New conduction abnormality     Cardiology consulted     Echo and myoview pending   -----  [place]:  >Atrial Fibrillation (M-505)  Admission is indicated for 1 or more of the following  Initiation or adjustment of antiarrhythmic medication[A] that requires cardiac monitoring (eg, telemetry), as indicated by ALL of the following:  Cardiac monitoring (eg, telemetry) appropriate, as indicated by 1 or more of the following:  Patient without a sinus rhythm ECG to evaluate (eg, to determine QT interval, presence of accessory pathways)[E]  Cardiac monitoring required that extends beyond observation care time frame  ------  AUTHORIZATION PENDING:   PLEASE FAX OR CALL DETERMINATION TO CONTACT BELOW:     THANK YOU,    AGUSTÍN Guadalupe, RN  Utilization Review  [facility]  Phone: [phone]  Fax: [phone]      NPI: [id number]  Tax ID: [id number]    Caterina Mason (66 y.o. Female)     Date of Birth Social Security Number Address Home Phone MRN    1955  [id number]  [place] [address] [phone] [id number]    Yazidi Marital Status          None        Admission Date Admission Type Admitting Provider Attending Provider Department, Room/Bed    10/5/21 Emergency Rodrigo Go MD Gad, George Fayez Labib Youssief, MD [facility] NEURO HEART, 263/1    Discharge Date Discharge Disposition Discharge Destination                       Attending Provider: Rodrigo Go MD    Allergies: Ibuprofen, Prednisone, Pregabalin, Tramadol, Levofloxacin, Sulfamethoxazole-trimethoprim    Isolation: None   Infection: None   Code Status: CPR    Ht: 162.6 cm (64\")   Wt: 103 kg (228 lb) "    Admission Cmt: None   Principal Problem: Atrial fibrillation with RVR (HCC) [I48.91]                 Active Insurance as of 10/5/2021     Primary Coverage     Payor Plan Insurance Group Employer/Plan Group    [employer]HEM MEDICAID [place] [employer] - AN[employer]M [id number]     Payor Plan Address Payor Plan Phone Number Payor Plan Fax Number Effective Dates    MAIL STOP:   2017 - None Entered    [address] [address]       [facility] [address]       Subscriber Name Subscriber Birth Date Member ID       MARTIN MASON 1955 [id number]                 Emergency Contacts      (Rel.) Home Phone Work Phone Mobile Phone    LEIDY PEREZ (Daughter) [phone] -- [phone]    BARBARA MASON (Spouse) [phone] -- --    ForeAgnes caceres (Daughter) -- -- [phone]               History & Physical      Donavan, Rodrigo De La Rosa MD at 10/05/21 1041              [facility] Medicine Services      Patient Name: Martin Mason  : 1955  MRN: [id number]  Primary Care Physician:  Lou Curran MD  Date of admission: 10/5/2021      Subjective      Chief Complaint:  Chest pain    History of Present Illness: Martin Mason is a 66 y.o. female with a history of paroxysmal a-fib on chronic anticoagulation with Eliquis, chronic diastolic CHF, Type 2 DM, and sleep apnea on CPAP with supplemental oxygen who presented to [facility] ED on 10/5/2021 complaining of chest pain. The patient states her pain started last night. She states it is located in her right chest, radiates into her back, and down her right arm. She states the pain is worse with movement and deep breaths. She reports associated shortness of breath that is worse with exertion. She denies cough, fever, or chills. She reports some leg swelling. She denies any other complaints. She states she quit smoking 2 years ago.    Workup in the ER revealed atrial fibrillation with rapid ventricular  response. She was given Cardizem 20 mg IV bolus and started on continuous drip. Her magnesium was low, so she was given Mag sulfate 1 g IV. Cardiology was consulted. She was admitted to the Hospitalist group for further evaluation and treatment.       Review of Systems   Constitutional: Negative for chills and fever.   Cardiovascular: Positive for chest pain, dyspnea on exertion and leg swelling.   Respiratory: Positive for shortness of breath.    All other systems reviewed and are negative.       Personal History     Past Medical History:   Diagnosis Date   • Arthritis    • Atrial fibrillation (HCC)    • Bradycardia     Dr. Charles   • Cataract    • COPD (chronic obstructive pulmonary disease) ([facility])     Dr. Rob   • Diabetes mellitus, type 2 ([facility])     sees Dr. Archibald at [place]   • DJD (degenerative joint disease)     possible herniated disc, sees Pain Mgmt in [place]   •     • GERD (gastroesophageal reflux disease)    • History of combined right and left heart catheterization 2018    minimal CAD on heart cath done    • Hyperlipidemia    • Hypertension    • Pain    • Sleep apnea     wears CPAP machine, sees Darron       Past Surgical History:   Procedure Laterality Date   • ABLATION OF DYSRHYTHMIC FOCUS     • CARDIAC CATHETERIZATION      and Angiograph    • CARDIAC ELECTROPHYSIOLOGY PROCEDURE N/A 12/10/2019    Procedure: pvc ablation;  Surgeon: Alfredo Iglesias MD;  Location: [facility] INVASIVE LOCATION;  Service: Cardiovascular   •  SECTION      x 1   • COLON RESECTION     • COLONOSCOPY  2012   • COLOSTOMY      and reversal in her 20's due to problems with childbirth   • COLOSTOMY CLOSURE     • OVARIAN CYST SURGERY     • ROTATOR CUFF REPAIR Left 2009    x2    • TOTAL ABDOMINAL HYSTERECTOMY WITH SALPINGO OOPHORECTOMY         Family History: family history includes Cancer in her sister; Diabetes in her brother; Heart disease in her father and mother; Hyperlipidemia in an other  family member; Hypertension in her brother, father, and mother; Lung disease in her father; Seizures in her mother; Stroke in her father and mother. Otherwise pertinent FHx was reviewed and not pertinent to current issue.    Social History:  reports that she quit smoking about 19 months ago. Her smoking use included cigarettes. She has a 48.00 pack-year smoking history. She has never used smokeless tobacco. She reports that she does not drink alcohol and does not use drugs.    Home Medications:  Prior to Admission Medications     Prescriptions Last Dose Informant Patient Reported? Taking?    Accu-Chek Aurea Plus test strip   No No    CHECK BLOOD SUGAR 3 TIMES E11.65    Accu-Chek Softclix Lancets lancets   No No    USE TO CHECK BS 3 TIMES DAILY    acebutolol (SECTRAL) 200 MG capsule   No No    Take 1 capsule by mouth 2 (Two) Times a Day.    albuterol sulfate  (90 Base) MCG/ACT inhaler   No No    Inhale 2 puffs Every 4 (Four) Hours As Needed for Wheezing.    arformoterol (Brovana) 15 MCG/2ML nebulizer solution   No No    Take 2 mL by nebulization 2 (Two) Times a Day.    atorvastatin (LIPITOR) 40 MG tablet   No No    TAKE 1/2 TABLET BY MOUTH DAILY    azithromycin (Zithromax Z-Kemar) 250 MG tablet   No No    Take 2 tablets the first day, then 1 tablet daily for 4 days.    Blood Glucose Monitoring Suppl (ACCU-CHEK AUREA PLUS) w/Device kit   No No    USE TO CHECK BS 3 TIMES DAILY    budesonide-formoterol (SYMBICORT) 160-4.5 MCG/ACT inhaler   No No    Inhale 2 puffs 2 (Two) Times a Day.    calcium-vitamin D (Oscal 500/200 D-3) 500-200 MG-UNIT per tablet   No No    Take 1 tablet by mouth 2 (Two) Times a Day.    [employer]S D3 25 MCG (1000 UT) capsule   No No    TAKE 1 CAPSULE BY MOUTH TWICE A DAY    dilTIAZem CD (Cardizem CD) 120 MG 24 hr capsule   No No    Take 1 capsule by mouth 2 (Two) Times a Day.    Eliquis 5 MG tablet tablet   No No    TAKE 1 TABLET BY MOUTH TWICE A DAY    fenofibrate 160 MG tablet   No No    TAKE 1  TABLET BY MOUTH EVERY DAY    furosemide (LASIX) 40 MG tablet   No No    TAKE 1 TABLET BY MOUTH EVERY DAY    HYDROcodone-acetaminophen (NORCO) 7.5-325 MG per tablet   Yes No    Take 1 tablet by mouth 3 (Three) Times a Day As Needed.    Insulin Glargine (BASAGLAR KWIKPEN) 100 UNIT/ML injection pen   No No    Inject 32 units subcu nightly    Insulin Lispro, 1 Unit Dial, (HumaLOG KwikPen) 100 UNIT/ML solution pen-injector   No No    Inject 12 Units under the skin into the appropriate area as directed 3 (Three) Times a Day.    Patient taking differently:  Inject 7 Units under the skin into the appropriate area as directed 3 (Three) Times a Day.    Insulin Pen Needle (BD Pen Needle Lorene U/F) 32G X 4 MM misc   No No    Inject insulin 4 times daily / DX E11.65    Janumet XR  MG tablet   No No    TAKE 2 TABLETS BY MOUTH EVERY DAY    lisinopril (PRINIVIL,ZESTRIL) 20 MG tablet   No No    TAKE 1 TABLET BY MOUTH EVERY DAY    magnesium oxide (MAG-OX) 400 MG tablet   No No    Take 1 tablet by mouth 2 (Two) Times a Day.    potassium chloride (MICRO-K) 10 MEQ CR capsule   No No    TAKE 1 CAPSULE BY MOUTH EVERY DAY            Allergies:  Allergies   Allergen Reactions   • Ibuprofen GI Intolerance   • Prednisone Other (See Comments)   • Pregabalin Other (See Comments)     jerking   • Tramadol Other (See Comments)     Legs jerked   • Levofloxacin Other (See Comments)     Increase sunburn   • Sulfamethoxazole-Trimethoprim Swelling       Objective      Vitals:   Temp:  [97.7 °F (36.5 °C)-98.6 °F (37 °C)] 97.7 °F (36.5 °C)  Heart Rate:  [] 88  Resp:  [17-24] 17  BP: (129-159)/() 134/68  Flow (L/min):  [2] 2    Physical Exam  Vitals reviewed.   Constitutional:       Appearance: She is obese.   HENT:      Head: Normocephalic.   Eyes:      Extraocular Movements: Extraocular movements intact.      Conjunctiva/sclera: Conjunctivae normal.      Pupils: Pupils are equal, round, and reactive to light.   Cardiovascular:       Rate and Rhythm: Normal rate. Rhythm irregularly irregular.      Pulses: Normal pulses.   Pulmonary:      Effort: Pulmonary effort is normal.      Breath sounds: Rhonchi present.      Comments: O2 @ 2 L per NC  Abdominal:      General: Bowel sounds are normal. There is no distension.      Palpations: Abdomen is soft.      Tenderness: There is no abdominal tenderness.   Musculoskeletal:         General: Normal range of motion.      Cervical back: Normal range of motion.      Right lower leg: No edema.      Left lower leg: No edema.   Skin:     General: Skin is warm and dry.      Capillary Refill: Capillary refill takes less than 2 seconds.   Neurological:      Mental Status: She is alert and oriented to person, place, and time.   Psychiatric:         Mood and Affect: Mood normal.         Behavior: Behavior normal.          Result Review    Result Review:  I have personally reviewed the results from the time of this admission to 10/5/2021 10:41 EDT and agree with these findings:  [x]  Laboratory  [x]  Microbiology  [x]  Radiology  [x]  EKG/Telemetry   []  Cardiology/Vascular   []  Pathology  [x]  Old records  []  Other:    Most notable findings include:   WBC 12.40, procalcitonin 0.11, K 4.5, Mg 1.4, TSH 1.830, glucose 356, ALT 54, AST 51, dimer 1.48, proBNP 1,170, troponin <0.010    EKG:  A-fib w/ RVR, RBBB,     Covid-19 negative    CT chest:  1. No pulmonary embolism.  2. Mild bilateral upper lobe medial subsegmental atelectasis.  3. Mild to moderate cardiomegaly.  4. Marked diffuse hepatic steatosis.  5. Uncomplicated cholelithiasis.      Assessment/Plan        Active Hospital Problems:  Active Hospital Problems    Diagnosis    • **Atrial fibrillation with RVR (HCC)    • Chest pain, atypical    • Elevated LFTs    • Hypomagnesemia    • Cholelithiasis    • Class 2 obesity due to excess calories without serious comorbidity with body mass index (BMI) of 39.0 to 39.9 in adult    • Chronic diastolic CHF (congestive  heart failure) ([facility])    • Essential hypertension    • Chronic obstructive pulmonary disease (HCC)    • Depression    • Gastroesophageal reflux disease    • Sleep apnea    • Paroxysmal atrial fibrillation ([facility])    • Diabetic peripheral neuropathy ([facility])    • Type 2 diabetes mellitus with hyperglycemia, with long-term current use of insulin ([facility])    • Mixed hyperlipidemia      Plan:     Atrial fibrillation with RVR   Paroxysmal atrial fibrillation   -given Cardizem 20 mg IV bolus in ER and started on continuous drip  -HR now improved, but remains in a-fib; continue Cardizem drip  -Lovenox 1 mg/kg sq q12h, hold Eliquis for now  -cardiology consulted  -obtain 2D echo  -K 4.5, Mg 1.4, TSH 1.830---replace Mg  -continuous cardiac monitoring     Chest pain, atypical  -r/o ACS  -check serial troponin  -EKG:  A-fib, RBBB  -cardiology consulted  -SL NTG PRN pain    Hypomagnesemia  -replace per protocol and monitor     Elevated LFTs  Cholelithiasis  -ALT 54, AST 51 on admission  -obtain gallbladder US  -monitor and trend LFTs    Type 2 diabetes mellitus with hyperglycemia, with long-term current use of insulin, complicated with Diabetic peripheral neuropathy  -check A1c  -accu checks AC&HS with SSI  -diabetic consistent carb diet  -consult diabetes educator   -continue long-acting insulin once home med list verified     Chronic obstructive pulmonary disease  -stable without exacerbation  -DuoNebs PRN  -continue home medication once list verified     Depression  -continue home medication once list verified     Gastroesophageal reflux disease  -continue home medication once list verified     Mixed hyperlipidemia  -continue home medication once list verified     Sleep apnea  -patient reports she wears CPAP with 2 L O2 when sleeping    Essential hypertension, chronic  -continue home medication once list verified   -monitor BP    Chronic diastolic CHF (congestive heart failure)  -continue home medication once list verified     Class  2 obesity due to excess calories without serious comorbidity with body mass index (BMI) of 39.0 to 39.9 in adult  -BMI 39.17 on admission  -encourage lifestyle modifications       DVT prophylaxis:  Medical DVT prophylaxis orders are present.    CODE STATUS:    Code Status: CPR  Medical Interventions (Level of Support Prior to Arrest): Full    Admission Status:  I believe this patient meets inpatient status.    I discussed the patient's findings and my recommendations with patient.    This patient has been examined wearing appropriate Personal Protective Equipment. 10/05/21      Signature: Electronically signed by YAW Kaplan, 10/05/21, 10:56 AM EDT.  Hospitalist Physician Assessment/Plan    History:*Patient seen exam  HPI reviewed with the patient    Exam:**  Regular irregular rhythm.  Diminished air entry bilaterally      Medical Decision Making:  Agree with above plan      Attending Physician Attestation    For this patient encounter, I have reviewed the mid-level provider documentation, medical decision making and treatment plan, and I have personally spent time with the patient.  All procedures were done by me, and/or all procedures were performed by the mid-level under my supervision.    Electronically signed by Rodrigo Go MD at 10/05/21 1305          Emergency Department Notes      Mary Cummings APRN at 10/05/21 0545     Attestation signed by Ronak Hammer MD at 10/06/21 0903          For this patient encounter, I reviewed the NP or PA documentation, treatment plan, and medical decision making. Ronak Hammer MD 10/6/2021 09:03 EDT                  Subjective   66-year-old female with atrial fibrillation complains of moderate right upper chest pain radiating to her back, sharp, worse with movement, deep breath.  Patient denies any cough or fever.  Patient was unaware she was in rapid ventricular response pain onset 9:30 PM at rest.          Review of Systems    Cardiovascular: Positive for chest pain.   All other systems reviewed and are negative.      Past Medical History:   Diagnosis Date   • Arthritis    • Atrial fibrillation (HCC)    • Bradycardia     Dr. Charles   • Cataract    • COPD (chronic obstructive pulmonary disease) ([facility])     Dr. Rob   • Diabetes mellitus, type 2 (HCC)     sees Dr. Archibald at [place]   • DJD (degenerative joint disease)     possible herniated disc, sees Pain Mgmt in [place]   •     • GERD (gastroesophageal reflux disease)    • History of combined right and left heart catheterization 2018    minimal CAD on heart cath done    • Hyperlipidemia    • Hypertension    • Pain    • Sleep apnea     wears CPAP machine, sees Darron       Allergies   Allergen Reactions   • Ibuprofen GI Intolerance   • Prednisone Other (See Comments)   • Pregabalin Other (See Comments)     jerking   • Tramadol Other (See Comments)     Legs jerked   • Levofloxacin Other (See Comments)     Increase sunburn   • Sulfamethoxazole-Trimethoprim Swelling       Past Surgical History:   Procedure Laterality Date   • ABLATION OF DYSRHYTHMIC FOCUS     • CARDIAC CATHETERIZATION      and Angiograph    • CARDIAC ELECTROPHYSIOLOGY PROCEDURE N/A 12/10/2019    Procedure: pvc ablation;  Surgeon: Alfredo Iglesias MD;  Location: [facility] INVASIVE LOCATION;  Service: Cardiovascular   •  SECTION      x 1   • COLON RESECTION     • COLONOSCOPY  2012   • COLOSTOMY      and reversal in her 20's due to problems with childbirth   • COLOSTOMY CLOSURE     • OVARIAN CYST SURGERY     • ROTATOR CUFF REPAIR Left 2009    x2    • TOTAL ABDOMINAL HYSTERECTOMY WITH SALPINGO OOPHORECTOMY         Family History   Problem Relation Age of Onset   • Heart disease Mother    • Hypertension Mother    • Stroke Mother    • Seizures Mother    • Heart disease Father    • Hypertension Father    • Lung disease Father    • Stroke Father    • Cancer Sister    • Hypertension Brother    •  Diabetes Brother    • Hyperlipidemia Other        Social History     Socioeconomic History   • Marital status:      Spouse name: Not on file   • Number of children: Not on file   • Years of education: Not on file   • Highest education level: Not on file   Tobacco Use   • Smoking status: Former Smoker     Packs/day: 1.00     Years: 48.00     Pack years: 48.00     Types: Cigarettes     Quit date: 3/1/2020     Years since quittin.5   • Smokeless tobacco: Never Used   Vaping Use   • Vaping Use: Never used   Substance and Sexual Activity   • Alcohol use: No   • Drug use: No   • Sexual activity: Defer           Objective   Physical Exam  Constitutional:       Appearance: She is well-developed.   HENT:      Head: Normocephalic and atraumatic.      Mouth/Throat:      Mouth: Mucous membranes are moist.      Pharynx: Oropharynx is clear.   Eyes:      Conjunctiva/sclera: Conjunctivae normal.      Pupils: Pupils are equal, round, and reactive to light.   Cardiovascular:      Rate and Rhythm: Tachycardia present.      Heart sounds: Normal heart sounds.   Pulmonary:      Effort: Pulmonary effort is normal.      Breath sounds: Normal breath sounds.      Comments: Right sided chest with normal inspection, there is tenderness to palpation which does reproduce pain  Abdominal:      General: Bowel sounds are normal. There is no distension.      Palpations: Abdomen is soft.      Tenderness: There is no abdominal tenderness.   Musculoskeletal:         General: No swelling or tenderness. Normal range of motion.      Cervical back: Normal range of motion and neck supple.   Skin:     General: Skin is warm and dry.      Capillary Refill: Capillary refill takes less than 2 seconds.   Neurological:      General: No focal deficit present.      Mental Status: She is alert and oriented to person, place, and time.   Psychiatric:         Mood and Affect: Mood normal.         Behavior: Behavior normal.         Procedures      Initial EKG  "was performed at 0501 and showed atrial fibrillation with RVR with a rate of 144 and a right bundle branch block which is old this EKG was reviewed by myself and read by Dr. Hammer    Repeat EKG performed at 0944 shows the patient has a rate of 87 with atrial flutter/atrial fib.  Was also reviewed by myself and read by Dr. Ortega    ED Course  ED Course as of Oct 05 1134   Tue Oct 05, 2021   0511 EKG interpretation: Atrial fibrillation with rapid ventricular response, rate 144, right bundle branch block, no STEMI seen    [JR]   0800 Patient care was assumed from Dr. Hammer for results of CT and disposition    [KW]   0937 CT pulmonary emboli shows no pulmonary emboli no acute findings this was discussed with the patient    [KW]   0946 Patient was discussed with Taina the nurse practitioner with the hospitalist and admitted to Dr. Go    [KW]   1116 Nursing staff performing enema now they were informed to repeat troponin and obtain Covid swab    [KW]   1133 Spoke to Dr. Charles in person and he will consult this patient    [KW]      ED Course User Index  [JR] Ronak Hammer MD  [KW] Mary Cummings D, APRN    /73   Pulse 89   Temp 97.7 °F (36.5 °C)   Resp 17   Ht 162.6 cm (64\")   Wt 104 kg (228 lb 2.8 oz)   SpO2 100%   BMI 39.17 kg/m²   Labs Reviewed   COMPREHENSIVE METABOLIC PANEL - Abnormal; Notable for the following components:       Result Value    Glucose 356 (*)     ALT (SGPT) 54 (*)     AST (SGOT) 51 (*)     eGFR Non African Amer 60 (*)     Anion Gap 17.0 (*)     All other components within normal limits    Narrative:     GFR Normal >60  Chronic Kidney Disease <60  Kidney Failure <15     D-DIMER, QUANTITATIVE - Abnormal; Notable for the following components:    D-Dimer, Quantitative 1.48 (*)     All other components within normal limits    Narrative:     Reference Range  --------------------------------------------------------------------     < 0.50   Negative Predictive Value  0.50-0.59   " Indeterminate    >= 0.60   Probable VTE             A very low percentage of patients with DVT may yield D-Dimer results   below the cut-off of 0.50 mg/L FEU.  This is known to be more   prevalent in patients with distal DVT.             Results of this test should always be interpreted in conjunction with   the patient's medical history, clinical presentation and other   findings.  Clinical diagnosis should not be based on the result of   INNOVANCE D-Dimer alone.   MAGNESIUM - Abnormal; Notable for the following components:    Magnesium 1.4 (*)     All other components within normal limits   CBC WITH AUTO DIFFERENTIAL - Abnormal; Notable for the following components:    WBC 12.40 (*)     RBC 5.50 (*)     Hematocrit 47.6 (*)     Lymphocyte % 17.2 (*)     Neutrophils, Absolute 9.00 (*)     Monocytes, Absolute 1.20 (*)     All other components within normal limits   BNP (IN-HOUSE) - Abnormal; Notable for the following components:    proBNP 1,170.0 (*)     All other components within normal limits    Narrative:     Among patients with dyspnea, NT-proBNP is highly sensitive for the detection of acute congestive heart failure. In addition NT-proBNP of <300 pg/ml effectively rules out acute congestive heart failure with 99% negative predictive value.    Results may be falsely decreased if patient taking Biotin.     COVID-19,CEPHEID/FRANDY/BDMAX,COR/FORTUNATO/PAD/[place]ISON IN-HOUSE,NP SWAB IN TRANSPORT MEDIA 3-4 HR TAT, RT-PCR - Normal    Narrative:     Fact sheet for providers: https://www.fda.gov/media/415740/download     Fact sheet for patients: https://www.fda.gov/media/772535/download  Fact sheet for providers: https://www.fda.gov/media/293550/download     Fact sheet for patients: https://www.fda.gov/media/876959/download   PROTIME-INR - Normal   APTT - Normal   TROPONIN (IN-HOUSE) - Normal    Narrative:     Troponin T Reference Range:  <= 0.03 ng/mL-   Negative for AMI  >0.03 ng/mL-     Abnormal for myocardial necrosis.   "Clinicians would have to utilize clinical acumen, EKG, Troponin and serial changes to determine if it is an Acute Myocardial Infarction or myocardial injury due to an underlying chronic condition.       Results may be falsely decreased if patient taking Biotin.     TSH - Normal   PROCALCITONIN - Normal    Narrative:     As a Marker for Sepsis (Non-Neonates):     1. <0.5 ng/mL represents a low risk of severe sepsis and/or septic shock.  2. >2 ng/mL represents a high risk of severe sepsis and/or septic shock.    As a Marker for Lower Respiratory Tract Infections that require antibiotic therapy:  PCT on Admission     Antibiotic Therapy             6-12 Hrs later  >0.5                          Strongly Recommended            >0.25 - <0.5             Recommended  0.1 - 0.25                  Discouraged                       Remeasure/reassess PCT  <0.1                         Strongly Discouraged         Remeasure/reassess PCT      As 28 day mortality risk marker: \"Change in Procalcitonin Result\" (>80% or <=80%) if Day 0 (or Day 1) and Day 4 values are available. Refer to [email or web address] [email or web address] [email or web address]-pct-calculator.com/    Change in PCT <=80 %   A decrease of PCT levels below or equal to 80% defines a positive change in PCT test result representing a higher risk for 28-day all-cause mortality of patients diagnosed with severe sepsis or septic shock.    Change in PCT >80 %   A decrease of PCT levels of more than 80% defines a negative change in PCT result representing a lower risk for 28-day all-cause mortality of patients diagnosed with severe sepsis or septic shock.              Results may be falsely decreased if patient taking Biotin.    RAINBOW DRAW    Narrative:     The following orders were created for panel order [place] Draw.  Procedure                               Abnormality         Status                     ---------                               -----------         ------                     Green Top (Gel)[[id number]] "                                  Final result               Lavender Top[[id number]]                                     Final result               Gold Top - SST[[id number]]                                   Final result               Light Blue Top[[id number]]                                   Final result                 Please view results for these tests on the individual orders.   TROPONIN (IN-HOUSE)   TROPONIN (IN-HOUSE)   GREEN TOP   LAVENDER TOP   GOLD TOP - SST   LIGHT BLUE TOP   CBC AND DIFFERENTIAL    Narrative:     The following orders were created for panel order CBC & Differential.  Procedure                               Abnormality         Status                     ---------                               -----------         ------                     CBC Auto Differential[[id number]]        Abnormal            Final result                 Please view results for these tests on the individual orders.     Medications   dilTIAZem (CARDIZEM) 100 mg in 100 mL NS infusion (ADV) (5 mg/hr Intravenous Currently Infusing 10/5/21 6001)   nitroglycerin (NITROSTAT) SL tablet 0.4 mg (0.4 mg Sublingual Given 10/5/21 1050)   sodium chloride 0.9 % flush 10 mL (has no administration in time range)   sodium chloride 0.9 % flush 10 mL (has no administration in time range)   acetaminophen (TYLENOL) tablet 650 mg (has no administration in time range)     Or   acetaminophen (TYLENOL) 160 MG/5ML solution 650 mg (has no administration in time range)     Or   acetaminophen (TYLENOL) suppository 650 mg (has no administration in time range)   aluminum-magnesium hydroxide-simethicone (MAALOX MAX) 400-400-40 MG/5ML suspension 15 mL (has no administration in time range)   ondansetron (ZOFRAN) tablet 4 mg (has no administration in time range)     Or   ondansetron (ZOFRAN) injection 4 mg (has no administration in time range)   melatonin tablet 5 mg (has no administration in time range)   enoxaparin (LOVENOX) syringe 100 mg (has no  administration in time range)   HYDROcodone-acetaminophen (NORCO) 7.5-325 MG per tablet 1 tablet (has no administration in time range)   ipratropium-albuterol (DUO-NEB) nebulizer solution 3 mL (has no administration in time range)   dilTIAZem (CARDIZEM) injection 20 mg (20 mg Intravenous Given 10/5/21 0538)   magnesium sulfate in D5W 1g/100mL (PREMIX) (0 g Intravenous Stopped 10/5/21 0753)   morphine injection 2 mg (2 mg Intravenous Given 10/5/21 0811)   ondansetron (ZOFRAN) injection 4 mg (4 mg Intravenous Given 10/5/21 0811)   iopamidol (ISOVUE-370) 76 % injection 100 mL (100 mL Intravenous Given 10/5/21 0834)   HYDROcodone-acetaminophen (NORCO) 7.5-325 MG per tablet 1 tablet (1 tablet Oral Given 10/5/21 0957)     XR Chest 1 View    Result Date: 10/5/2021  Marked cardiomegaly. No acute cardiopulmonary process.   Electronically Signed By-Rudy Lopez DO On:10/5/2021 7:17 AM This report was finalized on [id number] by  Rudy Lopez DO.    CT Chest Pulmonary Embolism    Result Date: 10/5/2021  1. No pulmonary embolism. 2. Mild bilateral upper lobe medial subsegmental atelectasis. 3. Mild to moderate cardiomegaly. 4. Marked diffuse hepatic steatosis. 5. Uncomplicated cholelithiasis.    Electronically Signed By-Emeli Corona MD On:10/5/2021 8:41 AM This report was finalized on [id number] by  Emeli Corona MD.                                           MDM  Number of Diagnoses or Management Options  Atrial fibrillation with RVR (HCC)  Chest pain, unspecified type  Diagnosis management comments: ForPatient had IV established and blood work was obtained initial EKG showed that the patient was in with RVR with a rate of 144 and the patient was started on a Cardizem drip she continued to complain of chest pain and was treated for such.  It was noted that she had an elevated D-dimer and a subsequent CT PE protocol was performed and found to be negative for pulmonary embolus at this time the patient will be  admitted she is on a Cardizem drip and her rate is controlled at around 100 at this time.  She still complaining of chest pain at the time of reevaluation and she will be given her home dose of 7.5 Norco-was also observed that the patient's magnesium was low at 1.4 and Dr. Hammer ordered 2 g of magnesium to be given around 630 this morning.    The patient continued to be stable throughout her emergency room stay she is a patient of Dr. Charles is the cardiologist and I will consult him as well to take over the patient's care    Patient was discussed with Taina the nurse practitioner with the hospitalist and admitted to Dr. Go       Amount and/or Complexity of Data Reviewed  Clinical lab tests: reviewed  Tests in the radiology section of CPT®: reviewed  Tests in the medicine section of CPT®: reviewed    Risk of Complications, Morbidity, and/or Mortality  Presenting problems: low  Diagnostic procedures: low  Management options: low    Patient Progress  Patient progress: improved      Final diagnoses:   Atrial fibrillation with RVR (HCC)   Chest pain, unspecified type       ED Disposition  ED Disposition     ED Disposition Condition Comment    Decision to Admit  Level of Care: Progressive Care [20]   Diagnosis: Atrial fibrillation with RVR (HCC) [583493]   Admitting Physician: ROBERT GO [[id number]]   Attending Physician: ROBERT GO [[id number]]   Bed Request Comments: PCU/QUANG   Certification: I Certify That Inpatient Hospital Services Are Medically Necessary For Greater Than 2 Midnights            No follow-up provider specified.       Medication List      No changes were made to your prescriptions during this visit.          Mary Cummings, APRN  10/05/21 0951       Mary Cummings, APRN  10/05/21 1134      Electronically signed by Ronak Hammer MD at 10/06/21 0903     aYnira Silver, RN at 10/05/21 0814        Pt places on 2L of 02 per provider.      Yanira Silver,  RN  10/05/21 0814      Electronically signed by Yanira Silver RN at 10/05/21 0814       Vital Signs (last day)     Date/Time   Temp   Temp src   Pulse   Resp   BP   Patient Position   SpO2    10/06/21 0539   98.4 (36.9)   Oral   84   16   117/56   Sitting   96    10/06/21 0300   --   --   74   --   137/56   --   97    10/06/21 0200   --   --   74   --   119/61   --   95    10/06/21 0100   --   --   83   --   132/66   --   91    10/06/21 0000   --   --   94   --   143/50   --   91    10/05/21 2300   --   --   89   --   142/71   --   98    10/05/21 2227   98.4 (36.9)   --   81   --   --   --   95    10/05/21 2200   --   --   86   --   126/61   --   (!) 88    10/05/21 2100   --   --   92   --   138/59   --   97    10/05/21 2023   --   --   95   --   (!) 137/117   --   --    10/05/21 2000   --   --   93   --   141/65   --   93    10/05/21 1900   --   --   90   --   130/82   --   93    10/05/21 1825   --   --   --   --   --   --   95    10/05/21 1749   --   --   87   --   --   --   95    10/05/21 1637   --   --   85   --   143/67   --   98    10/05/21 1631   --   --   95   --   143/67   --   --    10/05/21 1600   --   --   86   --   --   --   99    10/05/21 1517   --   --   87   --   152/75   --   --    10/05/21 1458   --   --   96   --   --   --   97    10/05/21 1446   --   --   89   --   128/69   --   --    10/05/21 1431   --   --   88   --   135/76   --   96    10/05/21 1402   --   --   --   --   136/62   --   --    10/05/21 1348   --   --   94   --   --   --   95    10/05/21 1347   --   --   --   --   146/85   --   --    10/05/21 1331   --   --   85   --   139/67   --   98    10/05/21 1316   --   --   83   16   141/64   --   98    10/05/21 1309   --   --   96   --   --   --   100    10/05/21 1246   --   --   93   --   147/61   --   97    10/05/21 1231   --   --   99   --   144/63   --   98    10/05/21 1216   --   --   91   --   137/69   --   99    10/05/21 1146   --   --   --   --   128/70   --   --    10/05/21  1136   --   --   89   --   --   --   100    10/05/21 1123   --   --   89   --   --   --   100    10/05/21 1122   --   --   99   --   --   --   100    10/05/21 1116   --   --   100   --   134/73   --   95    10/05/21 1046   --   --   90   --   134/63   --   98    10/05/21 1001   --   --   88   --   134/68   --   98    10/05/21 0946   --   --   99   --   142/75   --   97    10/05/21 0934   --   --   87   --   --   --   98    10/05/21 0911   --   --   87   --   --   --   100    10/05/21 0850   --   --   90   --   --   --   99    10/05/21 0837   --   --   84   --   --   --   100    10/05/21 0814   --   --   --   --   --   --   99    10/05/21 0813   --   --   88   --   --   --   99    10/05/21 0801   --   --   94   --   129/73   --   96    10/05/21 07:53:55   --   --   --   17   --   --   --    10/05/21 0743   --   --   78   --   --   --   97    10/05/21 0731   --   --   80   --   130/60   --   97    10/05/21 0716   --   --   94   --   148/63   --   96    10/05/21 0702   --   --   88   --   137/77   --   98    10/05/21 0646   --   --   100   --   139/66   --   95    10/05/21 06:45:40   97.7 (36.5)   --   81   18   140/70   Sitting   95    10/05/21 0601   --   --   94   19   149/73   --   97    10/05/21 0538   --   --   (!) 130   --   149/95   --   --    10/05/21 0452   98.6 (37)   Oral   (!) 144   24   159/100   Sitting   96                Facility-Administered Medications as of 10/6/2021   Medication Dose Route Frequency Provider Last Rate Last Admin   • acetaminophen (TYLENOL) tablet 650 mg  650 mg Oral Q4H PRN Taina Santos APRN   650 mg at 10/05/21 1501    Or   • acetaminophen (TYLENOL) 160 MG/5ML solution 650 mg  650 mg Oral Q4H PRN Taina Santos, YAW        Or   • acetaminophen (TYLENOL) suppository 650 mg  650 mg Rectal Q4H PRN Taina Santos APRN       • aluminum-magnesium hydroxide-simethicone (MAALOX MAX) 400-400-40 MG/5ML suspension 15 mL  15 mL Oral Q6H PRN Taina Santos, YAW       • apixaban (ELIQUIS)  tablet 5 mg  5 mg Oral Q12H Rodrigo Go MD   5 mg at 10/05/21 2226   • atorvastatin (LIPITOR) tablet 20 mg  20 mg Oral Daily Rodrigo Go MD   20 mg at 10/06/21 0904   • dilTIAZem (CARDIZEM) 100 mg in 100 mL NS infusion (ADV)  5-15 mg/hr Intravenous Titrated Ronak Hammer MD 5 mL/hr at 10/06/21 0338 5 mg/hr at 10/06/21 0338   • [COMPLETED] dilTIAZem (CARDIZEM) injection 20 mg  20 mg Intravenous Once Ronak Hammer MD   20 mg at 10/05/21 0538   • dilTIAZem CD (CARDIZEM CD) 24 hr capsule 120 mg  120 mg Oral Q24H Jesse Charles MD   120 mg at 10/06/21 0904   • furosemide (LASIX) tablet 40 mg  40 mg Oral Daily Rodrigo Go MD   40 mg at 10/06/21 0904   • [COMPLETED] HYDROcodone-acetaminophen (NORCO) 7.5-325 MG per tablet 1 tablet  1 tablet Oral Once Mary Cummings APRN   1 tablet at 10/05/21 0957   • HYDROcodone-acetaminophen (NORCO) 7.5-325 MG per tablet 1 tablet  1 tablet Oral Q8H PRN Taina Santos APRN   1 tablet at 10/06/21 0454   • influenza vac split quad (FLUZONE,FLUARIX,AFLURIA,FLULAVAL) injection 0.5 mL  0.5 mL Intramuscular During Hospitalization Rodrigo Go MD       • insulin glargine (LANTUS, SEMGLEE) injection 32 Units  32 Units Subcutaneous Nightly Rodrigo Go MD   32 Units at 10/05/21 2022   • [COMPLETED] iopamidol (ISOVUE-370) 76 % injection 100 mL  100 mL Intravenous Once in imaging Ronak Hammer MD   100 mL at 10/05/21 0834   • ipratropium-albuterol (DUO-NEB) nebulizer solution 3 mL  3 mL Nebulization Q4H PRN Taina Santos APRN       • lisinopril (PRINIVIL,ZESTRIL) tablet 20 mg  20 mg Oral Daily Rodrigo Go MD   20 mg at 10/06/21 0904   • [COMPLETED] magnesium sulfate in D5W 1g/100mL (PREMIX)  1 g Intravenous Once [place], Ronak BOYKIN MD   Stopped at 10/05/21 0753   • melatonin tablet 5 mg  5 mg Oral Nightly PRN Taina Santos APRN       • metoprolol tartrate  (LOPRESSOR) tablet 25 mg  25 mg Oral Q12H Jesse Charles MD   25 mg at 10/06/21 0904   • [COMPLETED] morphine injection 2 mg  2 mg Intravenous Once Ronak Hammer MD   2 mg at 10/05/21 0811   • nitroglycerin (NITROSTAT) SL tablet 0.4 mg  0.4 mg Sublingual Q5 Min PRN Santos, Taina, APRN   0.4 mg at 10/05/21 1050   • [COMPLETED] ondansetron (ZOFRAN) injection 4 mg  4 mg Intravenous Once Ronak Hammer MD   4 mg at 10/05/21 0811   • ondansetron (ZOFRAN) tablet 4 mg  4 mg Oral Q6H PRN Santos, Taina, APRN        Or   • ondansetron (ZOFRAN) injection 4 mg  4 mg Intravenous Q6H PRN Santos, Taina, APRN       • potassium chloride (K-DUR,KLOR-CON) CR tablet 10 mEq  10 mEq Oral Daily Rodrigo Go MD   10 mEq at 10/06/21 0904   • sodium chloride 0.9 % flush 10 mL  10 mL Intravenous Q12H Santos, Taina, APRN   10 mL at 10/06/21 0904   • sodium chloride 0.9 % flush 10 mL  10 mL Intravenous PRN Santos, Taina, APRN           Lab Results (last 24 hours)     Procedure Component Value Units Date/Time    POC Glucose Once [[id number]]  (Abnormal) Collected: 10/06/21 0735    Specimen: Blood Updated: 10/06/21 0737     Glucose 297 mg/dL      Comment: Serial Number: [id number]:  [id number]       Basic Metabolic Panel [[id number]]  (Abnormal) Collected: 10/06/21 0448    Specimen: Blood Updated: 10/06/21 0549     Glucose 310 mg/dL      BUN 18 mg/dL      Creatinine 0.78 mg/dL      Sodium 136 mmol/L      Potassium 3.7 mmol/L      Chloride 99 mmol/L      CO2 22.0 mmol/L      Calcium 9.5 mg/dL      eGFR Non African Amer 74 mL/min/1.73      BUN/Creatinine Ratio 23.1     Anion Gap 15.0 mmol/L     Narrative:      GFR Normal >60  Chronic Kidney Disease <60  Kidney Failure <15      Hepatic Function Panel [[id number]]  (Abnormal) Collected: 10/06/21 0448    Specimen: Blood Updated: 10/06/21 0549     Total Protein 7.7 g/dL      Albumin 4.00 g/dL      ALT (SGPT) 39 U/L      AST (SGOT) 31 U/L      Alkaline Phosphatase 75 U/L       Total Bilirubin 1.1 mg/dL      Bilirubin, Direct 0.2 mg/dL      Bilirubin, Indirect 0.9 mg/dL     CBC Auto Differential [[id number]]  (Abnormal) Collected: 10/06/21 0448    Specimen: Blood Updated: 10/06/21 0538     WBC 9.20 10*3/mm3      RBC 4.88 10*6/mm3      Hemoglobin 14.1 g/dL      Hematocrit 42.5 %      MCV 87.0 fL      MCH 28.9 pg      MCHC 33.2 g/dL      RDW 15.5 %      RDW-SD 47.7 fl      MPV 7.9 fL      Platelets 284 10*3/mm3      Neutrophil % 50.3 %      Lymphocyte % 33.6 %      Monocyte % 14.5 %      Eosinophil % 1.1 %      Basophil % 0.5 %      Neutrophils, Absolute 4.60 10*3/mm3      Lymphocytes, Absolute 3.10 10*3/mm3      Monocytes, Absolute 1.30 10*3/mm3      Eosinophils, Absolute 0.10 10*3/mm3      Basophils, Absolute 0.00 10*3/mm3      nRBC 0.1 /100 WBC         Imaging Results (Last 24 Hours)     ** No results found for the last 24 hours. **        ECG/EMG Results (last 24 hours)     Procedure Component Value Units Date/Time    Adult Transthoracic Echo Complete W/ Cont if Necessary Per Protocol [[id number]] Collected: 10/05/21 1636     Updated: 10/05/21 1801     BSA 2.1 m^2      RVIDd 3.9 cm      IVSd 1.0 cm      LVIDd 5.6 cm      LVIDs 4.0 cm      LVPWd 1.3 cm      IVS/LVPW 0.75     FS 28.3 %      EDV(Teich) 152.1 ml      ESV(Teich) 69.9 ml      EF(Teich) 54.0 %      EDV(cubed) 173.3 ml      ESV(cubed) 63.9 ml      EF(cubed) 63.1 %      LV mass(C)d 266.0 grams      LV mass(C)dI 128.6 grams/m^2      SV(Teich) 82.2 ml      SI(Teich) 39.7 ml/m^2      SV(cubed) 109.4 ml      SI(cubed) 52.9 ml/m^2      Ao root diam 2.0 cm      Ao root area 3.0 cm^2      asc Aorta Diam 3.3 cm      LVOT diam 1.9 cm      LVOT area 2.8 cm^2      EDV(MOD-sp4) 101.3 ml      ESV(MOD-sp4) 45.8 ml      EF(MOD-sp4) 54.8 %      EDV(MOD-sp2) 93.8 ml      ESV(MOD-sp2) 45.2 ml      EF(MOD-sp2) 51.8 %      SV(MOD-sp4) 55.5 ml      SI(MOD-sp4) 26.8 ml/m^2      SV(MOD-sp2) 48.6 ml      SI(MOD-sp2) 23.5 ml/m^2      Ao root area (BSA  corrected) 0.95     LV Anthony Vol (BSA corrected) 49.0 ml/m^2      LV Sys Vol (BSA corrected) 22.1 ml/m^2      Aortic R-R 0.8 sec      Aortic HR 75.3 BPM      MV E max terese 117.9 cm/sec      MV V2 max 135.6 cm/sec      MV max PG 7.4 mmHg      MV V2 mean 75.6 cm/sec      MV mean PG 3.0 mmHg      MV V2 VTI 23.5 cm      MVA(VTI) 2.3 cm^2      MV dec time 0.18 sec      Ao pk terese 163.9 cm/sec      Ao max PG 10.7 mmHg      Ao max PG (full) 5.9 mmHg      Ao V2 mean 104.7 cm/sec      Ao mean PG 5.2 mmHg      Ao mean PG (full) 3.1 mmHg      Ao V2 VTI 30.2 cm      NICOLA(I,A) 1.8 cm^2      NICOLA(I,D) 1.8 cm^2      NICOLA(V,A) 1.9 cm^2      NICOLA(V,D) 1.9 cm^2      AI max terese 453.0 cm/sec      AI max PG 82.1 mmHg      AI dec slope 368.3 cm/sec^2      AI dec time 1.2 sec      AI P1/2t 360.2 msec      LV V1 max PG 4.9 mmHg      LV V1 mean PG 2.1 mmHg      LV V1 max 110.6 cm/sec      LV V1 mean 65.3 cm/sec      LV V1 VTI 19.4 cm      CO(Ao) 6.8 l/min      CI(Ao) 3.3 l/min/m^2      SV(Ao) 90.8 ml      SI(Ao) 43.9 ml/m^2      CO(LVOT) 4.0 l/min      CI(LVOT) 1.9 l/min/m^2      SV(LVOT) 53.5 ml      SI(LVOT) 25.9 ml/m^2      PA V2 max 122.3 cm/sec      PA max PG 6.0 mmHg      PA max PG (full) 3.2 mmHg      PA V2 mean 71.8 cm/sec      PA mean PG 2.6 mmHg      PA mean PG (full) 1.1 mmHg      PA V2 VTI 25.4 cm      PA acc time 0.11 sec      RV V1 max PG 2.7 mmHg      RV V1 mean PG 1.5 mmHg      RV V1 max 82.9 cm/sec      RV V1 mean 55.6 cm/sec      RV V1 VTI 17.6 cm      TR max terese 283.2 cm/sec      RVSP(TR) 35.1 mmHg      RAP systole 3.0 mmHg      PA pr(Accel) 28.9 mmHg       CV ECHO MARYURI - BZI_BMI 39.1 kilograms/m^2       CV ECHO MARYURI - BSA(HAYCOCK) 2.2 m^2       CV ECHO MARY[place] - BZI_METRIC_WEIGHT 103.4 kg       CV ECHO MARYURI - BZI_METRIC_HEIGHT 162.6 cm      EF(MOD-bp) 53.0 %      LA dimension(2D) 4.5 cm     ECG 12 Lead [[id number]] Collected: 10/05/21 0944     Updated: 10/06/21 0640     QT Interval 435 ms     Narrative:      HEART RATE= 87   bpm  RR Interval= 686  ms  OH Interval=   ms  P Horizontal Axis=   deg  P Front Axis=   deg  QRSD Interval= 168  ms  QT Interval= 435  ms  QRS Axis= -84  deg  T Wave Axis= -47  deg  - ABNORMAL ECG -  Atrial flutter/fibrillation  Right bundle branch block  Left anterior fascicular block  When compared with ECG of 05-Oct-2021 5:01:31,  Significant change in rhythm  New conduction abnormality  Electronically Signed By: Rhys Ortega ([facility]) 06-Oct-2021 06:39:56  Date and Time of Study: 2021-10-05 09:44:48             Consult Notes (last 24 hours) (Notes from 10/05/21 0930 through 10/06/21 0930)      Jesse Charles MD at 10/05/21 6846      Consult Orders    1. Cardiology (on-call MD unless specified) [[id number]] ordered by Mary Cummings APRN at 10/05/21 0940               Referring Provider: Dr. Go    Attending: Rodrigo Go *    Reason for Consultation:    Chest pain  Shortness of breath  Atrial fibrillation with rapid ventricular response  Hypertension  Hyperlipidemia  Diabetes mellitus      Chief complaint:    Chest discomfort  Palpitation  Shortness of breath       History of present illness:     Patient is 66-year-old white gentleman whose past medical history is significant for hypertension, hyperlipidemia, COPD, diabetes mellitus, persistent atrial fibrillation, who is admitted with symptom of chest pain and shortness of breath and palpitation.    Patient reports that she woke up with chest pain and palpitation.  She was short of breath.  She came to the hospital.  Her heart rate was noted to be elevated.  She is started on intravenous Cardizem and rate is relatively improved.    In 2017, patient noted to have ventricular bigeminy and frequent PVC.  Patient underwent Holter monitor and was noted to have PVCs greater than 30,000.  Patient underwent stress test which was negative.  In 2018, patient was again admitted with palpitation and dizziness and underwent cardiac catheterization which showed  no significant CAD.  In 2019 patient underwent EP study and possible ablation of PVCs but it was unsuccessful.  Patient was started on Sectral 200 mg twice daily.  Patient has sleep apnea and uses CPAP.  Subsequently patient developed atrial fibrillation which was initially paroxysmal but subsequently it was persistent.  Patient is recommended to have rate control and anticoagulation with Eliquis.    Review of Systems  Review of Systems   Constitutional: Negative for chills and fever.   HENT: Negative for ear discharge and nosebleeds.    Eyes: Negative for discharge and redness.   Cardiovascular: Positive for chest pain and palpitations. Negative for orthopnea, paroxysmal nocturnal dyspnea and syncope.   Respiratory: Positive for shortness of breath. Negative for cough and wheezing.    Endocrine: Negative for heat intolerance.   Skin: Negative for rash.   Musculoskeletal: Positive for arthritis and joint pain. Negative for myalgias.   Gastrointestinal: Negative for abdominal pain, melena, nausea and vomiting.   Genitourinary: Negative for dysuria and hematuria.   Neurological: Negative for dizziness, light-headedness, numbness and tremors.   Psychiatric/Behavioral: Negative for depression. The patient is not nervous/anxious.        Past Medical History  Past Medical History:   Diagnosis Date   • Arthritis    • Atrial fibrillation (HCC)    • Bradycardia     Dr. Charles   • Cataract    • COPD (chronic obstructive pulmonary disease) ([facility])     Dr. Rob   • Diabetes mellitus, type 2 ([facility])     sees Dr. Archibald at [place]   • DJD (degenerative joint disease)     possible herniated disc, sees Pain Mgmt in [place]   •     • GERD (gastroesophageal reflux disease)    • History of combined right and left heart catheterization 2018    minimal CAD on heart cath done    • Hyperlipidemia    • Hypertension    • Pain    • Sleep apnea     wears CPAP machine, flora Rob    and   Past Surgical History:   Procedure Laterality  "Date   • ABLATION OF DYSRHYTHMIC FOCUS     • CARDIAC CATHETERIZATION  2018    and Angiograph    • CARDIAC ELECTROPHYSIOLOGY PROCEDURE N/A 12/10/2019    Procedure: pvc ablation;  Surgeon: Alfredo Iglesias MD;  Location: [facility] INVASIVE LOCATION;  Service: Cardiovascular   •  SECTION      x 1   • COLON RESECTION     • COLONOSCOPY  2012   • COLOSTOMY      and reversal in her 20's due to problems with childbirth   • COLOSTOMY CLOSURE     • OVARIAN CYST SURGERY     • ROTATOR CUFF REPAIR Left 2009    x2    • TOTAL ABDOMINAL HYSTERECTOMY WITH SALPINGO OOPHORECTOMY         Family History  Family History   Problem Relation Age of Onset   • Heart disease Mother    • Hypertension Mother    • Stroke Mother    • Seizures Mother    • Heart disease Father    • Hypertension Father    • Lung disease Father    • Stroke Father    • Cancer Sister    • Hypertension Brother    • Diabetes Brother    • Hyperlipidemia Other        Social History  Social History     Socioeconomic History   • Marital status:      Spouse name: Not on file   • Number of children: Not on file   • Years of education: Not on file   • Highest education level: Not on file   Tobacco Use   • Smoking status: Former Smoker     Packs/day: 1.00     Years: 48.00     Pack years: 48.00     Types: Cigarettes     Quit date: 3/1/2020     Years since quittin.5   • Smokeless tobacco: Never Used   Vaping Use   • Vaping Use: Never used   Substance and Sexual Activity   • Alcohol use: No   • Drug use: No   • Sexual activity: Defer       Objective     Physical Exam:    Vital Signs  Vitals:    10/05/21 1517 10/05/21 1600 10/05/21 1631 10/05/21 1637   BP: 152/75  143/67 143/67   BP Location:       Patient Position:       Pulse: 87 86 95 85   Resp:       Temp:       TempSrc:       SpO2:  99%  98%   Weight:    103 kg (228 lb)   Height:    162.6 cm (64\")       Weight  Flowsheet Rows      First Filed Value   Admission Height  162.6 cm (64\") Documented " at 10/05/2021 0452   Admission Weight  104 kg (228 lb 2.8 oz) Documented at 10/05/2021 0452           Constitutional:       Appearance: Well-developed.   Eyes:      General: No scleral icterus.        Right eye: No discharge.   HENT:      Head: Normocephalic and atraumatic.   Neck:      Thyroid: No thyromegaly.      Lymphadenopathy: No cervical adenopathy.   Pulmonary:      Effort: Pulmonary effort is normal. No respiratory distress.      Breath sounds: Normal breath sounds. No wheezing. No rales.   Cardiovascular:      Normal rate. Irregularly irregular rhythm.      No gallop.   Edema:     Peripheral edema absent.   Abdominal:      Tenderness: There is no abdominal tenderness.   Skin:     Findings: No erythema or rash.   Neurological:      Mental Status: Alert and oriented to person, place, and time.         Results Review:  Lab Results (last 24 hours)     Procedure Component Value Units Date/Time    Troponin [[id number]]  (Normal) Collected: 10/05/21 1209    Specimen: Blood Updated: 10/05/21 1243     Troponin T <0.010 ng/mL     Narrative:      Troponin T Reference Range:  <= 0.03 ng/mL-   Negative for AMI  >0.03 ng/mL-     Abnormal for myocardial necrosis.  Clinicians would have to utilize clinical acumen, EKG, Troponin and serial changes to determine if it is an Acute Myocardial Infarction or myocardial injury due to an underlying chronic condition.       Results may be falsely decreased if patient taking Biotin.      Comprehensive Metabolic Panel [[id number]]  (Abnormal) Collected: 10/05/21 0519    Specimen: Blood Updated: 10/05/21 0705     Glucose 356 mg/dL      BUN 18 mg/dL      Creatinine 0.94 mg/dL      Sodium 138 mmol/L      Potassium 4.5 mmol/L      Comment: Slight hemolysis detected by analyzer. Results may be affected.        Chloride 99 mmol/L      CO2 22.0 mmol/L      Calcium 10.4 mg/dL      Total Protein 8.2 g/dL      Albumin 4.40 g/dL      ALT (SGPT) 54 U/L      AST (SGOT) 51 U/L      Alkaline  Phosphatase 81 U/L      Total Bilirubin 0.8 mg/dL      eGFR Non African Amer 60 mL/min/1.73      Globulin 3.8 gm/dL      A/G Ratio 1.2 g/dL      BUN/Creatinine Ratio 19.1     Anion Gap 17.0 mmol/L     Narrative:      GFR Normal >60  Chronic Kidney Disease <60  Kidney Failure <15      Troponin [[id number]]  (Normal) Collected: 10/05/21 0519    Specimen: Blood Updated: 10/05/21 0705     Troponin T <0.010 ng/mL     Narrative:      Troponin T Reference Range:  <= 0.03 ng/mL-   Negative for AMI  >0.03 ng/mL-     Abnormal for myocardial necrosis.  Clinicians would have to utilize clinical acumen, EKG, Troponin and serial changes to determine if it is an Acute Myocardial Infarction or myocardial injury due to an underlying chronic condition.       Results may be falsely decreased if patient taking Biotin.      aPTT [[id number]]  (Normal) Collected: 10/05/21 0519    Specimen: Blood Updated: 10/05/21 0702     PTT 29.0 seconds     Protime-INR [[id number]]  (Normal) Collected: 10/05/21 0519    Specimen: Blood Updated: 10/05/21 0702     Protime 11.4 Seconds      INR 1.03    D-dimer, Quantitative [[id number]]  (Abnormal) Collected: 10/05/21 0519    Specimen: Blood Updated: 10/05/21 0702     D-Dimer, Quantitative 1.48 mg/L (FEU)     Narrative:      Reference Range  --------------------------------------------------------------------     < 0.50   Negative Predictive Value  0.50-0.59   Indeterminate    >= 0.60   Probable VTE             A very low percentage of patients with DVT may yield D-Dimer results   below the cut-off of 0.50 mg/L FEU.  This is known to be more   prevalent in patients with distal DVT.             Results of this test should always be interpreted in conjunction with   the patient's medical history, clinical presentation and other   findings.  Clinical diagnosis should not be based on the result of   [place] D-Dimer alone.    CBC & Differential [[id number]]  (Abnormal) Collected: 10/05/21 0519    Specimen: Blood  Updated: 10/05/21 0653    Narrative:      The following orders were created for panel order CBC & Differential.  Procedure                               Abnormality         Status                     ---------                               -----------         ------                     CBC Auto Differential[[id number]]        Abnormal            Final result                 Please view results for these tests on the individual orders.    CBC Auto Differential [[id number]]  (Abnormal) Collected: 10/05/21 0519    Specimen: Blood Updated: 10/05/21 0653     WBC 12.40 10*3/mm3      RBC 5.50 10*6/mm3      Hemoglobin 15.8 g/dL      Hematocrit 47.6 %      MCV 86.6 fL      MCH 28.7 pg      MCHC 33.2 g/dL      RDW 15.1 %      RDW-SD 45.1 fl      MPV 9.1 fL      Platelets 323 10*3/mm3      Neutrophil % 72.5 %      Lymphocyte % 17.2 %      Monocyte % 9.4 %      Eosinophil % 0.5 %      Basophil % 0.4 %      Neutrophils, Absolute 9.00 10*3/mm3      Lymphocytes, Absolute 2.10 10*3/mm3      Monocytes, Absolute 1.20 10*3/mm3      Eosinophils, Absolute 0.10 10*3/mm3      Basophils, Absolute 0.00 10*3/mm3      nRBC 0.0 /100 WBC     COVID-19,CEPHEID/FRANDY/BDMAX,COR/FORTUNATO/PAD/[place] IN-HOUSE(OR EMERGENT/ADD-ON),NP SWAB IN TRANSPORT MEDIA 3-4 HR TAT, RT-PCR - Swab, Nasopharynx [[id number]]  (Normal) Collected: 10/05/21 0505    Specimen: Swab from Nasopharynx Updated: 10/05/21 0643     COVID19 Not Detected    Narrative:      Fact sheet for providers: https://www.fda.gov/media/067975/download     Fact sheet for patients: https://www.fda.gov/media/419269/download  Fact sheet for providers: https://www.fda.gov/media/818279/download     Fact sheet for patients: https://www.fda.gov/media/756952/download    [facility] Draw [[id number]] Collected: 10/05/21 0519    Specimen: Blood Updated: 10/05/21 0630    Narrative:      The following orders were created for panel order [facility] Draw.  Procedure                               Abnormality         Status          "            ---------                               -----------         ------                     Green Top (Gel)[[id number]]                                  Final result               Lavender Top[[id number]]                                     Final result               Gold Top - SST[[id number]]                                   Final result               Light Blue Top[[id number]]                                   Final result                 Please view results for these tests on the individual orders.    Lavender Top [[id number]] Collected: 10/05/21 0519    Specimen: Blood Updated: 10/05/21 0630     Extra Tube hold for add-on     Comment: Auto resulted       Gold Top - SST [[id number]] Collected: 10/05/21 0519    Specimen: Blood Updated: 10/05/21 0630     Extra Tube Hold for add-ons.     Comment: Auto resulted.       Light Blue Top [[id number]] Collected: 10/05/21 0519    Specimen: Blood Updated: 10/05/21 0630     Extra Tube hold for add-on     Comment: Auto resulted       Procalcitonin [[id number]]  (Normal) Collected: 10/05/21 0519    Specimen: Blood Updated: 10/05/21 0627     Procalcitonin 0.11 ng/mL     Narrative:      As a Marker for Sepsis (Non-Neonates):     1. <0.5 ng/mL represents a low risk of severe sepsis and/or septic shock.  2. >2 ng/mL represents a high risk of severe sepsis and/or septic shock.    As a Marker for Lower Respiratory Tract Infections that require antibiotic therapy:  PCT on Admission     Antibiotic Therapy             6-12 Hrs later  >0.5                          Strongly Recommended            >0.25 - <0.5             Recommended  0.1 - 0.25                  Discouraged                       Remeasure/reassess PCT  <0.1                         Strongly Discouraged         Remeasure/reassess PCT      As 28 day mortality risk marker: \"Change in Procalcitonin Result\" (>80% or <=80%) if Day 0 (or Day 1) and Day 4 values are available. Refer to " http://www.[facility]-pct-calculator.com/    Change in PCT <=80 %   A decrease of PCT levels below or equal to 80% defines a positive change in PCT test result representing a higher risk for 28-day all-cause mortality of patients diagnosed with severe sepsis or septic shock.    Change in PCT >80 %   A decrease of PCT levels of more than 80% defines a negative change in PCT result representing a lower risk for 28-day all-cause mortality of patients diagnosed with severe sepsis or septic shock.              Results may be falsely decreased if patient taking Biotin.     Green Top (Gel) [[id number]] Collected: 10/05/21 0519    Specimen: Blood Updated: 10/05/21 0625     Extra Tube done    BNP [[id number]]  (Abnormal) Collected: 10/05/21 0519    Specimen: Blood Updated: 10/05/21 0619     proBNP 1,170.0 pg/mL     Narrative:      Among patients with dyspnea, NT-proBNP is highly sensitive for the detection of acute congestive heart failure. In addition NT-proBNP of <300 pg/ml effectively rules out acute congestive heart failure with 99% negative predictive value.    Results may be falsely decreased if patient taking Biotin.      TSH [[id number]]  (Normal) Collected: 10/05/21 0519    Specimen: Blood Updated: 10/05/21 0600     TSH 1.830 uIU/mL     Magnesium [[id number]]  (Abnormal) Collected: 10/05/21 0519    Specimen: Blood Updated: 10/05/21 0549     Magnesium 1.4 mg/dL         Imaging Results (Last 72 Hours)     Procedure Component Value Units Date/Time    US Abdomen Limited [[id number]] Collected: 10/05/21 1203     Updated: 10/05/21 1205    Narrative:      US ABDOMEN LIMITED-     Date of Exam: 10/5/2021 10:54 AM     Indication: elevated LFTs, atypical chest pain; I48.91-Unspecified  atrial fibrillation; R07.9-Chest pain, unspecified.     Comparison: None available.     Technique: Transverse and sagittal ultrasound images of the right upper  quadrant were obtained. Doppler evaluation was also conducted.     FINDINGS:  Visualized  portions of the head and body of the pancreas are normal.  Evaluation is limited due to surrounding bowel gas.     Liver has homogenous echogenicity and normal echotexture.  No focal  hepatic lesions.  Portal and hepatic veins are patent and have normal  flow direction and waveforms.      Gallbladder is normal in caliber with no evidence of stones, wall  thickening or pericholecystic fluid. Negative sonographic Mauricio's sign.     The biliary system is nondilated.      The right kidney is normal in size with no evidence of hydronephrosis.  No suspicious focal lesions.        Impression:      No acute sonographic abnormality within the right upper quadrant.     Electronically Signed By-Rudy Lopez DO On:10/5/2021 12:03 PM  This report was finalized on [id number] by  Rudy Lopez DO.    CT Chest Pulmonary Embolism [[id number]] Collected: 10/05/21 0838     Updated: 10/05/21 0843    Narrative:      CT CHEST PULMONARY EMBOLISM-     Date of Exam: 10/5/2021 8:24 AM     Indication: cp.     Comparison: AP portable chest 10/5/2021. No prior CT chest for  comparison.     Technique: Serial and axial CT images of the chest were obtained  following the uneventful intravenous administration of 100 cc Isovue-370  contrast. Reconstructions in the coronal and sagittal planes were also  performed.  Automated exposure control and iterative reconstruction  methods were used.     FINDINGS:     There is no pulmonary embolism. There is no thoracic aortic aneurysm or  aortic dissection. There is mild to moderate cardiac enlargement. No  pericardial effusion or pleural effusion is seen. Trace fluid is seen  within the pericardial recesses. There are no pathologically enlarged  nodes. Benign calcified lymph node is seen within the precarinal region.     Mild subsegmental atelectasis is demonstrated medially within the upper  lobes. No dense lung consolidations are identified. There is no abnormal  bronchial wall thickening or  bronchiectasis.     Thyroid gland is within normal limits. The liver is markedly and  diffusely steatotic. Small gallstones are present. Nonspecific  low-density lesion in the left upper renal pole measures 7 mm,  statistically favored to represent a cyst. The remainder of included  upper abdominal organs are within normal limits.     No acute osseous abnormalities identified.          Impression:      1. No pulmonary embolism.  2. Mild bilateral upper lobe medial subsegmental atelectasis.  3. Mild to moderate cardiomegaly.  4. Marked diffuse hepatic steatosis.  5. Uncomplicated cholelithiasis.           Electronically Signed By-Emeli Corona MD On:10/5/2021 8:41 AM  This report was finalized on [id number] by  Emeli Corona MD.    XR Chest 1 View [[id number]] Collected: 10/05/21 0716     Updated: 10/05/21 0719    Narrative:      XR CHEST 1 VW-     Date of Exam: 10/5/2021 5:15 AM     Indication: cp  68-year-old, history of atrial fibrillation, chest pain,  COPD, shortness of breath     Comparison: Portable chest dated 3/10/2020 and 1/31/2017     Technique: 1 view(s) of the chest were obtained.     FINDINGS: There is marked cardiomegaly not significantly changed  given differences in technical factors. Mediastinal and hilar  silhouettes are unremarkable. The lungs are grossly clear. No  significant pneumothorax, pleural effusion or focal pulmonary  parenchymal opacity. Pulmonary vascularity is within normal limits. The  trachea is midline. Visualized bony structures are intact.       Impression:      Marked cardiomegaly. No acute cardiopulmonary process.        Electronically Signed By-Rudy Lopez DO On:10/5/2021 7:17 AM  This report was finalized on [id number] by  Rudy Lopez DO.        ECG 12 Lead   Preliminary Result   HEART RATE= 87  bpm   RR Interval= 686  ms   OH Interval=   ms   P Horizontal Axis=   deg   P Front Axis=   deg   QRSD Interval= 168  ms   QT Interval= 435  ms   QRS Axis= -84  deg   T  Wave Axis= -47  deg   - ABNORMAL ECG -   Atrial flutter/fibrillation   Right bundle branch block   Left anterior fascicular block   When compared with ECG of 05-Oct-2021 5:01:31,   Significant change in rhythm   New conduction abnormality   Electronically Signed By:    Date and Time of Study: 2021-10-05 09:44:48      ECG 12 Lead   Preliminary Result   HEART RATE= 144  bpm   RR Interval= 415  ms   PA Interval=   ms   P Horizontal Axis=   deg   P Front Axis=   deg   QRSD Interval= 154  ms   QT Interval= 330  ms   QRS Axis= -121  deg   T Wave Axis= -7  deg   - ABNORMAL ECG -   Atrial fibrillation   Right bundle branch block   Electronically Signed By:    Date and Time of Study: 2021-10-05 05:01:31        CBC    Results from last 7 days   Lab Units 10/05/21  0519   WBC 10*3/mm3 12.40*   HEMOGLOBIN g/dL 15.8   PLATELETS 10*3/mm3 323     BMP   Results from last 7 days   Lab Units 10/05/21  0519   SODIUM mmol/L 138   POTASSIUM mmol/L 4.5   CHLORIDE mmol/L 99   CO2 mmol/L 22.0   BUN mg/dL 18   CREATININE mg/dL 0.94   GLUCOSE mg/dL 356*   MAGNESIUM mg/dL 1.4*     CMP   Results from last 7 days   Lab Units 10/05/21  0519   SODIUM mmol/L 138   POTASSIUM mmol/L 4.5   CHLORIDE mmol/L 99   CO2 mmol/L 22.0   BUN mg/dL 18   CREATININE mg/dL 0.94   GLUCOSE mg/dL 356*   ALBUMIN g/dL 4.40   BILIRUBIN mg/dL 0.8   ALK PHOS U/L 81   AST (SGOT) U/L 51*   ALT (SGPT) U/L 54*     Medication Review  Scheduled Meds:dilTIAZem CD, 120 mg, Oral, Q24H  enoxaparin, 1 mg/kg, Subcutaneous, Q12H  metoprolol tartrate, 25 mg, Oral, Q12H  sodium chloride, 10 mL, Intravenous, Q12H      Continuous Infusions:dilTIAZem, 5-15 mg/hr, Last Rate: 10 mg/hr (10/05/21 1451)      PRN Meds:.•  acetaminophen **OR** acetaminophen **OR** acetaminophen  •  aluminum-magnesium hydroxide-simethicone  •  HYDROcodone-acetaminophen  •  ipratropium-albuterol  •  melatonin  •  nitroglycerin  •  ondansetron **OR** ondansetron  •  sodium chloride    Assessment:      Atrial  fibrillation with RVR (HCC)    Chronic obstructive pulmonary disease (HCC)    Depression    Diabetic peripheral neuropathy (HCC)    Gastroesophageal reflux disease    Mixed hyperlipidemia    Hypomagnesemia    Paroxysmal atrial fibrillation (HCC)    Sleep apnea    Type 2 diabetes mellitus with hyperglycemia, with long-term current use of insulin (HCC)    Essential hypertension    Chronic diastolic CHF (congestive heart failure) ([facility])    Class 2 obesity due to excess calories without serious comorbidity with body mass index (BMI) of 39.0 to 39.9 in adult    Chest pain, atypical    Elevated LFTs    Cholelithiasis        Plan:    MDM:    1.  Atrial fibrillation with rapid ventricular response:    Patient is on intravenous Cardizem.  I would start Cardizem  mg daily.  Lopressor 25 mg twice daily would be started.  Sectral would be discontinued.  Continue Eliquis.    2.  Chest discomfort:    Patient had previous cardiac catheterization in 2018 which was unremarkable.  I would proceed with stress test in the morning    3.  Hypertension:    I would start Cardizem CD and add beta-blocker.  Patient is on lisinopril.    4.  Obstructive sleep apnea:    Continue CPAP.    I discussed the patients findings and my recommendations with patient    Jesse Charles MD  10/05/21  17:59 EDT              Electronically signed by Jesse Charles MD at 10/05/21 9673

## 2021-10-06 NOTE — PROGRESS NOTES
CARDIOLOGY FOLLOW-UP PROGRESS NOTE      Reason for follow-up:    Atrial Fibrillation  Right shoulder pain     Attending: Rodrigo Go *      Subjective .     Denies chest pain or dyspnea currntly     Review of Systems   Constitutional: Positive for malaise/fatigue. Negative for decreased appetite and diaphoresis.   HENT: Negative for congestion, hearing loss and nosebleeds.    Cardiovascular: Positive for chest pain and irregular heartbeat. Negative for claudication, dyspnea on exertion, leg swelling, near-syncope, orthopnea, palpitations, paroxysmal nocturnal dyspnea and syncope.   Respiratory: Negative for cough, shortness of breath and sleep disturbances due to breathing.    Endocrine: Negative for polyuria.   Hematologic/Lymphatic: Does not bruise/bleed easily.   Skin: Negative for itching and rash.   Musculoskeletal: Negative for back pain, muscle weakness and myalgias.   Gastrointestinal: Negative for abdominal pain, change in bowel habit and nausea.   Genitourinary: Negative for dysuria, flank pain, frequency and hesitancy.   Neurological: Positive for weakness. Negative for dizziness and tremors.   Psychiatric/Behavioral: Negative for altered mental status. The patient does not have insomnia.        Allergies: Ibuprofen, Prednisone, Pregabalin, Tramadol, Levofloxacin, and Sulfamethoxazole-trimethoprim    Scheduled Meds:apixaban, 5 mg, Oral, Q12H  atorvastatin, 20 mg, Oral, Daily  dilTIAZem CD, 120 mg, Oral, Q24H  furosemide, 40 mg, Oral, Daily  insulin glargine, 32 Units, Subcutaneous, Nightly  lidocaine, 1 patch, Transdermal, Q24H  linagliptin, 5 mg, Oral, Daily  lisinopril, 20 mg, Oral, Daily  metoprolol tartrate, 25 mg, Oral, Q12H  potassium chloride, 10 mEq, Oral, Daily  sodium chloride, 10 mL, Intravenous, Q12H        Continuous Infusions:dilTIAZem, 5-15 mg/hr, Last Rate: Stopped (10/06/21 1120)        PRN Meds:.•  acetaminophen **OR** acetaminophen **OR** acetaminophen  •  aluminum-magnesium  "hydroxide-simethicone  •  baclofen  •  HYDROcodone-acetaminophen  •  influenza vaccine  •  ipratropium-albuterol  •  melatonin  •  nitroglycerin  •  ondansetron **OR** ondansetron  •  sodium chloride    Objective     VITAL SIGNS  Patient Vitals for the past 24 hrs:   BP Temp Temp src Pulse Resp SpO2 Height Weight   10/06/21 1449 148/67 96.9 °F (36.1 °C) Oral 93 19 -- -- --   10/06/21 1034 132/52 98.2 °F (36.8 °C) Oral 85 19 95 % -- --   10/06/21 0539 117/56 98.4 °F (36.9 °C) Oral 84 16 96 % -- --   10/06/21 0300 137/56 -- -- 74 -- 97 % -- --   10/06/21 0200 119/61 -- -- 74 -- 95 % -- --   10/06/21 0100 132/66 -- -- 83 -- 91 % -- --   10/06/21 0000 143/50 -- -- 94 -- 91 % -- --   10/05/21 2300 142/71 -- -- 89 -- 98 % -- --   10/05/21 2227 -- 98.4 °F (36.9 °C) -- 81 -- 95 % -- --   10/05/21 2200 126/61 -- -- 86 -- (!) 88 % -- --   10/05/21 2100 138/59 -- -- 92 -- 97 % -- --   10/05/21 2023 (!) 137/117 -- -- 95 -- -- -- --   10/05/21 2000 141/65 -- -- 93 -- 93 % -- --   10/05/21 1900 130/82 -- -- 90 -- 93 % -- --   10/05/21 1825 -- -- -- -- -- 95 % -- --   10/05/21 1749 -- -- -- 87 -- 95 % -- --   10/05/21 1637 143/67 -- -- 85 -- 98 % 162.6 cm (64\") 103 kg (228 lb)   10/05/21 1631 143/67 -- -- 95 -- -- -- --   10/05/21 1600 -- -- -- 86 -- 99 % -- --   10/05/21 1517 152/75 -- -- 87 -- -- -- --        Flowsheet Rows      First Filed Value   Admission Height  162.6 cm (64\") Documented at 10/05/2021 0452   Admission Weight  104 kg (228 lb 2.8 oz) Documented at 10/05/2021 0452          Body mass index is 39.14 kg/m².      Intake/Output Summary (Last 24 hours) at 10/6/2021 1502  Last data filed at 10/6/2021 1449  Gross per 24 hour   Intake 780 ml   Output --   Net 780 ml        TELEMETRY:     AF 80s    Physical Exam:  Vitals reviewed.   Constitutional:       General: Not in acute distress.     Appearance: Normal appearance. Well-developed.   Eyes:      Pupils: Pupils are equal, round, and reactive to light.   HENT:      " Head: Normocephalic and atraumatic.   Neck:      Vascular: No JVD.   Pulmonary:      Effort: Pulmonary effort is normal.      Breath sounds: Normal breath sounds.   Cardiovascular:      Normal rate. Irregularly irregular rhythm.   Pulses:     Intact distal pulses.   Edema:     Peripheral edema absent.   Abdominal:      General: There is no distension.      Palpations: Abdomen is soft.      Tenderness: There is no abdominal tenderness.   Musculoskeletal: Normal range of motion.      Cervical back: Normal range of motion and neck supple. Skin:     General: Skin is warm and dry.   Neurological:      Mental Status: Alert and oriented to person, place, and time.            Results Review:   I reviewed the patient's new clinical results.    CBC    Results from last 7 days   Lab Units 10/06/21  0448 10/05/21  0519   WBC 10*3/mm3 9.20 12.40*   HEMOGLOBIN g/dL 14.1 15.8   PLATELETS 10*3/mm3 284 323     BMP   Results from last 7 days   Lab Units 10/06/21  0448 10/05/21  0519   SODIUM mmol/L 136 138   POTASSIUM mmol/L 3.7 4.5   CHLORIDE mmol/L 99 99   CO2 mmol/L 22.0 22.0   BUN mg/dL 18 18   CREATININE mg/dL 0.78 0.94   GLUCOSE mg/dL 310* 356*   MAGNESIUM mg/dL  --  1.4*     Cr Clearance Estimated Creatinine Clearance: 80.8 mL/min (by C-G formula based on SCr of 0.78 mg/dL).  Coag   Results from last 7 days   Lab Units 10/05/21  0519   INR  1.03   APTT seconds 29.0     HbA1C   Lab Results   Component Value Date    HGBA1C 10.6 (H) 05/20/2021    HGBA1C 9.9 (H) 09/22/2020    HGBA1C 10.6 (H) 05/29/2020     Blood Glucose   Glucose   Date/Time Value Ref Range Status   10/06/2021 1123 351 (H) 70 - 105 mg/dL Final     Comment:     Serial Number: [id number]:  [id number]   10/06/2021 0735 297 (H) 70 - 105 mg/dL Final     Comment:     Serial Number: [id number]:  [id number]     Infection   Results from last 7 days   Lab Units 10/05/21  0519   PROCALCITONIN ng/mL 0.11     CMP   Results from last 7 days   Lab Units  10/06/21  0448 10/05/21  0519   SODIUM mmol/L 136 138   POTASSIUM mmol/L 3.7 4.5   CHLORIDE mmol/L 99 99   CO2 mmol/L 22.0 22.0   BUN mg/dL 18 18   CREATININE mg/dL 0.78 0.94   GLUCOSE mg/dL 310* 356*   ALBUMIN g/dL 4.00 4.40   BILIRUBIN mg/dL 1.1 0.8   ALK PHOS U/L 75 81   AST (SGOT) U/L 31 51*   ALT (SGPT) U/L 39* 54*     ABG      UA      BRUNA  No results found for: POCMETH, POCAMPHET, POCBARBITUR, POCBENZO, POCCOCAINE, POCOPIATES, POCOXYCODO, POCPHENCYC, POCPROPOXY, POCTHC, POCTRICYC  Lysis Labs   Results from last 7 days   Lab Units 10/06/21  0448 10/05/21  0519   INR   --  1.03   APTT seconds  --  29.0   HEMOGLOBIN g/dL 14.1 15.8   PLATELETS 10*3/mm3 284 323   CREATININE mg/dL 0.78 0.94     Radiology(recent) XR Humerus Right    Result Date: 10/6/2021  Negative for acute osseous abnormality.  Electronically Signed By-Hardik Woodson MD On:10/6/2021 1:26 PM This report was finalized on [id number] by  Hardik Woodson MD.    XR Chest 1 View    Result Date: 10/5/2021  Marked cardiomegaly. No acute cardiopulmonary process.   Electronically Signed By-Rudy Lopez DO On:10/5/2021 7:17 AM This report was finalized on [id number] by  Rudy Lopez DO.    CT Chest Pulmonary Embolism    Result Date: 10/5/2021  1. No pulmonary embolism. 2. Mild bilateral upper lobe medial subsegmental atelectasis. 3. Mild to moderate cardiomegaly. 4. Marked diffuse hepatic steatosis. 5. Uncomplicated cholelithiasis.    Electronically Signed By-Emeli Corona MD On:10/5/2021 8:41 AM This report was finalized on [id number] by  Emeli Corona MD.    US Abdomen Limited    Result Date: 10/5/2021  No acute sonographic abnormality within the right upper quadrant.  Electronically Signed By-Rudy Lopez DO On:10/5/2021 12:03 PM This report was finalized on [id number] by  Rudy Lopez DO.      Imaging Results (Last 24 Hours)     Procedure Component Value Units Date/Time    XR Humerus Right [[id number]] Collected: 10/06/21 1326      Updated: 10/06/21 1328    Narrative:      DATE OF EXAM:  10/6/2021 12:40 PM     PROCEDURE:  XR HUMERUS RIGHT-     INDICATIONS:  Pain; I48.91-Unspecified atrial fibrillation; R07.9-Chest pain,  unspecified     COMPARISON:  No Comparisons Available     TECHNIQUE:   AP and lateral radiographic views were obtained of the right humerus.     FINDINGS:  Right humerus is intact. There is no fracture or aggressive osseous  lesion. The elbow alignment appears maintained. There is degenerative  spurring at the medial and lateral epicondyle. No osseous erosion.       Impression:      Negative for acute osseous abnormality.     Electronically Signed By-Hardik Woodson MD On:10/6/2021 1:26 PM  This report was finalized on [id number] by  Hardik Woodson MD.          Results from last 7 days   Lab Units 10/05/21  1209   TROPONIN T ng/mL <0.010       EKG              I personally viewed and interpreted the patient's EKG/Telemetry data:        ECHOCARDIOGRAM:     Results for orders placed during the hospital encounter of 03/10/20    Adult Transthoracic Echo Complete W/ Cont if Necessary Per Protocol    Interpretation Summary  · Left ventricular systolic function is normal.  · Estimated EF = 55%.        STRESS MYOVIEW:      CARDIAC CATHETERIZATION:      OTHER:         Assessment/Plan            Atrial fibrillation with RVR (HCC)    Chronic obstructive pulmonary disease (HCC)    Depression    Diabetic peripheral neuropathy (HCC)    Gastroesophageal reflux disease    Mixed hyperlipidemia    Hypomagnesemia    Paroxysmal atrial fibrillation (HCC)    Sleep apnea    Type 2 diabetes mellitus with hyperglycemia, with long-term current use of insulin (HCC)    Essential hypertension    Chronic diastolic CHF (congestive heart failure) ([facility])    Class 2 obesity due to excess calories without serious comorbidity with body mass index (BMI) of 39.0 to 39.9 in adult    Chest pain, atypical    Elevated LFTs    Cholelithiasis        ASSESSMENT:    AF RVR:  AF is permanent; a/c with eliquis  Chest Discomfort     -Cath 2018 normal coronaries  HTN  RUSLAN  DM           PLAN:    Lexiscan myoview to rule out ischemic burden    Ventricular rates stable    Additional recommendations per Dr. Charles     I discussed the patients findings and my recommendations with patient and RN                          YAW Cortes  10/06/21  15:02 EDT

## 2021-10-06 NOTE — PLAN OF CARE
Problem: Adult Inpatient Plan of Care  Goal: Plan of Care Review  Outcome: Ongoing, Progressing  Goal: Patient-Specific Goal (Individualized)  Outcome: Ongoing, Progressing  Goal: Absence of Hospital-Acquired Illness or Injury  Outcome: Ongoing, Progressing  Intervention: Identify and Manage Fall Risk  Recent Flowsheet Documentation  Taken 10/6/2021 1615 by Renita Oropeza RN  Safety Promotion/Fall Prevention:   assistive device/personal items within reach   clutter free environment maintained   nonskid shoes/slippers when out of bed   room organization consistent   safety round/check completed  Taken 10/6/2021 1600 by Renita Oropeza RN  Safety Promotion/Fall Prevention: safety round/check completed  Taken 10/6/2021 1400 by Renita Oropeza RN  Safety Promotion/Fall Prevention: safety round/check completed  Taken 10/6/2021 1200 by Renita Oropeza RN  Safety Promotion/Fall Prevention:   assistive device/personal items within reach   clutter free environment maintained   nonskid shoes/slippers when out of bed   room organization consistent   safety round/check completed  Taken 10/6/2021 1000 by Renita Oropeza RN  Safety Promotion/Fall Prevention: safety round/check completed  Taken 10/6/2021 0830 by Renita Oropeza RN  Safety Promotion/Fall Prevention:   assistive device/personal items within reach   clutter free environment maintained   nonskid shoes/slippers when out of bed   room organization consistent   safety round/check completed  Taken 10/6/2021 0800 by Renita Oropeza RN  Safety Promotion/Fall Prevention: safety round/check completed  Intervention: Prevent Skin Injury  Recent Flowsheet Documentation  Taken 10/6/2021 1615 by Renita Oropeza RN  Body Position: position changed independently  Skin Protection: adhesive use limited  Taken 10/6/2021 1200 by Renita Oropeza RN  Body Position: position changed independently  Skin Protection: adhesive use limited  Taken 10/6/2021 0830 by  Renita Oropeza RN  Body Position: position changed independently  Skin Protection:   adhesive use limited   tubing/devices free from skin contact  Intervention: Prevent Infection  Recent Flowsheet Documentation  Taken 10/6/2021 1615 by Renita Oropeza RN  Infection Prevention: hand hygiene promoted  Taken 10/6/2021 1200 by Renita Oropeza RN  Infection Prevention: hand hygiene promoted  Taken 10/6/2021 0830 by Renita Oropeza RN  Infection Prevention: hand hygiene promoted  Goal: Optimal Comfort and Wellbeing  Outcome: Ongoing, Progressing  Goal: Readiness for Transition of Care  Outcome: Ongoing, Progressing     Problem: Diabetes Comorbidity  Goal: Blood Glucose Level Within Desired Range  Outcome: Ongoing, Progressing  Intervention: Maintain Glycemic Control  Recent Flowsheet Documentation  Taken 10/6/2021 1615 by Renita Oropeza RN  Glycemic Management: blood glucose monitoring  Taken 10/6/2021 1200 by Renita Oropeza RN  Glycemic Management: blood glucose monitoring  Taken 10/6/2021 0830 by Renita Oropeza RN  Glycemic Management: blood glucose monitoring   Goal Outcome Evaluation:     Patient had an XR of her humerus today. The patient did not have any other tests or procedures. The patient is on 2L of oxygen at times during the shift. The patient was able to be weaned off of the cardizem gtt. The patient complains of neck, shoulder, chest pain and was given medications. See MAR. The patient is going to have a myoview in the morning. Will continue to monitor.

## 2021-10-06 NOTE — PLAN OF CARE
Problem: Adult Inpatient Plan of Care  Goal: Plan of Care Review  Outcome: Ongoing, Progressing  Flowsheets (Taken 10/6/2021 0353)  Progress: improving  Plan of Care Reviewed With: patient  Outcome Summary: Pt is resting in bed, frequent use of bedside commode, able to navigate without incident.  Pt c/o pain in her right arm and shoulder, but only with movement.  Pt denies pain with inhalation or exhalation, and denies cardiac pain, but states it feels more musculoskeletal in nature.  VSS on cardizem gtt, able to turn gtt down to 5mg/hr rate controlled but pt is still in afib at the time.  Goal: Patient-Specific Goal (Individualized)  Outcome: Ongoing, Progressing  Goal: Absence of Hospital-Acquired Illness or Injury  Outcome: Ongoing, Progressing  Intervention: Identify and Manage Fall Risk  Recent Flowsheet Documentation  Taken 10/6/2021 0341 by Lurdes Gallardo, RN  Safety Promotion/Fall Prevention:   safety round/check completed   room organization consistent   nonskid shoes/slippers when out of bed   lighting adjusted   fall prevention program maintained   clutter free environment maintained   assistive device/personal items within reach  Intervention: Prevent Infection  Recent Flowsheet Documentation  Taken 10/6/2021 0341 by Lurdes Gallardo, RN  Infection Prevention:   single patient room provided   rest/sleep promoted   hand hygiene promoted  Goal: Optimal Comfort and Wellbeing  Outcome: Ongoing, Progressing  Intervention: Provide Person-Centered Care  Recent Flowsheet Documentation  Taken 10/6/2021 0341 by Lurdes Gallardo, RN  Trust Relationship/Rapport:   care explained   questions encouraged   questions answered   emotional support provided   choices provided  Goal: Readiness for Transition of Care  Outcome: Ongoing, Progressing   Goal Outcome Evaluation:  Plan of Care Reviewed With: patient        Progress: improving  Outcome Summary: Pt is resting in bed, frequent use of bedside commode, able to navigate  without incident.  Pt c/o pain in her right arm and shoulder, but only with movement.  Pt denies pain with inhalation or exhalation, and denies cardiac pain, but states it feels more musculoskeletal in nature.  VSS on cardizem gtt, able to turn gtt down to 5mg/hr rate controlled but pt is still in afib at the time.

## 2021-10-06 NOTE — CASE MANAGEMENT/SOCIAL WORK
Discharge Planning Assessment   Doc     Patient Name: Caterina Mason  MRN: [id number]  Today's Date: 10/6/2021    Admit Date: 10/5/2021    Discharge Needs Assessment     Row Name 10/06/21 1120       Living Environment    Lives With  spouse    Name(s) of Who Lives With Patient  Waqas    Current Living Arrangements  home/apartment/condo    Primary Care Provided by  self    Provides Primary Care For  no one    Family Caregiver if Needed  spouse    Family Caregiver Names  Waqas    Able to Return to Prior Arrangements  yes       Resource/Environmental Concerns    Transportation Concerns  car, none       Transition Planning    Patient/Family Anticipates Transition to  home with family    Patient/Family Anticipated Services at Transition  none    Transportation Anticipated  family or friend will provide       Discharge Needs Assessment    Readmission Within the Last 30 Days  no previous admission in last 30 days    Equipment Currently Used at Home  cpap;oxygen;cane, straight    Concerns to be Addressed  denies needs/concerns at this time    Anticipated Changes Related to Illness  none    Equipment Needed After Discharge  none        Discharge Plan     Row Name 10/06/21 1121       Plan    Plan  Anticipate Routine Home    Patient/Family in Agreement with Plan  yes    Plan Comments  met with patient at bedside lives at home with , independent with ADL's  can provide transportation. pcp and pharmacy verified, able to afford medications. Currently recieves home oxygen at night through [employer] and has home CPAP. declined wanting home health or any other services. d/c barriers: Cardiology following, cardizem drip              Demographic Summary     Row Name 10/06/21 1120       General Information    Admission Type  inpatient    Arrived From  emergency department    Referral Source  admission list    Reason for Consult  discharge planning        Functional Status     Row Name 10/06/21 1120       Functional  Status    Usual Activity Tolerance  good    Current Activity Tolerance  good       Functional Status, IADL    Medications  independent    Meal Preparation  independent    Housekeeping  independent    Laundry  independent    Shopping  independent       Mental Status    General Appearance WDL  WDL       Mental Status Summary    Recent Changes in Mental Status/Cognitive Functioning  no changes        Met with patient at bedside wearing mask and goggles, Spent less than 15 minutes in room at greater than 6 feet distance.       Delicia Telles RN

## 2021-10-06 NOTE — DISCHARGE INSTRUCTIONS
Follow-up with PCP    Follow-up with Dr. Charles 2 to 4 weeks or as instructed  Heart healthy diabetic diet  May resume Metformin 10/7/1931  Follow-up with outpatient orthopedic surgery for evaluation of right shoulder pain and possible rotator cuff abnormalities.  Avoid lifting more than 5 pounds right upper extremity.  Take medication as prescribed  Follow-up with GI in 1 to 2 months for evaluation of LFT elevation.  Might be related to possible Josue.

## 2021-10-06 NOTE — DISCHARGE SUMMARY
[facility] Medicine Services  DISCHARGE SUMMARY    Patient Name: Caterina Mason  : 1955  MRN: [id number]    Date of Admission: 10/5/2021  Date of Discharge: 10/7/2021  Condition at discharge stable  Primary Care Physician: Lou Curran MD      Presenting Problem:   Atrial fibrillation with RVR (HCC) [I48.91]  Chest pain, unspecified type [R07.9]    Active and Resolved Hospital Problems:  Active Hospital Problems    Diagnosis POA   • **Atrial fibrillation with RVR (HCC) [I48.91] Yes   • Chest pain, atypical [R07.89] Yes   • Elevated LFTs [R79.89] Yes   • Hypomagnesemia [E83.42] Yes   • Cholelithiasis [K80.20] Yes   • Class 2 obesity due to excess calories without serious comorbidity with body mass index (BMI) of 39.0 to 39.9 in adult [E66.09, Z68.39] Not Applicable   • Chronic diastolic CHF (congestive heart failure) (HCC) [I50.32] Yes   • Essential hypertension [I10] Yes   • Chronic obstructive pulmonary disease (HCC) [J44.9] Yes   • Depression [F32.A] Yes   • Gastroesophageal reflux disease [K21.9] Yes   • Sleep apnea [G47.30] Yes   • Paroxysmal atrial fibrillation (HCC) [I48.0] Yes   • Diabetic peripheral neuropathy (HCC) [E11.42] Yes   • Type 2 diabetes mellitus with hyperglycemia, with long-term current use of insulin (HCC) [E11.65, Z79.4] Not Applicable   • Mixed hyperlipidemia [E78.2] Yes      Resolved Hospital Problems   No resolved problems to display.         Hospital Course     Hospital Course:  Caterina Mason is a 66 y.o. female       History of Present Illness: Caterina Mason is a 66 y.o. female with a history of paroxysmal a-fib on chronic anticoagulation with Eliquis, chronic diastolic CHF, Type 2 DM, and sleep apnea on CPAP with supplemental oxygen who presented to [facility] ED on 10/5/2021 complaining of chest pain. The patient states her pain started last night. She states it is located in her right chest, radiates into her back,  and down her right arm. She states the pain is worse with movement and deep breaths. She reports associated shortness of breath that is worse with exertion. She denies cough, fever, or chills. She reports some leg swelling. She denies any other complaints. She states she quit smoking 2 years ago.     Workup in the ER revealed atrial fibrillation with rapid ventricular response. She was given Cardizem 20 mg IV bolus and started on continuous drip. Her magnesium was low, so she was given Mag sulfate 1 g IV. Cardiology was consulted. She was admitted to the Hospitalist group for further evaluation and treatment.     Admission patient start on Cardizem drip and initiated on metoprolol instead of CV total.  Patient seen by Dr. Charles who has seen her in the past and had previous heart catheter was negative and no further ischemic work-up was initiated except for stress test and echocardiogram  Patient heart rate now under control with metoprolol and Cardizem.  Patient continues on Eliquis.  She had right-sided chest discomfort into the right upper extremity which seems to be tender on examination today and she is advised to follow-up with orthopedic surgeon for evaluation of rotator cuff abnormalities.  Her x-ray of the right humerus was negative.  She had negative serial troponins  CT angiogram of the chest was negative for PE.  She hadRight upper quad ultrasound for elevated LFT and was negative for gallstones stable initially showed on CT scan.    Patient had 2D echo is currently pending  She had refused stress test and wants to go home   Is okay with patient discharge and follow-up for outpatient heart cath      DISCHARGE Follow Up Recommendations for labs and diagnostics: *    Currently pending echo and cardiology clearance  Follow-up with PCP     Follow-up with Dr. Charles 2 to 4 weeks or as instructed  Heart healthy diabetic diet  May resume Metformin 10/7/1931  Follow-up with outpatient orthopedic surgery for  evaluation of right shoulder pain and possible rotator cuff abnormalities.  Avoid lifting more than 5 pounds right upper extremity.  Take medication as prescribed  Follow-up with GI in 1 to 2 months for evaluation of LFT elevation.  Might be related to possible Josue.    Reasons For Change In Medications and Indications for New Medications:  Cardiology recommendation    Day of Discharge     Vital Signs:  Temp:  [98.2 °F (36.8 °C)-98.4 °F (36.9 °C)] 98.2 °F (36.8 °C)  Heart Rate:  [74-96] 85  Resp:  [16-19] 19  BP: (117-152)/() 132/52  Flow (L/min):  [2-3] 2    Physical Exam:  Physical Exam   refer to progress note    Pertinent  and/or Most Recent Results     LAB RESULTS:      Lab 10/06/21  0448 10/05/21  0519   WBC 9.20 12.40*   HEMOGLOBIN 14.1 15.8   HEMATOCRIT 42.5 47.6*   PLATELETS 284 323   NEUTROS ABS 4.60 9.00*   LYMPHS ABS 3.10 2.10   MONOS ABS 1.30* 1.20*   EOS ABS 0.10 0.10   MCV 87.0 86.6   PROCALCITONIN  --  0.11   PROTIME  --  11.4   APTT  --  29.0         Lab 10/06/21  0448 10/05/21  0519   SODIUM 136 138   POTASSIUM 3.7 4.5   CHLORIDE 99 99   CO2 22.0 22.0   ANION GAP 15.0 17.0*   BUN 18 18   CREATININE 0.78 0.94   GLUCOSE 310* 356*   CALCIUM 9.5 10.4   MAGNESIUM  --  1.4*   TSH  --  1.830         Lab 10/06/21  0448 10/05/21  0519   TOTAL PROTEIN 7.7 8.2   ALBUMIN 4.00 4.40   GLOBULIN  --  3.8   ALT (SGPT) 39* 54*   AST (SGOT) 31 51*   BILIRUBIN 1.1 0.8   INDIRECT BILIRUBIN 0.9  --    BILIRUBIN DIRECT 0.2  --    ALK PHOS 75 81         Lab 10/05/21  1209 10/05/21  0519   PROBNP  --  1,170.0*   TROPONIN T <0.010 <0.010   PROTIME  --  11.4   INR  --  1.03                 Brief Urine Lab Results  (Last result in the past 365 days)      Color   Clarity   Blood   Leuk Est   Nitrite   Protein   CREAT   Urine HCG        05/20/21 0740             14.2           Microbiology Results (last 10 days)     Procedure Component Value - Date/Time    COVID-19,CEPHEID/FRANDY/BDMAX,COR/FORTUNATO/PAD/[place] IN-HOUSE(OR  EMERGENT/ADD-ON),NP SWAB IN TRANSPORT MEDIA 3-4 HR TAT, RT-PCR - Swab, Nasopharynx [[id number]]  (Normal) Collected: 10/05/21 0505    Lab Status: Final result Specimen: Swab from Nasopharynx Updated: 10/05/21 0643     COVID19 Not Detected    Narrative:      Fact sheet for providers: https://www.fda.gov/media/409463/download     Fact sheet for patients: https://www.fda.gov/media/176203/download  Fact sheet for providers: https://www.fda.gov/media/418557/download     Fact sheet for patients: https://www.fda.gov/media/702226/download          XR Humerus Right    Result Date: 10/6/2021  Impression: Negative for acute osseous abnormality.  Electronically Signed By-Hardik Woodson MD On:10/6/2021 1:26 PM This report was finalized on [id number] by  Hardik Woodson MD.    XR Chest 1 View    Result Date: 10/5/2021  Impression: Marked cardiomegaly. No acute cardiopulmonary process.   Electronically Signed By-Rudy Lopez DO On:10/5/2021 7:17 AM This report was finalized on [id number] by  Rudy Lopez DO.    CT Chest Pulmonary Embolism    Result Date: 10/5/2021  Impression: 1. No pulmonary embolism. 2. Mild bilateral upper lobe medial subsegmental atelectasis. 3. Mild to moderate cardiomegaly. 4. Marked diffuse hepatic steatosis. 5. Uncomplicated cholelithiasis.    Electronically Signed By-Emeli Corona MD On:10/5/2021 8:41 AM This report was finalized on [id number] by  Emeli Corona MD.    US Abdomen Limited    Result Date: 10/5/2021  Impression: No acute sonographic abnormality within the right upper quadrant.  Electronically Signed By-Rudy Lopez DO On:10/5/2021 12:03 PM This report was finalized on [id number] by  Rudy Lopez DO.              Results for orders placed during the hospital encounter of 03/10/20    Adult Transthoracic Echo Complete W/ Cont if Necessary Per Protocol    Interpretation Summary  · Left ventricular systolic function is normal.  · Estimated EF = 55%.      Labs Pending at  Discharge:      Procedures Performed           Consults:   Consults     Date and Time Order Name Status Description    10/5/2021  9:40 AM Cardiology (on-call MD unless specified) Completed     10/5/2021  9:40 AM Hospitalist (on-call MD unless specified) Completed             Discharge Details        Discharge Medications      New Medications      Instructions Start Date   Baclofen 5 MG tablet   5 mg, Oral, 3 Times Daily PRN      metoprolol tartrate 25 MG tablet  Commonly known as: LOPRESSOR   25 mg, Oral, Every 12 Hours Scheduled         Changes to Medications      Instructions Start Date   dilTIAZem  MG 24 hr capsule  Commonly known as: CARDIZEM CD  What changed: when to take this   120 mg, Oral, Every 24 Hours Scheduled   Start Date: October 7, 2021        Continue These Medications      Instructions Start Date   albuterol sulfate  (90 Base) MCG/ACT inhaler  Commonly known as: PROVENTIL HFA;VENTOLIN HFA;PROAIR HFA   2 puffs, Inhalation, Every 4 Hours PRN      apixaban 5 MG tablet tablet  Commonly known as: ELIQUIS   5 mg, Oral, 2 Times Daily      atorvastatin 20 MG tablet  Commonly known as: LIPITOR   20 mg, Oral, Daily      BASAGLAR KWIKPEN 100 UNIT/ML injection pen   32 Units, Subcutaneous, Nightly      cholecalciferol 25 MCG (1000 UT) tablet  Commonly known as: VITAMIN D3   1,000 Units, Oral, Daily      fenofibrate 160 MG tablet   160 mg, Oral, Daily      furosemide 40 MG tablet  Commonly known as: LASIX   40 mg, Oral, Daily      HYDROcodone-acetaminophen 7.5-325 MG per tablet  Commonly known as: NORCO   1 tablet, Oral, 3 Times Daily PRN      insulin lispro 100 UNIT/ML injection  Commonly known as: humaLOG   12 Units, Subcutaneous, 3 Times Daily Before Meals      Janumet XR  MG tablet  Generic drug: SITagliptin-metFORMIN HCl ER   2 tablets, Oral, Daily      lisinopril 20 MG tablet  Commonly known as: PRINIVIL,ZESTRIL   20 mg, Oral, Daily      magnesium oxide 400 (241.3 Mg) MG tablet  tablet  Commonly known as: MAGOX   400 mg, Oral, 2 Times Daily      potassium chloride 10 MEQ CR tablet  Commonly known as: K-DUR,KLOR-CON   10 mEq, Oral, Daily         Stop These Medications    acebutolol 200 MG capsule  Commonly known as: SECTRAL            Allergies   Allergen Reactions   • Ibuprofen GI Intolerance   • Prednisone Other (See Comments)   • Pregabalin Other (See Comments)     jerking   • Tramadol Other (See Comments)     Legs jerked   • Levofloxacin Other (See Comments)     Increase sunburn   • Sulfamethoxazole-Trimethoprim Swelling         Discharge Disposition:     Home or Self Care    Diet:  Hospital:  Diet Order   Procedures   • Diet Cardiac, Diabetic/Consistent Carbs; 2gm Na+; Diabetic - Consistent Carb; No Caffeine         Discharge Activity:         CODE STATUS:  Code Status and Medical Interventions:   Ordered at: 10/05/21 1033     Code Status:    CPR     Medical Interventions (Level of Support Prior to Arrest):    Full         Future Appointments   Date Time Provider Department Center   11/1/2021  9:00 AM SOSA Sprague DPM [facility] PODIATRY [facility]   11/16/2021  8:20 AM LAB  [facility] LAB [facility] None   11/23/2021  2:00 PM Bautista Archibald MD [facility] END NA [facility]   3/18/2022  1:45 PM Jesse Charles MD [facility] CARD CD [facility]           Time spent on Discharge including face to face service:  33* minutes    This patient has been examined wearing appropriate Personal Protective Equipment and discussed with hospital infection control department. 10/06/21      Signature: Electronically signed by Rodrigo Go MD, 10/06/21, 2:17 PM EDT.  [facility] Hospitalist Team

## 2021-10-06 NOTE — PROGRESS NOTES
[facility] Medicine Services Daily Progress Note    Patient Name: Caterina Mason  : 1955  MRN: [id number]  Primary Care Physician:  Lou Curran MD  Date of admission: 10/5/2021      Subjective      Chief Complaint: *  Right shoulder pain      Patient Reports 10/6  Complaining of some shoulder pain but has improved since yesterday  X-ray ordered      ROS  All other systems reviewed and negative  Echo pending    Objective      Vitals:   Temp:  [98.2 °F (36.8 °C)-98.4 °F (36.9 °C)] 98.2 °F (36.8 °C)  Heart Rate:  [] 85  Resp:  [16-19] 19  BP: (117-152)/() 132/52  Flow (L/min):  [3] 3    Physical Exam   GENERAL APPEARANCE: Well developed, well nourished, alert and cooperative, and appears to be in no acute distress.  HEAD: normocephalic.  EYES: PERRL, EOMI. vision intact grossly.  EARS: Intact hearing.  No gross abnormalities.  NOSE: No nasal discharge.  THROAT: Clear   NECK: Neck supple, non-tender without lymphadenopathy, masses or thyromegaly.  CARDIAC: Normal S1 and S2. No S3, S4 or murmurs. Rhythm is regular. There is no peripheral edema, cyanosis or pallor. Extremities are warm and well perfused. Capillary refill is less than 2 seconds. No carotid bruits.  LUNGS: Clear to auscultation and percussion without rales, rhonchi, wheezing or diminished breath sounds.  ABDOMEN: Positive bowel sounds. Soft, nondistended, nontender. No guarding or rebound. No masses.  MUSKULOSKELETAL: No deformity or swelling   BACK: No abnormalities noted     EXTREMITIES: Local stenosis of the lateral aspect of the right shoulder  No limitation of right upper extremity movement.  LOWER EXTREMITY: No edema or swelling  NEUROLOGICAL: CN II-XII intact. Strength and sensation symmetric and intact throughout. Reflexes 2+ throughout. Cerebellar testing normal.  SKIN: Skin normal color, texture and turgor with no lesions or eruptions.  PSYCHIATRIC: Alert cooperative not  suicidal            Result Review    Result Review:  I have personally reviewed the results from the time of this admission to 10/6/2021 10:52 EDT and agree with these findings:  [x]  Laboratory  [x]  Microbiology  [x]  Radiology  []  EKG/Telemetry   []  Cardiology/Vascular   []  Pathology  []  Old records  []  Other:  Most notable findings include: *As above        Assessment/Plan      Brief Patient Summary:  Caterina Mason is a 66 y.o. female who presented with A. fib and RVR  She had right upper extremity and chest wall pain.        apixaban, 5 mg, Oral, Q12H  atorvastatin, 20 mg, Oral, Daily  dilTIAZem CD, 120 mg, Oral, Q24H  furosemide, 40 mg, Oral, Daily  insulin glargine, 32 Units, Subcutaneous, Nightly  lidocaine, 1 patch, Transdermal, Q24H  lisinopril, 20 mg, Oral, Daily  metoprolol tartrate, 25 mg, Oral, Q12H  potassium chloride, 10 mEq, Oral, Daily  sodium chloride, 10 mL, Intravenous, Q12H       dilTIAZem, 5-15 mg/hr, Last Rate: 5 mg/hr (10/06/21 0338)         Active Hospital Problems:  Active Hospital Problems    Diagnosis    • **Atrial fibrillation with RVR (HCC)    • Chest pain, atypical    • Elevated LFTs    • Hypomagnesemia    • Cholelithiasis    • Class 2 obesity due to excess calories without serious comorbidity with body mass index (BMI) of 39.0 to 39.9 in adult    • Chronic diastolic CHF (congestive heart failure) (HCC)    • Essential hypertension    • Chronic obstructive pulmonary disease (HCC)    • Depression    • Gastroesophageal reflux disease    • Sleep apnea    • Paroxysmal atrial fibrillation (HCC)    • Diabetic peripheral neuropathy (HCC)    • Type 2 diabetes mellitus with hyperglycemia, with long-term current use of insulin (HCC)    • Mixed hyperlipidemia      Plan:   Atrial fibrillation with RVR   Paroxysmal atrial fibrillation   -P.o. Cardizem  -Discontinue dobutamine  -Dr. Charles following  -Had previous heart cath 2 years ago that was negative.  She reports also another heart cath  prior to that.  -Continue Eliquis and heart rate control.  -No plan for rhythm control at this time per Dr. Charles.       Chest pain, atypical  -r negative serial troponin excellent pending stress test and echocardiogram  Dr. Charles follow       Hypomagnesemia  -replace per protocol and monitor      Elevated LFTs  Cholelithiasis  -ALT 54, AST 51 on admission  -Ultrasound negative for gallstones or abnormalities gallbladder  -monitor and trend LFTs  -Outpatient follow-up with GI for possible Josue.    Type 2 diabetes mellitus with hyperglycemia, with long-term current use of insulin, complicated with Diabetic peripheral neuropathy  -check A1c  -accu checks AC&HS with SSI  -diabetic consistent carb diet  -consult diabetes educator   -Replaced sitagliptin with Tradjenta  Resume home Metformin whenever ready for discharge     Chronic obstructive pulmonary disease  -stable without exacerbation  -DuoNebs PRN  -continue home medication once list verified      Depression  -continue home medication once list verified      Gastroesophageal reflux disease  -continue home medication once list verified      Mixed hyperlipidemia  -continue home medication once list verified      Sleep apnea  -patient reports she wears CPAP with 2 L O2 when sleeping     Essential hypertension, chronic  -continue home medication once list verified   -monitor BP     Chronic diastolic CHF (congestive heart failure)  -Without exacerbation     Class 2 obesity due to excess calories without serious comorbidity with body mass index (BMI) of 39.0 to 39.9 in adult  -BMI 39.17 on admission  -encourage lifestyle modifications         DVT prophylaxis:  Medical DVT prophylaxis orders are present.    DVT prophylaxis:  Medical DVT prophylaxis orders are present.    CODE STATUS:    Code Status: CPR  Medical Interventions (Level of Support Prior to Arrest): Full      Disposition:  I expect patient to be discharged home.    This patient has been examined wearing  appropriate Personal Protective Equipment and discussed with hospital infection control department. 10/06/21      Electronically signed by Rodrigo Go MD, 10/06/21, 10:52 EDT.  [facility] Hospitalist Team

## 2021-10-07 ENCOUNTER — READMISSION MANAGEMENT (OUTPATIENT)
Dept: CALL CENTER | Facility: HOSPITAL | Age: 66
End: 2021-10-07

## 2021-10-07 VITALS
OXYGEN SATURATION: 96 % | WEIGHT: 231.48 LBS | DIASTOLIC BLOOD PRESSURE: 80 MMHG | HEIGHT: 64 IN | TEMPERATURE: 97.9 F | SYSTOLIC BLOOD PRESSURE: 138 MMHG | HEART RATE: 98 BPM | RESPIRATION RATE: 11 BRPM | BODY MASS INDEX: 39.52 KG/M2

## 2021-10-07 LAB
GLUCOSE BLDC GLUCOMTR-MCNC: 287 MG/DL (ref 70–105)
GLUCOSE BLDC GLUCOMTR-MCNC: 363 MG/DL (ref 70–105)

## 2021-10-07 PROCEDURE — 99232 SBSQ HOSP IP/OBS MODERATE 35: CPT | Performed by: NURSE PRACTITIONER

## 2021-10-07 PROCEDURE — 94799 UNLISTED PULMONARY SVC/PX: CPT

## 2021-10-07 PROCEDURE — 63710000001 INSULIN LISPRO (HUMAN) PER 5 UNITS: Performed by: INTERNAL MEDICINE

## 2021-10-07 PROCEDURE — 99239 HOSP IP/OBS DSCHRG MGMT >30: CPT | Performed by: INTERNAL MEDICINE

## 2021-10-07 PROCEDURE — 94660 CPAP INITIATION&MGMT: CPT

## 2021-10-07 PROCEDURE — 82962 GLUCOSE BLOOD TEST: CPT

## 2021-10-07 RX ORDER — INSULIN LISPRO 100 [IU]/ML
4 INJECTION, SOLUTION INTRAVENOUS; SUBCUTANEOUS
Status: DISCONTINUED | OUTPATIENT
Start: 2021-10-07 | End: 2021-10-07 | Stop reason: HOSPADM

## 2021-10-07 RX ADMIN — METOPROLOL TARTRATE 50 MG: 50 TABLET, FILM COATED ORAL at 08:58

## 2021-10-07 RX ADMIN — Medication 10 ML: at 08:59

## 2021-10-07 RX ADMIN — INSULIN LISPRO 8 UNITS: 100 INJECTION, SOLUTION INTRAVENOUS; SUBCUTANEOUS at 08:58

## 2021-10-07 RX ADMIN — BACLOFEN 5 MG: 10 TABLET ORAL at 04:57

## 2021-10-07 RX ADMIN — POTASSIUM CHLORIDE 10 MEQ: 1500 TABLET, EXTENDED RELEASE ORAL at 08:58

## 2021-10-07 RX ADMIN — ATORVASTATIN CALCIUM 20 MG: 20 TABLET, FILM COATED ORAL at 08:58

## 2021-10-07 RX ADMIN — DILTIAZEM HYDROCHLORIDE 120 MG: 120 CAPSULE, COATED, EXTENDED RELEASE ORAL at 08:58

## 2021-10-07 RX ADMIN — INSULIN LISPRO 4 UNITS: 100 INJECTION, SOLUTION INTRAVENOUS; SUBCUTANEOUS at 08:58

## 2021-10-07 RX ADMIN — LIDOCAINE 1 PATCH: 50 PATCH TOPICAL at 08:58

## 2021-10-07 RX ADMIN — FUROSEMIDE 40 MG: 40 TABLET ORAL at 08:58

## 2021-10-07 RX ADMIN — LISINOPRIL 20 MG: 20 TABLET ORAL at 08:58

## 2021-10-07 RX ADMIN — APIXABAN 5 MG: 5 TABLET, FILM COATED ORAL at 12:09

## 2021-10-07 RX ADMIN — LINAGLIPTIN 5 MG: 5 TABLET, FILM COATED ORAL at 08:58

## 2021-10-07 RX ADMIN — HYDROCODONE BITARTRATE AND ACETAMINOPHEN 1 TABLET: 7.5; 325 TABLET ORAL at 04:57

## 2021-10-07 NOTE — PLAN OF CARE
Problem: Adult Inpatient Plan of Care  Goal: Plan of Care Review  Outcome: Ongoing, Progressing  Flowsheets (Taken 10/7/2021 0412)  Progress: improving  Plan of Care Reviewed With: patient  Outcome Summary: Pt resting and sitting on edge of bed c/o pain but stating that her pain is better than it has been.  VSS, pt continues to c/o pain in her right shoulder, able to describe that it starts in the back of her neck and moves to her shoulder and down her arm and into her fingers.  Pt states she wants to go home.  Goal: Patient-Specific Goal (Individualized)  Outcome: Ongoing, Progressing  Goal: Absence of Hospital-Acquired Illness or Injury  Outcome: Ongoing, Progressing  Intervention: Identify and Manage Fall Risk  Recent Flowsheet Documentation  Taken 10/7/2021 0411 by Lurdes Gallardo, RN  Safety Promotion/Fall Prevention:   safety round/check completed   room organization consistent   nonskid shoes/slippers when out of bed   lighting adjusted   fall prevention program maintained   clutter free environment maintained   assistive device/personal items within reach  Taken 10/7/2021 0009 by Lurdes Gallardo, RN  Safety Promotion/Fall Prevention:   safety round/check completed   room organization consistent   nonskid shoes/slippers when out of bed   lighting adjusted   fall prevention program maintained   clutter free environment maintained   assistive device/personal items within reach  Taken 10/6/2021 1904 by Lurdes Gallardo, RN  Safety Promotion/Fall Prevention:   safety round/check completed   room organization consistent   nonskid shoes/slippers when out of bed   lighting adjusted   fall prevention program maintained   clutter free environment maintained   assistive device/personal items within reach  Intervention: Prevent Skin Injury  Recent Flowsheet Documentation  Taken 10/6/2021 1904 by Lurdes Gallardo, RN  Body Position: position changed independently  Intervention: Prevent and Manage VTE (venous thromboembolism)  Risk  Recent Flowsheet Documentation  Taken 10/6/2021 1904 by Lurdes Gallardo RN  VTE Prevention/Management: patient refused intervention  Intervention: Prevent Infection  Recent Flowsheet Documentation  Taken 10/7/2021 0411 by Lurdes Gallardo RN  Infection Prevention:   single patient room provided   rest/sleep promoted   hand hygiene promoted  Taken 10/7/2021 0009 by Lurdes Gallardo RN  Infection Prevention:   single patient room provided   rest/sleep promoted   hand hygiene promoted  Taken 10/6/2021 1904 by Lurdes Gallardo RN  Infection Prevention:   single patient room provided   rest/sleep promoted   hand hygiene promoted  Goal: Optimal Comfort and Wellbeing  Outcome: Ongoing, Progressing  Intervention: Provide Person-Centered Care  Recent Flowsheet Documentation  Taken 10/6/2021 1904 by Lurdes Gallardo RN  Trust Relationship/Rapport:   care explained   questions encouraged   questions answered   reassurance provided  Goal: Readiness for Transition of Care  Outcome: Ongoing, Progressing   Goal Outcome Evaluation:  Plan of Care Reviewed With: patient        Progress: improving  Outcome Summary: Pt resting and sitting on edge of bed c/o pain but stating that her pain is better than it has been.  VSS, pt continues to c/o pain in her right shoulder, able to describe that it starts in the back of her neck and moves to her shoulder and down her arm and into her fingers.  Pt states she wants to go home.

## 2021-10-07 NOTE — PROGRESS NOTES
CARDIOLOGY FOLLOW-UP PROGRESS NOTE      Reason for follow-up:    Atrial Fibrillation  Right shoulder pain     Attending: Rodrigo Go *      Subjective .     Denies chest pain or dyspnea currntly     Review of Systems   Constitutional: Positive for malaise/fatigue. Negative for decreased appetite and diaphoresis.   HENT: Negative for congestion, hearing loss and nosebleeds.    Cardiovascular: Positive for chest pain and irregular heartbeat. Negative for claudication, dyspnea on exertion, leg swelling, near-syncope, orthopnea, palpitations, paroxysmal nocturnal dyspnea and syncope.   Respiratory: Negative for cough, shortness of breath and sleep disturbances due to breathing.    Endocrine: Negative for polyuria.   Hematologic/Lymphatic: Does not bruise/bleed easily.   Skin: Negative for itching and rash.   Musculoskeletal: Negative for back pain, muscle weakness and myalgias.   Gastrointestinal: Negative for abdominal pain, change in bowel habit and nausea.   Genitourinary: Negative for dysuria, flank pain, frequency and hesitancy.   Neurological: Positive for weakness. Negative for dizziness and tremors.   Psychiatric/Behavioral: Negative for altered mental status. The patient does not have insomnia.        Allergies: Ibuprofen, Prednisone, Pregabalin, Tramadol, Levofloxacin, and Sulfamethoxazole-trimethoprim    Scheduled Meds:apixaban, 5 mg, Oral, Q12H  atorvastatin, 20 mg, Oral, Daily  dilTIAZem CD, 120 mg, Oral, Q24H  furosemide, 40 mg, Oral, Daily  insulin glargine, 32 Units, Subcutaneous, Nightly  insulin lispro, 0-14 Units, Subcutaneous, TID AC  insulin lispro, 4 Units, Subcutaneous, TID With Meals  lidocaine, 1 patch, Transdermal, Q24H  linagliptin, 5 mg, Oral, Daily  lisinopril, 20 mg, Oral, Daily  metoprolol tartrate, 50 mg, Oral, Q12H  potassium chloride, 10 mEq, Oral, Daily  sodium chloride, 10 mL, Intravenous, Q12H        Continuous Infusions:     PRN Meds:.•  acetaminophen **OR**  "acetaminophen **OR** acetaminophen  •  aluminum-magnesium hydroxide-simethicone  •  baclofen  •  dextrose  •  dextrose  •  glucagon (human recombinant)  •  HYDROcodone-acetaminophen  •  influenza vaccine  •  insulin lispro **AND** insulin lispro  •  ipratropium-albuterol  •  melatonin  •  nitroglycerin  •  ondansetron **OR** ondansetron  •  sodium chloride    Objective     VITAL SIGNS  Patient Vitals for the past 24 hrs:   BP Temp Temp src Pulse Resp SpO2 Weight   10/07/21 0612 121/73 97.7 °F (36.5 °C) Axillary 98 22 97 % 105 kg (231 lb 7.7 oz)   10/07/21 0219 133/54 97.5 °F (36.4 °C) Oral 97 20 92 % --   10/06/21 2211 146/84 98.4 °F (36.9 °C) Axillary 90 20 94 % --   10/06/21 1933 -- -- -- 96 -- 92 % --   10/06/21 1825 151/72 97.9 °F (36.6 °C) Oral 90 19 -- --   10/06/21 1449 148/67 96.9 °F (36.1 °C) Oral 93 19 -- --   10/06/21 1034 132/52 98.2 °F (36.8 °C) Oral 85 19 95 % --        Flowsheet Rows      First Filed Value   Admission Height  162.6 cm (64\") Documented at 10/05/2021 0452   Admission Weight  104 kg (228 lb 2.8 oz) Documented at 10/05/2021 0452          Body mass index is 39.73 kg/m².      Intake/Output Summary (Last 24 hours) at 10/7/2021 1020  Last data filed at 10/6/2021 1933  Gross per 24 hour   Intake 1080 ml   Output --   Net 1080 ml        TELEMETRY:     AF 80s    Physical Exam:  Vitals reviewed.   Constitutional:       General: Not in acute distress.     Appearance: Normal appearance. Well-developed.   Eyes:      Pupils: Pupils are equal, round, and reactive to light.   HENT:      Head: Normocephalic and atraumatic.   Neck:      Vascular: No JVD.   Pulmonary:      Effort: Pulmonary effort is normal.      Breath sounds: Normal breath sounds.   Cardiovascular:      Normal rate. Irregularly irregular rhythm.   Pulses:     Intact distal pulses.   Edema:     Peripheral edema absent.   Abdominal:      General: There is no distension.      Palpations: Abdomen is soft.      Tenderness: There is no " abdominal tenderness.   Musculoskeletal: Normal range of motion.      Cervical back: Normal range of motion and neck supple. Skin:     General: Skin is warm and dry.   Neurological:      Mental Status: Alert and oriented to person, place, and time.            Results Review:   I reviewed the patient's new clinical results.    CBC    Results from last 7 days   Lab Units 10/06/21  0448 10/05/21  0519   WBC 10*3/mm3 9.20 12.40*   HEMOGLOBIN g/dL 14.1 15.8   PLATELETS 10*3/mm3 284 323     BMP   Results from last 7 days   Lab Units 10/06/21  0448 10/05/21  0519   SODIUM mmol/L 136 138   POTASSIUM mmol/L 3.7 4.5   CHLORIDE mmol/L 99 99   CO2 mmol/L 22.0 22.0   BUN mg/dL 18 18   CREATININE mg/dL 0.78 0.94   GLUCOSE mg/dL 310* 356*   MAGNESIUM mg/dL  --  1.4*     Cr Clearance Estimated Creatinine Clearance: 81.7 mL/min (by C-G formula based on SCr of 0.78 mg/dL).  Coag   Results from last 7 days   Lab Units 10/05/21  0519   INR  1.03   APTT seconds 29.0     HbA1C   Lab Results   Component Value Date    HGBA1C 10.6 (H) 05/20/2021    HGBA1C 9.9 (H) 09/22/2020    HGBA1C 10.6 (H) 05/29/2020     Blood Glucose   Glucose   Date/Time Value Ref Range Status   10/07/2021 0728 287 (H) 70 - 105 mg/dL Final     Comment:     Serial Number: [id number]:  [id number]   10/06/2021 1934 366 (H) 70 - 105 mg/dL Final     Comment:     Serial Number: [id number]:  [id number]   10/06/2021 1614 385 (H) 70 - 105 mg/dL Final     Comment:     Serial Number: [id number]:  [id number]   10/06/2021 1123 351 (H) 70 - 105 mg/dL Final     Comment:     Serial Number: [id number]:  [id number]   10/06/2021 0735 297 (H) 70 - 105 mg/dL Final     Comment:     Serial Number: [id number]:  [id number]     Infection   Results from last 7 days   Lab Units 10/05/21  0519   PROCALCITONIN ng/mL 0.11     CMP   Results from last 7 days   Lab Units 10/06/21  0448 10/05/21  0519   SODIUM mmol/L 136 138   POTASSIUM mmol/L 3.7 4.5   CHLORIDE mmol/L 99  99   CO2 mmol/L 22.0 22.0   BUN mg/dL 18 18   CREATININE mg/dL 0.78 0.94   GLUCOSE mg/dL 310* 356*   ALBUMIN g/dL 4.00 4.40   BILIRUBIN mg/dL 1.1 0.8   ALK PHOS U/L 75 81   AST (SGOT) U/L 31 51*   ALT (SGPT) U/L 39* 54*     ABG      UA      BRUNA  No results found for: POCMETH, POCAMPHET, POCBARBITUR, POCBENZO, POCCOCAINE, POCOPIATES, POCOXYCODO, POCPHENCYC, POCPROPOXY, POCTHC, POCTRICYC  Lysis Labs   Results from last 7 days   Lab Units 10/06/21  0448 10/05/21  0519   INR   --  1.03   APTT seconds  --  29.0   HEMOGLOBIN g/dL 14.1 15.8   PLATELETS 10*3/mm3 284 323   CREATININE mg/dL 0.78 0.94     Radiology(recent) XR Humerus Right    Result Date: 10/6/2021  Negative for acute osseous abnormality.  Electronically Signed By-Hardik Woodson MD On:10/6/2021 1:26 PM This report was finalized on [id number] by  Hardik Woodson MD.    US Abdomen Limited    Result Date: 10/5/2021  No acute sonographic abnormality within the right upper quadrant.  Electronically Signed By-Rudy Lopez DO On:10/5/2021 12:03 PM This report was finalized on [id number] by  Rudy Lopez DO.      Imaging Results (Last 24 Hours)     ** No results found for the last 24 hours. **          Results from last 7 days   Lab Units 10/05/21  1209   TROPONIN T ng/mL <0.010       EKG              I personally viewed and interpreted the patient's EKG/Telemetry data:        ECHOCARDIOGRAM:     Results for orders placed during the hospital encounter of 03/10/20    Adult Transthoracic Echo Complete W/ Cont if Necessary Per Protocol    Interpretation Summary  · Left ventricular systolic function is normal.  · Estimated EF = 55%.        STRESS MYOVIEW:      CARDIAC CATHETERIZATION:      OTHER:         Assessment/Plan            Atrial fibrillation with RVR (HCC)    Chronic obstructive pulmonary disease (HCC)    Depression    Diabetic peripheral neuropathy (HCC)    Gastroesophageal reflux disease    Mixed hyperlipidemia    Hypomagnesemia    Paroxysmal atrial  fibrillation (HCC)    Sleep apnea    Type 2 diabetes mellitus with hyperglycemia, with long-term current use of insulin (HCC)    Essential hypertension    Chronic diastolic CHF (congestive heart failure) (HCC)    Class 2 obesity due to excess calories without serious comorbidity with body mass index (BMI) of 39.0 to 39.9 in adult    Chest pain, atypical    Elevated LFTs    Cholelithiasis        ASSESSMENT:    AF RVR: AF is permanent; a/c with eliquis  Chest Discomfort     -Cath 2018 normal coronaries  HTN  RUSLAN  DM           PLAN:    Pt refused Lexiscan  Anxious for d/c home  Continue current Rx and supportive care.   OK for d/c home from cards standpoint  OP f/u with Dr. Charles in 3-4 weeks  Ventricular rates stable         I discussed the patients findings and my recommendations with patient and RN                          YAW Cortes  10/07/21  10:20 EDT

## 2021-10-07 NOTE — PLAN OF CARE
Problem: Adult Inpatient Plan of Care  Goal: Plan of Care Review  Outcome: Ongoing, Progressing  Goal: Patient-Specific Goal (Individualized)  Outcome: Ongoing, Progressing  Goal: Absence of Hospital-Acquired Illness or Injury  Outcome: Ongoing, Progressing  Intervention: Identify and Manage Fall Risk  Recent Flowsheet Documentation  Taken 10/7/2021 0845 by Renita Oropeza RN  Safety Promotion/Fall Prevention:   assistive device/personal items within reach   clutter free environment maintained   nonskid shoes/slippers when out of bed   room organization consistent   safety round/check completed  Intervention: Prevent Skin Injury  Recent Flowsheet Documentation  Taken 10/7/2021 0845 by Renita Oropeza RN  Body Position: position changed independently  Skin Protection:   adhesive use limited   tubing/devices free from skin contact  Intervention: Prevent Infection  Recent Flowsheet Documentation  Taken 10/7/2021 0845 by Renita Oropeza RN  Infection Prevention: hand hygiene promoted  Goal: Optimal Comfort and Wellbeing  Outcome: Ongoing, Progressing  Goal: Readiness for Transition of Care  Outcome: Ongoing, Progressing     Problem: Diabetes Comorbidity  Goal: Blood Glucose Level Within Desired Range  Outcome: Ongoing, Progressing  Intervention: Maintain Glycemic Control  Recent Flowsheet Documentation  Taken 10/7/2021 0845 by Renita Oropeza RN  Glycemic Management: blood glucose monitoring   Goal Outcome Evaluation:         Patient did not have any new tests or procedures today. The patient refused lab work. The patient complained of neck, shoulder, back, and chest pain. The patient is being discharged home and will have a heart cath outpatient. Will continue to monitor.

## 2021-10-07 NOTE — OUTREACH NOTE
Prep Survey      Responses   [facility]?  Doc   Is LACE score < 7 ?  No   Emergency Room discharge w/ pulse ox?  No   Eligibility  TCM   Hospital  Doc   Date of Admission  10/05/21   Date of Discharge  10/07/21   Discharge Disposition  Home or Self Care   Discharge diagnosis  chest pain, A-fib RVR, chronic CHF, COPD, T2DM   Does the patient have one of the following disease processes/diagnoses(primary or secondary)?  Other   Does the patient have Home health ordered?  No   Is there a DME ordered?  No   Prep survey completed?  Yes          Karina Cruz RN

## 2021-10-07 NOTE — PROGRESS NOTES
[facility] Daily Progress Note    Patient Name: Caterina Mason  : 1955  MRN: [id number]  Primary Care Physician:  Lou Curran MD  Date of admission: 10/5/2021      Subjective      Chief Complaint: *  Right shoulder pain      Patient Reports   10/6  Complaining of some shoulder pain but has improved since yesterday  X-ray ordered    10/7  Echo still pending  Patient awaiting stress test today.  Sitter 92% on room air.  Hypoglycemia noted.  Premeal insulin added.      ROS  All other systems reviewed and negative      Objective      Vitals:   Temp:  [96.9 °F (36.1 °C)-98.4 °F (36.9 °C)] 97.7 °F (36.5 °C)  Heart Rate:  [85-98] 98  Resp:  [19-22] 22  BP: (121-151)/(52-84) 121/73  Flow (L/min):  [2-3] 2    Physical Exam   GENERAL APPEARANCE: Well developed, well nourished, alert and cooperative, and appears to be in no acute distress.  HEAD: normocephalic.  EYES: PERRL, EOMI. vision intact grossly.  EARS: Intact hearing.  No gross abnormalities.  NOSE: No nasal discharge.  THROAT: Clear   NECK: Neck supple, non-tender without lymphadenopathy, masses or thyromegaly.  CARDIAC: Normal S1 and S2. No S3, S4 or murmurs. Rhythm is regular. There is no peripheral edema, cyanosis or pallor. Extremities are warm and well perfused. Capillary refill is less than 2 seconds. No carotid bruits.  LUNGS: Clear to auscultation and percussion without rales, rhonchi, wheezing or diminished breath sounds.  ABDOMEN: Positive bowel sounds. Soft, nondistended, nontender. No guarding or rebound. No masses.  MUSKULOSKELETAL: No deformity or swelling   BACK: No abnormalities noted     EXTREMITIES: Local stenosis of the lateral aspect of the right shoulder  No limitation of right upper extremity movement.  LOWER EXTREMITY: No edema or swelling  NEUROLOGICAL: CN II-XII intact. Strength and sensation symmetric and intact throughout. Reflexes 2+ throughout. Cerebellar testing normal.  SKIN:  Skin normal color, texture and turgor with no lesions or eruptions.  PSYCHIATRIC: Alert cooperative not suicidal            Result Review    Result Review:  I have personally reviewed the results from the time of this admission to 10/7/2021 08:15 EDT and agree with these findings:  [x]  Laboratory  [x]  Microbiology  [x]  Radiology  []  EKG/Telemetry   []  Cardiology/Vascular   []  Pathology  []  Old records  []  Other:  Most notable findings include: *As above        Assessment/Plan      Brief Patient Summary:  Caterina Mason is a 66 y.o. female who presented with A. fib and RVR  She had right upper extremity and chest wall pain.        apixaban, 5 mg, Oral, Q12H  atorvastatin, 20 mg, Oral, Daily  dilTIAZem CD, 120 mg, Oral, Q24H  furosemide, 40 mg, Oral, Daily  insulin glargine, 32 Units, Subcutaneous, Nightly  insulin lispro, 0-14 Units, Subcutaneous, TID AC  lidocaine, 1 patch, Transdermal, Q24H  linagliptin, 5 mg, Oral, Daily  lisinopril, 20 mg, Oral, Daily  metoprolol tartrate, 50 mg, Oral, Q12H  potassium chloride, 10 mEq, Oral, Daily  sodium chloride, 10 mL, Intravenous, Q12H             Active Hospital Problems:  Active Hospital Problems    Diagnosis    • **Atrial fibrillation with RVR (HCC)    • Chest pain, atypical    • Elevated LFTs    • Hypomagnesemia    • Cholelithiasis    • Class 2 obesity due to excess calories without serious comorbidity with body mass index (BMI) of 39.0 to 39.9 in adult    • Chronic diastolic CHF (congestive heart failure) (HCC)    • Essential hypertension    • Chronic obstructive pulmonary disease (HCC)    • Depression    • Gastroesophageal reflux disease    • Sleep apnea    • Paroxysmal atrial fibrillation (HCC)    • Diabetic peripheral neuropathy (HCC)    • Type 2 diabetes mellitus with hyperglycemia, with long-term current use of insulin (HCC)    • Mixed hyperlipidemia      Plan:   Atrial fibrillation with RVR   Paroxysmal atrial fibrillation   -P.o. Cardizem  -Discontinue  dobutamine  -Dr. Charles following  -Had previous heart cath 2 years ago that was negative.  She reports also another heart cath prior to that.  -Continue Eliquis and heart rate control.  -No plan for rhythm control at this time per Dr. Charles.    Right upper extremity pain.  No x-ray abnormal findings on right humerus x-ray.  Patient is tender on the right biceps tendon.  Check sed rate.  Differentials include Fibromyalgia.  Check total CK.     Chest pain, atypical  -r negative serial troponin excellent pending stress test and echocardiogram  Dr. Charles follow       Hypomagnesemia  -replace per protocol and monitor      Elevated LFTs  Cholelithiasis  -ALT 54, AST 51 on admission  -Ultrasound negative for gallstones or abnormalities gallbladder  -monitor and trend LFTs  -Outpatient follow-up with GI for possible Josue.    Type 2 diabetes mellitus with hyperglycemia, with long-term current use of insulin, complicated with Diabetic peripheral neuropathy  -check A1c  -accu checks AC&HS with SSI  -diabetic consistent carb diet  -consult diabetes educator   -Replaced sitagliptin with Tradjenta  Resume home Metformin whenever ready for discharge  -Premeal insulin added smaller dose than home dose.      Chronic obstructive pulmonary disease  -stable without exacerbation  -DuoNebs PRN  -continue home medication once list verified      Depression  -continue home medication once list verified      Gastroesophageal reflux disease  -continue home medication once list verified      Mixed hyperlipidemia  -continue home medication once list verified      Sleep apnea  -patient reports she wears CPAP with 2 L O2 when sleeping     Essential hypertension, chronic  -continue home medication once list verified   -monitor BP     Chronic diastolic CHF (congestive heart failure)  -Without exacerbation     Class 2 obesity due to excess calories without serious comorbidity with body mass index (BMI) of 39.0 to 39.9 in adult  -BMI 39.17 on  admission  -encourage lifestyle modifications         DVT prophylaxis:  Medical DVT prophylaxis orders are present.    DVT prophylaxis:  Medical DVT prophylaxis orders are present.    CODE STATUS:    Code Status: CPR  Medical Interventions (Level of Support Prior to Arrest): Full      Disposition:  I expect patient to be discharged home.    This patient has been examined wearing appropriate Personal Protective Equipment and discussed with hospital infection control department. 10/07/21      Electronically signed by Rodrigo Go MD, 10/07/21, 08:15 EDT.  [facility], [place], operated by [facility] Hospitalist Team

## 2021-10-08 ENCOUNTER — TRANSITIONAL CARE MANAGEMENT TELEPHONE ENCOUNTER (OUTPATIENT)
Dept: CALL CENTER | Facility: HOSPITAL | Age: 66
End: 2021-10-08

## 2021-10-08 NOTE — OUTREACH NOTE
Call Center TCM Note      Responses   [facility] patient discharged from?  Doc   Does the patient have one of the following disease processes/diagnoses(primary or secondary)?  Other   TCM attempt successful?  Yes   Call start time  1555   Call end time  1602   Discharge diagnosis  chest pain, A-fib RVR, chronic CHF, COPD, T2DM   Meds reviewed with patient/caregiver?  Yes   Is the patient having any side effects they believe may be caused by any medication additions or changes?  No   Does the patient have all medications ordered at discharge?  Yes   Is the patient taking all medications as directed (includes completed medication regime)?  Yes   Does the patient have a primary care provider?   Yes   Does the patient have an appointment with their PCP within 7 days of discharge?  No   Comments regarding PCP  attempted to make f/u appt but no available appts noted   Nursing Interventions  Advised patient to make appointment   Has the patient kept scheduled appointments due by today?  N/A   Has home health visited the patient within 72 hours of discharge?  N/A   Psychosocial issues?  No   Did the patient receive a copy of their discharge instructions?  Yes   Nursing interventions  Reviewed instructions with patient   What is the patient's perception of their health status since discharge?  Same   Is the patient/caregiver able to teach back the hierarchy of who to call/visit for symptoms/problems? PCP, Specialist, Home health nurse, Urgent Care, ED, 911  Yes   TCM call completed?  Yes   Wrap up additional comments  Patient says she is feeling about the same but thinks it is the new baclofen prescription she was started on, advised her to not take baclofen since it is an as needed medication and speak with a doctor about other medication options, attempted to make f/u appt with PCP during call but no available appts noted.          Erika Sprague RN    10/8/2021, 16:05 EDT

## 2021-10-08 NOTE — OUTREACH NOTE
Call Center TCM Note      Responses   [facility] patient discharged from?  Doc   Does the patient have one of the following disease processes/diagnoses(primary or secondary)?  Other   TCM attempt successful?  No   Unsuccessful attempts  Attempt 1          Erika Sprague RN    10/8/2021, 15:02 EDT

## 2021-10-10 LAB
BH CV ECHO MEAS - AI DEC SLOPE: 368.3 CM/SEC^2
BH CV ECHO MEAS - AI DEC TIME: 1.2 SEC
BH CV ECHO MEAS - AI MAX PG: 82.1 MMHG
BH CV ECHO MEAS - AI MAX VEL: 453 CM/SEC
BH CV ECHO MEAS - AI P1/2T: 360.2 MSEC
BH CV ECHO MEAS - AO MAX PG (FULL): 5.9 MMHG
BH CV ECHO MEAS - AO MAX PG: 10.7 MMHG
BH CV ECHO MEAS - AO MEAN PG (FULL): 3.1 MMHG
BH CV ECHO MEAS - AO MEAN PG: 5.2 MMHG
BH CV ECHO MEAS - AO ROOT AREA (BSA CORRECTED): 0.95
BH CV ECHO MEAS - AO ROOT AREA: 3 CM^2
BH CV ECHO MEAS - AO ROOT DIAM: 2 CM
BH CV ECHO MEAS - AO V2 MAX: 163.9 CM/SEC
BH CV ECHO MEAS - AO V2 MEAN: 104.7 CM/SEC
BH CV ECHO MEAS - AO V2 VTI: 30.2 CM
BH CV ECHO MEAS - AORTIC HR: 75.3 BPM
BH CV ECHO MEAS - AORTIC R-R: 0.8 SEC
BH CV ECHO MEAS - ASC AORTA: 3.3 CM
BH CV ECHO MEAS - AVA(I,A): 1.8 CM^2
BH CV ECHO MEAS - AVA(I,D): 1.8 CM^2
BH CV ECHO MEAS - AVA(V,A): 1.9 CM^2
BH CV ECHO MEAS - AVA(V,D): 1.9 CM^2
BH CV ECHO MEAS - BSA(HAYCOCK): 2.2 M^2
BH CV ECHO MEAS - BSA: 2.1 M^2
BH CV ECHO MEAS - BZI_BMI: 39.1 KILOGRAMS/M^2
BH CV ECHO MEAS - BZI_METRIC_HEIGHT: 162.6 CM
BH CV ECHO MEAS - BZI_METRIC_WEIGHT: 103.4 KG
BH CV ECHO MEAS - CI(AO): 3.3 L/MIN/M^2
BH CV ECHO MEAS - CI(LVOT): 1.9 L/MIN/M^2
BH CV ECHO MEAS - CO(AO): 6.8 L/MIN
BH CV ECHO MEAS - CO(LVOT): 4 L/MIN
BH CV ECHO MEAS - EDV(CUBED): 173.3 ML
BH CV ECHO MEAS - EDV(MOD-SP2): 93.8 ML
BH CV ECHO MEAS - EDV(MOD-SP4): 101.3 ML
BH CV ECHO MEAS - EDV(TEICH): 152.1 ML
BH CV ECHO MEAS - EF(CUBED): 63.1 %
BH CV ECHO MEAS - EF(MOD-BP): 53 %
BH CV ECHO MEAS - EF(MOD-SP2): 51.8 %
BH CV ECHO MEAS - EF(MOD-SP4): 54.8 %
BH CV ECHO MEAS - EF(TEICH): 54 %
BH CV ECHO MEAS - ESV(CUBED): 63.9 ML
BH CV ECHO MEAS - ESV(MOD-SP2): 45.2 ML
BH CV ECHO MEAS - ESV(MOD-SP4): 45.8 ML
BH CV ECHO MEAS - ESV(TEICH): 69.9 ML
BH CV ECHO MEAS - FS: 28.3 %
BH CV ECHO MEAS - IVS/LVPW: 0.75
BH CV ECHO MEAS - IVSD: 1 CM
BH CV ECHO MEAS - LA DIMENSION(2D): 4.5 CM
BH CV ECHO MEAS - LV DIASTOLIC VOL/BSA (35-75): 49 ML/M^2
BH CV ECHO MEAS - LV MASS(C)D: 266 GRAMS
BH CV ECHO MEAS - LV MASS(C)DI: 128.6 GRAMS/M^2
BH CV ECHO MEAS - LV MAX PG: 4.9 MMHG
BH CV ECHO MEAS - LV MEAN PG: 2.1 MMHG
BH CV ECHO MEAS - LV SYSTOLIC VOL/BSA (12-30): 22.1 ML/M^2
BH CV ECHO MEAS - LV V1 MAX: 110.6 CM/SEC
BH CV ECHO MEAS - LV V1 MEAN: 65.3 CM/SEC
BH CV ECHO MEAS - LV V1 VTI: 19.4 CM
BH CV ECHO MEAS - LVIDD: 5.6 CM
BH CV ECHO MEAS - LVIDS: 4 CM
BH CV ECHO MEAS - LVOT AREA: 2.8 CM^2
BH CV ECHO MEAS - LVOT DIAM: 1.9 CM
BH CV ECHO MEAS - LVPWD: 1.3 CM
BH CV ECHO MEAS - MV DEC TIME: 0.18 SEC
BH CV ECHO MEAS - MV E MAX VEL: 117.9 CM/SEC
BH CV ECHO MEAS - MV MAX PG: 7.4 MMHG
BH CV ECHO MEAS - MV MEAN PG: 3 MMHG
BH CV ECHO MEAS - MV V2 MAX: 135.6 CM/SEC
BH CV ECHO MEAS - MV V2 MEAN: 75.6 CM/SEC
BH CV ECHO MEAS - MV V2 VTI: 23.5 CM
BH CV ECHO MEAS - MVA(VTI): 2.3 CM^2
BH CV ECHO MEAS - PA ACC TIME: 0.11 SEC
BH CV ECHO MEAS - PA MAX PG (FULL): 3.2 MMHG
BH CV ECHO MEAS - PA MAX PG: 6 MMHG
BH CV ECHO MEAS - PA MEAN PG (FULL): 1.1 MMHG
BH CV ECHO MEAS - PA MEAN PG: 2.6 MMHG
BH CV ECHO MEAS - PA PR(ACCEL): 28.9 MMHG
BH CV ECHO MEAS - PA V2 MAX: 122.3 CM/SEC
BH CV ECHO MEAS - PA V2 MEAN: 71.8 CM/SEC
BH CV ECHO MEAS - PA V2 VTI: 25.4 CM
BH CV ECHO MEAS - RAP SYSTOLE: 3 MMHG
BH CV ECHO MEAS - RV MAX PG: 2.7 MMHG
BH CV ECHO MEAS - RV MEAN PG: 1.5 MMHG
BH CV ECHO MEAS - RV V1 MAX: 82.9 CM/SEC
BH CV ECHO MEAS - RV V1 MEAN: 55.6 CM/SEC
BH CV ECHO MEAS - RV V1 VTI: 17.6 CM
BH CV ECHO MEAS - RVDD: 3.9 CM
BH CV ECHO MEAS - RVSP: 35.1 MMHG
BH CV ECHO MEAS - SI(AO): 43.9 ML/M^2
BH CV ECHO MEAS - SI(CUBED): 52.9 ML/M^2
BH CV ECHO MEAS - SI(LVOT): 25.9 ML/M^2
BH CV ECHO MEAS - SI(MOD-SP2): 23.5 ML/M^2
BH CV ECHO MEAS - SI(MOD-SP4): 26.8 ML/M^2
BH CV ECHO MEAS - SI(TEICH): 39.7 ML/M^2
BH CV ECHO MEAS - SV(AO): 90.8 ML
BH CV ECHO MEAS - SV(CUBED): 109.4 ML
BH CV ECHO MEAS - SV(LVOT): 53.5 ML
BH CV ECHO MEAS - SV(MOD-SP2): 48.6 ML
BH CV ECHO MEAS - SV(MOD-SP4): 55.5 ML
BH CV ECHO MEAS - SV(TEICH): 82.2 ML
BH CV ECHO MEAS - TR MAX VEL: 283.2 CM/SEC

## 2021-10-11 RX ORDER — ACEBUTOLOL HYDROCHLORIDE 200 MG/1
CAPSULE ORAL
Qty: 180 CAPSULE | OUTPATIENT
Start: 2021-10-11

## 2021-10-19 ENCOUNTER — READMISSION MANAGEMENT (OUTPATIENT)
Dept: CALL CENTER | Facility: HOSPITAL | Age: 66
End: 2021-10-19

## 2021-10-19 NOTE — OUTREACH NOTE
Medical Week 2 Survey      Responses   [facility] patient discharged from? Doc   Does the patient have one of the following disease processes/diagnoses(primary or secondary)? Other   Week 2 attempt successful? No   Unsuccessful attempts Attempt 1          Dyan Villegas LPN

## 2021-10-21 RX ORDER — APIXABAN 5 MG/1
TABLET, FILM COATED ORAL
Qty: 60 TABLET | Refills: 3 | Status: SHIPPED | OUTPATIENT
Start: 2021-10-21 | End: 2022-04-01 | Stop reason: SDUPTHER

## 2021-10-22 ENCOUNTER — READMISSION MANAGEMENT (OUTPATIENT)
Dept: CALL CENTER | Facility: HOSPITAL | Age: 66
End: 2021-10-22

## 2021-10-22 NOTE — OUTREACH NOTE
Medical Week 2 Survey      Responses   [facility] patient discharged from? Doc   Does the patient have one of the following disease processes/diagnoses(primary or secondary)? Other   Week 2 attempt successful? No   Unsuccessful attempts Attempt 2          Hiral Wright LPN

## 2021-10-31 ENCOUNTER — READMISSION MANAGEMENT (OUTPATIENT)
Dept: CALL CENTER | Facility: HOSPITAL | Age: 66
End: 2021-10-31

## 2021-10-31 NOTE — OUTREACH NOTE
Medical Week 3 Survey      Responses   [facility] patient discharged from? Doc   Does the patient have one of the following disease processes/diagnoses(primary or secondary)? Other   Week 3 attempt successful? Yes   Call start time 1125   Call end time 1130   Discharge diagnosis chest pain, A-fib RVR, chronic CHF, COPD, T2DM   Meds reviewed with patient/caregiver? Yes   Is the patient taking all medications as directed (includes completed medication regime)? Yes   Medication comments Takes pain meds for shoulder pain   Has the patient kept scheduled appointments due by today? N/A   Comments States she is supposed to have cardiac cath but not sure she will do this. I have advised her to call her PCP for a f/u appt.   Psychosocial issues? No   What is the patient's perception of their health status since discharge? Same   Is the patient/caregiver able to teach back signs and symptoms related to disease process for when to call PCP? Yes   Is the patient/caregiver able to teach back signs and symptoms related to disease process for when to call 911? Yes   Is the patient/caregiver able to teach back the hierarchy of who to call/visit for symptoms/problems? PCP, Specialist, Home health nurse, Urgent Care, ED, 911 Yes   If the patient is a current smoker, are they able to teach back resources for cessation? Not a smoker   Additional teach back comments Pt states she has some pain into shoulder--popping and cracking. I have advised he to f/u with her PCP. Enc fluids and good diet.    Week 3 Call Completed? Yes          Ava Andres RN

## 2021-11-02 DIAGNOSIS — Z79.4 TYPE 2 DIABETES MELLITUS WITH HYPERGLYCEMIA, WITH LONG-TERM CURRENT USE OF INSULIN (HCC): ICD-10-CM

## 2021-11-02 DIAGNOSIS — E11.65 TYPE 2 DIABETES MELLITUS WITH HYPERGLYCEMIA, WITH LONG-TERM CURRENT USE OF INSULIN (HCC): ICD-10-CM

## 2021-11-03 RX ORDER — PEN NEEDLE, DIABETIC 32GX 5/32"
NEEDLE, DISPOSABLE MISCELLANEOUS
Qty: 150 EACH | Refills: 11 | Status: SHIPPED | OUTPATIENT
Start: 2021-11-03 | End: 2021-11-23 | Stop reason: SDUPTHER

## 2021-11-05 ENCOUNTER — OFFICE VISIT (OUTPATIENT)
Dept: CARDIOLOGY | Facility: CLINIC | Age: 66
End: 2021-11-05

## 2021-11-05 VITALS
HEART RATE: 85 BPM | DIASTOLIC BLOOD PRESSURE: 66 MMHG | SYSTOLIC BLOOD PRESSURE: 122 MMHG | OXYGEN SATURATION: 94 % | HEIGHT: 64 IN | WEIGHT: 227 LBS | BODY MASS INDEX: 38.76 KG/M2

## 2021-11-05 DIAGNOSIS — I48.91 ATRIAL FIBRILLATION WITH RVR (HCC): Primary | ICD-10-CM

## 2021-11-05 DIAGNOSIS — I10 ESSENTIAL HYPERTENSION: Chronic | ICD-10-CM

## 2021-11-05 DIAGNOSIS — I49.3 PVC'S (PREMATURE VENTRICULAR CONTRACTIONS): ICD-10-CM

## 2021-11-05 DIAGNOSIS — E78.2 MIXED HYPERLIPIDEMIA: Chronic | ICD-10-CM

## 2021-11-05 PROCEDURE — 93000 ELECTROCARDIOGRAM COMPLETE: CPT | Performed by: NURSE PRACTITIONER

## 2021-11-05 PROCEDURE — 99213 OFFICE O/P EST LOW 20 MIN: CPT | Performed by: NURSE PRACTITIONER

## 2021-11-05 NOTE — PROGRESS NOTES
Cardiology Office Follow Up Visit      Primary Care Provider:  Lou Curran MD    Reason for f/u:     Hospital follow-up  Atrial fibrillation with RVR  Hypertension  PVCs  Dyslipidemia      Subjective     CC:    Denies chest pain or worsening dyspnea    History of Present Illness       Caterina Mason is a 66 y.o. female.  Patient has a past medical history of COPD, hypertension, dyslipidemia, diabetes, atrial fibrillation.    The patient had a recent admission to [facility] with complaints of shortness of breath and chest discomfort.    The patient was found to be in atrial fibrillation with RVR.  Medical therapy was adjusted.    The patient had a previous EP study and attempted ablation for PVCs but it was unsuccessful.    During her recent hospitalization the patient refused stress Myoview.  She had a previous cardiac catheterization in 2018 that showed no significant coronary artery disease.    Echocardiogram completed 2021 showed ejection fraction to be 55% with no significant valvular flow abnormalities detected    Fortunately the patient did not bring a list of her medications with her today but reports she is compliant with home medicines as prescribed        Past Medical History:   Diagnosis Date   • Arthritis    • Atrial fibrillation (HCC)    • Bradycardia     Dr. Charles   • Cataract    • COPD (chronic obstructive pulmonary disease) ([facility])     Dr. Rob   • Diabetes mellitus, type 2 ([facility])     sees Dr. Archibald at [place]   • DJD (degenerative joint disease)     possible herniated disc, sees Pain Mgmt in [place]   •     • GERD (gastroesophageal reflux disease)    • History of combined right and left heart catheterization 2018    minimal CAD on heart cath done    • Hyperlipidemia    • Hypertension    • Pain    • Sleep apnea     wears CPAP machine, sees Darron       Past Surgical History:   Procedure Laterality Date   • ABLATION OF DYSRHYTHMIC FOCUS     • CARDIAC  CATHETERIZATION  2018    and Angiograph    • CARDIAC ELECTROPHYSIOLOGY PROCEDURE N/A 12/10/2019    Procedure: pvc ablation;  Surgeon: Alfredo Iglesias MD;  Location: [facility] INVASIVE LOCATION;  Service: Cardiovascular   •  SECTION      x 1   • COLON RESECTION     • COLONOSCOPY  2012   • COLOSTOMY      and reversal in her 20's due to problems with childbirth   • COLOSTOMY CLOSURE     • OVARIAN CYST SURGERY     • ROTATOR CUFF REPAIR Left 2009    x2    • TOTAL ABDOMINAL HYSTERECTOMY WITH SALPINGO OOPHORECTOMY           Current Outpatient Medications:   •  albuterol sulfate  (90 Base) MCG/ACT inhaler, Inhale 2 puffs Every 4 (Four) Hours As Needed for Wheezing., Disp: , Rfl:   •  atorvastatin (LIPITOR) 20 MG tablet, Take 20 mg by mouth Daily., Disp: , Rfl:   •  cholecalciferol (VITAMIN D3) 25 MCG (1000 UT) tablet, Take 1,000 Units by mouth Daily., Disp: , Rfl:   •  dilTIAZem CD (CARDIZEM CD) 120 MG 24 hr capsule, Take 1 capsule by mouth Daily for 30 days., Disp: 30 capsule, Rfl: 0  •  Eliquis 5 MG tablet tablet, TAKE 1 TABLET BY MOUTH TWICE A DAY, Disp: 60 tablet, Rfl: 3  •  furosemide (LASIX) 40 MG tablet, Take 40 mg by mouth Daily., Disp: , Rfl:   •  HYDROcodone-acetaminophen (NORCO) 7.5-325 MG per tablet, Take 1 tablet by mouth 3 (Three) Times a Day As Needed., Disp: , Rfl:   •  Insulin Glargine (BASAGLAR KWIKPEN) 100 UNIT/ML injection pen, Inject 32 Units under the skin into the appropriate area as directed Every Night., Disp: , Rfl:   •  insulin lispro (humaLOG) 100 UNIT/ML injection, Inject 7 Units under the skin into the appropriate area as directed 3 (Three) Times a Day Before Meals., Disp: , Rfl:   •  lisinopril (PRINIVIL,ZESTRIL) 20 MG tablet, Take 20 mg by mouth Daily., Disp: , Rfl:   •  magnesium oxide (MAGOX) 400 (241.3 Mg) MG tablet tablet, Take 400 mg by mouth 2 (Two) Times a Day., Disp: , Rfl:   •  metoprolol tartrate (LOPRESSOR) 25 MG tablet, Take 1 tablet by mouth Every  12 (Twelve) Hours for 30 days., Disp: 180 tablet, Rfl: 3  •  potassium chloride (K-DUR,KLOR-CON) 10 MEQ CR tablet, Take 10 mEq by mouth Daily., Disp: , Rfl:   •  SITagliptin-metFORMIN HCl ER (Janumet XR)  MG tablet, Take 2 tablets by mouth Daily., Disp: , Rfl:   •  BD Pen Needle Lorene 2nd Gen 32G X 4 MM misc, INJECT INSULIN UNDER THE SKIN TO APPROPRIATE AREA FOUR TIMES DAILY, Disp: 150 each, Rfl: 11    Social History     Socioeconomic History   • Marital status:    Tobacco Use   • Smoking status: Former Smoker     Packs/day: 1.00     Years: 48.00     Pack years: 48.00     Types: Cigarettes     Quit date: 3/1/2020     Years since quittin.6   • Smokeless tobacco: Never Used   Vaping Use   • Vaping Use: Never used   Substance and Sexual Activity   • Alcohol use: No   • Drug use: No   • Sexual activity: Defer       Family History   Problem Relation Age of Onset   • Heart disease Mother    • Hypertension Mother    • Stroke Mother    • Seizures Mother    • Heart disease Father    • Hypertension Father    • Lung disease Father    • Stroke Father    • Cancer Sister    • Hypertension Brother    • Diabetes Brother    • Hyperlipidemia Other        The following portions of the patient's history were reviewed and updated as appropriate: allergies, current medications, past family history, past medical history, past social history, past surgical history and problem list.    Review of Systems   Constitutional: Negative for decreased appetite and diaphoresis.   HENT: Negative for congestion, hearing loss and nosebleeds.    Cardiovascular: Positive for dyspnea on exertion and palpitations. Negative for chest pain, claudication, irregular heartbeat, leg swelling, near-syncope, orthopnea, paroxysmal nocturnal dyspnea and syncope.   Respiratory: Negative for cough, shortness of breath and sleep disturbances due to breathing.    Endocrine: Negative for polyuria.   Hematologic/Lymphatic: Does not bruise/bleed easily.  "  Skin: Negative for itching and rash.   Musculoskeletal: Negative for back pain, muscle weakness and myalgias.   Gastrointestinal: Negative for abdominal pain, change in bowel habit and nausea.   Genitourinary: Negative for dysuria, flank pain, frequency and hesitancy.   Neurological: Negative for dizziness, tremors and weakness.   Psychiatric/Behavioral: Negative for altered mental status. The patient does not have insomnia.      /66 (BP Location: Left arm, Patient Position: Sitting)   Pulse 85   Ht 162.6 cm (64\")   Wt 103 kg (227 lb)   SpO2 94%   BMI 38.96 kg/m² .  Objective     Constitutional:       General: Not in acute distress.     Appearance: Normal appearance. Well-developed.   Eyes:      Pupils: Pupils are equal, round, and reactive to light.   HENT:      Head: Normocephalic and atraumatic.   Neck:      Vascular: No JVD.   Pulmonary:      Effort: Pulmonary effort is normal.      Breath sounds: Normal breath sounds.   Cardiovascular:      Normal rate. Irregularly irregular rhythm.   Pulses:     Intact distal pulses.   Edema:     Peripheral edema absent.   Abdominal:      General: There is no distension.      Palpations: Abdomen is soft.      Tenderness: There is no abdominal tenderness.   Musculoskeletal: Normal range of motion.      Cervical back: Normal range of motion and neck supple. Skin:     General: Skin is warm and dry.   Neurological:      Mental Status: Alert and oriented to person, place, and time.             ECG 12 Lead    Date/Time: 11/5/2021 1:42 PM  Performed by: Calli Weir APRN  Authorized by: Calli Weir APRN   Comparison: not compared with previous ECG   Rhythm: atrial fibrillation  Ectopy: unifocal PVCs  BPM: 85  Conduction: right bundle branch block  QRS axis: right    Clinical impression: abnormal EKG            EKG ordered by and reviewed by me in office         Diagnoses and all orders for this visit:    1. Atrial fibrillation with RVR (HCC) " (Primary)  Comments:  He still hospitalization for A. fib with RVR.  Her atrial fibrillation is permanent.  Her ventricular rates are now stable.  She has anticoagulated with Eliquis    2. Essential hypertension  Comments:  Stable on current medical therapy    3. PVC's (premature ventricular contractions)  Comments:  Isolated PVCs noted on EKG    4. Mixed hyperlipidemia  Comments:  Remains on statin therapy    Other orders  -     metoprolol tartrate (LOPRESSOR) 25 MG tablet; Take 1 tablet by mouth Every 12 (Twelve) Hours for 30 days.  Dispense: 180 tablet; Refill: 3           MEDICAL DECISION MAKING:        Patient is here today for hospital follow-up.  She was recently admitted to [facility] and found to be in atrial fibrillation with RVR.  Her atrial fibrillation is permanent.    Medications were adjusted.  She presents today for follow-up.  Her ventricular rates are stable.    Echocardiogram done in the hospital showed preserved LV function.    Patient declined stress Myoview that was ordered during the hospitalization.  She had previous cardiac catheterization in 2018 that showed normal coronary anatomy.    At this time the patient appears to be fairly well compensated.  She complains of chronic dyspnea with exertion that is unchanged.  She is having no chest pain currently.    We will continue the current medical treatment.  We will plan on seeing her for scheduled follow-up in 4 months.  If the patient develops any new or worsening problems have asked her to contact the office sooner

## 2021-11-08 ENCOUNTER — TELEPHONE (OUTPATIENT)
Dept: FAMILY MEDICINE CLINIC | Facility: CLINIC | Age: 66
End: 2021-11-08

## 2021-11-08 ENCOUNTER — READMISSION MANAGEMENT (OUTPATIENT)
Dept: CALL CENTER | Facility: HOSPITAL | Age: 66
End: 2021-11-08

## 2021-11-08 RX ORDER — FLUCONAZOLE 150 MG/1
150 TABLET ORAL ONCE
Qty: 1 TABLET | Refills: 0 | Status: SHIPPED | OUTPATIENT
Start: 2021-11-08 | End: 2021-11-08

## 2021-11-08 NOTE — OUTREACH NOTE
Medical Week 4 Survey      Responses   [facility] patient discharged from? Doc   Does the patient have one of the following disease processes/diagnoses(primary or secondary)? Other   Week 4 attempt successful? No          Hiral Wright LPN

## 2021-11-08 NOTE — TELEPHONE ENCOUNTER
"  Caller: Caterina Mason    Relationship: Self    Best call back number: [phone]*    What medication are you requesting: FOR VAGINAL YEAST INFECTION    What are your current symptoms: ITCHING    How long have you been experiencing symptoms: COMES AND GOES, \"FOR AWHILE\"    Have you had these symptoms before:    [x] Yes  [] No    Have you been treated for these symptoms before:   [x] Yes  [] No    If a prescription is needed, what is your preferred pharmacy and phone number:       [facility]/pharmacy #[id number] - [facility] [place] - [address] [place] [address] [phone] [facility] [phone] [phone]    Additional notes:          "

## 2021-11-15 ENCOUNTER — TELEPHONE (OUTPATIENT)
Dept: ENDOCRINOLOGY | Facility: CLINIC | Age: 66
End: 2021-11-15

## 2021-11-15 DIAGNOSIS — Z79.4 TYPE 2 DIABETES MELLITUS WITH HYPERGLYCEMIA, WITH LONG-TERM CURRENT USE OF INSULIN (HCC): Primary | ICD-10-CM

## 2021-11-15 DIAGNOSIS — E11.65 TYPE 2 DIABETES MELLITUS WITH HYPERGLYCEMIA, WITH LONG-TERM CURRENT USE OF INSULIN (HCC): Primary | ICD-10-CM

## 2021-11-16 ENCOUNTER — LAB (OUTPATIENT)
Dept: LAB | Facility: HOSPITAL | Age: 66
End: 2021-11-16

## 2021-11-16 DIAGNOSIS — Z79.4 TYPE 2 DIABETES MELLITUS WITH HYPERGLYCEMIA, WITH LONG-TERM CURRENT USE OF INSULIN (HCC): ICD-10-CM

## 2021-11-16 DIAGNOSIS — E11.65 TYPE 2 DIABETES MELLITUS WITH HYPERGLYCEMIA, WITH LONG-TERM CURRENT USE OF INSULIN (HCC): ICD-10-CM

## 2021-11-16 DIAGNOSIS — Z79.4 TYPE 2 DIABETES MELLITUS WITH HYPERGLYCEMIA, WITH LONG-TERM CURRENT USE OF INSULIN (HCC): Primary | ICD-10-CM

## 2021-11-16 DIAGNOSIS — E11.65 TYPE 2 DIABETES MELLITUS WITH HYPERGLYCEMIA, WITH LONG-TERM CURRENT USE OF INSULIN (HCC): Primary | ICD-10-CM

## 2021-11-16 LAB
ALBUMIN SERPL-MCNC: 4.4 G/DL (ref 3.5–5.2)
ALBUMIN UR-MCNC: <1.2 MG/DL
ALBUMIN/GLOB SERPL: 1.3 G/DL
ALP SERPL-CCNC: 70 U/L (ref 39–117)
ALT SERPL W P-5'-P-CCNC: 59 U/L (ref 1–33)
ANION GAP SERPL CALCULATED.3IONS-SCNC: 7.4 MMOL/L (ref 5–15)
AST SERPL-CCNC: 57 U/L (ref 1–32)
BILIRUB SERPL-MCNC: 0.7 MG/DL (ref 0–1.2)
BUN SERPL-MCNC: 15 MG/DL (ref 8–23)
BUN/CREAT SERPL: 16.7 (ref 7–25)
CALCIUM SPEC-SCNC: 10.3 MG/DL (ref 8.6–10.5)
CHLORIDE SERPL-SCNC: 101 MMOL/L (ref 98–107)
CHOLEST SERPL-MCNC: 135 MG/DL (ref 0–200)
CO2 SERPL-SCNC: 30.6 MMOL/L (ref 22–29)
CREAT SERPL-MCNC: 0.9 MG/DL (ref 0.57–1)
CREAT UR-MCNC: 24.3 MG/DL
GFR SERPL CREATININE-BSD FRML MDRD: 63 ML/MIN/1.73
GLOBULIN UR ELPH-MCNC: 3.5 GM/DL
GLUCOSE SERPL-MCNC: 229 MG/DL (ref 65–99)
HBA1C MFR BLD: 11.6 % (ref 3.5–5.6)
HDLC SERPL-MCNC: 28 MG/DL (ref 40–60)
LDLC SERPL CALC-MCNC: 63 MG/DL (ref 0–100)
LDLC/HDLC SERPL: 1.84 {RATIO}
MICROALBUMIN/CREAT UR: NORMAL MG/G{CREAT}
POTASSIUM SERPL-SCNC: 3.4 MMOL/L (ref 3.5–5.2)
PROT SERPL-MCNC: 7.9 G/DL (ref 6–8.5)
SODIUM SERPL-SCNC: 139 MMOL/L (ref 136–145)
TRIGL SERPL-MCNC: 278 MG/DL (ref 0–150)
VLDLC SERPL-MCNC: 44 MG/DL (ref 5–40)

## 2021-11-16 PROCEDURE — 80053 COMPREHEN METABOLIC PANEL: CPT

## 2021-11-16 PROCEDURE — 80061 LIPID PANEL: CPT

## 2021-11-16 PROCEDURE — 82570 ASSAY OF URINE CREATININE: CPT

## 2021-11-16 PROCEDURE — 82043 UR ALBUMIN QUANTITATIVE: CPT

## 2021-11-16 PROCEDURE — 83036 HEMOGLOBIN GLYCOSYLATED A1C: CPT

## 2021-11-16 PROCEDURE — 36415 COLL VENOUS BLD VENIPUNCTURE: CPT

## 2021-11-16 RX ORDER — POTASSIUM CHLORIDE 750 MG/1
CAPSULE, EXTENDED RELEASE ORAL
COMMUNITY
Start: 2021-10-23 | End: 2022-01-25

## 2021-11-16 RX ORDER — CHOLECALCIFEROL (VITAMIN D3) 25 MCG
CAPSULE ORAL
COMMUNITY
Start: 2021-10-29 | End: 2022-04-01

## 2021-11-16 RX ORDER — LANCETS
EACH MISCELLANEOUS
Qty: 100 EACH | Refills: 11 | Status: SHIPPED | OUTPATIENT
Start: 2021-11-16 | End: 2022-10-21

## 2021-11-16 RX ORDER — BLOOD SUGAR DIAGNOSTIC
STRIP MISCELLANEOUS
COMMUNITY
Start: 2021-10-13 | End: 2021-12-01

## 2021-11-16 RX ORDER — MAGNESIUM OXIDE 400 MG/1
TABLET ORAL
COMMUNITY
Start: 2021-10-31 | End: 2022-02-28

## 2021-11-23 ENCOUNTER — OFFICE VISIT (OUTPATIENT)
Dept: ENDOCRINOLOGY | Facility: CLINIC | Age: 66
End: 2021-11-23

## 2021-11-23 VITALS
HEIGHT: 64 IN | OXYGEN SATURATION: 95 % | SYSTOLIC BLOOD PRESSURE: 135 MMHG | TEMPERATURE: 96.8 F | WEIGHT: 228 LBS | BODY MASS INDEX: 38.93 KG/M2 | DIASTOLIC BLOOD PRESSURE: 80 MMHG | HEART RATE: 86 BPM

## 2021-11-23 DIAGNOSIS — I10 ESSENTIAL HYPERTENSION: Chronic | ICD-10-CM

## 2021-11-23 DIAGNOSIS — Z79.4 TYPE 2 DIABETES MELLITUS WITH HYPERGLYCEMIA, WITH LONG-TERM CURRENT USE OF INSULIN (HCC): Primary | Chronic | ICD-10-CM

## 2021-11-23 DIAGNOSIS — E78.2 MIXED HYPERLIPIDEMIA: Chronic | ICD-10-CM

## 2021-11-23 DIAGNOSIS — E11.65 TYPE 2 DIABETES MELLITUS WITH HYPERGLYCEMIA, WITH LONG-TERM CURRENT USE OF INSULIN (HCC): Primary | Chronic | ICD-10-CM

## 2021-11-23 LAB — GLUCOSE BLDC GLUCOMTR-MCNC: 376 MG/DL (ref 70–105)

## 2021-11-23 PROCEDURE — 82962 GLUCOSE BLOOD TEST: CPT | Performed by: INTERNAL MEDICINE

## 2021-11-23 PROCEDURE — 99214 OFFICE O/P EST MOD 30 MIN: CPT | Performed by: INTERNAL MEDICINE

## 2021-11-23 RX ORDER — FLUCONAZOLE 150 MG/1
TABLET ORAL
COMMUNITY
Start: 2021-11-08 | End: 2022-01-18 | Stop reason: SDUPTHER

## 2021-11-23 RX ORDER — INSULIN LISPRO 100 [IU]/ML
INJECTION, SOLUTION INTRAVENOUS; SUBCUTANEOUS
Qty: 5 PEN | Refills: 6 | Status: SHIPPED | OUTPATIENT
Start: 2021-11-23 | End: 2022-12-19

## 2021-11-23 RX ORDER — FENOFIBRATE 160 MG/1
TABLET ORAL
COMMUNITY
Start: 2021-11-09 | End: 2022-02-04

## 2021-11-23 RX ORDER — BUDESONIDE AND FORMOTEROL FUMARATE DIHYDRATE 160; 4.5 UG/1; UG/1
AEROSOL RESPIRATORY (INHALATION)
COMMUNITY
Start: 2021-11-09 | End: 2022-04-11

## 2021-11-23 RX ORDER — INSULIN GLARGINE 100 [IU]/ML
40 INJECTION, SOLUTION SUBCUTANEOUS NIGHTLY
Qty: 5 PEN | Refills: 6 | Status: SHIPPED | OUTPATIENT
Start: 2021-11-23 | End: 2022-02-07 | Stop reason: SDUPTHER

## 2021-11-23 RX ORDER — PEN NEEDLE, DIABETIC 32GX 5/32"
NEEDLE, DISPOSABLE MISCELLANEOUS
Qty: 150 EACH | Refills: 11 | Status: SHIPPED | OUTPATIENT
Start: 2021-11-23 | End: 2022-12-27

## 2021-11-23 NOTE — PROGRESS NOTES
Endocrine Progress Note Outpatient     Patient Care Team:  Lou Curran MD as PCP - General  Jesse Charles MD as Consulting Physician (Cardiology)  Bautista Archibald MD as Consulting Physician (Endocrinology)  Jane Montero MD as Consulting Physician (Obstetrics and Gynecology)    Chief Complaint: Follow-up type 2 diabetes    HPI: 66-year-old female with history of type 2 diabetes, hypertension and hyperlipidemia is here for follow-up.    For type 2 diabetes she is currently on Janumet XR 50/thousand 2 tablets daily.  She is Lantus 32 units sq qhs, Humalog 7 units with each meal. She did bring in blood sugar records for review and mostly about 200. She is not able to follow diet. She tells me that since she stopped smoking she feels hungry and eats the wrong stuff.    Hypertension: Well-controlled.    Hyperlipidemia: On atorvastatin and fenofibrate.    Past Medical History:   Diagnosis Date   • Arthritis    • Atrial fibrillation (HCC)    • Bradycardia     Dr. Charles   • Cataract    • COPD (chronic obstructive pulmonary disease) ([facility])     Dr. Rob   • Diabetes mellitus, type 2 (HCC)     sees Dr. Archibald at [place]   • DJD (degenerative joint disease)     possible herniated disc, sees Pain Mgmt in [place]   •     • GERD (gastroesophageal reflux disease)    • History of combined right and left heart catheterization 2018    minimal CAD on heart cath done    • Hyperlipidemia    • Hypertension    • Pain    • Sleep apnea     wears CPAP machine, sees Darron       Social History     Socioeconomic History   • Marital status:    Tobacco Use   • Smoking status: Former Smoker     Packs/day: 1.00     Years: 48.00     Pack years: 48.00     Types: Cigarettes     Quit date: 3/1/2020     Years since quittin.7   • Smokeless tobacco: Never Used   Vaping Use   • Vaping Use: Never used   Substance and Sexual Activity   • Alcohol use: No   • Drug use: No   • Sexual activity: Defer       Family History   Problem  Relation Age of Onset   • Heart disease Mother    • Hypertension Mother    • Stroke Mother    • Seizures Mother    • Heart disease Father    • Hypertension Father    • Lung disease Father    • Stroke Father    • Cancer Sister    • Hypertension Brother    • Diabetes Brother    • Hyperlipidemia Other        Allergies   Allergen Reactions   • Ibuprofen GI Intolerance   • Prednisone Other (See Comments)   • Pregabalin Other (See Comments)     jerking   • Tramadol Other (See Comments)     Legs jerked   • Levofloxacin Other (See Comments)     Increase sunburn   • Sulfamethoxazole-Trimethoprim Swelling       ROS:   Constitutional:  Admit fatigue, tiredness.    Eyes:  Denies change in visual acuity   HENT:  Denies nasal congestion or sore throat   Respiratory: denies cough, admit shortness of breath.   Cardiovascular:  denies chest pain, edema   GI:  Denies abdominal pain, nausea, vomiting.   Musculoskeletal:   Admit muscle aches and cramps.  Integument:  Denies dry skin and rash   Neurologic:  Denies headache, focal weakness or sensory changes   Endocrine:  Denies polyuria or polydipsia   Psychiatric:  Denies depression or anxiety      Vitals:    11/23/21 1347   BP: 135/80   Pulse: 86   Temp: 96.8 °F (36 °C)   SpO2: 95%     BMI: 39.1  Physical Exam:  GEN: NAD, conversant, obese  EYES: EOMI, PERRL, no conjunctival erythema  NECK: no thyromegaly, full ROM   CV: RRR, no murmurs/rubs/gallops, no peripheral edema  LUNG: CTAB, no wheezes/rales/ronchi  SKIN: no rashes, no acanthosis  MSK: no deformities, full ROM of all extremities  NEURO: no tremors, DTR normal  PSYCH: AOX3, appropriate mood, affect normal  FEET: Has Onychomycosis    Results Review:     I reviewed the patient's new clinical results.    Lab Results   Component Value Date    HGBA1C 11.6 (H) 11/16/2021    HGBA1C 10.6 (H) 05/20/2021    HGBA1C 9.9 (H) 09/22/2020      Lab Results   Component Value Date    GLUCOSE 229 (H) 11/16/2021    BUN 15 11/16/2021    CREATININE  0.90 11/16/2021    EGFRIFNONA 63 11/16/2021    BCR 16.7 11/16/2021    K 3.4 (L) 11/16/2021    CO2 30.6 (H) 11/16/2021    CALCIUM 10.3 11/16/2021    ALBUMIN 4.40 11/16/2021    LABIL2 1.4 05/08/2019    AST 57 (H) 11/16/2021    ALT 59 (H) 11/16/2021    CHOL 135 11/16/2021    TRIG 278 (H) 11/16/2021    LDL 63 11/16/2021    HDL 28 (L) 11/16/2021     Lab Results   Component Value Date    TSH 1.830 10/05/2021         Medication Review: Reviewed.       Current Outpatient Medications:   •  Accu-Chek Aurea Plus test strip, , Disp: , Rfl:   •  Accu-Chek Softclix Lancets lancets, Use to check blood sugars 3 times a day., Disp: 100 each, Rfl: 11  •  albuterol sulfate  (90 Base) MCG/ACT inhaler, Inhale 2 puffs Every 4 (Four) Hours As Needed for Wheezing., Disp: , Rfl:   •  atorvastatin (LIPITOR) 20 MG tablet, Take 20 mg by mouth Daily., Disp: , Rfl:   •  BD Pen Needle Lorene 2nd Gen 32G X 4 MM misc, INJECT INSULIN UNDER THE SKIN TO APPROPRIATE AREA FOUR TIMES DAILY, Disp: 150 each, Rfl: 11  •  budesonide-formoterol (SYMBICORT) 160-4.5 MCG/ACT inhaler, , Disp: , Rfl:   •  Calcium Carb-Cholecalciferol (Oyster Shell Calcium w/D) 500-200 MG-UNIT tablet, , Disp: , Rfl:   •  cholecalciferol (VITAMIN D3) 25 MCG (1000 UT) tablet, Take 1,000 Units by mouth Daily., Disp: , Rfl:   •  [employer] D3 25 MCG (1000 UT) capsule, , Disp: , Rfl:   •  Eliquis 5 MG tablet tablet, TAKE 1 TABLET BY MOUTH TWICE A DAY, Disp: 60 tablet, Rfl: 3  •  fenofibrate 160 MG tablet, , Disp: , Rfl:   •  fluconazole (DIFLUCAN) 150 MG tablet, , Disp: , Rfl:   •  furosemide (LASIX) 40 MG tablet, Take 40 mg by mouth Daily., Disp: , Rfl:   •  HYDROcodone-acetaminophen (NORCO) 7.5-325 MG per tablet, Take 1 tablet by mouth 3 (Three) Times a Day As Needed., Disp: , Rfl:   •  Insulin Glargine (BASAGLAR KWIKPEN) 100 UNIT/ML injection pen, Inject 32 Units under the skin into the appropriate area as directed Every Night., Disp: , Rfl:   •  insulin lispro (humaLOG) 100 UNIT/ML  injection, Inject 7 Units under the skin into the appropriate area as directed 3 (Three) Times a Day Before Meals., Disp: , Rfl:   •  lisinopril (PRINIVIL,ZESTRIL) 20 MG tablet, Take 20 mg by mouth Daily., Disp: , Rfl:   •  magnesium oxide (MAG-OX) 400 MG tablet, , Disp: , Rfl:   •  magnesium oxide (MAGOX) 400 (241.3 Mg) MG tablet tablet, Take 400 mg by mouth 2 (Two) Times a Day., Disp: , Rfl:   •  metoprolol tartrate (LOPRESSOR) 25 MG tablet, Take 1 tablet by mouth Every 12 (Twelve) Hours for 30 days., Disp: 180 tablet, Rfl: 3  •  potassium chloride (K-DUR,KLOR-CON) 10 MEQ CR tablet, Take 10 mEq by mouth Daily., Disp: , Rfl:   •  potassium chloride (MICRO-K) 10 MEQ CR capsule, , Disp: , Rfl:   •  SITagliptin-metFORMIN HCl ER (Janumet XR)  MG tablet, Take 2 tablets by mouth Daily., Disp: , Rfl:   •  dilTIAZem CD (CARDIZEM CD) 120 MG 24 hr capsule, Take 1 capsule by mouth Daily for 30 days., Disp: 30 capsule, Rfl: 0      Assessment/Plan   1.  Diabetes mellitus type II: Uncontrolled with worsening of A1c at 11.6%.  Will increase Basaglar to 40 units subcu nightly and increase Humalog to 14 with each meal.  She will continue Janumet..  I am also going to refer her for the nutrition consult again.  She is advised to always keep glucose source in case of low blood sugar and get regular eye exam and flu vaccine.    2.  Hypertension: Well-controlled, continue current medication    3.  Hyperlipidemia: Well-controlled, will continue atorvastatin with fenofibrate.    4.  Onychomycosis: Seen by podiatry.    5.  Obesity: She will did not make her appointment with a nutritionist, will refer again. She needs to work on her diet and activity..              Bautista Archibald MD FACE.

## 2021-11-23 NOTE — PATIENT INSTRUCTIONS
Increase Basaglar to 40 units subcu daily  Increase Humalog to 14 units with each meal  Always keep glucose source in case of low blood sugar  Arrange for nutrition consult  Please work on your diet  Annual eye exam and flu vaccine  Continue Janumet  Labs before follow-up.

## 2021-12-01 DIAGNOSIS — E11.65 TYPE 2 DIABETES MELLITUS WITH HYPERGLYCEMIA (HCC): ICD-10-CM

## 2021-12-01 RX ORDER — BLOOD SUGAR DIAGNOSTIC
STRIP MISCELLANEOUS
Qty: 50 EACH | Refills: 15 | Status: SHIPPED | OUTPATIENT
Start: 2021-12-01 | End: 2022-05-31

## 2021-12-03 ENCOUNTER — TELEPHONE (OUTPATIENT)
Dept: CARDIOLOGY | Facility: CLINIC | Age: 66
End: 2021-12-03

## 2021-12-03 RX ORDER — DILTIAZEM HYDROCHLORIDE 120 MG/1
120 CAPSULE, COATED, EXTENDED RELEASE ORAL
Qty: 30 CAPSULE | Refills: 1 | Status: SHIPPED | OUTPATIENT
Start: 2021-12-03 | End: 2022-02-07 | Stop reason: SDUPTHER

## 2021-12-03 NOTE — TELEPHONE ENCOUNTER
Needs diltiazem 120 mg sent to [facility] on [address]  She says she has been trying to get this refilled for a few days  She is completely  out Needs ASAP

## 2022-01-16 NOTE — TELEPHONE ENCOUNTER
Began care.   RN assessing fht and contractions every 15 minutes, patient sitting  Up in bed , patient with cough, audiable fetal movement Left message on voicemail

## 2022-01-18 ENCOUNTER — TELEPHONE (OUTPATIENT)
Dept: FAMILY MEDICINE CLINIC | Facility: CLINIC | Age: 67
End: 2022-01-18

## 2022-01-18 RX ORDER — FLUCONAZOLE 150 MG/1
150 TABLET ORAL ONCE
Qty: 1 TABLET | Refills: 0 | Status: SHIPPED | OUTPATIENT
Start: 2022-01-18 | End: 2022-01-18

## 2022-01-18 NOTE — TELEPHONE ENCOUNTER
Caller: Caterina Mason    Relationship: Self    Best call back number:[phone]    What medication are you requesting: YEAST INFECTION MEDICATION     What are your current symptoms: ITCHING AND BURNING IN VAGINAL AREA     How long have you been experiencing symptoms: A FEW WEEKS BUT GETTING WORSE     Have you had these symptoms before:    [x] Yes  [] No    Have you been treated for these symptoms before:   [x] Yes  [] No    If a prescription is needed, what is your preferred pharmacy and phone number:       [facility]/pharmacy #[id number] - [facility] [place] [address] [phone] [facility] [phone]      Additional notes: PT STATED THAT SHE DOES NOT WISH TO COME INTO OFFICE DUE TO COVID

## 2022-01-24 NOTE — TELEPHONE ENCOUNTER
This medication says Historical Provider in her chart. Please advise if you are managing this medication.

## 2022-01-25 RX ORDER — LISINOPRIL 20 MG/1
TABLET ORAL
Qty: 90 TABLET | Refills: 1 | Status: SHIPPED | OUTPATIENT
Start: 2022-01-25 | End: 2022-07-25

## 2022-01-25 RX ORDER — POTASSIUM CHLORIDE 750 MG/1
CAPSULE, EXTENDED RELEASE ORAL
Qty: 90 CAPSULE | Refills: 1 | Status: SHIPPED | OUTPATIENT
Start: 2022-01-25 | End: 2022-04-11

## 2022-01-30 RX ORDER — SITAGLIPTIN AND METFORMIN HYDROCHLORIDE 1000; 50 MG/1; MG/1
TABLET, FILM COATED, EXTENDED RELEASE ORAL
Qty: 60 TABLET | Refills: 3 | Status: SHIPPED | OUTPATIENT
Start: 2022-01-30 | End: 2022-05-28

## 2022-02-04 RX ORDER — FENOFIBRATE 160 MG/1
TABLET ORAL
Qty: 90 TABLET | Refills: 1 | Status: SHIPPED | OUTPATIENT
Start: 2022-02-04 | End: 2022-07-27

## 2022-02-04 RX ORDER — ATORVASTATIN CALCIUM 40 MG/1
TABLET, FILM COATED ORAL
Qty: 45 TABLET | Refills: 1 | Status: SHIPPED | OUTPATIENT
Start: 2022-02-04 | End: 2022-08-29

## 2022-02-07 ENCOUNTER — TELEPHONE (OUTPATIENT)
Dept: CARDIOLOGY | Facility: CLINIC | Age: 67
End: 2022-02-07

## 2022-02-07 RX ORDER — DILTIAZEM HYDROCHLORIDE 120 MG/1
120 CAPSULE, COATED, EXTENDED RELEASE ORAL
Qty: 90 CAPSULE | Refills: 1 | Status: SHIPPED | OUTPATIENT
Start: 2022-02-07 | End: 2022-07-25

## 2022-02-07 RX ORDER — INSULIN GLARGINE 100 [IU]/ML
40 INJECTION, SOLUTION SUBCUTANEOUS NIGHTLY
Qty: 5 PEN | Refills: 6 | Status: SHIPPED | OUTPATIENT
Start: 2022-02-07 | End: 2022-11-14

## 2022-02-07 NOTE — TELEPHONE ENCOUNTER
Is she suppose to be taking the diltiazem 120 mg  1x daily  If so she needs a refill sent to [facility] on [address]

## 2022-02-18 ENCOUNTER — TELEPHONE (OUTPATIENT)
Dept: PHYSICAL THERAPY | Facility: CLINIC | Age: 67
End: 2022-02-18

## 2022-02-24 ENCOUNTER — TELEPHONE (OUTPATIENT)
Dept: PHYSICAL THERAPY | Facility: CLINIC | Age: 67
End: 2022-02-24

## 2022-02-28 RX ORDER — MAGNESIUM OXIDE 400 MG/1
TABLET ORAL
Qty: 60 TABLET | Refills: 6 | Status: SHIPPED | OUTPATIENT
Start: 2022-02-28 | End: 2022-04-11

## 2022-03-01 ENCOUNTER — TREATMENT (OUTPATIENT)
Dept: PHYSICAL THERAPY | Facility: CLINIC | Age: 67
End: 2022-03-01

## 2022-03-01 DIAGNOSIS — Z74.09 IMPAIRED FUNCTIONAL MOBILITY, BALANCE, GAIT, AND ENDURANCE: ICD-10-CM

## 2022-03-01 DIAGNOSIS — M25.559 PAIN, HIP: ICD-10-CM

## 2022-03-01 DIAGNOSIS — M54.42 CHRONIC BILATERAL LOW BACK PAIN WITH BILATERAL SCIATICA: Primary | ICD-10-CM

## 2022-03-01 DIAGNOSIS — G89.29 CHRONIC BILATERAL LOW BACK PAIN WITH BILATERAL SCIATICA: Primary | ICD-10-CM

## 2022-03-01 DIAGNOSIS — M54.41 CHRONIC BILATERAL LOW BACK PAIN WITH BILATERAL SCIATICA: Primary | ICD-10-CM

## 2022-03-01 DIAGNOSIS — M54.6 CHRONIC BILATERAL THORACIC BACK PAIN: ICD-10-CM

## 2022-03-01 DIAGNOSIS — G89.29 CHRONIC BILATERAL THORACIC BACK PAIN: ICD-10-CM

## 2022-03-01 PROCEDURE — 97162 PT EVAL MOD COMPLEX 30 MIN: CPT | Performed by: PHYSICAL THERAPIST

## 2022-03-01 NOTE — PROGRESS NOTES
"  Physical Therapy Initial Evaluation and Plan of Care    Patient: Caterina Mason   : 1955  Diagnosis/ICD-10 Code:  Chronic bilateral low back pain with bilateral sciatica [M54.42, M54.41, G89.29]  Referring practitioner: Yari Bakrer  Date of Initial Visit: 3/1/2022  Today's Date: 3/1/2022  Patient seen for 1 sessions           Subjective Questionnaire: PT Functional Test: Oswestry = 64% impairment      Subjective Evaluation    History of Present Illness  Mechanism of injury: Pt reports having chronic back pain for ~20 yr. States pain is in low back, B buttock, and up between shoulder blades. Takes hydrocodone 3x/day. States pain radiates down B LE to feet at times. Pt with peripheral neuropathy related to DM. Pt states her back hurts if she leans back or slouches while sitting. Cannot lay on her back. States she sleeps on her belly or her sides. Back starts to feel weak with standing and feels like it is \"going to buckle.\" States back starts to feel better if she is moving around. Pain is constant. States she did PT 3 yr ago at [facility] with little benefit. Also reports having R hip pain at side and along groin. Pt states is on a new heart pill that makes her dizzy. Has not had any falls related to this. Uses cane outside of her home.  Pt reports that she cannot lay down at all without O2, stating she will not be able to breath.       Patient Occupation: disabled Quality of life: good    Pain  Current pain ratin  At best pain ratin  At worst pain rating: 10  Location: low back, mid back, B buttock  Quality: dull ache, radiating and knife-like  Relieving factors: medications, heat and change in position  Aggravating factors: movement, lifting, standing, prolonged positioning, sleeping and ambulation  Progression: no change    Social Support  Lives in: trailer (ramp or 3 steps to enter)  Lives with: spouse    Treatments  Previous treatment: physical therapy  Patient Goals  Patient goals for therapy: " decreased pain, improved balance, increased motion, increased strength and independence with ADLs/IADLs             Objective          Postural Observations  Seated posture: fair  Standing posture: fair    Additional Postural Observation Details  In sitting: pt sits upright and does not lean against back of chair.  In standing: increased lumbar lordosis.  Gait: decreased mark, uses cane in R hand. Minimal pelvic movement.    Palpation   Left   Tenderness of the lumbar paraspinals.     Right Tenderness of the gluteus medius and lumbar paraspinals.     Tenderness     Left Hip   Tenderness in the sacroiliac joint.     Right Hip   Tenderness in the sacroiliac joint.     Additional Tenderness Details  Generalized tenderness lumbar region.    Active Range of Motion     Additional Active Range of Motion Details  Lumbar AROM:  All lumbar AROM 75% limited in all directions with pain. Overall lumbar AROM assessment limited.    Strength/Myotome Testing     Left Hip   Planes of Motion   Flexion: 4  Abduction: 4-    Right Hip   Planes of Motion   Flexion: 3+  Abduction: 3+    Left Knee   Flexion: 4+  Extension: 4+    Right Knee   Flexion: 4+  Extension: 4+    Left Ankle/Foot   Dorsiflexion: 4+    Right Ankle/Foot   Dorsiflexion: 4+    Additional Strength Details  All strength testing completed in sitting due to pt declined to attempt laying down.    Lumbar Flexibility Comments:   Tightness present of R hip flexor, R piriformis.          Assessment & Plan     Assessment  Impairments: abnormal gait, abnormal muscle firing, abnormal muscle tone, abnormal or restricted ROM, activity intolerance, impaired balance, impaired physical strength, lacks appropriate home exercise program, pain with function, safety issue and weight-bearing intolerance  Functional Limitations: carrying objects, lifting, sleeping, walking, pulling, uncomfortable because of pain, moving in bed, sitting, standing, stooping and unable to perform repetitive  tasks  Assessment details: Pt is a 66 y.o. female with low back pain, thoracic pain, and R hip pain. Pt presents with decreased lumbar AROM, decreased core strength, and decreased B hip strength. Pt with tightness and limitations in R hip ROM compared to L. Pt is having difficulty with all ambulation. Difficulty standing. Difficulty sitting comfortably in her recliner. Oswestry indicates 64% impairment.    Patient presents with the impairments listed above and based on the objective findings and the physical therapy evaluation, the patient's condition has the potential to improve in response to therapy.   The patient's condition and/or services required are at a level of complexity that necessitates the skill & supervision of a physical therapist.    Prognosis: good    Goals  Plan Goals: STG to be met in 3 wk:  - Pt to report 50% improvement in pain during sit to stand.  - Pt to report 25% or better improvement in pain with activity..  - Pt to be independent with HEP.  LTG to be met in 12 wk:  - Improve Oswestry to 35% or less impairment.  - Increase R hip flex and abd strength to 4-/5 for improved standing tolerance.  - Pt to tolerate sitting up to 1 hr with no increase in pain.    Plan  Therapy options: will be seen for skilled therapy services  Planned modality interventions: electrical stimulation/Russian stimulation, thermotherapy (hydrocollator packs), traction and ultrasound  Planned therapy interventions: manual therapy, abdominal trunk stabilization, balance/weight-bearing training, body mechanics training, flexibility, functional ROM exercises, home exercise program, joint mobilization, neuromuscular re-education, postural training, soft tissue mobilization, spinal/joint mobilization, strengthening, stretching, therapeutic activities, gait training and transfer training  Frequency: 1-2x/wk.  Duration in weeks: 12  Treatment plan discussed with: patient  Plan details: Assessment limited this date due to pt  unable to lay on mat (in supine or in sidelying) due to reported difficulty breathing laying down without O2 on. All tests and measures completed in sitting as able.        Timed:         Manual Therapy:         mins  38131;     Therapeutic Exercise:     5    mins  56684;     Neuromuscular Alistair:        mins  52864;    Therapeutic Activity:          mins  85675;     Gait Training:           mins  55928;     Ultrasound:          mins  14124;    Ionto                                   mins   94891  Can Repos          mins 81922  Self - Care                          mins  96272    Un-Timed:  Electrical Stimulation:         mins  90654 ( );  Dry Needling          20561/20560  Traction          mins 37359  Low Eval          Mins  47932  Mod Eval     35     Mins  93582  High Eval                            Mins  75266      Timed Treatment:   5   mins   Total Treatment:     40   mins      PT SIGNATURE: Estella Lewis PT, CLT  [place] Lic # [id number]  Electronically signed by Estella Lewis PT, 03/01/22, 11:00 AM EST    Initial Certification  Certification Period: 3/1/2022 thru 5/29/2022  I certify that the therapy services are furnished while this patient is under my care.  The services outlined above are required by this patient, and will be reviewed every 90 days.     PHYSICIAN: Yari Barker _____________________________________________________  NPI: [id number]                                      DATE:    Please sign and return via fax to [phone]. Thank you, [facility].

## 2022-03-28 ENCOUNTER — TREATMENT (OUTPATIENT)
Dept: PHYSICAL THERAPY | Facility: CLINIC | Age: 67
End: 2022-03-28

## 2022-03-28 NOTE — PROGRESS NOTES
"Pt. Entered department stating that \"I do not think I should be here\".  She reports that she has diarrhea and coughing.  Pt. Deferred PT due to being ill.  She scheduled more appointments.    "

## 2022-04-01 ENCOUNTER — TELEPHONE (OUTPATIENT)
Dept: CARDIOLOGY | Facility: CLINIC | Age: 67
End: 2022-04-01

## 2022-04-01 RX ORDER — CHOLECALCIFEROL (VITAMIN D3) 25 MCG
CAPSULE ORAL
Qty: 60 CAPSULE | Refills: 5 | Status: SHIPPED | OUTPATIENT
Start: 2022-04-01 | End: 2022-05-24 | Stop reason: SDUPTHER

## 2022-04-01 NOTE — TELEPHONE ENCOUNTER
Patient called and stated that she is out of refills on her Eliquis 5 mg and she would like for Dr. Charles to send in a new prescription.

## 2022-04-11 ENCOUNTER — LAB (OUTPATIENT)
Dept: FAMILY MEDICINE CLINIC | Facility: CLINIC | Age: 67
End: 2022-04-11

## 2022-04-11 ENCOUNTER — TREATMENT (OUTPATIENT)
Dept: PHYSICAL THERAPY | Facility: CLINIC | Age: 67
End: 2022-04-11

## 2022-04-11 ENCOUNTER — OFFICE VISIT (OUTPATIENT)
Dept: FAMILY MEDICINE CLINIC | Facility: CLINIC | Age: 67
End: 2022-04-11

## 2022-04-11 VITALS
WEIGHT: 234 LBS | OXYGEN SATURATION: 94 % | DIASTOLIC BLOOD PRESSURE: 83 MMHG | BODY MASS INDEX: 40.17 KG/M2 | SYSTOLIC BLOOD PRESSURE: 152 MMHG | TEMPERATURE: 97.8 F | HEART RATE: 74 BPM

## 2022-04-11 DIAGNOSIS — G25.81 RESTLESS LEG SYNDROME: ICD-10-CM

## 2022-04-11 DIAGNOSIS — M54.42 CHRONIC BILATERAL LOW BACK PAIN WITH BILATERAL SCIATICA: Primary | ICD-10-CM

## 2022-04-11 DIAGNOSIS — J42 CHRONIC BRONCHITIS, UNSPECIFIED CHRONIC BRONCHITIS TYPE: Primary | Chronic | ICD-10-CM

## 2022-04-11 DIAGNOSIS — G89.29 CHRONIC BILATERAL LOW BACK PAIN WITH BILATERAL SCIATICA: Primary | ICD-10-CM

## 2022-04-11 DIAGNOSIS — M54.41 CHRONIC BILATERAL LOW BACK PAIN WITH BILATERAL SCIATICA: Primary | ICD-10-CM

## 2022-04-11 LAB
BASOPHILS # BLD AUTO: 0.03 10*3/MM3 (ref 0–0.2)
BASOPHILS NFR BLD AUTO: 0.4 % (ref 0–1.5)
DEPRECATED RDW RBC AUTO: 42 FL (ref 37–54)
EOSINOPHIL # BLD AUTO: 0.08 10*3/MM3 (ref 0–0.4)
EOSINOPHIL NFR BLD AUTO: 1 % (ref 0.3–6.2)
ERYTHROCYTE [DISTWIDTH] IN BLOOD BY AUTOMATED COUNT: 12.8 % (ref 12.3–15.4)
FERRITIN SERPL-MCNC: 143 NG/ML (ref 13–150)
HCT VFR BLD AUTO: 48.1 % (ref 34–46.6)
HGB BLD-MCNC: 15.6 G/DL (ref 12–15.9)
IMM GRANULOCYTES # BLD AUTO: 0.03 10*3/MM3 (ref 0–0.05)
IMM GRANULOCYTES NFR BLD AUTO: 0.4 % (ref 0–0.5)
LYMPHOCYTES # BLD AUTO: 2.53 10*3/MM3 (ref 0.7–3.1)
LYMPHOCYTES NFR BLD AUTO: 32.9 % (ref 19.6–45.3)
MCH RBC QN AUTO: 29.5 PG (ref 26.6–33)
MCHC RBC AUTO-ENTMCNC: 32.4 G/DL (ref 31.5–35.7)
MCV RBC AUTO: 90.9 FL (ref 79–97)
MONOCYTES # BLD AUTO: 0.66 10*3/MM3 (ref 0.1–0.9)
MONOCYTES NFR BLD AUTO: 8.6 % (ref 5–12)
NEUTROPHILS NFR BLD AUTO: 4.37 10*3/MM3 (ref 1.7–7)
NEUTROPHILS NFR BLD AUTO: 56.7 % (ref 42.7–76)
NRBC BLD AUTO-RTO: 0 /100 WBC (ref 0–0.2)
PLATELET # BLD AUTO: 302 10*3/MM3 (ref 140–450)
PMV BLD AUTO: 11.9 FL (ref 6–12)
RBC # BLD AUTO: 5.29 10*6/MM3 (ref 3.77–5.28)
WBC NRBC COR # BLD: 7.7 10*3/MM3 (ref 3.4–10.8)

## 2022-04-11 PROCEDURE — 82728 ASSAY OF FERRITIN: CPT | Performed by: FAMILY MEDICINE

## 2022-04-11 PROCEDURE — 99214 OFFICE O/P EST MOD 30 MIN: CPT | Performed by: FAMILY MEDICINE

## 2022-04-11 PROCEDURE — 97112 NEUROMUSCULAR REEDUCATION: CPT | Performed by: PHYSICAL THERAPIST

## 2022-04-11 PROCEDURE — 97014 ELECTRIC STIMULATION THERAPY: CPT | Performed by: PHYSICAL THERAPIST

## 2022-04-11 PROCEDURE — 85025 COMPLETE CBC W/AUTO DIFF WBC: CPT | Performed by: FAMILY MEDICINE

## 2022-04-11 PROCEDURE — 36415 COLL VENOUS BLD VENIPUNCTURE: CPT

## 2022-04-11 PROCEDURE — 97110 THERAPEUTIC EXERCISES: CPT | Performed by: PHYSICAL THERAPIST

## 2022-04-11 RX ORDER — ALBUTEROL SULFATE 90 UG/1
2 AEROSOL, METERED RESPIRATORY (INHALATION) EVERY 4 HOURS PRN
Qty: 18 G | Refills: 1 | Status: SHIPPED | OUTPATIENT
Start: 2022-04-11 | End: 2022-07-25 | Stop reason: SDUPTHER

## 2022-04-11 RX ORDER — PRAMIPEXOLE DIHYDROCHLORIDE 0.12 MG/1
0.12 TABLET ORAL NIGHTLY
Qty: 90 TABLET | Refills: 1 | Status: SHIPPED | OUTPATIENT
Start: 2022-04-11 | End: 2022-04-22

## 2022-04-11 NOTE — PROGRESS NOTES
"Chief Complaint  Follow-up (SOA, using inhaler 5/6 x with little to no help )    Subjective          Caterina Mason presents to [facility]  She feels \"jerks\" in her muscles and has trouble sleeping.  Primally in her legs.        Shortness of Breath  This is a chronic problem. The current episode started more than 1 year ago. The problem occurs constantly. The problem has been gradually worsening. Associated symptoms include leg pain and sputum production. Pertinent negatives include no fever, rhinorrhea, swollen glands or syncope. She has tried beta agonist inhalers and steroid inhalers (She borrowed her son's albuterol becuase she does not have any refills on her albuterol inhaler.) for the symptoms. The treatment provided mild relief. Her past medical history is significant for COPD.       Objective   Vital Signs:   /83 (BP Location: Left arm, Patient Position: Sitting, Cuff Size: Adult)   Pulse 74   Temp 97.8 °F (36.6 °C) (Infrared)   Wt 106 kg (234 lb)   SpO2 94%   BMI 40.17 kg/m²            Physical Exam  Vitals and nursing note reviewed.   Constitutional:       General: She is not in acute distress.     Appearance: She is well-developed.   HENT:      Head: Normocephalic.   Eyes:      General: Lids are normal.   Neck:      Thyroid: No thyroid mass or thyromegaly.      Trachea: Trachea normal.   Cardiovascular:      Rate and Rhythm: Normal rate and regular rhythm.      Heart sounds: Normal heart sounds.   Pulmonary:      Effort: Pulmonary effort is normal.      Breath sounds: Normal breath sounds.   Musculoskeletal:      Cervical back: Normal range of motion.   Lymphadenopathy:      Cervical: No cervical adenopathy.   Skin:     General: Skin is warm and dry.   Neurological:      Mental Status: She is alert and oriented to person, place, and time.   Psychiatric:         Attention and Perception: She is attentive.         Mood and Affect: Mood normal.         " Speech: Speech normal.         Behavior: Behavior normal.        Result Review :   The following data was reviewed by: Lou Curran MD on 04/11/2022:  Common labs    Common Labsle 10/5/21 10/5/21 10/6/21 10/6/21 10/6/21 11/16/21 11/16/21 11/16/21 11/16/21    0519 0519 0448 0448 0448 0812 0812 0812 0812   Glucose  356 (A)   310 (A)   229 (A)    BUN  18   18   15    Creatinine  0.94   0.78   0.90    eGFR Non African Am  60 (A)   74   63    Sodium  138   136   139    Potassium  4.5   3.7   3.4 (A)    Chloride  99   99   101    Calcium  10.4   9.5   10.3    Albumin  4.40  4.00    4.40    Total Bilirubin  0.8  1.1    0.7    Alkaline Phosphatase  81  75    70    AST (SGOT)  51 (A)  31    57 (A)    ALT (SGPT)  54 (A)  39 (A)    59 (A)    WBC 12.40 (A)  9.20         Hemoglobin 15.8  14.1         Hematocrit 47.6 (A)  42.5         Platelets 323  284         Total Cholesterol       135     Triglycerides       278 (A)     HDL Cholesterol       28 (A)     LDL Cholesterol        63     Hemoglobin A1C      11.6 (A)      Microalbumin, Urine         <1.2   (A) Abnormal value       Comments are available for some flowsheets but are not being displayed.                     Assessment and Plan    Diagnoses and all orders for this visit:    1. Chronic bronchitis, unspecified chronic bronchitis type (HCC) (Primary)  -     albuterol sulfate  (90 Base) MCG/ACT inhaler; Inhale 2 puffs Every 4 (Four) Hours As Needed for Wheezing or Shortness of Air.  Dispense: 18 g; Refill: 1  -     Budeson-Glycopyrrol-Formoterol (BREZTRI) 160-9-4.8 MCG/ACT aerosol inhaler; Inhale 2 puffs 2 (Two) Times a Day.  Dispense: 2 each; Refill: 0    2. Restless leg syndrome  -     CBC & Differential  -     Ferritin; Future  -     pramipexole (Mirapex) 0.125 MG tablet; Take 1 tablet by mouth Every Night.  Dispense: 90 tablet; Refill: 1        Follow Up   No follow-ups on file.  Patient was given instructions and counseling regarding her condition or for  health maintenance advice. Please see specific information pulled into the AVS if appropriate.

## 2022-04-11 NOTE — PROGRESS NOTES
Physical Therapy Daily Progress Note      Patient: Caterina Mason   : 1955  Diagnosis/ICD-10 Code:  Chronic bilateral low back pain with bilateral sciatica [M54.42, M54.41, G89.29]  Referring practitioner: Yari Barker  Date of Initial Visit: 2022  Today's Date: 2022  Patient seen for 2 sessions.  POC 2x/wk x 12 weeks, exp 22  Insurance , exp 22    PRECAUTIONS:   DM with neuropathy        VISIT#: 2      Subjective :  Pt. Has no back or leg pain currently.  She states that it happens mostly at night and her LE's get very restless.   Her doctor is starting her on medication for her legs but she cannot remember the name of it. (Mirapex-for restless leg syndrome).    She c/o SOA is getting worse and doctor is aware, put her on a different inhaler. She has not done HEP.       Objective     See Exercise, Manual, and Modality Logs for complete treatment. Pt. Unable to perform sitting hip flexion properly.     SpO2 98%.       Patient Education:  Review of HEP. Stress importance of performing HEP.       Assessment/Plan:  Pt. With multiple medical problems which are inhibiting ability to perform there ex/activity.  She also is unable to tolerate supine or side lying so activity must be performed in sitting or standing. Modifications will have to be made to activity in order for patient to perform effectively.       Progress per Plan of Care:  Monitor response. Add Hip flexion and runners stretch in standing.             Timed:         Manual Therapy:         mins  92180;     Therapeutic Exercise:    20     mins  82171;     Neuromuscular Alistair:  10      mins  68161;    Therapeutic Activity:          mins  49853;     Gait Training:           mins  45171;     Ultrasound:          mins  44532;    Ionto                                   mins   43462  Self Care                            mins   01983  Aquatic                               mins 49699    Un-Timed:  Electrical Stimulation:          mins  88484 ( );  Traction          mins 18763      Timed Treatment:  30    mins   Total Treatment:    45    mins    Raina Parks PTA  Physical Therapist Assistant   [place] license:  #[id number]

## 2022-04-15 ENCOUNTER — TELEPHONE (OUTPATIENT)
Dept: PHYSICAL THERAPY | Facility: CLINIC | Age: 67
End: 2022-04-15

## 2022-04-18 ENCOUNTER — TREATMENT (OUTPATIENT)
Dept: PHYSICAL THERAPY | Facility: CLINIC | Age: 67
End: 2022-04-18

## 2022-04-18 DIAGNOSIS — Z74.09 IMPAIRED FUNCTIONAL MOBILITY, BALANCE, GAIT, AND ENDURANCE: ICD-10-CM

## 2022-04-18 DIAGNOSIS — M25.559 PAIN, HIP: ICD-10-CM

## 2022-04-18 DIAGNOSIS — M54.6 CHRONIC BILATERAL THORACIC BACK PAIN: ICD-10-CM

## 2022-04-18 DIAGNOSIS — M54.41 CHRONIC BILATERAL LOW BACK PAIN WITH BILATERAL SCIATICA: Primary | ICD-10-CM

## 2022-04-18 DIAGNOSIS — M54.42 CHRONIC BILATERAL LOW BACK PAIN WITH BILATERAL SCIATICA: Primary | ICD-10-CM

## 2022-04-18 DIAGNOSIS — G89.29 CHRONIC BILATERAL THORACIC BACK PAIN: ICD-10-CM

## 2022-04-18 DIAGNOSIS — G89.29 CHRONIC BILATERAL LOW BACK PAIN WITH BILATERAL SCIATICA: Primary | ICD-10-CM

## 2022-04-18 PROCEDURE — 97110 THERAPEUTIC EXERCISES: CPT | Performed by: PHYSICAL THERAPIST

## 2022-04-18 PROCEDURE — 97014 ELECTRIC STIMULATION THERAPY: CPT | Performed by: PHYSICAL THERAPIST

## 2022-04-18 PROCEDURE — 97112 NEUROMUSCULAR REEDUCATION: CPT | Performed by: PHYSICAL THERAPIST

## 2022-04-18 PROCEDURE — 97530 THERAPEUTIC ACTIVITIES: CPT | Performed by: PHYSICAL THERAPIST

## 2022-04-18 NOTE — PROGRESS NOTES
Physical Therapy Daily Progress Note      Patient: Caterina Mason   : 1955  Diagnosis/ICD-10 Code:  Chronic bilateral low back pain with bilateral sciatica [M54.42, M54.41, G89.29]   Problems Addressed this Visit    None     Visit Diagnoses     Chronic bilateral low back pain with bilateral sciatica    -  Primary    Chronic bilateral thoracic back pain        Pain, hip        Impaired functional mobility, balance, gait, and endurance          Diagnoses       Codes Comments    Chronic bilateral low back pain with bilateral sciatica    -  Primary ICD-10-CM: M54.42, M54.41, G89.29  ICD-9-CM: 724.2, 724.3, 338.29     Chronic bilateral thoracic back pain     ICD-10-CM: M54.6, G89.29  ICD-9-CM: 724.1, 338.29     Pain, hip     ICD-10-CM: M25.559  ICD-9-CM: 719.45     Impaired functional mobility, balance, gait, and endurance     ICD-10-CM: Z74.09  ICD-9-CM: V49.89         Referring practitioner: Yari Barker  Date of Initial Visit: Type: THERAPY  Noted: 3/1/2022  Today's Date: 2022    VISIT#: 3    Subjective : pt states she is hurting all over. Reports low back is hurting a lot and both hips. Rates pain at 10/10. Does not feel like pain pills are helping. Had a hard time straightening up this morning. Pt reports having pain in both hips today and piriformis stretch was felt more in L hip this date. Requests e-stim to upper thoracic region this date also.    Objective : antalgic gait, pt using cane in R hand.  Added standing hip flex and Nustep to ther ex.  R hip flex and abd = 3+/5.  See Exercise, Manual, and Modality Logs for complete treatment.     Assessment/Plan : Increased pain at start of session. Pt having increased difficulty with ambulation. Pt with very limited attendance at PT since evaluation due to pain and difficulty with all mobility. Pt did enjoy addition of NuStep this date. No significant progress toward goals due to limited visits to date. Pt will benefit from continued PT to progress  LE and core strengthening for decreased pain and improved mobility.    STG to be met in 3 wk:  - Pt to report 50% improvement in pain during sit to stand. - Not met  - Pt to report 25% or better improvement in pain with activity. - Not met  - Pt to be independent with HEP. - Progressing  LTG to be met in 12 wk:  - Improve Oswestry to 35% or less impairment. - Not met  - Increase R hip flex and abd strength to 4-/5 for improved standing tolerance. - Not met  - Pt to tolerate sitting up to 1 hr with no increase in pain. - Not met    Progress per Plan of Care         Timed:         Manual Therapy:         mins  66090;     Therapeutic Exercise:    25     mins  30549;     Neuromuscular Alistair:    13    mins  29311;    Therapeutic Activity:          mins  96188;     Gait Training:           mins  31351;     Ultrasound:          mins  82387;    Ionto                                   mins   41508  Self Care                            mins   82024    Un-Timed:  Electrical Stimulation:    15     mins  29589 ( );  Dry Needling          mins 33475/09130  Traction          mins 95512  Canalith Repos                   mins  55640  Low Eval          Mins  33208  Mod Eval          Mins  10631  High Eval                            Mins  02110  Re-Eval                               mins  35749    Timed Treatment:   38   mins   Total Treatment:     53   mins    Estella Lewis, PT, CLT, Cert DN  Physical Therapist  [place] Lic # [id number]

## 2022-04-21 ENCOUNTER — TELEPHONE (OUTPATIENT)
Dept: FAMILY MEDICINE CLINIC | Facility: CLINIC | Age: 67
End: 2022-04-21

## 2022-04-21 NOTE — PROGRESS NOTES
Date of Office Visit: 2022  Encounter Provider: Dr. Jesse Charles  Place of Service: [facility] CARDIOLOGY [place]  Patient Name: Caterina Mason  :1955  Lou Curran MD    Chief Complaint   Patient presents with   • Atrial Fibrillation   • Hyperlipidemia   • Congestive Heart Failure   • Hypertension   • Follow-up     History of Present Illness:    I am pleased to see Mrs. Mason in my office today as a follow-up.    As you know, patient is 66 year-old white female whose past medical history significant for hypertension, hyperlipidemia, COPD, diabetes mellitus, paroxysmal atrial fibrillation, who came today for follow-up.    In 2017, patient was admitted at [facility] with a symptom of possible bradycardia and dizziness.  Patient was seen by endocrinologist and was thought to have bradycardia and was sent to the hospital.  In the hospital she was never documented to have bradycardia.  She was noted to have sinus rhythm with frequent PVCs and ventricular bigeminy rhythm.  Holter monitor showed frequent PVCs greater than 30,000.  Stress test was negative for ischemia and echocardiogram showed normal left ventricular size and function and LVEF was 55%.  No significant valvular heart disease was noted. In 2017, patient underwent Holter monitor which showed sinus rhythm with right bundle branch block pattern and patient had frequent PACs.     In 2018, patient was admitted at [facility] again with symptom of dizziness and possible extreme bradycardia.  She was found to be in sinus rhythm with bigeminy.  Holter monitor showed frequent premature ventricular contraction but no significant pause.  Ventricular bigeminy rhythm was noted.  Patient underwent cardiac catheterization showed no significant CAD..  In 2019, patient underwent Holter monitor it showed frequent PVCs greater than 18,000.  No significant bradycardia noted.  No SVT or VT was present.  In 2019, patient underwent Holter monitor which showed predominantly sinus rhythm with right bundle branch block.  Frequent PVCs and bigeminy was noted.  Patient had 26% of PVC burden. In 2019, patient underwent EP study and possible ablation of PVCs.  However it was unsuccessful.  Patient was started on Sectral 200 mg twice daily.  Subsequently, it was stopped and metoprolol was started.  Patient has sleep apnea and she is on CPAP.    In 2021, echocardiogram showed EF of 55% and no significant valvular heart disease noted    Since the previous visit, overall patient is stable.  Patient denies any symptom of angina pectoris or congestive heart failure.  Patient complain of shortness of breath on exertion.  Patient denies any significant chest pain.  No orthopnea PND no syncope or presyncope.  No leg edema noted.    EKG showed atrial fibrillation with right bundle branch block.    Her blood pressure is slightly low today but most of the blood pressures are within desirable range.  Patient has not had any ischemic evaluation.  At present I would proceed with stress test with Cardiolite imaging.  Patient is advised to monitor the blood pressure and call me in 1 to 2 months.        Past Medical History:   Diagnosis Date   • Arthritis    • Atrial fibrillation (Formerly [facility])    • Bradycardia     Dr. Charles   • Cataract    • COPD (chronic obstructive pulmonary disease) (Formerly [facility])     Dr. Rob   • Diabetes mellitus, type 2 (Formerly [facility])     sees Dr. Archibald at [facility]   • DJD (degenerative joint disease)     possible herniated disc, sees Pain Mgmt in [place]   • Emphysema of lung (Formerly [facility])    •     • GERD (gastroesophageal reflux disease)    • History of combined right and left heart catheterization 2018    minimal CAD on heart cath done    • Hyperlipidemia    • Hypertension    • Pain    • Sleep apnea     wears CPAP machine, sees Darron         Past Surgical History:   Procedure Laterality Date   • ABLATION OF  DYSRHYTHMIC FOCUS     • CARDIAC CATHETERIZATION  2018    and Angiograph    • CARDIAC ELECTROPHYSIOLOGY PROCEDURE N/A 12/10/2019    Procedure: pvc ablation;  Surgeon: Alfredo Iglesias MD;  Location: [facility] INVASIVE LOCATION;  Service: Cardiovascular   •  SECTION      x 1   • COLON RESECTION     • COLONOSCOPY  2012   • COLOSTOMY      and reversal in her 20's due to problems with childbirth   • COLOSTOMY CLOSURE     • OVARIAN CYST SURGERY     • ROTATOR CUFF REPAIR Left 2009    x2    • TOTAL ABDOMINAL HYSTERECTOMY WITH SALPINGO OOPHORECTOMY             Current Outpatient Medications:   •  Accu-Chek Softclix Lancets lancets, Use to check blood sugars 3 times a day., Disp: 100 each, Rfl: 11  •  albuterol sulfate  (90 Base) MCG/ACT inhaler, Inhale 2 puffs Every 4 (Four) Hours As Needed for Wheezing or Shortness of Air., Disp: 18 g, Rfl: 1  •  apixaban (Eliquis) 5 MG tablet tablet, Take 1 tablet by mouth 2 (Two) Times a Day. Patient needs an appt before any more refills, Disp: 60 tablet, Rfl: 0  •  atorvastatin (LIPITOR) 40 MG tablet, TAKE 1/2 TABLET BY MOUTH DAILY, Disp: 45 tablet, Rfl: 1  •  Budeson-Glycopyrrol-Formoterol (BREZTRI) 160-9-4.8 MCG/ACT aerosol inhaler, Inhale 2 puffs 2 (Two) Times a Day., Disp: 2 each, Rfl: 0  •  Calcium Carb-Cholecalciferol (Oyster Shell Calcium w/D) 500-200 MG-UNIT tablet, , Disp: , Rfl:   •  [employer]S D3 25 MCG (1000 UT) capsule, TAKE 1 CAPSULE BY MOUTH TWICE A DAY, Disp: 60 capsule, Rfl: 5  •  dilTIAZem CD (CARDIZEM CD) 120 MG 24 hr capsule, Take 1 capsule by mouth Daily for 180 days., Disp: 90 capsule, Rfl: 1  •  fenofibrate 160 MG tablet, TAKE 1 TABLET BY MOUTH EVERY DAY, Disp: 90 tablet, Rfl: 1  •  furosemide (LASIX) 40 MG tablet, Take 40 mg by mouth Daily., Disp: , Rfl:   •  glucose blood (Accu-Chek Aurea Plus) test strip, USE TO CHECK BLOOD SUGAR TWICE A DAY, Disp: 50 each, Rfl: 15  •  HYDROcodone-acetaminophen (NORCO) 7.5-325 MG per tablet, Take 1 tablet  by mouth 3 (Three) Times a Day As Needed., Disp: , Rfl:   •  Insulin Glargine (BASAGLAR KWIKPEN) 100 UNIT/ML injection pen, Inject 40 Units under the skin into the appropriate area as directed Every Night., Disp: 5 pen, Rfl: 6  •  Insulin Lispro, 1 Unit Dial, (HumaLOG KwikPen) 100 UNIT/ML solution pen-injector, Inject 14 units before each meal., Disp: 5 pen, Rfl: 6  •  Insulin Pen Needle (BD Pen Needle Lorene 2nd Gen) 32G X 4 MM misc, Used to inject insulin 4 times a day., Disp: 150 each, Rfl: 11  •  Janumet XR  MG tablet, TAKE 2 TABLETS BY MOUTH EVERY DAY, Disp: 60 tablet, Rfl: 3  •  lisinopril (PRINIVIL,ZESTRIL) 20 MG tablet, TAKE 1 TABLET BY MOUTH EVERY DAY, Disp: 90 tablet, Rfl: 1  •  magnesium oxide (MAGOX) 400 (241.3 Mg) MG tablet tablet, Take 400 mg by mouth 2 (Two) Times a Day., Disp: , Rfl:   •  metoprolol tartrate (LOPRESSOR) 25 MG tablet, Take 25 mg by mouth 2 (Two) Times a Day., Disp: , Rfl:   •  potassium chloride (K-DUR,KLOR-CON) 10 MEQ CR tablet, Take 10 mEq by mouth Daily., Disp: , Rfl:       Social History     Socioeconomic History   • Marital status:    Tobacco Use   • Smoking status: Former Smoker     Packs/day: 1.00     Years: 48.00     Pack years: 48.00     Types: Cigarettes     Quit date: 3/1/2020     Years since quittin.1   • Smokeless tobacco: Never Used   Vaping Use   • Vaping Use: Never used   Substance and Sexual Activity   • Alcohol use: No   • Drug use: No   • Sexual activity: Defer         Review of Systems   Constitutional: Negative for chills and fever.   HENT: Negative for ear discharge and nosebleeds.    Eyes: Negative for discharge and redness.   Cardiovascular: Negative for chest pain, orthopnea, palpitations, paroxysmal nocturnal dyspnea and syncope.   Respiratory: Positive for shortness of breath. Negative for cough and wheezing.    Endocrine: Negative for heat intolerance.   Skin: Negative for rash.   Musculoskeletal: Positive for arthritis and joint pain.  "Negative for myalgias.   Gastrointestinal: Negative for abdominal pain, melena, nausea and vomiting.   Genitourinary: Negative for dysuria and hematuria.   Neurological: Negative for dizziness, light-headedness, numbness and tremors.   Psychiatric/Behavioral: Negative for depression. The patient is not nervous/anxious.        Procedures      ECG 12 Lead    Date/Time: 4/22/2022 11:54 AM  Performed by: Jesse Charles MD  Authorized by: Jesse Charles MD   Comparison: compared with previous ECG   Similar to previous ECG  Rhythm: atrial fibrillation  Conduction: right bundle branch block    Clinical impression: abnormal EKG            ECG 12 Lead    (Results Pending)           Objective:    /72 (BP Location: Right arm, Cuff Size: Large Adult)   Pulse 82   Ht 162.6 cm (64.02\")   Wt 106 kg (234 lb)   BMI 40.15 kg/m²         Constitutional:       Appearance: Well-developed.   Eyes:      General: No scleral icterus.        Right eye: No discharge.   HENT:      Head: Normocephalic and atraumatic.   Neck:      Thyroid: No thyromegaly.      Lymphadenopathy: No cervical adenopathy.   Pulmonary:      Effort: Pulmonary effort is normal. No respiratory distress.      Breath sounds: Normal breath sounds. No wheezing. No rales.   Cardiovascular:      Normal rate. Irregularly irregular rhythm.      No gallop.   Abdominal:      Tenderness: There is no abdominal tenderness.   Skin:     Findings: No erythema or rash.   Neurological:      Mental Status: Alert and oriented to person, place, and time.             Assessment:       Diagnosis Plan   1. Paroxysmal atrial fibrillation (HCC)  ECG 12 Lead    Stress Test With Myocardial Perfusion One Day   2. PVC's (premature ventricular contractions)  ECG 12 Lead    Stress Test With Myocardial Perfusion One Day   3. Shortness of breath  ECG 12 Lead    Stress Test With Myocardial Perfusion One Day   4. Type 2 diabetes mellitus with hyperglycemia, with long-term current use of insulin " (Formerly [facility])  ECG 12 Lead    Stress Test With Myocardial Perfusion One Day   5. Mixed hyperlipidemia  ECG 12 Lead    Stress Test With Myocardial Perfusion One Day   6. Essential hypertension  ECG 12 Lead    Stress Test With Myocardial Perfusion One Day   7. Chronic diastolic CHF (congestive heart failure) (Formerly [facility])  ECG 12 Lead    Stress Test With Myocardial Perfusion One Day            Plan:       MDM:    1.  Shortness of breath:    I would recommend to proceed with stress test.  Patient has not had any ischemic evaluation.  Previous echocardiogram was unremarkable.    2.  Permanent atrial fibrillation:    Patient had paroxysmal atrial fibrillation in the past but now it is persistent.  Continue Eliquis and rate control.    3.  Hypertension:    Blood pressure is very well controlled.  Her blood pressure is slightly low today.  Previously it was desirable.  Continue current therapy and monitor the blood pressure and call my office in 2 months    4.  Congestive heart failure chronic diastolic:    Patient seems to be in euvolemic status.  Current dose of furosemide would be continued    5.  Diabetes mellitus:    Diet modification discussed with the patient.  Weight loss emphasized

## 2022-04-21 NOTE — TELEPHONE ENCOUNTER
PATIENT HAS THRUSH FROM HER Budeson-Glycopyrrol-Formoterol (BREZTRI) 160-9-4.8 MCG/ACT aerosol inhaler. SAYS IT IS ALL DOWN HER THROAT AND LIPS. SHE IS NEEDING SOMETHING TO CLEAR IT UP.    PLEASE ADVISE  [phone]

## 2022-04-22 ENCOUNTER — TREATMENT (OUTPATIENT)
Dept: PHYSICAL THERAPY | Facility: CLINIC | Age: 67
End: 2022-04-22

## 2022-04-22 ENCOUNTER — OFFICE VISIT (OUTPATIENT)
Dept: CARDIOLOGY | Facility: CLINIC | Age: 67
End: 2022-04-22

## 2022-04-22 VITALS
SYSTOLIC BLOOD PRESSURE: 103 MMHG | HEIGHT: 64 IN | BODY MASS INDEX: 39.95 KG/M2 | DIASTOLIC BLOOD PRESSURE: 72 MMHG | WEIGHT: 234 LBS | HEART RATE: 82 BPM

## 2022-04-22 DIAGNOSIS — M54.6 CHRONIC BILATERAL THORACIC BACK PAIN: ICD-10-CM

## 2022-04-22 DIAGNOSIS — G89.29 CHRONIC BILATERAL LOW BACK PAIN WITH BILATERAL SCIATICA: Primary | ICD-10-CM

## 2022-04-22 DIAGNOSIS — M54.42 CHRONIC BILATERAL LOW BACK PAIN WITH BILATERAL SCIATICA: Primary | ICD-10-CM

## 2022-04-22 DIAGNOSIS — I10 ESSENTIAL HYPERTENSION: Chronic | ICD-10-CM

## 2022-04-22 DIAGNOSIS — M25.559 PAIN, HIP: ICD-10-CM

## 2022-04-22 DIAGNOSIS — Z79.4 TYPE 2 DIABETES MELLITUS WITH HYPERGLYCEMIA, WITH LONG-TERM CURRENT USE OF INSULIN: Chronic | ICD-10-CM

## 2022-04-22 DIAGNOSIS — Z74.09 IMPAIRED FUNCTIONAL MOBILITY, BALANCE, GAIT, AND ENDURANCE: ICD-10-CM

## 2022-04-22 DIAGNOSIS — R06.02 SHORTNESS OF BREATH: ICD-10-CM

## 2022-04-22 DIAGNOSIS — M54.41 CHRONIC BILATERAL LOW BACK PAIN WITH BILATERAL SCIATICA: Primary | ICD-10-CM

## 2022-04-22 DIAGNOSIS — E78.2 MIXED HYPERLIPIDEMIA: Chronic | ICD-10-CM

## 2022-04-22 DIAGNOSIS — I49.3 PVC'S (PREMATURE VENTRICULAR CONTRACTIONS): ICD-10-CM

## 2022-04-22 DIAGNOSIS — E11.65 TYPE 2 DIABETES MELLITUS WITH HYPERGLYCEMIA, WITH LONG-TERM CURRENT USE OF INSULIN: Chronic | ICD-10-CM

## 2022-04-22 DIAGNOSIS — I50.32 CHRONIC DIASTOLIC CHF (CONGESTIVE HEART FAILURE): Chronic | ICD-10-CM

## 2022-04-22 DIAGNOSIS — I48.0 PAROXYSMAL ATRIAL FIBRILLATION: Primary | Chronic | ICD-10-CM

## 2022-04-22 DIAGNOSIS — G89.29 CHRONIC BILATERAL THORACIC BACK PAIN: ICD-10-CM

## 2022-04-22 PROCEDURE — 97110 THERAPEUTIC EXERCISES: CPT | Performed by: PHYSICAL THERAPIST

## 2022-04-22 PROCEDURE — 99214 OFFICE O/P EST MOD 30 MIN: CPT | Performed by: INTERNAL MEDICINE

## 2022-04-22 PROCEDURE — 97112 NEUROMUSCULAR REEDUCATION: CPT | Performed by: PHYSICAL THERAPIST

## 2022-04-22 PROCEDURE — 97014 ELECTRIC STIMULATION THERAPY: CPT | Performed by: PHYSICAL THERAPIST

## 2022-04-22 PROCEDURE — 93000 ELECTROCARDIOGRAM COMPLETE: CPT | Performed by: INTERNAL MEDICINE

## 2022-04-22 NOTE — PROGRESS NOTES
Physical Therapy Daily Progress Note      Patient: Caterina Mason   : 1955  Diagnosis/ICD-10 Code:  Chronic bilateral low back pain with bilateral sciatica [M54.42, M54.41, G89.29]   Problems Addressed this Visit    None     Visit Diagnoses     Chronic bilateral low back pain with bilateral sciatica    -  Primary    Chronic bilateral thoracic back pain        Pain, hip        Impaired functional mobility, balance, gait, and endurance          Diagnoses       Codes Comments    Chronic bilateral low back pain with bilateral sciatica    -  Primary ICD-10-CM: M54.42, M54.41, G89.29  ICD-9-CM: 724.2, 724.3, 338.29     Chronic bilateral thoracic back pain     ICD-10-CM: M54.6, G89.29  ICD-9-CM: 724.1, 338.29     Pain, hip     ICD-10-CM: M25.559  ICD-9-CM: 719.45     Impaired functional mobility, balance, gait, and endurance     ICD-10-CM: Z74.09  ICD-9-CM: V49.89         Referring practitioner: Yari Barker  Date of Initial Visit: Type: THERAPY  Noted: 3/1/2022  Today's Date: 2022    VISIT#: 4    Subjective :Pt reports back pain pretty bad this morning, rates at 8/10. Having B hip pain, no pain radiating down legs. States upper back is feeling better today since doing e-stim at last visit. Started on new inhaler and now has thrush, waiting on medicine to treat this. Started on water pill also for B LE swelling. States it has helped.    Objective : Added leg press and gastroc stretch at incline to ther ex.    See Exercise, Manual, and Modality Logs for complete treatment.     Assessment/Plan : Continued pain at start of session in low back and B hips. Good tolerance to ther ex with no increase in symptoms but fatigue reported. Pt will benefit from continued PT to progress LE and core strengthening for decreased pain and improved mobility.     STG to be met in 3 wk:  - Pt to report 50% improvement in pain during sit to stand. - Not met  - Pt to report 25% or better improvement in pain with activity. - Not  met  - Pt to be independent with HEP. - Progressing  LTG to be met in 12 wk:  - Improve Oswestry to 35% or less impairment. - Not met  - Increase R hip flex and abd strength to 4-/5 for improved standing tolerance. - Not met  - Pt to tolerate sitting up to 1 hr with no increase in pain. - Not met    Progress per Plan of Care and Progress strengthening /stabilization /functional activity         Timed:         Manual Therapy:         mins  74506;     Therapeutic Exercise:    25     mins  10761;     Neuromuscular Alistair:    13    mins  91423;    Therapeutic Activity:          mins  78763;     Gait Training:           mins  11045;     Ultrasound:          mins  25985;    Ionto                                   mins   69549  Self Care                            mins   59106    Un-Timed:  Electrical Stimulation:    15     mins  96273 ( );  Dry Needling          mins 03408/59777  Traction          mins 87786  Canalith Repos                   mins  06504  Low Eval          Mins  92280  Mod Eval          Mins  81565  High Eval                            Mins  01790  Re-Eval                               mins  79121    Timed Treatment:   38   mins   Total Treatment:     53   mins    Estella eLwis, PT, CLT, Cert DN  Physical Therapist  [place] Lic # [id number]

## 2022-04-25 ENCOUNTER — TELEPHONE (OUTPATIENT)
Dept: PHYSICAL THERAPY | Facility: CLINIC | Age: 67
End: 2022-04-25

## 2022-05-02 ENCOUNTER — TELEPHONE (OUTPATIENT)
Dept: CARDIOLOGY | Facility: CLINIC | Age: 67
End: 2022-05-02

## 2022-05-02 ENCOUNTER — TREATMENT (OUTPATIENT)
Dept: PHYSICAL THERAPY | Facility: CLINIC | Age: 67
End: 2022-05-02

## 2022-05-02 DIAGNOSIS — M54.41 CHRONIC BILATERAL LOW BACK PAIN WITH BILATERAL SCIATICA: Primary | ICD-10-CM

## 2022-05-02 DIAGNOSIS — M25.559 PAIN, HIP: ICD-10-CM

## 2022-05-02 DIAGNOSIS — M54.6 CHRONIC BILATERAL THORACIC BACK PAIN: ICD-10-CM

## 2022-05-02 DIAGNOSIS — M54.42 CHRONIC BILATERAL LOW BACK PAIN WITH BILATERAL SCIATICA: Primary | ICD-10-CM

## 2022-05-02 DIAGNOSIS — G89.29 CHRONIC BILATERAL THORACIC BACK PAIN: ICD-10-CM

## 2022-05-02 DIAGNOSIS — Z74.09 IMPAIRED FUNCTIONAL MOBILITY, BALANCE, GAIT, AND ENDURANCE: ICD-10-CM

## 2022-05-02 DIAGNOSIS — G89.29 CHRONIC BILATERAL LOW BACK PAIN WITH BILATERAL SCIATICA: Primary | ICD-10-CM

## 2022-05-02 PROCEDURE — 97112 NEUROMUSCULAR REEDUCATION: CPT | Performed by: PHYSICAL THERAPIST

## 2022-05-02 PROCEDURE — 97014 ELECTRIC STIMULATION THERAPY: CPT | Performed by: PHYSICAL THERAPIST

## 2022-05-02 PROCEDURE — 97110 THERAPEUTIC EXERCISES: CPT | Performed by: PHYSICAL THERAPIST

## 2022-05-02 NOTE — TELEPHONE ENCOUNTER
Incoming Refill Request      Medication requested (name and dose): eliquis 5 bid    Pharmacy where request should be sent: [facility]    Additional details provided by patient: n/a    Best call back number: [phone]    Does the patient have less than a 3 day supply:  [] Yes  [x] No    Idania Henriquez Rep  05/02/22, 08:42 EDT

## 2022-05-02 NOTE — TELEPHONE ENCOUNTER
GERD symptoms stable no recent heartburn or worsening    Indigestion continue on Dexilant 60 milligram capsules Patient says she's not having any problems and doesn't want to have the stress test unless Dr Charles says she really needs it.

## 2022-05-02 NOTE — PROGRESS NOTES
Physical Therapy Daily Progress Note      Patient: Caterina Mason   : 1955  Diagnosis/ICD-10 Code:  Chronic bilateral low back pain with bilateral sciatica [M54.42, M54.41, G89.29]   Problems Addressed this Visit    None     Visit Diagnoses     Chronic bilateral low back pain with bilateral sciatica    -  Primary    Chronic bilateral thoracic back pain        Pain, hip        Impaired functional mobility, balance, gait, and endurance          Diagnoses       Codes Comments    Chronic bilateral low back pain with bilateral sciatica    -  Primary ICD-10-CM: M54.42, M54.41, G89.29  ICD-9-CM: 724.2, 724.3, 338.29     Chronic bilateral thoracic back pain     ICD-10-CM: M54.6, G89.29  ICD-9-CM: 724.1, 338.29     Pain, hip     ICD-10-CM: M25.559  ICD-9-CM: 719.45     Impaired functional mobility, balance, gait, and endurance     ICD-10-CM: Z74.09  ICD-9-CM: V49.89         Referring practitioner: Yari Barker  Date of Initial Visit: Type: THERAPY  Noted: 3/1/2022  Today's Date: 2022    VISIT#: 5    Subjective : Pt states her back is sore today. Rates pain at 10/10. States she is having less pain during sit to stand. Has been having pain in L hip that started over the weekend (indicates L side of low back and upper buttock). Doing HEP but states they tire her out and do not give any pain relief.  Oswestry = 35/50, indicating 70% impairment.    Objective :   B hip flex and abd = 3+/5 to 4-/5 with c/o B groin pain  Transfers sit to stand with use of B UE with normal speed and transition. Using cane during ambulation.   Increased lumbar lordosis.  See Exercise, Manual, and Modality Logs for complete treatment.     Assessment/Plan  : Decreased pain during transitional movements. Continued pain with sitting, standing, and walking. Pt will benefit from lower abdominal and hip strengthening to improved posture in standing for improved tolerance to ambulation.    STG to be met in 3 wk:  - Pt to report 50%  improvement in pain during sit to stand. - MET  - Pt to report 25% or better improvement in pain with activity. - Progressing  - Pt to be independent with HEP. - MET  LTG to be met in 12 wk:  - Improve Oswestry to 35% or less impairment. - Not met  - Increase R hip flex and abd strength to 4-/5 for improved standing tolerance. - Progressing  - Pt to tolerate sitting up to 1 hr with no increase in pain. - Not met    Progress per Plan of Care and Progress strengthening /stabilization /functional activity         Timed:         Manual Therapy:         mins  73871;     Therapeutic Exercise:    25     mins  73318;     Neuromuscular Alistair:    13    mins  96793;    Therapeutic Activity:          mins  71726;     Gait Training:           mins  92474;     Ultrasound:          mins  25187;    Ionto                                   mins   40184  Self Care                            mins   34787    Un-Timed:  Electrical Stimulation:    15     mins  30996 ( );  Dry Needling          mins 63243/64959  Traction          mins 51454  Canalith Repos                   mins  48008  Low Eval          Mins  50739  Mod Eval          Mins  52511  High Eval                            Mins  16775  Re-Eval                               mins  59884    Timed Treatment:   38   mins   Total Treatment:     53   mins    Estella Lewis, PT, CLT, Cert DN  Physical Therapist  [place] Lic # [id number]

## 2022-05-03 ENCOUNTER — TELEPHONE (OUTPATIENT)
Dept: FAMILY MEDICINE CLINIC | Facility: CLINIC | Age: 67
End: 2022-05-03

## 2022-05-03 RX ORDER — FUROSEMIDE 40 MG/1
40 TABLET ORAL DAILY
Qty: 90 TABLET | Refills: 1 | Status: SHIPPED | OUTPATIENT
Start: 2022-05-03 | End: 2022-10-19

## 2022-05-03 NOTE — TELEPHONE ENCOUNTER
PATIENT CALLING WANTING TO GET A REFILL OF THE WATER PILL SHE STATED SHE DOESN'T KNOW THE NAME AND CANT FIND THE MEDICATION BOTTLE.    PHARMACY:[facility]/pharmacy #[id number] - [facility] [place]  [address] [address] [facility] [phone] [facility] [phone] [phone]

## 2022-05-05 ENCOUNTER — APPOINTMENT (OUTPATIENT)
Dept: NUCLEAR MEDICINE | Facility: HOSPITAL | Age: 67
End: 2022-05-05

## 2022-05-23 ENCOUNTER — TELEPHONE (OUTPATIENT)
Dept: ENDOCRINOLOGY | Facility: CLINIC | Age: 67
End: 2022-05-23

## 2022-05-23 NOTE — TELEPHONE ENCOUNTER
PA on dexcom sensor done in [facility].    PA Case: [id number], Status: Approved, Coverage Starts on: 5/23/2022 12:00:00 AM, Coverage Ends on: 5/23/2023 12:00:00 AM.

## 2022-05-24 RX ORDER — CHOLECALCIFEROL (VITAMIN D3) 25 MCG
1000 CAPSULE ORAL 2 TIMES DAILY
Qty: 60 CAPSULE | Refills: 5 | Status: SHIPPED | OUTPATIENT
Start: 2022-05-24 | End: 2022-08-01 | Stop reason: SDUPTHER

## 2022-05-28 DIAGNOSIS — E11.65 TYPE 2 DIABETES MELLITUS WITH HYPERGLYCEMIA: ICD-10-CM

## 2022-05-28 RX ORDER — SITAGLIPTIN AND METFORMIN HYDROCHLORIDE 1000; 50 MG/1; MG/1
TABLET, FILM COATED, EXTENDED RELEASE ORAL
Qty: 60 TABLET | Refills: 3 | Status: SHIPPED | OUTPATIENT
Start: 2022-05-28 | End: 2022-09-25

## 2022-05-31 ENCOUNTER — LAB (OUTPATIENT)
Dept: LAB | Facility: HOSPITAL | Age: 67
End: 2022-05-31

## 2022-05-31 DIAGNOSIS — E11.65 TYPE 2 DIABETES MELLITUS WITH HYPERGLYCEMIA, WITH LONG-TERM CURRENT USE OF INSULIN: Chronic | ICD-10-CM

## 2022-05-31 DIAGNOSIS — Z79.4 TYPE 2 DIABETES MELLITUS WITH HYPERGLYCEMIA, WITH LONG-TERM CURRENT USE OF INSULIN: Chronic | ICD-10-CM

## 2022-05-31 LAB
ALBUMIN SERPL-MCNC: 4.4 G/DL (ref 3.5–5.2)
ALBUMIN UR-MCNC: 3.8 MG/DL
ALBUMIN/GLOB SERPL: 1.4 G/DL
ALP SERPL-CCNC: 62 U/L (ref 39–117)
ALT SERPL W P-5'-P-CCNC: 82 U/L (ref 1–33)
ANION GAP SERPL CALCULATED.3IONS-SCNC: 12.4 MMOL/L (ref 5–15)
AST SERPL-CCNC: 76 U/L (ref 1–32)
BILIRUB SERPL-MCNC: 0.8 MG/DL (ref 0–1.2)
BUN SERPL-MCNC: 12 MG/DL (ref 8–23)
BUN/CREAT SERPL: 15.4 (ref 7–25)
CALCIUM SPEC-SCNC: 9.8 MG/DL (ref 8.6–10.5)
CHLORIDE SERPL-SCNC: 102 MMOL/L (ref 98–107)
CHOLEST SERPL-MCNC: 122 MG/DL (ref 0–200)
CO2 SERPL-SCNC: 25.6 MMOL/L (ref 22–29)
CREAT SERPL-MCNC: 0.78 MG/DL (ref 0.57–1)
CREAT UR-MCNC: 88.1 MG/DL
EGFRCR SERPLBLD CKD-EPI 2021: 83.9 ML/MIN/1.73
GLOBULIN UR ELPH-MCNC: 3.2 GM/DL
GLUCOSE SERPL-MCNC: 202 MG/DL (ref 65–99)
HBA1C MFR BLD: 11.5 % (ref 3.5–5.6)
HDLC SERPL-MCNC: 37 MG/DL (ref 40–60)
LDLC SERPL CALC-MCNC: 54 MG/DL (ref 0–100)
LDLC/HDLC SERPL: 1.3 {RATIO}
MICROALBUMIN/CREAT UR: 43.1 MG/G
POTASSIUM SERPL-SCNC: 4.1 MMOL/L (ref 3.5–5.2)
PROT SERPL-MCNC: 7.6 G/DL (ref 6–8.5)
SODIUM SERPL-SCNC: 140 MMOL/L (ref 136–145)
TRIGL SERPL-MCNC: 184 MG/DL (ref 0–150)
VLDLC SERPL-MCNC: 31 MG/DL (ref 5–40)

## 2022-05-31 PROCEDURE — 36415 COLL VENOUS BLD VENIPUNCTURE: CPT

## 2022-05-31 PROCEDURE — 83036 HEMOGLOBIN GLYCOSYLATED A1C: CPT

## 2022-05-31 PROCEDURE — 82043 UR ALBUMIN QUANTITATIVE: CPT

## 2022-05-31 PROCEDURE — 80053 COMPREHEN METABOLIC PANEL: CPT

## 2022-05-31 PROCEDURE — 80061 LIPID PANEL: CPT

## 2022-05-31 PROCEDURE — 82570 ASSAY OF URINE CREATININE: CPT

## 2022-05-31 RX ORDER — MAGNESIUM OXIDE 400 MG/1
1 TABLET ORAL 2 TIMES DAILY
COMMUNITY
Start: 2022-05-04 | End: 2022-10-21 | Stop reason: SDUPTHER

## 2022-05-31 RX ORDER — BUDESONIDE AND FORMOTEROL FUMARATE DIHYDRATE 160; 4.5 UG/1; UG/1
2 AEROSOL RESPIRATORY (INHALATION) 2 TIMES DAILY
Status: ON HOLD | COMMUNITY
Start: 2022-05-05 | End: 2023-03-07

## 2022-05-31 RX ORDER — BLOOD SUGAR DIAGNOSTIC
STRIP MISCELLANEOUS
Qty: 100 EACH | Refills: 5 | Status: SHIPPED | OUTPATIENT
Start: 2022-05-31 | End: 2022-12-06

## 2022-05-31 RX ORDER — POTASSIUM CHLORIDE 750 MG/1
10 CAPSULE, EXTENDED RELEASE ORAL DAILY
COMMUNITY
Start: 2022-04-25 | End: 2022-07-27

## 2022-06-06 ENCOUNTER — TELEPHONE (OUTPATIENT)
Dept: ENDOCRINOLOGY | Facility: CLINIC | Age: 67
End: 2022-06-06

## 2022-06-27 ENCOUNTER — OFFICE VISIT (OUTPATIENT)
Dept: FAMILY MEDICINE CLINIC | Facility: CLINIC | Age: 67
End: 2022-06-27

## 2022-06-27 VITALS
DIASTOLIC BLOOD PRESSURE: 87 MMHG | BODY MASS INDEX: 40.49 KG/M2 | OXYGEN SATURATION: 93 % | WEIGHT: 236 LBS | TEMPERATURE: 98 F | HEART RATE: 103 BPM | SYSTOLIC BLOOD PRESSURE: 125 MMHG

## 2022-06-27 DIAGNOSIS — B35.4 TINEA CORPORIS: Primary | ICD-10-CM

## 2022-06-27 PROCEDURE — 99213 OFFICE O/P EST LOW 20 MIN: CPT | Performed by: FAMILY MEDICINE

## 2022-06-27 RX ORDER — NYSTATIN 100000 U/G
1 OINTMENT TOPICAL 2 TIMES DAILY
Qty: 30 G | Refills: 1 | Status: SHIPPED | OUTPATIENT
Start: 2022-06-27 | End: 2022-07-25 | Stop reason: SDUPTHER

## 2022-06-27 RX ORDER — APIXABAN 5 MG/1
TABLET, FILM COATED ORAL
Qty: 60 TABLET | Refills: 1 | Status: SHIPPED | OUTPATIENT
Start: 2022-06-27 | End: 2022-08-22

## 2022-07-25 ENCOUNTER — OFFICE VISIT (OUTPATIENT)
Dept: FAMILY MEDICINE CLINIC | Facility: CLINIC | Age: 67
End: 2022-07-25

## 2022-07-25 VITALS
OXYGEN SATURATION: 95 % | WEIGHT: 238 LBS | BODY MASS INDEX: 40.83 KG/M2 | HEART RATE: 69 BPM | TEMPERATURE: 97.7 F | DIASTOLIC BLOOD PRESSURE: 88 MMHG | SYSTOLIC BLOOD PRESSURE: 144 MMHG

## 2022-07-25 DIAGNOSIS — B37.31 VAGINA, CANDIDIASIS: ICD-10-CM

## 2022-07-25 DIAGNOSIS — B35.4 TINEA CORPORIS: Primary | ICD-10-CM

## 2022-07-25 DIAGNOSIS — J42 CHRONIC BRONCHITIS, UNSPECIFIED CHRONIC BRONCHITIS TYPE: Chronic | ICD-10-CM

## 2022-07-25 PROCEDURE — 99213 OFFICE O/P EST LOW 20 MIN: CPT | Performed by: FAMILY MEDICINE

## 2022-07-25 RX ORDER — LISINOPRIL 20 MG/1
TABLET ORAL
Qty: 90 TABLET | Refills: 1 | Status: SHIPPED | OUTPATIENT
Start: 2022-07-25 | End: 2023-02-06

## 2022-07-25 RX ORDER — FLUCONAZOLE 100 MG/1
100 TABLET ORAL DAILY
Qty: 5 TABLET | Refills: 0 | Status: SHIPPED | OUTPATIENT
Start: 2022-07-25 | End: 2022-10-21

## 2022-07-25 RX ORDER — ALBUTEROL SULFATE 90 UG/1
2 AEROSOL, METERED RESPIRATORY (INHALATION) EVERY 4 HOURS PRN
Qty: 18 G | Refills: 1 | Status: SHIPPED | OUTPATIENT
Start: 2022-07-25 | End: 2022-08-22

## 2022-07-25 RX ORDER — DILTIAZEM HYDROCHLORIDE 120 MG/1
CAPSULE, COATED, EXTENDED RELEASE ORAL
Qty: 90 CAPSULE | Refills: 1 | Status: SHIPPED | OUTPATIENT
Start: 2022-07-25 | End: 2023-01-16

## 2022-07-25 RX ORDER — NYSTATIN 100000 U/G
1 OINTMENT TOPICAL 2 TIMES DAILY
Qty: 30 G | Refills: 1 | Status: SHIPPED | OUTPATIENT
Start: 2022-07-25 | End: 2022-10-21

## 2022-07-27 RX ORDER — POTASSIUM CHLORIDE 750 MG/1
CAPSULE, EXTENDED RELEASE ORAL
Qty: 90 CAPSULE | Refills: 1 | Status: SHIPPED | OUTPATIENT
Start: 2022-07-27 | End: 2023-01-23

## 2022-07-27 RX ORDER — FENOFIBRATE 160 MG/1
TABLET ORAL
Qty: 90 TABLET | Refills: 1 | Status: SHIPPED | OUTPATIENT
Start: 2022-07-27 | End: 2023-01-16

## 2022-08-01 RX ORDER — CHOLECALCIFEROL (VITAMIN D3) 25 MCG
1000 CAPSULE ORAL 2 TIMES DAILY
Qty: 60 CAPSULE | Refills: 5 | Status: SHIPPED | OUTPATIENT
Start: 2022-08-01 | End: 2022-10-21

## 2022-08-20 DIAGNOSIS — J42 CHRONIC BRONCHITIS, UNSPECIFIED CHRONIC BRONCHITIS TYPE: Chronic | ICD-10-CM

## 2022-08-22 RX ORDER — APIXABAN 5 MG/1
TABLET, FILM COATED ORAL
Qty: 60 TABLET | Refills: 1 | Status: SHIPPED | OUTPATIENT
Start: 2022-08-22 | End: 2022-10-17

## 2022-08-22 RX ORDER — ALBUTEROL SULFATE 90 UG/1
AEROSOL, METERED RESPIRATORY (INHALATION)
Qty: 18 G | Refills: 1 | Status: SHIPPED | OUTPATIENT
Start: 2022-08-22

## 2022-08-29 RX ORDER — ATORVASTATIN CALCIUM 40 MG/1
TABLET, FILM COATED ORAL
Qty: 45 TABLET | Refills: 1 | Status: SHIPPED | OUTPATIENT
Start: 2022-08-29 | End: 2023-02-20

## 2022-09-19 ENCOUNTER — OFFICE VISIT (OUTPATIENT)
Dept: FAMILY MEDICINE CLINIC | Facility: CLINIC | Age: 67
End: 2022-09-19

## 2022-09-19 VITALS
OXYGEN SATURATION: 95 % | HEART RATE: 72 BPM | DIASTOLIC BLOOD PRESSURE: 81 MMHG | TEMPERATURE: 97.2 F | BODY MASS INDEX: 40.49 KG/M2 | SYSTOLIC BLOOD PRESSURE: 129 MMHG | WEIGHT: 236 LBS

## 2022-09-19 DIAGNOSIS — N61.1 ABSCESS OF RIGHT BREAST: Primary | ICD-10-CM

## 2022-09-19 PROCEDURE — 99213 OFFICE O/P EST LOW 20 MIN: CPT | Performed by: FAMILY MEDICINE

## 2022-09-19 RX ORDER — CEFDINIR 300 MG/1
300 CAPSULE ORAL 2 TIMES DAILY
Qty: 20 CAPSULE | Refills: 0 | Status: SHIPPED | OUTPATIENT
Start: 2022-09-19 | End: 2022-10-21

## 2022-09-19 RX ORDER — CLOBETASOL PROPIONATE 0.5 MG/G
OINTMENT TOPICAL
COMMUNITY
Start: 2022-08-30 | End: 2022-10-21

## 2022-09-19 NOTE — PROGRESS NOTES
"Chief Complaint  Wound Infection (Under breast area, on going ) and Eye Problem (X 2 weeks, sore, itches )    Subjective        Caterina Mason presents to [facility]  History of Present Illness  She has been on some oral medication from her OB/GYN office recently and had it refilled but she is not feeling any better.  She had some bloody drainage from it last week that had a bad smell. She does not remember the name of the medicine.  I called her pharmacy and the only anti-infective medicine that she has had recently is Diflucan.   Wound Infection  This is a new problem. The current episode started 1 to 4 weeks ago. The problem occurs constantly. The problem has been unchanged. Pertinent negatives include no anorexia, chills, fever or swollen glands. Nothing aggravates the symptoms.   Eye Problem   Pertinent negatives include no fever.       Objective   Vital Signs:  /81 (BP Location: Left arm, Patient Position: Sitting, Cuff Size: Large Adult)   Pulse 72   Temp 97.2 °F (36.2 °C) (Infrared)   Wt 107 kg (236 lb)   SpO2 95%   BMI 40.49 kg/m²   Estimated body mass index is 40.49 kg/m² as calculated from the following:    Height as of 4/22/22: 162.6 cm (64.02\").    Weight as of this encounter: 107 kg (236 lb).          Physical Exam  Vitals and nursing note reviewed.   Constitutional:       Appearance: Normal appearance.   Cardiovascular:      Rate and Rhythm: Regular rhythm.      Heart sounds: Normal heart sounds.   Pulmonary:      Breath sounds: Normal breath sounds.   Chest:   Breasts:      Right: Skin change and tenderness present.            Comments: Area of redness, warmth and fluctuance  Neurological:      Mental Status: She is alert.   Psychiatric:         Mood and Affect: Mood normal.        Result Review :  The following data was reviewed by: Lou Curran MD on 09/19/2022:  Common labs    Common Labsle 11/16/21 11/16/21 11/16/21 11/16/21 4/11/22 5/31/22 " 5/31/22 5/31/22 5/31/22    0812 0812 0812 0812  1118 1118 1118 1118   Glucose   229 (A)     202 (A)    BUN   15     12    Creatinine   0.90     0.78    eGFR Non African Am   63         Sodium   139     140    Potassium   3.4 (A)     4.1    Chloride   101     102    Calcium   10.3     9.8    Albumin   4.40     4.40    Total Bilirubin   0.7     0.8    Alkaline Phosphatase   70     62    AST (SGOT)   57 (A)     76 (A)    ALT (SGPT)   59 (A)     82 (A)    WBC     7.70       Hemoglobin     15.6       Hematocrit     48.1 (A)       Platelets     302       Total Cholesterol  135     122     Triglycerides  278 (A)     184 (A)     HDL Cholesterol  28 (A)     37 (A)     LDL Cholesterol   63     54     Hemoglobin A1C 11.6 (A)     11.5 (A)      Microalbumin, Urine    <1.2     3.8   (A) Abnormal value                      Assessment and Plan   Diagnoses and all orders for this visit:    1. Abscess of right breast (Primary)  -     Ambulatory Referral to General Surgery  -     cefdinir (OMNICEF) 300 MG capsule; Take 1 capsule by mouth 2 (Two) Times a Day.  Dispense: 20 capsule; Refill: 0             Follow Up   No follow-ups on file.  Patient was given instructions and counseling regarding her condition or for health maintenance advice. Please see specific information pulled into the AVS if appropriate.

## 2022-09-25 RX ORDER — SITAGLIPTIN AND METFORMIN HYDROCHLORIDE 1000; 50 MG/1; MG/1
TABLET, FILM COATED, EXTENDED RELEASE ORAL
Qty: 60 TABLET | Refills: 3 | Status: SHIPPED | OUTPATIENT
Start: 2022-09-25 | End: 2023-01-16

## 2022-10-04 DIAGNOSIS — G25.81 RESTLESS LEG SYNDROME: ICD-10-CM

## 2022-10-04 RX ORDER — PRAMIPEXOLE DIHYDROCHLORIDE 0.12 MG/1
TABLET ORAL
Qty: 90 TABLET | Refills: 1 | OUTPATIENT
Start: 2022-10-04

## 2022-10-17 RX ORDER — APIXABAN 5 MG/1
TABLET, FILM COATED ORAL
Qty: 60 TABLET | Refills: 1 | Status: SHIPPED | OUTPATIENT
Start: 2022-10-17 | End: 2022-12-21

## 2022-10-19 RX ORDER — FUROSEMIDE 40 MG/1
TABLET ORAL
Qty: 90 TABLET | Refills: 1 | Status: SHIPPED | OUTPATIENT
Start: 2022-10-19

## 2022-10-21 ENCOUNTER — OFFICE VISIT (OUTPATIENT)
Dept: SURGERY | Facility: CLINIC | Age: 67
End: 2022-10-21

## 2022-10-21 VITALS
OXYGEN SATURATION: 99 % | HEIGHT: 64 IN | DIASTOLIC BLOOD PRESSURE: 62 MMHG | TEMPERATURE: 96.8 F | BODY MASS INDEX: 40.67 KG/M2 | RESPIRATION RATE: 18 BRPM | SYSTOLIC BLOOD PRESSURE: 141 MMHG | WEIGHT: 238.2 LBS | HEART RATE: 71 BPM

## 2022-10-21 DIAGNOSIS — N63.13 MASS OF LOWER OUTER QUADRANT OF RIGHT BREAST: Primary | ICD-10-CM

## 2022-10-21 PROCEDURE — 88305 TISSUE EXAM BY PATHOLOGIST: CPT | Performed by: SURGERY

## 2022-10-21 PROCEDURE — 99204 OFFICE O/P NEW MOD 45 MIN: CPT | Performed by: SURGERY

## 2022-10-21 PROCEDURE — 11104 PUNCH BX SKIN SINGLE LESION: CPT | Performed by: SURGERY

## 2022-10-21 RX ORDER — DOXYCYCLINE HYCLATE 100 MG/1
100 CAPSULE ORAL 2 TIMES DAILY
Qty: 20 CAPSULE | Refills: 0 | Status: ON HOLD | OUTPATIENT
Start: 2022-10-21 | End: 2022-12-01

## 2022-10-21 NOTE — PROGRESS NOTES
GENERAL SURGERY CONSULTATION NOTE    Consult requested by: Dr. Curran    Patient Care Team:  Lou Curran MD as PCP - General  MelvinJesse tate MD as Consulting Physician (Cardiology)  Bautista Archibald MD as Consulting Physician (Endocrinology)  Jane Montero MD as Consulting Physician (Obstetrics and Gynecology)    Reason for consult: right breast abscess    Subjective     Patient is a 67 y.o. female presents with a chronic wound of the right breast which has been present for the last 3 months or so.  The patient states that it is painful, and she now has an area of ulceration with surrounding erythema which occasionally will drain blood and some purulent appearing material.  She has been seen by her OB/GYN for this previously, but has never been evaluated by a general surgeon.  She was seen by her primary care physician on 2022 who started her on Omnicef and instructed her to follow-up with a general surgeon.  She had an appointment with Dr. Wilkins on 10/3/2022, but did not have this done.  She has not had a mammogram in many years.  She reports that she started having her periods around age 14, she had 2 pregnancies with 2 live births the first child was born when she was 21.  She has had a hysterectomy in .  She never breast-fed, has never taken birth control pills, and does not take any hormone replacement therapy.  She has COPD and cannot lie flat, as well as coronary artery disease and atrial fibrillation.  Family history is significant for a sister who  of metastatic cancer in her 20s.    Review of Systems   Constitutional: Positive for activity change, diaphoresis and unexpected weight gain.   Respiratory: Positive for cough, shortness of breath and wheezing.    Cardiovascular: Positive for chest pain and leg swelling.   Gastrointestinal: Positive for abdominal pain, constipation and diarrhea.   Genitourinary: Positive for breast discharge, breast lump and breast pain.   Neurological:  Positive for dizziness, speech difficulty, light-headedness and headache.   Hematological: Bruises/bleeds easily.        History  Past Medical History:   Diagnosis Date   • Arthritis    • Atrial fibrillation ([facility])    • Bradycardia     Dr. Charles   • Cataract    • COPD (chronic obstructive pulmonary disease) ([facility])     Dr. Rob   • Diabetes mellitus, type 2 ([facility])     sees Dr. Archibald at [place]   • DJD (degenerative joint disease)     possible herniated disc, sees Pain Mgmt in [place]   • Emphysema of lung ([facility])    •     • GERD (gastroesophageal reflux disease)    • History of combined right and left heart catheterization 2018    minimal CAD on heart cath done    • Hyperlipidemia    • Hypertension    • Pain    • Sleep apnea     wears CPAP machine, sees Darron     Past Surgical History:   Procedure Laterality Date   • ABLATION OF DYSRHYTHMIC FOCUS     • CARDIAC CATHETERIZATION      and Angiograph    • CARDIAC ELECTROPHYSIOLOGY PROCEDURE N/A 12/10/2019    Procedure: pvc ablation;  Surgeon: Alfredo Iglesias MD;  Location: [facility] INVASIVE LOCATION;  Service: Cardiovascular   •  SECTION      x 1   • COLON RESECTION     • COLONOSCOPY  2012   • COLOSTOMY      and reversal in her 20's due to problems with childbirth   • COLOSTOMY CLOSURE     • OVARIAN CYST SURGERY     • ROTATOR CUFF REPAIR Left 2009    x2    • TOTAL ABDOMINAL HYSTERECTOMY WITH SALPINGO OOPHORECTOMY       Family History   Problem Relation Age of Onset   • Heart disease Mother    • Hypertension Mother    • Stroke Mother    • Seizures Mother    • Heart disease Father    • Hypertension Father    • Lung disease Father    • Stroke Father    • Cancer Sister         unknown   • Hypertension Brother    • Diabetes Brother    • Hyperlipidemia Other      Social History     Tobacco Use   • Smoking status: Former     Packs/day: 1.00     Years: 48.00     Pack years: 48.00     Types: Cigarettes     Quit date: 3/1/2020     Years since  quittin.6   • Smokeless tobacco: Never   Vaping Use   • Vaping Use: Never used   Substance Use Topics   • Alcohol use: No   • Drug use: No     (Not in a hospital admission)    Allergies:  Ibuprofen, Prednisone, Pregabalin, Tramadol, Levofloxacin, and Sulfamethoxazole-trimethoprim    Objective     Vital Signs  Temp:  [96.8 °F (36 °C)] 96.8 °F (36 °C)  Heart Rate:  [71] 71  Resp:  [18] 18  BP: (141)/(62) 141/62    Physical Exam  Vitals reviewed. Exam conducted with a chaperone present.   Constitutional:       General: She is not in acute distress.     Appearance: She is well-developed. She is obese. She is not ill-appearing.   HENT:      Head: Normocephalic and atraumatic.   Eyes:      Pupils: Pupils are equal, round, and reactive to light.   Cardiovascular:      Rate and Rhythm: Normal rate and regular rhythm.   Pulmonary:      Effort: Pulmonary effort is normal.      Breath sounds: Normal breath sounds.   Chest:      Comments: Both breasts were examined in upright position.  The left breast is of normal shape and contour without any dominant masses, no skin lesions, or ulcerations.  The right breast has normal shape and contour, but does have a approximately 3 cm area of erythema surrounding a superficial ulceration with exposed granulation tissue beneath which is draining a small amount of purulent fluid.  Abdominal:      General: There is no distension.      Palpations: Abdomen is soft.      Tenderness: There is no abdominal tenderness.      Hernia: No hernia is present.   Musculoskeletal:         General: Normal range of motion.      Cervical back: Normal range of motion.   Lymphadenopathy:      Cervical: No cervical adenopathy.      Upper Body:      Right upper body: No supraclavicular or axillary adenopathy.      Left upper body: No supraclavicular or axillary adenopathy.   Skin:     General: Skin is warm and dry.      Findings: No rash.   Neurological:      Mental Status: She is alert and oriented to  person, place, and time.         Results Review:   Lab Results (last 24 hours)     ** No results found for the last 24 hours. **        No radiology results for the last day      I reviewed the patient's new imaging results and agree with the interpretation.  I reviewed the patient's other test results and agree with the interpretation    Assessment & Plan   Right breast abscess    Patient is not breast-feeding, does not have any nipple piercings, and is a non-smoker.  Thus, we discussed the significance of breast abscesses in this patient population and the need to rule out cancer prior to proceeding.  I recommend a punch biopsy which will be performed today in the office.  We discussed the risk, benefits, and alternatives to this.  I will then have her follow-up with me in 1 week  We will start her on doxycycline and recommend warm compresses to the area to see if this makes a difference as well.  She will need a mammogram and ultrasound in the near future    I discussed the patients findings and my recommendations with the patient.     Ronak Trinidad MD  10/21/22  10:42 EDT

## 2022-10-21 NOTE — PROGRESS NOTES
Operative Note    Patient Name:  Caterina Mason  YOB: 1955    Date of Surgery:  10/21/2022      Indications: See consult note    Pre-op Diagnosis:   Right breast abscess    Post-op Diagnosis:  Post-Op Diagnosis Codes:  Right breast abscess    Procedure/CPT® Codes:  5 mm punch biopsy of the right breast abscess      Staff:  MD Vivi-surgeon    Anesthesia: 1% lidocaine with epinephrine    Estimated Blood Loss: Minimal    Implants:    None    Specimen:          A: Right breast    Findings: 5 mm punch biopsy of the right breast skin    Complications: None, immediately    Description of Procedure: After obtaining informed consent, the patient was positioned in a semisupine position.  We then prepped and draped the area of concern within the right breast and used a 5 mm punch biopsy forceps to obtain a 5 mm circular section of the patient's skin.  This was then placed in formalin and passed off the field for pathologic review.  The wound was dressed with a 4 x 4 gauze and a Band-Aid.  The patient tolerated the procedure well, was able to leave the office under her own power without any immediate complications    Ronak Trinidad MD     Date: 10/21/2022  Time: 10:48 EDT

## 2022-10-25 LAB
LAB AP CASE REPORT: NORMAL
PATH REPORT.FINAL DX SPEC: NORMAL
PATH REPORT.GROSS SPEC: NORMAL

## 2022-10-25 RX ORDER — MAGNESIUM OXIDE 400 MG/1
TABLET ORAL
Qty: 60 TABLET | Refills: 6 | Status: SHIPPED | OUTPATIENT
Start: 2022-10-25

## 2022-10-26 ENCOUNTER — OFFICE VISIT (OUTPATIENT)
Dept: SURGERY | Facility: CLINIC | Age: 67
End: 2022-10-26

## 2022-10-26 VITALS
TEMPERATURE: 96 F | RESPIRATION RATE: 18 BRPM | HEIGHT: 64 IN | OXYGEN SATURATION: 95 % | BODY MASS INDEX: 40.08 KG/M2 | DIASTOLIC BLOOD PRESSURE: 89 MMHG | HEART RATE: 76 BPM | SYSTOLIC BLOOD PRESSURE: 144 MMHG | WEIGHT: 234.8 LBS

## 2022-10-26 DIAGNOSIS — N63.13 MASS OF LOWER OUTER QUADRANT OF RIGHT BREAST: Primary | ICD-10-CM

## 2022-10-26 PROCEDURE — 99214 OFFICE O/P EST MOD 30 MIN: CPT | Performed by: SURGERY

## 2022-10-26 RX ORDER — CHLORHEXIDINE GLUCONATE 0.12 MG/ML
15 RINSE ORAL EVERY 12 HOURS SCHEDULED
Status: CANCELLED | OUTPATIENT
Start: 2022-10-26

## 2022-10-26 RX ORDER — SODIUM CHLORIDE 9 MG/ML
100 INJECTION, SOLUTION INTRAVENOUS CONTINUOUS
Status: CANCELLED | OUTPATIENT
Start: 2022-10-26

## 2022-11-01 NOTE — PROGRESS NOTES
GENERAL SURGERY CONSULTATION NOTE    Consult requested by: Dr. Curran    Patient Care Team:  Lou Curran MD as PCP - General  MelvinJesse tate MD as Consulting Physician (Cardiology)  Bautista Archibald MD as Consulting Physician (Endocrinology)  Jane Montero MD as Consulting Physician (Obstetrics and Gynecology)    Reason for consult: right breast abscess    Subjective   From 10/21/2022:  Patient is a 67 y.o. female presents with a chronic wound of the right breast which has been present for the last 3 months or so.  The patient states that it is painful, and she now has an area of ulceration with surrounding erythema which occasionally will drain blood and some purulent appearing material.  She has been seen by her OB/GYN for this previously, but has never been evaluated by a general surgeon.  She was seen by her primary care physician on 2022 who started her on Omnicef and instructed her to follow-up with a general surgeon.  She had an appointment with Dr. Wilkins on 10/3/2022, but did not have this done.  She has not had a mammogram in many years.  She reports that she started having her periods around age 14, she had 2 pregnancies with 2 live births the first child was born when she was 21.  She has had a hysterectomy in .  She never breast-fed, has never taken birth control pills, and does not take any hormone replacement therapy.  She has COPD and cannot lie flat, as well as coronary artery disease and atrial fibrillation.  Family history is significant for a sister who  of metastatic cancer in her 20s.    From 10/26/2022:  Patient returns to the office after undergoing punch biopsy of this area.  She states that she is not have any significant pain, but she is still having an open wound with some occasional drainage from the area.  She has also noticed some skin tears in the skin surrounding the biopsy site from the dressing.    Review of Systems   Constitutional: Positive for activity  change, diaphoresis and unexpected weight gain.   Respiratory: Positive for cough, shortness of breath and wheezing.    Cardiovascular: Positive for chest pain and leg swelling.   Gastrointestinal: Positive for abdominal pain, constipation and diarrhea.   Genitourinary: Positive for breast discharge, breast lump and breast pain.   Neurological: Positive for dizziness, speech difficulty, light-headedness and headache.   Hematological: Bruises/bleeds easily.        History  Past Medical History:   Diagnosis Date   • Arthritis    • Atrial fibrillation ([facility])    • Bradycardia     Dr. Charles   • Cataract    • COPD (chronic obstructive pulmonary disease) ([facility])     Dr. Rob   • Diabetes mellitus, type 2 ([facility])     sees Dr. Archibald at [place]   • DJD (degenerative joint disease)     possible herniated disc, sees Pain Mgmt in [place]   • Emphysema of lung ([facility])    •     • GERD (gastroesophageal reflux disease)    • History of combined right and left heart catheterization 2018    minimal CAD on heart cath done    • Hyperlipidemia    • Hypertension    • Pain    • Sleep apnea     wears CPAP machine, sees Darron     Past Surgical History:   Procedure Laterality Date   • ABLATION OF DYSRHYTHMIC FOCUS     • CARDIAC CATHETERIZATION      and Angiograph    • CARDIAC ELECTROPHYSIOLOGY PROCEDURE N/A 12/10/2019    Procedure: pvc ablation;  Surgeon: Alfredo Iglesias MD;  Location: [facility] INVASIVE LOCATION;  Service: Cardiovascular   •  SECTION      x 1   • COLON RESECTION     • COLONOSCOPY  2012   • COLOSTOMY      and reversal in her 20's due to problems with childbirth   • COLOSTOMY CLOSURE     • OVARIAN CYST SURGERY     • ROTATOR CUFF REPAIR Left 2009    x2    • TOTAL ABDOMINAL HYSTERECTOMY WITH SALPINGO OOPHORECTOMY       Family History   Problem Relation Age of Onset   • Heart disease Mother    • Hypertension Mother    • Stroke Mother    • Seizures Mother    • Heart disease Father    • Hypertension  Father    • Lung disease Father    • Stroke Father    • Cancer Sister         unknown   • Hypertension Brother    • Diabetes Brother    • Hyperlipidemia Other      Social History     Tobacco Use   • Smoking status: Former     Packs/day: 1.00     Years: 48.00     Pack years: 48.00     Types: Cigarettes     Quit date: 3/1/2020     Years since quittin.6   • Smokeless tobacco: Never   Vaping Use   • Vaping Use: Never used   Substance Use Topics   • Alcohol use: No   • Drug use: No     (Not in a hospital admission)    Allergies:  Ibuprofen, Prednisone, Pregabalin, Tramadol, Levofloxacin, and Sulfamethoxazole-trimethoprim    Objective     Vital Signs       Physical Exam  Vitals reviewed. Exam conducted with a chaperone present.   Constitutional:       General: She is not in acute distress.     Appearance: She is well-developed. She is obese. She is not ill-appearing.   HENT:      Head: Normocephalic and atraumatic.   Eyes:      Pupils: Pupils are equal, round, and reactive to light.   Cardiovascular:      Rate and Rhythm: Normal rate and regular rhythm.   Pulmonary:      Effort: Pulmonary effort is normal.      Breath sounds: Normal breath sounds.   Chest:      Comments: Both breasts were examined in upright position.  The left breast is of normal shape and contour without any dominant masses, no skin lesions, or ulcerations.  The right breast has normal shape and contour, but does have a approximately 3 cm area of erythema surrounding a superficial ulceration with exposed granulation tissue beneath which is draining a small amount of purulent fluid.  Abdominal:      General: There is no distension.      Palpations: Abdomen is soft.      Tenderness: There is no abdominal tenderness.      Hernia: No hernia is present.   Musculoskeletal:         General: Normal range of motion.      Cervical back: Normal range of motion.   Lymphadenopathy:      Cervical: No cervical adenopathy.      Upper Body:      Right upper body: No  supraclavicular or axillary adenopathy.      Left upper body: No supraclavicular or axillary adenopathy.   Skin:     General: Skin is warm and dry.      Findings: No rash.   Neurological:      Mental Status: She is alert and oriented to person, place, and time.         Results Review:   Lab Results (last 24 hours)     ** No results found for the last 24 hours. **        No radiology results for the last day      I reviewed the patient's new imaging results and agree with the interpretation.  I reviewed the patient's other test results and agree with the interpretation    Assessment & Plan   Right breast abscess    Pathology reviewed with patient which demonstrated epidermal ulceration overlying dermal abscess, negative for carcinoma in situ and invasive carcinoma.  This is a reassuring finding, and now I recommend that we proceed to the operating room for excision of the skin surrounding this area of ulceration.  I think that this will allow for removal of the skin and the underlying infection, and allow for improved cosmesis.  Initially, the patient was uncertain as to whether or not she wanted to proceed, but I discussed that we could proceed in the operating room under anesthesia with the assistance of local anesthesia, and then the patient felt more at ease with proceeding with surgery.  Risks of surgery include bleeding, infection, pain, and recurrence.  Wound healing complications are also a concern especially in the setting of potential infection, however hopefully we will be able to remain outside of the infection.    I discussed the patients findings and my recommendations with the patient.     Ronak Trinidad MD  11/01/22  11:53 EDT

## 2022-11-14 RX ORDER — INSULIN GLARGINE 100 [IU]/ML
40 INJECTION, SOLUTION SUBCUTANEOUS NIGHTLY
Qty: 45 ML | Refills: 0 | Status: SHIPPED | OUTPATIENT
Start: 2022-11-14

## 2022-11-15 PROBLEM — Z01.818 PRE-OP EXAMINATION: Status: ACTIVE | Noted: 2022-11-15

## 2022-11-29 ENCOUNTER — APPOINTMENT (OUTPATIENT)
Dept: GENERAL RADIOLOGY | Facility: HOSPITAL | Age: 67
End: 2022-11-29

## 2022-11-29 ENCOUNTER — HOSPITAL ENCOUNTER (EMERGENCY)
Facility: HOSPITAL | Age: 67
Discharge: HOME | End: 2022-11-29
Attending: EMERGENCY MEDICINE | Admitting: EMERGENCY MEDICINE

## 2022-11-29 VITALS
SYSTOLIC BLOOD PRESSURE: 158 MMHG | WEIGHT: 280 LBS | HEIGHT: 64 IN | BODY MASS INDEX: 47.8 KG/M2 | HEART RATE: 112 BPM | OXYGEN SATURATION: 95 % | TEMPERATURE: 100.5 F | RESPIRATION RATE: 18 BRPM | DIASTOLIC BLOOD PRESSURE: 48 MMHG

## 2022-11-29 DIAGNOSIS — U07.1 COVID-19: Primary | ICD-10-CM

## 2022-11-29 LAB
ALBUMIN SERPL-MCNC: 4.1 G/DL (ref 3.5–5.2)
ALBUMIN/GLOB SERPL: 1 G/DL
ALP SERPL-CCNC: 69 U/L (ref 39–117)
ALT SERPL W P-5'-P-CCNC: 43 U/L (ref 1–33)
ANION GAP SERPL CALCULATED.3IONS-SCNC: 15 MMOL/L (ref 5–15)
AST SERPL-CCNC: 39 U/L (ref 1–32)
BACTERIA UR QL AUTO: ABNORMAL /HPF
BASOPHILS # BLD AUTO: 0.2 10*3/MM3 (ref 0–0.2)
BASOPHILS NFR BLD AUTO: 1.8 % (ref 0–1.5)
BILIRUB SERPL-MCNC: 1 MG/DL (ref 0–1.2)
BILIRUB UR QL STRIP: NEGATIVE
BUN SERPL-MCNC: 16 MG/DL (ref 8–23)
BUN/CREAT SERPL: 18 (ref 7–25)
CALCIUM SPEC-SCNC: 9.5 MG/DL (ref 8.6–10.5)
CHLORIDE SERPL-SCNC: 100 MMOL/L (ref 98–107)
CLARITY UR: CLEAR
CO2 SERPL-SCNC: 26 MMOL/L (ref 22–29)
COLOR UR: YELLOW
CREAT SERPL-MCNC: 0.89 MG/DL (ref 0.57–1)
D-LACTATE SERPL-SCNC: 1.7 MMOL/L (ref 0.5–2)
DEPRECATED RDW RBC AUTO: 43.3 FL (ref 37–54)
EGFRCR SERPLBLD CKD-EPI 2021: 71.2 ML/MIN/1.73
EOSINOPHIL # BLD AUTO: 0 10*3/MM3 (ref 0–0.4)
EOSINOPHIL NFR BLD AUTO: 0.1 % (ref 0.3–6.2)
ERYTHROCYTE [DISTWIDTH] IN BLOOD BY AUTOMATED COUNT: 14 % (ref 12.3–15.4)
FLUAV SUBTYP SPEC NAA+PROBE: NOT DETECTED
FLUBV RNA ISLT QL NAA+PROBE: NOT DETECTED
GLOBULIN UR ELPH-MCNC: 4.1 GM/DL
GLUCOSE BLDC GLUCOMTR-MCNC: 213 MG/DL (ref 70–105)
GLUCOSE SERPL-MCNC: 242 MG/DL (ref 65–99)
GLUCOSE UR STRIP-MCNC: ABNORMAL MG/DL
HCT VFR BLD AUTO: 44.9 % (ref 34–46.6)
HGB BLD-MCNC: 14.5 G/DL (ref 12–15.9)
HGB UR QL STRIP.AUTO: NEGATIVE
HYALINE CASTS UR QL AUTO: ABNORMAL /LPF
KETONES UR QL STRIP: ABNORMAL
LEUKOCYTE ESTERASE UR QL STRIP.AUTO: ABNORMAL
LIPASE SERPL-CCNC: 42 U/L (ref 13–60)
LYMPHOCYTES # BLD AUTO: 1.9 10*3/MM3 (ref 0.7–3.1)
LYMPHOCYTES NFR BLD AUTO: 20.5 % (ref 19.6–45.3)
MCH RBC QN AUTO: 28.9 PG (ref 26.6–33)
MCHC RBC AUTO-ENTMCNC: 32.4 G/DL (ref 31.5–35.7)
MCV RBC AUTO: 89.2 FL (ref 79–97)
MONOCYTES # BLD AUTO: 0.9 10*3/MM3 (ref 0.1–0.9)
MONOCYTES NFR BLD AUTO: 9.9 % (ref 5–12)
NEUTROPHILS NFR BLD AUTO: 6.1 10*3/MM3 (ref 1.7–7)
NEUTROPHILS NFR BLD AUTO: 67.7 % (ref 42.7–76)
NITRITE UR QL STRIP: NEGATIVE
NRBC BLD AUTO-RTO: 0 /100 WBC (ref 0–0.2)
PH UR STRIP.AUTO: 8.5 [PH] (ref 5–8)
PLATELET # BLD AUTO: 289 10*3/MM3 (ref 140–450)
PMV BLD AUTO: 8.2 FL (ref 6–12)
POTASSIUM SERPL-SCNC: 3.8 MMOL/L (ref 3.5–5.2)
PROT SERPL-MCNC: 8.2 G/DL (ref 6–8.5)
PROT UR QL STRIP: ABNORMAL
RBC # BLD AUTO: 5.04 10*6/MM3 (ref 3.77–5.28)
RBC # UR STRIP: ABNORMAL /HPF
REF LAB TEST METHOD: ABNORMAL
SARS-COV-2 RNA PNL SPEC NAA+PROBE: DETECTED
SODIUM SERPL-SCNC: 141 MMOL/L (ref 136–145)
SP GR UR STRIP: 1.03 (ref 1–1.03)
SQUAMOUS #/AREA URNS HPF: ABNORMAL /HPF
UROBILINOGEN UR QL STRIP: ABNORMAL
WBC # UR STRIP: ABNORMAL /HPF
WBC NRBC COR # BLD: 9 10*3/MM3 (ref 3.4–10.8)
YEAST URNS QL MICRO: ABNORMAL /HPF

## 2022-11-29 PROCEDURE — 83605 ASSAY OF LACTIC ACID: CPT

## 2022-11-29 PROCEDURE — 82962 GLUCOSE BLOOD TEST: CPT

## 2022-11-29 PROCEDURE — 87086 URINE CULTURE/COLONY COUNT: CPT | Performed by: NURSE PRACTITIONER

## 2022-11-29 PROCEDURE — 25010000002 DEXAMETHASONE PER 1 MG: Performed by: NURSE PRACTITIONER

## 2022-11-29 PROCEDURE — 71045 X-RAY EXAM CHEST 1 VIEW: CPT

## 2022-11-29 PROCEDURE — 96374 THER/PROPH/DIAG INJ IV PUSH: CPT

## 2022-11-29 PROCEDURE — 36415 COLL VENOUS BLD VENIPUNCTURE: CPT

## 2022-11-29 PROCEDURE — 83690 ASSAY OF LIPASE: CPT | Performed by: NURSE PRACTITIONER

## 2022-11-29 PROCEDURE — 87186 SC STD MICRODIL/AGAR DIL: CPT | Performed by: NURSE PRACTITIONER

## 2022-11-29 PROCEDURE — 87635 SARS-COV-2 COVID-19 AMP PRB: CPT | Performed by: NURSE PRACTITIONER

## 2022-11-29 PROCEDURE — 80053 COMPREHEN METABOLIC PANEL: CPT | Performed by: NURSE PRACTITIONER

## 2022-11-29 PROCEDURE — 81001 URINALYSIS AUTO W/SCOPE: CPT | Performed by: NURSE PRACTITIONER

## 2022-11-29 PROCEDURE — 99283 EMERGENCY DEPT VISIT LOW MDM: CPT

## 2022-11-29 PROCEDURE — 87040 BLOOD CULTURE FOR BACTERIA: CPT | Performed by: NURSE PRACTITIONER

## 2022-11-29 PROCEDURE — 87150 DNA/RNA AMPLIFIED PROBE: CPT | Performed by: NURSE PRACTITIONER

## 2022-11-29 PROCEDURE — 87147 CULTURE TYPE IMMUNOLOGIC: CPT | Performed by: NURSE PRACTITIONER

## 2022-11-29 PROCEDURE — 85025 COMPLETE CBC W/AUTO DIFF WBC: CPT | Performed by: NURSE PRACTITIONER

## 2022-11-29 PROCEDURE — 87502 INFLUENZA DNA AMP PROBE: CPT | Performed by: NURSE PRACTITIONER

## 2022-11-29 RX ORDER — DEXAMETHASONE 4 MG/1
4 TABLET ORAL 3 TIMES DAILY
Qty: 9 TABLET | Refills: 0 | Status: SHIPPED | OUTPATIENT
Start: 2022-11-29 | End: 2023-01-17

## 2022-11-29 RX ORDER — ACETAMINOPHEN 500 MG
1000 TABLET ORAL ONCE
Status: COMPLETED | OUTPATIENT
Start: 2022-11-29 | End: 2022-11-29

## 2022-11-29 RX ORDER — BROMPHENIRAMINE MALEATE, PSEUDOEPHEDRINE HYDROCHLORIDE, AND DEXTROMETHORPHAN HYDROBROMIDE 2; 30; 10 MG/5ML; MG/5ML; MG/5ML
5 SYRUP ORAL 4 TIMES DAILY PRN
Qty: 120 ML | Refills: 0 | Status: SHIPPED | OUTPATIENT
Start: 2022-11-29 | End: 2023-01-17

## 2022-11-29 RX ORDER — DEXAMETHASONE SODIUM PHOSPHATE 4 MG/ML
10 INJECTION, SOLUTION INTRA-ARTICULAR; INTRALESIONAL; INTRAMUSCULAR; INTRAVENOUS; SOFT TISSUE ONCE
Status: COMPLETED | OUTPATIENT
Start: 2022-11-29 | End: 2022-11-29

## 2022-11-29 RX ORDER — SODIUM CHLORIDE 0.9 % (FLUSH) 0.9 %
10 SYRINGE (ML) INJECTION AS NEEDED
Status: DISCONTINUED | OUTPATIENT
Start: 2022-11-29 | End: 2022-11-29 | Stop reason: HOSPADM

## 2022-11-29 RX ADMIN — DEXAMETHASONE SODIUM PHOSPHATE 10 MG: 4 INJECTION, SOLUTION INTRAMUSCULAR; INTRAVENOUS at 21:33

## 2022-11-29 RX ADMIN — ACETAMINOPHEN 1000 MG: 500 TABLET ORAL at 20:39

## 2022-11-30 ENCOUNTER — HOSPITAL ENCOUNTER (INPATIENT)
Facility: HOSPITAL | Age: 67
LOS: 4 days | Discharge: HOME | End: 2022-12-04
Attending: INTERNAL MEDICINE | Admitting: INTERNAL MEDICINE

## 2022-11-30 DIAGNOSIS — U07.1 COVID-19: ICD-10-CM

## 2022-11-30 DIAGNOSIS — B95.61 BACTEREMIA DUE TO METHICILLIN SUSCEPTIBLE STAPHYLOCOCCUS AUREUS (MSSA): Primary | ICD-10-CM

## 2022-11-30 DIAGNOSIS — R78.81 BACTEREMIA DUE TO METHICILLIN SUSCEPTIBLE STAPHYLOCOCCUS AUREUS (MSSA): Primary | ICD-10-CM

## 2022-11-30 LAB
ALBUMIN SERPL-MCNC: 4.3 G/DL (ref 3.5–5.2)
ALBUMIN/GLOB SERPL: 1.1 G/DL
ALP SERPL-CCNC: 67 U/L (ref 39–117)
ALT SERPL W P-5'-P-CCNC: 43 U/L (ref 1–33)
ANION GAP SERPL CALCULATED.3IONS-SCNC: 15 MMOL/L (ref 5–15)
AST SERPL-CCNC: 31 U/L (ref 1–32)
BACTERIA BLD CULT: ABNORMAL
BACTERIA SPEC AEROBE CULT: NO GROWTH
BASOPHILS # BLD AUTO: 0 10*3/MM3 (ref 0–0.2)
BASOPHILS NFR BLD AUTO: 0.1 % (ref 0–1.5)
BILIRUB SERPL-MCNC: 1 MG/DL (ref 0–1.2)
BOTTLE TYPE: ABNORMAL
BUN SERPL-MCNC: 23 MG/DL (ref 8–23)
BUN/CREAT SERPL: 22.5 (ref 7–25)
CALCIUM SPEC-SCNC: 10.3 MG/DL (ref 8.6–10.5)
CHLORIDE SERPL-SCNC: 98 MMOL/L (ref 98–107)
CO2 SERPL-SCNC: 25 MMOL/L (ref 22–29)
CREAT SERPL-MCNC: 1.02 MG/DL (ref 0.57–1)
DEPRECATED RDW RBC AUTO: 43.8 FL (ref 37–54)
EGFRCR SERPLBLD CKD-EPI 2021: 60.4 ML/MIN/1.73
EOSINOPHIL # BLD AUTO: 0 10*3/MM3 (ref 0–0.4)
EOSINOPHIL NFR BLD AUTO: 0 % (ref 0.3–6.2)
ERYTHROCYTE [DISTWIDTH] IN BLOOD BY AUTOMATED COUNT: 14 % (ref 12.3–15.4)
GLOBULIN UR ELPH-MCNC: 4 GM/DL
GLUCOSE BLDC GLUCOMTR-MCNC: 340 MG/DL (ref 70–105)
GLUCOSE SERPL-MCNC: 492 MG/DL (ref 65–99)
HCT VFR BLD AUTO: 45.3 % (ref 34–46.6)
HGB BLD-MCNC: 14.9 G/DL (ref 12–15.9)
LYMPHOCYTES # BLD AUTO: 1.2 10*3/MM3 (ref 0.7–3.1)
LYMPHOCYTES NFR BLD AUTO: 13.3 % (ref 19.6–45.3)
MCH RBC QN AUTO: 29.7 PG (ref 26.6–33)
MCHC RBC AUTO-ENTMCNC: 33 G/DL (ref 31.5–35.7)
MCV RBC AUTO: 90.1 FL (ref 79–97)
MONOCYTES # BLD AUTO: 0.6 10*3/MM3 (ref 0.1–0.9)
MONOCYTES NFR BLD AUTO: 6.8 % (ref 5–12)
NEUTROPHILS NFR BLD AUTO: 7.3 10*3/MM3 (ref 1.7–7)
NEUTROPHILS NFR BLD AUTO: 79.8 % (ref 42.7–76)
NRBC BLD AUTO-RTO: 0.1 /100 WBC (ref 0–0.2)
PLATELET # BLD AUTO: 308 10*3/MM3 (ref 140–450)
PMV BLD AUTO: 8.4 FL (ref 6–12)
POTASSIUM SERPL-SCNC: 4.3 MMOL/L (ref 3.5–5.2)
PROT SERPL-MCNC: 8.3 G/DL (ref 6–8.5)
RBC # BLD AUTO: 5.02 10*6/MM3 (ref 3.77–5.28)
SODIUM SERPL-SCNC: 138 MMOL/L (ref 136–145)
WBC NRBC COR # BLD: 9.1 10*3/MM3 (ref 3.4–10.8)

## 2022-11-30 PROCEDURE — 80053 COMPREHEN METABOLIC PANEL: CPT | Performed by: NURSE PRACTITIONER

## 2022-11-30 PROCEDURE — 25010000002 CEFAZOLIN PER 500 MG: Performed by: NURSE PRACTITIONER

## 2022-11-30 PROCEDURE — 82962 GLUCOSE BLOOD TEST: CPT

## 2022-11-30 PROCEDURE — 85025 COMPLETE CBC W/AUTO DIFF WBC: CPT | Performed by: NURSE PRACTITIONER

## 2022-11-30 PROCEDURE — 99284 EMERGENCY DEPT VISIT MOD MDM: CPT

## 2022-11-30 PROCEDURE — 63710000001 INSULIN LISPRO (HUMAN) PER 5 UNITS: Performed by: NURSE PRACTITIONER

## 2022-11-30 PROCEDURE — 93005 ELECTROCARDIOGRAM TRACING: CPT | Performed by: NURSE PRACTITIONER

## 2022-11-30 PROCEDURE — 63710000001 INSULIN GLARGINE PER 5 UNITS: Performed by: INTERNAL MEDICINE

## 2022-11-30 RX ORDER — MAGNESIUM SULFATE HEPTAHYDRATE 40 MG/ML
2 INJECTION, SOLUTION INTRAVENOUS AS NEEDED
Status: DISCONTINUED | OUTPATIENT
Start: 2022-11-30 | End: 2022-12-04 | Stop reason: HOSPADM

## 2022-11-30 RX ORDER — ACETAMINOPHEN 325 MG/1
650 TABLET ORAL EVERY 4 HOURS PRN
Status: DISCONTINUED | OUTPATIENT
Start: 2022-11-30 | End: 2022-12-04 | Stop reason: HOSPADM

## 2022-11-30 RX ORDER — ENOXAPARIN SODIUM 100 MG/ML
40 INJECTION SUBCUTANEOUS EVERY 12 HOURS
Status: DISCONTINUED | OUTPATIENT
Start: 2022-11-30 | End: 2022-11-30

## 2022-11-30 RX ORDER — HYDRALAZINE HYDROCHLORIDE 20 MG/ML
10 INJECTION INTRAMUSCULAR; INTRAVENOUS EVERY 6 HOURS PRN
Status: DISCONTINUED | OUTPATIENT
Start: 2022-11-30 | End: 2022-12-04 | Stop reason: HOSPADM

## 2022-11-30 RX ORDER — NICOTINE POLACRILEX 4 MG
15 LOZENGE BUCCAL
Status: DISCONTINUED | OUTPATIENT
Start: 2022-11-30 | End: 2022-12-04 | Stop reason: HOSPADM

## 2022-11-30 RX ORDER — INSULIN LISPRO 100 [IU]/ML
3-14 INJECTION, SOLUTION INTRAVENOUS; SUBCUTANEOUS
Status: DISCONTINUED | OUTPATIENT
Start: 2022-11-30 | End: 2022-12-04 | Stop reason: HOSPADM

## 2022-11-30 RX ORDER — ONDANSETRON 2 MG/ML
4 INJECTION INTRAMUSCULAR; INTRAVENOUS EVERY 6 HOURS PRN
Status: DISCONTINUED | OUTPATIENT
Start: 2022-11-30 | End: 2022-12-04 | Stop reason: HOSPADM

## 2022-11-30 RX ORDER — OLANZAPINE 10 MG/2ML
1 INJECTION, POWDER, LYOPHILIZED, FOR SOLUTION INTRAMUSCULAR
Status: DISCONTINUED | OUTPATIENT
Start: 2022-11-30 | End: 2022-12-04 | Stop reason: HOSPADM

## 2022-11-30 RX ORDER — SODIUM CHLORIDE 0.9 % (FLUSH) 0.9 %
10 SYRINGE (ML) INJECTION EVERY 12 HOURS SCHEDULED
Status: DISCONTINUED | OUTPATIENT
Start: 2022-11-30 | End: 2022-12-04 | Stop reason: HOSPADM

## 2022-11-30 RX ORDER — SODIUM CHLORIDE 0.9 % (FLUSH) 0.9 %
10 SYRINGE (ML) INJECTION AS NEEDED
Status: DISCONTINUED | OUTPATIENT
Start: 2022-11-30 | End: 2022-12-04 | Stop reason: HOSPADM

## 2022-11-30 RX ORDER — POTASSIUM CHLORIDE 20 MEQ/1
40 TABLET, EXTENDED RELEASE ORAL AS NEEDED
Status: DISCONTINUED | OUTPATIENT
Start: 2022-11-30 | End: 2022-12-04 | Stop reason: HOSPADM

## 2022-11-30 RX ORDER — POTASSIUM CHLORIDE 1.5 G/1.77G
40 POWDER, FOR SOLUTION ORAL AS NEEDED
Status: DISCONTINUED | OUTPATIENT
Start: 2022-11-30 | End: 2022-12-04 | Stop reason: HOSPADM

## 2022-11-30 RX ORDER — SODIUM CHLORIDE 9 MG/ML
40 INJECTION, SOLUTION INTRAVENOUS AS NEEDED
Status: DISCONTINUED | OUTPATIENT
Start: 2022-11-30 | End: 2022-12-04 | Stop reason: HOSPADM

## 2022-11-30 RX ORDER — MAGNESIUM SULFATE HEPTAHYDRATE 40 MG/ML
4 INJECTION, SOLUTION INTRAVENOUS AS NEEDED
Status: DISCONTINUED | OUTPATIENT
Start: 2022-11-30 | End: 2022-12-04 | Stop reason: HOSPADM

## 2022-11-30 RX ORDER — DEXTROSE MONOHYDRATE 25 G/50ML
25 INJECTION, SOLUTION INTRAVENOUS
Status: DISCONTINUED | OUTPATIENT
Start: 2022-11-30 | End: 2022-12-04 | Stop reason: HOSPADM

## 2022-11-30 RX ADMIN — CEFAZOLIN 2 G: 2 INJECTION, POWDER, FOR SOLUTION INTRAMUSCULAR; INTRAVENOUS at 16:20

## 2022-11-30 RX ADMIN — Medication 10 ML: at 21:25

## 2022-11-30 RX ADMIN — INSULIN GLARGINE 25 UNITS: 100 INJECTION, SOLUTION SUBCUTANEOUS at 21:21

## 2022-11-30 RX ADMIN — INSULIN LISPRO 10 UNITS: 100 INJECTION, SOLUTION INTRAVENOUS; SUBCUTANEOUS at 20:12

## 2022-12-01 ENCOUNTER — APPOINTMENT (OUTPATIENT)
Dept: CARDIOLOGY | Facility: HOSPITAL | Age: 67
End: 2022-12-01

## 2022-12-01 LAB
ALBUMIN SERPL-MCNC: 4 G/DL (ref 3.5–5.2)
ALBUMIN/GLOB SERPL: 1 G/DL
ALP SERPL-CCNC: 69 U/L (ref 39–117)
ALT SERPL W P-5'-P-CCNC: 35 U/L (ref 1–33)
ANION GAP SERPL CALCULATED.3IONS-SCNC: 12 MMOL/L (ref 5–15)
AST SERPL-CCNC: 23 U/L (ref 1–32)
BASOPHILS # BLD AUTO: 0.1 10*3/MM3 (ref 0–0.2)
BASOPHILS NFR BLD AUTO: 0.5 % (ref 0–1.5)
BILIRUB SERPL-MCNC: 0.6 MG/DL (ref 0–1.2)
BUN SERPL-MCNC: 27 MG/DL (ref 8–23)
BUN/CREAT SERPL: 26.2 (ref 7–25)
CALCIUM SPEC-SCNC: 10.3 MG/DL (ref 8.6–10.5)
CHLORIDE SERPL-SCNC: 96 MMOL/L (ref 98–107)
CHOLEST SERPL-MCNC: 151 MG/DL (ref 0–200)
CO2 SERPL-SCNC: 27 MMOL/L (ref 22–29)
CREAT SERPL-MCNC: 1.03 MG/DL (ref 0.57–1)
CRP SERPL-MCNC: 3.43 MG/DL (ref 0–0.5)
DEPRECATED RDW RBC AUTO: 46.8 FL (ref 37–54)
EGFRCR SERPLBLD CKD-EPI 2021: 59.7 ML/MIN/1.73
EOSINOPHIL # BLD AUTO: 0 10*3/MM3 (ref 0–0.4)
EOSINOPHIL NFR BLD AUTO: 0 % (ref 0.3–6.2)
ERYTHROCYTE [DISTWIDTH] IN BLOOD BY AUTOMATED COUNT: 14.3 % (ref 12.3–15.4)
ERYTHROCYTE [SEDIMENTATION RATE] IN BLOOD: 34 MM/HR (ref 0–30)
GLOBULIN UR ELPH-MCNC: 4 GM/DL
GLUCOSE BLDC GLUCOMTR-MCNC: 294 MG/DL (ref 70–105)
GLUCOSE BLDC GLUCOMTR-MCNC: 300 MG/DL (ref 70–105)
GLUCOSE BLDC GLUCOMTR-MCNC: 349 MG/DL (ref 70–105)
GLUCOSE BLDC GLUCOMTR-MCNC: 381 MG/DL (ref 70–105)
GLUCOSE SERPL-MCNC: 447 MG/DL (ref 65–99)
HBA1C MFR BLD: 11.8 % (ref 3.5–5.6)
HCT VFR BLD AUTO: 43.4 % (ref 34–46.6)
HDLC SERPL-MCNC: 37 MG/DL (ref 40–60)
HGB BLD-MCNC: 14.5 G/DL (ref 12–15.9)
LDLC SERPL CALC-MCNC: 81 MG/DL (ref 0–100)
LDLC/HDLC SERPL: 2.02 {RATIO}
LYMPHOCYTES # BLD AUTO: 2.1 10*3/MM3 (ref 0.7–3.1)
LYMPHOCYTES NFR BLD AUTO: 14.8 % (ref 19.6–45.3)
MAGNESIUM SERPL-MCNC: 1.9 MG/DL (ref 1.6–2.4)
MCH RBC QN AUTO: 29.8 PG (ref 26.6–33)
MCHC RBC AUTO-ENTMCNC: 33.6 G/DL (ref 31.5–35.7)
MCV RBC AUTO: 88.9 FL (ref 79–97)
MONOCYTES # BLD AUTO: 1.4 10*3/MM3 (ref 0.1–0.9)
MONOCYTES NFR BLD AUTO: 10.4 % (ref 5–12)
NEUTROPHILS NFR BLD AUTO: 10.3 10*3/MM3 (ref 1.7–7)
NEUTROPHILS NFR BLD AUTO: 74.3 % (ref 42.7–76)
NRBC BLD AUTO-RTO: 0.2 /100 WBC (ref 0–0.2)
PHOSPHATE SERPL-MCNC: 3.3 MG/DL (ref 2.5–4.5)
PLATELET # BLD AUTO: 341 10*3/MM3 (ref 140–450)
PMV BLD AUTO: 8.8 FL (ref 6–12)
POTASSIUM SERPL-SCNC: 4.4 MMOL/L (ref 3.5–5.2)
PROCALCITONIN SERPL-MCNC: 0.21 NG/ML (ref 0–0.25)
PROT SERPL-MCNC: 8 G/DL (ref 6–8.5)
RBC # BLD AUTO: 4.88 10*6/MM3 (ref 3.77–5.28)
SODIUM SERPL-SCNC: 135 MMOL/L (ref 136–145)
TRIGL SERPL-MCNC: 197 MG/DL (ref 0–150)
TSH SERPL DL<=0.05 MIU/L-ACNC: 0.39 UIU/ML (ref 0.27–4.2)
VLDLC SERPL-MCNC: 33 MG/DL (ref 5–40)
WBC NRBC COR # BLD: 13.9 10*3/MM3 (ref 3.4–10.8)

## 2022-12-01 PROCEDURE — 93325 DOPPLER ECHO COLOR FLOW MAPG: CPT | Performed by: INTERNAL MEDICINE

## 2022-12-01 PROCEDURE — 94799 UNLISTED PULMONARY SVC/PX: CPT

## 2022-12-01 PROCEDURE — 82962 GLUCOSE BLOOD TEST: CPT

## 2022-12-01 PROCEDURE — 93308 TTE F-UP OR LMTD: CPT | Performed by: INTERNAL MEDICINE

## 2022-12-01 PROCEDURE — 85652 RBC SED RATE AUTOMATED: CPT | Performed by: INTERNAL MEDICINE

## 2022-12-01 PROCEDURE — 93010 ELECTROCARDIOGRAM REPORT: CPT | Performed by: INTERNAL MEDICINE

## 2022-12-01 PROCEDURE — 93325 DOPPLER ECHO COLOR FLOW MAPG: CPT

## 2022-12-01 PROCEDURE — 84145 PROCALCITONIN (PCT): CPT | Performed by: INTERNAL MEDICINE

## 2022-12-01 PROCEDURE — 63710000001 INSULIN LISPRO (HUMAN) PER 5 UNITS

## 2022-12-01 PROCEDURE — 87040 BLOOD CULTURE FOR BACTERIA: CPT | Performed by: INTERNAL MEDICINE

## 2022-12-01 PROCEDURE — 63710000001 INSULIN GLARGINE PER 5 UNITS: Performed by: INTERNAL MEDICINE

## 2022-12-01 PROCEDURE — 25010000002 ONDANSETRON PER 1 MG: Performed by: NURSE PRACTITIONER

## 2022-12-01 PROCEDURE — 97162 PT EVAL MOD COMPLEX 30 MIN: CPT

## 2022-12-01 PROCEDURE — 25010000002 CEFAZOLIN PER 500 MG: Performed by: INTERNAL MEDICINE

## 2022-12-01 PROCEDURE — 25010000002 SULFUR HEXAFLUORIDE MICROSPH 60.7-25 MG RECONSTITUTED SUSPENSION: Performed by: INTERNAL MEDICINE

## 2022-12-01 PROCEDURE — 25010000002 CEFTRIAXONE PER 250 MG: Performed by: NURSE PRACTITIONER

## 2022-12-01 PROCEDURE — 94660 CPAP INITIATION&MGMT: CPT

## 2022-12-01 PROCEDURE — 93308 TTE F-UP OR LMTD: CPT

## 2022-12-01 PROCEDURE — 36415 COLL VENOUS BLD VENIPUNCTURE: CPT | Performed by: INTERNAL MEDICINE

## 2022-12-01 PROCEDURE — 84100 ASSAY OF PHOSPHORUS: CPT | Performed by: INTERNAL MEDICINE

## 2022-12-01 PROCEDURE — 63710000001 INSULIN LISPRO (HUMAN) PER 5 UNITS: Performed by: NURSE PRACTITIONER

## 2022-12-01 PROCEDURE — 80050 GENERAL HEALTH PANEL: CPT | Performed by: NURSE PRACTITIONER

## 2022-12-01 PROCEDURE — 99222 1ST HOSP IP/OBS MODERATE 55: CPT | Performed by: INTERNAL MEDICINE

## 2022-12-01 PROCEDURE — 83735 ASSAY OF MAGNESIUM: CPT | Performed by: INTERNAL MEDICINE

## 2022-12-01 PROCEDURE — 63710000001 INSULIN REGULAR HUMAN PER 5 UNITS: Performed by: INTERNAL MEDICINE

## 2022-12-01 PROCEDURE — 86140 C-REACTIVE PROTEIN: CPT | Performed by: INTERNAL MEDICINE

## 2022-12-01 PROCEDURE — 83036 HEMOGLOBIN GLYCOSYLATED A1C: CPT | Performed by: NURSE PRACTITIONER

## 2022-12-01 PROCEDURE — 94761 N-INVAS EAR/PLS OXIMETRY MLT: CPT

## 2022-12-01 PROCEDURE — 80061 LIPID PANEL: CPT | Performed by: NURSE PRACTITIONER

## 2022-12-01 RX ORDER — INSULIN LISPRO 100 [IU]/ML
20 INJECTION, SOLUTION INTRAVENOUS; SUBCUTANEOUS ONCE
Status: COMPLETED | OUTPATIENT
Start: 2022-12-01 | End: 2022-12-01

## 2022-12-01 RX ORDER — DILTIAZEM HYDROCHLORIDE 120 MG/1
120 CAPSULE, COATED, EXTENDED RELEASE ORAL DAILY
Status: DISCONTINUED | OUTPATIENT
Start: 2022-12-01 | End: 2022-12-01 | Stop reason: SDUPTHER

## 2022-12-01 RX ORDER — NYSTATIN 100000 [USP'U]/G
POWDER TOPICAL EVERY 12 HOURS SCHEDULED
Status: DISCONTINUED | OUTPATIENT
Start: 2022-12-01 | End: 2022-12-04 | Stop reason: HOSPADM

## 2022-12-01 RX ORDER — HYDROCODONE BITARTRATE AND ACETAMINOPHEN 7.5; 325 MG/1; MG/1
1 TABLET ORAL 3 TIMES DAILY PRN
Status: DISCONTINUED | OUTPATIENT
Start: 2022-12-01 | End: 2022-12-04 | Stop reason: HOSPADM

## 2022-12-01 RX ORDER — DILTIAZEM HYDROCHLORIDE 240 MG/1
240 CAPSULE, COATED, EXTENDED RELEASE ORAL
Status: DISCONTINUED | OUTPATIENT
Start: 2022-12-01 | End: 2022-12-04 | Stop reason: HOSPADM

## 2022-12-01 RX ORDER — ATORVASTATIN CALCIUM 20 MG/1
20 TABLET, FILM COATED ORAL DAILY
Status: DISCONTINUED | OUTPATIENT
Start: 2022-12-01 | End: 2022-12-04 | Stop reason: HOSPADM

## 2022-12-01 RX ADMIN — CEFAZOLIN 2 G: 2 INJECTION, POWDER, FOR SOLUTION INTRAMUSCULAR; INTRAVENOUS at 11:58

## 2022-12-01 RX ADMIN — ACETAMINOPHEN 650 MG: 325 TABLET, FILM COATED ORAL at 00:08

## 2022-12-01 RX ADMIN — Medication 10 ML: at 20:40

## 2022-12-01 RX ADMIN — INSULIN LISPRO 10 UNITS: 100 INJECTION, SOLUTION INTRAVENOUS; SUBCUTANEOUS at 18:40

## 2022-12-01 RX ADMIN — METOPROLOL TARTRATE 25 MG: 25 TABLET, FILM COATED ORAL at 20:41

## 2022-12-01 RX ADMIN — INSULIN LISPRO 8 UNITS: 100 INJECTION, SOLUTION INTRAVENOUS; SUBCUTANEOUS at 12:17

## 2022-12-01 RX ADMIN — APIXABAN 5 MG: 5 TABLET, FILM COATED ORAL at 19:43

## 2022-12-01 RX ADMIN — ATORVASTATIN CALCIUM 20 MG: 20 TABLET, FILM COATED ORAL at 18:42

## 2022-12-01 RX ADMIN — CEFAZOLIN 2 G: 2 INJECTION, POWDER, FOR SOLUTION INTRAMUSCULAR; INTRAVENOUS at 19:45

## 2022-12-01 RX ADMIN — HYDROCODONE BITARTRATE AND ACETAMINOPHEN 1 TABLET: 7.5; 325 TABLET ORAL at 19:43

## 2022-12-01 RX ADMIN — INSULIN HUMAN 12 UNITS: 100 INJECTION, SOLUTION PARENTERAL at 08:24

## 2022-12-01 RX ADMIN — INSULIN LISPRO 20 UNITS: 100 INJECTION, SOLUTION INTRAVENOUS; SUBCUTANEOUS at 05:48

## 2022-12-01 RX ADMIN — INSULIN LISPRO 10 UNITS: 100 INJECTION, SOLUTION INTRAVENOUS; SUBCUTANEOUS at 07:57

## 2022-12-01 RX ADMIN — ONDANSETRON 4 MG: 2 INJECTION INTRAMUSCULAR; INTRAVENOUS at 06:34

## 2022-12-01 RX ADMIN — METOPROLOL TARTRATE 25 MG: 25 TABLET, FILM COATED ORAL at 14:30

## 2022-12-01 RX ADMIN — INSULIN GLARGINE 25 UNITS: 100 INJECTION, SOLUTION SUBCUTANEOUS at 19:44

## 2022-12-01 RX ADMIN — APIXABAN 5 MG: 5 TABLET, FILM COATED ORAL at 08:26

## 2022-12-01 RX ADMIN — DILTIAZEM HYDROCHLORIDE 240 MG: 240 CAPSULE, COATED, EXTENDED RELEASE ORAL at 08:26

## 2022-12-01 RX ADMIN — Medication 10 ML: at 08:29

## 2022-12-01 RX ADMIN — CEFTRIAXONE 2 G: 2 INJECTION, POWDER, FOR SOLUTION INTRAMUSCULAR; INTRAVENOUS at 00:08

## 2022-12-01 RX ADMIN — ACETAMINOPHEN 650 MG: 325 TABLET, FILM COATED ORAL at 11:51

## 2022-12-01 RX ADMIN — SULFUR HEXAFLUORIDE 2 ML: KIT at 10:13

## 2022-12-01 RX ADMIN — Medication 10 ML: at 12:04

## 2022-12-01 RX ADMIN — NYSTATIN: 100000 POWDER TOPICAL at 20:41

## 2022-12-01 NOTE — THERAPY EVALUATION
Patient Name: Caterina Mason  : 1955    MRN: [id number]                              Today's Date: 2022       Admit Date: 2022    Visit Dx:     ICD-10-CM ICD-9-CM   1. Bacteremia due to methicillin susceptible Staphylococcus aureus (MSSA)  R78.81 790.7    B95.61 041.11   2. COVID-19  U07.1 079.89     Patient Active Problem List   Diagnosis   • Arthritis   • Bradycardia   • Chronic obstructive pulmonary disease ([facility])   • Depression   • Diabetic peripheral neuropathy ([facility])   • Gastroesophageal reflux disease   • Mixed hyperlipidemia   • Hypomagnesemia   • Lumbar radiculopathy   • Myalgia   • Paroxysmal atrial fibrillation ([facility])   • Shoulder pain, right   • Sleep apnea   • Type 2 diabetes mellitus with hyperglycemia, with long-term current use of insulin ([facility])   • Vitamin D deficiency   • Palpitations   • PVC's (premature ventricular contractions)   • Essential hypertension   • Cervical radiculitis   • Arthralgia of right temporomandibular joint   • Shortness of breath   • Bilateral leg edema   • Chronic diastolic CHF (congestive heart failure) ([facility])   • OAB (overactive bladder)   • Need for vaccination   • Welcome to Medicare preventive visit   • Class 2 obesity due to excess calories without serious comorbidity with body mass index (BMI) of 39.0 to 39.9 in adult   • Hip pain   • Wellness examination   • Onychomycosis   • Atrial fibrillation with RVR ([facility])   • Chest pain, atypical   • Elevated LFTs   • Cholelithiasis   • Vagina, candidiasis   • Tinea corporis   • Pre-op examination   • Bacteremia due to methicillin susceptible Staphylococcus aureus (MSSA)     Past Medical History:   Diagnosis Date   • Arthritis    • Atrial fibrillation ([facility])    • Bradycardia     Dr. Charles   • Cataract    • COPD (chronic obstructive pulmonary disease) ([facility])     Dr. Rob   • Diabetes mellitus, type 2 ([facility])     sees Dr. Archibald at [place]   • DJD (degenerative joint disease)     possible herniated disc, sees Pain  [facility]mt in [place]   • Emphysema of lung ([facility])    •     • GERD (gastroesophageal reflux disease)    • History of combined right and left heart catheterization 2018    minimal CAD on heart cath done    • Hyperlipidemia    • Hypertension    • Pain    • Sleep apnea     wears CPAP machine, flora Rob     Past Surgical History:   Procedure Laterality Date   • ABLATION OF DYSRHYTHMIC FOCUS     • CARDIAC CATHETERIZATION      and Angiograph    • CARDIAC ELECTROPHYSIOLOGY PROCEDURE N/A 12/10/2019    Procedure: pvc ablation;  Surgeon: Alfredo Iglesias MD;  Location: [facility] INVASIVE LOCATION;  Service: Cardiovascular   •  SECTION      x 1   • COLON RESECTION     • COLONOSCOPY  2012   • COLOSTOMY      and reversal in her 20's due to problems with childbirth   • COLOSTOMY CLOSURE     • OVARIAN CYST SURGERY     • ROTATOR CUFF REPAIR Left 2009    x2    • TOTAL ABDOMINAL HYSTERECTOMY WITH SALPINGO OOPHORECTOMY        General Information     Row Name 22 1139          Physical Therapy Time and Intention    Document Type evaluation  -EL     Mode of Treatment individual therapy;physical therapy  -EL     Row Name 22 1139          General Information    Patient Profile Reviewed yes  -EL     Prior Level of Function independent:;all household mobility;ADL's  -EL     Row Name 22 1139          Living Environment    People in Home spouse  spouse works during the day, but inconsistent schedule  -EL     Row Name 22 1139          Home Main Entrance    Number of Stairs, Main Entrance three  -EL     Row Name 22 1139          Stairs Within Home, Primary    Number of Stairs, Within Home, Primary none  -EL     Row Name 22 1139          Cognition    Orientation Status (Cognition) oriented x 4  -EL     Row Name 22 1139          Safety Issues, Functional Mobility    Impairments Affecting Function (Mobility) endurance/activity tolerance;shortness of breath;balance  -EL            User Key  (r) = Recorded By, (t) = Taken By, (c) = Cosigned By    Initials Name Provider Type    Edison Watson PT Physical Therapist               Mobility     Row Name 12/01/22 1140          Bed Mobility    Comment, (Bed Mobility) sitting EOB when PT entered  -EL     Row Name 12/01/22 1140          Bed-Chair Transfer    Bed-Chair [place] (Transfers) modified independence  -EL     Comment, (Bed-Chair Transfer) has been transfering self to[place] [facility]  -EL     Row Name 12/01/22 1140          Sit-Stand Transfer    Sit-Stand [place] (Transfers) contact guard  -EL     Row Name 12/01/22 1140          Gait/Stairs (Locomotion)    [place] Level (Gait) contact guard  -EL     Assistive Device (Gait) --  HHA  -EL     Distance in Feet (Gait) 10,15  -EL     Deviations/Abnormal Patterns (Gait) gait speed decreased;stride length decreased  -EL     Comment, (Gait/Stairs) Mild SOA following ambulation, HR unstable during mobility  -EL           User Key  (r) = Recorded By, (t) = Taken By, (c) = Cosigned By    Initials Name Provider Type    Edison Watson PT Physical Therapist               Obj/Interventions     Row Name 12/01/22 1141          Range of Motion Comprehensive    General Range of Motion bilateral lower extremity ROM WFL  -EL     Row Name 12/01/22 1141          Strength Comprehensive (MMT)    General Manual Muscle Testing (MMT) Assessment no strength deficits identified  -EL     Comment, General Manual Muscle Testing (MMT) Assessment BLE 4/5 gross  -EL     Row Name 12/01/22 1141          Balance    Balance Assessment sitting static balance;standing static balance;standing dynamic balance  -EL     Static Sitting Balance independent  -EL     Static Standing Balance contact guard  -EL     Dynamic Standing Balance contact guard  -EL     Row Name 12/01/22 1141          Sensory Assessment (Somatosensory)    Sensory Assessment (Somatosensory) sensation intact  -EL           User Key  (r) = Recorded By, (t) =  Taken By, (c) = Cosigned By    Initials Name Provider Type    Edison Watson, PT Physical Therapist               Goals/Plan     Row Name 12/01/22 1610          Bed Mobility Goal 1 (PT)    Activity/Assistive Device (Bed Mobility Goal 1, PT) bed mobility activities, all  -EL     [place] Level/Cues Needed (Bed Mobility Goal 1, PT) modified independence  -EL     Time Frame (Bed Mobility Goal 1, PT) long term goal (LTG);2 weeks  -EL     Row Name 12/01/22 1610          Transfer Goal 1 (PT)    Activity/Assistive Device (Transfer Goal 1, PT) transfers, all  -EL     [place] Level/Cues Needed (Transfer Goal 1, PT) modified independence  -EL     Time Frame (Transfer Goal 1, PT) long term goal (LTG);2 weeks  -EL     Row Name 12/01/22 1610          Gait Training Goal 1 (PT)    Activity/Assistive Device (Gait Training Goal 1, PT) gait (walking locomotion)  -EL     [place] Level (Gait Training Goal 1, PT) modified independence  -EL     Distance (Gait Training Goal 1, PT) 150  -EL     Time Frame (Gait Training Goal 1, PT) long term goal (LTG);2 weeks  -EL     Row Name 12/01/22 1610          Therapy Assessment/Plan (PT)    Planned Therapy Interventions (PT) balance training;neuromuscular re-education;bed mobility training;transfer training;gait training;patient/family education;strengthening  -EL           User Key  (r) = Recorded By, (t) = Taken By, (c) = Cosigned By    Initials Name Provider Type    Edison Watson, PT Physical Therapist               Clinical Impression     Row Name 12/01/22 6537          Pain    Additional Documentation Pain Scale: FACES Pre/Post-Treatment (Group)  -     Row Name 12/01/22 9529          Pain Scale: FACES Pre/Post-Treatment    Pain: FACES Scale, Pretreatment 2-->hurts little bit  -EL     Posttreatment Pain Rating 2-->hurts little bit  -EL     Pain Location generalized  -     Row Name 12/01/22 2272          Plan of Care Review    Plan of Care Reviewed With patient  -EL     Outcome  Evaluation Pt is a 66 YO F admitted with generalized weakness COVID (+). Pt states she lives at home with spouse who works, typically is independent with all ADLs, ambulation with rollator and no recent falls. Pt this date with unstable HR fluctuating from  throughout evaluation. Pt able to transfer self safely to from [facility], but requires CGA for ambulating short distance in room. Pt with mild SOA, and generalized c/o fatigue. Pt appears below functional baseline and recommendation is return home wiht family assist and HHPT. PT to f/u while admitted to encourage mobiltiy.  -EL     Row Name 12/01/22 1551          Therapy Assessment/Plan (PT)    Rehab Potential (PT) good, to achieve stated therapy goals  -EL     Criteria for Skilled Interventions Met (PT) yes  -EL     Therapy Frequency (PT) 3 times/wk  -EL     Predicted Duration of Therapy Intervention (PT) until d/c  -EL     Row Name 12/01/22 1554          Vital Signs    O2 Delivery Pre Treatment supplemental O2  -EL     O2 Delivery Intra Treatment supplemental O2  -EL     O2 Delivery Post Treatment supplemental O2  -EL     Pre Patient Position Sitting  -EL     Intra Patient Position Standing  -EL     Post Patient Position Sitting  -EL     Row Name 12/01/22 3665          Positioning and Restraints    Pre-Treatment Position in bed  -EL     Post Treatment Position bed  -EL     In Bed notified nsg;sitting EOB;call light within reach;encouraged to call for assist  -EL           User Key  (r) = Recorded By, (t) = Taken By, (c) = Cosigned By    Initials Name Provider Type    Edison Watson, PT Physical Therapist               Outcome Measures     Row Name 12/01/22 1610          How much help from another person do you currently need...    Turning from your back to your side while in flat bed without using bedrails? 3  -EL     Moving from lying on back to sitting on the side of a flat bed without bedrails? 3  -EL     Moving to and from a bed to a chair (including a  wheelchair)? 3  -EL     Standing up from a chair using your arms (e.g., wheelchair, bedside chair)? 3  -EL     Climbing 3-5 steps with a railing? 2  -EL     To walk in hospital room? 3  -EL     AM-PAC 6 Clicks Score (PT) 17  -EL     Highest level of mobility 5 --> Static standing  -EL     Row Name 12/01/22 1610          Functional Assessment    Outcome Measure Options AM-PAC 6 Clicks Basic Mobility (PT)  -EL           User Key  (r) = Recorded By, (t) = Taken By, (c) = Cosigned By    Initials Name Provider Type    EL Edison Hopson, PT Physical Therapist                             Physical Therapy Education     Title: PT OT SLP Therapies (Done)     Topic: Physical Therapy (Done)     Point: Mobility training (Done)     Learning Progress Summary           Patient Acceptance, E,TB, VU by EL at 12/1/2022 1611                   Point: Precautions (Done)     Learning Progress Summary           Patient Acceptance, E,TB, VU by EL at 12/1/2022 1611                               User Key     Initials Effective Dates Name Provider Type Discipline     06/23/20 -  Edison Hopson, PT Physical Therapist PT              PT Recommendation and Plan  Planned Therapy Interventions (PT): balance training, neuromuscular re-education, bed mobility training, transfer training, gait training, patient/family education, strengthening  Plan of Care Reviewed With: patient  Outcome Evaluation: Pt is a 66 YO F admitted with generalized weakness COVID (+). Pt states she lives at home with spouse who works, typically is independent with all ADLs, ambulation with rollator and no recent falls. Pt this date with unstable HR fluctuating from  throughout evaluation. Pt able to transfer self safely to from [facility], but requires CGA for ambulating short distance in room. Pt with mild SOA, and generalized c/o fatigue. Pt appears below functional baseline and recommendation is return home wiht family assist and HHPT. PT to f/u while admitted to encourage  mobiltiy.     Time Calculation:    PT Charges     Row Name 12/01/22 1612             Time Calculation    Start Time 0913  -EL      Stop Time 0938  -EL      Time Calculation (min) 25 min  -EL      PT Received On 12/01/22  -EL      PT - Next Appointment 12/02/22  -EL      PT Goal Re-Cert Due Date 12/15/22  -EL            User Key  (r) = Recorded By, (t) = Taken By, (c) = Cosigned By    Initials Name Provider Type    Edison Watson PT Physical Therapist              Therapy Charges for Today     Code Description Service Date Service Provider Modifiers Qty    [id number] HC PT EVAL MOD COMPLEXITY 4 12/1/2022 Edison Hopson, PT GP 1          PT G-Codes  Outcome Measure Options: AM-PAC 6 Clicks Basic Mobility (PT)  AM-PAC 6 Clicks Score (PT): 17  PT Discharge Summary  Anticipated Discharge Disposition (PT): home with home health    Edison Hopson PT  12/1/2022

## 2022-12-01 NOTE — CONSULTS
CARDIOLOGY CONSULT NOTE      Referring Provider: Dr. Lozano    Reason for Consultation: Reported ventricular tachycardia    Attending: Michael Lozano MD    Chief complaint    Patient reports feeling unwell with cough, congestion, fever, chills, aches    Subjective .     History of present illness:  Caterina Mason is a 67 y.o. female who presents with cough congestion nausea associated with fever chills body aches and pains.  She tested positive for COVID-19.  She was seen in the emergency room 2 days ago and discharged home with treatment.  Blood cultures were obtained and came back positive for MSSA bacteremia and she was advised to return to the emergency room.    Allergy was consulted today due to reported ventricular tachycardia.  Reviewed all telemetry strips and there were periods of artifact and isolated PVCs but no ventricular tachycardia is identified.    The patient is known to have atrial fibrillation that is permanent.  She is also known to have hypertension, dyslipidemia, COPD, diabetes, obstructive sleep apnea.    Patient has previously been noted to have frequent PVCs she underwent previous EP study but ablation for PVCs was unsuccessful.    Patient had previous cardiac catheterization in August 2018 that showed no significant CAD.    Patient was previously started on Sectral 200 mg twice daily for her PVCs this was then changed to metoprolol.    In November 2021 echocardiogram showed her ejection fraction to be 55% with no significant valvular flow abnormalities.        Review of Systems   Constitutional: Positive for fever and malaise/fatigue. Negative for chills.   HENT: Negative for ear discharge and nosebleeds.    Eyes: Negative for discharge and redness.   Cardiovascular: Positive for palpitations. Negative for chest pain, orthopnea, paroxysmal nocturnal dyspnea and syncope.   Respiratory: Positive for cough and shortness of breath. Negative for wheezing.    Endocrine: Negative for heat  intolerance.   Skin: Negative for rash.   Musculoskeletal: Negative for arthritis and myalgias.   Gastrointestinal: Negative for abdominal pain, melena, nausea and vomiting.   Genitourinary: Negative for dysuria and hematuria.   Neurological: Negative for dizziness, light-headedness, numbness and tremors.   Psychiatric/Behavioral: Negative for depression. The patient is not nervous/anxious.        History  Past Medical History:   Diagnosis Date   • Arthritis    • Atrial fibrillation ([facility])    • Bradycardia     Dr. Charles   • Cataract    • COPD (chronic obstructive pulmonary disease) ([facility])     Dr. Rob   • Diabetes mellitus, type 2 ([facility])     sees Dr. Archibald at [place]   • DJD (degenerative joint disease)     possible herniated disc, sees Pain Mgmt in [place]   • Emphysema of lung ([facility])    •     • GERD (gastroesophageal reflux disease)    • History of combined right and left heart catheterization 2018    minimal CAD on heart cath done    • Hyperlipidemia    • Hypertension    • Pain    • Sleep apnea     wears CPAP machine, sees Darron       Past Surgical History:   Procedure Laterality Date   • ABLATION OF DYSRHYTHMIC FOCUS     • CARDIAC CATHETERIZATION      and Angiograph    • CARDIAC ELECTROPHYSIOLOGY PROCEDURE N/A 12/10/2019    Procedure: pvc ablation;  Surgeon: Alfredo Iglesias MD;  Location: [facility] INVASIVE LOCATION;  Service: Cardiovascular   •  SECTION      x 1   • COLON RESECTION     • COLONOSCOPY  2012   • COLOSTOMY      and reversal in her 20's due to problems with childbirth   • COLOSTOMY CLOSURE     • OVARIAN CYST SURGERY     • ROTATOR CUFF REPAIR Left 2009    x2    • TOTAL ABDOMINAL HYSTERECTOMY WITH SALPINGO OOPHORECTOMY         Family History   Problem Relation Age of Onset   • Heart disease Mother    • Hypertension Mother    • Stroke Mother    • Seizures Mother    • Heart disease Father    • Hypertension Father    • Lung disease Father    • Stroke Father    • Cancer  Sister         unknown   • Hypertension Brother    • Diabetes Brother    • Hyperlipidemia Other        Social History     Tobacco Use   • Smoking status: Former     Packs/day: 1.00     Years: 48.00     Pack years: 48.00     Types: Cigarettes     Quit date: 3/1/2020     Years since quittin.7   • Smokeless tobacco: Never   Vaping Use   • Vaping Use: Never used   Substance Use Topics   • Alcohol use: No   • Drug use: No        Medications Prior to Admission   Medication Sig Dispense Refill Last Dose   • albuterol sulfate  (90 Base) MCG/ACT inhaler INHALE 2 PUFFS EVERY 4 HOURS AS NEEDED FOR WHEEZING OR SHORTNESS OF AIR 18 g 1 2022   • atorvastatin (LIPITOR) 40 MG tablet TAKE 1/2 TABLET BY MOUTH EVERY DAY 45 tablet 1 2022   • budesonide-formoterol (SYMBICORT) 160-4.5 MCG/ACT inhaler Inhale 2 puffs 2 (Two) Times a Day.   2022   • dilTIAZem CD (CARDIZEM CD) 120 MG 24 hr capsule TAKE 1 CAPSULE BY MOUTH EVERY DAY 90 capsule 1    • Eliquis 5 MG tablet tablet TAKE 1 TABLET BY MOUTH 2 (TWO) TIMES A DAY. PATIENT NEEDS AN APPT BEFORE ANY MORE REFILLS 60 tablet 1 2022   • fenofibrate 160 MG tablet TAKE 1 TABLET BY MOUTH EVERY DAY 90 tablet 1    • furosemide (LASIX) 40 MG tablet TAKE 1 TABLET BY MOUTH EVERY DAY 90 tablet 1    • HYDROcodone-acetaminophen (NORCO) 7.5-325 MG per tablet Take 1 tablet by mouth 3 (Three) Times a Day As Needed.      • Insulin Glargine (BASAGLAR KWIKPEN) 100 UNIT/ML injection pen INJECT 40 UNITS UNDER THE SKIN INTO THE APPROPRIATE AREA AS DIRECTED EVERY NIGHT. 45 mL 0    • Insulin Lispro, 1 Unit Dial, (HumaLOG KwikPen) 100 UNIT/ML solution pen-injector Inject 14 units before each meal. 5 pen 6    • Janumet XR  MG tablet TAKE 2 TABLETS BY MOUTH EVERY DAY 60 tablet 3    • lisinopril (PRINIVIL,ZESTRIL) 20 MG tablet TAKE 1 TABLET BY MOUTH EVERY DAY 90 tablet 1    • magnesium oxide (MAG-OX) 400 MG tablet TAKE 1 TABLET BY MOUTH TWICE A DAY 60 tablet 6    • metoprolol  tartrate (LOPRESSOR) 25 MG tablet TAKE 1 TABLET BY MOUTH EVERY 12 HOURS FOR 30 DAYS. 180 tablet 3    • potassium chloride (MICRO-K) 10 MEQ CR capsule TAKE 1 CAPSULE BY MOUTH EVERY DAY 90 capsule 1    • brompheniramine-pseudoephedrine-DM 30-2-10 MG/5ML syrup Take 5 mL by mouth 4 (Four) Times a Day As Needed for Congestion or Cough. 120 mL 0    • Cholecalciferol (Vitamin D3) 25 MCG (1000 UT) capsule TAKE 1 TABLET BY MOUTH TWICE A DAY *OTC NOT COVERED*      • dexamethasone (DECADRON) 4 MG tablet Take 1 tablet by mouth 3 (Three) Times a Day. 9 tablet 0    • glucose blood (Accu-Chek Aurea Plus) test strip CHECK BLOOD SUGAR 3 TIMES DX E11.65 100 each 5    • Insulin Pen Needle (BD Pen Needle Lorene 2nd Gen) 32G X 4 MM misc Used to inject insulin 4 times a day. 150 each 11          Ibuprofen, Prednisone, Pregabalin, Tramadol, Levofloxacin, and Sulfamethoxazole-trimethoprim    Scheduled Meds:apixaban, 5 mg, Oral, Q12H  atorvastatin, 20 mg, Oral, Daily  ceFAZolin, 2 g, Intravenous, Q8H  dilTIAZem CD, 240 mg, Oral, Q24H  insulin glargine, 25 Units, Subcutaneous, Nightly  insulin lispro, 3-14 Units, Subcutaneous, TID With Meals  metoprolol tartrate, 25 mg, Oral, Q12H  nystatin, , Topical, Q12H  sodium chloride, 10 mL, Intravenous, Q12H      Continuous Infusions:   PRN Meds:.•  acetaminophen  •  dextrose  •  dextrose  •  glucagon (human recombinant)  •  hydrALAZINE  •  HYDROcodone-acetaminophen  •  magnesium sulfate **OR** magnesium sulfate **OR** magnesium sulfate  •  ondansetron  •  potassium & sodium phosphates **OR** potassium & sodium phosphates  •  potassium chloride  •  potassium chloride  •  [COMPLETED] Insert Peripheral IV **AND** sodium chloride  •  sodium chloride  •  sodium chloride    Objective     VITAL SIGNS  Vitals:    12/02/22 0356 12/02/22 0700 12/02/22 1300 12/02/22 1500   BP: 111/48 129/71 140/60 143/57   BP Location: Right arm Right arm Right arm Right arm   Patient Position: Lying Sitting Sitting Sitting  "  Pulse: 68 68 79 80   Resp:  16 18 17   Temp: 97.6 °F (36.4 °C) 98 °F (36.7 °C) 97.9 °F (36.6 °C) 98 °F (36.7 °C)   TempSrc: Oral Oral Oral Oral   SpO2: 95% 90%  97%   Weight:       Height:           Flowsheet Rows    Flowsheet Row First Filed Value   Admission Height 162.6 cm (64\") Documented at 11/30/2022 1447   Admission Weight 127 kg (280 lb) Documented at 11/30/2022 1447          Body mass index is 48.06 kg/m².     TELEMETRY: Atrial fibrillation    Physical Exam:  Constitutional:       Appearance: Well-developed.   Eyes:      General: No scleral icterus.        Right eye: No discharge.   HENT:      Head: Normocephalic and atraumatic.   Neck:      Thyroid: No thyromegaly.      Lymphadenopathy: No cervical adenopathy.   Pulmonary:      Effort: Pulmonary effort is normal. No respiratory distress.      Breath sounds: Normal breath sounds. No wheezing. No rales.   Cardiovascular:      Normal rate. Irregularly irregular rhythm.      No gallop.   Abdominal:      Tenderness: There is no abdominal tenderness.   Skin:     Findings: No erythema or rash.   Neurological:      Mental Status: Alert and oriented to person, place, and time.      deferred due to COVID-19 isolation    Results Review:   I reviewed the patient's new clinical results.    CBC    Results from last 7 days   Lab Units 12/02/22 0449 12/01/22 0448 11/30/22  1604 11/29/22  2049   WBC 10*3/mm3 10.90* 13.90* 9.10 9.00   HEMOGLOBIN g/dL 13.4 14.5 14.9 14.5   PLATELETS 10*3/mm3 328 341 308 289     BMP   Results from last 7 days   Lab Units 12/02/22 0449 12/01/22  0448 11/30/22  1603 11/29/22  2049   SODIUM mmol/L 139 135* 138 141   POTASSIUM mmol/L 4.2 4.4 4.3 3.8   CHLORIDE mmol/L 100 96* 98 100   CO2 mmol/L 28.0 27.0 25.0 26.0   BUN mg/dL 23 27* 23 16   CREATININE mg/dL 1.03* 1.03* 1.02* 0.89   GLUCOSE mg/dL 324* 447* 492* 242*   MAGNESIUM mg/dL 1.6 1.9  --   --    PHOSPHORUS mg/dL 3.2 3.3  --   --      Cr Clearance Estimated Creatinine Clearance: 69.9 " mL/min (A) (by C-G formula based on SCr of 1.03 mg/dL (H)).  Coag     HbA1C   Lab Results   Component Value Date    HGBA1C 11.8 (H) 12/01/2022    HGBA1C 11.5 (H) 05/31/2022    HGBA1C 11.6 (H) 11/16/2021     Blood Glucose   Glucose   Date/Time Value Ref Range Status   12/02/2022 1643 319 (H) 70 - 105 mg/dL Final     Comment:     Serial Number: [id number]:  [id number]   12/02/2022 1150 281 (H) 70 - 105 mg/dL Final     Comment:     Serial Number: [id number]:  [id number]   12/02/2022 0740 265 (H) 70 - 105 mg/dL Final     Comment:     Serial Number: [id number]:  [id number]   12/01/2022 1829 300 (H) 70 - 105 mg/dL Final     Comment:     Serial Number: [id number]:  [id number]   12/01/2022 1155 294 (H) 70 - 105 mg/dL Final     Comment:     Serial Number: [id number]:  [id number]   12/01/2022 0721 349 (H) 70 - 105 mg/dL Final     Comment:     Serial Number: [id number]:  [id number]   12/01/2022 0544 381 (H) 70 - 105 mg/dL Final     Comment:     Serial Number: [id number]:  [id number]   11/30/2022 1955 340 (H) 70 - 105 mg/dL Final     Comment:     Serial Number: [id number]:  [id number]     Infection   Results from last 7 days   Lab Units 12/01/22  0835 12/01/22 0448 11/29/22 2049 11/29/22 2039   BLOODCX  No growth at 24 hours  No growth at 24 hours  --  Staphylococcus aureus*  Staphylococcus aureus*  --    URINECX   --   --   --  No growth   BCIDPCR   --   --  Staph aureus. mecA/C and MREJ (methicillin resistance gene) NOT detected. Identification by BCID2 PCR*  --    PROCALCITONIN ng/mL  --  0.21  --   --      CMP   Results from last 7 days   Lab Units 12/02/22  0449 12/01/22  0448 11/30/22  1603 11/29/22  2049   SODIUM mmol/L 139 135* 138 141   POTASSIUM mmol/L 4.2 4.4 4.3 3.8   CHLORIDE mmol/L 100 96* 98 100   CO2 mmol/L 28.0 27.0 25.0 26.0   BUN mg/dL 23 27* 23 16   CREATININE mg/dL 1.03* 1.03* 1.02* 0.89   GLUCOSE mg/dL 324* 447* 492* 242*   ALBUMIN g/dL 3.40* 4.00 4.30  4.10   BILIRUBIN mg/dL 0.4 0.6 1.0 1.0   ALK PHOS U/L 67 69 67 69   AST (SGOT) U/L 31 23 31 39*   ALT (SGPT) U/L 32 35* 43* 43*   LIPASE U/L  --   --   --  42     ABG      UA    Results from last 7 days   Lab Units 11/29/22  2039   NITRITE UA  Negative   WBC UA /HPF 13-20*   BACTERIA UA /HPF None Seen   SQUAM EPITHEL UA /HPF 0-2   URINECX  No growth     BRUNA  No results found for: POCMETH, POCAMPHET, POCBARBITUR, POCBENZO, POCCOCAINE, POCOPIATES, POCOXYCODO, POCPHENCYC, POCPROPOXY, POCTHC, POCTRICYC  [facility] Labs   Results from last 7 days   Lab Units 12/02/22  0449 12/01/22  0448 11/30/22  1604 11/30/22  1603 11/29/22  2049   HEMOGLOBIN g/dL 13.4 14.5 14.9  --  14.5   PLATELETS 10*3/mm3 328 341 308  --  289   CREATININE mg/dL 1.03* 1.03*  --  1.02* 0.89     Radiology(recent) No radiology results for the last day          Imaging Results (Last 24 Hours)     ** No results found for the last 24 hours. **          EKG            I personally viewed and interpreted the patient's EKG/Telemetry data:    ECHOCARDIOGRAM:      STRESS MYOVIEW:    CARDIAC CATHETERIZATION:    OTHER:         Assessment & Plan       Bacteremia due to methicillin susceptible Staphylococcus aureus (MSSA)      Assessment:    Consulted for reported ventricular tachycardia    -Review of telemetry strips does not show VT that shows periods of artifact  Permanent atrial fibrillation with mildly elevated ventricular rate  Isolated PVCs  Covid-19  MSSA bacteremia  COPD  Hypertension  Dyslipidemia  Diabetes  No significant CAD by cardiac catheterization in 2018  Preserved LV function by last echocardiogram    Plan:    Recommend resuming home medications for rate control for A. Fib  We will continue to monitor telemetry  She has noted to have isolated PVCs  Magnesium and potassium are within normal limits  Blood sugar is not well controlled  Repeat blood cultures are pending  Echocardiogram will be performed    Additional recommendations per   Melvin    Patient is seen and examined and findings are verified.  All data is reviewed by me personally.  Assessment and plan formulated by APC was done after discussion with attending.  I spent more than 50% of time in taking care of the patient.    Patient is presented with a fever and chills and was noted to have COVID-19 infection.  Cardiology consultation is requested because of elevated heart rate.    Heart rate was greater than 110.    Patient was not given calcium channel blocker and beta-blockers and that is why heart rate was elevated.  I would recommend to restart diltiazem and metoprolol.  Patient has permanent atrial fibrillation.  I would recommend that patient should proceed with current treatment.  We shall follow    I discussed the patients findings and my recommendations with patient and RN    Electronically signed by Jesse Charles MD, 12/02/22, 5:41 PM EST.        Electronically signed by YAW Cortes, 12/01/22, 4:32 PM EST.      Jesse Charles MD  12/02/22  17:41 EST

## 2022-12-01 NOTE — PLAN OF CARE
Goal Outcome Evaluation:   Pt resting in bed with CPAP on. Chest x-ray completed today, negative. Receiving IV antibiotics per MD order.

## 2022-12-01 NOTE — CASE MANAGEMENT/SOCIAL WORK
Discharge Planning Assessment  [facility]     Patient Name: Caterina Mason  MRN: [id number]  Today's Date: 12/1/2022    Admit Date: 11/30/2022    Plan: DC Plan: COVID + From home with spouse, IV antibiotics.  PT referral pending.   Discharge Needs Assessment     Row Name 12/01/22 1218       Living Environment    People in Home spouse    Name(s) of People in Home Waqas    Current Living Arrangements home    Primary Care Provided by self    Provides Primary Care For no one, unable/limited ability to care for self    Family Caregiver if Needed spouse    Family Caregiver Names Waqas    Quality of Family Relationships unable to assess    Able to Return to Prior Arrangements yes       Resource/Environmental Concerns    Resource/Environmental Concerns none    Transportation Concerns none       Transition Planning    Patient/Family Anticipates Transition to home with family    Patient/Family Anticipated Services at Transition none    Transportation Anticipated family or friend will provide       Discharge Needs Assessment    Readmission Within the Last 30 Days no previous admission in last 30 days    Equipment Currently Used at Home cane, straight;cpap;shower chair;glucometer    Anticipated Changes Related to Illness none    Discharge Coordination/Progress DC Plan: COVID + From home with spouse, IV antibiotics.  PT referral pending.               Discharge Plan     Row Name 12/01/22 1223       Plan    Plan DC Plan: COVID + From home with spouse, IV antibiotics.  PT referral pending.    Plan Comments Room air, Antibiotics IV, Decadron, ID consult pending. Daughter confirms insurnance and PCP. Pharmacy [facility] or [facility]. Spouse or daughter for transportation. Daughter did not want to make change to Meds to Beds.              Continued Care and Services - Admitted Since 11/30/2022    Coordination has not been started for this encounter.          Demographic Summary     Row Name 12/01/22 1216       General Information     Admission Type inpatient    Arrived From emergency department    Referral Source admission list    Reason for Consult discharge planning    Preferred Language English    General Information Comments Spoke with daughter Estella Richardson per phone.               Functional Status     Row Name 12/01/22 1217       Functional Status    Usual Activity Tolerance moderate    Current Activity Tolerance moderate       Functional Status, IADL    Medications independent    Meal Preparation independent    Housekeeping assistive equipment    Laundry assistive equipment    Shopping assistive equipment       Mental Status    General Appearance WDL --  COVID 19 Isolation              Phone communication or documentation only - no physical contact with patient or family.           Denisa Sun RN

## 2022-12-01 NOTE — PROGRESS NOTES
[facility] Medicine Services Daily Progress Note    Patient Name: Caterina Mason  : 1955  MRN: [id number]  Primary Care Physician:  Lou Curran MD  Date of admission: 2022      Subjective      Chief Complaint: Shortness of breath.      Patient Reports she is feeling okay.  Noted to have runs of tachyarrhythmia.  Cardiology consulted.  Heart rate 1 20-1 50 on the monitor at bedside.  No major shortness of breath.  Patient complaining of headache.    ROS negative except as above      Objective      Vitals:   Temp:  [97.6 °F (36.4 °C)-98.1 °F (36.7 °C)] 97.6 °F (36.4 °C)  Heart Rate:  [] 105  Resp:  [14-23] 23  BP: (135-157)/(50-77) 143/75  Flow (L/min):  [2-3] 2    Physical Exam  Vitals reviewed.   Constitutional:       Comments: Wearing nasal cannula   HENT:      Head: Normocephalic.      Nose: Nose normal.      Mouth/Throat:      Mouth: Mucous membranes are moist.   Cardiovascular:      Rate and Rhythm: Tachycardia present. Rhythm irregular.      Pulses: Normal pulses.      Heart sounds: Normal heart sounds.   Pulmonary:      Effort: Pulmonary effort is normal.      Breath sounds: Normal breath sounds.   Abdominal:      General: Abdomen is flat.      Palpations: Abdomen is soft.   Musculoskeletal:         General: Normal range of motion.      Cervical back: Normal range of motion.   Skin:     General: Skin is warm.   Neurological:      General: No focal deficit present.      Mental Status: She is alert and oriented to person, place, and time.   Psychiatric:         Mood and Affect: Mood normal.         Behavior: Behavior normal.             Result Review    Result Review:  I have personally reviewed the results from the time of this admission to 2022 17:06 EST and agree with these findings:  [x]  Laboratory  []  Microbiology  []  Radiology  []  EKG/Telemetry   []  Cardiology/Vascular   []  Pathology  []  Old records  []  Other:  Most notable findings  include: Hyperglycemia        Assessment & Plan      Brief Patient Summary:  Caterina Mason is a 67 y.o. female who presents with COVID infection and bacteremia      apixaban, 5 mg, Oral, Q12H  ceFAZolin, 2 g, Intravenous, Q8H  dilTIAZem CD, 240 mg, Oral, Q24H  insulin glargine, 25 Units, Subcutaneous, Nightly  insulin lispro, 3-14 Units, Subcutaneous, TID With Meals  metoprolol tartrate, 25 mg, Oral, Q12H  sodium chloride, 10 mL, Intravenous, Q12H             Active Hospital Problems:  Active Hospital Problems    Diagnosis    • **Bacteremia due to methicillin susceptible Staphylococcus aureus (MSSA)      MSSA bacteremia  - Given 1 dose of cefazolin in ER  - Start Rocephin 2g daily x10 days due to it being easier for outpatient administration purposes versus cefazolin that is every 8 hours  - ID consulted  - 2D echo to rule out endocarditis     COVID-19 infection  - Tested positive on 11/29/2022  - Currently on room air  - Continue Decadron that was started in ER last night  - Symbicort and albuterol  - Monitor daily labs and provide supportive care as needed     Type 2 diabetes  - Glucose 492  - Last A1c 11.5 on 5/31/2022.  New Hemoglobin A1c ordered.  - Sliding scale management ordered  - Healthy heart/consistent carb diet     COPD  Obstructive sleep apnea  - Currently on room air  - Uses CPAP at home and follows   - CPAP at bedtime/as needed  - Symbicort and albuterol     Hypertension  - /68  - Metoprolol and lisinopril  - Lasix and K-Dur.  Monitor daily labs.     Hyperlipidemia  - Lipid panel ordered  - Fenofibrate     History of A. fib  - EKG ordered  - Eliquis and Cardizem    Patient having runs of V. tach  Dr. Charles her cardiologist consulted    Genital yeast  Statin ordered     DVT prophylaxis:  -Eliquis     CODE STATUS:    Admission Status:  I believe this patient meets inpatient status.     I discussed the patient's findings and my recommendations with patient.     This patient has been  examined wearing appropriate Personal Protective Equipment and discussed with patient. 12/01/22      Electronically signed by Michael Lozano MD, 12/01/22, 17:06 EST.  [facility] Hospitalist Team

## 2022-12-01 NOTE — CONSULTS
"Diabetes Education  Assessment/Teaching    Patient Name:  Caterina Mason  YOB: 1955  MRN: [id number]  Admit Date:  11/30/2022      Assessment Date:  12/1/2022  Flowsheet Row Most Recent Value   General Information     Referral From: A1c, Blood glucose  [Pt seen due ot A1c this adm of 11.8% and adm bs of 492.]   Height 162.6 cm (64\")   Height Method Stated   Weight 127 kg (280 lb)   Weight Method Standing scale   Pregnancy Assessment    Diabetes History    What type of diabetes do you have? Type 2   Length of Diabetes Diagnosis --  [dx 18+ years ago]   Do you test your blood sugar at home? yes   Frequency of checks 2 times/day (am and prelunch)   Meter type unsure of name of meter, about 2 years old   Who performs the test? self   Have you had low blood sugar? (<70mg/dl) no   Education Preferences    Nutrition Information    Assessment Topics    DM Goals           Flowsheet Row Most Recent Value   DM Education Needs    Meter Has own   Frequency of Testing 3 times a day  [Discussed importance of checking bs 3 times/day and recording readings. Pt states needs new log book. Log book given to nurse to give to pt.]   Blood Glucose Target Range Discussed A1c result of 11.8%. Reviewed healthy bs range and healthy A1c target. Discussed importance of bs control.   Medication Insulin, Oral, Pen  [Pt taking Lantus 40 units hs, Lispro 14 units ac and Janumet ER 50/1000 2 pills daily.]   Problem Solving Hypoglycemia, Signs, Symptoms, Treatment  [Gave low bs handout]   Reducing Risks A1C testing  [Gave A1c info sheet.]   Healthy Eating --  [Pt states she does not eat routinely. Pt drinking regular Big Red, 12-16 oz bottles, about 2/day. Discussed importance of reducing consumption of sugared beverages.]   Motivation Engaged   Teaching Method Explanation, Discussion, Handouts   Patient Response Verbalized understanding            Other Comments:  Called pt on phone due to Covid positive. Pt follows at [facility] [place]. She was last seen on 11/23/2021. Follow up visit scheduled for 2/13/2023. Discussed importance of reporting bs to [facility] in between visits if bs running outside healthy range. Discussed importance of reducing consumption of Big Red and then reporting 2 weeks worth of bs to office. Gave [facility] bs hotline phone number.     Per rx refill on 5/31/2022, pt using the [employer] Aurea Plus meter. Pt states has insulin and bs monitoring supplies.     Gave pt First Steps booklet and Planning Healthy Meals packet. All written info was given to staff nurse to give to pt next time nurse enters room.     Pt with no additional questions.       Electronically signed by:  Laura Oliver RN  12/01/22 16:25 EST

## 2022-12-01 NOTE — PAYOR COMM NOTE
"INPATIENT ORDER 11/30/22      ER ADMIT -  POSITIVE BLOOD CULTURES. INFECTIOUS DISEASE CONSULTED - PENDING F/U BLOOD CULTURE RESULTS AND ECHO.    IV CEFAZOLIN.      ==========  AUTHORIZATION PENDING:   PLEASE FAX OR CALL DETERMINATION TO CONTACT BELOW:       THANK YOU,    AGUSTÍN Guadalupe, RN  Utilization Review  [facility]  Phone: [phone]  Fax: [phone]      NPI: [id number]  Tax ID: [id number]    Martin Borjas (67 y.o. Female)     Date of Birth   1955    Social Security Number       Address   [address] [place] [address] [place] [facility]    Home Phone   [phone]    MRN   [id number]       Jewish   None    Marital Status                               Admission Date   11/30/22    Admission Type   Emergency    Admitting Provider   Michael Lozano MD    Attending Provider   Michael Lozano MD    Department, Room/Bed   [facility] 2A PEDIATRICS, 204/1       Discharge Date       Discharge Disposition       Discharge Destination                               Attending Provider: Michael Lozano MD    Allergies: Ibuprofen, Prednisone, Pregabalin, Tramadol, Levofloxacin, Sulfamethoxazole-trimethoprim    Isolation: Enh Drop/Con   Infection: COVID (confirmed) (11/29/22)   Code Status: CPR    Ht: 162.6 cm (64\")   Wt: 127 kg (280 lb)    Admission Cmt: None   Principal Problem: Bacteremia due to methicillin susceptible Staphylococcus aureus (MSSA) [R78.81,B95.61]                 Active Insurance as of 11/30/2022     Primary Coverage     Payor Plan Insurance Group Employer/Plan Group    [employer] [place] [employer] [id number]     Payor Plan Address Payor Plan Phone Number Payor Plan Fax Number Effective Dates    MAIL STOP:   4/1/2017 - None Entered    [address] [address]       [facility] [address]       Subscriber Name Subscriber Birth Date Member ID       MARTIN BORJAS 1955 [id number]                 Emergency Contacts      (Rel.) Home " Phone Work Phone Mobile Phone    LEIDY PEREZ (Daughter) [phone] -- [phone]    BARBARA MASON (Spouse) [phone] -- --    ForemenAgnes (Daughter) -- -- [phone]               History & Physical      OswaldoTainaYAW at 22 1614              [facility]      Patient Name: Caterina Mason  : 1955  MRN: [id number]  Primary Care Physician:  Lou Curran MD  Date of admission: 2022      Subjective       Chief Complaint: MSSA bacteremia and COVID-19 infection    History of Present Illness: Caterina Mason is a 67 y.o. female who presented to [facility] on 2022 complaining of general ill feeling.  She states she was seen here yesterday and tested positive for COVID.  She states she was discharged home but then called today and told to return due to positive blood cultures. She states she has felt feverish at home, had cough and congestion and general unwell feeling.  She denies any chest pain shortness of breath or abdominal pain.  Denies vomiting or diarrhea.    In ER today, patient given 1 dose of cefazolin, glucose 492, creatinine 1.02, WBC 9.10.  Last night in the ER chest x-ray was negative, UA showed positive ketones, WBC 13-20, negative for bacteria, and moderate leukocytes.  Patient tested positive for COVID-19 on 2022.  Blood cultures grew MSSA.        Review of Systems   Constitutional: Positive for fever.        Patient reports feeling feverish last night while in ER.   Musculoskeletal: Positive for myalgias.   All other systems reviewed and are negative.       Personal History     Past Medical History:   Diagnosis Date   • Arthritis    • Atrial fibrillation ([facility])    • Bradycardia     Dr. Charles   • Cataract    • COPD (chronic obstructive pulmonary disease) ([facility])     Dr. Rob   • Diabetes mellitus, type 2 ([facility])     sees Dr. Archibald at [place]   • DJD (degenerative joint disease)     possible  herniated disc, sees Pain Mgmt in [place]   • Emphysema of lung (HCC)    •     • GERD (gastroesophageal reflux disease)    • History of combined right and left heart catheterization 2018    minimal CAD on heart cath done    • Hyperlipidemia    • Hypertension    • Pain    • Sleep apnea     wears CPAP machine, seefelipe Rob       Past Surgical History:   Procedure Laterality Date   • ABLATION OF DYSRHYTHMIC FOCUS     • CARDIAC CATHETERIZATION      and Angiograph    • CARDIAC ELECTROPHYSIOLOGY PROCEDURE N/A 12/10/2019    Procedure: pvc ablation;  Surgeon: Alfredo Iglesias MD;  Location: [facility] INVASIVE LOCATION;  Service: Cardiovascular   •  SECTION      x 1   • COLON RESECTION     • COLONOSCOPY  2012   • COLOSTOMY      and reversal in her 20's due to problems with childbirth   • COLOSTOMY CLOSURE     • OVARIAN CYST SURGERY     • ROTATOR CUFF REPAIR Left 2009    x2    • TOTAL ABDOMINAL HYSTERECTOMY WITH SALPINGO OOPHORECTOMY         Family History: family history includes Cancer in her sister; Diabetes in her brother; Heart disease in her father and mother; Hyperlipidemia in an other family member; Hypertension in her brother, father, and mother; Lung disease in her father; Seizures in her mother; Stroke in her father and mother. Otherwise pertinent FHx was reviewed and not pertinent to current issue.    Social History:  reports that she quit smoking about 2 years ago. Her smoking use included cigarettes. She has a 48.00 pack-year smoking history. She has never used smokeless tobacco. She reports that she does not drink alcohol and does not use drugs.    Home Medications:  Prior to Admission Medications     Prescriptions Last Dose Informant Patient Reported? Taking?    albuterol sulfate  (90 Base) MCG/ACT inhaler   No No    INHALE 2 PUFFS EVERY 4 HOURS AS NEEDED FOR WHEEZING OR SHORTNESS OF AIR    atorvastatin (LIPITOR) 40 MG tablet   No No    TAKE 1/2 TABLET BY MOUTH EVERY  DAY    brompheniramine-pseudoephedrine-DM 30-2-10 MG/5ML syrup   No No    Take 5 mL by mouth 4 (Four) Times a Day As Needed for Congestion or Cough.    budesonide-formoterol (SYMBICORT) 160-4.5 MCG/ACT inhaler   Yes No    Inhale 2 puffs 2 (Two) Times a Day.    Cholecalciferol (Vitamin D3) 25 MCG (1000 UT) capsule   Yes No    TAKE 1 TABLET BY MOUTH TWICE A DAY *OTC NOT COVERED*    dexamethasone (DECADRON) 4 MG tablet   No No    Take 1 tablet by mouth 3 (Three) Times a Day.    dilTIAZem CD (CARDIZEM CD) 120 MG 24 hr capsule   No No    TAKE 1 CAPSULE BY MOUTH EVERY DAY    doxycycline (VIBRAMYCIN) 100 MG capsule   No No    Take 1 capsule by mouth 2 (Two) Times a Day.    Eliquis 5 MG tablet tablet   No No    TAKE 1 TABLET BY MOUTH 2 (TWO) TIMES A DAY. PATIENT NEEDS AN APPT BEFORE ANY MORE REFILLS    fenofibrate 160 MG tablet   No No    TAKE 1 TABLET BY MOUTH EVERY DAY    furosemide (LASIX) 40 MG tablet   No No    TAKE 1 TABLET BY MOUTH EVERY DAY    glucose blood (Accu-Chek Aurea Plus) test strip   No No    CHECK BLOOD SUGAR 3 TIMES DX E11.65    HYDROcodone-acetaminophen (NORCO) 7.5-325 MG per tablet   Yes No    Take 1 tablet by mouth 3 (Three) Times a Day As Needed.    Insulin Glargine (BASAGLAR KWIKPEN) 100 UNIT/ML injection pen   No No    INJECT 40 UNITS UNDER THE SKIN INTO THE APPROPRIATE AREA AS DIRECTED EVERY NIGHT.    Insulin Lispro, 1 Unit Dial, (HumaLOG KwikPen) 100 UNIT/ML solution pen-injector   No No    Inject 14 units before each meal.    Insulin Pen Needle (BD Pen Needle Lorene 2nd Gen) 32G X 4 MM misc   No No    Used to inject insulin 4 times a day.    Janumet XR  MG tablet   No No    TAKE 2 TABLETS BY MOUTH EVERY DAY    lisinopril (PRINIVIL,ZESTRIL) 20 MG tablet   No No    TAKE 1 TABLET BY MOUTH EVERY DAY    magnesium oxide (MAG-OX) 400 MG tablet   No No    TAKE 1 TABLET BY MOUTH TWICE A DAY    magnesium oxide (MAGOX) 400 (241.3 Mg) MG tablet tablet   Yes No    Take 400 mg by mouth 2 (Two) Times a  Day.    metoprolol tartrate (LOPRESSOR) 25 MG tablet   No No    TAKE 1 TABLET BY MOUTH EVERY 12 HOURS FOR 30 DAYS.    potassium chloride (K-DUR,KLOR-CON) 10 MEQ CR tablet   Yes No    Take 10 mEq by mouth Daily.    potassium chloride (MICRO-K) 10 MEQ CR capsule   No No    TAKE 1 CAPSULE BY MOUTH EVERY DAY            Allergies:  Allergies   Allergen Reactions   • Ibuprofen GI Intolerance   • Prednisone Other (See Comments)   • Pregabalin Other (See Comments)     jerking   • Tramadol Other (See Comments)     Legs jerked   • Levofloxacin Other (See Comments)     Increase sunburn   • Sulfamethoxazole-Trimethoprim Swelling       Objective       Vitals:   Temp:  [98.3 °F (36.8 °C)] 98.3 °F (36.8 °C)  Heart Rate:  [107] 107  Resp:  [16] 16  BP: (161)/(68) 161/68    Physical Exam  Vitals reviewed.   Constitutional:       Appearance: She is obese.   HENT:      Head: Normocephalic.   Eyes:      Pupils: Pupils are equal, round, and reactive to light.   Cardiovascular:      Rate and Rhythm: Normal rate and regular rhythm.   Pulmonary:      Effort: Pulmonary effort is normal.      Breath sounds: Normal breath sounds.   Abdominal:      General: Bowel sounds are normal.      Palpations: Abdomen is soft.   Skin:     General: Skin is warm and dry.   Neurological:      Mental Status: She is alert and oriented to person, place, and time.   Psychiatric:         Mood and Affect: Mood normal.         Behavior: Behavior normal.        Result Review    Result Review:  I have personally reviewed the results from the time of this admission to 11/30/2022 16:15 EST and agree with these findings:  [x]  Laboratory  [x]  Microbiology  [x]  Radiology  []  EKG/Telemetry   [x]  Cardiology/Vascular   []  Pathology  []  Old records  []  Other:        Assessment & Plan        Active Hospital Problems:  There are no active hospital problems to display for this patient.    Plan:     Home med list not reviewed by pharmacy yet at the time of my exam  Med  rec will need completed    MSSA bacteremia  - Given 1 dose of cefazolin in ER  - Start Rocephin 2g daily x10 days due to it being easier for outpatient administration purposes versus cefazolin that is every 8 hours  - ID consulted  - 2D echo to rule out endocarditis    COVID-19 infection  - Tested positive on 11/29/2022  - Currently on room air  - Continue Decadron that was started in ER last night  - Symbicort and albuterol  - Monitor daily labs and provide supportive care as needed    Type 2 diabetes  - Glucose 492  - Last A1c 11.5 on 5/31/2022.  New Hemoglobin A1c ordered.  - Sliding scale management ordered  - Healthy heart/consistent carb diet    COPD  Obstructive sleep apnea  - Currently on room air  - Uses CPAP at home and follows   - CPAP at bedtime/as needed  - Symbicort and albuterol    Hypertension  - /68  - Metoprolol and lisinopril  - Lasix and K-Dur.  Monitor daily labs.    Hyperlipidemia  - Lipid panel ordered  - Fenofibrate    History of A. fib  - EKG ordered  - Eliquis and Cardizem    DVT prophylaxis:  -Eliquis    CODE STATUS:       Admission Status:  I believe this patient meets inpatient status.    I discussed the patient's findings and my recommendations with patient.    This patient has been examined wearing appropriate Personal Protective Equipment and discussed with patient. 11/30/22      Signature: Electronically signed by YAW Sutton, 11/30/22, 4:16 PM EST.      Electronically signed by Taina Chacon APRN at 11/30/22 1840          Emergency Department Notes      Mary Cummings APRN at 11/30/22 1457     Attestation signed by Stephen Palmer MD at 11/30/22 8957        NON FACE TO FACE: This visit was performed by BOTH a physician and an APC. I performed all aspects of the MDM as documented.  Stephen Palmer MD 11/30/2022 22:53 EST                         Subjective      Provider in Triage Note  Patient is a 67-year-old white female who presents today with  concerns of general ill feeling.  She states she was seen here yesterday and tested positive for COVID.  She states she was discharged home but then called today and told to return due to positive blood cultures.  She states she needs IV antibiotics.  She states she has felt feverish at home, had cough and congestion and general unwell feeling.  She denies any chest pain shortness of breath or abdominal pain.  Denies vomiting or diarrhea.  Consultation with ER pharmacist suggest IV Kefzol 2 g.      History of Present Illness  Agree with provider in triage note I also saw the patient last night when she was COVID-positive the pharmacist reports that the patient is positive for MSSA and recommends 2 g of Kefzol IV and admission for bacteremia today the patient states she still does not feel well but is afebrile and nontoxic.        Review of Systems   Constitutional: Positive for fatigue. Negative for chills and fever.   HENT: Positive for congestion. Negative for tinnitus and trouble swallowing.    Eyes: Negative for photophobia, discharge and redness.   Respiratory: Positive for cough. Negative for shortness of breath.    Cardiovascular: Negative for chest pain and palpitations.   Gastrointestinal: Negative for abdominal pain, diarrhea, nausea and vomiting.   Genitourinary: Negative for dysuria, frequency and urgency.   Musculoskeletal: Positive for arthralgias. Negative for back pain, joint swelling and myalgias.   Skin: Negative for rash.   Neurological: Negative for dizziness and headaches.   Psychiatric/Behavioral: Negative for confusion.   All other systems reviewed and are negative.      Past Medical History:   Diagnosis Date   • Arthritis    • Atrial fibrillation ([facility])    • Bradycardia     Dr. Charles   • Cataract    • COPD (chronic obstructive pulmonary disease) ([facility])     Dr. Rob   • Diabetes mellitus, type 2 ([facility])     sees Dr. Archibald at [place]   • DJD (degenerative joint disease)     possible herniated  disc, sees Pain Mgmt in [place]   • Emphysema of lung (HCC)    •     • GERD (gastroesophageal reflux disease)    • History of combined right and left heart catheterization 2018    minimal CAD on heart cath done    • Hyperlipidemia    • Hypertension    • Pain    • Sleep apnea     wears CPAP machine, sees [facility]       Allergies   Allergen Reactions   • Ibuprofen GI Intolerance   • Prednisone Other (See Comments)   • Pregabalin Other (See Comments)     jerking   • Tramadol Other (See Comments)     Legs jerked   • Levofloxacin Other (See Comments)     Increase sunburn   • Sulfamethoxazole-Trimethoprim Swelling       Past Surgical History:   Procedure Laterality Date   • ABLATION OF DYSRHYTHMIC FOCUS     • CARDIAC CATHETERIZATION      and Angiograph    • CARDIAC ELECTROPHYSIOLOGY PROCEDURE N/A 12/10/2019    Procedure: pvc ablation;  Surgeon: Alfredo Iglesias MD;  Location: [facility] INVASIVE LOCATION;  Service: Cardiovascular   •  SECTION      x 1   • COLON RESECTION     • COLONOSCOPY  2012   • COLOSTOMY      and reversal in her 20's due to problems with childbirth   • COLOSTOMY CLOSURE     • OVARIAN CYST SURGERY     • ROTATOR CUFF REPAIR Left 2009    x2    • TOTAL ABDOMINAL HYSTERECTOMY WITH SALPINGO OOPHORECTOMY         Family History   Problem Relation Age of Onset   • Heart disease Mother    • Hypertension Mother    • Stroke Mother    • Seizures Mother    • Heart disease Father    • Hypertension Father    • Lung disease Father    • Stroke Father    • Cancer Sister         unknown   • Hypertension Brother    • Diabetes Brother    • Hyperlipidemia Other        Social History     Socioeconomic History   • Marital status:    Tobacco Use   • Smoking status: Former     Packs/day: 1.00     Years: 48.00     Pack years: 48.00     Types: Cigarettes     Quit date: 3/1/2020     Years since quittin.7   • Smokeless tobacco: Never   Vaping Use   • Vaping Use: Never used   Substance  and Sexual Activity   • Alcohol use: No   • Drug use: No   • Sexual activity: Defer           Objective   Physical Exam  Vitals reviewed.   Constitutional:       General: She is not in acute distress.     Appearance: Normal appearance. She is well-developed. She is obese. She is not ill-appearing, toxic-appearing or diaphoretic.   HENT:      Head: Normocephalic and atraumatic.      Right Ear: External ear normal.      Left Ear: External ear normal.      Nose: Congestion present.      Mouth/Throat:      Mouth: Mucous membranes are moist.   Eyes:      Conjunctiva/sclera: Conjunctivae normal.      Pupils: Pupils are equal, round, and reactive to light.   Cardiovascular:      Rate and Rhythm: Normal rate and regular rhythm.      Pulses: Normal pulses.      Heart sounds: Normal heart sounds.   Pulmonary:      Effort: Pulmonary effort is normal. No respiratory distress.      Breath sounds: Normal breath sounds. No wheezing.   Abdominal:      General: Bowel sounds are normal. There is no distension.      Palpations: Abdomen is soft. There is no mass.      Tenderness: There is no abdominal tenderness. There is no guarding or rebound.   Musculoskeletal:         General: No deformity. Normal range of motion.      Cervical back: Normal range of motion and neck supple.   Skin:     General: Skin is warm and dry.      Capillary Refill: Capillary refill takes less than 2 seconds.   Neurological:      General: No focal deficit present.      Mental Status: She is alert and oriented to person, place, and time.      GCS: GCS eye subscore is 4. GCS verbal subscore is 5. GCS motor subscore is 6.      Cranial Nerves: No cranial nerve deficit.      Sensory: No sensory deficit.      Deep Tendon Reflexes: Reflexes normal.   Psychiatric:         Attention and Perception: Attention normal.         Mood and Affect: Mood normal.         Speech: Speech normal.         Behavior: Behavior normal. Behavior is cooperative.         Thought Content:  "Thought content normal.         Cognition and Memory: Cognition and memory normal.         Judgment: Judgment normal.         Procedures          ED Course  ED Course as of 11/30/22 1622   Wed Nov 30, 2022   1512 Spoke with Taina the nurse practitioner who will admit the patient for Dr. Scott the hospitalist [KW]      ED Course User Index  [KW] Mary Cummings BENJI, APRN      /68 (BP Location: Left arm, Patient Position: Lying)   Pulse 107   Temp 98.3 °F (36.8 °C) (Oral)   Resp 16   Ht 162.6 cm (64\")   Wt 127 kg (280 lb)   SpO2 97%   BMI 48.06 kg/m²   Labs Reviewed   CBC WITH AUTO DIFFERENTIAL - Abnormal; Notable for the following components:       Result Value    Neutrophil % 79.8 (*)     Lymphocyte % 13.3 (*)     Eosinophil % 0.0 (*)     Neutrophils, Absolute 7.30 (*)     All other components within normal limits   COMPREHENSIVE METABOLIC PANEL   CBC AND DIFFERENTIAL    Narrative:     The following orders were created for panel order CBC & Differential.  Procedure                               Abnormality         Status                     ---------                               -----------         ------                     CBC Auto Differential[[id number]]        Abnormal            Final result                 Please view results for these tests on the individual orders.     Medications   sodium chloride 0.9 % flush 10 mL (has no administration in time range)   ceFAZolin 2 gm IVPB in 100 mL NS (MBP) (has no administration in time range)     XR Chest 1 View    Result Date: 11/29/2022   1. No radiographic findings of pneumonia 2. Cardiomegaly with no evidence of CHF  Electronically Signed By-Son Montelongo On:11/29/2022 9:37 PM This report was finalized on [id number] by  Son Montelongo, .                                         MDM  Number of Diagnoses or Management Options  Bacteremia due to methicillin susceptible Staphylococcus aureus (MSSA)  COVID-19  Diagnosis management comments: Patient had " IV established and blood work was obtained patient was discussed with the pharmacist Sharlene who states positive blood cultures for MSSA and the patient will be started on 2 g of Kefzol and the patient will be admitted for bacteremia and COVID-19 for antibiotics and further care the patient was made aware and agreeable this plan of care stable at admission    Risk of Complications, Morbidity, and/or Mortality  Presenting problems: high  Diagnostic procedures: high  Management options: high    Patient Progress  Patient progress: improved      Final diagnoses:   Bacteremia due to methicillin susceptible Staphylococcus aureus (MSSA)   COVID-19       ED Disposition  ED Disposition     ED Disposition   Decision to Admit    Condition   --    Comment   Level of Care: Med/Surg [1]   Admitting Physician: LILY BAH [[id number]]   Attending Physician: LILY BAH [[id number]]               No follow-up provider specified.       Medication List      No changes were made to your prescriptions during this visit.          Mary Cummings APRN  11/30/22 1622      Electronically signed by Stephen Palmer MD at 11/30/22 2253       Vital Signs (last day)     Date/Time Temp Temp src Pulse Resp BP Patient Position SpO2    12/01/22 0800 97.6 (36.4) Oral 110 19 147/69 Sitting 97    12/01/22 0326 98.1 (36.7) Oral 94 14 135/70 Lying 94    12/01/22 0204 -- -- 100 -- -- -- --    11/30/22 2346 97.7 (36.5) Oral 112 22 143/50 Lying --    11/30/22 1900 97.9 (36.6) Oral 98 16 157/73 Lying 97    11/30/22 1447 98.3 (36.8) Oral 107 16 161/68 Lying 97            Facility-Administered Medications as of 12/1/2022   Medication Dose Route Frequency Provider Last Rate Last Admin   • acetaminophen (TYLENOL) tablet 650 mg  650 mg Oral Q4H PRN Taina Chacon APRN   650 mg at 12/01/22 0008   • apixaban (ELIQUIS) tablet 5 mg  5 mg Oral Q12H Candy Llanos APRN   5 mg at 12/01/22 0826   • [COMPLETED] ceFAZolin 2 gm IVPB in 100 mL NS (MBP)  2 g  Intravenous Once Cleo Byrnes APRN   2 g at 11/30/22 1620   • ceFAZolin 2 gm IVPB in 100 mL NS (MBP)  2 g Intravenous Q8H Michael Lozano MD       • dextrose (D50W) (25 g/50 mL) IV injection 25 g  25 g Intravenous Q15 Min PRN Taina Chacon APRN       • dextrose (GLUTOSE) oral gel 15 g  15 g Oral Q15 Min PRN Taina Chacon APRN       • dilTIAZem CD (CARDIZEM CD) 24 hr capsule 240 mg  240 mg Oral Q24H Candy Llanos APRN   240 mg at 12/01/22 0826   • glucagon (human recombinant) (GLUCAGEN DIAGNOSTIC) 1 mg in sterile water (preservative free) 1 mL injection  1 mg Intramuscular Q15 Min PRN Taina Chacon APRN       • hydrALAZINE (APRESOLINE) injection 10 mg  10 mg Intravenous Q6H PRN Michael Lozano MD       • insulin glargine (LANTUS, SEMGLEE) injection 25 Units  25 Units Subcutaneous Nightly Michael Lozano MD   25 Units at 11/30/22 2121   • [COMPLETED] insulin lispro (ADMELOG) injection 20 Units  20 Units Subcutaneous Once Candy Llanos APRN   20 Units at 12/01/22 0548   • insulin lispro (ADMELOG) injection 3-14 Units  3-14 Units Subcutaneous TID With Meals Taina Chacon APRN   10 Units at 12/01/22 0757   • [COMPLETED] insulin regular (humuLIN R,novoLIN R) injection 12 Units  12 Units Intravenous Once Michael Lozano MD   12 Units at 12/01/22 0824   • Magnesium Sulfate 2 gram Bolus, followed by 8 gram infusion (total Mg dose 10 grams)- Mg less than or equal to 1mg/dL  2 g Intravenous PRN Michael Lozano MD        Or   • Magnesium Sulfate 2 gram / 50mL Infusion (GIVE X 3 BAGS TO EQUAL 6GM TOTAL DOSE) - Mg 1.1 - 1.5 mg/dl  2 g Intravenous PRN Michael Lozano MD        Or   • Magnesium Sulfate 4 gram infusion- Mg 1.6-1.9 mg/dL  4 g Intravenous PRN Michael Lozano MD       • ondansetron (ZOFRAN) injection 4 mg  4 mg Intravenous Q6H PRN Taina Chacon APRN   4 mg at 12/01/22 0634   • potassium & sodium phosphates (PHOS-NAK) 280-160-250 MG packet - for Phosphorus less than 1.25 mg/dL  2 packet Oral  Q6H PRN Michael Lozano MD        Or   • potassium & sodium phosphates (PHOS-NAK) 280-160-250 MG packet - for Phosphorus 1.25 - 2.5 mg/dL  2 packet Oral Q6H PRN Michael Lozano MD       • potassium chloride (K-DUR,KLOR-CON) CR tablet 40 mEq  40 mEq Oral PRN Michael Lozano MD       • potassium chloride (KLOR-CON) packet 40 mEq  40 mEq Oral PRN Michael Lozano MD       • sodium chloride 0.9 % flush 10 mL  10 mL Intravenous PRN Cleo Byrnes APRN       • sodium chloride 0.9 % flush 10 mL  10 mL Intravenous Q12H Taina Chacon APRN   10 mL at 12/01/22 0829   • sodium chloride 0.9 % flush 10 mL  10 mL Intravenous PRN Taina Chacon APRN       • sodium chloride 0.9 % infusion 40 mL  40 mL Intravenous PRN Taina Chacon APRN       • [COMPLETED] Sulfur Hexafluoride Microsph (LUMASON) 60.7-25 MG IV reconstituted suspension reconstituted suspension 2 mL  2 mL Intravenous Once in imaging Michael Lozano MD   2 mL at 12/01/22 1013     Lab Results (last 48 hours)     Procedure Component Value Units Date/Time    Hemoglobin A1c [[id number]]  (Abnormal) Collected: 12/01/22 0448    Specimen: Blood Updated: 12/01/22 1040     Hemoglobin A1C 11.8 %     Narrative:      Hemoglobin A1C Reference Range:    <5.7 %        Normal  5.7-6.4 %     Increased risk for diabetes  > 6.4 %        Diabetes       These guidelines have been recommended by the American Diabetic Association for Hgb A1c.      The following 2010 guidelines have been recommended by the American Diabetes Association for Hemoglobin A1c.    HBA1c 5.7-6.4% Increased risk for future diabetes (pre-diabetes)  HBA1c     >6.4% Diabetes      Blood Culture - Blood, Arm, Right [[id number]] Collected: 12/01/22 0835    Specimen: Blood from Arm, Right Updated: 12/01/22 0911    Blood Culture - Blood, Hand, Right [[id number]] Collected: 12/01/22 0835    Specimen: Blood from Hand, Right Updated: 12/01/22 0910    POC Glucose Once [[id number]]  (Abnormal) Collected: 12/01/22 0721     Specimen: Blood Updated: 12/01/22 0722     Glucose 349 mg/dL      Comment: Serial Number: [id number]:  [id number]       POC Glucose Once [[id number]]  (Abnormal) Collected: 12/01/22 0544    Specimen: Blood Updated: 12/01/22 0546     Glucose 381 mg/dL      Comment: Serial Number: [id number]:  [id number]       Comprehensive Metabolic Panel [[id number]]  (Abnormal) Collected: 12/01/22 0448    Specimen: Blood Updated: 12/01/22 0538     Glucose 447 mg/dL      BUN 27 mg/dL      Creatinine 1.03 mg/dL      Sodium 135 mmol/L      Potassium 4.4 mmol/L      Chloride 96 mmol/L      CO2 27.0 mmol/L      Calcium 10.3 mg/dL      Total Protein 8.0 g/dL      Albumin 4.00 g/dL      ALT (SGPT) 35 U/L      AST (SGOT) 23 U/L      Alkaline Phosphatase 69 U/L      Total Bilirubin 0.6 mg/dL      Globulin 4.0 gm/dL      A/G Ratio 1.0 g/dL      BUN/Creatinine Ratio 26.2     Anion Gap 12.0 mmol/L      eGFR 59.7 mL/min/1.73      Comment: National Kidney Foundation and American Society of Nephrology (ASN) Task Force recommended calculation based on the Chronic Kidney Disease Epidemiology Collaboration (CKD-EPI) equation refit without adjustment for race.       Narrative:      GFR Normal >60  Chronic Kidney Disease <60  Kidney Failure <15      Phosphorus [[id number]]  (Normal) Collected: 12/01/22 0448    Specimen: Blood Updated: 12/01/22 0534     Phosphorus 3.3 mg/dL     Magnesium [[id number]]  (Normal) Collected: 12/01/22 0448    Specimen: Blood Updated: 12/01/22 0534     Magnesium 1.9 mg/dL     Lipid Panel [[id number]]  (Abnormal) Collected: 12/01/22 0448    Specimen: Blood Updated: 12/01/22 0534     Total Cholesterol 151 mg/dL      Triglycerides 197 mg/dL      HDL Cholesterol 37 mg/dL      LDL Cholesterol  81 mg/dL      VLDL Cholesterol 33 mg/dL      LDL/HDL Ratio 2.02    Narrative:      Cholesterol Reference Ranges  (U.S. Department of Health and Human Services ATP III Classifications)    Desirable          <200 mg/dL  Borderline High  "   200-239 mg/dL  High Risk          >240 mg/dL      Triglyceride Reference Ranges  (U.S. Department of Health and Human Services ATP III Classifications)    Normal           <150 mg/dL  Borderline High  150-199 mg/dL  High             200-499 mg/dL  Very High        >500 mg/dL    HDL Reference Ranges  (U.S. Department of Health and Human Services ATP III Classifications)    Low     <40 mg/dl (major risk factor for CHD)  High    >60 mg/dl ('negative' risk factor for CHD)        LDL Reference Ranges  (U.S. Department of Health and Human Services ATP III Classifications)    Optimal          <100 mg/dL  Near Optimal     100-129 mg/dL  Borderline High  130-159 mg/dL  High             160-189 mg/dL  Very High        >189 mg/dL    C-reactive Protein [[id number]]  (Abnormal) Collected: 12/01/22 0448    Specimen: Blood Updated: 12/01/22 0534     C-Reactive Protein 3.43 mg/dL     TSH [[id number]]  (Normal) Collected: 12/01/22 0448    Specimen: Blood Updated: 12/01/22 0530     TSH 0.394 uIU/mL     Procalcitonin [[id number]]  (Normal) Collected: 12/01/22 0448    Specimen: Blood Updated: 12/01/22 0530     Procalcitonin 0.21 ng/mL     Narrative:      As a Marker for Sepsis (Non-Neonates):    1. <0.5 ng/mL represents a low risk of severe sepsis and/or septic shock.  2. >2 ng/mL represents a high risk of severe sepsis and/or septic shock.    As a Marker for Lower Respiratory Tract Infections that require antibiotic therapy:    PCT on Admission    Antibiotic Therapy       6-12 Hrs later    >0.5                Strongly Recommended  >0.25 - <0.5        Recommended   0.1 - 0.25          Discouraged              Remeasure/reassess PCT  <0.1                Strongly Discouraged     Remeasure/reassess PCT    As 28 day mortality risk marker: \"Change in Procalcitonin Result\" (>80% or <=80%) if Day 0 (or Day 1) and Day 4 values are available. Refer to http://www.[employer]-pct-calculator.com    Change in PCT <=80%  A decrease of PCT levels below or " equal to 80% defines a positive change in PCT test result representing a higher risk for 28-day all-cause mortality of patients diagnosed with severe sepsis for septic shock.    Change in PCT >80%  A decrease of PCT levels of more than 80% defines a negative change in PCT result representing a lower risk for 28-day all-cause mortality of patients diagnosed with severe sepsis or septic shock.       Sedimentation Rate [[id number]]  (Abnormal) Collected: 12/01/22 0448    Specimen: Blood Updated: 12/01/22 0519     Sed Rate 34 mm/hr     CBC & Differential [[id number]]  (Abnormal) Collected: 12/01/22 0448    Specimen: Blood Updated: 12/01/22 0514    Narrative:      The following orders were created for panel order CBC & Differential.  Procedure                               Abnormality         Status                     ---------                               -----------         ------                     CBC Auto Differential[[id number]]        Abnormal            Final result                 Please view results for these tests on the individual orders.    CBC Auto Differential [[id number]]  (Abnormal) Collected: 12/01/22 0448    Specimen: Blood Updated: 12/01/22 0514     WBC 13.90 10*3/mm3      RBC 4.88 10*6/mm3      Hemoglobin 14.5 g/dL      Hematocrit 43.4 %      MCV 88.9 fL      MCH 29.8 pg      MCHC 33.6 g/dL      RDW 14.3 %      RDW-SD 46.8 fl      MPV 8.8 fL      Platelets 341 10*3/mm3      Neutrophil % 74.3 %      Lymphocyte % 14.8 %      Monocyte % 10.4 %      Eosinophil % 0.0 %      Basophil % 0.5 %      Neutrophils, Absolute 10.30 10*3/mm3      Lymphocytes, Absolute 2.10 10*3/mm3      Monocytes, Absolute 1.40 10*3/mm3      Eosinophils, Absolute 0.00 10*3/mm3      Basophils, Absolute 0.10 10*3/mm3      nRBC 0.2 /100 WBC     POC Glucose Once [[id number]]  (Abnormal) Collected: 11/1955    Specimen: Blood Updated: 11/30/22 1957     Glucose 340 mg/dL      Comment: Serial Number: [id number]:  [id number]        Comprehensive Metabolic Panel [[id number]]  (Abnormal) Collected: 11/30/22 1603    Specimen: Blood Updated: 11/30/22 1631     Glucose 492 mg/dL      BUN 23 mg/dL      Creatinine 1.02 mg/dL      Sodium 138 mmol/L      Potassium 4.3 mmol/L      Chloride 98 mmol/L      CO2 25.0 mmol/L      Calcium 10.3 mg/dL      Total Protein 8.3 g/dL      Albumin 4.30 g/dL      ALT (SGPT) 43 U/L      AST (SGOT) 31 U/L      Alkaline Phosphatase 67 U/L      Total Bilirubin 1.0 mg/dL      Globulin 4.0 gm/dL      A/G Ratio 1.1 g/dL      BUN/Creatinine Ratio 22.5     Anion Gap 15.0 mmol/L      eGFR 60.4 mL/min/1.73      Comment: National Kidney Foundation and American Society of Nephrology (ASN) Task Force recommended calculation based on the Chronic Kidney Disease Epidemiology Collaboration (CKD-EPI) equation refit without adjustment for race.       Narrative:      GFR Normal >60  Chronic Kidney Disease <60  Kidney Failure <15      CBC & Differential [[id number]]  (Abnormal) Collected: 11/30/22 1604    Specimen: Blood Updated: 11/30/22 1606    Narrative:      The following orders were created for panel order CBC & Differential.  Procedure                               Abnormality         Status                     ---------                               -----------         ------                     CBC Auto Differential[[id number]]        Abnormal            Final result                 Please view results for these tests on the individual orders.    CBC Auto Differential [[id number]]  (Abnormal) Collected: 11/30/22 1604    Specimen: Blood Updated: 11/30/22 1606     WBC 9.10 10*3/mm3      RBC 5.02 10*6/mm3      Hemoglobin 14.9 g/dL      Hematocrit 45.3 %      MCV 90.1 fL      MCH 29.7 pg      MCHC 33.0 g/dL      RDW 14.0 %      RDW-SD 43.8 fl      MPV 8.4 fL      Platelets 308 10*3/mm3      Neutrophil % 79.8 %      Lymphocyte % 13.3 %      Monocyte % 6.8 %      Eosinophil % 0.0 %      Basophil % 0.1 %      Neutrophils, Absolute 7.30  10*3/mm3      Lymphocytes, Absolute 1.20 10*3/mm3      Monocytes, Absolute 0.60 10*3/mm3      Eosinophils, Absolute 0.00 10*3/mm3      Basophils, Absolute 0.00 10*3/mm3      nRBC 0.1 /100 WBC             Physician Progress Notes (last 48 hours)  Notes from 11/29/22 1134 through 12/01/22 1134   No notes of this type exist for this encounter.          Consult Notes (last 48 hours)      Olya Mendez MD at 12/01/22 0825      Consult Orders    1. Inpatient Infectious Diseases Consult [[id number]] ordered by Taina Chacon APRN at 11/30/22 1628                    LOS: 1 day   Patient Care Team:  Lou Curran MD as PCP - General  Jesse Charles MD as Consulting Physician (Cardiology)  Bautista Archibald MD as Consulting Physician (Endocrinology)  Jane Montero MD as Consulting Physician (Obstetrics and Gynecology)        Subjective       Attending MD : Michael Lozano MD    Patient Complaints: weak         History of Present Illness  : 67 y.o. female who presented to [facility] on 11/30/2022 complaining of general ill feeling.  She states she was seen here yesterday and tested positive for COVID.  She states she was discharged home but then called today and told to return due to positive blood cultures. She states she has felt feverish at home, had cough and congestion and general unwell feeling.  She denies any chest pain shortness of breath or abdominal pain.  Denies vomiting or diarrhea.     In ER today, patient given 1 dose of cefazolin, glucose 492, creatinine 1.02, WBC 9.10.  Last night in the ER chest x-ray was negative, UA showed positive ketones, WBC 13-20, negative for bacteria, and moderate leukocytes.  Patient tested positive for COVID-19 on 11/29/2022.  Blood cultures grew MSSA.     Patient Denies:  NV    PMH :   Bacteremia due to methicillin susceptible Staphylococcus aureus (MSSA)      Review of Systems:    weak    Objective     Vital Signs  Temp:  [97.6 °F (36.4 °C)-98.3 °F (36.8 °C)]  97.6 °F (36.4 °C)  Heart Rate:  [] 110  Resp:  [14-22] 19  BP: (135-161)/(50-73) 147/69    Physical Exam:     General Appearance:    Alert, cooperative, in no acute distress   Head:    Normocephalic, without obvious abnormality, atraumatic   Eyes:            Lids and lashes normal, conjunctivae and sclerae normal, no   icterus, no pallor, corneas clear, PERRLA   Ears:    Ears appear intact with no abnormalities noted   Throat:   No oral lesions, no thrush, oral mucosa moist   Neck:   No adenopathy, supple, trachea midline, no thyromegaly, no     carotid bruit, no JVD   Back:     No kyphosis present, no scoliosis present, no skin lesions,       erythema or scars, no tenderness to percussion or                   palpation,   range of motion normal   Lungs:     Clear to auscultation,respirations regular, even and                   unlabored    Heart:    Regular rhythm and normal rate, normal S1 and S2, no            murmur, no gallop, no rub, no click   Breast Exam:    Deferred   Abdomen:     Normal bowel sounds, no masses, no organomegaly, soft        non-tender, non-distended, no guarding, no rebound                 tenderness   Genitalia:    Deferred   Extremities:   Moves all extremities well, no edema, no cyanosis, no              redness   Pulses:   Pulses palpable and equal bilaterally   Skin:   No bleeding, bruising or rash   Lymph nodes:   No palpable adenopathy   Neurologic:   Cranial nerves 2 - 12 grossly intact, sensation intact, DTR        present and equal bilaterally          Results Review:    Lab Results (last 72 hours)     Procedure Component Value Units Date/Time    POC Glucose Once [[id number]]  (Abnormal) Collected: 12/01/22 0721    Specimen: Blood Updated: 12/01/22 0722     Glucose 349 mg/dL      Comment: Serial Number: [id number]:  [id number]       POC Glucose Once [[id number]]  (Abnormal) Collected: 12/01/22 0544    Specimen: Blood Updated: 12/01/22 0546     Glucose 381 mg/dL       Comment: Serial Number: [id number]:  [id number]       Comprehensive Metabolic Panel [[id number]]  (Abnormal) Collected: 12/01/22 0448    Specimen: Blood Updated: 12/01/22 0538     Glucose 447 mg/dL      BUN 27 mg/dL      Creatinine 1.03 mg/dL      Sodium 135 mmol/L      Potassium 4.4 mmol/L      Chloride 96 mmol/L      CO2 27.0 mmol/L      Calcium 10.3 mg/dL      Total Protein 8.0 g/dL      Albumin 4.00 g/dL      ALT (SGPT) 35 U/L      AST (SGOT) 23 U/L      Alkaline Phosphatase 69 U/L      Total Bilirubin 0.6 mg/dL      Globulin 4.0 gm/dL      A/G Ratio 1.0 g/dL      BUN/Creatinine Ratio 26.2     Anion Gap 12.0 mmol/L      eGFR 59.7 mL/min/1.73      Comment: National Kidney Foundation and American Society of Nephrology (ASN) Task Force recommended calculation based on the Chronic Kidney Disease Epidemiology Collaboration (CKD-EPI) equation refit without adjustment for race.       Narrative:      GFR Normal >60  Chronic Kidney Disease <60  Kidney Failure <15      Phosphorus [[id number]]  (Normal) Collected: 12/01/22 0448    Specimen: Blood Updated: 12/01/22 0534     Phosphorus 3.3 mg/dL     Magnesium [[id number]]  (Normal) Collected: 12/01/22 0448    Specimen: Blood Updated: 12/01/22 0534     Magnesium 1.9 mg/dL     Lipid Panel [[id number]]  (Abnormal) Collected: 12/01/22 0448    Specimen: Blood Updated: 12/01/22 0534     Total Cholesterol 151 mg/dL      Triglycerides 197 mg/dL      HDL Cholesterol 37 mg/dL      LDL Cholesterol  81 mg/dL      VLDL Cholesterol 33 mg/dL      LDL/HDL Ratio 2.02    Narrative:      Cholesterol Reference Ranges  (U.S. Department of Health and Human Services ATP III Classifications)    Desirable          <200 mg/dL  Borderline High    200-239 mg/dL  High Risk          >240 mg/dL      Triglyceride Reference Ranges  (U.S. Department of Health and Human Services ATP III Classifications)    Normal           <150 mg/dL  Borderline High  150-199 mg/dL  High             200-499 mg/dL  Very High  "       >500 mg/dL    HDL Reference Ranges  (U.S. Department of Health and Human Services ATP III Classifications)    Low     <40 mg/dl (major risk factor for CHD)  High    >60 mg/dl ('negative' risk factor for CHD)        LDL Reference Ranges  (U.S. Department of Health and Human Services ATP III Classifications)    Optimal          <100 mg/dL  Near Optimal     100-129 mg/dL  Borderline High  130-159 mg/dL  High             160-189 mg/dL  Very High        >189 mg/dL    C-reactive Protein [[id number]]  (Abnormal) Collected: 12/01/22 0448    Specimen: Blood Updated: 12/01/22 0534     C-Reactive Protein 3.43 mg/dL     TSH [[id number]]  (Normal) Collected: 12/01/22 0448    Specimen: Blood Updated: 12/01/22 0530     TSH 0.394 uIU/mL     Procalcitonin [[id number]]  (Normal) Collected: 12/01/22 0448    Specimen: Blood Updated: 12/01/22 0530     Procalcitonin 0.21 ng/mL     Narrative:      As a Marker for Sepsis (Non-Neonates):    1. <0.5 ng/mL represents a low risk of severe sepsis and/or septic shock.  2. >2 ng/mL represents a high risk of severe sepsis and/or septic shock.    As a Marker for Lower Respiratory Tract Infections that require antibiotic therapy:    PCT on Admission    Antibiotic Therapy       6-12 Hrs later    >0.5                Strongly Recommended  >0.25 - <0.5        Recommended   0.1 - 0.25          Discouraged              Remeasure/reassess PCT  <0.1                Strongly Discouraged     Remeasure/reassess PCT    As 28 day mortality risk marker: \"Change in Procalcitonin Result\" (>80% or <=80%) if Day 0 (or Day 1) and Day 4 values are available. Refer to http://www.[employer]-pct-calculator.com    Change in PCT <=80%  A decrease of PCT levels below or equal to 80% defines a positive change in PCT test result representing a higher risk for 28-day all-cause mortality of patients diagnosed with severe sepsis for septic shock.    Change in PCT >80%  A decrease of PCT levels of more than 80% defines a negative " change in PCT result representing a lower risk for 28-day all-cause mortality of patients diagnosed with severe sepsis or septic shock.       Sedimentation Rate [[id number]]  (Abnormal) Collected: 12/01/22 0448    Specimen: Blood Updated: 12/01/22 0519     Sed Rate 34 mm/hr     CBC & Differential [[id number]]  (Abnormal) Collected: 12/01/22 0448    Specimen: Blood Updated: 12/01/22 0514    Narrative:      The following orders were created for panel order CBC & Differential.  Procedure                               Abnormality         Status                     ---------                               -----------         ------                     CBC Auto Differential[[id number]]        Abnormal            Final result                 Please view results for these tests on the individual orders.    CBC Auto Differential [[id number]]  (Abnormal) Collected: 12/01/22 0448    Specimen: Blood Updated: 12/01/22 0514     WBC 13.90 10*3/mm3      RBC 4.88 10*6/mm3      Hemoglobin 14.5 g/dL      Hematocrit 43.4 %      MCV 88.9 fL      MCH 29.8 pg      MCHC 33.6 g/dL      RDW 14.3 %      RDW-SD 46.8 fl      MPV 8.8 fL      Platelets 341 10*3/mm3      Neutrophil % 74.3 %      Lymphocyte % 14.8 %      Monocyte % 10.4 %      Eosinophil % 0.0 %      Basophil % 0.5 %      Neutrophils, Absolute 10.30 10*3/mm3      Lymphocytes, Absolute 2.10 10*3/mm3      Monocytes, Absolute 1.40 10*3/mm3      Eosinophils, Absolute 0.00 10*3/mm3      Basophils, Absolute 0.10 10*3/mm3      nRBC 0.2 /100 WBC     Hemoglobin A1c [[id number]] Collected: 12/01/22 0448    Specimen: Blood Updated: 12/01/22 0458    POC Glucose Once [[id number]]  (Abnormal) Collected: 11/1955    Specimen: Blood Updated: 11/30/22 1957     Glucose 340 mg/dL      Comment: Serial Number: [id number]:  [id number]       Comprehensive Metabolic Panel [[id number]]  (Abnormal) Collected: 11/30/22 1603    Specimen: Blood Updated: 11/30/22 1631     Glucose 492 mg/dL      BUN 23 mg/dL       Creatinine 1.02 mg/dL      Sodium 138 mmol/L      Potassium 4.3 mmol/L      Chloride 98 mmol/L      CO2 25.0 mmol/L      Calcium 10.3 mg/dL      Total Protein 8.3 g/dL      Albumin 4.30 g/dL      ALT (SGPT) 43 U/L      AST (SGOT) 31 U/L      Alkaline Phosphatase 67 U/L      Total Bilirubin 1.0 mg/dL      Globulin 4.0 gm/dL      A/G Ratio 1.1 g/dL      BUN/Creatinine Ratio 22.5     Anion Gap 15.0 mmol/L      eGFR 60.4 mL/min/1.73      Comment: National Kidney Foundation and American Society of Nephrology (ASN) Task Force recommended calculation based on the Chronic Kidney Disease Epidemiology Collaboration (CKD-EPI) equation refit without adjustment for race.       Narrative:      GFR Normal >60  Chronic Kidney Disease <60  Kidney Failure <15      CBC & Differential [[id number]]  (Abnormal) Collected: 11/30/22 1604    Specimen: Blood Updated: 11/30/22 1606    Narrative:      The following orders were created for panel order CBC & Differential.  Procedure                               Abnormality         Status                     ---------                               -----------         ------                     CBC Auto Differential[[id number]]        Abnormal            Final result                 Please view results for these tests on the individual orders.    CBC Auto Differential [[id number]]  (Abnormal) Collected: 11/30/22 1604    Specimen: Blood Updated: 11/30/22 1606     WBC 9.10 10*3/mm3      RBC 5.02 10*6/mm3      Hemoglobin 14.9 g/dL      Hematocrit 45.3 %      MCV 90.1 fL      MCH 29.7 pg      MCHC 33.0 g/dL      RDW 14.0 %      RDW-SD 43.8 fl      MPV 8.4 fL      Platelets 308 10*3/mm3      Neutrophil % 79.8 %      Lymphocyte % 13.3 %      Monocyte % 6.8 %      Eosinophil % 0.0 %      Basophil % 0.1 %      Neutrophils, Absolute 7.30 10*3/mm3      Lymphocytes, Absolute 1.20 10*3/mm3      Monocytes, Absolute 0.60 10*3/mm3      Eosinophils, Absolute 0.00 10*3/mm3      Basophils, Absolute 0.00 10*3/mm3       nRBC 0.1 /100 WBC               Imaging Results (Last 72 Hours)     ** No results found for the last 72 hours. **            Medication Review:      Hospital Medications (active)       Dose Frequency Start End    acetaminophen (TYLENOL) tablet 650 mg 650 mg Every 4 Hours PRN 11/30/2022     Admin Instructions: Based on patient request - if ordered for moderate or severe pain, provider allows for administration of a medication prescribed for a lower pain scale.  Do not exceed 4 grams of acetaminophen in a 24 hr period. Max dose of 2gm for AST/ALT greater than 120 units/L    If given for fever, use fever parameter: fever greater than 100.4 °F.    If given for pain, use the following pain scale:   Mild Pain = Pain Score of 1-3, CPOT 1-2  Moderate Pain = Pain Score of 4-6, CPOT 3-4  Severe Pain = Pain Score of 7-10, CPOT 5-8    Route: Oral    apixaban (ELIQUIS) tablet 5 mg 5 mg Every 12 Hours Scheduled 12/1/2022     Admin Instructions: Tablet may be crushed and suspended in 60 mL of water or D5W and immediately delivered via NG tube.    Route: Oral    cefTRIAXone (ROCEPHIN) 2 g in sodium chloride 0.9 % 100 mL IVPB 2 g Every 24 Hours 12/1/2022 12/10/2022    Admin Instructions: LR should be paused and flushing of the line with NS is recommended prior to and after completion of ceftriaxone infusion due to incompatibility. Do not co-adminster with calcium-containing solutions.  Caution: Look alike/sound alike drug alert    Route: Intravenous    dextrose (D50W) (25 g/50 mL) IV injection 25 g 25 g Every 15 Minutes PRN 11/30/2022     Admin Instructions: Blood sugar less than 70; patient has IV access - Unresponsive, NPO or Unable To Safely Swallow    Route: Intravenous    dextrose (GLUTOSE) oral gel 15 g 15 g Every 15 Minutes PRN 11/30/2022     Admin Instructions: BS<70, Patient Alert, Is not NPO, Can safely swallow.    Route: Oral    dilTIAZem CD (CARDIZEM CD) 24 hr capsule 240 mg 240 mg Every 24 Hours Scheduled 12/1/2022      Admin Instructions: Caution: Look alike/sound alike drug alert.   Swallow capsule whole. Do not crush, chew, or open capsule. Avoid grapefruit juice. Maximum simvastatin dose 10 mg while taking dilTIAZem.    Route: Oral    glucagon (human recombinant) (GLUCAGEN DIAGNOSTIC) 1 mg in sterile water (preservative free) 1 mL injection 1 mg Every 15 Minutes PRN 11/30/2022     Admin Instructions: Blood Glucose Less Than 70 - Patient Without IV Access - Unresponsive, NPO or Unable To Safely Swallow  Reconstitute powder for injection by adding 1 mL of sterile water for injection to glucagon 1 mg vial resulting in concentration of 1 mg/mL.    Route: Intramuscular    hydrALAZINE (APRESOLINE) injection 10 mg 10 mg Every 6 Hours PRN 11/30/2022     Admin Instructions: As needed for SBP greater than 160  Caution: Look alike/sound alike drug alert    Route: Intravenous    insulin glargine (LANTUS, SEMGLEE) injection 25 Units 25 Units Nightly 11/30/2022     Admin Instructions:     Route: Subcutaneous    insulin lispro (ADMELOG) injection 3-14 Units 3-14 Units 3 Times Daily With Meals 11/30/2022     Admin Instructions: Blood Glucose 150-199 mg/dL - 3 units  Blood Glucose 200-249 mg/dL - 5 units  Blood Glucose 250-299 mg/dL - 8 units  Blood Glucose 300-349 mg/dL - 10 units  Blood Glucose 350-400 mg/dL - 12 units  Blood Glucose Greater Than 400 mg/dL - 14 units & Call Provider   Caution: Look alike/sound alike drug alert    Route: Subcutaneous    insulin regular (humuLIN R,novoLIN R) injection 12 Units 12 Units Once 12/1/2022     Admin Instructions:  Caution: Look alike/sound alike drug alert    Route: Intravenous    Magnesium Sulfate 2 gram / 50mL Infusion (GIVE X 3 BAGS TO EQUAL 6GM TOTAL DOSE) - Mg 1.1 - 1.5 mg/dl 2 g As Needed 11/30/2022     Admin Instructions: Mg 1.1 -1.5 mg/dL. Infuse 2 grams over 2 hours for 3 doses (for a total Mg dose of 6 grams).  Recheck Mg level in the AM.    Route: Intravenous    Linked Group 1: See  Hyperspace for full Linked Orders Report.        Magnesium Sulfate 2 gram Bolus, followed by 8 gram infusion (total Mg dose 10 grams)- Mg less than or equal to 1mg/dL 2 g As Needed 11/30/2022     Admin Instructions: Mg less than or equal to 1mg/dL. Give 2 gm over 30 minutes as bolus, then infuse 2 gm over 2 hours for 4 doses (8 grams) for total dose of 10 grams.  Recheck Mg levels in the AM.    Route: Intravenous    Linked Group 1: See Hyperspace for full Linked Orders Report.        Magnesium Sulfate 4 gram infusion- Mg 1.6-1.9 mg/dL 4 g As Needed 11/30/2022     Admin Instructions: Mg 1.6-1.9 mg/dL. Recheck Mg level in the AM.    Route: Intravenous    Linked Group 1: See Hyperspace for full Linked Orders Report.        ondansetron (ZOFRAN) injection 4 mg 4 mg Every 6 Hours PRN 11/30/2022     Admin Instructions: If BOTH ondansetron (ZOFRAN) and promethazine (PHENERGAN) are ordered use ondansetron first and THEN promethazine IF ondansetron is ineffective.    Route: Intravenous    potassium & sodium phosphates (PHOS-NAK) 280-160-250 MG packet - for Phosphorus 1.25 - 2.5 mg/dL 2 packet Every 6 Hours PRN 11/30/2022     Admin Instructions: Phosphorus replacement:       If Phos 1.25 - 2.5  mg/dL: Give Neutraphos 2 packets by mouth every 6 hours x 1 dose. Recheck Phosphorus level 6 hours after replacement complete.    Route: Oral    Linked Group 2: See Hyperspace for full Linked Orders Report.        potassium & sodium phosphates (PHOS-NAK) 280-160-250 MG packet - for Phosphorus less than 1.25 mg/dL 2 packet Every 6 Hours PRN 11/30/2022     Admin Instructions: Phosphorus replacement:     If Phos    < 1.25 mg/dL   : Give Neutraphos 2 packets by mouth every 6 hours x 2 doses. Recheck Phosphorus level 6 hours after replacement complete.    Route: Oral    Linked Group 2: See Hyperspace for full Linked Orders Report.        potassium chloride (K-DUR,KLOR-CON) CR tablet 40 mEq 40 mEq As Needed 11/30/2022     Admin Instructions:  Potassium 3.1 or Less Give KCl 40 mEq q4h x3 Doses   Potassium 3.2 - 3.6 Give KCl 40 mEq q4h x2 Doses     Check Potassium 4 Hours After Last Dose Given   Check Magnesium if Potassium Level Remains Low After Replacement   DO NOT GIVE if CrCl is Less Than 30 mL/minute or Urine Output Less Than 30 mL/hr    Route: Oral    potassium chloride (KLOR-CON) packet 40 mEq 40 mEq As Needed 11/30/2022     Admin Instructions: Potassium 3.1 or Less Give KCl 40 mEq q4h x3 Doses   Potassium 3.2 - 3.6 Give KCl 40 mEq q4h x2 Doses     Check Potassium 4 Hours After Last Dose Given   Check Magnesium if Potassium Level Remains Low After Replacement   DO NOT GIVE if CrCl is Less Than 30 mL/minute or Urine Output Less Than 30 mL/hr    Route: Oral    sodium chloride 0.9 % flush 10 mL 10 mL As Needed 11/30/2022     Route: Intravenous    Linked Group 3: See [facility] for full Linked Orders Report.        sodium chloride 0.9 % flush 10 mL 10 mL Every 12 Hours Scheduled 11/30/2022     Route: Intravenous    sodium chloride 0.9 % flush 10 mL 10 mL As Needed 11/30/2022     Route: Intravenous    sodium chloride 0.9 % infusion 40 mL 40 mL As Needed 11/30/2022     Admin Instructions: Following administration of an IV intermittent medication, flush line with 40mL NS at 100mL/hr.    Route: Intravenous          Assessment & Plan         Bacteremia due to methicillin susceptible Staphylococcus aureus (MSSA)    COVID-19 +    COPD,HTN    Plan :    IV cefazolin  Repeat BC  Echo  Will follow  Thank you      Olya Mendez MD  12/01/22  08:25 EST          Electronically signed by Olya Mendez MD at 12/01/22 5651

## 2022-12-01 NOTE — PLAN OF CARE
Goal Outcome Evaluation:  Plan of Care Reviewed With: patient           Outcome Evaluation: Pt is a 66 YO F admitted with generalized weakness COVID (+). Pt states she lives at home with spouse who works, typically is independent with all ADLs, ambulation with rollator and no recent falls. Pt this date with unstable HR fluctuating from  throughout evaluation. Pt able to transfer self safely to from [facility], but requires CGA for ambulating short distance in room. Pt with mild SOA, and generalized c/o fatigue. Pt appears below functional baseline and recommendation is return home wiht family assist and HHPT. PT to f/u while admitted to encourage mobiltiy.

## 2022-12-01 NOTE — CONSULTS
LOS: 1 day   Patient Care Team:  Lou Curran MD as PCP - General  MelvinJesse tate MD as Consulting Physician (Cardiology)  Bautista Archibald MD as Consulting Physician (Endocrinology)  Jane Montero MD as Consulting Physician (Obstetrics and Gynecology)        Subjective       Attending MD : Michael Lozano MD    Patient Complaints: weak         History of Present Illness  : 67 y.o. female who presented to [facility] on 11/30/2022 complaining of general ill feeling.  She states she was seen here yesterday and tested positive for COVID.  She states she was discharged home but then called today and told to return due to positive blood cultures. She states she has felt feverish at home, had cough and congestion and general unwell feeling.  She denies any chest pain shortness of breath or abdominal pain.  Denies vomiting or diarrhea.     In ER today, patient given 1 dose of cefazolin, glucose 492, creatinine 1.02, WBC 9.10.  Last night in the ER chest x-ray was negative, UA showed positive ketones, WBC 13-20, negative for bacteria, and moderate leukocytes.  Patient tested positive for COVID-19 on 11/29/2022.  Blood cultures grew MSSA.     Patient Denies:  NV    PMH :   Bacteremia due to methicillin susceptible Staphylococcus aureus (MSSA)      Review of Systems:    weak    Objective     Vital Signs  Temp:  [97.6 °F (36.4 °C)-98.3 °F (36.8 °C)] 97.6 °F (36.4 °C)  Heart Rate:  [] 110  Resp:  [14-22] 19  BP: (135-161)/(50-73) 147/69    Physical Exam:     General Appearance:    Alert, cooperative, in no acute distress   Head:    Normocephalic, without obvious abnormality, atraumatic   Eyes:            Lids and lashes normal, conjunctivae and sclerae normal, no   icterus, no pallor, corneas clear, PERRLA   Ears:    Ears appear intact with no abnormalities noted   Throat:   No oral lesions, no thrush, oral mucosa moist   Neck:   No adenopathy, supple, trachea midline, no thyromegaly, no     carotid  bruit, no JVD   Back:     No kyphosis present, no scoliosis present, no skin lesions,       erythema or scars, no tenderness to percussion or                   palpation,   range of motion normal   Lungs:     Clear to auscultation,respirations regular, even and                   unlabored    Heart:    Regular rhythm and normal rate, normal S1 and S2, no            murmur, no gallop, no rub, no click   Breast Exam:    Deferred   Abdomen:     Normal bowel sounds, no masses, no organomegaly, soft        non-tender, non-distended, no guarding, no rebound                 tenderness   Genitalia:    Deferred   Extremities:   Moves all extremities well, no edema, no cyanosis, no              redness   Pulses:   Pulses palpable and equal bilaterally   Skin:   No bleeding, bruising or rash   Lymph nodes:   No palpable adenopathy   Neurologic:   Cranial nerves 2 - 12 grossly intact, sensation intact, DTR        present and equal bilaterally          Results Review:    Lab Results (last 72 hours)     Procedure Component Value Units Date/Time    POC Glucose Once [[id number]]  (Abnormal) Collected: 12/01/22 0721    Specimen: Blood Updated: 12/01/22 0722     Glucose 349 mg/dL      Comment: Serial Number: [id number]:  [id number]       POC Glucose Once [[id number]]  (Abnormal) Collected: 12/01/22 0544    Specimen: Blood Updated: 12/01/22 0546     Glucose 381 mg/dL      Comment: Serial Number: [id number]:  [id number]       Comprehensive Metabolic Panel [[id number]]  (Abnormal) Collected: 12/01/22 0448    Specimen: Blood Updated: 12/01/22 0538     Glucose 447 mg/dL      BUN 27 mg/dL      Creatinine 1.03 mg/dL      Sodium 135 mmol/L      Potassium 4.4 mmol/L      Chloride 96 mmol/L      CO2 27.0 mmol/L      Calcium 10.3 mg/dL      Total Protein 8.0 g/dL      Albumin 4.00 g/dL      ALT (SGPT) 35 U/L      AST (SGOT) 23 U/L      Alkaline Phosphatase 69 U/L      Total Bilirubin 0.6 mg/dL      Globulin 4.0 gm/dL      A/G Ratio 1.0  g/dL      BUN/Creatinine Ratio 26.2     Anion Gap 12.0 mmol/L      eGFR 59.7 mL/min/1.73      Comment: National Kidney Foundation and American Society of Nephrology (ASN) Task Force recommended calculation based on the Chronic Kidney Disease Epidemiology Collaboration (CKD-EPI) equation refit without adjustment for race.       Narrative:      GFR Normal >60  Chronic Kidney Disease <60  Kidney Failure <15      Phosphorus [[id number]]  (Normal) Collected: 12/01/22 0448    Specimen: Blood Updated: 12/01/22 0534     Phosphorus 3.3 mg/dL     Magnesium [[id number]]  (Normal) Collected: 12/01/22 0448    Specimen: Blood Updated: 12/01/22 0534     Magnesium 1.9 mg/dL     Lipid Panel [[id number]]  (Abnormal) Collected: 12/01/22 0448    Specimen: Blood Updated: 12/01/22 0534     Total Cholesterol 151 mg/dL      Triglycerides 197 mg/dL      HDL Cholesterol 37 mg/dL      LDL Cholesterol  81 mg/dL      VLDL Cholesterol 33 mg/dL      LDL/HDL Ratio 2.02    Narrative:      Cholesterol Reference Ranges  (U.S. Department of Health and Human Services ATP III Classifications)    Desirable          <200 mg/dL  Borderline High    200-239 mg/dL  High Risk          >240 mg/dL      Triglyceride Reference Ranges  (U.S. Department of Health and Human Services ATP III Classifications)    Normal           <150 mg/dL  Borderline High  150-199 mg/dL  High             200-499 mg/dL  Very High        >500 mg/dL    HDL Reference Ranges  (U.S. Department of Health and Human Services ATP III Classifications)    Low     <40 mg/dl (major risk factor for CHD)  High    >60 mg/dl ('negative' risk factor for CHD)        LDL Reference Ranges  (U.S. Department of Health and Human Services ATP III Classifications)    Optimal          <100 mg/dL  Near Optimal     100-129 mg/dL  Borderline High  130-159 mg/dL  High             160-189 mg/dL  Very High        >189 mg/dL    C-reactive Protein [[id number]]  (Abnormal) Collected: 12/01/22 0448    Specimen: Blood Updated:  "12/01/22 0534     C-Reactive Protein 3.43 mg/dL     TSH [[id number]]  (Normal) Collected: 12/01/22 0448    Specimen: Blood Updated: 12/01/22 0530     TSH 0.394 uIU/mL     Procalcitonin [[id number]]  (Normal) Collected: 12/01/22 0448    Specimen: Blood Updated: 12/01/22 0530     Procalcitonin 0.21 ng/mL     Narrative:      As a Marker for Sepsis (Non-Neonates):    1. <0.5 ng/mL represents a low risk of severe sepsis and/or septic shock.  2. >2 ng/mL represents a high risk of severe sepsis and/or septic shock.    As a Marker for Lower Respiratory Tract Infections that require antibiotic therapy:    PCT on Admission    Antibiotic Therapy       6-12 Hrs later    >0.5                Strongly Recommended  >0.25 - <0.5        Recommended   0.1 - 0.25          Discouraged              Remeasure/reassess PCT  <0.1                Strongly Discouraged     Remeasure/reassess PCT    As 28 day mortality risk marker: \"Change in Procalcitonin Result\" (>80% or <=80%) if Day 0 (or Day 1) and Day 4 values are available. Refer to [email or web address] [email or web address]    Change in PCT <=80%  A decrease of PCT levels below or equal to 80% defines a positive change in PCT test result representing a higher risk for 28-day all-cause mortality of patients diagnosed with severe sepsis for septic shock.    Change in PCT >80%  A decrease of PCT levels of more than 80% defines a negative change in PCT result representing a lower risk for 28-day all-cause mortality of patients diagnosed with severe sepsis or septic shock.       Sedimentation Rate [[id number]]  (Abnormal) Collected: 12/01/22 0448    Specimen: Blood Updated: 12/01/22 0519     Sed Rate 34 mm/hr     CBC & Differential [[id number]]  (Abnormal) Collected: 12/01/22 0448    Specimen: Blood Updated: 12/01/22 0514    Narrative:      The following orders were created for panel order CBC & Differential.  Procedure                               Abnormality         Status                   "   ---------                               -----------         ------                     CBC Auto Differential[[id number]]        Abnormal            Final result                 Please view results for these tests on the individual orders.    CBC Auto Differential [[id number]]  (Abnormal) Collected: 12/01/22 0448    Specimen: Blood Updated: 12/01/22 0514     WBC 13.90 10*3/mm3      RBC 4.88 10*6/mm3      Hemoglobin 14.5 g/dL      Hematocrit 43.4 %      MCV 88.9 fL      MCH 29.8 pg      MCHC 33.6 g/dL      RDW 14.3 %      RDW-SD 46.8 fl      MPV 8.8 fL      Platelets 341 10*3/mm3      Neutrophil % 74.3 %      Lymphocyte % 14.8 %      Monocyte % 10.4 %      Eosinophil % 0.0 %      Basophil % 0.5 %      Neutrophils, Absolute 10.30 10*3/mm3      Lymphocytes, Absolute 2.10 10*3/mm3      Monocytes, Absolute 1.40 10*3/mm3      Eosinophils, Absolute 0.00 10*3/mm3      Basophils, Absolute 0.10 10*3/mm3      nRBC 0.2 /100 WBC     Hemoglobin A1c [[id number]] Collected: 12/01/22 0448    Specimen: Blood Updated: 12/01/22 0458    POC Glucose Once [[id number]]  (Abnormal) Collected: 11/1955    Specimen: Blood Updated: 11/30/22 1957     Glucose 340 mg/dL      Comment: Serial Number: [id number]:  [id number]       Comprehensive Metabolic Panel [[id number]]  (Abnormal) Collected: 11/30/22 1603    Specimen: Blood Updated: 11/30/22 1631     Glucose 492 mg/dL      BUN 23 mg/dL      Creatinine 1.02 mg/dL      Sodium 138 mmol/L      Potassium 4.3 mmol/L      Chloride 98 mmol/L      CO2 25.0 mmol/L      Calcium 10.3 mg/dL      Total Protein 8.3 g/dL      Albumin 4.30 g/dL      ALT (SGPT) 43 U/L      AST (SGOT) 31 U/L      Alkaline Phosphatase 67 U/L      Total Bilirubin 1.0 mg/dL      Globulin 4.0 gm/dL      A/G Ratio 1.1 g/dL      BUN/Creatinine Ratio 22.5     Anion Gap 15.0 mmol/L      eGFR 60.4 mL/min/1.73      Comment: National Kidney Foundation and American Society of Nephrology (ASN) Task Force recommended calculation based on  the Chronic Kidney Disease Epidemiology Collaboration (CKD-EPI) equation refit without adjustment for race.       Narrative:      GFR Normal >60  Chronic Kidney Disease <60  Kidney Failure <15      CBC & Differential [[id number]]  (Abnormal) Collected: 11/30/22 1604    Specimen: Blood Updated: 11/30/22 1606    Narrative:      The following orders were created for panel order CBC & Differential.  Procedure                               Abnormality         Status                     ---------                               -----------         ------                     CBC Auto Differential[[id number]]        Abnormal            Final result                 Please view results for these tests on the individual orders.    CBC Auto Differential [[id number]]  (Abnormal) Collected: 11/30/22 1604    Specimen: Blood Updated: 11/30/22 1606     WBC 9.10 10*3/mm3      RBC 5.02 10*6/mm3      Hemoglobin 14.9 g/dL      Hematocrit 45.3 %      MCV 90.1 fL      MCH 29.7 pg      MCHC 33.0 g/dL      RDW 14.0 %      RDW-SD 43.8 fl      MPV 8.4 fL      Platelets 308 10*3/mm3      Neutrophil % 79.8 %      Lymphocyte % 13.3 %      Monocyte % 6.8 %      Eosinophil % 0.0 %      Basophil % 0.1 %      Neutrophils, Absolute 7.30 10*3/mm3      Lymphocytes, Absolute 1.20 10*3/mm3      Monocytes, Absolute 0.60 10*3/mm3      Eosinophils, Absolute 0.00 10*3/mm3      Basophils, Absolute 0.00 10*3/mm3      nRBC 0.1 /100 WBC               Imaging Results (Last 72 Hours)     ** No results found for the last 72 hours. **            Medication Review:      Hospital Medications (active)       Dose Frequency Start End    acetaminophen (TYLENOL) tablet 650 mg 650 mg Every 4 Hours PRN 11/30/2022     Admin Instructions: Based on patient request - if ordered for moderate or severe pain, provider allows for administration of a medication prescribed for a lower pain scale.  Do not exceed 4 grams of acetaminophen in a 24 hr period. Max dose of 2gm for AST/ALT greater  than 120 units/L    If given for fever, use fever parameter: fever greater than 100.4 °F.    If given for pain, use the following pain scale:   Mild Pain = Pain Score of 1-3, CPOT 1-2  Moderate Pain = Pain Score of 4-6, CPOT 3-4  Severe Pain = Pain Score of 7-10, CPOT 5-8    Route: Oral    apixaban (ELIQUIS) tablet 5 mg 5 mg Every 12 Hours Scheduled 12/1/2022     Admin Instructions: Tablet may be crushed and suspended in 60 mL of water or D5W and immediately delivered via NG tube.    Route: Oral    cefTRIAXone (ROCEPHIN) 2 g in sodium chloride 0.9 % 100 mL IVPB 2 g Every 24 Hours 12/1/2022 12/10/2022    Admin Instructions: LR should be paused and flushing of the line with NS is recommended prior to and after completion of ceftriaxone infusion due to incompatibility. Do not co-adminster with calcium-containing solutions.  Caution: Look alike/sound alike drug alert    Route: Intravenous    dextrose (D50W) (25 g/50 mL) IV injection 25 g 25 g Every 15 Minutes PRN 11/30/2022     Admin Instructions: Blood sugar less than 70; patient has IV access - Unresponsive, NPO or Unable To Safely Swallow    Route: Intravenous    dextrose (GLUTOSE) oral gel 15 g 15 g Every 15 Minutes PRN 11/30/2022     Admin Instructions: BS<70, Patient Alert, Is not NPO, Can safely swallow.    Route: Oral    dilTIAZem CD (CARDIZEM CD) 24 hr capsule 240 mg 240 mg Every 24 Hours Scheduled 12/1/2022     Admin Instructions: Caution: Look alike/sound alike drug alert.   Swallow capsule whole. Do not crush, chew, or open capsule. Avoid grapefruit juice. Maximum simvastatin dose 10 mg while taking dilTIAZem.    Route: Oral    glucagon (human recombinant) (GLUCAGEN DIAGNOSTIC) 1 mg in sterile water (preservative free) 1 mL injection 1 mg Every 15 Minutes PRN 11/30/2022     Admin Instructions: Blood Glucose Less Than 70 - Patient Without IV Access - Unresponsive, NPO or Unable To Safely Swallow  Reconstitute powder for injection by adding 1 mL of sterile  water for injection to glucagon 1 mg vial resulting in concentration of 1 mg/mL.    Route: Intramuscular    hydrALAZINE (APRESOLINE) injection 10 mg 10 mg Every 6 Hours PRN 11/30/2022     Admin Instructions: As needed for SBP greater than 160  Caution: Look alike/sound alike drug alert    Route: Intravenous    insulin glargine (LANTUS, SEMGLEE) injection 25 Units 25 Units Nightly 11/30/2022     Admin Instructions:     Route: Subcutaneous    insulin lispro (ADMELOG) injection 3-14 Units 3-14 Units 3 Times Daily With Meals 11/30/2022     Admin Instructions: Blood Glucose 150-199 mg/dL - 3 units  Blood Glucose 200-249 mg/dL - 5 units  Blood Glucose 250-299 mg/dL - 8 units  Blood Glucose 300-349 mg/dL - 10 units  Blood Glucose 350-400 mg/dL - 12 units  Blood Glucose Greater Than 400 mg/dL - 14 units & Call Provider   Caution: Look alike/sound alike drug alert    Route: Subcutaneous    insulin regular (humuLIN R,novoLIN R) injection 12 Units 12 Units Once 12/1/2022     Admin Instructions:  Caution: Look alike/sound alike drug alert    Route: Intravenous    Magnesium Sulfate 2 gram / 50mL Infusion (GIVE X 3 BAGS TO EQUAL 6GM TOTAL DOSE) - Mg 1.1 - 1.5 mg/dl 2 g As Needed 11/30/2022     Admin Instructions: Mg 1.1 -1.5 mg/dL. Infuse 2 grams over 2 hours for 3 doses (for a total Mg dose of 6 grams).  Recheck Mg level in the AM.    Route: Intravenous    Linked Group 1: See Hyperspace for full Linked Orders Report.        Magnesium Sulfate 2 gram Bolus, followed by 8 gram infusion (total Mg dose 10 grams)- Mg less than or equal to 1mg/dL 2 g As Needed 11/30/2022     Admin Instructions: Mg less than or equal to 1mg/dL. Give 2 gm over 30 minutes as bolus, then infuse 2 gm over 2 hours for 4 doses (8 grams) for total dose of 10 grams.  Recheck Mg levels in the AM.    Route: Intravenous    Linked Group 1: See Hyperspace for full Linked Orders Report.        Magnesium Sulfate 4 gram infusion- Mg 1.6-1.9 mg/dL 4 g As Needed  11/30/2022     Admin Instructions: Mg 1.6-1.9 mg/dL. Recheck Mg level in the AM.    Route: Intravenous    Linked Group 1: See Hyperspace for full Linked Orders Report.        ondansetron (ZOFRAN) injection 4 mg 4 mg Every 6 Hours PRN 11/30/2022     Admin Instructions: If BOTH ondansetron (ZOFRAN) and promethazine (PHENERGAN) are ordered use ondansetron first and THEN promethazine IF ondansetron is ineffective.    Route: Intravenous    potassium & sodium phosphates (PHOS-NAK) 280-160-250 MG packet - for Phosphorus 1.25 - 2.5 mg/dL 2 packet Every 6 Hours PRN 11/30/2022     Admin Instructions: Phosphorus replacement:       If Phos 1.25 - 2.5  mg/dL: Give Neutraphos 2 packets by mouth every 6 hours x 1 dose. Recheck Phosphorus level 6 hours after replacement complete.    Route: Oral    Linked Group 2: See Hyperspace for full Linked Orders Report.        potassium & sodium phosphates (PHOS-NAK) 280-160-250 MG packet - for Phosphorus less than 1.25 mg/dL 2 packet Every 6 Hours PRN 11/30/2022     Admin Instructions: Phosphorus replacement:     If Phos    < 1.25 mg/dL   : Give Neutraphos 2 packets by mouth every 6 hours x 2 doses. Recheck Phosphorus level 6 hours after replacement complete.    Route: Oral    Linked Group 2: See Hyperspace for full Linked Orders Report.        potassium chloride (K-DUR,KLOR-CON) CR tablet 40 mEq 40 mEq As Needed 11/30/2022     Admin Instructions: Potassium 3.1 or Less Give KCl 40 mEq q4h x3 Doses   Potassium 3.2 - 3.6 Give KCl 40 mEq q4h x2 Doses     Check Potassium 4 Hours After Last Dose Given   Check Magnesium if Potassium Level Remains Low After Replacement   DO NOT GIVE if CrCl is Less Than 30 mL/minute or Urine Output Less Than 30 mL/hr    Route: Oral    potassium chloride (KLOR-CON) packet 40 mEq 40 mEq As Needed 11/30/2022     Admin Instructions: Potassium 3.1 or Less Give KCl 40 mEq q4h x3 Doses   Potassium 3.2 - 3.6 Give KCl 40 mEq q4h x2 Doses     Check Potassium 4 Hours After  Last Dose Given   Check Magnesium if Potassium Level Remains Low After Replacement   DO NOT GIVE if CrCl is Less Than 30 mL/minute or Urine Output Less Than 30 mL/hr    Route: Oral    sodium chloride 0.9 % flush 10 mL 10 mL As Needed 11/30/2022     Route: Intravenous    Linked Group 3: See [facility] for full Linked Orders Report.        sodium chloride 0.9 % flush 10 mL 10 mL Every 12 Hours Scheduled 11/30/2022     Route: Intravenous    sodium chloride 0.9 % flush 10 mL 10 mL As Needed 11/30/2022     Route: Intravenous    sodium chloride 0.9 % infusion 40 mL 40 mL As Needed 11/30/2022     Admin Instructions: Following administration of an IV intermittent medication, flush line with 40mL NS at 100mL/hr.    Route: Intravenous          Assessment & Plan         Bacteremia due to methicillin susceptible Staphylococcus aureus (MSSA)    COVID-19 +    COPD,HTN    Plan :    IV cefazolin  Repeat BC  Echo  Will follow  Thank you      Olya Mendez MD  12/01/22  08:25 EST

## 2022-12-02 LAB
ALBUMIN SERPL-MCNC: 3.4 G/DL (ref 3.5–5.2)
ALBUMIN/GLOB SERPL: 0.9 G/DL
ALP SERPL-CCNC: 67 U/L (ref 39–117)
ALT SERPL W P-5'-P-CCNC: 32 U/L (ref 1–33)
ANION GAP SERPL CALCULATED.3IONS-SCNC: 11 MMOL/L (ref 5–15)
AST SERPL-CCNC: 31 U/L (ref 1–32)
BACTERIA SPEC AEROBE CULT: ABNORMAL
BACTERIA SPEC AEROBE CULT: ABNORMAL
BASOPHILS # BLD AUTO: 0.1 10*3/MM3 (ref 0–0.2)
BASOPHILS NFR BLD AUTO: 0.5 % (ref 0–1.5)
BH CV ECHO MEAS - EDV(MOD-SP4): 71.6 ML
BH CV ECHO MEAS - EF(MOD-BP): 57 %
BH CV ECHO MEAS - EF(MOD-SP4): 57.6 %
BH CV ECHO MEAS - ESV(MOD-SP4): 30.3 ML
BH CV ECHO MEAS - LV DIASTOLIC VOL/BSA (35-75): 31.7 CM2
BH CV ECHO MEAS - LV SYSTOLIC VOL/BSA (12-30): 13.4 CM2
BH CV ECHO MEAS - SI(MOD-SP4): 18.3 ML/M2
BH CV ECHO MEAS - SV(MOD-SP4): 41.3 ML
BILIRUB SERPL-MCNC: 0.4 MG/DL (ref 0–1.2)
BUN SERPL-MCNC: 23 MG/DL (ref 8–23)
BUN/CREAT SERPL: 22.3 (ref 7–25)
CALCIUM SPEC-SCNC: 9.3 MG/DL (ref 8.6–10.5)
CHLORIDE SERPL-SCNC: 100 MMOL/L (ref 98–107)
CO2 SERPL-SCNC: 28 MMOL/L (ref 22–29)
CREAT SERPL-MCNC: 1.03 MG/DL (ref 0.57–1)
DEPRECATED RDW RBC AUTO: 46.4 FL (ref 37–54)
EGFRCR SERPLBLD CKD-EPI 2021: 59.7 ML/MIN/1.73
EOSINOPHIL # BLD AUTO: 0.1 10*3/MM3 (ref 0–0.4)
EOSINOPHIL NFR BLD AUTO: 0.8 % (ref 0.3–6.2)
ERYTHROCYTE [DISTWIDTH] IN BLOOD BY AUTOMATED COUNT: 14.3 % (ref 12.3–15.4)
GLOBULIN UR ELPH-MCNC: 3.7 GM/DL
GLUCOSE BLDC GLUCOMTR-MCNC: 265 MG/DL (ref 70–105)
GLUCOSE BLDC GLUCOMTR-MCNC: 281 MG/DL (ref 70–105)
GLUCOSE BLDC GLUCOMTR-MCNC: 319 MG/DL (ref 70–105)
GLUCOSE SERPL-MCNC: 324 MG/DL (ref 65–99)
GRAM STN SPEC: ABNORMAL
HCT VFR BLD AUTO: 40.1 % (ref 34–46.6)
HGB BLD-MCNC: 13.4 G/DL (ref 12–15.9)
ISOLATED FROM: ABNORMAL
ISOLATED FROM: ABNORMAL
LYMPHOCYTES # BLD AUTO: 3.4 10*3/MM3 (ref 0.7–3.1)
LYMPHOCYTES NFR BLD AUTO: 30.9 % (ref 19.6–45.3)
MAGNESIUM SERPL-MCNC: 1.6 MG/DL (ref 1.6–2.4)
MAXIMAL PREDICTED HEART RATE: 153 BPM
MCH RBC QN AUTO: 29.7 PG (ref 26.6–33)
MCHC RBC AUTO-ENTMCNC: 33.4 G/DL (ref 31.5–35.7)
MCV RBC AUTO: 88.8 FL (ref 79–97)
MONOCYTES # BLD AUTO: 1.2 10*3/MM3 (ref 0.1–0.9)
MONOCYTES NFR BLD AUTO: 10.8 % (ref 5–12)
NEUTROPHILS NFR BLD AUTO: 57 % (ref 42.7–76)
NEUTROPHILS NFR BLD AUTO: 6.2 10*3/MM3 (ref 1.7–7)
NRBC BLD AUTO-RTO: 0.2 /100 WBC (ref 0–0.2)
PHOSPHATE SERPL-MCNC: 3.2 MG/DL (ref 2.5–4.5)
PLATELET # BLD AUTO: 328 10*3/MM3 (ref 140–450)
PMV BLD AUTO: 9.4 FL (ref 6–12)
POTASSIUM SERPL-SCNC: 4.2 MMOL/L (ref 3.5–5.2)
PROT SERPL-MCNC: 7.1 G/DL (ref 6–8.5)
RBC # BLD AUTO: 4.52 10*6/MM3 (ref 3.77–5.28)
SODIUM SERPL-SCNC: 139 MMOL/L (ref 136–145)
STRESS TARGET HR: 130 BPM
WBC NRBC COR # BLD: 10.9 10*3/MM3 (ref 3.4–10.8)

## 2022-12-02 PROCEDURE — 80053 COMPREHEN METABOLIC PANEL: CPT | Performed by: INTERNAL MEDICINE

## 2022-12-02 PROCEDURE — 99232 SBSQ HOSP IP/OBS MODERATE 35: CPT | Performed by: INTERNAL MEDICINE

## 2022-12-02 PROCEDURE — 84100 ASSAY OF PHOSPHORUS: CPT | Performed by: INTERNAL MEDICINE

## 2022-12-02 PROCEDURE — 83735 ASSAY OF MAGNESIUM: CPT | Performed by: INTERNAL MEDICINE

## 2022-12-02 PROCEDURE — 63710000001 INSULIN LISPRO (HUMAN) PER 5 UNITS: Performed by: NURSE PRACTITIONER

## 2022-12-02 PROCEDURE — 85025 COMPLETE CBC W/AUTO DIFF WBC: CPT | Performed by: NURSE PRACTITIONER

## 2022-12-02 PROCEDURE — 63710000001 INSULIN GLARGINE PER 5 UNITS: Performed by: INTERNAL MEDICINE

## 2022-12-02 PROCEDURE — 82962 GLUCOSE BLOOD TEST: CPT

## 2022-12-02 PROCEDURE — 25010000002 CEFAZOLIN PER 500 MG: Performed by: INTERNAL MEDICINE

## 2022-12-02 RX ORDER — FLUCONAZOLE 100 MG/1
150 TABLET ORAL ONCE
Status: COMPLETED | OUTPATIENT
Start: 2022-12-02 | End: 2022-12-02

## 2022-12-02 RX ADMIN — ACETAMINOPHEN 650 MG: 325 TABLET, FILM COATED ORAL at 13:28

## 2022-12-02 RX ADMIN — DILTIAZEM HYDROCHLORIDE 240 MG: 240 CAPSULE, COATED, EXTENDED RELEASE ORAL at 08:30

## 2022-12-02 RX ADMIN — CEFAZOLIN 2 G: 2 INJECTION, POWDER, FOR SOLUTION INTRAMUSCULAR; INTRAVENOUS at 03:17

## 2022-12-02 RX ADMIN — APIXABAN 5 MG: 5 TABLET, FILM COATED ORAL at 19:49

## 2022-12-02 RX ADMIN — FLUCONAZOLE 150 MG: 100 TABLET ORAL at 22:11

## 2022-12-02 RX ADMIN — HYDROCODONE BITARTRATE AND ACETAMINOPHEN 1 TABLET: 7.5; 325 TABLET ORAL at 20:07

## 2022-12-02 RX ADMIN — METOPROLOL TARTRATE 25 MG: 25 TABLET, FILM COATED ORAL at 08:30

## 2022-12-02 RX ADMIN — HYDROCODONE BITARTRATE AND ACETAMINOPHEN 1 TABLET: 7.5; 325 TABLET ORAL at 08:31

## 2022-12-02 RX ADMIN — NYSTATIN: 100000 POWDER TOPICAL at 08:31

## 2022-12-02 RX ADMIN — INSULIN LISPRO 8 UNITS: 100 INJECTION, SOLUTION INTRAVENOUS; SUBCUTANEOUS at 08:26

## 2022-12-02 RX ADMIN — APIXABAN 5 MG: 5 TABLET, FILM COATED ORAL at 08:30

## 2022-12-02 RX ADMIN — INSULIN LISPRO 8 UNITS: 100 INJECTION, SOLUTION INTRAVENOUS; SUBCUTANEOUS at 12:17

## 2022-12-02 RX ADMIN — CEFAZOLIN 2 G: 2 INJECTION, POWDER, FOR SOLUTION INTRAMUSCULAR; INTRAVENOUS at 19:49

## 2022-12-02 RX ADMIN — Medication 10 ML: at 19:49

## 2022-12-02 RX ADMIN — ATORVASTATIN CALCIUM 20 MG: 20 TABLET, FILM COATED ORAL at 08:29

## 2022-12-02 RX ADMIN — Medication 10 ML: at 08:29

## 2022-12-02 RX ADMIN — CEFAZOLIN 2 G: 2 INJECTION, POWDER, FOR SOLUTION INTRAMUSCULAR; INTRAVENOUS at 12:20

## 2022-12-02 RX ADMIN — INSULIN LISPRO 10 UNITS: 100 INJECTION, SOLUTION INTRAVENOUS; SUBCUTANEOUS at 17:19

## 2022-12-02 RX ADMIN — INSULIN GLARGINE 25 UNITS: 100 INJECTION, SOLUTION SUBCUTANEOUS at 19:49

## 2022-12-02 RX ADMIN — METOPROLOL TARTRATE 25 MG: 25 TABLET, FILM COATED ORAL at 19:49

## 2022-12-02 NOTE — PLAN OF CARE
Goal Outcome Evaluation:  Plan of Care Reviewed With: patient           Outcome Evaluation: Continue IV abx. Pt rested throughout the night. No new concerns voiced at this time. DC plan IV abx, PT referral

## 2022-12-02 NOTE — CASE MANAGEMENT/SOCIAL WORK
Continued Stay Note  MARIEL Jonesyd     Patient Name: Caterina Mason  MRN: [id number]  Today's Date: 12/2/2022    Admit Date: 11/30/2022    Plan: DC Plan: COVID + From home with spouse, IV antibiotics.  PT referral pending.   Discharge Plan     Row Name 12/02/22 1027       Plan    Plan Comments PT has recommended home health. VM left for spouse and CM spoke to daughter Estella Richardson. She will contact spouse and call back. Discussed IV antibiotics.            Phone communication or documentation only - no physical contact with patient or family.           Expected Discharge Date and Time     Expected Discharge Date Expected Discharge Time    Dec 3, 2022             Denisa Sun RN

## 2022-12-02 NOTE — PLAN OF CARE
Goal Outcome Evaluation:              Outcome Evaluation: Patient alert, oriented x 4 today, HR , A-fib noted on monitor. Bilateral breath sounds diminished throughout all lobes. O2 sats 95% on 2 LPM via NC, home CPAP used when sleeping. Up to bedside commode independently. Shower done and hair washed today by daughter. Plan to place midline for home Abx therapy and possibly DC home with home health tomorrow or Sunday. Denies distress at this time.

## 2022-12-02 NOTE — PROGRESS NOTES
Spoke to patient daughter and agreeable to C   No other agencies in home    Demographics verified      [facility] to provide IV abx  And teach   Agnes the niece is to learn how to administer     Please call the Daughter Estella wolfe to arrange visits and with any questions

## 2022-12-02 NOTE — PROGRESS NOTES
LOS: 2 days   Admiting Physician- Michael Lozano MD    Reason For Followup:    COVID-19 infection  Atrial fibrillation chronic  Hypertension  COPD    Subjective     Patient is sitting up in chair and feeling better.    Objective     Heart rate is better controlled in 70s    Review of Systems:   Review of Systems   Constitutional: Negative for chills and fever.   HENT: Negative for ear discharge and nosebleeds.    Eyes: Negative for discharge and redness.   Cardiovascular: Negative for chest pain, orthopnea, palpitations, paroxysmal nocturnal dyspnea and syncope.   Respiratory: Positive for cough and shortness of breath. Negative for wheezing.    Endocrine: Negative for heat intolerance.   Skin: Negative for rash.   Musculoskeletal: Negative for arthritis and myalgias.   Gastrointestinal: Negative for abdominal pain, melena, nausea and vomiting.   Genitourinary: Negative for dysuria and hematuria.   Neurological: Negative for dizziness, light-headedness, numbness and tremors.   Psychiatric/Behavioral: Negative for depression. The patient is not nervous/anxious.          Vital Signs  Vitals:    12/02/22 0356 12/02/22 0700 12/02/22 1300 12/02/22 1500   BP: 111/48 129/71 140/60 143/57   BP Location: Right arm Right arm Right arm Right arm   Patient Position: Lying Sitting Sitting Sitting   Pulse: 68 68 79 80   Resp:  16 18 17   Temp: 97.6 °F (36.4 °C) 98 °F (36.7 °C) 97.9 °F (36.6 °C) 98 °F (36.7 °C)   TempSrc: Oral Oral Oral Oral   SpO2: 95% 90%  97%   Weight:       Height:         Wt Readings from Last 1 Encounters:   11/30/22 127 kg (280 lb)       Intake/Output Summary (Last 24 hours) at 12/2/2022 1741  Last data filed at 12/2/2022 1500  Gross per 24 hour   Intake 758 ml   Output 1150 ml   Net -392 ml     Physical Exam:  Physical Exam    Physical exam is deferred      Results Review:   Lab Results (last 24 hours)     Procedure Component Value Units Date/Time    POC Glucose Once [[id number]]  (Abnormal) Collected:  12/02/22 1643    Specimen: Blood Updated: 12/02/22 1648     Glucose 319 mg/dL      Comment: Serial Number: [id number]:  [id number]       POC Glucose Once [[id number]]  (Abnormal) Collected: 12/02/22 1150    Specimen: Blood Updated: 12/02/22 1154     Glucose 281 mg/dL      Comment: Serial Number: [id number]:  [id number]       Blood Culture - Blood, Arm, Right [[id number]]  (Normal) Collected: 12/01/22 0835    Specimen: Blood from Arm, Right Updated: 12/02/22 0915     Blood Culture No growth at 24 hours    Narrative:      Less than seven (7) mL's of blood was collected.  Insufficient quantity may yield false negative results.    Blood Culture - Blood, Hand, Right [[id number]]  (Normal) Collected: 12/01/22 0835    Specimen: Blood from Hand, Right Updated: 12/02/22 0915     Blood Culture No growth at 24 hours    POC Glucose Once [[id number]]  (Abnormal) Collected: 12/02/22 0740    Specimen: Blood Updated: 12/02/22 0846     Glucose 265 mg/dL      Comment: Serial Number: [id number]:  [id number]       Comprehensive Metabolic Panel [[id number]]  (Abnormal) Collected: 12/02/22 0449    Specimen: Blood Updated: 12/02/22 0639     Glucose 324 mg/dL      BUN 23 mg/dL      Creatinine 1.03 mg/dL      Sodium 139 mmol/L      Potassium 4.2 mmol/L      Chloride 100 mmol/L      CO2 28.0 mmol/L      Calcium 9.3 mg/dL      Total Protein 7.1 g/dL      Albumin 3.40 g/dL      ALT (SGPT) 32 U/L      AST (SGOT) 31 U/L      Alkaline Phosphatase 67 U/L      Total Bilirubin 0.4 mg/dL      Globulin 3.7 gm/dL      A/G Ratio 0.9 g/dL      BUN/Creatinine Ratio 22.3     Anion Gap 11.0 mmol/L      eGFR 59.7 mL/min/1.73      Comment: National Kidney Foundation and American Society of Nephrology (ASN) Task Force recommended calculation based on the Chronic Kidney Disease Epidemiology Collaboration (CKD-EPI) equation refit without adjustment for race.       Narrative:      GFR Normal >60  Chronic Kidney Disease <60  Kidney Failure <15       Phosphorus [[id number]]  (Normal) Collected: 12/02/22 0449    Specimen: Blood Updated: 12/02/22 0639     Phosphorus 3.2 mg/dL     Magnesium [[id number]]  (Normal) Collected: 12/02/22 0449    Specimen: Blood Updated: 12/02/22 0639     Magnesium 1.6 mg/dL     CBC & Differential [[id number]]  (Abnormal) Collected: 12/02/22 0449    Specimen: Blood Updated: 12/02/22 0607    Narrative:      The following orders were created for panel order CBC & Differential.  Procedure                               Abnormality         Status                     ---------                               -----------         ------                     CBC Auto Differential[[id number]]        Abnormal            Final result                 Please view results for these tests on the individual orders.    CBC Auto Differential [[id number]]  (Abnormal) Collected: 12/02/22 0449    Specimen: Blood Updated: 12/02/22 0607     WBC 10.90 10*3/mm3      RBC 4.52 10*6/mm3      Hemoglobin 13.4 g/dL      Hematocrit 40.1 %      MCV 88.8 fL      MCH 29.7 pg      MCHC 33.4 g/dL      RDW 14.3 %      RDW-SD 46.4 fl      MPV 9.4 fL      Platelets 328 10*3/mm3      Neutrophil % 57.0 %      Lymphocyte % 30.9 %      Monocyte % 10.8 %      Eosinophil % 0.8 %      Basophil % 0.5 %      Neutrophils, Absolute 6.20 10*3/mm3      Lymphocytes, Absolute 3.40 10*3/mm3      Monocytes, Absolute 1.20 10*3/mm3      Eosinophils, Absolute 0.10 10*3/mm3      Basophils, Absolute 0.10 10*3/mm3      nRBC 0.2 /100 WBC     POC Glucose Once [[id number]]  (Abnormal) Collected: 12/01/22 1829    Specimen: Blood Updated: 12/01/22 1830     Glucose 300 mg/dL      Comment: Serial Number: [id number]:  [id number]           Imaging Results (Last 72 Hours)     ** No results found for the last 72 hours. **        ECG/EMG Results (most recent)     Procedure Component Value Units Date/Time    ECG 12 Lead Other; History of A. fib [[id number]] Collected: 12/01/22 1036     Updated: 12/01/22 1037     QT  Interval 363 ms     Narrative:      HEART RATE= 118  bpm  RR Interval= 506  ms  OR Interval=   ms  P Horizontal Axis=   deg  P Front Axis=   deg  QRSD Interval= 152  ms  QT Interval= 363  ms  QRS Axis= -86  deg  T Wave Axis= 3  deg  - ABNORMAL ECG -  Atrial fibrillation  Ventricular premature complex  RBBB and LAFB  When compared with ECG of 05-Oct-2021 9:44:48,  Significant change in rhythm  Electronically Signed By:   Date and Time of Study: 2022-12-01 10:36:38    SCANNED - TELEMETRY   [[id number]] Resulted: 11/30/22     Updated: 12/01/22 1055    Adult Transthoracic Echo Limited W/ Cont if Necessary Per Protocol [[id number]] Resulted: 12/02/22 1028     Updated: 12/02/22 1031     Target HR (85%) 130 bpm      Max. Pred. HR (100%) 153 bpm      LV Sys Vol (BSA corrected) 13.4 cm2      LV Anthony Vol (BSA corrected) 31.7 cm2      EDV(MOD-sp4) 71.6 ml      ESV(MOD-sp4) 30.3 ml      SV(MOD-sp4) 41.3 ml      SI(MOD-sp4) 18.3 ml/m2      EF(MOD-sp4) 57.6 %      EF(MOD-bp) 57.0 %     Narrative:      •  Left ventricular systolic function is normal. Left ventricular ejection   fraction appears to be 56 - 60%.  •  Left ventricular diastolic function was normal.      SCANNED - TELEMETRY   [[id number]] Resulted: 11/30/22     Updated: 12/02/22 1119    SCANNED - TELEMETRY   [[id number]] Resulted: 11/30/22     Updated: 12/02/22 1124    SCANNED - TELEMETRY   [[id number]] Resulted: 11/30/22     Updated: 12/02/22 1142    SCANNED - TELEMETRY   [[id number]] Resulted: 11/30/22     Updated: 12/02/22 1306        CBC    Results from last 7 days   Lab Units 12/02/22  0449 12/01/22  0448 11/30/22  1604 11/29/22  2049   WBC 10*3/mm3 10.90* 13.90* 9.10 9.00   HEMOGLOBIN g/dL 13.4 14.5 14.9 14.5   PLATELETS 10*3/mm3 328 341 308 289     BMP   Results from last 7 days   Lab Units 12/02/22  0449 12/01/22  0448 11/30/22  1603 11/29/22  2049   SODIUM mmol/L 139 135* 138 141   POTASSIUM mmol/L 4.2 4.4 4.3 3.8   CHLORIDE mmol/L 100 96* 98 100   CO2 mmol/L 28.0  27.0 25.0 26.0   BUN mg/dL 23 27* 23 16   CREATININE mg/dL 1.03* 1.03* 1.02* 0.89   GLUCOSE mg/dL 324* 447* 492* 242*   MAGNESIUM mg/dL 1.6 1.9  --   --    PHOSPHORUS mg/dL 3.2 3.3  --   --      CMP   Results from last 7 days   Lab Units 12/02/22  0449 12/01/22  0448 11/30/22  1603 11/29/22  2049   SODIUM mmol/L 139 135* 138 141   POTASSIUM mmol/L 4.2 4.4 4.3 3.8   CHLORIDE mmol/L 100 96* 98 100   CO2 mmol/L 28.0 27.0 25.0 26.0   BUN mg/dL 23 27* 23 16   CREATININE mg/dL 1.03* 1.03* 1.02* 0.89   GLUCOSE mg/dL 324* 447* 492* 242*   ALBUMIN g/dL 3.40* 4.00 4.30 4.10   BILIRUBIN mg/dL 0.4 0.6 1.0 1.0   ALK PHOS U/L 67 69 67 69   AST (SGOT) U/L 31 23 31 39*   ALT (SGPT) U/L 32 35* 43* 43*   LIPASE U/L  --   --   --  42     Cardiac Studies:  Echo- Results for orders placed during the hospital encounter of 11/30/22    Adult Transthoracic Echo Limited W/ Cont if Necessary Per Protocol    Interpretation Summary  •  Left ventricular systolic function is normal. Left ventricular ejection fraction appears to be 56 - 60%.  •  Left ventricular diastolic function was normal.    Stress Myoview-  Cath-      Medication Review:   Scheduled Meds:apixaban, 5 mg, Oral, Q12H  atorvastatin, 20 mg, Oral, Daily  ceFAZolin, 2 g, Intravenous, Q8H  dilTIAZem CD, 240 mg, Oral, Q24H  insulin glargine, 25 Units, Subcutaneous, Nightly  insulin lispro, 3-14 Units, Subcutaneous, TID With Meals  metoprolol tartrate, 25 mg, Oral, Q12H  nystatin, , Topical, Q12H  sodium chloride, 10 mL, Intravenous, Q12H      Continuous Infusions:   PRN Meds:.•  acetaminophen  •  dextrose  •  dextrose  •  glucagon (human recombinant)  •  hydrALAZINE  •  HYDROcodone-acetaminophen  •  magnesium sulfate **OR** magnesium sulfate **OR** magnesium sulfate  •  ondansetron  •  potassium & sodium phosphates **OR** potassium & sodium phosphates  •  potassium chloride  •  potassium chloride  •  [COMPLETED] Insert Peripheral IV **AND** sodium chloride  •  sodium chloride  •   sodium chloride      Assessment & Plan   Patient Active Problem List   Diagnosis   • Arthritis   • Bradycardia   • Chronic obstructive pulmonary disease ([facility])   • Depression   • Diabetic peripheral neuropathy ([facility])   • Gastroesophageal reflux disease   • Mixed hyperlipidemia   • Hypomagnesemia   • Lumbar radiculopathy   • Myalgia   • Paroxysmal atrial fibrillation ([facility])   • Shoulder pain, right   • Sleep apnea   • Type 2 diabetes mellitus with hyperglycemia, with long-term current use of insulin ([facility])   • Vitamin D deficiency   • Palpitations   • PVC's (premature ventricular contractions)   • Essential hypertension   • Cervical radiculitis   • Arthralgia of right temporomandibular joint   • Shortness of breath   • Bilateral leg edema   • Chronic diastolic CHF (congestive heart failure) ([facility])   • OAB (overactive bladder)   • Need for vaccination   • Welcome to Medicare preventive visit   • Class 2 obesity due to excess calories without serious comorbidity with body mass index (BMI) of 39.0 to 39.9 in adult   • Hip pain   • Wellness examination   • Onychomycosis   • Atrial fibrillation with RVR ([facility])   • Chest pain, atypical   • Elevated LFTs   • Cholelithiasis   • Vagina, candidiasis   • Tinea corporis   • Pre-op examination   • Bacteremia due to methicillin susceptible Staphylococcus aureus (MSSA)     MDM:    1.  Atrial fibrillation:    Patient has chronic atrial fibrillation and patient is on diltiazem beta-blocker and Eliquis.  Heart rate is better controlled.    2.  Hypertension:    Patient blood pressure is stable hiply well-controlled continue current treatment    3.  COVID-19 infection:    Patient is clinically doing well.    From a cardiac standpoint patient can be discharged and follow-up with me as an outpatient        Jesse Charles MD  12/02/22  17:41 EST

## 2022-12-02 NOTE — CASE MANAGEMENT/SOCIAL WORK
Continued Stay Note  [facility]     Patient Name: Caterina Mason  MRN: [id number]  Today's Date: 12/2/2022    Admit Date: 11/30/2022    Plan: DC Plan: COVID +, Referral to [facility] and [facility] for IV antibiotics, pending acceptance from both.   Discharge Plan     Row Name 12/02/22 1441       Plan    Plan DC Plan: COVID +, Referral to [facility] and [facility] for IV antibiotics, pending acceptance from both.    Plan Comments If [facility] cannot accept pt, then [facility] could provide education and antibiotics. Would need to come into [facility] weekly for site care. Discussed with daughter Estella Richardson, agrees to plan. Niece Holli could come to the home daily to help with IV antibiotics, pending frequency of antibiotics.              Met with daughter in outside of room wearing PPE: mask.      Maintained distance greater than six feet and spent less than 15 minutes in the room.      Expected Discharge Date and Time     Expected Discharge Date Expected Discharge Time    Dec 3, 2022             Denisa Sun RN

## 2022-12-02 NOTE — PROGRESS NOTES
LOS: 2 days   Patient Care Team:  Lou Curran MD as PCP - General  Jesse Charles MD as Consulting Physician (Cardiology)  Bautista Archibald MD as Consulting Physician (Endocrinology)  Jane Montero MD as Consulting Physician (Obstetrics and Gynecology)        Subjective     Interval History:     Patient Complaints:   Patient Denies:  NV       Review of Systems:    Weak    Objective     Vital Signs  Temp:  [97.6 °F (36.4 °C)-98.5 °F (36.9 °C)] 98 °F (36.7 °C)  Heart Rate:  [] 68  Resp:  [16-23] 16  BP: (111-164)/(48-80) 129/71    Physical Exam:     General Appearance:    Alert, cooperative, in no acute distress   Head:    Normocephalic, without obvious abnormality, atraumatic   Eyes:            Lids and lashes normal, conjunctivae and sclerae normal, no   icterus, no pallor, corneas clear, PERRLA   Ears:    Ears appear intact with no abnormalities noted   Throat:   No oral lesions, no thrush, oral mucosa moist   Neck:   No adenopathy, supple, trachea midline, no thyromegaly, no     carotid bruit, no JVD   Back:     No kyphosis present, no scoliosis present, no skin lesions,       erythema or scars, no tenderness to percussion or                   palpation,   range of motion normal   Lungs:     Clear to auscultation,respirations regular, even and                   unlabored    Heart:    Regular rhythm and normal rate, normal S1 and S2, no            murmur, no gallop, no rub, no click   Breast Exam:    Deferred   Abdomen:     Normal bowel sounds, no masses, no organomegaly, soft        non-tender, non-distended, no guarding, no rebound                 tenderness   Genitalia:    Deferred   Extremities:   Moves all extremities well, no edema, no cyanosis, no              redness   Pulses:   Pulses palpable and equal bilaterally   Skin:   No bleeding, bruising or rash   Lymph nodes:   No palpable adenopathy   Neurologic:   Cranial nerves 2 - 12 grossly intact, sensation intact, DTR        present and  equal bilaterally          Results Review:      Lab Results (last 72 hours)     Procedure Component Value Units Date/Time    Blood Culture - Blood, Arm, Right [[id number]]  (Normal) Collected: 12/01/22 0835    Specimen: Blood from Arm, Right Updated: 12/02/22 0915     Blood Culture No growth at 24 hours    Narrative:      Less than seven (7) mL's of blood was collected.  Insufficient quantity may yield false negative results.    Blood Culture - Blood, Hand, Right [[id number]]  (Normal) Collected: 12/01/22 0835    Specimen: Blood from Hand, Right Updated: 12/02/22 0915     Blood Culture No growth at 24 hours    POC Glucose Once [[id number]]  (Abnormal) Collected: 12/02/22 0740    Specimen: Blood Updated: 12/02/22 0846     Glucose 265 mg/dL      Comment: Serial Number: [id number]:  [id number]       Comprehensive Metabolic Panel [[id number]]  (Abnormal) Collected: 12/02/22 0449    Specimen: Blood Updated: 12/02/22 0639     Glucose 324 mg/dL      BUN 23 mg/dL      Creatinine 1.03 mg/dL      Sodium 139 mmol/L      Potassium 4.2 mmol/L      Chloride 100 mmol/L      CO2 28.0 mmol/L      Calcium 9.3 mg/dL      Total Protein 7.1 g/dL      Albumin 3.40 g/dL      ALT (SGPT) 32 U/L      AST (SGOT) 31 U/L      Alkaline Phosphatase 67 U/L      Total Bilirubin 0.4 mg/dL      Globulin 3.7 gm/dL      A/G Ratio 0.9 g/dL      BUN/Creatinine Ratio 22.3     Anion Gap 11.0 mmol/L      eGFR 59.7 mL/min/1.73      Comment: National Kidney Foundation and American Society of Nephrology (ASN) Task Force recommended calculation based on the Chronic Kidney Disease Epidemiology Collaboration (CKD-EPI) equation refit without adjustment for race.       Narrative:      GFR Normal >60  Chronic Kidney Disease <60  Kidney Failure <15      Phosphorus [[id number]]  (Normal) Collected: 12/02/22 0449    Specimen: Blood Updated: 12/02/22 0639     Phosphorus 3.2 mg/dL     Magnesium [[id number]]  (Normal) Collected: 12/02/22 0449    Specimen: Blood Updated:  12/02/22 0639     Magnesium 1.6 mg/dL     CBC & Differential [[id number]]  (Abnormal) Collected: 12/02/22 0449    Specimen: Blood Updated: 12/02/22 0607    Narrative:      The following orders were created for panel order CBC & Differential.  Procedure                               Abnormality         Status                     ---------                               -----------         ------                     CBC Auto Differential[[id number]]        Abnormal            Final result                 Please view results for these tests on the individual orders.    CBC Auto Differential [[id number]]  (Abnormal) Collected: 12/02/22 0449    Specimen: Blood Updated: 12/02/22 0607     WBC 10.90 10*3/mm3      RBC 4.52 10*6/mm3      Hemoglobin 13.4 g/dL      Hematocrit 40.1 %      MCV 88.8 fL      MCH 29.7 pg      MCHC 33.4 g/dL      RDW 14.3 %      RDW-SD 46.4 fl      MPV 9.4 fL      Platelets 328 10*3/mm3      Neutrophil % 57.0 %      Lymphocyte % 30.9 %      Monocyte % 10.8 %      Eosinophil % 0.8 %      Basophil % 0.5 %      Neutrophils, Absolute 6.20 10*3/mm3      Lymphocytes, Absolute 3.40 10*3/mm3      Monocytes, Absolute 1.20 10*3/mm3      Eosinophils, Absolute 0.10 10*3/mm3      Basophils, Absolute 0.10 10*3/mm3      nRBC 0.2 /100 WBC     POC Glucose Once [[id number]]  (Abnormal) Collected: 12/01/22 1829    Specimen: Blood Updated: 12/01/22 1830     Glucose 300 mg/dL      Comment: Serial Number: [id number]:  [id number]       POC Glucose Once [[id number]]  (Abnormal) Collected: 12/01/22 1155    Specimen: Blood Updated: 12/01/22 1159     Glucose 294 mg/dL      Comment: Serial Number: [id number]:  [id number]       Hemoglobin A1c [[id number]]  (Abnormal) Collected: 12/01/22 0448    Specimen: Blood Updated: 12/01/22 1040     Hemoglobin A1C 11.8 %     Narrative:      Hemoglobin A1C Reference Range:    <5.7 %        Normal  5.7-6.4 %     Increased risk for diabetes  > 6.4 %        Diabetes       These guidelines  have been recommended by the American Diabetic Association for Hgb A1c.      The following 2010 guidelines have been recommended by the American Diabetes Association for Hemoglobin A1c.    HBA1c 5.7-6.4% Increased risk for future diabetes (pre-diabetes)  HBA1c     >6.4% Diabetes      POC Glucose Once [[id number]]  (Abnormal) Collected: 12/01/22 0721    Specimen: Blood Updated: 12/01/22 0722     Glucose 349 mg/dL      Comment: Serial Number: [id number]:  [id number]       POC Glucose Once [[id number]]  (Abnormal) Collected: 12/01/22 0544    Specimen: Blood Updated: 12/01/22 0546     Glucose 381 mg/dL      Comment: Serial Number: [id number]:  [id number]       Comprehensive Metabolic Panel [[id number]]  (Abnormal) Collected: 12/01/22 0448    Specimen: Blood Updated: 12/01/22 0538     Glucose 447 mg/dL      BUN 27 mg/dL      Creatinine 1.03 mg/dL      Sodium 135 mmol/L      Potassium 4.4 mmol/L      Chloride 96 mmol/L      CO2 27.0 mmol/L      Calcium 10.3 mg/dL      Total Protein 8.0 g/dL      Albumin 4.00 g/dL      ALT (SGPT) 35 U/L      AST (SGOT) 23 U/L      Alkaline Phosphatase 69 U/L      Total Bilirubin 0.6 mg/dL      Globulin 4.0 gm/dL      A/G Ratio 1.0 g/dL      BUN/Creatinine Ratio 26.2     Anion Gap 12.0 mmol/L      eGFR 59.7 mL/min/1.73      Comment: National Kidney Foundation and American Society of Nephrology (ASN) Task Force recommended calculation based on the Chronic Kidney Disease Epidemiology Collaboration (CKD-EPI) equation refit without adjustment for race.       Narrative:      GFR Normal >60  Chronic Kidney Disease <60  Kidney Failure <15      Phosphorus [[id number]]  (Normal) Collected: 12/01/22 0448    Specimen: Blood Updated: 12/01/22 0534     Phosphorus 3.3 mg/dL     Magnesium [[id number]]  (Normal) Collected: 12/01/22 0448    Specimen: Blood Updated: 12/01/22 0534     Magnesium 1.9 mg/dL     Lipid Panel [[id number]]  (Abnormal) Collected: 12/01/22 0448    Specimen: Blood Updated:  12/01/22 0534     Total Cholesterol 151 mg/dL      Triglycerides 197 mg/dL      HDL Cholesterol 37 mg/dL      LDL Cholesterol  81 mg/dL      VLDL Cholesterol 33 mg/dL      LDL/HDL Ratio 2.02    Narrative:      Cholesterol Reference Ranges  (U.S. Department of Health and Human Services ATP III Classifications)    Desirable          <200 mg/dL  Borderline High    200-239 mg/dL  High Risk          >240 mg/dL      Triglyceride Reference Ranges  (U.S. Department of Health and Human Services ATP III Classifications)    Normal           <150 mg/dL  Borderline High  150-199 mg/dL  High             200-499 mg/dL  Very High        >500 mg/dL    HDL Reference Ranges  (U.S. Department of Health and Human Services ATP III Classifications)    Low     <40 mg/dl (major risk factor for CHD)  High    >60 mg/dl ('negative' risk factor for CHD)        LDL Reference Ranges  (U.S. Department of Health and Human Services ATP III Classifications)    Optimal          <100 mg/dL  Near Optimal     100-129 mg/dL  Borderline High  130-159 mg/dL  High             160-189 mg/dL  Very High        >189 mg/dL    C-reactive Protein [[id number]]  (Abnormal) Collected: 12/01/22 0448    Specimen: Blood Updated: 12/01/22 0534     C-Reactive Protein 3.43 mg/dL     TSH [[id number]]  (Normal) Collected: 12/01/22 0448    Specimen: Blood Updated: 12/01/22 0530     TSH 0.394 uIU/mL     Procalcitonin [[id number]]  (Normal) Collected: 12/01/22 0448    Specimen: Blood Updated: 12/01/22 0530     Procalcitonin 0.21 ng/mL     Narrative:      As a Marker for Sepsis (Non-Neonates):    1. <0.5 ng/mL represents a low risk of severe sepsis and/or septic shock.  2. >2 ng/mL represents a high risk of severe sepsis and/or septic shock.    As a Marker for Lower Respiratory Tract Infections that require antibiotic therapy:    PCT on Admission    Antibiotic Therapy       6-12 Hrs later    >0.5                Strongly Recommended  >0.25 - <0.5        Recommended   0.1 - 0.25        "   Discouraged              Remeasure/reassess PCT  <0.1                Strongly Discouraged     Remeasure/reassess PCT    As 28 day mortality risk marker: \"Change in Procalcitonin Result\" (>80% or <=80%) if Day 0 (or Day 1) and Day 4 values are available. Refer to [email or web address]    Change in PCT <=80%  A decrease of PCT levels below or equal to 80% defines a positive change in PCT test result representing a higher risk for 28-day all-cause mortality of patients diagnosed with severe sepsis for septic shock.    Change in PCT >80%  A decrease of PCT levels of more than 80% defines a negative change in PCT result representing a lower risk for 28-day all-cause mortality of patients diagnosed with severe sepsis or septic shock.       Sedimentation Rate [[id number]]  (Abnormal) Collected: 12/01/22 0448    Specimen: Blood Updated: 12/01/22 0519     Sed Rate 34 mm/hr     CBC & Differential [[id number]]  (Abnormal) Collected: 12/01/22 0448    Specimen: Blood Updated: 12/01/22 0514    Narrative:      The following orders were created for panel order CBC & Differential.  Procedure                               Abnormality         Status                     ---------                               -----------         ------                     CBC Auto Differential[[id number]]        Abnormal            Final result                 Please view results for these tests on the individual orders.    CBC Auto Differential [[id number]]  (Abnormal) Collected: 12/01/22 0448    Specimen: Blood Updated: 12/01/22 0514     WBC 13.90 10*3/mm3      RBC 4.88 10*6/mm3      Hemoglobin 14.5 g/dL      Hematocrit 43.4 %      MCV 88.9 fL      MCH 29.8 pg      MCHC 33.6 g/dL      RDW 14.3 %      RDW-SD 46.8 fl      MPV 8.8 fL      Platelets 341 10*3/mm3      Neutrophil % 74.3 %      Lymphocyte % 14.8 %      Monocyte % 10.4 %      Eosinophil % 0.0 %      Basophil % 0.5 %      Neutrophils, Absolute 10.30 10*3/mm3      Lymphocytes, " Absolute 2.10 10*3/mm3      Monocytes, Absolute 1.40 10*3/mm3      Eosinophils, Absolute 0.00 10*3/mm3      Basophils, Absolute 0.10 10*3/mm3      nRBC 0.2 /100 WBC     POC Glucose Once [[id number]]  (Abnormal) Collected: 11/1955    Specimen: Blood Updated: 11/30/22 1957     Glucose 340 mg/dL      Comment: Serial Number: [id number]:  [id number]       Comprehensive Metabolic Panel [[id number]]  (Abnormal) Collected: 11/30/22 1603    Specimen: Blood Updated: 11/30/22 1631     Glucose 492 mg/dL      BUN 23 mg/dL      Creatinine 1.02 mg/dL      Sodium 138 mmol/L      Potassium 4.3 mmol/L      Chloride 98 mmol/L      CO2 25.0 mmol/L      Calcium 10.3 mg/dL      Total Protein 8.3 g/dL      Albumin 4.30 g/dL      ALT (SGPT) 43 U/L      AST (SGOT) 31 U/L      Alkaline Phosphatase 67 U/L      Total Bilirubin 1.0 mg/dL      Globulin 4.0 gm/dL      A/G Ratio 1.1 g/dL      BUN/Creatinine Ratio 22.5     Anion Gap 15.0 mmol/L      eGFR 60.4 mL/min/1.73      Comment: National Kidney Foundation and American Society of Nephrology (ASN) Task Force recommended calculation based on the Chronic Kidney Disease Epidemiology Collaboration (CKD-EPI) equation refit without adjustment for race.       Narrative:      GFR Normal >60  Chronic Kidney Disease <60  Kidney Failure <15      CBC & Differential [[id number]]  (Abnormal) Collected: 11/30/22 1604    Specimen: Blood Updated: 11/30/22 1606    Narrative:      The following orders were created for panel order CBC & Differential.  Procedure                               Abnormality         Status                     ---------                               -----------         ------                     CBC Auto Differential[[id number]]        Abnormal            Final result                 Please view results for these tests on the individual orders.    CBC Auto Differential [[id number]]  (Abnormal) Collected: 11/30/22 1604    Specimen: Blood Updated: 11/30/22 1606     WBC 9.10 10*3/mm3       RBC 5.02 10*6/mm3      Hemoglobin 14.9 g/dL      Hematocrit 45.3 %      MCV 90.1 fL      MCH 29.7 pg      MCHC 33.0 g/dL      RDW 14.0 %      RDW-SD 43.8 fl      MPV 8.4 fL      Platelets 308 10*3/mm3      Neutrophil % 79.8 %      Lymphocyte % 13.3 %      Monocyte % 6.8 %      Eosinophil % 0.0 %      Basophil % 0.1 %      Neutrophils, Absolute 7.30 10*3/mm3      Lymphocytes, Absolute 1.20 10*3/mm3      Monocytes, Absolute 0.60 10*3/mm3      Eosinophils, Absolute 0.00 10*3/mm3      Basophils, Absolute 0.00 10*3/mm3      nRBC 0.1 /100 WBC           Imaging Results (Last 72 Hours)     ** No results found for the last 72 hours. **            Medication Review:     Hospital Medications (active)       Dose Frequency Start End    acetaminophen (TYLENOL) tablet 650 mg 650 mg Every 4 Hours PRN 11/30/2022     Admin Instructions: Based on patient request - if ordered for moderate or severe pain, provider allows for administration of a medication prescribed for a lower pain scale.  Do not exceed 4 grams of acetaminophen in a 24 hr period. Max dose of 2gm for AST/ALT greater than 120 units/L    If given for fever, use fever parameter: fever greater than 100.4 °F.    If given for pain, use the following pain scale:   Mild Pain = Pain Score of 1-3, CPOT 1-2  Moderate Pain = Pain Score of 4-6, CPOT 3-4  Severe Pain = Pain Score of 7-10, CPOT 5-8    Route: Oral    apixaban (ELIQUIS) tablet 5 mg 5 mg Every 12 Hours Scheduled 12/1/2022     Admin Instructions: Tablet may be crushed and suspended in 60 mL of water or D5W and immediately delivered via NG tube.    Route: Oral    atorvastatin (LIPITOR) tablet 20 mg 20 mg Daily 12/1/2022     Admin Instructions: Avoid grapefruit juice.    Route: Oral    ceFAZolin 2 gm IVPB in 100 mL NS (MBP) 2 g Every 8 Hours 12/1/2022 12/15/2022    Admin Instructions: Caution: Look alike/sound alike drug alert    Route: Intravenous    dextrose (D50W) (25 g/50 mL) IV injection 25 g 25 g Every 15 Minutes  PRN 11/30/2022     Admin Instructions: Blood sugar less than 70; patient has IV access - Unresponsive, NPO or Unable To Safely Swallow    Route: Intravenous    dextrose (GLUTOSE) oral gel 15 g 15 g Every 15 Minutes PRN 11/30/2022     Admin Instructions: BS<70, Patient Alert, Is not NPO, Can safely swallow.    Route: Oral    dilTIAZem CD (CARDIZEM CD) 24 hr capsule 240 mg 240 mg Every 24 Hours Scheduled 12/1/2022     Admin Instructions: Caution: Look alike/sound alike drug alert.   Swallow capsule whole. Do not crush, chew, or open capsule. Avoid grapefruit juice. Maximum simvastatin dose 10 mg while taking dilTIAZem.    Route: Oral    glucagon (human recombinant) (GLUCAGEN DIAGNOSTIC) 1 mg in sterile water (preservative free) 1 mL injection 1 mg Every 15 Minutes PRN 11/30/2022     Admin Instructions: Blood Glucose Less Than 70 - Patient Without IV Access - Unresponsive, NPO or Unable To Safely Swallow  Reconstitute powder for injection by adding 1 mL of sterile water for injection to glucagon 1 mg vial resulting in concentration of 1 mg/mL.    Route: Intramuscular    hydrALAZINE (APRESOLINE) injection 10 mg 10 mg Every 6 Hours PRN 11/30/2022     Admin Instructions: As needed for SBP greater than 160  Caution: Look alike/sound alike drug alert    Route: Intravenous    HYDROcodone-acetaminophen (NORCO) 7.5-325 MG per tablet 1 tablet 1 tablet 3 Times Daily PRN 12/1/2022     Admin Instructions: Based on patient request - if ordered for moderate or severe pain, provider allows for administration of a medication prescribed for a lower pain scale.  [IRAIS]    Do not exceed 4 grams of acetaminophen in a 24 hr period. Max dose of 2gm for AST/ALT greater than 120 units/L        If given for pain, use the following pain scale:   Mild Pain = Pain Score of 1-3, CPOT 1-2  Moderate Pain = Pain Score of 4-6, CPOT 3-4  Severe Pain = Pain Score of 7-10, CPOT 5-8    Route: Oral    insulin glargine (LANTUS, SEMGLEE) injection 25 Units 25  Units Nightly 11/30/2022     Admin Instructions:     Route: Subcutaneous    insulin lispro (ADMELOG) injection 3-14 Units 3-14 Units 3 Times Daily With Meals 11/30/2022     Admin Instructions: Blood Glucose 150-199 mg/dL - 3 units  Blood Glucose 200-249 mg/dL - 5 units  Blood Glucose 250-299 mg/dL - 8 units  Blood Glucose 300-349 mg/dL - 10 units  Blood Glucose 350-400 mg/dL - 12 units  Blood Glucose Greater Than 400 mg/dL - 14 units & Call Provider   Caution: Look alike/sound alike drug alert    Route: Subcutaneous    Magnesium Sulfate 2 gram / 50mL Infusion (GIVE X 3 BAGS TO EQUAL 6GM TOTAL DOSE) - Mg 1.1 - 1.5 mg/dl 2 g As Needed 11/30/2022     Admin Instructions: Mg 1.1 -1.5 mg/dL. Infuse 2 grams over 2 hours for 3 doses (for a total Mg dose of 6 grams).  Recheck Mg level in the AM.    Route: Intravenous    Linked Group 1: See Hyperspace for full Linked Orders Report.        Magnesium Sulfate 2 gram Bolus, followed by 8 gram infusion (total Mg dose 10 grams)- Mg less than or equal to 1mg/dL 2 g As Needed 11/30/2022     Admin Instructions: Mg less than or equal to 1mg/dL. Give 2 gm over 30 minutes as bolus, then infuse 2 gm over 2 hours for 4 doses (8 grams) for total dose of 10 grams.  Recheck Mg levels in the AM.    Route: Intravenous    Linked Group 1: See Hyperspace for full Linked Orders Report.        Magnesium Sulfate 4 gram infusion- Mg 1.6-1.9 mg/dL 4 g As Needed 11/30/2022     Admin Instructions: Mg 1.6-1.9 mg/dL. Recheck Mg level in the AM.    Route: Intravenous    Linked Group 1: See Hyperspace for full Linked Orders Report.        metoprolol tartrate (LOPRESSOR) tablet 25 mg 25 mg Every 12 Hours Scheduled 12/1/2022     Route: Oral    nystatin (MYCOSTATIN) powder  Every 12 Hours Scheduled 12/1/2022     Admin Instructions: Groin area that is irritated    Route: Topical    ondansetron (ZOFRAN) injection 4 mg 4 mg Every 6 Hours PRN 11/30/2022     Admin Instructions: If BOTH ondansetron (ZOFRAN) and  promethazine (PHENERGAN) are ordered use ondansetron first and THEN promethazine IF ondansetron is ineffective.    Route: Intravenous    potassium & sodium phosphates (PHOS-NAK) 280-160-250 MG packet - for Phosphorus 1.25 - 2.5 mg/dL 2 packet Every 6 Hours PRN 11/30/2022     Admin Instructions: Phosphorus replacement:       If Phos 1.25 - 2.5  mg/dL: Give Neutraphos 2 packets by mouth every 6 hours x 1 dose. Recheck Phosphorus level 6 hours after replacement complete.    Route: Oral    Linked Group 2: See Hyperspace for full Linked Orders Report.        potassium & sodium phosphates (PHOS-NAK) 280-160-250 MG packet - for Phosphorus less than 1.25 mg/dL 2 packet Every 6 Hours PRN 11/30/2022     Admin Instructions: Phosphorus replacement:     If Phos    < 1.25 mg/dL   : Give Neutraphos 2 packets by mouth every 6 hours x 2 doses. Recheck Phosphorus level 6 hours after replacement complete.    Route: Oral    Linked Group 2: See Hyperspace for full Linked Orders Report.        potassium chloride (K-DUR,KLOR-CON) CR tablet 40 mEq 40 mEq As Needed 11/30/2022     Admin Instructions: Potassium 3.1 or Less Give KCl 40 mEq q4h x3 Doses   Potassium 3.2 - 3.6 Give KCl 40 mEq q4h x2 Doses     Check Potassium 4 Hours After Last Dose Given   Check Magnesium if Potassium Level Remains Low After Replacement   DO NOT GIVE if CrCl is Less Than 30 mL/minute or Urine Output Less Than 30 mL/hr    Route: Oral    potassium chloride (KLOR-CON) packet 40 mEq 40 mEq As Needed 11/30/2022     Admin Instructions: Potassium 3.1 or Less Give KCl 40 mEq q4h x3 Doses   Potassium 3.2 - 3.6 Give KCl 40 mEq q4h x2 Doses     Check Potassium 4 Hours After Last Dose Given   Check Magnesium if Potassium Level Remains Low After Replacement   DO NOT GIVE if CrCl is Less Than 30 mL/minute or Urine Output Less Than 30 mL/hr    Route: Oral    sodium chloride 0.9 % flush 10 mL 10 mL As Needed 11/30/2022     Route: Intravenous    Linked Group 3: See Hyperspace for  full Linked Orders Report.        sodium chloride 0.9 % flush 10 mL 10 mL Every 12 Hours Scheduled 11/30/2022     Route: Intravenous    sodium chloride 0.9 % flush 10 mL 10 mL As Needed 11/30/2022     Route: Intravenous    sodium chloride 0.9 % infusion 40 mL 40 mL As Needed 11/30/2022     Admin Instructions: Following administration of an IV intermittent medication, flush line with 40mL NS at 100mL/hr.    Route: Intravenous        apixaban, 5 mg, Oral, Q12H  atorvastatin, 20 mg, Oral, Daily  ceFAZolin, 2 g, Intravenous, Q8H  dilTIAZem CD, 240 mg, Oral, Q24H  insulin glargine, 25 Units, Subcutaneous, Nightly  insulin lispro, 3-14 Units, Subcutaneous, TID With Meals  metoprolol tartrate, 25 mg, Oral, Q12H  nystatin, , Topical, Q12H  sodium chloride, 10 mL, Intravenous, Q12H        Assessment & Plan         Bacteremia due to methicillin susceptible Staphylococcus aureus (MSSA)    Covid +  COPD, HTN  Echo -    Plan :    IV cefazolin'  Repeat BC  Need JEOVANNY  Will follow  Thank you    Olya Mendez MD  12/02/22  11:41 EST

## 2022-12-02 NOTE — PAYOR COMM NOTE
"RE: [id number]  CLINICAL UPDATE  ==============================    UTILIZATION REVIEW  MARCELINO CODY RN   PH: [phone]  FAX: [phone]    [facility]  NPI# [id number]  TID # [id number]  ================================        Martin Mason (67 y.o. Female)     Date of Birth   1955    Social Security Number       Address   [address] [place] [address]    Home Phone   [phone]    MRN   [id number]       Cheondoism   None    Marital Status                               Admission Date   11/30/22    Admission Type   Emergency    Admitting Provider   Michael Lozano MD    Attending Provider   Michael Lozano MD    Department, Room/Bed   [facility] 2A PEDIATRICS, 204/1       Discharge Date       Discharge Disposition       Discharge Destination                               Attending Provider: Michael Lozano MD    Allergies: Ibuprofen, Prednisone, Pregabalin, Tramadol, Levofloxacin, Sulfamethoxazole-trimethoprim    Isolation: Enh Drop/Con   Infection: COVID (confirmed) (11/29/22)   Code Status: CPR    Ht: 162.6 cm (64\")   Wt: 127 kg (280 lb)    Admission Cmt: None   Principal Problem: Bacteremia due to methicillin susceptible Staphylococcus aureus (MSSA) [R78.81,B95.61]                 Active Insurance as of 11/30/2022     Primary Coverage     Payor Plan Insurance Group Employer/Plan Group    [employer] [id number]     Payor Plan Address Payor Plan Phone Number Payor Plan Fax Number Effective Dates    MAIL STOP:   4/1/2017 - None Entered    [address] [address]       [facility] [address]       Subscriber Name Subscriber Birth Date Member ID       MARTIN MASON 1955 [id number]                 Emergency Contacts      (Rel.) Home Phone Work Phone Mobile Phone    CHRIS,LEIDY (Daughter) [phone] -- [phone]    BARBARA MASON (Spouse) [phone] -- --    Foremen,Agnes (Daughter) -- -- [phone] "            Oxygen Therapy (last day)     Date/Time SpO2 Device (Oxygen Therapy) Flow (L/min) Oxygen Concentration (%) ETCO2 (mmHg)    22 0830 -- nasal cannula 2 -- --    22 0700 90 -- -- -- --    22 0356 95 CPAP -- -- --    22 2352 92 -- -- -- --    22 -- nasal cannula;other (see comments) 2 -- --    22 195 98 nasal cannula 2 -- --    22 1819 97 nasal cannula 2 -- --    22 1600 -- nasal cannula 2 -- --    22 1200 -- nasal cannula 2 -- --    22 1159 98 -- 2 -- --    22 0824 97 nasal cannula 3 32 --    22 0800 97 room air -- -- --    22 0609 -- CPAP -- 32 --    22 0430 -- room air -- -- --    22 0326 94 room air -- -- --             Physician Progress Notes (last 24 hours)      Michael Lozano MD at 22 1706              [facility] Medicine Services Daily Progress Note    Patient Name: Caterina Mason  : 1955  MRN: [id number]  Primary Care Physician:  Lou Curran MD  Date of admission: 2022      Subjective       Chief Complaint: Shortness of breath.      Patient Reports she is feeling okay.  Noted to have runs of tachyarrhythmia.  Cardiology consulted.  Heart rate 1 20-1 50 on the monitor at bedside.  No major shortness of breath.  Patient complaining of headache.    ROS negative except as above      Objective       Vitals:   Temp:  [97.6 °F (36.4 °C)-98.1 °F (36.7 °C)] 97.6 °F (36.4 °C)  Heart Rate:  [] 105  Resp:  [14-23] 23  BP: (135-157)/(50-77) 143/75  Flow (L/min):  [2-3] 2    Physical Exam  Vitals reviewed.   Constitutional:       Comments: Wearing nasal cannula   HENT:      Head: Normocephalic.      Nose: Nose normal.      Mouth/Throat:      Mouth: Mucous membranes are moist.   Cardiovascular:      Rate and Rhythm: Tachycardia present. Rhythm irregular.      Pulses: Normal pulses.      Heart sounds: Normal heart sounds.   Pulmonary:      Effort: Pulmonary effort is  normal.      Breath sounds: Normal breath sounds.   Abdominal:      General: Abdomen is flat.      Palpations: Abdomen is soft.   Musculoskeletal:         General: Normal range of motion.      Cervical back: Normal range of motion.   Skin:     General: Skin is warm.   Neurological:      General: No focal deficit present.      Mental Status: She is alert and oriented to person, place, and time.   Psychiatric:         Mood and Affect: Mood normal.         Behavior: Behavior normal.             Result Review    Result Review:  I have personally reviewed the results from the time of this admission to 12/1/2022 17:06 EST and agree with these findings:  [x]  Laboratory  []  Microbiology  []  Radiology  []  EKG/Telemetry   []  Cardiology/Vascular   []  Pathology  []  Old records  []  Other:  Most notable findings include: Hyperglycemia        Assessment & Plan      Brief Patient Summary:  Caterina Mason is a 67 y.o. female who presents with COVID infection and bacteremia      apixaban, 5 mg, Oral, Q12H  ceFAZolin, 2 g, Intravenous, Q8H  dilTIAZem CD, 240 mg, Oral, Q24H  insulin glargine, 25 Units, Subcutaneous, Nightly  insulin lispro, 3-14 Units, Subcutaneous, TID With Meals  metoprolol tartrate, 25 mg, Oral, Q12H  sodium chloride, 10 mL, Intravenous, Q12H             Active Hospital Problems:  Active Hospital Problems    Diagnosis    • **Bacteremia due to methicillin susceptible Staphylococcus aureus (MSSA)      MSSA bacteremia  - Given 1 dose of cefazolin in ER  - Start Rocephin 2g daily x10 days due to it being easier for outpatient administration purposes versus cefazolin that is every 8 hours  - ID consulted  - 2D echo to rule out endocarditis     COVID-19 infection  - Tested positive on 11/29/2022  - Currently on room air  - Continue Decadron that was started in ER last night  - Symbicort and albuterol  - Monitor daily labs and provide supportive care as needed     Type 2 diabetes  - Glucose 492  - Last A1c 11.5  on 5/31/2022.  New Hemoglobin A1c ordered.  - Sliding scale management ordered  - Healthy heart/consistent carb diet     COPD  Obstructive sleep apnea  - Currently on room air  - Uses CPAP at home and follows   - CPAP at bedtime/as needed  - Symbicort and albuterol     Hypertension  - /68  - Metoprolol and lisinopril  - Lasix and K-Dur.  Monitor daily labs.     Hyperlipidemia  - Lipid panel ordered  - Fenofibrate     History of A. fib  - EKG ordered  - Eliquis and Cardizem    Patient having runs of V. tach  Dr. Charles her cardiologist consulted    Genital yeast  Statin ordered     DVT prophylaxis:  -Eliquis     CODE STATUS:    Admission Status:  I believe this patient meets inpatient status.     I discussed the patient's findings and my recommendations with patient.     This patient has been examined wearing appropriate Personal Protective Equipment and discussed with patient. 12/01/22      Electronically signed by Michael Lozano MD, 12/01/22, 17:06 EST.  [facility] Hospitalist Team             Electronically signed by Michael Lozano MD at 12/01/22 1711          Consult Notes (last 24 hours)      Calli Weir APRN at 12/01/22 1631      Consult Orders    1. Inpatient Cardiology Consult [[id number]] ordered by Michael Lozano MD at 12/01/22 1013               CARDIOLOGY CONSULT NOTE      Referring Provider: Dr. Lozano    Reason for Consultation: Reported ventricular tachycardia    Attending: Michael Lozano MD    Chief complaint    Patient reports feeling unwell with cough, congestion, fever, chills, aches    Subjective .     History of present illness:  Caterina Mason is a 67 y.o. female who presents with cough congestion nausea associated with fever chills body aches and pains.  She tested positive for COVID-19.  She was seen in the emergency room 2 days ago and discharged home with treatment.  Blood cultures were obtained and came back positive for MSSA bacteremia and she was advised to return to the  emergency room.    Allergy was consulted today due to reported ventricular tachycardia.  Reviewed all telemetry strips and there were periods of artifact and isolated PVCs but no ventricular tachycardia is identified.    The patient is known to have atrial fibrillation that is permanent.  She is also known to have hypertension, dyslipidemia, COPD, diabetes, obstructive sleep apnea.    Patient has previously been noted to have frequent PVCs she underwent previous EP study but ablation for PVCs was unsuccessful.    Patient had previous cardiac catheterization in 2018 that showed no significant CAD.    Patient was previously started on Sectral 200 mg twice daily for her PVCs this was then changed to metoprolol.    In 2021 echocardiogram showed her ejection fraction to be 55% with no significant valvular flow abnormalities.        Review of Systems   Constitutional: Positive for fever and malaise/fatigue.   Cardiovascular: Positive for irregular heartbeat.   Musculoskeletal: Positive for joint pain.   Neurological: Positive for weakness.       History  Past Medical History:   Diagnosis Date   • Arthritis    • Atrial fibrillation (HCC)    • Bradycardia     Dr. Charles   • Cataract    • COPD (chronic obstructive pulmonary disease) ([facility])     Dr. Rob   • Diabetes mellitus, type 2 ([facility])     sees Dr. Archibald at [place]   • DJD (degenerative joint disease)     possible herniated disc, sees Pain Mgmt in [place]   • Emphysema of lung ([facility])    •     • GERD (gastroesophageal reflux disease)    • History of combined right and left heart catheterization 2018    minimal CAD on heart cath done    • Hyperlipidemia    • Hypertension    • Pain    • Sleep apnea     wears CPAP machine, sees Darron       Past Surgical History:   Procedure Laterality Date   • ABLATION OF DYSRHYTHMIC FOCUS     • CARDIAC CATHETERIZATION      and Angiograph    • CARDIAC ELECTROPHYSIOLOGY PROCEDURE N/A 12/10/2019    Procedure: pvc  ablation;  Surgeon: Alfredo Iglesias MD;  Location: [facility] INVASIVE LOCATION;  Service: Cardiovascular   •  SECTION      x 1   • COLON RESECTION     • COLONOSCOPY  2012   • COLOSTOMY      and reversal in her 20's due to problems with childbirth   • COLOSTOMY CLOSURE     • OVARIAN CYST SURGERY     • ROTATOR CUFF REPAIR Left 2009    x2    • TOTAL ABDOMINAL HYSTERECTOMY WITH SALPINGO OOPHORECTOMY         Family History   Problem Relation Age of Onset   • Heart disease Mother    • Hypertension Mother    • Stroke Mother    • Seizures Mother    • Heart disease Father    • Hypertension Father    • Lung disease Father    • Stroke Father    • Cancer Sister         unknown   • Hypertension Brother    • Diabetes Brother    • Hyperlipidemia Other        Social History     Tobacco Use   • Smoking status: Former     Packs/day: 1.00     Years: 48.00     Pack years: 48.00     Types: Cigarettes     Quit date: 3/1/2020     Years since quittin.7   • Smokeless tobacco: Never   Vaping Use   • Vaping Use: Never used   Substance Use Topics   • Alcohol use: No   • Drug use: No        Medications Prior to Admission   Medication Sig Dispense Refill Last Dose   • albuterol sulfate  (90 Base) MCG/ACT inhaler INHALE 2 PUFFS EVERY 4 HOURS AS NEEDED FOR WHEEZING OR SHORTNESS OF AIR 18 g 1 2022   • atorvastatin (LIPITOR) 40 MG tablet TAKE 1/2 TABLET BY MOUTH EVERY DAY 45 tablet 1 2022   • budesonide-formoterol (SYMBICORT) 160-4.5 MCG/ACT inhaler Inhale 2 puffs 2 (Two) Times a Day.   2022   • dilTIAZem CD (CARDIZEM CD) 120 MG 24 hr capsule TAKE 1 CAPSULE BY MOUTH EVERY DAY 90 capsule 1    • Eliquis 5 MG tablet tablet TAKE 1 TABLET BY MOUTH 2 (TWO) TIMES A DAY. PATIENT NEEDS AN APPT BEFORE ANY MORE REFILLS 60 tablet 1 2022   • fenofibrate 160 MG tablet TAKE 1 TABLET BY MOUTH EVERY DAY 90 tablet 1    • furosemide (LASIX) 40 MG tablet TAKE 1 TABLET BY MOUTH EVERY DAY 90 tablet 1    •  HYDROcodone-acetaminophen (NORCO) 7.5-325 MG per tablet Take 1 tablet by mouth 3 (Three) Times a Day As Needed.      • Insulin Glargine (BASAGLAR KWIKPEN) 100 UNIT/ML injection pen INJECT 40 UNITS UNDER THE SKIN INTO THE APPROPRIATE AREA AS DIRECTED EVERY NIGHT. 45 mL 0    • Insulin Lispro, 1 Unit Dial, (HumaLOG KwikPen) 100 UNIT/ML solution pen-injector Inject 14 units before each meal. 5 pen 6    • Janumet XR  MG tablet TAKE 2 TABLETS BY MOUTH EVERY DAY 60 tablet 3    • lisinopril (PRINIVIL,ZESTRIL) 20 MG tablet TAKE 1 TABLET BY MOUTH EVERY DAY 90 tablet 1    • magnesium oxide (MAG-OX) 400 MG tablet TAKE 1 TABLET BY MOUTH TWICE A DAY 60 tablet 6    • metoprolol tartrate (LOPRESSOR) 25 MG tablet TAKE 1 TABLET BY MOUTH EVERY 12 HOURS FOR 30 DAYS. 180 tablet 3    • potassium chloride (MICRO-K) 10 MEQ CR capsule TAKE 1 CAPSULE BY MOUTH EVERY DAY 90 capsule 1    • brompheniramine-pseudoephedrine-DM 30-2-10 MG/5ML syrup Take 5 mL by mouth 4 (Four) Times a Day As Needed for Congestion or Cough. 120 mL 0    • Cholecalciferol (Vitamin D3) 25 MCG (1000 UT) capsule TAKE 1 TABLET BY MOUTH TWICE A DAY *OTC NOT COVERED*      • dexamethasone (DECADRON) 4 MG tablet Take 1 tablet by mouth 3 (Three) Times a Day. 9 tablet 0    • glucose blood (Accu-Chek Aurea Plus) test strip CHECK BLOOD SUGAR 3 TIMES DX E11.65 100 each 5    • Insulin Pen Needle (BD Pen Needle Lorene 2nd Gen) 32G X 4 MM misc Used to inject insulin 4 times a day. 150 each 11          Ibuprofen, Prednisone, Pregabalin, Tramadol, Levofloxacin, and Sulfamethoxazole-trimethoprim    Scheduled Meds:apixaban, 5 mg, Oral, Q12H  ceFAZolin, 2 g, Intravenous, Q8H  dilTIAZem CD, 240 mg, Oral, Q24H  insulin glargine, 25 Units, Subcutaneous, Nightly  insulin lispro, 3-14 Units, Subcutaneous, TID With Meals  metoprolol tartrate, 25 mg, Oral, Q12H  sodium chloride, 10 mL, Intravenous, Q12H      Continuous Infusions:   PRN Meds:.•  acetaminophen  •  dextrose  •  dextrose  •   "glucagon (human recombinant)  •  hydrALAZINE  •  magnesium sulfate **OR** magnesium sulfate **OR** magnesium sulfate  •  ondansetron  •  potassium & sodium phosphates **OR** potassium & sodium phosphates  •  potassium chloride  •  potassium chloride  •  [COMPLETED] Insert Peripheral IV **AND** sodium chloride  •  sodium chloride  •  sodium chloride    Objective     VITAL SIGNS  Vitals:    12/01/22 0800 12/01/22 0824 12/01/22 1159 12/01/22 1414   BP: 147/69  148/77 143/75   BP Location: Right arm  Left arm Right arm   Patient Position: Sitting  Sitting Sitting   Pulse: 110 114 111 105   Resp: 19 18 23   Temp: 97.6 °F (36.4 °C)      TempSrc: Oral      SpO2: 97% 97% 98%    Weight:       Height:           Flowsheet Rows    Flowsheet Row First Filed Value   Admission Height 162.6 cm (64\") Documented at 11/30/2022 1447   Admission Weight 127 kg (280 lb) Documented at 11/30/2022 1447          Body mass index is 48.06 kg/m².     TELEMETRY: Atrial fibrillation    Physical Exam:  Physical Exam deferred due to COVID-19 isolation    Results Review:   I reviewed the patient's new clinical results.    CBC    Results from last 7 days   Lab Units 12/01/22  0448 11/30/22  1604 11/29/22  2049   WBC 10*3/mm3 13.90* 9.10 9.00   HEMOGLOBIN g/dL 14.5 14.9 14.5   PLATELETS 10*3/mm3 341 308 289     BMP   Results from last 7 days   Lab Units 12/01/22  0448 11/30/22  1603 11/29/22  2049   SODIUM mmol/L 135* 138 141   POTASSIUM mmol/L 4.4 4.3 3.8   CHLORIDE mmol/L 96* 98 100   CO2 mmol/L 27.0 25.0 26.0   BUN mg/dL 27* 23 16   CREATININE mg/dL 1.03* 1.02* 0.89   GLUCOSE mg/dL 447* 492* 242*   MAGNESIUM mg/dL 1.9  --   --    PHOSPHORUS mg/dL 3.3  --   --      Cr Clearance Estimated Creatinine Clearance: 69.9 mL/min (A) (by C-G formula based on SCr of 1.03 mg/dL (H)).  Coag     HbA1C   Lab Results   Component Value Date    HGBA1C 11.8 (H) 12/01/2022    HGBA1C 11.5 (H) 05/31/2022    HGBA1C 11.6 (H) 11/16/2021     Blood Glucose   Glucose "   Date/Time Value Ref Range Status   12/01/2022 1155 294 (H) 70 - 105 mg/dL Final     Comment:     Serial Number: [id number]:  [id number]   12/01/2022 0721 349 (H) 70 - 105 mg/dL Final     Comment:     Serial Number: [id number]:  [id number]   12/01/2022 0544 381 (H) 70 - 105 mg/dL Final     Comment:     Serial Number: [id number]:  [id number]   11/30/2022 1955 340 (H) 70 - 105 mg/dL Final     Comment:     Serial Number: [id number]:  [id number]   11/29/2022 2150 213 (H) 70 - 105 mg/dL Final     Comment:     Serial Number: [id number]:  [id number]     Infection   Results from last 7 days   Lab Units 12/01/22  0448 11/29/22  2049 11/29/22 2039   BLOODCX   --  Staphylococcus aureus*  Staphylococcus aureus*  --    URINECX   --   --  No growth   BCIDPCR   --  Staph aureus. mecA/C and MREJ (methicillin resistance gene) NOT detected. Identification by BCID2 PCR*  --    PROCALCITONIN ng/mL 0.21  --   --      CMP   Results from last 7 days   Lab Units 12/01/22  0448 11/30/22  1603 11/29/22 2049   SODIUM mmol/L 135* 138 141   POTASSIUM mmol/L 4.4 4.3 3.8   CHLORIDE mmol/L 96* 98 100   CO2 mmol/L 27.0 25.0 26.0   BUN mg/dL 27* 23 16   CREATININE mg/dL 1.03* 1.02* 0.89   GLUCOSE mg/dL 447* 492* 242*   ALBUMIN g/dL 4.00 4.30 4.10   BILIRUBIN mg/dL 0.6 1.0 1.0   ALK PHOS U/L 69 67 69   AST (SGOT) U/L 23 31 39*   ALT (SGPT) U/L 35* 43* 43*   LIPASE U/L  --   --  42     ABG      UA    Results from last 7 days   Lab Units 11/29/22 2039   NITRITE UA  Negative   WBC UA /HPF 13-20*   BACTERIA UA /HPF None Seen   SQUAM EPITHEL UA /HPF 0-2   URINECX  No growth     BRUNA  No results found for: POCMETH, POCAMPHET, POCBARBITUR, POCBENZO, POCCOCAINE, POCOPIATES, POCOXYCODO, POCPHENCYC, POCPROPOXY, POCTHC, POCTRICYC  Lysis Labs   Results from last 7 days   Lab Units 12/01/22  0448 11/30/22  1604 11/30/22  1603 11/29/22  2049   HEMOGLOBIN g/dL 14.5 14.9  --  14.5   PLATELETS 10*3/mm3 341 308  --  289   CREATININE  mg/dL 1.03*  --  1.02* 0.89     Radiology(recent) XR Chest 1 View    Result Date: 11/29/2022   1. No radiographic findings of pneumonia 2. Cardiomegaly with no evidence of CHF  Electronically Signed By-Son Montelongo On:11/29/2022 9:37 PM This report was finalized on [id number] by  Son Montelongo, .            Imaging Results (Last 24 Hours)     ** No results found for the last 24 hours. **          EKG            I personally viewed and interpreted the patient's EKG/Telemetry data:    ECHOCARDIOGRAM:      STRESS MYOVIEW:    CARDIAC CATHETERIZATION:    OTHER:         Assessment & Plan       Bacteremia due to methicillin susceptible Staphylococcus aureus (MSSA)      Assessment:    Consulted for reported ventricular tachycardia    -Review of telemetry strips does not show VT that shows periods of artifact  Permanent atrial fibrillation with mildly elevated ventricular rate  Isolated PVCs  Covid-19  MSSA bacteremia  COPD  Hypertension  Dyslipidemia  Diabetes  No significant CAD by cardiac catheterization in 2018  Preserved LV function by last echocardiogram    Plan:    Recommend resuming home medications for rate control for A. Fib  We will continue to monitor telemetry  She has noted to have isolated PVCs  Magnesium and potassium are within normal limits  Blood sugar is not well controlled  Repeat blood cultures are pending  Echocardiogram will be performed    Additional recommendations per Dr. Charles    I discussed the patients findings and my recommendations with patient and RN      Electronically signed by YAW Cortes, 12/01/22, 4:32 PM EST.      YAW Cortes  12/01/22  16:32 EST            Electronically signed by Calli Weir APRN at 12/01/22 1686

## 2022-12-02 NOTE — DISCHARGE PLACEMENT REQUEST
"Martin Mason (67 y.o. Female)     Date of Birth   1955    Social Security Number       Address   [address] [place] [address]    Home Phone   [phone]    MRN   [id number]       Rastafari   None    Marital Status                               Admission Date   11/30/22    Admission Type   Emergency    Admitting Provider   Michael Lozano MD    Attending Provider   Michael Lozano MD    Department, Room/Bed   [address] PEDIATRICS, 204/1       Discharge Date       Discharge Disposition       Discharge Destination                               Attending Provider: Michael Lozano MD    Allergies: Ibuprofen, Prednisone, Pregabalin, Tramadol, Levofloxacin, Sulfamethoxazole-trimethoprim    Isolation: Enh Drop/Con   Infection: COVID (confirmed) (11/29/22)   Code Status: CPR    Ht: 162.6 cm (64\")   Wt: 127 kg (280 lb)    Admission Cmt: None   Principal Problem: Bacteremia due to methicillin susceptible Staphylococcus aureus (MSSA) [R78.81,B95.61]                 Active Insurance as of 11/30/2022     Primary Coverage     Payor Plan Insurance Group Employer/Plan Group    [facility] MEDICAID [employer] [facility] [id number]     Payor Plan Address Payor Plan Phone Number Payor Plan Fax Number Effective Dates    MAIL STOP:   4/1/2017 - None Entered    [address] [address]       [facility] [address]       Subscriber Name Subscriber Birth Date Member ID       MARTIN MASON 1955 [id number]                 Emergency Contacts      (Rel.) Home Phone Work Phone Mobile Phone    LEIDY PEREZ (Daughter) [phone] -- [phone]    MICHELLEBARBARA DENT (Spouse) [phone] -- --    ForeAgnes caceres (Daughter) -- -- [phone]            Insurance Information                ANTH MEDICAID/HOOSIER CARE Well - [facility] Phone: --    Subscriber: Martin Mason Subscriber#: [id number]    Group#: [id number] Precert#: --          "

## 2022-12-03 LAB
ALBUMIN SERPL-MCNC: 3.5 G/DL (ref 3.5–5.2)
ALBUMIN/GLOB SERPL: 1 G/DL
ALP SERPL-CCNC: 67 U/L (ref 39–117)
ALT SERPL W P-5'-P-CCNC: 35 U/L (ref 1–33)
ANION GAP SERPL CALCULATED.3IONS-SCNC: 11 MMOL/L (ref 5–15)
AST SERPL-CCNC: 49 U/L (ref 1–32)
BASOPHILS # BLD AUTO: 0.1 10*3/MM3 (ref 0–0.2)
BASOPHILS NFR BLD AUTO: 1.4 % (ref 0–1.5)
BILIRUB SERPL-MCNC: 0.5 MG/DL (ref 0–1.2)
BUN SERPL-MCNC: 18 MG/DL (ref 8–23)
BUN/CREAT SERPL: 20.5 (ref 7–25)
CALCIUM SPEC-SCNC: 9.1 MG/DL (ref 8.6–10.5)
CHLORIDE SERPL-SCNC: 104 MMOL/L (ref 98–107)
CO2 SERPL-SCNC: 25 MMOL/L (ref 22–29)
CREAT SERPL-MCNC: 0.88 MG/DL (ref 0.57–1)
DEPRECATED RDW RBC AUTO: 44.2 FL (ref 37–54)
EGFRCR SERPLBLD CKD-EPI 2021: 72.1 ML/MIN/1.73
EOSINOPHIL # BLD AUTO: 0.1 10*3/MM3 (ref 0–0.4)
EOSINOPHIL NFR BLD AUTO: 1.2 % (ref 0.3–6.2)
ERYTHROCYTE [DISTWIDTH] IN BLOOD BY AUTOMATED COUNT: 14.1 % (ref 12.3–15.4)
GLOBULIN UR ELPH-MCNC: 3.4 GM/DL
GLUCOSE BLDC GLUCOMTR-MCNC: 303 MG/DL (ref 70–105)
GLUCOSE BLDC GLUCOMTR-MCNC: 306 MG/DL (ref 70–105)
GLUCOSE BLDC GLUCOMTR-MCNC: 324 MG/DL (ref 70–105)
GLUCOSE BLDC GLUCOMTR-MCNC: 339 MG/DL (ref 70–105)
GLUCOSE SERPL-MCNC: 350 MG/DL (ref 65–99)
HCT VFR BLD AUTO: 41.3 % (ref 34–46.6)
HGB BLD-MCNC: 13.4 G/DL (ref 12–15.9)
LYMPHOCYTES # BLD AUTO: 2.8 10*3/MM3 (ref 0.7–3.1)
LYMPHOCYTES NFR BLD AUTO: 37.9 % (ref 19.6–45.3)
MAGNESIUM SERPL-MCNC: 1.7 MG/DL (ref 1.6–2.4)
MCH RBC QN AUTO: 29.1 PG (ref 26.6–33)
MCHC RBC AUTO-ENTMCNC: 32.5 G/DL (ref 31.5–35.7)
MCV RBC AUTO: 89.7 FL (ref 79–97)
MONOCYTES # BLD AUTO: 0.8 10*3/MM3 (ref 0.1–0.9)
MONOCYTES NFR BLD AUTO: 10.7 % (ref 5–12)
NEUTROPHILS NFR BLD AUTO: 3.6 10*3/MM3 (ref 1.7–7)
NEUTROPHILS NFR BLD AUTO: 48.8 % (ref 42.7–76)
NRBC BLD AUTO-RTO: 0.1 /100 WBC (ref 0–0.2)
PHOSPHATE SERPL-MCNC: 3.1 MG/DL (ref 2.5–4.5)
PLATELET # BLD AUTO: 298 10*3/MM3 (ref 140–450)
PMV BLD AUTO: 9 FL (ref 6–12)
POTASSIUM SERPL-SCNC: 4.4 MMOL/L (ref 3.5–5.2)
PROT SERPL-MCNC: 6.9 G/DL (ref 6–8.5)
RBC # BLD AUTO: 4.61 10*6/MM3 (ref 3.77–5.28)
SODIUM SERPL-SCNC: 140 MMOL/L (ref 136–145)
WBC NRBC COR # BLD: 7.4 10*3/MM3 (ref 3.4–10.8)

## 2022-12-03 PROCEDURE — 84100 ASSAY OF PHOSPHORUS: CPT | Performed by: INTERNAL MEDICINE

## 2022-12-03 PROCEDURE — 82962 GLUCOSE BLOOD TEST: CPT

## 2022-12-03 PROCEDURE — 63710000001 INSULIN GLARGINE PER 5 UNITS: Performed by: INTERNAL MEDICINE

## 2022-12-03 PROCEDURE — 83735 ASSAY OF MAGNESIUM: CPT | Performed by: INTERNAL MEDICINE

## 2022-12-03 PROCEDURE — 80053 COMPREHEN METABOLIC PANEL: CPT | Performed by: INTERNAL MEDICINE

## 2022-12-03 PROCEDURE — 25010000002 CEFAZOLIN PER 500 MG: Performed by: INTERNAL MEDICINE

## 2022-12-03 PROCEDURE — C1751 CATH, INF, PER/CENT/MIDLINE: HCPCS

## 2022-12-03 PROCEDURE — 63710000001 INSULIN LISPRO (HUMAN) PER 5 UNITS: Performed by: NURSE PRACTITIONER

## 2022-12-03 PROCEDURE — 99232 SBSQ HOSP IP/OBS MODERATE 35: CPT | Performed by: INTERNAL MEDICINE

## 2022-12-03 PROCEDURE — 85025 COMPLETE CBC W/AUTO DIFF WBC: CPT | Performed by: NURSE PRACTITIONER

## 2022-12-03 PROCEDURE — 94799 UNLISTED PULMONARY SVC/PX: CPT

## 2022-12-03 RX ORDER — HYDRALAZINE HYDROCHLORIDE 25 MG/1
25 TABLET, FILM COATED ORAL EVERY 12 HOURS SCHEDULED
Status: DISCONTINUED | OUTPATIENT
Start: 2022-12-03 | End: 2022-12-04 | Stop reason: HOSPADM

## 2022-12-03 RX ADMIN — NYSTATIN: 100000 POWDER TOPICAL at 20:46

## 2022-12-03 RX ADMIN — HYDROCODONE BITARTRATE AND ACETAMINOPHEN 1 TABLET: 7.5; 325 TABLET ORAL at 11:43

## 2022-12-03 RX ADMIN — Medication 10 ML: at 20:45

## 2022-12-03 RX ADMIN — APIXABAN 5 MG: 5 TABLET, FILM COATED ORAL at 20:44

## 2022-12-03 RX ADMIN — CEFAZOLIN 2 G: 2 INJECTION, POWDER, FOR SOLUTION INTRAMUSCULAR; INTRAVENOUS at 03:07

## 2022-12-03 RX ADMIN — INSULIN LISPRO 10 UNITS: 100 INJECTION, SOLUTION INTRAVENOUS; SUBCUTANEOUS at 08:56

## 2022-12-03 RX ADMIN — HYDROCODONE BITARTRATE AND ACETAMINOPHEN 1 TABLET: 7.5; 325 TABLET ORAL at 20:44

## 2022-12-03 RX ADMIN — ACETAMINOPHEN 650 MG: 325 TABLET, FILM COATED ORAL at 03:52

## 2022-12-03 RX ADMIN — ATORVASTATIN CALCIUM 20 MG: 20 TABLET, FILM COATED ORAL at 08:56

## 2022-12-03 RX ADMIN — DILTIAZEM HYDROCHLORIDE 240 MG: 240 CAPSULE, COATED, EXTENDED RELEASE ORAL at 08:56

## 2022-12-03 RX ADMIN — INSULIN LISPRO 10 UNITS: 100 INJECTION, SOLUTION INTRAVENOUS; SUBCUTANEOUS at 18:21

## 2022-12-03 RX ADMIN — Medication 10 ML: at 08:58

## 2022-12-03 RX ADMIN — METOPROLOL TARTRATE 25 MG: 25 TABLET, FILM COATED ORAL at 20:44

## 2022-12-03 RX ADMIN — INSULIN LISPRO 10 UNITS: 100 INJECTION, SOLUTION INTRAVENOUS; SUBCUTANEOUS at 11:44

## 2022-12-03 RX ADMIN — METOPROLOL TARTRATE 25 MG: 25 TABLET, FILM COATED ORAL at 08:56

## 2022-12-03 RX ADMIN — HYDRALAZINE HYDROCHLORIDE 25 MG: 25 TABLET, FILM COATED ORAL at 20:44

## 2022-12-03 RX ADMIN — CEFAZOLIN 2 G: 2 INJECTION, POWDER, FOR SOLUTION INTRAMUSCULAR; INTRAVENOUS at 11:43

## 2022-12-03 RX ADMIN — INSULIN GLARGINE 25 UNITS: 100 INJECTION, SOLUTION SUBCUTANEOUS at 20:44

## 2022-12-03 RX ADMIN — CEFAZOLIN 2 G: 2 INJECTION, POWDER, FOR SOLUTION INTRAMUSCULAR; INTRAVENOUS at 18:20

## 2022-12-03 RX ADMIN — APIXABAN 5 MG: 5 TABLET, FILM COATED ORAL at 08:56

## 2022-12-03 RX ADMIN — NYSTATIN: 100000 POWDER TOPICAL at 08:58

## 2022-12-03 NOTE — CASE MANAGEMENT/SOCIAL WORK
Discharge Planning Assessment  [facility]     Patient Name: Caterina Mason  MRN: [id number]  Today's Date: 12/3/2022    Admit Date: 11/30/2022    Plan: D/C Plan: Home with [facility] for IV Abx, accepted. Amb care for labs and .   Discharge Needs Assessment    No documentation.                Discharge Plan     Row Name 12/03/22 1406       Plan    Plan D/C Plan: Home with [facility] for IV Abx, accepted. Amb care for labs and .    Plan Comments Spoke with Ally at [facility] who will begin IV Abx set up and check benefits. Spoke with pt Holli king and her Daughter, Estella, to discuss home IV abx, verbalized understanding. SC to Yenifer Santa, [facility] to inform her that pt will need to be seen upon dc for her Labs and .              Continued Care and Services - Admitted Since 11/30/2022     Dialysis/Infusion     Service Provider Request Status Selected Services Address Phone Fax Patient Preferred    [facility] Accepted N/A [address] [address] [phone] --          Home Medical Care     Service Provider Request Status Selected Services Address Phone Fax Patient Preferred    [facility] includes the [facility] Pending - Request Sent N/A [address] [facility] [address] [phone] --              Expected Discharge Date and Time     Expected Discharge Date Expected Discharge Time    Dec 3, 2022          Demographic Summary    No documentation.                Functional Status    No documentation.                          Shanice Van

## 2022-12-03 NOTE — CASE MANAGEMENT/SOCIAL WORK
Continued Stay Note  MARIEL Doc     Patient Name: Caterina Mason  MRN: [id number]  Today's Date: 12/3/2022    Admit Date: 11/30/2022    Plan: D/C Plan: Home IV ABX with [facility] if benefits approved or through amb Care if needed.   Discharge Plan     Row Name 12/03/22 1528       Plan    Plan D/C Plan: Home IV ABX with [facility] if benefits approved or through amb Care if needed.    Plan Comments CM contacted by Klaudia with [facility] who states that when verifying pt benefits she was told they would deny bc pt should have applied for Medicare at age 65. Pt states she attempted to apply for Medicare and was told she wasn't eligible and that she would cont on her Medicaid plan. Contacted Klaudia to update her with this info and she will try again to verify that the cost of home antibiotics would be covered. Pt currently on Q 8 hr abx and SC sent to Dr. Mendez to see if there was a daily option avail if pt needs to have them done through amb care due to insurance coverage, awaiting response. Spoke with pt and family at bedside who are argreeable to come to amb care daily for abx if needed. D/C barriers: JEOVANNY,  PICC placement, final IV abx plan.    Row Name 12/03/22 9254       Plan    Plan D/C Plan: Home with [facility] for IV Abx, accepted. Amb care for labs and .    Plan Comments Spoke with Klaudia at [facility] who will begin IV Abx set up and check benefits. Spoke with pt Holli king and her Daughter, Estella, to discuss home IV abx, verbalized understanding. SC to Yenifer Santa, Amb care to inform her that pt will need to be seen upon dc for her Labs and .                   Expected Discharge Date and Time     Expected Discharge Date Expected Discharge Time    Dec 3, 2022    Met with patient in room wearing PPE: mask    Maintained distance greater than six feet and spent less than 15 minutes in the room    Shanice Van RN    Phone [phone]  Fax [phone]

## 2022-12-03 NOTE — CASE MANAGEMENT/SOCIAL WORK
Continued Stay Note   Doc     Patient Name: Caterina Mason  MRN: [id number]  Today's Date: 12/3/2022    Admit Date: 11/30/2022    Plan: D/C Plan: Home IV Abx through [facility], Benefits verified. Amb Care for Labs and Line maintanence.   Discharge Plan     Row Name 12/03/22 1634       Plan    Plan D/C Plan: Home IV Abx through [facility], Benefits verified. [facility] for Labs and Line maintanence.    Plan Comments Per Klaudia with [facility], pt benefits were verified. Updated pt, family, MD and Nursing Staff.    Row Name 12/03/22 5318       Plan    Plan D/C Plan: Home IV ABX with [facility] if benefits approved or through amb Care if needed.    Plan Comments CM contacted by Klaudia with [facility] who states that when verifying pt benefits she was told they would deny bc pt should have applied for Medicare at age 65. Pt states she attempted to apply for Medicare and was told she wasn't eligible and that she would cont on her Medicaid plan. Contacted Klaudia to update her with this info and she will try again to verify that the cost of home antibiotics would be covered. Pt currently on Q 8 hr abx and SC sent to Dr. Mendez to see if there was a daily option avail if pt needs to have them done through amb care due to insurance coverage, awaiting response. Spoke with pt and family at bedside who are argreeable to come to amb care daily for abx if needed. D/C barriers: JEOVANNY,  PICC placement, final IV abx plan.    Row Name 12/03/22 1406       Plan    Plan D/C Plan: Home with [facility] for IV Abx, accepted. Amb care for labs and .    Plan Comments Spoke with Klaudia at [facility] who will begin IV Abx set up and check benefits. Spoke with pt Holli king and her Daughter, Etsella, to discuss home IV abx, verbalized understanding. SC to Yenifer Santa, [facility] care to inform her that pt will need to be seen upon dc for her Labs and .                Expected Discharge Date and Time     Expected  Discharge Date Expected Discharge Time    Dec 3, 2022      Shanice Van RN    Phone [phone]  Fax [phone]  Phone communication or documentation only - no physical contact with patient or family.

## 2022-12-03 NOTE — PROGRESS NOTES
[facility] Daily Progress Note    Patient Name: Caterina Mason  : 1955  MRN: [id number]  Primary Care Physician:  Lou Curran MD  Date of admission: 2022      Subjective    Chief Complaint: Shortness of breath.        Patient Reports she is feeling okay.    Heart rate no longer elevated.  Home meds resumed.  Breathing easier.  Hopefully home tomorrow.     ROS negative except as above    Objective      Vitals:   Temp:  [97.6 °F (36.4 °C)-98.2 °F (36.8 °C)] 97.8 °F (36.6 °C)  Heart Rate:  [68-86] 86  Resp:  [16-18] 18  BP: (111-164)/(48-71) 164/71  Flow (L/min):  [2] 2    Physical Exam  Vitals reviewed.   Constitutional:       Comments: Wearing nasal cannula   HENT:      Head: Normocephalic.      Nose: Nose normal.      Mouth/Throat:      Mouth: Mucous membranes are moist.   Cardiovascular:      Rate and Rhythm: Tachycardia  has resolved     Pulses: Normal pulses.      Heart sounds: Normal heart sounds.   Pulmonary:      Effort: Pulmonary effort is normal.      Breath sounds: Normal breath sounds.   Abdominal:      General: Abdomen is flat.      Palpations: Abdomen is soft.   Musculoskeletal:         General: Normal range of motion.      Cervical back: Normal range of motion.   Skin:     General: Skin is warm.   Neurological:      General: No focal deficit present.      Mental Status: She is alert and oriented to person, place, and time.   Psychiatric:         Mood and Affect: Mood normal.         Behavior: Behavior normal.        Result Review    Result Review:  I have personally reviewed the results from the time of this admission to 2022 20:26 EST and agree with these findings:  [x]  Laboratory  []  Microbiology  []  Radiology  []  EKG/Telemetry   []  Cardiology/Vascular   []  Pathology  []  Old records  []  Other:  Most notable findings include: Glucose 324         Assessment & Plan       Brief Patient Summary:  Caterina Mason is a 67 y.o.  female who presents with COVID infection and bacteremia        apixaban, 5 mg, Oral, Q12H  ceFAZolin, 2 g, Intravenous, Q8H  dilTIAZem CD, 240 mg, Oral, Q24H  insulin glargine, 25 Units, Subcutaneous, Nightly  insulin lispro, 3-14 Units, Subcutaneous, TID With Meals  metoprolol tartrate, 25 mg, Oral, Q12H  sodium chloride, 10 mL, Intravenous, Q12H               Active Hospital Problems:       Active Hospital Problems     Diagnosis     • **Bacteremia due to methicillin susceptible Staphylococcus aureus (MSSA)        MSSA bacteremia  - Given 1 dose of cefazolin in ER  - Start Rocephin 2g daily x10 days due to it being easier for outpatient administration purposes versus cefazolin that is every 8 hours  - ID consulted  - 2D echo to rule out endocarditis  -Needs JEOVANNY.  Blood cultures repeated     COVID-19 infection  - Tested positive on 11/29/2022  - Currently on room air  - Continue Decadron that was started in ER last night  - Symbicort and albuterol  - Monitor daily labs and provide supportive care as needed     Type 2 diabetes  - Glucose 492  - Last A1c 11.5 on 5/31/2022.  New Hemoglobin A1c ordered.  - Sliding scale management ordered  - Healthy heart/consistent carb diet     COPD  Obstructive sleep apnea  - Currently on room air  - Uses CPAP at home and follows   - CPAP at bedtime/as needed  - Symbicort and albuterol     Hypertension  - /68  - Metoprolol and lisinopril  - Lasix and K-Dur.  Monitor daily labs.     Hyperlipidemia  - Lipid panel ordered  - Fenofibrate     History of A. fib  - EKG ordered  - Eliquis and Cardizem     Patient having runs of V. tach  Dr. Charles her cardiologist consulted.  Rhythm stabilized December 2     Genital yeast  Fluconazole ordered x1     DVT prophylaxis:  -Eliquis     CODE STATUS:    Admission Status:  I believe this patient meets inpatient status.     I discussed the patient's findings and my recommendations with patient.     This patient has been examined wearing  appropriate Personal Protective Equipment and discussed with patient.   12/02/22      Electronically signed by Michael Lozano MD, 12/02/22, 20:26 EST.  [facility] Hospitalist Team

## 2022-12-03 NOTE — PLAN OF CARE
Goal Outcome Evaluation:  Plan of Care Reviewed With: patient           Outcome Evaluation: Continued IV abx. Pt 94% on RA. Pt voiced no new concerns at this time. Will continue to monitor.

## 2022-12-03 NOTE — PROGRESS NOTES
LOS: 3 days   Admiting Physician- Michael Lozano MD    Reason For Followup:    COVID-19 infection  Chronic atrial fibrillation  Bacteremia  Hypertension    Subjective     Patient is feeling better.    Objective     Blood pressure is elevated    Review of Systems:   Review of Systems   Constitutional: Negative for chills and fever.   HENT: Negative for ear discharge and nosebleeds.    Eyes: Negative for discharge and redness.   Cardiovascular: Negative for chest pain, orthopnea, palpitations, paroxysmal nocturnal dyspnea and syncope.   Respiratory: Positive for cough and shortness of breath. Negative for wheezing.    Endocrine: Negative for heat intolerance.   Skin: Negative for rash.   Musculoskeletal: Negative for arthritis and myalgias.   Gastrointestinal: Negative for abdominal pain, melena, nausea and vomiting.   Genitourinary: Negative for dysuria and hematuria.   Neurological: Negative for dizziness, light-headedness, numbness and tremors.   Psychiatric/Behavioral: Negative for depression. The patient is not nervous/anxious.          Vital Signs  Vitals:    12/03/22 0032 12/03/22 0354 12/03/22 0800 12/03/22 1200   BP: 138/60 151/72 169/69 164/72   BP Location:  Right arm Right arm Right arm   Patient Position:  Sitting Sitting Sitting   Pulse: 80 64 72 75   Resp: 20 16 20 24   Temp: 96.5 °F (35.8 °C) 97.1 °F (36.2 °C) 97.3 °F (36.3 °C) 97.3 °F (36.3 °C)   TempSrc: Oral Oral Oral Oral   SpO2: 95% 96% 93% 96%   Weight:       Height:         Wt Readings from Last 1 Encounters:   11/30/22 127 kg (280 lb)       Intake/Output Summary (Last 24 hours) at 12/3/2022 1507  Last data filed at 12/3/2022 1300  Gross per 24 hour   Intake 1200 ml   Output 650 ml   Net 550 ml     Physical Exam:  Physical Exam    Results Review:   Lab Results (last 24 hours)     Procedure Component Value Units Date/Time    POC Glucose Once [[id number]]  (Abnormal) Collected: 12/03/22 1106    Specimen: Blood Updated: 12/03/22 1107     Glucose  306 mg/dL      Comment: Serial Number: [id number]:  [id number]       Blood Culture - Blood, Arm, Right [[id number]]  (Normal) Collected: 12/01/22 0835    Specimen: Blood from Arm, Right Updated: 12/03/22 0915     Blood Culture No growth at 2 days    Narrative:      Less than seven (7) mL's of blood was collected.  Insufficient quantity may yield false negative results.    Blood Culture - Blood, Hand, Right [[id number]]  (Normal) Collected: 12/01/22 0835    Specimen: Blood from Hand, Right Updated: 12/03/22 0915     Blood Culture No growth at 2 days    POC Glucose Once [[id number]]  (Abnormal) Collected: 12/03/22 0725    Specimen: Blood Updated: 12/03/22 0726     Glucose 303 mg/dL      Comment: Serial Number: [id number]:  [id number]       Comprehensive Metabolic Panel [[id number]]  (Abnormal) Collected: 12/03/22 0539    Specimen: Blood Updated: 12/03/22 0641     Glucose 350 mg/dL      BUN 18 mg/dL      Creatinine 0.88 mg/dL      Sodium 140 mmol/L      Potassium 4.4 mmol/L      Comment: Slight hemolysis detected by analyzer. Results may be affected.        Chloride 104 mmol/L      CO2 25.0 mmol/L      Calcium 9.1 mg/dL      Total Protein 6.9 g/dL      Albumin 3.50 g/dL      ALT (SGPT) 35 U/L      AST (SGOT) 49 U/L      Alkaline Phosphatase 67 U/L      Total Bilirubin 0.5 mg/dL      Globulin 3.4 gm/dL      A/G Ratio 1.0 g/dL      BUN/Creatinine Ratio 20.5     Anion Gap 11.0 mmol/L      eGFR 72.1 mL/min/1.73      Comment: National Kidney Foundation and American Society of Nephrology (ASN) Task Force recommended calculation based on the Chronic Kidney Disease Epidemiology Collaboration (CKD-EPI) equation refit without adjustment for race.       Narrative:      GFR Normal >60  Chronic Kidney Disease <60  Kidney Failure <15      Phosphorus [[id number]]  (Normal) Collected: 12/03/22 0539    Specimen: Blood Updated: 12/03/22 0641     Phosphorus 3.1 mg/dL     Magnesium [[id number]]  (Normal) Collected: 12/03/22 0539     Specimen: Blood Updated: 12/03/22 0641     Magnesium 1.7 mg/dL     CBC & Differential [[id number]]  (Normal) Collected: 12/03/22 0539    Specimen: Blood Updated: 12/03/22 0609    Narrative:      The following orders were created for panel order CBC & Differential.  Procedure                               Abnormality         Status                     ---------                               -----------         ------                     CBC Auto Differential[[id number]]        Normal              Final result                 Please view results for these tests on the individual orders.    CBC Auto Differential [[id number]]  (Normal) Collected: 12/03/22 0539    Specimen: Blood Updated: 12/03/22 0609     WBC 7.40 10*3/mm3      RBC 4.61 10*6/mm3      Hemoglobin 13.4 g/dL      Hematocrit 41.3 %      MCV 89.7 fL      MCH 29.1 pg      MCHC 32.5 g/dL      RDW 14.1 %      RDW-SD 44.2 fl      MPV 9.0 fL      Platelets 298 10*3/mm3      Neutrophil % 48.8 %      Lymphocyte % 37.9 %      Monocyte % 10.7 %      Eosinophil % 1.2 %      Basophil % 1.4 %      Neutrophils, Absolute 3.60 10*3/mm3      Lymphocytes, Absolute 2.80 10*3/mm3      Monocytes, Absolute 0.80 10*3/mm3      Eosinophils, Absolute 0.10 10*3/mm3      Basophils, Absolute 0.10 10*3/mm3      nRBC 0.1 /100 WBC     POC Glucose Once [[id number]]  (Abnormal) Collected: 12/02/22 1643    Specimen: Blood Updated: 12/02/22 1648     Glucose 319 mg/dL      Comment: Serial Number: [id number]:  [id number]           Imaging Results (Last 72 Hours)     ** No results found for the last 72 hours. **        ECG/EMG Results (most recent)     Procedure Component Value Units Date/Time    ECG 12 Lead Other; History of A. fib [[id number]] Collected: 12/01/22 1036     Updated: 12/01/22 1037     QT Interval 363 ms     Narrative:      HEART RATE= 118  bpm  RR Interval= 506  ms  VT Interval=   ms  P Horizontal Axis=   deg  P Front Axis=   deg  QRSD Interval= 152  ms  QT Interval= 363  ms  QRS  Axis= -86  deg  T Wave Axis= 3  deg  - ABNORMAL ECG -  Atrial fibrillation  Ventricular premature complex  RBBB and LAFB  When compared with ECG of 05-Oct-2021 9:44:48,  Significant change in rhythm  Electronically Signed By:   Date and Time of Study: 2022-12-01 10:36:38    SCANNED - TELEMETRY   [[id number]] Resulted: 11/30/22     Updated: 12/01/22 1055    Adult Transthoracic Echo Limited W/ Cont if Necessary Per Protocol [[id number]] Resulted: 12/02/22 1028     Updated: 12/02/22 1031     Target HR (85%) 130 bpm      Max. Pred. HR (100%) 153 bpm      LV Sys Vol (BSA corrected) 13.4 cm2      LV Anthony Vol (BSA corrected) 31.7 cm2      EDV(MOD-sp4) 71.6 ml      ESV(MOD-sp4) 30.3 ml      SV(MOD-sp4) 41.3 ml      SI(MOD-sp4) 18.3 ml/m2      EF(MOD-sp4) 57.6 %      EF(MOD-bp) 57.0 %     Narrative:      •  Left ventricular systolic function is normal. Left ventricular ejection   fraction appears to be 56 - 60%.  •  Left ventricular diastolic function was normal.      SCANNED - TELEMETRY   [[id number]] Resulted: 11/30/22     Updated: 12/02/22 1119    SCANNED - TELEMETRY   [[id number]] Resulted: 11/30/22     Updated: 12/02/22 1124    SCANNED - TELEMETRY   [[id number]] Resulted: 11/30/22     Updated: 12/02/22 1142    SCANNED - TELEMETRY   [[id number]] Resulted: 11/30/22     Updated: 12/02/22 1306        CBC    Results from last 7 days   Lab Units 12/03/22  0539 12/02/22  0449 12/01/22 0448 11/30/22  1604 11/29/22  2049   WBC 10*3/mm3 7.40 10.90* 13.90* 9.10 9.00   HEMOGLOBIN g/dL 13.4 13.4 14.5 14.9 14.5   PLATELETS 10*3/mm3 298 328 341 308 289     BMP   Results from last 7 days   Lab Units 12/03/22  0539 12/02/22  0449 12/01/22 0448 11/30/22  1603 11/29/22  2049   SODIUM mmol/L 140 139 135* 138 141   POTASSIUM mmol/L 4.4 4.2 4.4 4.3 3.8   CHLORIDE mmol/L 104 100 96* 98 100   CO2 mmol/L 25.0 28.0 27.0 25.0 26.0   BUN mg/dL 18 23 27* 23 16   CREATININE mg/dL 0.88 1.03* 1.03* 1.02* 0.89   GLUCOSE mg/dL 350* 324* 447* 492* 242*    MAGNESIUM mg/dL 1.7 1.6 1.9  --   --    PHOSPHORUS mg/dL 3.1 3.2 3.3  --   --      CMP   Results from last 7 days   Lab Units 12/03/22  0539 12/02/22  0449 12/01/22  0448 11/30/22  1603 11/29/22  2049   SODIUM mmol/L 140 139 135* 138 141   POTASSIUM mmol/L 4.4 4.2 4.4 4.3 3.8   CHLORIDE mmol/L 104 100 96* 98 100   CO2 mmol/L 25.0 28.0 27.0 25.0 26.0   BUN mg/dL 18 23 27* 23 16   CREATININE mg/dL 0.88 1.03* 1.03* 1.02* 0.89   GLUCOSE mg/dL 350* 324* 447* 492* 242*   ALBUMIN g/dL 3.50 3.40* 4.00 4.30 4.10   BILIRUBIN mg/dL 0.5 0.4 0.6 1.0 1.0   ALK PHOS U/L 67 67 69 67 69   AST (SGOT) U/L 49* 31 23 31 39*   ALT (SGPT) U/L 35* 32 35* 43* 43*   LIPASE U/L  --   --   --   --  42     Cardiac Studies:  Echo- Results for orders placed during the hospital encounter of 11/30/22    Adult Transthoracic Echo Limited W/ Cont if Necessary Per Protocol    Interpretation Summary  •  Left ventricular systolic function is normal. Left ventricular ejection fraction appears to be 56 - 60%.  •  Left ventricular diastolic function was normal.    Stress Myoview-  Cath-      Medication Review:   Scheduled Meds:apixaban, 5 mg, Oral, Q12H  atorvastatin, 20 mg, Oral, Daily  ceFAZolin, 2 g, Intravenous, Q8H  dilTIAZem CD, 240 mg, Oral, Q24H  insulin glargine, 25 Units, Subcutaneous, Nightly  insulin lispro, 3-14 Units, Subcutaneous, TID With Meals  metoprolol tartrate, 25 mg, Oral, Q12H  nystatin, , Topical, Q12H  sodium chloride, 10 mL, Intravenous, Q12H      Continuous Infusions:   PRN Meds:.•  acetaminophen  •  dextrose  •  dextrose  •  glucagon (human recombinant)  •  hydrALAZINE  •  HYDROcodone-acetaminophen  •  magnesium sulfate **OR** magnesium sulfate **OR** magnesium sulfate  •  ondansetron  •  potassium & sodium phosphates **OR** potassium & sodium phosphates  •  potassium chloride  •  potassium chloride  •  [COMPLETED] Insert Peripheral IV **AND** sodium chloride  •  sodium chloride  •  sodium chloride      Assessment & Plan    Patient Active Problem List   Diagnosis   • Arthritis   • Bradycardia   • Chronic obstructive pulmonary disease ([facility])   • Depression   • Diabetic peripheral neuropathy ([facility])   • Gastroesophageal reflux disease   • Mixed hyperlipidemia   • Hypomagnesemia   • Lumbar radiculopathy   • Myalgia   • Paroxysmal atrial fibrillation ([facility])   • Shoulder pain, right   • Sleep apnea   • Type 2 diabetes mellitus with hyperglycemia, with long-term current use of insulin ([facility])   • Vitamin D deficiency   • Palpitations   • PVC's (premature ventricular contractions)   • Essential hypertension   • Cervical radiculitis   • Arthralgia of right temporomandibular joint   • Shortness of breath   • Bilateral leg edema   • Chronic diastolic CHF (congestive heart failure) ([facility])   • OAB (overactive bladder)   • Need for vaccination   • Welcome to Medicare preventive visit   • Class 2 obesity due to excess calories without serious comorbidity with body mass index (BMI) of 39.0 to 39.9 in adult   • Hip pain   • Wellness examination   • Onychomycosis   • Atrial fibrillation with RVR ([facility])   • Chest pain, atypical   • Elevated LFTs   • Cholelithiasis   • Vagina, candidiasis   • Tinea corporis   • Pre-op examination   • Bacteremia due to methicillin susceptible Staphylococcus aureus (MSSA)     MDM:    1.  Bacteremia:    Patient has bacteremia.  There were 2 bottles collected at the same time and they are growing methicillin sensitive staph aureus.  ID requested JEOVANNY.  However patient echocardiogram showed no vegetation.  At this stage I would recommend to defer JEOVANNY and treat empirically for bacteremia.  If needed I would proceed with JEOVANNY after 1 to 2 weeks if clinically indicated.    2.  COVID-19 infection:    Patient is slowly getting better I will recommend continue current treatment.    3.  Hypertension:    I would add hydralazine for control of blood pressure    4.  Chronic atrial fibrillation:    Patient heart rate is much better.    Jesse GAMBOA  MD Mel[place]  12/03/22  15:07 EST

## 2022-12-03 NOTE — PROGRESS NOTES
[facility] Medicine Services Daily Progress Note    Patient Name: Caterina Mason  : 1955  MRN: [id number]  Primary Care Physician:  Lou Curran MD  Date of admission: 2022      Subjective      Chief Complaint: Shortness of breath.        Patient Reports she is feeling okay.    Heart rate no longer elevated.  Home meds resumed.  Breathing easier.    Has bacteremia and needs a JEOVANNY but has COVID at this time.  Will need IV antibiotics.     ROS negative except as above    Objective      Vitals:   Temp:  [96.5 °F (35.8 °C)-97.8 °F (36.6 °C)] 97.3 °F (36.3 °C)  Heart Rate:  [64-86] 75  Resp:  [16-24] 24  BP: (138-169)/(60-72) 164/72  Flow (L/min):  [2] 2    Physical Exam  Vitals reviewed.   Constitutional:       Comments: Wearing nasal cannula   HENT:      Head: Normocephalic.      Nose: Nose normal.      Mouth/Throat:      Mouth: Mucous membranes are moist.   Cardiovascular:      Rate and Rhythm: Tachycardia  has resolved     Pulses: Normal pulses.      Heart sounds: Normal heart sounds.   Pulmonary:      Effort: Pulmonary effort is normal.      Breath sounds: Normal breath sounds.   Abdominal:      General: Abdomen is flat.      Palpations: Abdomen is soft.   Musculoskeletal:         General: Normal range of motion.      Cervical back: Normal range of motion.   Skin:     General: Skin is warm.   Neurological:      General: No focal deficit present.      Mental Status: She is alert and oriented to person, place, and time.   Psychiatric:         Mood and Affect: Mood normal.         Behavior: Behavior normal.        Result Review    Result Review:  I have personally reviewed the results from the time of this admission to 12/3/2022 17:23 EST and agree with these findings:  [x]  Laboratory  []  Microbiology  []  Radiology  []  EKG/Telemetry   []  Cardiology/Vascular   []  Pathology  []  Old records  []  Other:  Most notable findings include: Glucose 350         Assessment &  Plan       Brief Patient Summary:  Caterina Mason is a 67 y.o. female who presents with COVID infection and bacteremia        apixaban, 5 mg, Oral, Q12H  ceFAZolin, 2 g, Intravenous, Q8H  dilTIAZem CD, 240 mg, Oral, Q24H  insulin glargine, 25 Units, Subcutaneous, Nightly  insulin lispro, 3-14 Units, Subcutaneous, TID With Meals  metoprolol tartrate, 25 mg, Oral, Q12H  sodium chloride, 10 mL, Intravenous, Q12H               Active Hospital Problems:          Active Hospital Problems     Diagnosis     • **Bacteremia due to methicillin susceptible Staphylococcus aureus (MSSA)        MSSA bacteremia  - Given 1 dose of cefazolin in ER  - Start Rocephin 2g daily x10 days due to it being easier for outpatient administration purposes versus cefazolin that is every 8 hours  - ID consulted  - 2D echo to rule out endocarditis  -Needs JEOVANNY.  Blood cultures repeated.  Unable to get JEOVANNY due to COVID  -Most likely we will need IV antibiotics on discharge     COVID-19 infection  - Tested positive on 11/29/2022  - Currently on room air  - Continue Decadron that was started in ER last night  - Symbicort and albuterol  - Monitor daily labs and provide supportive care as needed     Type 2 diabetes  - Glucose 492  - Last A1c 11.5 on 5/31/2022.  New Hemoglobin A1c ordered.  - Sliding scale management ordered  - Healthy heart/consistent carb diet     COPD  Obstructive sleep apnea  - Currently on room air  - Uses CPAP at home and follows   - CPAP at bedtime/as needed  - Symbicort and albuterol     Hypertension  - /68  - Metoprolol and lisinopril  - Lasix and K-Dur.  Monitor daily labs.     Hyperlipidemia  - Lipid panel ordered  - Fenofibrate     History of A. fib  - EKG ordered  - Eliquis and Cardizem     Patient having runs of V. tach  Dr. Charles her cardiologist consulted.  Rhythm stabilized December 2     Genital yeast  Fluconazole ordered x1     DVT prophylaxis:  -Eliquis     CODE STATUS:    Admission Status:  I believe  this patient meets inpatient status.     I discussed the patient's findings and my recommendations with patient.     This patient has been examined wearing appropriate Personal Protective Equipment and discussed with patient.      12/03/22      Electronically signed by Mihcael Lozano MD, 12/03/22, 17:23 EST.  [facility]ist [facility]

## 2022-12-03 NOTE — PLAN OF CARE
Problem: Adult Inpatient Plan of Care  Goal: Plan of Care Review  Outcome: Ongoing, Progressing  Flowsheets (Taken 12/3/2022 1441)  Progress: improving  Plan of Care Reviewed With: patient  Outcome Evaluation: gave iv abx as order. possible discharge tomorrow. pain controlled by PRN meds.

## 2022-12-03 NOTE — PROGRESS NOTES
LOS: 3 days   Patient Care Team:  Lou Curran MD as PCP - General  Jesse Charles MD as Consulting Physician (Cardiology)  Bautista Archibald MD as Consulting Physician (Endocrinology)  Jane Montero MD as Consulting Physician (Obstetrics and Gynecology)        Subjective     Interval History:     Patient Complaints:   Patient Denies:  NV       Review of Systems:    Weak    Objective     Vital Signs  Temp:  [96.5 °F (35.8 °C)-98 °F (36.7 °C)] 97.3 °F (36.3 °C)  Heart Rate:  [64-86] 72  Resp:  [16-20] 20  BP: (138-169)/(57-72) 169/69    Physical Exam:     General Appearance:    Alert, cooperative, in no acute distress   Head:    Normocephalic, without obvious abnormality, atraumatic   Eyes:            Lids and lashes normal, conjunctivae and sclerae normal, no   icterus, no pallor, corneas clear, PERRLA   Ears:    Ears appear intact with no abnormalities noted   Throat:   No oral lesions, no thrush, oral mucosa moist   Neck:   No adenopathy, supple, trachea midline, no thyromegaly, no     carotid bruit, no JVD   Back:     No kyphosis present, no scoliosis present, no skin lesions,       erythema or scars, no tenderness to percussion or                   palpation,   range of motion normal   Lungs:     Clear to auscultation,respirations regular, even and                   unlabored    Heart:    Regular rhythm and normal rate, normal S1 and S2, no            murmur, no gallop, no rub, no click   Breast Exam:    Deferred   Abdomen:     Normal bowel sounds, no masses, no organomegaly, soft        non-tender, non-distended, no guarding, no rebound                 tenderness   Genitalia:    Deferred   Extremities:   Moves all extremities well, no edema, no cyanosis, no              redness   Pulses:   Pulses palpable and equal bilaterally   Skin:   No bleeding, bruising or rash   Lymph nodes:   No palpable adenopathy   Neurologic:   Cranial nerves 2 - 12 grossly intact, sensation intact, DTR        present and  equal bilaterally          Results Review:      Lab Results (last 72 hours)     Procedure Component Value Units Date/Time    Blood Culture - Blood, Arm, Right [[id number]]  (Normal) Collected: 12/01/22 0835    Specimen: Blood from Arm, Right Updated: 12/02/22 0915     Blood Culture No growth at 24 hours    Narrative:      Less than seven (7) mL's of blood was collected.  Insufficient quantity may yield false negative results.    Blood Culture - Blood, Hand, Right [[id number]]  (Normal) Collected: 12/01/22 0835    Specimen: Blood from Hand, Right Updated: 12/02/22 0915     Blood Culture No growth at 24 hours    POC Glucose Once [[id number]]  (Abnormal) Collected: 12/02/22 0740    Specimen: Blood Updated: 12/02/22 0846     Glucose 265 mg/dL      Comment: Serial Number: [id number]:  [id number]       Comprehensive Metabolic Panel [[id number]]  (Abnormal) Collected: 12/02/22 0449    Specimen: Blood Updated: 12/02/22 0639     Glucose 324 mg/dL      BUN 23 mg/dL      Creatinine 1.03 mg/dL      Sodium 139 mmol/L      Potassium 4.2 mmol/L      Chloride 100 mmol/L      CO2 28.0 mmol/L      Calcium 9.3 mg/dL      Total Protein 7.1 g/dL      Albumin 3.40 g/dL      ALT (SGPT) 32 U/L      AST (SGOT) 31 U/L      Alkaline Phosphatase 67 U/L      Total Bilirubin 0.4 mg/dL      Globulin 3.7 gm/dL      A/G Ratio 0.9 g/dL      BUN/Creatinine Ratio 22.3     Anion Gap 11.0 mmol/L      eGFR 59.7 mL/min/1.73      Comment: National Kidney Foundation and American Society of Nephrology (ASN) Task Force recommended calculation based on the Chronic Kidney Disease Epidemiology Collaboration (CKD-EPI) equation refit without adjustment for race.       Narrative:      GFR Normal >60  Chronic Kidney Disease <60  Kidney Failure <15      Phosphorus [[id number]]  (Normal) Collected: 12/02/22 0449    Specimen: Blood Updated: 12/02/22 0639     Phosphorus 3.2 mg/dL     Magnesium [[id number]]  (Normal) Collected: 12/02/22 0449    Specimen: Blood Updated:  12/02/22 0639     Magnesium 1.6 mg/dL     CBC & Differential [[id number]]  (Abnormal) Collected: 12/02/22 0449    Specimen: Blood Updated: 12/02/22 0607    Narrative:      The following orders were created for panel order CBC & Differential.  Procedure                               Abnormality         Status                     ---------                               -----------         ------                     CBC Auto Differential[[id number]]        Abnormal            Final result                 Please view results for these tests on the individual orders.    CBC Auto Differential [[id number]]  (Abnormal) Collected: 12/02/22 0449    Specimen: Blood Updated: 12/02/22 0607     WBC 10.90 10*3/mm3      RBC 4.52 10*6/mm3      Hemoglobin 13.4 g/dL      Hematocrit 40.1 %      MCV 88.8 fL      MCH 29.7 pg      MCHC 33.4 g/dL      RDW 14.3 %      RDW-SD 46.4 fl      MPV 9.4 fL      Platelets 328 10*3/mm3      Neutrophil % 57.0 %      Lymphocyte % 30.9 %      Monocyte % 10.8 %      Eosinophil % 0.8 %      Basophil % 0.5 %      Neutrophils, Absolute 6.20 10*3/mm3      Lymphocytes, Absolute 3.40 10*3/mm3      Monocytes, Absolute 1.20 10*3/mm3      Eosinophils, Absolute 0.10 10*3/mm3      Basophils, Absolute 0.10 10*3/mm3      nRBC 0.2 /100 WBC     POC Glucose Once [[id number]]  (Abnormal) Collected: 12/01/22 1829    Specimen: Blood Updated: 12/01/22 1830     Glucose 300 mg/dL      Comment: Serial Number: [id number]:  [id number]       POC Glucose Once [[id number]]  (Abnormal) Collected: 12/01/22 1155    Specimen: Blood Updated: 12/01/22 1159     Glucose 294 mg/dL      Comment: Serial Number: [id number]:  [id number]       Hemoglobin A1c [[id number]]  (Abnormal) Collected: 12/01/22 0448    Specimen: Blood Updated: 12/01/22 1040     Hemoglobin A1C 11.8 %     Narrative:      Hemoglobin A1C Reference Range:    <5.7 %        Normal  5.7-6.4 %     Increased risk for diabetes  > 6.4 %        Diabetes       These guidelines  have been recommended by the American Diabetic Association for Hgb A1c.      The following 2010 guidelines have been recommended by the American Diabetes Association for Hemoglobin A1c.    HBA1c 5.7-6.4% Increased risk for future diabetes (pre-diabetes)  HBA1c     >6.4% Diabetes      POC Glucose Once [[id number]]  (Abnormal) Collected: 12/01/22 0721    Specimen: Blood Updated: 12/01/22 0722     Glucose 349 mg/dL      Comment: Serial Number: [id number]:  [id number]       POC Glucose Once [[id number]]  (Abnormal) Collected: 12/01/22 0544    Specimen: Blood Updated: 12/01/22 0546     Glucose 381 mg/dL      Comment: Serial Number: [id number]:  [id number]       Comprehensive Metabolic Panel [[id number]]  (Abnormal) Collected: 12/01/22 0448    Specimen: Blood Updated: 12/01/22 0538     Glucose 447 mg/dL      BUN 27 mg/dL      Creatinine 1.03 mg/dL      Sodium 135 mmol/L      Potassium 4.4 mmol/L      Chloride 96 mmol/L      CO2 27.0 mmol/L      Calcium 10.3 mg/dL      Total Protein 8.0 g/dL      Albumin 4.00 g/dL      ALT (SGPT) 35 U/L      AST (SGOT) 23 U/L      Alkaline Phosphatase 69 U/L      Total Bilirubin 0.6 mg/dL      Globulin 4.0 gm/dL      A/G Ratio 1.0 g/dL      BUN/Creatinine Ratio 26.2     Anion Gap 12.0 mmol/L      eGFR 59.7 mL/min/1.73      Comment: National Kidney Foundation and American Society of Nephrology (ASN) Task Force recommended calculation based on the Chronic Kidney Disease Epidemiology Collaboration (CKD-EPI) equation refit without adjustment for race.       Narrative:      GFR Normal >60  Chronic Kidney Disease <60  Kidney Failure <15      Phosphorus [[id number]]  (Normal) Collected: 12/01/22 0448    Specimen: Blood Updated: 12/01/22 0534     Phosphorus 3.3 mg/dL     Magnesium [[id number]]  (Normal) Collected: 12/01/22 0448    Specimen: Blood Updated: 12/01/22 0534     Magnesium 1.9 mg/dL     Lipid Panel [[id number]]  (Abnormal) Collected: 12/01/22 0448    Specimen: Blood Updated:  12/01/22 0534     Total Cholesterol 151 mg/dL      Triglycerides 197 mg/dL      HDL Cholesterol 37 mg/dL      LDL Cholesterol  81 mg/dL      VLDL Cholesterol 33 mg/dL      LDL/HDL Ratio 2.02    Narrative:      Cholesterol Reference Ranges  (U.S. Department of Health and Human Services ATP III Classifications)    Desirable          <200 mg/dL  Borderline High    200-239 mg/dL  High Risk          >240 mg/dL      Triglyceride Reference Ranges  (U.S. Department of Health and Human Services ATP III Classifications)    Normal           <150 mg/dL  Borderline High  150-199 mg/dL  High             200-499 mg/dL  Very High        >500 mg/dL    HDL Reference Ranges  (U.S. Department of Health and Human Services ATP III Classifications)    Low     <40 mg/dl (major risk factor for CHD)  High    >60 mg/dl ('negative' risk factor for CHD)        LDL Reference Ranges  (U.S. Department of Health and Human Services ATP III Classifications)    Optimal          <100 mg/dL  Near Optimal     100-129 mg/dL  Borderline High  130-159 mg/dL  High             160-189 mg/dL  Very High        >189 mg/dL    C-reactive Protein [[id number]]  (Abnormal) Collected: 12/01/22 0448    Specimen: Blood Updated: 12/01/22 0534     C-Reactive Protein 3.43 mg/dL     TSH [[id number]]  (Normal) Collected: 12/01/22 0448    Specimen: Blood Updated: 12/01/22 0530     TSH 0.394 uIU/mL     Procalcitonin [[id number]]  (Normal) Collected: 12/01/22 0448    Specimen: Blood Updated: 12/01/22 0530     Procalcitonin 0.21 ng/mL     Narrative:      As a Marker for Sepsis (Non-Neonates):    1. <0.5 ng/mL represents a low risk of severe sepsis and/or septic shock.  2. >2 ng/mL represents a high risk of severe sepsis and/or septic shock.    As a Marker for Lower Respiratory Tract Infections that require antibiotic therapy:    PCT on Admission    Antibiotic Therapy       6-12 Hrs later    >0.5                Strongly Recommended  >0.25 - <0.5        Recommended   0.1 - 0.25        "   Discouraged              Remeasure/reassess PCT  <0.1                Strongly Discouraged     Remeasure/reassess PCT    As 28 day mortality risk marker: \"Change in Procalcitonin Result\" (>80% or <=80%) if Day 0 (or Day 1) and Day 4 values are available. Refer to [email or web address]-[email or web address]    Change in PCT <=80%  A decrease of PCT levels below or equal to 80% defines a positive change in PCT test result representing a higher risk for 28-day all-cause mortality of patients diagnosed with severe sepsis for septic shock.    Change in PCT >80%  A decrease of PCT levels of more than 80% defines a negative change in PCT result representing a lower risk for 28-day all-cause mortality of patients diagnosed with severe sepsis or septic shock.       Sedimentation Rate [[id number]]  (Abnormal) Collected: 12/01/22 0448    Specimen: Blood Updated: 12/01/22 0519     Sed Rate 34 mm/hr     CBC & Differential [[id number]]  (Abnormal) Collected: 12/01/22 0448    Specimen: Blood Updated: 12/01/22 0514    Narrative:      The following orders were created for panel order CBC & Differential.  Procedure                               Abnormality         Status                     ---------                               -----------         ------                     CBC Auto Differential[[id number]]        Abnormal            Final result                 Please view results for these tests on the individual orders.    CBC Auto Differential [[id number]]  (Abnormal) Collected: 12/01/22 0448    Specimen: Blood Updated: 12/01/22 0514     WBC 13.90 10*3/mm3      RBC 4.88 10*6/mm3      Hemoglobin 14.5 g/dL      Hematocrit 43.4 %      MCV 88.9 fL      MCH 29.8 pg      MCHC 33.6 g/dL      RDW 14.3 %      RDW-SD 46.8 fl      MPV 8.8 fL      Platelets 341 10*3/mm3      Neutrophil % 74.3 %      Lymphocyte % 14.8 %      Monocyte % 10.4 %      Eosinophil % 0.0 %      Basophil % 0.5 %      Neutrophils, Absolute 10.30 10*3/mm3      Lymphocytes, " Absolute 2.10 10*3/mm3      Monocytes, Absolute 1.40 10*3/mm3      Eosinophils, Absolute 0.00 10*3/mm3      Basophils, Absolute 0.10 10*3/mm3      nRBC 0.2 /100 WBC     POC Glucose Once [[id number]]  (Abnormal) Collected: 11/1955    Specimen: Blood Updated: 11/30/22 1957     Glucose 340 mg/dL      Comment: Serial Number: [id number]:  [id number]       Comprehensive Metabolic Panel [[id number]]  (Abnormal) Collected: 11/30/22 1603    Specimen: Blood Updated: 11/30/22 1631     Glucose 492 mg/dL      BUN 23 mg/dL      Creatinine 1.02 mg/dL      Sodium 138 mmol/L      Potassium 4.3 mmol/L      Chloride 98 mmol/L      CO2 25.0 mmol/L      Calcium 10.3 mg/dL      Total Protein 8.3 g/dL      Albumin 4.30 g/dL      ALT (SGPT) 43 U/L      AST (SGOT) 31 U/L      Alkaline Phosphatase 67 U/L      Total Bilirubin 1.0 mg/dL      Globulin 4.0 gm/dL      A/G Ratio 1.1 g/dL      BUN/Creatinine Ratio 22.5     Anion Gap 15.0 mmol/L      eGFR 60.4 mL/min/1.73      Comment: National Kidney Foundation and American Society of Nephrology (ASN) Task Force recommended calculation based on the Chronic Kidney Disease Epidemiology Collaboration (CKD-EPI) equation refit without adjustment for race.       Narrative:      GFR Normal >60  Chronic Kidney Disease <60  Kidney Failure <15      CBC & Differential [[id number]]  (Abnormal) Collected: 11/30/22 1604    Specimen: Blood Updated: 11/30/22 1606    Narrative:      The following orders were created for panel order CBC & Differential.  Procedure                               Abnormality         Status                     ---------                               -----------         ------                     CBC Auto Differential[[id number]]        Abnormal            Final result                 Please view results for these tests on the individual orders.    CBC Auto Differential [[id number]]  (Abnormal) Collected: 11/30/22 1604    Specimen: Blood Updated: 11/30/22 1606     WBC 9.10 10*3/mm3       RBC 5.02 10*6/mm3      Hemoglobin 14.9 g/dL      Hematocrit 45.3 %      MCV 90.1 fL      MCH 29.7 pg      MCHC 33.0 g/dL      RDW 14.0 %      RDW-SD 43.8 fl      MPV 8.4 fL      Platelets 308 10*3/mm3      Neutrophil % 79.8 %      Lymphocyte % 13.3 %      Monocyte % 6.8 %      Eosinophil % 0.0 %      Basophil % 0.1 %      Neutrophils, Absolute 7.30 10*3/mm3      Lymphocytes, Absolute 1.20 10*3/mm3      Monocytes, Absolute 0.60 10*3/mm3      Eosinophils, Absolute 0.00 10*3/mm3      Basophils, Absolute 0.00 10*3/mm3      nRBC 0.1 /100 WBC           Imaging Results (Last 72 Hours)     ** No results found for the last 72 hours. **            Medication Review:     Hospital Medications (active)       Dose Frequency Start End    acetaminophen (TYLENOL) tablet 650 mg 650 mg Every 4 Hours PRN 11/30/2022     Admin Instructions: Based on patient request - if ordered for moderate or severe pain, provider allows for administration of a medication prescribed for a lower pain scale.  Do not exceed 4 grams of acetaminophen in a 24 hr period. Max dose of 2gm for AST/ALT greater than 120 units/L    If given for fever, use fever parameter: fever greater than 100.4 °F.    If given for pain, use the following pain scale:   Mild Pain = Pain Score of 1-3, CPOT 1-2  Moderate Pain = Pain Score of 4-6, CPOT 3-4  Severe Pain = Pain Score of 7-10, CPOT 5-8    Route: Oral    apixaban (ELIQUIS) tablet 5 mg 5 mg Every 12 Hours Scheduled 12/1/2022     Admin Instructions: Tablet may be crushed and suspended in 60 mL of water or D5W and immediately delivered via NG tube.    Route: Oral    atorvastatin (LIPITOR) tablet 20 mg 20 mg Daily 12/1/2022     Admin Instructions: Avoid grapefruit juice.    Route: Oral    ceFAZolin 2 gm IVPB in 100 mL NS (MBP) 2 g Every 8 Hours 12/1/2022 12/15/2022    Admin Instructions: Caution: Look alike/sound alike drug alert    Route: Intravenous    dextrose (D50W) (25 g/50 mL) IV injection 25 g 25 g Every 15 Minutes  PRN 11/30/2022     Admin Instructions: Blood sugar less than 70; patient has IV access - Unresponsive, NPO or Unable To Safely Swallow    Route: Intravenous    dextrose (GLUTOSE) oral gel 15 g 15 g Every 15 Minutes PRN 11/30/2022     Admin Instructions: BS<70, Patient Alert, Is not NPO, Can safely swallow.    Route: Oral    dilTIAZem CD (CARDIZEM CD) 24 hr capsule 240 mg 240 mg Every 24 Hours Scheduled 12/1/2022     Admin Instructions: Caution: Look alike/sound alike drug alert.   Swallow capsule whole. Do not crush, chew, or open capsule. Avoid grapefruit juice. Maximum simvastatin dose 10 mg while taking dilTIAZem.    Route: Oral    glucagon (human recombinant) (GLUCAGEN DIAGNOSTIC) 1 mg in sterile water (preservative free) 1 mL injection 1 mg Every 15 Minutes PRN 11/30/2022     Admin Instructions: Blood Glucose Less Than 70 - Patient Without IV Access - Unresponsive, NPO or Unable To Safely Swallow  Reconstitute powder for injection by adding 1 mL of sterile water for injection to glucagon 1 mg vial resulting in concentration of 1 mg/mL.    Route: Intramuscular    hydrALAZINE (APRESOLINE) injection 10 mg 10 mg Every 6 Hours PRN 11/30/2022     Admin Instructions: As needed for SBP greater than 160  Caution: Look alike/sound alike drug alert    Route: Intravenous    HYDROcodone-acetaminophen (NORCO) 7.5-325 MG per tablet 1 tablet 1 tablet 3 Times Daily PRN 12/1/2022     Admin Instructions: Based on patient request - if ordered for moderate or severe pain, provider allows for administration of a medication prescribed for a lower pain scale.  [IRAIS]    Do not exceed 4 grams of acetaminophen in a 24 hr period. Max dose of 2gm for AST/ALT greater than 120 units/L        If given for pain, use the following pain scale:   Mild Pain = Pain Score of 1-3, CPOT 1-2  Moderate Pain = Pain Score of 4-6, CPOT 3-4  Severe Pain = Pain Score of 7-10, CPOT 5-8    Route: Oral    insulin glargine (LANTUS, SEMGLEE) injection 25 Units 25  Units Nightly 11/30/2022     Admin Instructions:     Route: Subcutaneous    insulin lispro (ADMELOG) injection 3-14 Units 3-14 Units 3 Times Daily With Meals 11/30/2022     Admin Instructions: Blood Glucose 150-199 mg/dL - 3 units  Blood Glucose 200-249 mg/dL - 5 units  Blood Glucose 250-299 mg/dL - 8 units  Blood Glucose 300-349 mg/dL - 10 units  Blood Glucose 350-400 mg/dL - 12 units  Blood Glucose Greater Than 400 mg/dL - 14 units & Call Provider   Caution: Look alike/sound alike drug alert    Route: Subcutaneous    Magnesium Sulfate 2 gram / 50mL Infusion (GIVE X 3 BAGS TO EQUAL 6GM TOTAL DOSE) - Mg 1.1 - 1.5 mg/dl 2 g As Needed 11/30/2022     Admin Instructions: Mg 1.1 -1.5 mg/dL. Infuse 2 grams over 2 hours for 3 doses (for a total Mg dose of 6 grams).  Recheck Mg level in the AM.    Route: Intravenous    Linked Group 1: See Hyperspace for full Linked Orders Report.        Magnesium Sulfate 2 gram Bolus, followed by 8 gram infusion (total Mg dose 10 grams)- Mg less than or equal to 1mg/dL 2 g As Needed 11/30/2022     Admin Instructions: Mg less than or equal to 1mg/dL. Give 2 gm over 30 minutes as bolus, then infuse 2 gm over 2 hours for 4 doses (8 grams) for total dose of 10 grams.  Recheck Mg levels in the AM.    Route: Intravenous    Linked Group 1: See Hyperspace for full Linked Orders Report.        Magnesium Sulfate 4 gram infusion- Mg 1.6-1.9 mg/dL 4 g As Needed 11/30/2022     Admin Instructions: Mg 1.6-1.9 mg/dL. Recheck Mg level in the AM.    Route: Intravenous    Linked Group 1: See Hyperspace for full Linked Orders Report.        metoprolol tartrate (LOPRESSOR) tablet 25 mg 25 mg Every 12 Hours Scheduled 12/1/2022     Route: Oral    nystatin (MYCOSTATIN) powder  Every 12 Hours Scheduled 12/1/2022     Admin Instructions: Groin area that is irritated    Route: Topical    ondansetron (ZOFRAN) injection 4 mg 4 mg Every 6 Hours PRN 11/30/2022     Admin Instructions: If BOTH ondansetron (ZOFRAN) and  promethazine (PHENERGAN) are ordered use ondansetron first and THEN promethazine IF ondansetron is ineffective.    Route: Intravenous    potassium & sodium phosphates (PHOS-NAK) 280-160-250 MG packet - for Phosphorus 1.25 - 2.5 mg/dL 2 packet Every 6 Hours PRN 11/30/2022     Admin Instructions: Phosphorus replacement:       If Phos 1.25 - 2.5  mg/dL: Give Neutraphos 2 packets by mouth every 6 hours x 1 dose. Recheck Phosphorus level 6 hours after replacement complete.    Route: Oral    Linked Group 2: See Hyperspace for full Linked Orders Report.        potassium & sodium phosphates (PHOS-NAK) 280-160-250 MG packet - for Phosphorus less than 1.25 mg/dL 2 packet Every 6 Hours PRN 11/30/2022     Admin Instructions: Phosphorus replacement:     If Phos    < 1.25 mg/dL   : Give Neutraphos 2 packets by mouth every 6 hours x 2 doses. Recheck Phosphorus level 6 hours after replacement complete.    Route: Oral    Linked Group 2: See Hyperspace for full Linked Orders Report.        potassium chloride (K-DUR,KLOR-CON) CR tablet 40 mEq 40 mEq As Needed 11/30/2022     Admin Instructions: Potassium 3.1 or Less Give KCl 40 mEq q4h x3 Doses   Potassium 3.2 - 3.6 Give KCl 40 mEq q4h x2 Doses     Check Potassium 4 Hours After Last Dose Given   Check Magnesium if Potassium Level Remains Low After Replacement   DO NOT GIVE if CrCl is Less Than 30 mL/minute or Urine Output Less Than 30 mL/hr    Route: Oral    potassium chloride (KLOR-CON) packet 40 mEq 40 mEq As Needed 11/30/2022     Admin Instructions: Potassium 3.1 or Less Give KCl 40 mEq q4h x3 Doses   Potassium 3.2 - 3.6 Give KCl 40 mEq q4h x2 Doses     Check Potassium 4 Hours After Last Dose Given   Check Magnesium if Potassium Level Remains Low After Replacement   DO NOT GIVE if CrCl is Less Than 30 mL/minute or Urine Output Less Than 30 mL/hr    Route: Oral    sodium chloride 0.9 % flush 10 mL 10 mL As Needed 11/30/2022     Route: Intravenous    Linked Group 3: See Hyperspace for  full Linked Orders Report.        sodium chloride 0.9 % flush 10 mL 10 mL Every 12 Hours Scheduled 11/30/2022     Route: Intravenous    sodium chloride 0.9 % flush 10 mL 10 mL As Needed 11/30/2022     Route: Intravenous    sodium chloride 0.9 % infusion 40 mL 40 mL As Needed 11/30/2022     Admin Instructions: Following administration of an IV intermittent medication, flush line with 40mL NS at 100mL/hr.    Route: Intravenous        apixaban, 5 mg, Oral, Q12H  atorvastatin, 20 mg, Oral, Daily  ceFAZolin, 2 g, Intravenous, Q8H  dilTIAZem CD, 240 mg, Oral, Q24H  insulin glargine, 25 Units, Subcutaneous, Nightly  insulin lispro, 3-14 Units, Subcutaneous, TID With Meals  metoprolol tartrate, 25 mg, Oral, Q12H  nystatin, , Topical, Q12H  sodium chloride, 10 mL, Intravenous, Q12H        Assessment & Plan         Bacteremia due to methicillin susceptible Staphylococcus aureus (MSSA)    Covid +  COPD, HTN  Echo -    Plan :    IV cefazolin for at least 4 weeks  Repeat BC -   JEOVANNY -  Will follow  Thank you    Olya Mendez MD  12/03/22  13:46 EST

## 2022-12-04 ENCOUNTER — APPOINTMENT (OUTPATIENT)
Dept: GENERAL RADIOLOGY | Facility: HOSPITAL | Age: 67
End: 2022-12-04

## 2022-12-04 ENCOUNTER — READMISSION MANAGEMENT (OUTPATIENT)
Dept: CALL CENTER | Facility: HOSPITAL | Age: 67
End: 2022-12-04

## 2022-12-04 VITALS
SYSTOLIC BLOOD PRESSURE: 167 MMHG | RESPIRATION RATE: 18 BRPM | BODY MASS INDEX: 47.8 KG/M2 | HEIGHT: 64 IN | OXYGEN SATURATION: 93 % | HEART RATE: 86 BPM | DIASTOLIC BLOOD PRESSURE: 73 MMHG | WEIGHT: 280 LBS | TEMPERATURE: 98.3 F

## 2022-12-04 LAB
ALBUMIN SERPL-MCNC: 3.6 G/DL (ref 3.5–5.2)
ALBUMIN/GLOB SERPL: 0.9 G/DL
ALP SERPL-CCNC: 70 U/L (ref 39–117)
ALT SERPL W P-5'-P-CCNC: 52 U/L (ref 1–33)
ANION GAP SERPL CALCULATED.3IONS-SCNC: 12 MMOL/L (ref 5–15)
AST SERPL-CCNC: 75 U/L (ref 1–32)
BASOPHILS # BLD AUTO: 0 10*3/MM3 (ref 0–0.2)
BASOPHILS NFR BLD AUTO: 0.4 % (ref 0–1.5)
BILIRUB SERPL-MCNC: 0.6 MG/DL (ref 0–1.2)
BUN SERPL-MCNC: 13 MG/DL (ref 8–23)
BUN/CREAT SERPL: 16.3 (ref 7–25)
CALCIUM SPEC-SCNC: 9.6 MG/DL (ref 8.6–10.5)
CHLORIDE SERPL-SCNC: 102 MMOL/L (ref 98–107)
CO2 SERPL-SCNC: 25 MMOL/L (ref 22–29)
CREAT SERPL-MCNC: 0.8 MG/DL (ref 0.57–1)
CRP SERPL-MCNC: 1.2 MG/DL (ref 0–0.5)
DEPRECATED RDW RBC AUTO: 42.9 FL (ref 37–54)
EGFRCR SERPLBLD CKD-EPI 2021: 80.9 ML/MIN/1.73
EOSINOPHIL # BLD AUTO: 0.1 10*3/MM3 (ref 0–0.4)
EOSINOPHIL NFR BLD AUTO: 1.3 % (ref 0.3–6.2)
ERYTHROCYTE [DISTWIDTH] IN BLOOD BY AUTOMATED COUNT: 13.8 % (ref 12.3–15.4)
ERYTHROCYTE [SEDIMENTATION RATE] IN BLOOD: 48 MM/HR (ref 0–30)
GLOBULIN UR ELPH-MCNC: 3.9 GM/DL
GLUCOSE BLDC GLUCOMTR-MCNC: 267 MG/DL (ref 70–105)
GLUCOSE BLDC GLUCOMTR-MCNC: 313 MG/DL (ref 70–105)
GLUCOSE BLDC GLUCOMTR-MCNC: 345 MG/DL (ref 70–105)
GLUCOSE SERPL-MCNC: 282 MG/DL (ref 65–99)
HCT VFR BLD AUTO: 44.1 % (ref 34–46.6)
HGB BLD-MCNC: 14.1 G/DL (ref 12–15.9)
LYMPHOCYTES # BLD AUTO: 3.1 10*3/MM3 (ref 0.7–3.1)
LYMPHOCYTES NFR BLD AUTO: 37.9 % (ref 19.6–45.3)
MAGNESIUM SERPL-MCNC: 1.6 MG/DL (ref 1.6–2.4)
MCH RBC QN AUTO: 28.8 PG (ref 26.6–33)
MCHC RBC AUTO-ENTMCNC: 32 G/DL (ref 31.5–35.7)
MCV RBC AUTO: 90.1 FL (ref 79–97)
MONOCYTES # BLD AUTO: 0.8 10*3/MM3 (ref 0.1–0.9)
MONOCYTES NFR BLD AUTO: 10.2 % (ref 5–12)
NEUTROPHILS NFR BLD AUTO: 4 10*3/MM3 (ref 1.7–7)
NEUTROPHILS NFR BLD AUTO: 50.2 % (ref 42.7–76)
NRBC BLD AUTO-RTO: 0.1 /100 WBC (ref 0–0.2)
PHOSPHATE SERPL-MCNC: 2.6 MG/DL (ref 2.5–4.5)
PLATELET # BLD AUTO: 335 10*3/MM3 (ref 140–450)
PMV BLD AUTO: 9.3 FL (ref 6–12)
POTASSIUM SERPL-SCNC: 4.1 MMOL/L (ref 3.5–5.2)
PROCALCITONIN SERPL-MCNC: 0.15 NG/ML (ref 0–0.25)
PROT SERPL-MCNC: 7.5 G/DL (ref 6–8.5)
RBC # BLD AUTO: 4.89 10*6/MM3 (ref 3.77–5.28)
SODIUM SERPL-SCNC: 139 MMOL/L (ref 136–145)
WBC NRBC COR # BLD: 8.1 10*3/MM3 (ref 3.4–10.8)

## 2022-12-04 PROCEDURE — 83735 ASSAY OF MAGNESIUM: CPT | Performed by: INTERNAL MEDICINE

## 2022-12-04 PROCEDURE — 99232 SBSQ HOSP IP/OBS MODERATE 35: CPT | Performed by: INTERNAL MEDICINE

## 2022-12-04 PROCEDURE — 25010000002 CEFTRIAXONE PER 250 MG: Performed by: INTERNAL MEDICINE

## 2022-12-04 PROCEDURE — 63710000001 INSULIN LISPRO (HUMAN) PER 5 UNITS: Performed by: NURSE PRACTITIONER

## 2022-12-04 PROCEDURE — 84100 ASSAY OF PHOSPHORUS: CPT | Performed by: INTERNAL MEDICINE

## 2022-12-04 PROCEDURE — 85025 COMPLETE CBC W/AUTO DIFF WBC: CPT | Performed by: NURSE PRACTITIONER

## 2022-12-04 PROCEDURE — 02HV33Z INSERTION OF INFUSION DEVICE INTO SUPERIOR VENA CAVA, PERCUTANEOUS APPROACH: ICD-10-PCS | Performed by: INTERNAL MEDICINE

## 2022-12-04 PROCEDURE — 85652 RBC SED RATE AUTOMATED: CPT | Performed by: INTERNAL MEDICINE

## 2022-12-04 PROCEDURE — 86140 C-REACTIVE PROTEIN: CPT | Performed by: INTERNAL MEDICINE

## 2022-12-04 PROCEDURE — 25010000002 CEFAZOLIN PER 500 MG: Performed by: INTERNAL MEDICINE

## 2022-12-04 PROCEDURE — 84145 PROCALCITONIN (PCT): CPT | Performed by: INTERNAL MEDICINE

## 2022-12-04 PROCEDURE — C1751 CATH, INF, PER/CENT/MIDLINE: HCPCS

## 2022-12-04 PROCEDURE — 80053 COMPREHEN METABOLIC PANEL: CPT | Performed by: INTERNAL MEDICINE

## 2022-12-04 PROCEDURE — 82962 GLUCOSE BLOOD TEST: CPT

## 2022-12-04 PROCEDURE — 71045 X-RAY EXAM CHEST 1 VIEW: CPT

## 2022-12-04 RX ADMIN — APIXABAN 5 MG: 5 TABLET, FILM COATED ORAL at 09:04

## 2022-12-04 RX ADMIN — NYSTATIN: 100000 POWDER TOPICAL at 09:04

## 2022-12-04 RX ADMIN — METOPROLOL TARTRATE 25 MG: 25 TABLET, FILM COATED ORAL at 09:03

## 2022-12-04 RX ADMIN — CEFAZOLIN 2 G: 2 INJECTION, POWDER, FOR SOLUTION INTRAMUSCULAR; INTRAVENOUS at 11:54

## 2022-12-04 RX ADMIN — HYDROCODONE BITARTRATE AND ACETAMINOPHEN 1 TABLET: 7.5; 325 TABLET ORAL at 06:00

## 2022-12-04 RX ADMIN — INSULIN LISPRO 8 UNITS: 100 INJECTION, SOLUTION INTRAVENOUS; SUBCUTANEOUS at 09:04

## 2022-12-04 RX ADMIN — ATORVASTATIN CALCIUM 20 MG: 20 TABLET, FILM COATED ORAL at 09:03

## 2022-12-04 RX ADMIN — INSULIN LISPRO 3 UNITS: 100 INJECTION, SOLUTION INTRAVENOUS; SUBCUTANEOUS at 17:24

## 2022-12-04 RX ADMIN — DILTIAZEM HYDROCHLORIDE 240 MG: 240 CAPSULE, COATED, EXTENDED RELEASE ORAL at 09:03

## 2022-12-04 RX ADMIN — HYDRALAZINE HYDROCHLORIDE 25 MG: 25 TABLET, FILM COATED ORAL at 09:03

## 2022-12-04 RX ADMIN — Medication 10 ML: at 09:05

## 2022-12-04 RX ADMIN — INSULIN LISPRO 10 UNITS: 100 INJECTION, SOLUTION INTRAVENOUS; SUBCUTANEOUS at 12:36

## 2022-12-04 RX ADMIN — CEFTRIAXONE 2 G: 2 INJECTION, POWDER, FOR SOLUTION INTRAMUSCULAR; INTRAVENOUS at 17:22

## 2022-12-04 RX ADMIN — CEFAZOLIN 2 G: 2 INJECTION, POWDER, FOR SOLUTION INTRAMUSCULAR; INTRAVENOUS at 03:20

## 2022-12-04 NOTE — DISCHARGE SUMMARY
[facility] Medicine Services  DISCHARGE SUMMARY    Patient Name: Caterina Mason  : 1955  MRN: [id number]    Date of Admission: 2022  Discharge Diagnosis: COVID infection, bacteremia, A. fib with RVR  Date of Discharge:  22    Primary Care Physician: Lou Curran MD      Presenting Problem:   Bacteremia due to methicillin susceptible Staphylococcus aureus (MSSA) [R78.81, B95.61]  COVID-19 [U07.1]    Active and Resolved Hospital Problems:  Active Hospital Problems    Diagnosis POA   • **Bacteremia due to methicillin susceptible Staphylococcus aureus (MSSA) [R78.81, B95.61] Yes      Resolved Hospital Problems   No resolved problems to display.         Hospital Course     Hospital Course:  Caterina Mason is a 67 y.o. female who presented to the hospital with shortness of breath.  The patient was evaluated and found to have COVID infection.  She was placed on supplemental oxygen and admitted to the medicine service.  Blood cultures ended up growing staph aureus.  The patient was started on IV antibiotics.  Patient was seen by infectious diseases.  She was placed on Rocephin.  Patient was also having A. fib with RVR.  Cardiology was consulted.  Medications were adjusted and rate was controlled.  Patient was anticoagulated with apixaban.  The patient was placed on ceftriaxone for 30 days and once everything was arranged she was discharged home in stable condition on room air without any further COVID symptoms.  PICC line was placed prior to discharge.            Reasons For Change In Medications and Indications for New Medications:      Day of Discharge     Vital Signs:  Temp:  [97.5 °F (36.4 °C)-98.6 °F (37 °C)] 98.3 °F (36.8 °C)  Heart Rate:  [80-97] 86  Resp:  [18-22] 18  BP: (145-167)/() 167/73    Physical Exam:  Physical Exam  Vitals reviewed.   HENT:      Head: Normocephalic.   Cardiovascular:      Rate and Rhythm: Normal rate.   Pulmonary:       Effort: Pulmonary effort is normal.   Abdominal:      General: Abdomen is flat.      Palpations: Abdomen is soft.   Musculoskeletal:         General: Normal range of motion.      Cervical back: Normal range of motion.   Skin:     General: Skin is warm.   Neurological:      General: No focal deficit present.      Mental Status: She is alert and oriented to person, place, and time.   Psychiatric:         Mood and Affect: Mood normal.         Behavior: Behavior normal.            Pertinent  and/or Most Recent Results     LAB RESULTS:      Lab 12/04/22 0448 12/03/22  0539 12/02/22  0449 12/01/22 0448 11/30/22  1604 11/29/22 2051   WBC 8.10 7.40 10.90* 13.90* 9.10  --    HEMOGLOBIN 14.1 13.4 13.4 14.5 14.9  --    HEMATOCRIT 44.1 41.3 40.1 43.4 45.3  --    PLATELETS 335 298 328 341 308  --    NEUTROS ABS 4.00 3.60 6.20 10.30* 7.30*  --    LYMPHS ABS 3.10 2.80 3.40* 2.10 1.20  --    MONOS ABS 0.80 0.80 1.20* 1.40* 0.60  --    EOS ABS 0.10 0.10 0.10 0.00 0.00  --    MCV 90.1 89.7 88.8 88.9 90.1  --    SED RATE 48*  --   --  34*  --   --    CRP 1.20*  --   --  3.43*  --   --    PROCALCITONIN 0.15  --   --  0.21  --   --    LACTATE  --   --   --   --   --  1.7         Lab 12/04/22 0448 12/03/22  0539 12/02/22  0449 12/01/22 0448 11/30/22  1603   SODIUM 139 140 139 135* 138   POTASSIUM 4.1 4.4 4.2 4.4 4.3   CHLORIDE 102 104 100 96* 98   CO2 25.0 25.0 28.0 27.0 25.0   ANION GAP 12.0 11.0 11.0 12.0 15.0   BUN 13 18 23 27* 23   CREATININE 0.80 0.88 1.03* 1.03* 1.02*   EGFR 80.9 72.1 59.7* 59.7* 60.4   GLUCOSE 282* 350* 324* 447* 492*   CALCIUM 9.6 9.1 9.3 10.3 10.3   MAGNESIUM 1.6 1.7 1.6 1.9  --    PHOSPHORUS 2.6 3.1 3.2 3.3  --    HEMOGLOBIN A1C  --   --   --  11.8*  --    TSH  --   --   --  0.394  --          Lab 12/04/22  0448 12/03/22  0539 12/02/22  0449 12/01/22 0448 11/30/22  1603 11/29/22  2049   TOTAL PROTEIN 7.5 6.9 7.1 8.0 8.3 8.2   ALBUMIN 3.60 3.50 3.40* 4.00 4.30 4.10   GLOBULIN 3.9 3.4 3.7 4.0 4.0 4.1   ALT  (SGPT) 52* 35* 32 35* 43* 43*   AST (SGOT) 75* 49* 31 23 31 39*   BILIRUBIN 0.6 0.5 0.4 0.6 1.0 1.0   ALK PHOS 70 67 67 69 67 69   LIPASE  --   --   --   --   --  42             Lab 12/01/22  0448   CHOLESTEROL 151   LDL CHOL 81   HDL CHOL 37*   TRIGLYCERIDES 197*             Brief Urine Lab Results  (Last result in the past 365 days)      Color   Clarity   Blood   Leuk Est   Nitrite   Protein   CREAT   Urine HCG        11/29/22 2039 Yellow   Clear   Negative   Moderate (2+)   Negative   100 mg/dL (2+)               Microbiology Results (last 10 days)     Procedure Component Value - Date/Time    Blood Culture - Blood, Arm, Right [[id number]]  (Normal) Collected: 12/01/22 0835    Lab Status: Preliminary result Specimen: Blood from Arm, Right Updated: 12/04/22 0915     Blood Culture No growth at 3 days    Narrative:      Less than seven (7) mL's of blood was collected.  Insufficient quantity may yield false negative results.    Blood Culture - Blood, Hand, Right [[id number]]  (Normal) Collected: 12/01/22 0835    Lab Status: Preliminary result Specimen: Blood from Hand, Right Updated: 12/04/22 0915     Blood Culture No growth at 3 days    Blood Culture - Blood, Wrist, Right [[id number]]  (Abnormal)  (Susceptibility) Collected: 11/29/22 2049    Lab Status: Final result Specimen: Blood from Wrist, Right Updated: 12/02/22 0718     Blood Culture Staphylococcus aureus     Comment:   Infectious disease consultation is highly recommended to rule out distant foci of infection.        Isolated from Aerobic and Anaerobic Bottles     Gram Stain Anaerobic Bottle Gram positive cocci in clusters      Aerobic Bottle Gram positive cocci in clusters    Susceptibility      Staphylococcus aureus      DAMARIS      Gentamicin Susceptible      Oxacillin Susceptible      Rifampin Susceptible      Vancomycin Susceptible                       Susceptibility Comments     Staphylococcus aureus    This isolate does not demonstrate inducible clindamycin  resistance in vitro.               Blood Culture - Blood, Arm, Left [[id number]]  (Abnormal) Collected: 11/29/22 2049    Lab Status: Final result Specimen: Blood from Arm, Left Updated: 12/02/22 0718     Blood Culture Staphylococcus aureus     Comment:   Infectious disease consultation is highly recommended to rule out distant foci of infection.        Isolated from Aerobic and Anaerobic Bottles     Gram Stain Aerobic Bottle Gram positive cocci in clusters      Anaerobic Bottle Gram positive cocci in clusters    Narrative:      Refer to previous blood culture collected on 11/29/2022 2049 for MICS.    Blood Culture ID, PCR - Blood, Wrist, Right [[id number]]  (Abnormal) Collected: 11/29/22 2049    Lab Status: Final result Specimen: Blood from Wrist, Right Updated: 11/30/22 1232     BCID, PCR Staph aureus. mecA/C and MREJ (methicillin resistance gene) NOT detected. Identification by BCID2 PCR     BOTTLE TYPE Anaerobic Bottle    Narrative:      Infectious disease consultation is highly recommended to rule out distant foci of infection.    Urine Culture - Urine, Urine, Clean Catch [[id number]]  (Normal) Collected: 11/29/22 2039    Lab Status: Final result Specimen: Urine, Clean Catch Updated: 11/30/22 2101     Urine Culture No growth    Influenza Antigen, Rapid - Swab, Nasopharynx [[id number]]  (Normal) Collected: 11/29/22 2031    Lab Status: Final result Specimen: Swab from Nasopharynx Updated: 11/29/22 2100     Influenza A PCR Not Detected     Influenza B PCR Not Detected    COVID-19,CEPHEID/FRANDY,COR/FORTUNATO/PAD/MADISON/MAD IN-HOUSE(OR EMERGENT/ADD-ON),NP SWAB IN TRANSPORT MEDIA 3-4 HR TAT, RT-PCR - Swab, Nasopharynx [[id number]]  (Abnormal) Collected: 11/29/22 2031    Lab Status: Final result Specimen: Swab from Nasopharynx Updated: 11/29/22 2101     COVID19 Detected    Narrative:      Fact sheet for providers: https://www.fda.gov/media/116964/download     Fact sheet for patients: https://www.fda.gov/media/325717/download  Fact  sheet for providers: https://www.fda.gov/media/334688/download    Fact sheet for patients: https://www.fda.gov/media/516982/download    Test performed by PCR.          XR Chest 1 View    Result Date: 11/29/2022  Impression:  1. No radiographic findings of pneumonia 2. Cardiomegaly with no evidence of CHF  Electronically Signed By-Son Mnotelongo On:11/29/2022 9:37 PM This report was finalized on [id number] by  Son Montelongo, .              Results for orders placed during the hospital encounter of 11/30/22    Adult Transthoracic Echo Limited W/ Cont if Necessary Per Protocol    Interpretation Summary  •  Left ventricular systolic function is normal. Left ventricular ejection fraction appears to be 56 - 60%.  •  Left ventricular diastolic function was normal.      Labs Pending at Discharge:  Pending Labs     Order Current Status    Blood Culture - Blood, Arm, Right Preliminary result    Blood Culture - Blood, Hand, Right Preliminary result          Procedures Performed           Consults:   Consults     Date and Time Order Name Status Description    12/1/2022 10:14 AM Inpatient Cardiology Consult Completed     11/30/2022  4:28 PM Inpatient Infectious Diseases Consult Completed     11/30/2022  3:31 PM Hospitalist (on-call MD unless specified)              Discharge Details        Discharge Medications      New Medications      Instructions Start Date   cefTRIAXone 2 g in sodium chloride 0.9 % 100 mL IVPB   2 g, Intravenous, Every 24 Hours         Continue These Medications      Instructions Start Date   Accu-Chek Aurea Plus test strip  Generic drug: glucose blood   CHECK BLOOD SUGAR 3 TIMES DX E11.65      albuterol sulfate  (90 Base) MCG/ACT inhaler  Commonly known as: PROVENTIL HFA;VENTOLIN HFA;PROAIR HFA   INHALE 2 PUFFS EVERY 4 HOURS AS NEEDED FOR WHEEZING OR SHORTNESS OF AIR      atorvastatin 40 MG tablet  Commonly known as: LIPITOR   TAKE 1/2 TABLET BY MOUTH EVERY DAY      BASAGLAR KWIKPEN 100 UNIT/ML  injection pen   40 Units, Subcutaneous, Nightly      BD Pen Needle Lorene 2nd Gen 32G X 4 MM misc  Generic drug: Insulin Pen Needle   Used to inject insulin 4 times a day.      brompheniramine-pseudoephedrine-DM 30-2-10 MG/5ML syrup   5 mL, Oral, 4 Times Daily PRN      budesonide-formoterol 160-4.5 MCG/ACT inhaler  Commonly known as: SYMBICORT   2 puffs, Inhalation, 2 Times Daily      dexamethasone 4 MG tablet  Commonly known as: DECADRON   4 mg, Oral, 3 Times Daily      dilTIAZem  MG 24 hr capsule  Commonly known as: CARDIZEM CD   TAKE 1 CAPSULE BY MOUTH EVERY DAY      Eliquis 5 MG tablet tablet  Generic drug: apixaban   TAKE 1 TABLET BY MOUTH 2 (TWO) TIMES A DAY. PATIENT NEEDS AN APPT BEFORE ANY MORE REFILLS      fenofibrate 160 MG tablet   TAKE 1 TABLET BY MOUTH EVERY DAY      furosemide 40 MG tablet  Commonly known as: LASIX   TAKE 1 TABLET BY MOUTH EVERY DAY      HYDROcodone-acetaminophen 7.5-325 MG per tablet  Commonly known as: NORCO   1 tablet, Oral, 3 Times Daily PRN      Insulin Lispro (1 Unit Dial) 100 UNIT/ML solution pen-injector  Commonly known as: HumaLOG KwikPen   Inject 14 units before each meal.      Janumet XR  MG tablet  Generic drug: SITagliptin-metFORMIN HCl ER   TAKE 2 TABLETS BY MOUTH EVERY DAY      lisinopril 20 MG tablet  Commonly known as: PRINIVIL,ZESTRIL   TAKE 1 TABLET BY MOUTH EVERY DAY      magnesium oxide 400 MG tablet  Commonly known as: MAG-OX   TAKE 1 TABLET BY MOUTH TWICE A DAY      metoprolol tartrate 25 MG tablet  Commonly known as: LOPRESSOR   TAKE 1 TABLET BY MOUTH EVERY 12 HOURS FOR 30 DAYS.      potassium chloride 10 MEQ CR capsule  Commonly known as: MICRO-K   TAKE 1 CAPSULE BY MOUTH EVERY DAY      Vitamin D3 25 MCG (1000 UT) capsule   TAKE 1 TABLET BY MOUTH TWICE A DAY *OTC NOT COVERED*             Allergies   Allergen Reactions   • Ibuprofen GI Intolerance   • Prednisone Other (See Comments)   • Pregabalin Other (See Comments)     jerking   • Tramadol Other  (See Comments)     Legs jerked   • Levofloxacin Other (See Comments)     Increase sunburn   • Sulfamethoxazole-Trimethoprim Swelling         Discharge Disposition:   Home or Self Care    Diet:  Hospital:  Diet Order   Procedures   • Diet: Cardiac Diets, Diabetic Diets; Healthy Heart (2-3 Na+); Consistent Carbohydrate; Texture: Regular Texture (IDDSI 7); Fluid Consistency: Thin (IDDSI 0)         Discharge Activity:         CODE STATUS:  Code Status and Medical Interventions:   Ordered at: 11/30/22 1722     Level Of Support Discussed With:    Patient     Code Status (Patient has no pulse and is not breathing):    CPR (Attempt to Resuscitate)     Medical Interventions (Patient has pulse or is breathing):    Full Support         Future Appointments   Date Time Provider Department Center   1/27/2023 10:15 AM Jesse Charles MD [facility] CARD CD [place]   2/6/2023  8:00 AM LAB  [facility] LAB DS  [facility] None   2/13/2023 10:30 AM Bautista Archibald MD [facility] END NA [place]       Additional Instructions for the Follow-ups that You Need to Schedule     Ambulatory Referral to Home Health (Hospital)   As directed      Face to Face Visit Date: 12/4/2022    Follow-up provider for Plan of Care?: I treated the patient in an acute care facility and will not continue treatment after discharge.    Follow-up provider: AUTUMN SNOW [[id number]]    Reason/Clinical Findings: ambulatory care for weekly cbc/bmp and line care    Describe mobility limitations that make leaving home difficult: ambulatory care for weekly cbc/bmp and line care    Nursing/Therapeutic Services Requested: Skilled Nursing    Skilled nursing orders: PICC line care/instruction Infusion therapy    Frequency: 1 Week 1               Time spent on Discharge including face to face service:  66  minutes    This patient has been examined wearing appropriate Personal Protective Equipment and discussed with patient/nursing/CM. 12/04/22      Signature: Michael Lozano MD

## 2022-12-04 NOTE — CASE MANAGEMENT/SOCIAL WORK
Continued Stay Note  MARIEL Roach     Patient Name: Caterina Mason  MRN: [id number]  Today's Date: 12/4/2022    Admit Date: 11/30/2022    Plan: D/C Plan: Home IV Abx through [facility], Benefits verified. [facility] for Labs and Line maintanence.   Discharge Plan     Row Name 12/04/22 1617       Plan    Plan Comments Home with IV Rocephin daily, with pt to recieve her 12/4 dose before dc. Per Ally at [facility] will teach pt and pt Agnes king virtually tomorrow at home before first dose. Updated pt, nursing staff and MD.                Expected Discharge Date and Time     Expected Discharge Date Expected Discharge Time    Dec 4, 2022       Met with patient in room wearing PPE: mask    Maintained distance greater than six feet and spent less than 15 minutes in the room    Shanice Van RN    Phone [phone]  Fax [phone]

## 2022-12-04 NOTE — CONSULTS
PICC Line Insertion Procedure Note    Procedure: Insertion of #4 FR/18G PICC    Indications:  Home IV therapy    Active Time Out:  Correct patient: Yes  Correct procedure: Yes  Correct site: Yes  Verified with: rn    Procedure Details:  Informed consent was obtained for the procedure.  Risk include, but are not limited to infection, air embolism, catheter tip moving, catheter blockage and phlebitis.     Maximum sterile technique was used including antiseptics, cap, gloves, gown, hand hygiene, mask and sheet.    Ultrasound Guidance: Yes    #4 FR/18G PICC inserted to the R Basilic vein per hospital protocol by ANA adams.   Non-pulsatile blood return: yes    Lot #: [id number]  Expiration date: 10312023    Complications:  none    Findings:  Catheter inserted to 40 cm, with 0 cm exposed.   Mid upper arm circumference is 38 cm.   Catheter was flushed with 20 cc NS and sterile dressing applied.  Patient tolerated procedure well.  PICC tip verified by:       [] Sapiens 3cg       [x] Chest X-ray    Recommendations:  Verbal and/or written Care/Maintenance instructions provided to patient.   Primary nurse notified.    Rene Adams RN  12/04/22  18:19 EST

## 2022-12-04 NOTE — OUTREACH NOTE
Prep Survey    Flowsheet Row Responses   [facility] facility patient discharged from? Doc   Is LACE score < 7 ? No   Emergency Room discharge w/ pulse ox? No   Eligibility TCM   Hospital Doc   Date of Admission 11/30/22   Date of Discharge 12/04/22   Discharge Disposition Home or Self Care   Discharge diagnosis COVID-19, bacteremia, A-fib with RVR   Does the patient have one of the following disease processes/diagnoses(primary or secondary)? Other   Does the patient have Home health ordered? No   Is there a DME ordered? No   Prep survey completed? Yes          KELLY BERUMEN - Registered Nurse

## 2022-12-04 NOTE — PROGRESS NOTES
LOS: 4 days   Patient Care Team:  Lou Curran MD as PCP - General  Jesse Charles MD as Consulting Physician (Cardiology)  Bautista Archibald MD as Consulting Physician (Endocrinology)  Jane Montero MD as Consulting Physician (Obstetrics and Gynecology)        Subjective     Interval History:     Patient Complaints:   Patient Denies:  NV       Review of Systems:    Weak    Objective     Vital Signs  Temp:  [97.5 °F (36.4 °C)-98.6 °F (37 °C)] 98.3 °F (36.8 °C)  Heart Rate:  [80-97] 86  Resp:  [18-22] 18  BP: (145-167)/() 167/73    Physical Exam:     General Appearance:    Alert, cooperative, in no acute distress   Head:    Normocephalic, without obvious abnormality, atraumatic   Eyes:            Lids and lashes normal, conjunctivae and sclerae normal, no   icterus, no pallor, corneas clear, PERRLA   Ears:    Ears appear intact with no abnormalities noted   Throat:   No oral lesions, no thrush, oral mucosa moist   Neck:   No adenopathy, supple, trachea midline, no thyromegaly, no     carotid bruit, no JVD   Back:     No kyphosis present, no scoliosis present, no skin lesions,       erythema or scars, no tenderness to percussion or                   palpation,   range of motion normal   Lungs:     Clear to auscultation,respirations regular, even and                   unlabored    Heart:    Regular rhythm and normal rate, normal S1 and S2, no            murmur, no gallop, no rub, no click   Breast Exam:    Deferred   Abdomen:     Normal bowel sounds, no masses, no organomegaly, soft        non-tender, non-distended, no guarding, no rebound                 tenderness   Genitalia:    Deferred   Extremities:   Moves all extremities well, no edema, no cyanosis, no              redness   Pulses:   Pulses palpable and equal bilaterally   Skin:   No bleeding, bruising or rash   Lymph nodes:   No palpable adenopathy   Neurologic:   Cranial nerves 2 - 12 grossly intact, sensation intact, DTR        present and  equal bilaterally          Results Review:      Lab Results (last 72 hours)     Procedure Component Value Units Date/Time    Blood Culture - Blood, Arm, Right [[id number]]  (Normal) Collected: 12/01/22 0835    Specimen: Blood from Arm, Right Updated: 12/02/22 0915     Blood Culture No growth at 24 hours    Narrative:      Less than seven (7) mL's of blood was collected.  Insufficient quantity may yield false negative results.    Blood Culture - Blood, Hand, Right [[id number]]  (Normal) Collected: 12/01/22 0835    Specimen: Blood from Hand, Right Updated: 12/02/22 0915     Blood Culture No growth at 24 hours    POC Glucose Once [[id number]]  (Abnormal) Collected: 12/02/22 0740    Specimen: Blood Updated: 12/02/22 0846     Glucose 265 mg/dL      Comment: Serial Number: [id number]:  [id number]       Comprehensive Metabolic Panel [[id number]]  (Abnormal) Collected: 12/02/22 0449    Specimen: Blood Updated: 12/02/22 0639     Glucose 324 mg/dL      BUN 23 mg/dL      Creatinine 1.03 mg/dL      Sodium 139 mmol/L      Potassium 4.2 mmol/L      Chloride 100 mmol/L      CO2 28.0 mmol/L      Calcium 9.3 mg/dL      Total Protein 7.1 g/dL      Albumin 3.40 g/dL      ALT (SGPT) 32 U/L      AST (SGOT) 31 U/L      Alkaline Phosphatase 67 U/L      Total Bilirubin 0.4 mg/dL      Globulin 3.7 gm/dL      A/G Ratio 0.9 g/dL      BUN/Creatinine Ratio 22.3     Anion Gap 11.0 mmol/L      eGFR 59.7 mL/min/1.73      Comment: National Kidney Foundation and American Society of Nephrology (ASN) Task Force recommended calculation based on the Chronic Kidney Disease Epidemiology Collaboration (CKD-EPI) equation refit without adjustment for race.       Narrative:      GFR Normal >60  Chronic Kidney Disease <60  Kidney Failure <15      Phosphorus [[id number]]  (Normal) Collected: 12/02/22 0449    Specimen: Blood Updated: 12/02/22 0639     Phosphorus 3.2 mg/dL     Magnesium [[id number]]  (Normal) Collected: 12/02/22 0449    Specimen: Blood Updated:  12/02/22 0639     Magnesium 1.6 mg/dL     CBC & Differential [[id number]]  (Abnormal) Collected: 12/02/22 0449    Specimen: Blood Updated: 12/02/22 0607    Narrative:      The following orders were created for panel order CBC & Differential.  Procedure                               Abnormality         Status                     ---------                               -----------         ------                     CBC Auto Differential[[id number]]        Abnormal            Final result                 Please view results for these tests on the individual orders.    CBC Auto Differential [[id number]]  (Abnormal) Collected: 12/02/22 0449    Specimen: Blood Updated: 12/02/22 0607     WBC 10.90 10*3/mm3      RBC 4.52 10*6/mm3      Hemoglobin 13.4 g/dL      Hematocrit 40.1 %      MCV 88.8 fL      MCH 29.7 pg      MCHC 33.4 g/dL      RDW 14.3 %      RDW-SD 46.4 fl      MPV 9.4 fL      Platelets 328 10*3/mm3      Neutrophil % 57.0 %      Lymphocyte % 30.9 %      Monocyte % 10.8 %      Eosinophil % 0.8 %      Basophil % 0.5 %      Neutrophils, Absolute 6.20 10*3/mm3      Lymphocytes, Absolute 3.40 10*3/mm3      Monocytes, Absolute 1.20 10*3/mm3      Eosinophils, Absolute 0.10 10*3/mm3      Basophils, Absolute 0.10 10*3/mm3      nRBC 0.2 /100 WBC     POC Glucose Once [[id number]]  (Abnormal) Collected: 12/01/22 1829    Specimen: Blood Updated: 12/01/22 1830     Glucose 300 mg/dL      Comment: Serial Number: [id number]:  [id number]       POC Glucose Once [[id number]]  (Abnormal) Collected: 12/01/22 1155    Specimen: Blood Updated: 12/01/22 1159     Glucose 294 mg/dL      Comment: Serial Number: [id number]:  [id number]       Hemoglobin A1c [[id number]]  (Abnormal) Collected: 12/01/22 0448    Specimen: Blood Updated: 12/01/22 1040     Hemoglobin A1C 11.8 %     Narrative:      Hemoglobin A1C Reference Range:    <5.7 %        Normal  5.7-6.4 %     Increased risk for diabetes  > 6.4 %        Diabetes       These guidelines  have been recommended by the American Diabetic Association for Hgb A1c.      The following 2010 guidelines have been recommended by the American Diabetes Association for Hemoglobin A1c.    HBA1c 5.7-6.4% Increased risk for future diabetes (pre-diabetes)  HBA1c     >6.4% Diabetes      POC Glucose Once [[id number]]  (Abnormal) Collected: 12/01/22 0721    Specimen: Blood Updated: 12/01/22 0722     Glucose 349 mg/dL      Comment: Serial Number: [id number]:  [id number]       POC Glucose Once [[id number]]  (Abnormal) Collected: 12/01/22 0544    Specimen: Blood Updated: 12/01/22 0546     Glucose 381 mg/dL      Comment: Serial Number: [id number]:  [id number]       Comprehensive Metabolic Panel [[id number]]  (Abnormal) Collected: 12/01/22 0448    Specimen: Blood Updated: 12/01/22 0538     Glucose 447 mg/dL      BUN 27 mg/dL      Creatinine 1.03 mg/dL      Sodium 135 mmol/L      Potassium 4.4 mmol/L      Chloride 96 mmol/L      CO2 27.0 mmol/L      Calcium 10.3 mg/dL      Total Protein 8.0 g/dL      Albumin 4.00 g/dL      ALT (SGPT) 35 U/L      AST (SGOT) 23 U/L      Alkaline Phosphatase 69 U/L      Total Bilirubin 0.6 mg/dL      Globulin 4.0 gm/dL      A/G Ratio 1.0 g/dL      BUN/Creatinine Ratio 26.2     Anion Gap 12.0 mmol/L      eGFR 59.7 mL/min/1.73      Comment: National Kidney Foundation and American Society of Nephrology (ASN) Task Force recommended calculation based on the Chronic Kidney Disease Epidemiology Collaboration (CKD-EPI) equation refit without adjustment for race.       Narrative:      GFR Normal >60  Chronic Kidney Disease <60  Kidney Failure <15      Phosphorus [[id number]]  (Normal) Collected: 12/01/22 0448    Specimen: Blood Updated: 12/01/22 0534     Phosphorus 3.3 mg/dL     Magnesium [[id number]]  (Normal) Collected: 12/01/22 0448    Specimen: Blood Updated: 12/01/22 0534     Magnesium 1.9 mg/dL     Lipid Panel [[id number]]  (Abnormal) Collected: 12/01/22 0448    Specimen: Blood Updated:  12/01/22 0534     Total Cholesterol 151 mg/dL      Triglycerides 197 mg/dL      HDL Cholesterol 37 mg/dL      LDL Cholesterol  81 mg/dL      VLDL Cholesterol 33 mg/dL      LDL/HDL Ratio 2.02    Narrative:      Cholesterol Reference Ranges  (U.S. Department of Health and Human Services ATP III Classifications)    Desirable          <200 mg/dL  Borderline High    200-239 mg/dL  High Risk          >240 mg/dL      Triglyceride Reference Ranges  (U.S. Department of Health and Human Services ATP III Classifications)    Normal           <150 mg/dL  Borderline High  150-199 mg/dL  High             200-499 mg/dL  Very High        >500 mg/dL    HDL Reference Ranges  (U.S. Department of Health and Human Services ATP III Classifications)    Low     <40 mg/dl (major risk factor for CHD)  High    >60 mg/dl ('negative' risk factor for CHD)        LDL Reference Ranges  (U.S. Department of Health and Human Services ATP III Classifications)    Optimal          <100 mg/dL  Near Optimal     100-129 mg/dL  Borderline High  130-159 mg/dL  High             160-189 mg/dL  Very High        >189 mg/dL    C-reactive Protein [[id number]]  (Abnormal) Collected: 12/01/22 0448    Specimen: Blood Updated: 12/01/22 0534     C-Reactive Protein 3.43 mg/dL     TSH [[id number]]  (Normal) Collected: 12/01/22 0448    Specimen: Blood Updated: 12/01/22 0530     TSH 0.394 uIU/mL     Procalcitonin [[id number]]  (Normal) Collected: 12/01/22 0448    Specimen: Blood Updated: 12/01/22 0530     Procalcitonin 0.21 ng/mL     Narrative:      As a Marker for Sepsis (Non-Neonates):    1. <0.5 ng/mL represents a low risk of severe sepsis and/or septic shock.  2. >2 ng/mL represents a high risk of severe sepsis and/or septic shock.    As a Marker for Lower Respiratory Tract Infections that require antibiotic therapy:    PCT on Admission    Antibiotic Therapy       6-12 Hrs later    >0.5                Strongly Recommended  >0.25 - <0.5        Recommended   0.1 - 0.25        "   Discouraged              Remeasure/reassess PCT  <0.1                Strongly Discouraged     Remeasure/reassess PCT    As 28 day mortality risk marker: \"Change in Procalcitonin Result\" (>80% or <=80%) if Day 0 (or Day 1) and Day 4 values are available. Refer to [email or web address]    Change in PCT <=80%  A decrease of PCT levels below or equal to 80% defines a positive change in PCT test result representing a higher risk for 28-day all-cause mortality of patients diagnosed with severe sepsis for septic shock.    Change in PCT >80%  A decrease of PCT levels of more than 80% defines a negative change in PCT result representing a lower risk for 28-day all-cause mortality of patients diagnosed with severe sepsis or septic shock.       Sedimentation Rate [[id number]]  (Abnormal) Collected: 12/01/22 0448    Specimen: Blood Updated: 12/01/22 0519     Sed Rate 34 mm/hr     CBC & Differential [[id number]]  (Abnormal) Collected: 12/01/22 0448    Specimen: Blood Updated: 12/01/22 0514    Narrative:      The following orders were created for panel order CBC & Differential.  Procedure                               Abnormality         Status                     ---------                               -----------         ------                     CBC Auto Differential[[id number]]        Abnormal            Final result                 Please view results for these tests on the individual orders.    CBC Auto Differential [[id number]]  (Abnormal) Collected: 12/01/22 0448    Specimen: Blood Updated: 12/01/22 0514     WBC 13.90 10*3/mm3      RBC 4.88 10*6/mm3      Hemoglobin 14.5 g/dL      Hematocrit 43.4 %      MCV 88.9 fL      MCH 29.8 pg      MCHC 33.6 g/dL      RDW 14.3 %      RDW-SD 46.8 fl      MPV 8.8 fL      Platelets 341 10*3/mm3      Neutrophil % 74.3 %      Lymphocyte % 14.8 %      Monocyte % 10.4 %      Eosinophil % 0.0 %      Basophil % 0.5 %      Neutrophils, Absolute 10.30 10*3/mm3      Lymphocytes, " Absolute 2.10 10*3/mm3      Monocytes, Absolute 1.40 10*3/mm3      Eosinophils, Absolute 0.00 10*3/mm3      Basophils, Absolute 0.10 10*3/mm3      nRBC 0.2 /100 WBC     POC Glucose Once [[id number]]  (Abnormal) Collected: 11/1955    Specimen: Blood Updated: 11/30/22 1957     Glucose 340 mg/dL      Comment: Serial Number: [id number]:  [id number]       Comprehensive Metabolic Panel [[id number]]  (Abnormal) Collected: 11/30/22 1603    Specimen: Blood Updated: 11/30/22 1631     Glucose 492 mg/dL      BUN 23 mg/dL      Creatinine 1.02 mg/dL      Sodium 138 mmol/L      Potassium 4.3 mmol/L      Chloride 98 mmol/L      CO2 25.0 mmol/L      Calcium 10.3 mg/dL      Total Protein 8.3 g/dL      Albumin 4.30 g/dL      ALT (SGPT) 43 U/L      AST (SGOT) 31 U/L      Alkaline Phosphatase 67 U/L      Total Bilirubin 1.0 mg/dL      Globulin 4.0 gm/dL      A/G Ratio 1.1 g/dL      BUN/Creatinine Ratio 22.5     Anion Gap 15.0 mmol/L      eGFR 60.4 mL/min/1.73      Comment: National Kidney Foundation and American Society of Nephrology (ASN) Task Force recommended calculation based on the Chronic Kidney Disease Epidemiology Collaboration (CKD-EPI) equation refit without adjustment for race.       Narrative:      GFR Normal >60  Chronic Kidney Disease <60  Kidney Failure <15      CBC & Differential [[id number]]  (Abnormal) Collected: 11/30/22 1604    Specimen: Blood Updated: 11/30/22 1606    Narrative:      The following orders were created for panel order CBC & Differential.  Procedure                               Abnormality         Status                     ---------                               -----------         ------                     CBC Auto Differential[[id number]]        Abnormal            Final result                 Please view results for these tests on the individual orders.    CBC Auto Differential [[id number]]  (Abnormal) Collected: 11/30/22 1604    Specimen: Blood Updated: 11/30/22 1606     WBC 9.10 10*3/mm3       RBC 5.02 10*6/mm3      Hemoglobin 14.9 g/dL      Hematocrit 45.3 %      MCV 90.1 fL      MCH 29.7 pg      MCHC 33.0 g/dL      RDW 14.0 %      RDW-SD 43.8 fl      MPV 8.4 fL      Platelets 308 10*3/mm3      Neutrophil % 79.8 %      Lymphocyte % 13.3 %      Monocyte % 6.8 %      Eosinophil % 0.0 %      Basophil % 0.1 %      Neutrophils, Absolute 7.30 10*3/mm3      Lymphocytes, Absolute 1.20 10*3/mm3      Monocytes, Absolute 0.60 10*3/mm3      Eosinophils, Absolute 0.00 10*3/mm3      Basophils, Absolute 0.00 10*3/mm3      nRBC 0.1 /100 WBC           Imaging Results (Last 72 Hours)     ** No results found for the last 72 hours. **            Medication Review:     Hospital Medications (active)       Dose Frequency Start End    acetaminophen (TYLENOL) tablet 650 mg 650 mg Every 4 Hours PRN 11/30/2022     Admin Instructions: Based on patient request - if ordered for moderate or severe pain, provider allows for administration of a medication prescribed for a lower pain scale.  Do not exceed 4 grams of acetaminophen in a 24 hr period. Max dose of 2gm for AST/ALT greater than 120 units/L    If given for fever, use fever parameter: fever greater than 100.4 °F.    If given for pain, use the following pain scale:   Mild Pain = Pain Score of 1-3, CPOT 1-2  Moderate Pain = Pain Score of 4-6, CPOT 3-4  Severe Pain = Pain Score of 7-10, CPOT 5-8    Route: Oral    apixaban (ELIQUIS) tablet 5 mg 5 mg Every 12 Hours Scheduled 12/1/2022     Admin Instructions: Tablet may be crushed and suspended in 60 mL of water or D5W and immediately delivered via NG tube.    Route: Oral    atorvastatin (LIPITOR) tablet 20 mg 20 mg Daily 12/1/2022     Admin Instructions: Avoid grapefruit juice.    Route: Oral    ceFAZolin 2 gm IVPB in 100 mL NS (MBP) 2 g Every 8 Hours 12/1/2022 12/15/2022    Admin Instructions: Caution: Look alike/sound alike drug alert    Route: Intravenous    dextrose (D50W) (25 g/50 mL) IV injection 25 g 25 g Every 15 Minutes  PRN 11/30/2022     Admin Instructions: Blood sugar less than 70; patient has IV access - Unresponsive, NPO or Unable To Safely Swallow    Route: Intravenous    dextrose (GLUTOSE) oral gel 15 g 15 g Every 15 Minutes PRN 11/30/2022     Admin Instructions: BS<70, Patient Alert, Is not NPO, Can safely swallow.    Route: Oral    dilTIAZem CD (CARDIZEM CD) 24 hr capsule 240 mg 240 mg Every 24 Hours Scheduled 12/1/2022     Admin Instructions: Caution: Look alike/sound alike drug alert.   Swallow capsule whole. Do not crush, chew, or open capsule. Avoid grapefruit juice. Maximum simvastatin dose 10 mg while taking dilTIAZem.    Route: Oral    glucagon (human recombinant) (GLUCAGEN DIAGNOSTIC) 1 mg in sterile water (preservative free) 1 mL injection 1 mg Every 15 Minutes PRN 11/30/2022     Admin Instructions: Blood Glucose Less Than 70 - Patient Without IV Access - Unresponsive, NPO or Unable To Safely Swallow  Reconstitute powder for injection by adding 1 mL of sterile water for injection to glucagon 1 mg vial resulting in concentration of 1 mg/mL.    Route: Intramuscular    hydrALAZINE (APRESOLINE) injection 10 mg 10 mg Every 6 Hours PRN 11/30/2022     Admin Instructions: As needed for SBP greater than 160  Caution: Look alike/sound alike drug alert    Route: Intravenous    HYDROcodone-acetaminophen (NORCO) 7.5-325 MG per tablet 1 tablet 1 tablet 3 Times Daily PRN 12/1/2022     Admin Instructions: Based on patient request - if ordered for moderate or severe pain, provider allows for administration of a medication prescribed for a lower pain scale.  [IRAIS]    Do not exceed 4 grams of acetaminophen in a 24 hr period. Max dose of 2gm for AST/ALT greater than 120 units/L        If given for pain, use the following pain scale:   Mild Pain = Pain Score of 1-3, CPOT 1-2  Moderate Pain = Pain Score of 4-6, CPOT 3-4  Severe Pain = Pain Score of 7-10, CPOT 5-8    Route: Oral    insulin glargine (LANTUS, SEMGLEE) injection 25 Units 25  Units Nightly 11/30/2022     Admin Instructions:     Route: Subcutaneous    insulin lispro (ADMELOG) injection 3-14 Units 3-14 Units 3 Times Daily With Meals 11/30/2022     Admin Instructions: Blood Glucose 150-199 mg/dL - 3 units  Blood Glucose 200-249 mg/dL - 5 units  Blood Glucose 250-299 mg/dL - 8 units  Blood Glucose 300-349 mg/dL - 10 units  Blood Glucose 350-400 mg/dL - 12 units  Blood Glucose Greater Than 400 mg/dL - 14 units & Call Provider   Caution: Look alike/sound alike drug alert    Route: Subcutaneous    Magnesium Sulfate 2 gram / 50mL Infusion (GIVE X 3 BAGS TO EQUAL 6GM TOTAL DOSE) - Mg 1.1 - 1.5 mg/dl 2 g As Needed 11/30/2022     Admin Instructions: Mg 1.1 -1.5 mg/dL. Infuse 2 grams over 2 hours for 3 doses (for a total Mg dose of 6 grams).  Recheck Mg level in the AM.    Route: Intravenous    Linked Group 1: See Hyperspace for full Linked Orders Report.        Magnesium Sulfate 2 gram Bolus, followed by 8 gram infusion (total Mg dose 10 grams)- Mg less than or equal to 1mg/dL 2 g As Needed 11/30/2022     Admin Instructions: Mg less than or equal to 1mg/dL. Give 2 gm over 30 minutes as bolus, then infuse 2 gm over 2 hours for 4 doses (8 grams) for total dose of 10 grams.  Recheck Mg levels in the AM.    Route: Intravenous    Linked Group 1: See Hyperspace for full Linked Orders Report.        Magnesium Sulfate 4 gram infusion- Mg 1.6-1.9 mg/dL 4 g As Needed 11/30/2022     Admin Instructions: Mg 1.6-1.9 mg/dL. Recheck Mg level in the AM.    Route: Intravenous    Linked Group 1: See Hyperspace for full Linked Orders Report.        metoprolol tartrate (LOPRESSOR) tablet 25 mg 25 mg Every 12 Hours Scheduled 12/1/2022     Route: Oral    nystatin (MYCOSTATIN) powder  Every 12 Hours Scheduled 12/1/2022     Admin Instructions: Groin area that is irritated    Route: Topical    ondansetron (ZOFRAN) injection 4 mg 4 mg Every 6 Hours PRN 11/30/2022     Admin Instructions: If BOTH ondansetron (ZOFRAN) and  promethazine (PHENERGAN) are ordered use ondansetron first and THEN promethazine IF ondansetron is ineffective.    Route: Intravenous    potassium & sodium phosphates (PHOS-NAK) 280-160-250 MG packet - for Phosphorus 1.25 - 2.5 mg/dL 2 packet Every 6 Hours PRN 11/30/2022     Admin Instructions: Phosphorus replacement:       If Phos 1.25 - 2.5  mg/dL: Give Neutraphos 2 packets by mouth every 6 hours x 1 dose. Recheck Phosphorus level 6 hours after replacement complete.    Route: Oral    Linked Group 2: See Hyperspace for full Linked Orders Report.        potassium & sodium phosphates (PHOS-NAK) 280-160-250 MG packet - for Phosphorus less than 1.25 mg/dL 2 packet Every 6 Hours PRN 11/30/2022     Admin Instructions: Phosphorus replacement:     If Phos    < 1.25 mg/dL   : Give Neutraphos 2 packets by mouth every 6 hours x 2 doses. Recheck Phosphorus level 6 hours after replacement complete.    Route: Oral    Linked Group 2: See Hyperspace for full Linked Orders Report.        potassium chloride (K-DUR,KLOR-CON) CR tablet 40 mEq 40 mEq As Needed 11/30/2022     Admin Instructions: Potassium 3.1 or Less Give KCl 40 mEq q4h x3 Doses   Potassium 3.2 - 3.6 Give KCl 40 mEq q4h x2 Doses     Check Potassium 4 Hours After Last Dose Given   Check Magnesium if Potassium Level Remains Low After Replacement   DO NOT GIVE if CrCl is Less Than 30 mL/minute or Urine Output Less Than 30 mL/hr    Route: Oral    potassium chloride (KLOR-CON) packet 40 mEq 40 mEq As Needed 11/30/2022     Admin Instructions: Potassium 3.1 or Less Give KCl 40 mEq q4h x3 Doses   Potassium 3.2 - 3.6 Give KCl 40 mEq q4h x2 Doses     Check Potassium 4 Hours After Last Dose Given   Check Magnesium if Potassium Level Remains Low After Replacement   DO NOT GIVE if CrCl is Less Than 30 mL/minute or Urine Output Less Than 30 mL/hr    Route: Oral    sodium chloride 0.9 % flush 10 mL 10 mL As Needed 11/30/2022     Route: Intravenous    Linked Group 3: See Hyperspace for  full Linked Orders Report.        sodium chloride 0.9 % flush 10 mL 10 mL Every 12 Hours Scheduled 11/30/2022     Route: Intravenous    sodium chloride 0.9 % flush 10 mL 10 mL As Needed 11/30/2022     Route: Intravenous    sodium chloride 0.9 % infusion 40 mL 40 mL As Needed 11/30/2022     Admin Instructions: Following administration of an IV intermittent medication, flush line with 40mL NS at 100mL/hr.    Route: Intravenous        apixaban, 5 mg, Oral, Q12H  atorvastatin, 20 mg, Oral, Daily  ceFAZolin, 2 g, Intravenous, Q8H  dilTIAZem CD, 240 mg, Oral, Q24H  hydrALAZINE, 25 mg, Oral, Q12H  insulin glargine, 25 Units, Subcutaneous, Nightly  insulin lispro, 3-14 Units, Subcutaneous, TID With Meals  metoprolol tartrate, 25 mg, Oral, Q12H  nystatin, , Topical, Q12H  sodium chloride, 10 mL, Intravenous, Q12H        Assessment & Plan         Bacteremia due to methicillin susceptible Staphylococcus aureus (MSSA)    Covid +  COPD, HTN  Echo -    Plan :    IV rocephin 2 gm daily till 1/5/23 .  CBC, BMP weekly   Repeat BC -   JEOVANNY -  Will follow  Thank you    Olya Mendez MD  12/04/22  13:14 EST

## 2022-12-04 NOTE — PROGRESS NOTES
LOS: 4 days   Admiting Physician- Michael Lozano MD    Reason For Followup:    COVID-19 infection  Chronic atrial fibrillation  Bacteremia  Hypertension    Subjective     Patient is feeling better.    Objective     Blood pressure is elevated    Review of Systems:   Review of Systems   Constitutional: Negative for chills and fever.   HENT: Negative for ear discharge and nosebleeds.    Eyes: Negative for discharge and redness.   Cardiovascular: Negative for chest pain, orthopnea, palpitations, paroxysmal nocturnal dyspnea and syncope.   Respiratory: Positive for cough and shortness of breath. Negative for wheezing.    Endocrine: Negative for heat intolerance.   Skin: Negative for rash.   Musculoskeletal: Negative for arthritis and myalgias.   Gastrointestinal: Negative for abdominal pain, melena, nausea and vomiting.   Genitourinary: Negative for dysuria and hematuria.   Neurological: Negative for dizziness, light-headedness, numbness and tremors.   Psychiatric/Behavioral: Negative for depression. The patient is not nervous/anxious.          Vital Signs  Vitals:    12/03/22 2022 12/04/22 0000 12/04/22 0500 12/04/22 0820   BP: (!) 150/108 145/69 154/74 167/73   BP Location: Right arm      Patient Position: Lying      Pulse: 95 80 85 86   Resp: 22 20 18 18   Temp: 97.9 °F (36.6 °C) 98 °F (36.7 °C) 98.6 °F (37 °C) 98.3 °F (36.8 °C)   TempSrc: Oral Oral Oral Oral   SpO2: 99% 96% 91% 93%   Weight:       Height:         Wt Readings from Last 1 Encounters:   11/30/22 127 kg (280 lb)       Intake/Output Summary (Last 24 hours) at 12/4/2022 1202  Last data filed at 12/3/2022 1900  Gross per 24 hour   Intake 960 ml   Output 100 ml   Net 860 ml     Physical Exam:  Physical Exam    Results Review:   Lab Results (last 24 hours)     Procedure Component Value Units Date/Time    POC Glucose Once [[id number]]  (Abnormal) Collected: 12/04/22 1129    Specimen: Blood Updated: 12/04/22 1130     Glucose 345 mg/dL      Comment: Serial  Number: [id number]:  [id number]       Blood Culture - Blood, Arm, Right [[id number]]  (Normal) Collected: 12/01/22 0835    Specimen: Blood from Arm, Right Updated: 12/04/22 0915     Blood Culture No growth at 3 days    Narrative:      Less than seven (7) mL's of blood was collected.  Insufficient quantity may yield false negative results.    Blood Culture - Blood, Hand, Right [[id number]]  (Normal) Collected: 12/01/22 0835    Specimen: Blood from Hand, Right Updated: 12/04/22 0915     Blood Culture No growth at 3 days    POC Glucose Once [[id number]]  (Abnormal) Collected: 12/04/22 0737    Specimen: Blood Updated: 12/04/22 0738     Glucose 267 mg/dL      Comment: Serial Number: [id number]:  [id number]       Comprehensive Metabolic Panel [[id number]]  (Abnormal) Collected: 12/04/22 0448    Specimen: Blood Updated: 12/04/22 0653     Glucose 282 mg/dL      BUN 13 mg/dL      Creatinine 0.80 mg/dL      Sodium 139 mmol/L      Potassium 4.1 mmol/L      Chloride 102 mmol/L      CO2 25.0 mmol/L      Calcium 9.6 mg/dL      Total Protein 7.5 g/dL      Albumin 3.60 g/dL      ALT (SGPT) 52 U/L      AST (SGOT) 75 U/L      Alkaline Phosphatase 70 U/L      Total Bilirubin 0.6 mg/dL      Globulin 3.9 gm/dL      A/G Ratio 0.9 g/dL      BUN/Creatinine Ratio 16.3     Anion Gap 12.0 mmol/L      eGFR 80.9 mL/min/1.73      Comment: National Kidney Foundation and American Society of Nephrology (ASN) Task Force recommended calculation based on the Chronic Kidney Disease Epidemiology Collaboration (CKD-EPI) equation refit without adjustment for race.       Narrative:      GFR Normal >60  Chronic Kidney Disease <60  Kidney Failure <15      Phosphorus [[id number]]  (Normal) Collected: 12/04/22 0448    Specimen: Blood Updated: 12/04/22 0653     Phosphorus 2.6 mg/dL     Magnesium [[id number]]  (Normal) Collected: 12/04/22 0448    Specimen: Blood Updated: 12/04/22 0653     Magnesium 1.6 mg/dL     C-reactive Protein [[id number]]  (Abnormal)  "Collected: 12/04/22 0448    Specimen: Blood Updated: 12/04/22 0653     C-Reactive Protein 1.20 mg/dL     Procalcitonin [[id number]]  (Normal) Collected: 12/04/22 0448    Specimen: Blood Updated: 12/04/22 0646     Procalcitonin 0.15 ng/mL     Narrative:      As a Marker for Sepsis (Non-Neonates):    1. <0.5 ng/mL represents a low risk of severe sepsis and/or septic shock.  2. >2 ng/mL represents a high risk of severe sepsis and/or septic shock.    As a Marker for Lower Respiratory Tract Infections that require antibiotic therapy:    PCT on Admission    Antibiotic Therapy       6-12 Hrs later    >0.5                Strongly Recommended  >0.25 - <0.5        Recommended   0.1 - 0.25          Discouraged              Remeasure/reassess PCT  <0.1                Strongly Discouraged     Remeasure/reassess PCT    As 28 day mortality risk marker: \"Change in Procalcitonin Result\" (>80% or <=80%) if Day 0 (or Day 1) and Day 4 values are available. Refer to [email or web address]://[email or web address]    Change in PCT <=80%  A decrease of PCT levels below or equal to 80% defines a positive change in PCT test result representing a higher risk for 28-day all-cause mortality of patients diagnosed with severe sepsis for septic shock.    Change in PCT >80%  A decrease of PCT levels of more than 80% defines a negative change in PCT result representing a lower risk for 28-day all-cause mortality of patients diagnosed with severe sepsis or septic shock.       Sedimentation Rate [[id number]]  (Abnormal) Collected: 12/04/22 0448    Specimen: Blood Updated: 12/04/22 0635     Sed Rate 48 mm/hr     CBC & Differential [[id number]]  (Normal) Collected: 12/04/22 0448    Specimen: Blood Updated: 12/04/22 0626    Narrative:      The following orders were created for panel order CBC & Differential.  Procedure                               Abnormality         Status                     ---------                               -----------         ------        "              CBC Auto Differential[[id number]]        Normal              Final result                 Please view results for these tests on the individual orders.    CBC Auto Differential [[id number]]  (Normal) Collected: 12/04/22 0448    Specimen: Blood Updated: 12/04/22 0626     WBC 8.10 10*3/mm3      RBC 4.89 10*6/mm3      Hemoglobin 14.1 g/dL      Hematocrit 44.1 %      MCV 90.1 fL      MCH 28.8 pg      MCHC 32.0 g/dL      RDW 13.8 %      RDW-SD 42.9 fl      MPV 9.3 fL      Platelets 335 10*3/mm3      Neutrophil % 50.2 %      Lymphocyte % 37.9 %      Monocyte % 10.2 %      Eosinophil % 1.3 %      Basophil % 0.4 %      Neutrophils, Absolute 4.00 10*3/mm3      Lymphocytes, Absolute 3.10 10*3/mm3      Monocytes, Absolute 0.80 10*3/mm3      Eosinophils, Absolute 0.10 10*3/mm3      Basophils, Absolute 0.00 10*3/mm3      nRBC 0.1 /100 WBC     POC Glucose Once [[id number]]  (Abnormal) Collected: 12/03/22 2018    Specimen: Blood Updated: 12/03/22 2019     Glucose 324 mg/dL      Comment: Serial Number: [id number]:  [id number]       POC Glucose Once [[id number]]  (Abnormal) Collected: 12/03/22 1606    Specimen: Blood Updated: 12/03/22 1607     Glucose 339 mg/dL      Comment: Serial Number: [id number]:  [id number]           Imaging Results (Last 72 Hours)     ** No results found for the last 72 hours. **        ECG/EMG Results (most recent)     Procedure Component Value Units Date/Time    ECG 12 Lead Other; History of A. fib [[id number]] Collected: 12/01/22 1036     Updated: 12/01/22 1037     QT Interval 363 ms     Narrative:      HEART RATE= 118  bpm  RR Interval= 506  ms  WA Interval=   ms  P Horizontal Axis=   deg  P Front Axis=   deg  QRSD Interval= 152  ms  QT Interval= 363  ms  QRS Axis= -86  deg  T Wave Axis= 3  deg  - ABNORMAL ECG -  Atrial fibrillation  Ventricular premature complex  RBBB and LAFB  When compared with ECG of 05-Oct-2021 9:44:48,  Significant change in rhythm  Electronically Signed By:    Date and Time of Study: 2022-12-01 10:36:38    SCANNED - TELEMETRY   [[id number]] Resulted: 11/30/22     Updated: 12/01/22 1055    Adult Transthoracic Echo Limited W/ Cont if Necessary Per Protocol [[id number]] Resulted: 12/02/22 1028     Updated: 12/02/22 1031     Target HR (85%) 130 bpm      Max. Pred. HR (100%) 153 bpm      LV Sys Vol (BSA corrected) 13.4 cm2      LV Anthony Vol (BSA corrected) 31.7 cm2      EDV(MOD-sp4) 71.6 ml      ESV(MOD-sp4) 30.3 ml      SV(MOD-sp4) 41.3 ml      SI(MOD-sp4) 18.3 ml/m2      EF(MOD-sp4) 57.6 %      EF(MOD-bp) 57.0 %     Narrative:      •  Left ventricular systolic function is normal. Left ventricular ejection   fraction appears to be 56 - 60%.  •  Left ventricular diastolic function was normal.      SCANNED - TELEMETRY   [[id number]] Resulted: 11/30/22     Updated: 12/02/22 1119    SCANNED - TELEMETRY   [[id number]] Resulted: 11/30/22     Updated: 12/02/22 1124    SCANNED - TELEMETRY   [[id number]] Resulted: 11/30/22     Updated: 12/02/22 1142    SCANNED - TELEMETRY   [[id number]] Resulted: 11/30/22     Updated: 12/02/22 1306        CBC    Results from last 7 days   Lab Units 12/04/22 0448 12/03/22  0539 12/02/22  0449 12/01/22 0448 11/30/22  1604 11/29/22  2049   WBC 10*3/mm3 8.10 7.40 10.90* 13.90* 9.10 9.00   HEMOGLOBIN g/dL 14.1 13.4 13.4 14.5 14.9 14.5   PLATELETS 10*3/mm3 335 298 328 341 308 289     BMP   Results from last 7 days   Lab Units 12/04/22 0448 12/03/22  0539 12/02/22  0449 12/01/22 0448 11/30/22  1603 11/29/22  2049   SODIUM mmol/L 139 140 139 135* 138 141   POTASSIUM mmol/L 4.1 4.4 4.2 4.4 4.3 3.8   CHLORIDE mmol/L 102 104 100 96* 98 100   CO2 mmol/L 25.0 25.0 28.0 27.0 25.0 26.0   BUN mg/dL 13 18 23 27* 23 16   CREATININE mg/dL 0.80 0.88 1.03* 1.03* 1.02* 0.89   GLUCOSE mg/dL 282* 350* 324* 447* 492* 242*   MAGNESIUM mg/dL 1.6 1.7 1.6 1.9  --   --    PHOSPHORUS mg/dL 2.6 3.1 3.2 3.3  --   --      CMP   Results from last 7 days   Lab Units 12/04/22  0449  12/03/22  0539 12/02/22  0449 12/01/22  0448 11/30/22  1603 11/29/22  2049   SODIUM mmol/L 139 140 139 135* 138 141   POTASSIUM mmol/L 4.1 4.4 4.2 4.4 4.3 3.8   CHLORIDE mmol/L 102 104 100 96* 98 100   CO2 mmol/L 25.0 25.0 28.0 27.0 25.0 26.0   BUN mg/dL 13 18 23 27* 23 16   CREATININE mg/dL 0.80 0.88 1.03* 1.03* 1.02* 0.89   GLUCOSE mg/dL 282* 350* 324* 447* 492* 242*   ALBUMIN g/dL 3.60 3.50 3.40* 4.00 4.30 4.10   BILIRUBIN mg/dL 0.6 0.5 0.4 0.6 1.0 1.0   ALK PHOS U/L 70 67 67 69 67 69   AST (SGOT) U/L 75* 49* 31 23 31 39*   ALT (SGPT) U/L 52* 35* 32 35* 43* 43*   LIPASE U/L  --   --   --   --   --  42     Cardiac Studies:  Echo- Results for orders placed during the hospital encounter of 11/30/22    Adult Transthoracic Echo Limited W/ Cont if Necessary Per Protocol    Interpretation Summary  •  Left ventricular systolic function is normal. Left ventricular ejection fraction appears to be 56 - 60%.  •  Left ventricular diastolic function was normal.    Stress Myoview-  Cath-      Medication Review:   Scheduled Meds:apixaban, 5 mg, Oral, Q12H  atorvastatin, 20 mg, Oral, Daily  ceFAZolin, 2 g, Intravenous, Q8H  dilTIAZem CD, 240 mg, Oral, Q24H  hydrALAZINE, 25 mg, Oral, Q12H  insulin glargine, 25 Units, Subcutaneous, Nightly  insulin lispro, 3-14 Units, Subcutaneous, TID With Meals  metoprolol tartrate, 25 mg, Oral, Q12H  nystatin, , Topical, Q12H  sodium chloride, 10 mL, Intravenous, Q12H      Continuous Infusions:   PRN Meds:.•  acetaminophen  •  dextrose  •  dextrose  •  glucagon (human recombinant)  •  hydrALAZINE  •  HYDROcodone-acetaminophen  •  magnesium sulfate **OR** magnesium sulfate **OR** magnesium sulfate  •  ondansetron  •  potassium & sodium phosphates **OR** potassium & sodium phosphates  •  potassium chloride  •  potassium chloride  •  [COMPLETED] Insert Peripheral IV **AND** sodium chloride  •  sodium chloride  •  sodium chloride      Assessment & Plan   Patient Active Problem List   Diagnosis   •  Arthritis   • Bradycardia   • Chronic obstructive pulmonary disease (HCC)   • Depression   • Diabetic peripheral neuropathy ([facility])   • Gastroesophageal reflux disease   • Mixed hyperlipidemia   • Hypomagnesemia   • Lumbar radiculopathy   • Myalgia   • Paroxysmal atrial fibrillation ([facility])   • Shoulder pain, right   • Sleep apnea   • Type 2 diabetes mellitus with hyperglycemia, with long-term current use of insulin ([facility])   • Vitamin D deficiency   • Palpitations   • PVC's (premature ventricular contractions)   • Essential hypertension   • Cervical radiculitis   • Arthralgia of right temporomandibular joint   • Shortness of breath   • Bilateral leg edema   • Chronic diastolic CHF (congestive heart failure) ([facility])   • OAB (overactive bladder)   • Need for vaccination   • Welcome to Medicare preventive visit   • Class 2 obesity due to excess calories without serious comorbidity with body mass index (BMI) of 39.0 to 39.9 in adult   • Hip pain   • Wellness examination   • Onychomycosis   • Atrial fibrillation with RVR ([facility])   • Chest pain, atypical   • Elevated LFTs   • Cholelithiasis   • Vagina, candidiasis   • Tinea corporis   • Pre-op examination   • Bacteremia due to methicillin susceptible Staphylococcus aureus (MSSA)     MDM:    1.  Bacteremia:    Patient has bacteremia.  There were 2 bottles collected at the same time and they are growing methicillin sensitive staph aureus.  ID requested JEOVANNY.  However patient echocardiogram showed no vegetation.  At this stage I would recommend to defer JEOVANNY and treat empirically for bacteremia.  If needed I would proceed with JEOVANNY after 1 to 2 weeks if clinically indicated.    2.  COVID-19 infection:    Patient is slowly getting better I will recommend continue current treatment.    3.  Hypertension:    I would add hydralazine for control of blood pressure    4.  Chronic atrial fibrillation:    Patient heart rate is much better.    Patient is seen and examined and findings are  verified.  All data is reviewed by me personally.  Assessment and plan formulated by APC was done after discussion with attending.  I spent more than 50% of time in taking care of the patient.    Patient is sitting up in the chair.    Normal S1 and S2.  No pericardial rub or murmur abdominal exam is benign    Patient had second set of blood cultures which are negative.  At this stage I will recommend patient does not have persistent bacteremia and I would hold off JEOVANNY if needed it can be done in 1 to 2 weeks.    I will follow has a outpatient    Electronically signed by Jesse Charles MD, 12/04/22, 12:04 PM EST.    Jesse Charles MD  12/04/22  12:02 EST

## 2022-12-05 ENCOUNTER — TRANSITIONAL CARE MANAGEMENT TELEPHONE ENCOUNTER (OUTPATIENT)
Dept: CALL CENTER | Facility: HOSPITAL | Age: 67
End: 2022-12-05

## 2022-12-05 ENCOUNTER — HOME HEALTH ADMISSION (OUTPATIENT)
Dept: HOME HEALTH SERVICES | Facility: HOME HEALTHCARE | Age: 67
End: 2022-12-05
Payer: MEDICAID

## 2022-12-05 LAB — QT INTERVAL: 363 MS

## 2022-12-05 NOTE — OUTREACH NOTE
Call Center TCM Note    Flowsheet Row Responses   [facility] facility patient discharged from? Doc   Does the patient have one of the following disease processes/diagnoses(primary or secondary)? Other   TCM attempt successful? No   Unsuccessful attempts Attempt 2          Dyan Villegas LPN    12/5/2022, 17:15 EST

## 2022-12-05 NOTE — PAYOR COMM NOTE
"RE: [id number]    DC NOTIFICATION  DC DATE 12/4/2022  ===================================    UTILIZATION REVIEW  MARCELINO CODY RN   PH: [phone]  FAX: [phone]    [facility]  NPI# [id number]  TID # [id number]          Martin Mason (67 y.o. Female)     Date of Birth   1955    Social Security Number       Address   [address] [place] [address]    Home Phone   [phone]    MRN   [id number]       Holiness   None    Marital Status                               Admission Date   11/30/22    Admission Type   Emergency    Admitting Provider   Michael Lozano MD    Attending Provider       Department, Room/Bed   [facility] 2A PEDIATRICS, 204/1       Discharge Date   12/4/2022    Discharge Disposition   Home or Self Care    Discharge Destination                               Attending Provider: (none)   Allergies: Ibuprofen, Prednisone, Pregabalin, Tramadol, Levofloxacin, Sulfamethoxazole-trimethoprim    Isolation: None   Infection: COVID (confirmed) (11/29/22)   Code Status: Prior    Ht: 162.6 cm (64\")   Wt: 127 kg (280 lb)    Admission Cmt: None   Principal Problem: Bacteremia due to methicillin susceptible Staphylococcus aureus (MSSA) [R78.81,B95.61]                 Active Insurance as of 11/30/2022     Primary Coverage     Payor Plan Insurance Group Employer/Plan Group    [employer] [place] - [employer] [id number]     Payor Plan Address Payor Plan Phone Number Payor Plan Fax Number Effective Dates    MAIL STOP:   4/1/2017 - None Entered    [address] [address]       [facility] [address]       Subscriber Name Subscriber Birth Date Member ID       MARTIN MASON 1955 [id number]                 Emergency Contacts      (Rel.) Home Phone Work Phone Mobile Phone    LEIDY PEREZ (Daughter) [phone] -- [phone]    MICHELLEBARBARA DENT (Spouse) [phone] -- --    Agnes Moncada (Daughter) -- -- [phone]             "   Discharge Summary      Michael Lozano MD at 22 1340                       [facility] Medicine Services  DISCHARGE SUMMARY    Patient Name: Caterina Mason  : 1955  MRN: [id number]    Date of Admission: 2022  Discharge Diagnosis: COVID infection, bacteremia, A. fib with RVR  Date of Discharge:  22    Primary Care Physician: Lou Curran MD      Presenting Problem:   Bacteremia due to methicillin susceptible Staphylococcus aureus (MSSA) [R78.81, B95.61]  COVID-19 [U07.1]    Active and Resolved Hospital Problems:  Active Hospital Problems    Diagnosis POA   • **Bacteremia due to methicillin susceptible Staphylococcus aureus (MSSA) [R78.81, B95.61] Yes      Resolved Hospital Problems   No resolved problems to display.         Hospital Course     Hospital Course:  Caterina Mason is a 67 y.o. female who presented to the hospital with shortness of breath.  The patient was evaluated and found to have COVID infection.  She was placed on supplemental oxygen and admitted to the medicine service.  Blood cultures ended up growing staph aureus.  The patient was started on IV antibiotics.  Patient was seen by infectious diseases.  She was placed on Rocephin.  Patient was also having A. fib with RVR.  Cardiology was consulted.  Medications were adjusted and rate was controlled.  Patient was anticoagulated with apixaban.  The patient was placed on ceftriaxone for 30 days and once everything was arranged she was discharged home in stable condition on room air without any further COVID symptoms.  PICC line was placed prior to discharge.            Reasons For Change In Medications and Indications for New Medications:      Day of Discharge     Vital Signs:  Temp:  [97.5 °F (36.4 °C)-98.6 °F (37 °C)] 98.3 °F (36.8 °C)  Heart Rate:  [80-97] 86  Resp:  [18-22] 18  BP: (145-167)/() 167/73    Physical Exam:  Physical Exam  Vitals reviewed.   HENT:      Head: Normocephalic.    Cardiovascular:      Rate and Rhythm: Normal rate.   Pulmonary:      Effort: Pulmonary effort is normal.   Abdominal:      General: Abdomen is flat.      Palpations: Abdomen is soft.   Musculoskeletal:         General: Normal range of motion.      Cervical back: Normal range of motion.   Skin:     General: Skin is warm.   Neurological:      General: No focal deficit present.      Mental Status: She is alert and oriented to person, place, and time.   Psychiatric:         Mood and Affect: Mood normal.         Behavior: Behavior normal.            Pertinent  and/or Most Recent Results     LAB RESULTS:      Lab 12/04/22 0448 12/03/22  0539 12/02/22  0449 12/01/22 0448 11/30/22  1604 11/29/22 2051   WBC 8.10 7.40 10.90* 13.90* 9.10  --    HEMOGLOBIN 14.1 13.4 13.4 14.5 14.9  --    HEMATOCRIT 44.1 41.3 40.1 43.4 45.3  --    PLATELETS 335 298 328 341 308  --    NEUTROS ABS 4.00 3.60 6.20 10.30* 7.30*  --    LYMPHS ABS 3.10 2.80 3.40* 2.10 1.20  --    MONOS ABS 0.80 0.80 1.20* 1.40* 0.60  --    EOS ABS 0.10 0.10 0.10 0.00 0.00  --    MCV 90.1 89.7 88.8 88.9 90.1  --    SED RATE 48*  --   --  34*  --   --    CRP 1.20*  --   --  3.43*  --   --    PROCALCITONIN 0.15  --   --  0.21  --   --    LACTATE  --   --   --   --   --  1.7         Lab 12/04/22 0448 12/03/22  0539 12/02/22  0449 12/01/22 0448 11/30/22  1603   SODIUM 139 140 139 135* 138   POTASSIUM 4.1 4.4 4.2 4.4 4.3   CHLORIDE 102 104 100 96* 98   CO2 25.0 25.0 28.0 27.0 25.0   ANION GAP 12.0 11.0 11.0 12.0 15.0   BUN 13 18 23 27* 23   CREATININE 0.80 0.88 1.03* 1.03* 1.02*   EGFR 80.9 72.1 59.7* 59.7* 60.4   GLUCOSE 282* 350* 324* 447* 492*   CALCIUM 9.6 9.1 9.3 10.3 10.3   MAGNESIUM 1.6 1.7 1.6 1.9  --    PHOSPHORUS 2.6 3.1 3.2 3.3  --    HEMOGLOBIN A1C  --   --   --  11.8*  --    TSH  --   --   --  0.394  --          Lab 12/04/22  0448 12/03/22  0539 12/02/22  0449 12/01/22  0448 11/30/22  1603 11/29/22  2049   TOTAL PROTEIN 7.5 6.9 7.1 8.0 8.3 8.2   ALBUMIN  3.60 3.50 3.40* 4.00 4.30 4.10   GLOBULIN 3.9 3.4 3.7 4.0 4.0 4.1   ALT (SGPT) 52* 35* 32 35* 43* 43*   AST (SGOT) 75* 49* 31 23 31 39*   BILIRUBIN 0.6 0.5 0.4 0.6 1.0 1.0   ALK PHOS 70 67 67 69 67 69   LIPASE  --   --   --   --   --  42             Lab 12/01/22  0448   CHOLESTEROL 151   LDL CHOL 81   HDL CHOL 37*   TRIGLYCERIDES 197*             Brief Urine Lab Results  (Last result in the past 365 days)      Color   Clarity   Blood   Leuk Est   Nitrite   Protein   CREAT   Urine HCG        11/29/22 2039 Yellow   Clear   Negative   Moderate (2+)   Negative   100 mg/dL (2+)               Microbiology Results (last 10 days)     Procedure Component Value - Date/Time    Blood Culture - Blood, Arm, Right [[id number]]  (Normal) Collected: 12/01/22 0835    Lab Status: Preliminary result Specimen: Blood from Arm, Right Updated: 12/04/22 0915     Blood Culture No growth at 3 days    Narrative:      Less than seven (7) mL's of blood was collected.  Insufficient quantity may yield false negative results.    Blood Culture - Blood, Hand, Right [[id number]]  (Normal) Collected: 12/01/22 0835    Lab Status: Preliminary result Specimen: Blood from Hand, Right Updated: 12/04/22 0915     Blood Culture No growth at 3 days    Blood Culture - Blood, Wrist, Right [[id number]]  (Abnormal)  (Susceptibility) Collected: 11/29/22 2049    Lab Status: Final result Specimen: Blood from Wrist, Right Updated: 12/02/22 0718     Blood Culture Staphylococcus aureus     Comment:   Infectious disease consultation is highly recommended to rule out distant foci of infection.        Isolated from Aerobic and Anaerobic Bottles     Gram Stain Anaerobic Bottle Gram positive cocci in clusters      Aerobic Bottle Gram positive cocci in clusters    Susceptibility      Staphylococcus aureus      DAMARIS      Gentamicin Susceptible      Oxacillin Susceptible      Rifampin Susceptible      Vancomycin Susceptible                       Susceptibility Comments      Staphylococcus aureus    This isolate does not demonstrate inducible clindamycin resistance in vitro.               Blood Culture - Blood, Arm, Left [[id number]]  (Abnormal) Collected: 11/29/22 2049    Lab Status: Final result Specimen: Blood from Arm, Left Updated: 12/02/22 0718     Blood Culture Staphylococcus aureus     Comment:   Infectious disease consultation is highly recommended to rule out distant foci of infection.        Isolated from Aerobic and Anaerobic Bottles     Gram Stain Aerobic Bottle Gram positive cocci in clusters      Anaerobic Bottle Gram positive cocci in clusters    Narrative:      Refer to previous blood culture collected on 11/29/2022 2049 for MICS.    Blood Culture ID, PCR - Blood, Wrist, Right [[id number]]  (Abnormal) Collected: 11/29/22 2049    Lab Status: Final result Specimen: Blood from Wrist, Right Updated: 11/30/22 1232     BCID, PCR Staph aureus. mecA/C and MREJ (methicillin resistance gene) NOT detected. Identification by BCID2 PCR     BOTTLE TYPE Anaerobic Bottle    Narrative:      Infectious disease consultation is highly recommended to rule out distant foci of infection.    Urine Culture - Urine, Urine, Clean Catch [[id number]]  (Normal) Collected: 11/29/22 2039    Lab Status: Final result Specimen: Urine, Clean Catch Updated: 11/30/22 2101     Urine Culture No growth    Influenza Antigen, Rapid - Swab, Nasopharynx [[id number]]  (Normal) Collected: 11/29/22 2031    Lab Status: Final result Specimen: Swab from Nasopharynx Updated: 11/29/22 2100     Influenza A PCR Not Detected     Influenza B PCR Not Detected    COVID-19,CEPHEID/RFANDY,COR/FORTUNATO/PAD/MADISON/MAD IN-HOUSE(OR EMERGENT/ADD-ON),NP SWAB IN TRANSPORT MEDIA 3-4 HR TAT, RT-PCR - Swab, Nasopharynx [[id number]]  (Abnormal) Collected: 11/29/22 2031    Lab Status: Final result Specimen: Swab from Nasopharynx Updated: 11/29/22 2101     COVID19 Detected    Narrative:      Fact sheet for providers:  https://www.fda.gov/media/287644/download     Fact sheet for patients: https://www.fda.gov/media/353248/download  Fact sheet for providers: https://www.fda.gov/media/288693/download    Fact sheet for patients: https://www.fda.gov/media/411428/download    Test performed by PCR.          XR Chest 1 View    Result Date: 11/29/2022  Impression:  1. No radiographic findings of pneumonia 2. Cardiomegaly with no evidence of CHF  Electronically Signed By-Son Montelongo On:11/29/2022 9:37 PM This report was finalized on [id number] by  Son Montelongo, .              Results for orders placed during the hospital encounter of 11/30/22    Adult Transthoracic Echo Limited W/ Cont if Necessary Per Protocol    Interpretation Summary  •  Left ventricular systolic function is normal. Left ventricular ejection fraction appears to be 56 - 60%.  •  Left ventricular diastolic function was normal.      Labs Pending at Discharge:  Pending Labs     Order Current Status    Blood Culture - Blood, Arm, Right Preliminary result    Blood Culture - Blood, Hand, Right Preliminary result          Procedures Performed           Consults:   Consults     Date and Time Order Name Status Description    12/1/2022 10:14 AM Inpatient Cardiology Consult Completed     11/30/2022  4:28 PM Inpatient Infectious Diseases Consult Completed     11/30/2022  3:31 PM Hospitalist (on-call MD unless specified)              Discharge Details        Discharge Medications      New Medications      Instructions Start Date   cefTRIAXone 2 g in sodium chloride 0.9 % 100 mL IVPB   2 g, Intravenous, Every 24 Hours         Continue These Medications      Instructions Start Date   Accu-Chek Aurea Plus test strip  Generic drug: glucose blood   CHECK BLOOD SUGAR 3 TIMES DX E11.65      albuterol sulfate  (90 Base) MCG/ACT inhaler  Commonly known as: PROVENTIL HFA;VENTOLIN HFA;PROAIR HFA   INHALE 2 PUFFS EVERY 4 HOURS AS NEEDED FOR WHEEZING OR SHORTNESS OF AIR       atorvastatin 40 MG tablet  Commonly known as: LIPITOR   TAKE 1/2 TABLET BY MOUTH EVERY DAY      BASAGLAR KWIKPEN 100 UNIT/ML injection pen   40 Units, Subcutaneous, Nightly      BD Pen Needle Lorene 2nd Gen 32G X 4 MM misc  Generic drug: Insulin Pen Needle   Used to inject insulin 4 times a day.      brompheniramine-pseudoephedrine-DM 30-2-10 MG/5ML syrup   5 mL, Oral, 4 Times Daily PRN      budesonide-formoterol 160-4.5 MCG/ACT inhaler  Commonly known as: SYMBICORT   2 puffs, Inhalation, 2 Times Daily      dexamethasone 4 MG tablet  Commonly known as: DECADRON   4 mg, Oral, 3 Times Daily      dilTIAZem  MG 24 hr capsule  Commonly known as: CARDIZEM CD   TAKE 1 CAPSULE BY MOUTH EVERY DAY      Eliquis 5 MG tablet tablet  Generic drug: apixaban   TAKE 1 TABLET BY MOUTH 2 (TWO) TIMES A DAY. PATIENT NEEDS AN APPT BEFORE ANY MORE REFILLS      fenofibrate 160 MG tablet   TAKE 1 TABLET BY MOUTH EVERY DAY      furosemide 40 MG tablet  Commonly known as: LASIX   TAKE 1 TABLET BY MOUTH EVERY DAY      HYDROcodone-acetaminophen 7.5-325 MG per tablet  Commonly known as: NORCO   1 tablet, Oral, 3 Times Daily PRN      Insulin Lispro (1 Unit Dial) 100 UNIT/ML solution pen-injector  Commonly known as: HumaLOG KwikPen   Inject 14 units before each meal.      Janumet XR  MG tablet  Generic drug: SITagliptin-metFORMIN HCl ER   TAKE 2 TABLETS BY MOUTH EVERY DAY      lisinopril 20 MG tablet  Commonly known as: PRINIVIL,ZESTRIL   TAKE 1 TABLET BY MOUTH EVERY DAY      magnesium oxide 400 MG tablet  Commonly known as: MAG-OX   TAKE 1 TABLET BY MOUTH TWICE A DAY      metoprolol tartrate 25 MG tablet  Commonly known as: LOPRESSOR   TAKE 1 TABLET BY MOUTH EVERY 12 HOURS FOR 30 DAYS.      potassium chloride 10 MEQ CR capsule  Commonly known as: MICRO-K   TAKE 1 CAPSULE BY MOUTH EVERY DAY      Vitamin D3 25 MCG (1000 UT) capsule   TAKE 1 TABLET BY MOUTH TWICE A DAY *OTC NOT COVERED*             Allergies   Allergen Reactions   •  Ibuprofen GI Intolerance   • Prednisone Other (See Comments)   • Pregabalin Other (See Comments)     jerking   • Tramadol Other (See Comments)     Legs jerked   • Levofloxacin Other (See Comments)     Increase sunburn   • Sulfamethoxazole-Trimethoprim Swelling         Discharge Disposition:   Home or Self Care    Diet:  Hospital:  Diet Order   Procedures   • Diet: Cardiac Diets, Diabetic Diets; Healthy Heart (2-3 Na+); Consistent Carbohydrate; Texture: Regular Texture (IDDSI 7); Fluid Consistency: Thin (IDDSI 0)         Discharge Activity:         CODE STATUS:  Code Status and Medical Interventions:   Ordered at: 11/30/22 1722     Level Of Support Discussed With:    Patient     Code Status (Patient has no pulse and is not breathing):    CPR (Attempt to Resuscitate)     Medical Interventions (Patient has pulse or is breathing):    Full Support         Future Appointments   Date Time Provider Department Center   1/27/2023 10:15 AM Jesse Charles MD [facility] CARD CD [facility]   2/6/2023  8:00 AM LAB  [facility] LAB DS  [facility] None   2/13/2023 10:30 AM Bautista Archibald MD [facility] END NA [place]       Additional Instructions for the Follow-ups that You Need to Schedule     Ambulatory Referral to Home Health (Hospital)   As directed      Face to Face Visit Date: 12/4/2022    Follow-up provider for Plan of Care?: I treated the patient in an acute care facility and will not continue treatment after discharge.    Follow-up provider: AUTUMN SNOW [[id number]]    Reason/Clinical Findings: ambulatory care for weekly cbc/bmp and line care    Describe mobility limitations that make leaving home difficult: ambulatory care for weekly cbc/bmp and line care    Nursing/Therapeutic Services Requested: Skilled Nursing    Skilled nursing orders: PICC line care/instruction Infusion therapy    Frequency: 1 Week 1               Time spent on Discharge including face to face service:  66  minutes    This patient has been examined wearing appropriate  Personal Protective Equipment and discussed with patient/nursing/CM. 12/04/22      Signature: Michael Lozano MD        Electronically signed by Michael Lozano MD at 12/04/22 2402

## 2022-12-05 NOTE — OUTREACH NOTE
Call Center TCM Note    Flowsheet Row Responses   [facility] facility patient discharged from? Doc   Does the patient have one of the following disease processes/diagnoses(primary or secondary)? Other   TCM attempt successful? No   Unsuccessful attempts Attempt 1          Dyan Villegas LPN    12/5/2022, 16:37 EST

## 2022-12-05 NOTE — CASE MANAGEMENT/SOCIAL WORK
Case Management Discharge Note      Final Note: Routine home with IV Rochephin with option care, Ambulatory Care for labs         Selected Continued Care - Discharged on 12/4/2022 Admission date: 11/30/2022 - Discharge disposition: Home or Self Care         Transportation Services  Private: Car    Final Discharge Disposition Code: 01 - home or self-care

## 2022-12-06 ENCOUNTER — HOME CARE VISIT (OUTPATIENT)
Dept: HOME HEALTH SERVICES | Facility: HOME HEALTHCARE | Age: 67
End: 2022-12-06
Payer: MEDICAID

## 2022-12-06 ENCOUNTER — TRANSITIONAL CARE MANAGEMENT TELEPHONE ENCOUNTER (OUTPATIENT)
Dept: CALL CENTER | Facility: HOSPITAL | Age: 67
End: 2022-12-06

## 2022-12-06 ENCOUNTER — TELEPHONE (OUTPATIENT)
Dept: CARDIOLOGY | Facility: CLINIC | Age: 67
End: 2022-12-06

## 2022-12-06 ENCOUNTER — TELEPHONE (OUTPATIENT)
Dept: FAMILY MEDICINE CLINIC | Facility: CLINIC | Age: 67
End: 2022-12-06

## 2022-12-06 VITALS
RESPIRATION RATE: 17 BRPM | HEART RATE: 86 BPM | TEMPERATURE: 98 F | OXYGEN SATURATION: 97 % | SYSTOLIC BLOOD PRESSURE: 162 MMHG | DIASTOLIC BLOOD PRESSURE: 90 MMHG

## 2022-12-06 LAB
BACTERIA SPEC AEROBE CULT: NORMAL
BACTERIA SPEC AEROBE CULT: NORMAL

## 2022-12-06 PROCEDURE — G0299 HHS/HOSPICE OF RN EA 15 MIN: HCPCS

## 2022-12-06 NOTE — TELEPHONE ENCOUNTER
Caller: Agnes Moncada    Relationship: Emergency Contact    Best call back number: [phone]    What is the best time to reach you: ANY    Who are you requesting to speak with (clinical staff, provider,  specific staff member): ANY      What was the call regarding: PT WAS INT THE ED ON 12.04 FOR COVID COMPLICATIONS AND THEY GAVE HER A STEROID TO TAKE, THEY ARE CALLING IN TO INQUIRE IF SHE STILL NEEDS TO BE TAKING THIS STEROID AFTER HER DISCHARGE.     Do you require a callback: YES

## 2022-12-06 NOTE — HOME HEALTH
Primary focus of care: Covid-19  Managment of PICC line and IV ABX; education about s/s of infection and dressing change weekly, blood draws    Vital signs stable. PICC line assessed; clean, dry and intact. Education provided about home health services; patient and caregiver verbilized understanding and was agreeable. Patient denies falls. MEdications reviewed. Patient has cane at chairside. Appetite fair. No wounds present.     CP assess  change PICC dressing  vital signs   blood draw  Assess lungs

## 2022-12-06 NOTE — OUTREACH NOTE
Call Center TCM Note    Flowsheet Row Responses   [facility] [place] facility patient discharged from? Doc   Does the patient have one of the following disease processes/diagnoses(primary or secondary)? Other   TCM attempt successful? No   Unsuccessful attempts Attempt 3          Katelyn Boothe RN    12/6/2022, 08:39 EST

## 2022-12-06 NOTE — TELEPHONE ENCOUNTER
Caller: Agnes Moncada    Relationship: Emergency Contact    Best call back number: [phone]    What is the best time to reach you: ANY    Who are you requesting to speak with (clinical staff, provider,  specific staff member): CLINICAL      What was the call regarding: WOULD LIKE TO KNOW IF SHE SHOULD CONTINUE TAKING THE DEXAMETHASONE 4 MG TABLET BECAUSE SHE HAS CURRENTLY HAS A BACTERIA INFECTION. AND IT SHOOTS HER BLOOD SUGAR UP HIGH.      Do you require a callback: YES

## 2022-12-07 ENCOUNTER — TELEPHONE (OUTPATIENT)
Dept: FAMILY MEDICINE CLINIC | Facility: CLINIC | Age: 67
End: 2022-12-07

## 2022-12-07 DIAGNOSIS — E11.65 TYPE 2 DIABETES MELLITUS WITH HYPERGLYCEMIA: ICD-10-CM

## 2022-12-07 RX ORDER — BLOOD SUGAR DIAGNOSTIC
STRIP MISCELLANEOUS
Qty: 200 EACH | Refills: 0 | Status: SHIPPED | OUTPATIENT
Start: 2022-12-07 | End: 2023-02-28

## 2022-12-07 NOTE — TELEPHONE ENCOUNTER
Pt called in and stated her blood sugar has been averaging 414 and she believes the medication the hospital called in for her is causing this ( DEXAMETHASONE)

## 2022-12-12 ENCOUNTER — LAB REQUISITION (OUTPATIENT)
Dept: LAB | Facility: HOSPITAL | Age: 67
End: 2022-12-12

## 2022-12-12 ENCOUNTER — HOME CARE VISIT (OUTPATIENT)
Dept: HOME HEALTH SERVICES | Facility: HOME HEALTHCARE | Age: 67
End: 2022-12-12
Payer: MEDICAID

## 2022-12-12 VITALS
HEART RATE: 84 BPM | TEMPERATURE: 98.9 F | DIASTOLIC BLOOD PRESSURE: 74 MMHG | OXYGEN SATURATION: 98 % | SYSTOLIC BLOOD PRESSURE: 150 MMHG | RESPIRATION RATE: 17 BRPM

## 2022-12-12 DIAGNOSIS — J44.9 CHRONIC OBSTRUCTIVE PULMONARY DISEASE, UNSPECIFIED: ICD-10-CM

## 2022-12-12 DIAGNOSIS — E11.65 TYPE 2 DIABETES MELLITUS WITH HYPERGLYCEMIA: ICD-10-CM

## 2022-12-12 DIAGNOSIS — I10 ESSENTIAL (PRIMARY) HYPERTENSION: ICD-10-CM

## 2022-12-12 DIAGNOSIS — E78.2 MIXED HYPERLIPIDEMIA: ICD-10-CM

## 2022-12-12 LAB
ANION GAP SERPL CALCULATED.3IONS-SCNC: 12 MMOL/L (ref 5–15)
BASOPHILS # BLD AUTO: 0 10*3/MM3 (ref 0–0.2)
BASOPHILS NFR BLD AUTO: 0.4 % (ref 0–1.5)
BUN SERPL-MCNC: 21 MG/DL (ref 8–23)
BUN/CREAT SERPL: 24.4 (ref 7–25)
CALCIUM SPEC-SCNC: 10.5 MG/DL (ref 8.6–10.5)
CHLORIDE SERPL-SCNC: 98 MMOL/L (ref 98–107)
CO2 SERPL-SCNC: 32 MMOL/L (ref 22–29)
CREAT SERPL-MCNC: 0.86 MG/DL (ref 0.57–1)
DEPRECATED RDW RBC AUTO: 45.1 FL (ref 37–54)
EGFRCR SERPLBLD CKD-EPI 2021: 74.2 ML/MIN/1.73
EOSINOPHIL # BLD AUTO: 0.1 10*3/MM3 (ref 0–0.4)
EOSINOPHIL NFR BLD AUTO: 0.8 % (ref 0.3–6.2)
ERYTHROCYTE [DISTWIDTH] IN BLOOD BY AUTOMATED COUNT: 14.4 % (ref 12.3–15.4)
GLUCOSE SERPL-MCNC: 218 MG/DL (ref 65–99)
HCT VFR BLD AUTO: 45.5 % (ref 34–46.6)
HGB BLD-MCNC: 14.8 G/DL (ref 12–15.9)
LYMPHOCYTES # BLD AUTO: 3.3 10*3/MM3 (ref 0.7–3.1)
LYMPHOCYTES NFR BLD AUTO: 27.7 % (ref 19.6–45.3)
MCH RBC QN AUTO: 29.2 PG (ref 26.6–33)
MCHC RBC AUTO-ENTMCNC: 32.5 G/DL (ref 31.5–35.7)
MCV RBC AUTO: 89.8 FL (ref 79–97)
MONOCYTES # BLD AUTO: 1 10*3/MM3 (ref 0.1–0.9)
MONOCYTES NFR BLD AUTO: 8.6 % (ref 5–12)
NEUTROPHILS NFR BLD AUTO: 62.5 % (ref 42.7–76)
NEUTROPHILS NFR BLD AUTO: 7.5 10*3/MM3 (ref 1.7–7)
NRBC BLD AUTO-RTO: 0.1 /100 WBC (ref 0–0.2)
PLATELET # BLD AUTO: 361 10*3/MM3 (ref 140–450)
PMV BLD AUTO: 9.1 FL (ref 6–12)
POTASSIUM SERPL-SCNC: 3.8 MMOL/L (ref 3.5–5.2)
RBC # BLD AUTO: 5.07 10*6/MM3 (ref 3.77–5.28)
SODIUM SERPL-SCNC: 142 MMOL/L (ref 136–145)
WBC NRBC COR # BLD: 12.1 10*3/MM3 (ref 3.4–10.8)

## 2022-12-12 PROCEDURE — G0299 HHS/HOSPICE OF RN EA 15 MIN: HCPCS

## 2022-12-12 PROCEDURE — 85025 COMPLETE CBC W/AUTO DIFF WBC: CPT | Performed by: INTERNAL MEDICINE

## 2022-12-12 PROCEDURE — 80048 BASIC METABOLIC PNL TOTAL CA: CPT | Performed by: INTERNAL MEDICINE

## 2022-12-12 NOTE — HOME HEALTH
Vital signs stable. PICC dressing changed; patient tolerated well. Blood work obtained per MD order. Education about dressing change and blood work provided; patient and niece verbilized understanding. Patient denies falls and pain. Appetite fair. No issues with elimination. Medications reviewed. No wounds present.     Cp assess  blood work  change PICC dressing  vital signs   assess lung sounds

## 2022-12-13 ENCOUNTER — HOME CARE VISIT (OUTPATIENT)
Dept: HOME HEALTH SERVICES | Facility: HOME HEALTHCARE | Age: 67
End: 2022-12-13
Payer: MEDICAID

## 2022-12-18 DIAGNOSIS — Z79.4 TYPE 2 DIABETES MELLITUS WITH HYPERGLYCEMIA, WITH LONG-TERM CURRENT USE OF INSULIN: Primary | ICD-10-CM

## 2022-12-18 DIAGNOSIS — E11.65 TYPE 2 DIABETES MELLITUS WITH HYPERGLYCEMIA, WITH LONG-TERM CURRENT USE OF INSULIN: Primary | ICD-10-CM

## 2022-12-19 ENCOUNTER — HOME CARE VISIT (OUTPATIENT)
Dept: HOME HEALTH SERVICES | Facility: HOME HEALTHCARE | Age: 67
End: 2022-12-19
Payer: MEDICAID

## 2022-12-19 ENCOUNTER — LAB REQUISITION (OUTPATIENT)
Dept: LAB | Facility: HOSPITAL | Age: 67
End: 2022-12-19

## 2022-12-19 DIAGNOSIS — U07.1 COVID-19: ICD-10-CM

## 2022-12-19 LAB
ANION GAP SERPL CALCULATED.3IONS-SCNC: 11 MMOL/L (ref 5–15)
BASOPHILS # BLD AUTO: 0.1 10*3/MM3 (ref 0–0.2)
BASOPHILS NFR BLD AUTO: 1.2 % (ref 0–1.5)
BUN SERPL-MCNC: 11 MG/DL (ref 8–23)
BUN/CREAT SERPL: 16.2 (ref 7–25)
CALCIUM SPEC-SCNC: 10.1 MG/DL (ref 8.6–10.5)
CHLORIDE SERPL-SCNC: 101 MMOL/L (ref 98–107)
CO2 SERPL-SCNC: 29 MMOL/L (ref 22–29)
CREAT SERPL-MCNC: 0.68 MG/DL (ref 0.57–1)
DEPRECATED RDW RBC AUTO: 45.5 FL (ref 37–54)
EGFRCR SERPLBLD CKD-EPI 2021: 95.6 ML/MIN/1.73
EOSINOPHIL # BLD AUTO: 0.1 10*3/MM3 (ref 0–0.4)
EOSINOPHIL NFR BLD AUTO: 1.5 % (ref 0.3–6.2)
ERYTHROCYTE [DISTWIDTH] IN BLOOD BY AUTOMATED COUNT: 14.4 % (ref 12.3–15.4)
GLUCOSE SERPL-MCNC: 324 MG/DL (ref 65–99)
HCT VFR BLD AUTO: 42.4 % (ref 34–46.6)
HGB BLD-MCNC: 13.7 G/DL (ref 12–15.9)
LYMPHOCYTES # BLD AUTO: 2.3 10*3/MM3 (ref 0.7–3.1)
LYMPHOCYTES NFR BLD AUTO: 32.4 % (ref 19.6–45.3)
MCH RBC QN AUTO: 29.1 PG (ref 26.6–33)
MCHC RBC AUTO-ENTMCNC: 32.2 G/DL (ref 31.5–35.7)
MCV RBC AUTO: 90.2 FL (ref 79–97)
MONOCYTES # BLD AUTO: 0.7 10*3/MM3 (ref 0.1–0.9)
MONOCYTES NFR BLD AUTO: 9.7 % (ref 5–12)
NEUTROPHILS NFR BLD AUTO: 4 10*3/MM3 (ref 1.7–7)
NEUTROPHILS NFR BLD AUTO: 55.2 % (ref 42.7–76)
NRBC BLD AUTO-RTO: 0.1 /100 WBC (ref 0–0.2)
PLATELET # BLD AUTO: 262 10*3/MM3 (ref 140–450)
PMV BLD AUTO: 10.3 FL (ref 6–12)
POTASSIUM SERPL-SCNC: 3.9 MMOL/L (ref 3.5–5.2)
RBC # BLD AUTO: 4.7 10*6/MM3 (ref 3.77–5.28)
SODIUM SERPL-SCNC: 141 MMOL/L (ref 136–145)
WBC NRBC COR # BLD: 7.2 10*3/MM3 (ref 3.4–10.8)

## 2022-12-19 PROCEDURE — G0299 HHS/HOSPICE OF RN EA 15 MIN: HCPCS

## 2022-12-19 PROCEDURE — 85025 COMPLETE CBC W/AUTO DIFF WBC: CPT | Performed by: INTERNAL MEDICINE

## 2022-12-19 PROCEDURE — 80048 BASIC METABOLIC PNL TOTAL CA: CPT | Performed by: INTERNAL MEDICINE

## 2022-12-19 RX ORDER — INSULIN LISPRO 100 [IU]/ML
INJECTION, SOLUTION INTRAVENOUS; SUBCUTANEOUS
Qty: 15 ML | Refills: 1 | Status: SHIPPED | OUTPATIENT
Start: 2022-12-19 | End: 2022-12-20 | Stop reason: SDUPTHER

## 2022-12-20 ENCOUNTER — HOME CARE VISIT (OUTPATIENT)
Dept: HOME HEALTH SERVICES | Facility: HOME HEALTHCARE | Age: 67
End: 2022-12-20
Payer: MEDICAID

## 2022-12-20 ENCOUNTER — TELEPHONE (OUTPATIENT)
Dept: ENDOCRINOLOGY | Facility: CLINIC | Age: 67
End: 2022-12-20

## 2022-12-20 DIAGNOSIS — E11.65 TYPE 2 DIABETES MELLITUS WITH HYPERGLYCEMIA, WITH LONG-TERM CURRENT USE OF INSULIN: ICD-10-CM

## 2022-12-20 DIAGNOSIS — Z79.4 TYPE 2 DIABETES MELLITUS WITH HYPERGLYCEMIA, WITH LONG-TERM CURRENT USE OF INSULIN: ICD-10-CM

## 2022-12-20 RX ORDER — INSULIN LISPRO 100 [IU]/ML
14 INJECTION, SOLUTION INTRAVENOUS; SUBCUTANEOUS
Qty: 15 ML | Refills: 1 | Status: SHIPPED | OUTPATIENT
Start: 2022-12-20

## 2022-12-20 NOTE — TELEPHONE ENCOUNTER
[facility] Key: [id number] - PA Case ID: [id number]

## 2022-12-21 VITALS
TEMPERATURE: 98.8 F | HEART RATE: 98 BPM | SYSTOLIC BLOOD PRESSURE: 126 MMHG | RESPIRATION RATE: 19 BRPM | OXYGEN SATURATION: 99 % | DIASTOLIC BLOOD PRESSURE: 76 MMHG

## 2022-12-21 RX ORDER — APIXABAN 5 MG/1
TABLET, FILM COATED ORAL
Qty: 60 TABLET | Refills: 1 | Status: SHIPPED | OUTPATIENT
Start: 2022-12-21 | End: 2023-02-20

## 2022-12-21 NOTE — TELEPHONE ENCOUNTER
PA Case: [id number], Status: Approved, Coverage Starts on: 12/20/2022 12:00:00 AM, Coverage Ends on: 12/20/2023 12:00:00 AM.    Patient informed.

## 2022-12-21 NOTE — HOME HEALTH
plan for next visit:  CP assess  falls/safety assess  medication education  IV ABT education  labs as ordered  PICC care

## 2022-12-22 ENCOUNTER — DOCUMENTATION (OUTPATIENT)
Dept: PHYSICAL THERAPY | Facility: CLINIC | Age: 67
End: 2022-12-22

## 2022-12-22 DIAGNOSIS — G89.29 CHRONIC BILATERAL LOW BACK PAIN WITH BILATERAL SCIATICA: Primary | ICD-10-CM

## 2022-12-22 DIAGNOSIS — G89.29 CHRONIC BILATERAL THORACIC BACK PAIN: ICD-10-CM

## 2022-12-22 DIAGNOSIS — E11.65 TYPE 2 DIABETES MELLITUS WITH HYPERGLYCEMIA, WITH LONG-TERM CURRENT USE OF INSULIN: Chronic | ICD-10-CM

## 2022-12-22 DIAGNOSIS — M54.41 CHRONIC BILATERAL LOW BACK PAIN WITH BILATERAL SCIATICA: Primary | ICD-10-CM

## 2022-12-22 DIAGNOSIS — M54.6 CHRONIC BILATERAL THORACIC BACK PAIN: ICD-10-CM

## 2022-12-22 DIAGNOSIS — M25.559 PAIN, HIP: ICD-10-CM

## 2022-12-22 DIAGNOSIS — M54.42 CHRONIC BILATERAL LOW BACK PAIN WITH BILATERAL SCIATICA: Primary | ICD-10-CM

## 2022-12-22 DIAGNOSIS — Z74.09 IMPAIRED FUNCTIONAL MOBILITY, BALANCE, GAIT, AND ENDURANCE: ICD-10-CM

## 2022-12-22 DIAGNOSIS — Z79.4 TYPE 2 DIABETES MELLITUS WITH HYPERGLYCEMIA, WITH LONG-TERM CURRENT USE OF INSULIN: Chronic | ICD-10-CM

## 2022-12-26 ENCOUNTER — LAB REQUISITION (OUTPATIENT)
Dept: LAB | Facility: HOSPITAL | Age: 67
End: 2022-12-26

## 2022-12-26 ENCOUNTER — HOME CARE VISIT (OUTPATIENT)
Dept: HOME HEALTH SERVICES | Facility: HOME HEALTHCARE | Age: 67
End: 2022-12-26
Payer: MEDICAID

## 2022-12-26 DIAGNOSIS — U07.1 COVID-19: ICD-10-CM

## 2022-12-26 LAB
ANION GAP SERPL CALCULATED.3IONS-SCNC: 13 MMOL/L (ref 5–15)
BASOPHILS # BLD AUTO: 0 10*3/MM3 (ref 0–0.2)
BASOPHILS NFR BLD AUTO: 0.6 % (ref 0–1.5)
BUN SERPL-MCNC: 11 MG/DL (ref 8–23)
BUN/CREAT SERPL: 14.1 (ref 7–25)
CALCIUM SPEC-SCNC: 9.9 MG/DL (ref 8.6–10.5)
CHLORIDE SERPL-SCNC: 102 MMOL/L (ref 98–107)
CO2 SERPL-SCNC: 30 MMOL/L (ref 22–29)
CREAT SERPL-MCNC: 0.78 MG/DL (ref 0.57–1)
DEPRECATED RDW RBC AUTO: 46.4 FL (ref 37–54)
EGFRCR SERPLBLD CKD-EPI 2021: 83.4 ML/MIN/1.73
EOSINOPHIL # BLD AUTO: 0.2 10*3/MM3 (ref 0–0.4)
EOSINOPHIL NFR BLD AUTO: 2.4 % (ref 0.3–6.2)
ERYTHROCYTE [DISTWIDTH] IN BLOOD BY AUTOMATED COUNT: 14.3 % (ref 12.3–15.4)
GLUCOSE SERPL-MCNC: 251 MG/DL (ref 65–99)
HCT VFR BLD AUTO: 41.1 % (ref 34–46.6)
HGB BLD-MCNC: 13.7 G/DL (ref 12–15.9)
LYMPHOCYTES # BLD AUTO: 2.4 10*3/MM3 (ref 0.7–3.1)
LYMPHOCYTES NFR BLD AUTO: 35.7 % (ref 19.6–45.3)
MCH RBC QN AUTO: 29.5 PG (ref 26.6–33)
MCHC RBC AUTO-ENTMCNC: 33.2 G/DL (ref 31.5–35.7)
MCV RBC AUTO: 88.6 FL (ref 79–97)
MONOCYTES # BLD AUTO: 0.7 10*3/MM3 (ref 0.1–0.9)
MONOCYTES NFR BLD AUTO: 9.8 % (ref 5–12)
NEUTROPHILS NFR BLD AUTO: 3.5 10*3/MM3 (ref 1.7–7)
NEUTROPHILS NFR BLD AUTO: 51.5 % (ref 42.7–76)
NRBC BLD AUTO-RTO: 0 /100 WBC (ref 0–0.2)
PLATELET # BLD AUTO: 254 10*3/MM3 (ref 140–450)
PMV BLD AUTO: 9.6 FL (ref 6–12)
POTASSIUM SERPL-SCNC: 3.8 MMOL/L (ref 3.5–5.2)
RBC # BLD AUTO: 4.64 10*6/MM3 (ref 3.77–5.28)
SODIUM SERPL-SCNC: 145 MMOL/L (ref 136–145)
WBC NRBC COR # BLD: 6.7 10*3/MM3 (ref 3.4–10.8)

## 2022-12-26 PROCEDURE — G0299 HHS/HOSPICE OF RN EA 15 MIN: HCPCS

## 2022-12-26 PROCEDURE — 85025 COMPLETE CBC W/AUTO DIFF WBC: CPT | Performed by: INTERNAL MEDICINE

## 2022-12-26 PROCEDURE — 80048 BASIC METABOLIC PNL TOTAL CA: CPT | Performed by: INTERNAL MEDICINE

## 2022-12-27 ENCOUNTER — HOME CARE VISIT (OUTPATIENT)
Dept: HOME HEALTH SERVICES | Facility: HOME HEALTHCARE | Age: 67
End: 2022-12-27
Payer: MEDICAID

## 2022-12-27 ENCOUNTER — LAB REQUISITION (OUTPATIENT)
Dept: LAB | Facility: HOSPITAL | Age: 67
End: 2022-12-27

## 2022-12-27 DIAGNOSIS — U07.1 COVID-19: ICD-10-CM

## 2022-12-27 PROCEDURE — G0299 HHS/HOSPICE OF RN EA 15 MIN: HCPCS

## 2022-12-27 PROCEDURE — 87076 CULTURE ANAEROBE IDENT EACH: CPT | Performed by: FAMILY MEDICINE

## 2022-12-27 PROCEDURE — 87205 SMEAR GRAM STAIN: CPT | Performed by: FAMILY MEDICINE

## 2022-12-27 PROCEDURE — 87070 CULTURE OTHR SPECIMN AEROBIC: CPT | Performed by: FAMILY MEDICINE

## 2022-12-27 RX ORDER — PEN NEEDLE, DIABETIC 32GX 5/32"
NEEDLE, DISPOSABLE MISCELLANEOUS
Qty: 120 EACH | Refills: 12 | Status: SHIPPED | OUTPATIENT
Start: 2022-12-27 | End: 2023-02-28

## 2022-12-27 NOTE — HOME HEALTH
PRN SN visit for PICC line dressing change due to dressing peeling off. Dressing changed via sterile technique. Patient tolerated well. Patient states that she has an open area under R breast. States that this has been ongoing for a while. States that it will close for a while and then open back up. States that one of her doctors did a biopsy of the area, which was negative. Photo uploaded to [employer] and message sent to PCP about new open area.    plan for next visit:  CP assess  falls/safety assess  medication education  labs as ordered  PICC care  wound assess

## 2022-12-29 VITALS
HEART RATE: 81 BPM | SYSTOLIC BLOOD PRESSURE: 127 MMHG | DIASTOLIC BLOOD PRESSURE: 67 MMHG | RESPIRATION RATE: 16 BRPM | OXYGEN SATURATION: 97 % | TEMPERATURE: 97.7 F

## 2022-12-29 NOTE — HOME HEALTH
plan for next visit:  CP assess  falls/safety assess  medication education  labs as ordered  PICC care

## 2022-12-29 NOTE — HOME HEALTH
SN spoke w/ Dr. Lou Curran and she ordered wound culture of new open wound of R breast. Culture obtained at [facility] at the [facility] office.

## 2022-12-31 LAB
BACTERIA SPEC AEROBE CULT: ABNORMAL
GRAM STN SPEC: ABNORMAL
GRAM STN SPEC: ABNORMAL

## 2023-01-02 ENCOUNTER — HOME CARE VISIT (OUTPATIENT)
Dept: HOME HEALTH SERVICES | Facility: HOME HEALTHCARE | Age: 68
End: 2023-01-02
Payer: MEDICAID

## 2023-01-02 ENCOUNTER — LAB REQUISITION (OUTPATIENT)
Dept: LAB | Facility: HOSPITAL | Age: 68
End: 2023-01-02
Payer: MEDICAID

## 2023-01-02 VITALS
HEART RATE: 82 BPM | RESPIRATION RATE: 18 BRPM | DIASTOLIC BLOOD PRESSURE: 78 MMHG | OXYGEN SATURATION: 97 % | SYSTOLIC BLOOD PRESSURE: 138 MMHG | TEMPERATURE: 97.3 F

## 2023-01-02 DIAGNOSIS — U07.1 COVID-19: ICD-10-CM

## 2023-01-02 LAB
ANION GAP SERPL CALCULATED.3IONS-SCNC: 11 MMOL/L (ref 5–15)
BASOPHILS # BLD AUTO: 0 10*3/MM3 (ref 0–0.2)
BASOPHILS NFR BLD AUTO: 0.5 % (ref 0–1.5)
BUN SERPL-MCNC: 9 MG/DL (ref 8–23)
BUN/CREAT SERPL: 11.4 (ref 7–25)
CALCIUM SPEC-SCNC: 10.7 MG/DL (ref 8.6–10.5)
CHLORIDE SERPL-SCNC: 100 MMOL/L (ref 98–107)
CO2 SERPL-SCNC: 30 MMOL/L (ref 22–29)
CREAT SERPL-MCNC: 0.79 MG/DL (ref 0.57–1)
DEPRECATED RDW RBC AUTO: 46.8 FL (ref 37–54)
EGFRCR SERPLBLD CKD-EPI 2021: 82.1 ML/MIN/1.73
EOSINOPHIL # BLD AUTO: 0.1 10*3/MM3 (ref 0–0.4)
EOSINOPHIL NFR BLD AUTO: 1.6 % (ref 0.3–6.2)
ERYTHROCYTE [DISTWIDTH] IN BLOOD BY AUTOMATED COUNT: 15 % (ref 12.3–15.4)
GLUCOSE SERPL-MCNC: 331 MG/DL (ref 65–99)
HCT VFR BLD AUTO: 41.2 % (ref 34–46.6)
HGB BLD-MCNC: 13.7 G/DL (ref 12–15.9)
LYMPHOCYTES # BLD AUTO: 2.5 10*3/MM3 (ref 0.7–3.1)
LYMPHOCYTES NFR BLD AUTO: 34.7 % (ref 19.6–45.3)
MCH RBC QN AUTO: 30 PG (ref 26.6–33)
MCHC RBC AUTO-ENTMCNC: 33.2 G/DL (ref 31.5–35.7)
MCV RBC AUTO: 90.4 FL (ref 79–97)
MONOCYTES # BLD AUTO: 0.8 10*3/MM3 (ref 0.1–0.9)
MONOCYTES NFR BLD AUTO: 10.7 % (ref 5–12)
NEUTROPHILS NFR BLD AUTO: 3.8 10*3/MM3 (ref 1.7–7)
NEUTROPHILS NFR BLD AUTO: 52.5 % (ref 42.7–76)
NRBC BLD AUTO-RTO: 0.1 /100 WBC (ref 0–0.2)
PLATELET # BLD AUTO: 347 10*3/MM3 (ref 140–450)
PMV BLD AUTO: 9.3 FL (ref 6–12)
POTASSIUM SERPL-SCNC: 3.9 MMOL/L (ref 3.5–5.2)
RBC # BLD AUTO: 4.56 10*6/MM3 (ref 3.77–5.28)
SODIUM SERPL-SCNC: 141 MMOL/L (ref 136–145)
WBC NRBC COR # BLD: 7.2 10*3/MM3 (ref 3.4–10.8)

## 2023-01-02 PROCEDURE — G0299 HHS/HOSPICE OF RN EA 15 MIN: HCPCS

## 2023-01-02 PROCEDURE — 85025 COMPLETE CBC W/AUTO DIFF WBC: CPT | Performed by: INTERNAL MEDICINE

## 2023-01-02 PROCEDURE — 80048 BASIC METABOLIC PNL TOTAL CA: CPT | Performed by: INTERNAL MEDICINE

## 2023-01-02 NOTE — HOME HEALTH
Routine Visit Note:    Skill/education provided: diabetes education, picc site care, wound assess, edema education    Patient/caregiver response: verbalized understanding, does not verbalize compliance or indicate compliance in the future    Plan for next visit: pull picc line if iv abx complete    Other pertinent info: pt educated on sodium restriction and diabetic diet. does not verbalize plan to comply with sugar and sodium restriction. will need reinforcement. wounds assessed and pt independent in care. educated on diabetic foot care. pt not currently checking feet daily for cuts/scrapes/etc.

## 2023-01-03 ENCOUNTER — HOME CARE VISIT (OUTPATIENT)
Dept: HOME HEALTH SERVICES | Facility: HOME HEALTHCARE | Age: 68
End: 2023-01-03
Payer: MEDICAID

## 2023-01-05 ENCOUNTER — HOME CARE VISIT (OUTPATIENT)
Dept: HOME HEALTH SERVICES | Facility: HOME HEALTHCARE | Age: 68
End: 2023-01-05
Payer: MEDICAID

## 2023-01-05 VITALS
OXYGEN SATURATION: 97 % | DIASTOLIC BLOOD PRESSURE: 88 MMHG | HEART RATE: 100 BPM | SYSTOLIC BLOOD PRESSURE: 152 MMHG | RESPIRATION RATE: 17 BRPM | TEMPERATURE: 98 F

## 2023-01-05 PROCEDURE — G0299 HHS/HOSPICE OF RN EA 15 MIN: HCPCS

## 2023-01-05 NOTE — HOME HEALTH
Vital signs stable. PICC line removed per MD order; ABX completed today. Patient tolerated well. Patient denies falls and pain. Appetite well. Blood sugar 188 this am. Education provided about monitoring PICC site for bleeding and monitoring blood sugar; patient and spouse verbilized understanding. Wound assessed.    Cp assess  vital signs  blood sugar  wound care

## 2023-01-09 ENCOUNTER — HOME CARE VISIT (OUTPATIENT)
Dept: HOME HEALTH SERVICES | Facility: HOME HEALTHCARE | Age: 68
End: 2023-01-09
Payer: MEDICAID

## 2023-01-10 ENCOUNTER — HOME CARE VISIT (OUTPATIENT)
Dept: HOME HEALTH SERVICES | Facility: HOME HEALTHCARE | Age: 68
End: 2023-01-10
Payer: MEDICAID

## 2023-01-11 ENCOUNTER — HOME CARE VISIT (OUTPATIENT)
Dept: HOME HEALTH SERVICES | Facility: HOME HEALTHCARE | Age: 68
End: 2023-01-11
Payer: MEDICAID

## 2023-01-11 VITALS
SYSTOLIC BLOOD PRESSURE: 146 MMHG | DIASTOLIC BLOOD PRESSURE: 72 MMHG | OXYGEN SATURATION: 95 % | TEMPERATURE: 97.9 F | RESPIRATION RATE: 18 BRPM | HEART RATE: 86 BPM

## 2023-01-11 PROCEDURE — G0299 HHS/HOSPICE OF RN EA 15 MIN: HCPCS

## 2023-01-11 NOTE — HOME HEALTH
Vital signs stable. Blood sugar within defined limits. Sn educated patient on last visit on Monday but patient requested to be discharged today; education provided but patient wants discharge to happen today. Medications reviewed. Patient lives with spouse for any assistance. Education provided about discharge; patient and spouse verbilized understanding. Sn advised patient to continuing monitoring blood sugar and vital signs; Patient verbilized understanding and was agreeable. Sn encouraged patient to completed exercises daily and monitor diabetic diet. Patient denies falls and pain. No further needs or questions at this time.

## 2023-01-16 RX ORDER — FENOFIBRATE 160 MG/1
TABLET ORAL
Qty: 90 TABLET | Refills: 1 | Status: SHIPPED | OUTPATIENT
Start: 2023-01-16

## 2023-01-16 RX ORDER — DILTIAZEM HYDROCHLORIDE 120 MG/1
CAPSULE, COATED, EXTENDED RELEASE ORAL
Qty: 90 CAPSULE | Refills: 1 | Status: SHIPPED | OUTPATIENT
Start: 2023-01-16 | End: 2023-03-03 | Stop reason: HOSPADM

## 2023-01-16 RX ORDER — SITAGLIPTIN AND METFORMIN HYDROCHLORIDE 1000; 50 MG/1; MG/1
TABLET, FILM COATED, EXTENDED RELEASE ORAL
Qty: 60 TABLET | Refills: 3 | Status: ON HOLD | OUTPATIENT
Start: 2023-01-16 | End: 2023-03-07

## 2023-01-17 ENCOUNTER — OFFICE VISIT (OUTPATIENT)
Dept: FAMILY MEDICINE CLINIC | Facility: CLINIC | Age: 68
End: 2023-01-17
Payer: MEDICAID

## 2023-01-17 VITALS
TEMPERATURE: 98.4 F | BODY MASS INDEX: 40.8 KG/M2 | OXYGEN SATURATION: 93 % | HEIGHT: 64 IN | SYSTOLIC BLOOD PRESSURE: 139 MMHG | DIASTOLIC BLOOD PRESSURE: 76 MMHG | HEART RATE: 91 BPM | WEIGHT: 239 LBS

## 2023-01-17 DIAGNOSIS — B95.61 BACTEREMIA DUE TO METHICILLIN SUSCEPTIBLE STAPHYLOCOCCUS AUREUS (MSSA): ICD-10-CM

## 2023-01-17 DIAGNOSIS — N61.0 CELLULITIS OF RIGHT BREAST: ICD-10-CM

## 2023-01-17 DIAGNOSIS — Z00.00 MEDICARE ANNUAL WELLNESS VISIT, SUBSEQUENT: Primary | ICD-10-CM

## 2023-01-17 DIAGNOSIS — R78.81 BACTEREMIA DUE TO METHICILLIN SUSCEPTIBLE STAPHYLOCOCCUS AUREUS (MSSA): ICD-10-CM

## 2023-01-17 PROCEDURE — 99214 OFFICE O/P EST MOD 30 MIN: CPT | Performed by: FAMILY MEDICINE

## 2023-01-17 RX ORDER — CEFDINIR 300 MG/1
300 CAPSULE ORAL 2 TIMES DAILY
Qty: 20 CAPSULE | Refills: 0 | Status: SHIPPED | OUTPATIENT
Start: 2023-01-17 | End: 2023-02-28

## 2023-01-23 RX ORDER — POTASSIUM CHLORIDE 750 MG/1
CAPSULE, EXTENDED RELEASE ORAL
Qty: 90 CAPSULE | Refills: 1 | Status: SHIPPED | OUTPATIENT
Start: 2023-01-23

## 2023-01-26 NOTE — PROGRESS NOTES
Date of Office Visit: 2023  Encounter Provider: Dr.Syed Charles  Place of Service: [facility] CARDIOLOGY [place]  Patient Name: Caterina Mason  :1955  Lou Curran MD    Chief Complaint   Patient presents with   • Atrial Fibrillation   • Hyperlipidemia   • Hypertension   • Congestive Heart Failure   • Diabetes   • Follow-up     History of Present Illness    I am pleased to see Mrs. Mason in my office today as a follow-up.    As you know, patient is 67 year-old white female whose past medical history significant for hypertension, hyperlipidemia, COPD, diabetes mellitus, paroxysmal atrial fibrillation, who came today for follow-up.    In 2017, patient was admitted at [facility] with a symptom of possible bradycardia and dizziness.  Patient was seen by endocrinologist and was thought to have bradycardia and was sent to the hospital.  In the hospital she was never documented to have bradycardia.  She was noted to have sinus rhythm with frequent PVCs and ventricular bigeminy rhythm.  Holter monitor showed frequent PVCs greater than 30,000.  Stress test was negative for ischemia and echocardiogram showed normal left ventricular size and function and LVEF was 55%.  No significant valvular heart disease was noted. In 2017, patient underwent Holter monitor which showed sinus rhythm with right bundle branch block pattern and patient had frequent PACs.     In 2018, patient was admitted at [facility] again with symptom of dizziness and possible extreme bradycardia.  She was found to be in sinus rhythm with bigeminy.  Holter monitor showed frequent premature ventricular contraction but no significant pause.  Ventricular bigeminy rhythm was noted.  Patient underwent cardiac catheterization showed no significant CAD..  In 2019, patient underwent Holter monitor it showed frequent PVCs greater than 18,000.  No significant bradycardia noted.  No SVT or VT was  present. In 2019, patient underwent Holter monitor which showed predominantly sinus rhythm with right bundle branch block.  Frequent PVCs and bigeminy was noted.  Patient had 26% of PVC burden. In 2019, patient underwent EP study and possible ablation of PVCs.  However it was unsuccessful.  Patient was started on Sectral 200 mg twice daily.  Subsequently, it was stopped and metoprolol was started.  Patient has sleep apnea and she is on CPAP.    In 2021, echocardiogram showed EF of 55% and no significant valvular heart disease noted    On previous visit I recommended to proceed with stress test but patient did not show up.  Patient reports that she gets occasional nausea and gastroesophageal reflux disease.  I suspect she has gastroparesis and GERD.  I recommend patient should see gastroenterologist.  Patient is advised to discuss with PCP.  Patient denies any chest pain.  No orthopnea PND.  Patient has shortness of breath.  However she is in euvolemic status.  No leg edema.    EKG showed atrial fibrillation with right bundle branch block.  PVCs noted.    At this stage I would recommend to proceed with stress test with Cardiolite imaging.  Blood pressure is controlled current treatment would be continued.        Past Medical History:   Diagnosis Date   • Arthritis    • Atrial fibrillation ([facility])    • Bradycardia     Dr. Charles   • Cataract    • COPD (chronic obstructive pulmonary disease) ([facility])     Dr. Rob   • Diabetes mellitus, type 2 ([facility])     sees Dr. Archibald at [place]   • DJD (degenerative joint disease)     possible herniated disc, sees Pain Mgmt in [place]   • Emphysema of lung ([facility])    •     • GERD (gastroesophageal reflux disease)    • History of combined right and left heart catheterization 2018    minimal CAD on heart cath done    • Hyperlipidemia    • Hypertension    • Pain    • Sleep apnea     wears CPAP machine, sees Darron         Past Surgical History:   Procedure  Laterality Date   • ABLATION OF DYSRHYTHMIC FOCUS     • CARDIAC CATHETERIZATION      and Angiograph    • CARDIAC ELECTROPHYSIOLOGY PROCEDURE N/A 12/10/2019    Procedure: pvc ablation;  Surgeon: Alfredo Iglesias MD;  Location: [facility] INVASIVE LOCATION;  Service: Cardiovascular   •  SECTION      x 1   • COLON RESECTION     • COLONOSCOPY  2012   • COLOSTOMY      and reversal in her 20's due to problems with childbirth   • COLOSTOMY CLOSURE     • OVARIAN CYST SURGERY     • ROTATOR CUFF REPAIR Left 2009    x2    • TOTAL ABDOMINAL HYSTERECTOMY WITH SALPINGO OOPHORECTOMY             Current Outpatient Medications:   •  Accu-Chek Aurea Plus test strip, USE TO CHECK BLOOD SUGAR TWICE A DAY, Disp: 200 each, Rfl: 0  •  albuterol sulfate  (90 Base) MCG/ACT inhaler, INHALE 2 PUFFS EVERY 4 HOURS AS NEEDED FOR WHEEZING OR SHORTNESS OF AIR, Disp: 18 g, Rfl: 1  •  atorvastatin (LIPITOR) 40 MG tablet, TAKE 1/2 TABLET BY MOUTH EVERY DAY, Disp: 45 tablet, Rfl: 1  •  budesonide-formoterol (SYMBICORT) 160-4.5 MCG/ACT inhaler, Inhale 2 puffs 2 (Two) Times a Day., Disp: , Rfl:   •  cefdinir (OMNICEF) 300 MG capsule, Take 1 capsule by mouth 2 (Two) Times a Day., Disp: 20 capsule, Rfl: 0  •  Cholecalciferol 25 MCG (1000 UT) capsule, TAKE 1 TABLET BY MOUTH TWICE A DAY *OTC NOT COVERED*, Disp: 60 capsule, Rfl: 5  •  dilTIAZem CD (CARDIZEM CD) 120 MG 24 hr capsule, TAKE 1 CAPSULE BY MOUTH EVERY DAY, Disp: 90 capsule, Rfl: 1  •  Eliquis 5 MG tablet tablet, TAKE 1 TABLET BY MOUTH 2 (TWO) TIMES A DAY. PATIENT NEEDS AN APPT BEFORE ANY MORE REFILLS, Disp: 60 tablet, Rfl: 1  •  fenofibrate 160 MG tablet, TAKE 1 TABLET BY MOUTH EVERY DAY, Disp: 90 tablet, Rfl: 1  •  furosemide (LASIX) 40 MG tablet, TAKE 1 TABLET BY MOUTH EVERY DAY, Disp: 90 tablet, Rfl: 1  •  HumaLOG KwikPen 100 UNIT/ML solution pen-injector, Inject 14 Units under the skin into the appropriate area as directed 3 (Three) Times a Day Before Meals.,  Disp: 15 mL, Rfl: 1  •  HYDROcodone-acetaminophen (NORCO) 7.5-325 MG per tablet, Take 1 tablet by mouth 3 (Three) Times a Day As Needed for Moderate Pain., Disp: , Rfl:   •  Insulin Glargine (BASAGLAR KWIKPEN) 100 UNIT/ML injection pen, INJECT 40 UNITS UNDER THE SKIN INTO THE APPROPRIATE AREA AS DIRECTED EVERY NIGHT., Disp: 45 mL, Rfl: 0  •  Insulin Pen Needle (BD Pen Needle Lorene U/F) 32G X 4 MM misc, USED TO INJECT INSULIN 4 TIMES A DAY., Disp: 120 each, Rfl: 12  •  Janumet XR  MG tablet, TAKE 2 TABLETS BY MOUTH EVERY DAY, Disp: 60 tablet, Rfl: 3  •  lisinopril (PRINIVIL,ZESTRIL) 20 MG tablet, TAKE 1 TABLET BY MOUTH EVERY DAY, Disp: 90 tablet, Rfl: 1  •  magnesium oxide (MAG-OX) 400 MG tablet, TAKE 1 TABLET BY MOUTH TWICE A DAY, Disp: 60 tablet, Rfl: 6  •  metoprolol tartrate (LOPRESSOR) 25 MG tablet, TAKE 1 TABLET BY MOUTH EVERY 12 HOURS FOR 30 DAYS., Disp: 180 tablet, Rfl: 3  •  potassium chloride (MICRO-K) 10 MEQ CR capsule, TAKE 1 CAPSULE BY MOUTH EVERY DAY, Disp: 90 capsule, Rfl: 1      Social History     Socioeconomic History   • Marital status:    Tobacco Use   • Smoking status: Former     Packs/day: 1.00     Years: 48.00     Pack years: 48.00     Types: Cigarettes     Quit date: 3/1/2020     Years since quittin.9   • Smokeless tobacco: Never   Vaping Use   • Vaping Use: Never used   Substance and Sexual Activity   • Alcohol use: No   • Drug use: No   • Sexual activity: Defer         Review of Systems   Constitutional: Negative for chills and fever.   HENT: Negative for ear discharge and nosebleeds.    Eyes: Negative for discharge and redness.   Cardiovascular: Negative for chest pain, orthopnea, palpitations, paroxysmal nocturnal dyspnea and syncope.   Respiratory: Positive for shortness of breath. Negative for cough and wheezing.    Endocrine: Negative for heat intolerance.   Skin: Negative for rash.   Musculoskeletal: Positive for arthritis and joint pain. Negative for myalgias.  "  Gastrointestinal: Negative for abdominal pain, melena, nausea and vomiting.   Genitourinary: Negative for dysuria and hematuria.   Neurological: Negative for dizziness, light-headedness, numbness and tremors.   Psychiatric/Behavioral: Negative for depression. The patient is not nervous/anxious.        Procedures      ECG 12 Lead    Date/Time: 1/27/2023 3:05 PM  Performed by: Jesse Charles MD  Authorized by: Jesse Charles MD   Comparison: compared with previous ECG   Similar to previous ECG  Rhythm: atrial fibrillation  Ectopy: unifocal PVCs  Conduction: right bundle branch block    Clinical impression: abnormal EKG            ECG 12 Lead    (Results Pending)           Objective:    /79 (BP Location: Right arm, Patient Position: Sitting, Cuff Size: Large Adult)   Pulse 91   Ht 162.6 cm (64.02\")   Wt 109 kg (240 lb)   SpO2 95%   BMI 41.18 kg/m²         Constitutional:       Appearance: Well-developed.   Eyes:      General: No scleral icterus.        Right eye: No discharge.   HENT:      Head: Normocephalic and atraumatic.   Neck:      Thyroid: No thyromegaly.      Lymphadenopathy: No cervical adenopathy.   Pulmonary:      Effort: Pulmonary effort is normal. No respiratory distress.      Breath sounds: Normal breath sounds. No wheezing. No rales.   Cardiovascular:      Normal rate. Regular rhythm.      No gallop.   Abdominal:      Tenderness: There is no abdominal tenderness.   Skin:     Findings: No erythema or rash.   Neurological:      Mental Status: Alert and oriented to person, place, and time.             Assessment:       Diagnosis Plan   1. Mixed hyperlipidemia  ECG 12 Lead    Stress Test With Myocardial Perfusion One Day      2. Paroxysmal atrial fibrillation (HCC)  ECG 12 Lead    Stress Test With Myocardial Perfusion One Day      3. Type 2 diabetes mellitus with hyperglycemia, with long-term current use of insulin (HCC)  ECG 12 Lead    Stress Test With Myocardial Perfusion One Day      4. Chronic " diastolic CHF (congestive heart failure) (HCC)  ECG 12 Lead    Stress Test With Myocardial Perfusion One Day      5. Essential hypertension  ECG 12 Lead    Stress Test With Myocardial Perfusion One Day      6. PVC's (premature ventricular contractions)  ECG 12 Lead    Stress Test With Myocardial Perfusion One Day               Plan:       MDM:    1.  Chronic atrial fibrillation:    Patient has chronic atrial fibrillation but rate is controlled.  Current treatment would be continued patient is on Eliquis.  Because of her underlying comorbid condition, achieving sinus rhythm would be hard and low probability.  I would recommend rate control and anticoagulation strategy.    2.  Congestive heart failure chronic diastolic:    Patient is in euvolemic status.  Continue Lasix.    3.  Hypertension:    Blood pressure is controlled.  Current treatment would be continued    4.  Shortness of breath:    I would proceed with stress test with Cardiolite imaging.

## 2023-01-27 ENCOUNTER — OFFICE VISIT (OUTPATIENT)
Dept: CARDIOLOGY | Facility: CLINIC | Age: 68
End: 2023-01-27
Payer: MEDICAID

## 2023-01-27 VITALS
OXYGEN SATURATION: 95 % | SYSTOLIC BLOOD PRESSURE: 130 MMHG | BODY MASS INDEX: 40.97 KG/M2 | DIASTOLIC BLOOD PRESSURE: 79 MMHG | HEART RATE: 91 BPM | WEIGHT: 240 LBS | HEIGHT: 64 IN

## 2023-01-27 DIAGNOSIS — I48.0 PAROXYSMAL ATRIAL FIBRILLATION: Chronic | ICD-10-CM

## 2023-01-27 DIAGNOSIS — I50.32 CHRONIC DIASTOLIC CHF (CONGESTIVE HEART FAILURE): Chronic | ICD-10-CM

## 2023-01-27 DIAGNOSIS — E11.65 TYPE 2 DIABETES MELLITUS WITH HYPERGLYCEMIA, WITH LONG-TERM CURRENT USE OF INSULIN: Chronic | ICD-10-CM

## 2023-01-27 DIAGNOSIS — I49.3 PVC'S (PREMATURE VENTRICULAR CONTRACTIONS): ICD-10-CM

## 2023-01-27 DIAGNOSIS — E78.2 MIXED HYPERLIPIDEMIA: Primary | Chronic | ICD-10-CM

## 2023-01-27 DIAGNOSIS — I10 ESSENTIAL HYPERTENSION: Chronic | ICD-10-CM

## 2023-01-27 DIAGNOSIS — Z79.4 TYPE 2 DIABETES MELLITUS WITH HYPERGLYCEMIA, WITH LONG-TERM CURRENT USE OF INSULIN: Chronic | ICD-10-CM

## 2023-01-27 PROCEDURE — 99214 OFFICE O/P EST MOD 30 MIN: CPT | Performed by: INTERNAL MEDICINE

## 2023-01-27 PROCEDURE — 93000 ELECTROCARDIOGRAM COMPLETE: CPT | Performed by: INTERNAL MEDICINE

## 2023-02-05 PROBLEM — N61.0 CELLULITIS OF RIGHT BREAST: Status: ACTIVE | Noted: 2023-02-05

## 2023-02-05 PROBLEM — Z00.00 WELCOME TO MEDICARE PREVENTIVE VISIT: Status: RESOLVED | Noted: 2021-02-05 | Resolved: 2023-02-05

## 2023-02-05 NOTE — PATIENT INSTRUCTIONS
Medicare Wellness  Personal Prevention Plan of Service     Date of Office Visit:    Encounter Provider:  Lou Curran MD  Place of Service:  [facility] FAM[facility]  Patient Name: Caterina Mason  :  1955    As part of the Medicare Wellness portion of your visit today, we are providing you with this personalized preventive plan of services (PPPS). This plan is based upon recommendations of the [place] Preventive Services Task Force (USPSTF) and the Advisory Committee on Immunization Practices (ACIP).    This lists the preventive care services that should be considered, and provides dates of when you are due. Items listed as completed are up-to-date and do not require any further intervention.    Health Maintenance   Topic Date Due    DXA SCAN  Never done    COVID-19 Vaccine (1) Never done    ZOSTER VACCINE (1 of 2) Never done    TDAP/TD VACCINES (2 - Tdap) 2018    HEPATITIS C SCREENING  Never done    DIABETIC FOOT EXAM  Never done    Pneumococcal Vaccine 65+ (3 - PPSV23 if available, else PCV20) 2021    MAMMOGRAM  2021    COLORECTAL CANCER SCREENING  2022    DIABETIC EYE EXAM  2022    LUNG CANCER SCREENING  10/05/2022    URINE MICROALBUMIN  2023    HEMOGLOBIN A1C  2023    LIPID PANEL  2023    ANNUAL PHYSICAL  2024    INFLUENZA VACCINE  Completed    PAP SMEAR  Discontinued       No orders of the defined types were placed in this encounter.      Return in about 3 months (around 2023).

## 2023-02-06 RX ORDER — LISINOPRIL 20 MG/1
TABLET ORAL
Qty: 90 TABLET | Refills: 1 | Status: SHIPPED | OUTPATIENT
Start: 2023-02-06

## 2023-02-07 ENCOUNTER — OFFICE VISIT (OUTPATIENT)
Dept: FAMILY MEDICINE CLINIC | Facility: CLINIC | Age: 68
End: 2023-02-07
Payer: MEDICAID

## 2023-02-07 ENCOUNTER — LAB (OUTPATIENT)
Dept: LAB | Facility: HOSPITAL | Age: 68
End: 2023-02-07
Payer: MEDICAID

## 2023-02-07 VITALS
TEMPERATURE: 98.7 F | BODY MASS INDEX: 39.95 KG/M2 | HEIGHT: 64 IN | HEART RATE: 77 BPM | OXYGEN SATURATION: 95 % | DIASTOLIC BLOOD PRESSURE: 90 MMHG | SYSTOLIC BLOOD PRESSURE: 136 MMHG | WEIGHT: 234 LBS

## 2023-02-07 DIAGNOSIS — N63.13 MASS OF LOWER OUTER QUADRANT OF RIGHT BREAST: ICD-10-CM

## 2023-02-07 DIAGNOSIS — R10.84 GENERALIZED ABDOMINAL PAIN: Primary | ICD-10-CM

## 2023-02-07 LAB
ALBUMIN SERPL-MCNC: 4.2 G/DL (ref 3.5–5.2)
ALBUMIN/GLOB SERPL: 1.5 G/DL
ALP SERPL-CCNC: 65 U/L (ref 39–117)
ALT SERPL W P-5'-P-CCNC: 42 U/L (ref 1–33)
ANION GAP SERPL CALCULATED.3IONS-SCNC: 9 MMOL/L (ref 5–15)
AST SERPL-CCNC: 41 U/L (ref 1–32)
BASOPHILS # BLD AUTO: 0.03 10*3/MM3 (ref 0–0.2)
BASOPHILS NFR BLD AUTO: 0.4 % (ref 0–1.5)
BILIRUB SERPL-MCNC: 0.8 MG/DL (ref 0–1.2)
BUN SERPL-MCNC: 12 MG/DL (ref 8–23)
BUN/CREAT SERPL: 16.9 (ref 7–25)
CALCIUM SPEC-SCNC: 9.9 MG/DL (ref 8.6–10.5)
CHLORIDE SERPL-SCNC: 103 MMOL/L (ref 98–107)
CO2 SERPL-SCNC: 28 MMOL/L (ref 22–29)
CREAT SERPL-MCNC: 0.71 MG/DL (ref 0.57–1)
DEPRECATED RDW RBC AUTO: 42.7 FL (ref 37–54)
EGFRCR SERPLBLD CKD-EPI 2021: 93.3 ML/MIN/1.73
EOSINOPHIL # BLD AUTO: 0.14 10*3/MM3 (ref 0–0.4)
EOSINOPHIL NFR BLD AUTO: 1.7 % (ref 0.3–6.2)
ERYTHROCYTE [DISTWIDTH] IN BLOOD BY AUTOMATED COUNT: 13.2 % (ref 12.3–15.4)
GLOBULIN UR ELPH-MCNC: 2.8 GM/DL
GLUCOSE SERPL-MCNC: 273 MG/DL (ref 65–99)
HCT VFR BLD AUTO: 41.5 % (ref 34–46.6)
HGB BLD-MCNC: 13.3 G/DL (ref 12–15.9)
IMM GRANULOCYTES # BLD AUTO: 0.04 10*3/MM3 (ref 0–0.05)
IMM GRANULOCYTES NFR BLD AUTO: 0.5 % (ref 0–0.5)
LYMPHOCYTES # BLD AUTO: 2.14 10*3/MM3 (ref 0.7–3.1)
LYMPHOCYTES NFR BLD AUTO: 26.1 % (ref 19.6–45.3)
MCH RBC QN AUTO: 28.8 PG (ref 26.6–33)
MCHC RBC AUTO-ENTMCNC: 32 G/DL (ref 31.5–35.7)
MCV RBC AUTO: 89.8 FL (ref 79–97)
MONOCYTES # BLD AUTO: 0.72 10*3/MM3 (ref 0.1–0.9)
MONOCYTES NFR BLD AUTO: 8.8 % (ref 5–12)
NEUTROPHILS NFR BLD AUTO: 5.12 10*3/MM3 (ref 1.7–7)
NEUTROPHILS NFR BLD AUTO: 62.5 % (ref 42.7–76)
NRBC BLD AUTO-RTO: 0 /100 WBC (ref 0–0.2)
PLATELET # BLD AUTO: 299 10*3/MM3 (ref 140–450)
PMV BLD AUTO: 12 FL (ref 6–12)
POTASSIUM SERPL-SCNC: 3.7 MMOL/L (ref 3.5–5.2)
PROT SERPL-MCNC: 7 G/DL (ref 6–8.5)
RBC # BLD AUTO: 4.62 10*6/MM3 (ref 3.77–5.28)
SODIUM SERPL-SCNC: 140 MMOL/L (ref 136–145)
WBC NRBC COR # BLD: 8.19 10*3/MM3 (ref 3.4–10.8)

## 2023-02-07 PROCEDURE — 36415 COLL VENOUS BLD VENIPUNCTURE: CPT

## 2023-02-07 PROCEDURE — 99213 OFFICE O/P EST LOW 20 MIN: CPT | Performed by: FAMILY MEDICINE

## 2023-02-07 PROCEDURE — 80053 COMPREHEN METABOLIC PANEL: CPT

## 2023-02-07 PROCEDURE — 85025 COMPLETE CBC W/AUTO DIFF WBC: CPT

## 2023-02-07 RX ORDER — DICYCLOMINE HYDROCHLORIDE 10 MG/1
10 CAPSULE ORAL
Qty: 120 CAPSULE | Refills: 0 | Status: ON HOLD | OUTPATIENT
Start: 2023-02-07 | End: 2023-03-01

## 2023-02-07 NOTE — PROGRESS NOTES
"Chief Complaint  Abdominal Pain (X 3 weeks- cramping, constant pain )    Subjective        Caterina Mason presents to [facility]  Abdominal Pain  This is a new problem. The current episode started 1 to 4 weeks ago. The problem occurs constantly. The problem has been unchanged. The pain is located in the generalized abdominal region. The pain is moderate. The quality of the pain is sharp, cramping and aching. The abdominal pain does not radiate. Associated symptoms include anorexia, constipation, diarrhea, nausea and weight loss. Pertinent negatives include no fever, melena or vomiting. The pain is aggravated by eating. The pain is relieved by nothing. Her past medical history is significant for abdominal surgery.       Objective   Vital Signs:  /90 (BP Location: Right arm, Patient Position: Sitting, Cuff Size: Large Adult)   Pulse 77   Temp 98.7 °F (37.1 °C) (Infrared)   Ht 162.6 cm (64\")   Wt 106 kg (234 lb)   SpO2 95%   BMI 40.17 kg/m²   Estimated body mass index is 40.17 kg/m² as calculated from the following:    Height as of this encounter: 162.6 cm (64\").    Weight as of this encounter: 106 kg (234 lb).             Physical Exam  Vitals and nursing note reviewed.   Constitutional:       General: She is not in acute distress.     Appearance: She is well-developed.   HENT:      Head: Normocephalic.   Eyes:      General: Lids are normal.   Neck:      Thyroid: No thyroid mass or thyromegaly.      Trachea: Trachea normal.   Cardiovascular:      Rate and Rhythm: Normal rate and regular rhythm.      Heart sounds: Normal heart sounds.   Pulmonary:      Effort: Pulmonary effort is normal.      Breath sounds: Normal breath sounds.   Abdominal:      Tenderness: There is generalized abdominal tenderness.   Musculoskeletal:      Cervical back: Normal range of motion.   Lymphadenopathy:      Cervical: No cervical adenopathy.   Skin:     General: Skin is warm and dry. "   Neurological:      Mental Status: She is alert and oriented to person, place, and time.   Psychiatric:         Attention and Perception: She is attentive.         Mood and Affect: Mood normal.         Speech: Speech normal.         Behavior: Behavior normal.        Result Review :  The following data was reviewed by: Lou Curran MD on 02/07/2023:  Common labs    Common Labs 12/19/22 12/19/22 12/26/22 12/26/22 1/2/23 1/2/23    1030 1030 1000 1000 1430 1430   Glucose  324 (A)  251 (A)  331 (A)   BUN  11  11  9   Creatinine  0.68  0.78  0.79   Sodium  141  145  141   Potassium  3.9  3.8  3.9   Chloride  101  102  100   Calcium  10.1  9.9  10.7 (A)   WBC 7.20  6.70  7.20    Hemoglobin 13.7  13.7  13.7    Hematocrit 42.4  41.1  41.2    Platelets 262  254  347    (A) Abnormal value                         Assessment and Plan   Diagnoses and all orders for this visit:    1. Generalized abdominal pain (Primary)  Assessment & Plan:  H/O colostomy and subsequent reversal years ago.  Concerning for possible adhesions.  Cannot rule out gallbladder as cause of pain.  Check CT as ordered but if pain gets worse before CT appointment then go to the ER.     Orders:  -     CT Abdomen Pelvis With Contrast; Future  -     dicyclomine (BENTYL) 10 MG capsule; Take 1 capsule by mouth 4 (Four) Times a Day Before Meals & at Bedtime.  Dispense: 120 capsule; Refill: 0           Follow Up   No follow-ups on file.  Patient was given instructions and counseling regarding her condition or for health maintenance advice. Please see specific information pulled into the AVS if appropriate.

## 2023-02-07 NOTE — ASSESSMENT & PLAN NOTE
H/O colostomy and subsequent reversal years ago.  Concerning for possible adhesions.  Cannot rule out gallbladder as cause of pain.  Check CT as ordered but if pain gets worse before CT appointment then go to the ER.

## 2023-02-08 ENCOUNTER — DOCUMENTATION (OUTPATIENT)
Dept: ENDOCRINOLOGY | Facility: CLINIC | Age: 68
End: 2023-02-08
Payer: MEDICAID

## 2023-02-08 NOTE — PROGRESS NOTES
Benefits Investigation Summary    Prescription: Renewal     Dispensing pharmacy: [facility]    Copay amount:     [place]  BIN: [id number]  [id number]  RX GROUP: [id number]    Prior Auth and Med Assistance notes: none    Yenifre Blackwell CPhT

## 2023-02-13 ENCOUNTER — TELEPHONE (OUTPATIENT)
Dept: ENDOCRINOLOGY | Facility: CLINIC | Age: 68
End: 2023-02-13
Payer: MEDICAID

## 2023-02-20 RX ORDER — ATORVASTATIN CALCIUM 40 MG/1
TABLET, FILM COATED ORAL
Qty: 45 TABLET | Refills: 1 | Status: SHIPPED | OUTPATIENT
Start: 2023-02-20

## 2023-02-20 RX ORDER — APIXABAN 5 MG/1
TABLET, FILM COATED ORAL
Qty: 60 TABLET | Refills: 1 | Status: SHIPPED | OUTPATIENT
Start: 2023-02-20 | End: 2023-03-03 | Stop reason: HOSPADM

## 2023-02-21 ENCOUNTER — APPOINTMENT (OUTPATIENT)
Dept: CT IMAGING | Facility: HOSPITAL | Age: 68
End: 2023-02-21
Payer: MEDICAID

## 2023-02-21 ENCOUNTER — HOSPITAL ENCOUNTER (EMERGENCY)
Facility: HOSPITAL | Age: 68
Discharge: HOME | End: 2023-02-21
Attending: EMERGENCY MEDICINE | Admitting: EMERGENCY MEDICINE
Payer: MEDICAID

## 2023-02-21 VITALS
RESPIRATION RATE: 18 BRPM | WEIGHT: 232.59 LBS | OXYGEN SATURATION: 98 % | TEMPERATURE: 97.4 F | DIASTOLIC BLOOD PRESSURE: 62 MMHG | BODY MASS INDEX: 39.71 KG/M2 | HEART RATE: 78 BPM | HEIGHT: 64 IN | SYSTOLIC BLOOD PRESSURE: 143 MMHG

## 2023-02-21 DIAGNOSIS — K80.20 CALCULUS OF GALLBLADDER WITHOUT CHOLECYSTITIS WITHOUT OBSTRUCTION: ICD-10-CM

## 2023-02-21 DIAGNOSIS — R10.9 ABDOMINAL PAIN, UNSPECIFIED ABDOMINAL LOCATION: Primary | ICD-10-CM

## 2023-02-21 DIAGNOSIS — E27.8 ADRENAL CYST: ICD-10-CM

## 2023-02-21 DIAGNOSIS — N28.1 RENAL CYST: ICD-10-CM

## 2023-02-21 LAB
ALBUMIN SERPL-MCNC: 3.9 G/DL (ref 3.5–5.2)
ALBUMIN/GLOB SERPL: 1.1 G/DL
ALP SERPL-CCNC: 61 U/L (ref 39–117)
ALT SERPL W P-5'-P-CCNC: 24 U/L (ref 1–33)
ANION GAP SERPL CALCULATED.3IONS-SCNC: 9 MMOL/L (ref 5–15)
AST SERPL-CCNC: 31 U/L (ref 1–32)
BACTERIA UR QL AUTO: ABNORMAL /HPF
BASOPHILS # BLD AUTO: 0 10*3/MM3 (ref 0–0.2)
BASOPHILS NFR BLD AUTO: 0.3 % (ref 0–1.5)
BILIRUB SERPL-MCNC: 0.7 MG/DL (ref 0–1.2)
BILIRUB UR QL STRIP: NEGATIVE
BUN SERPL-MCNC: 13 MG/DL (ref 8–23)
BUN/CREAT SERPL: 16 (ref 7–25)
CALCIUM SPEC-SCNC: 10.8 MG/DL (ref 8.6–10.5)
CHLORIDE SERPL-SCNC: 102 MMOL/L (ref 98–107)
CLARITY UR: CLEAR
CO2 SERPL-SCNC: 30 MMOL/L (ref 22–29)
COLOR UR: YELLOW
CREAT SERPL-MCNC: 0.81 MG/DL (ref 0.57–1)
DEPRECATED RDW RBC AUTO: 45.9 FL (ref 37–54)
EGFRCR SERPLBLD CKD-EPI 2021: 79.7 ML/MIN/1.73
EOSINOPHIL # BLD AUTO: 0.1 10*3/MM3 (ref 0–0.4)
EOSINOPHIL NFR BLD AUTO: 1.3 % (ref 0.3–6.2)
ERYTHROCYTE [DISTWIDTH] IN BLOOD BY AUTOMATED COUNT: 14.1 % (ref 12.3–15.4)
GLOBULIN UR ELPH-MCNC: 3.6 GM/DL
GLUCOSE SERPL-MCNC: 170 MG/DL (ref 65–99)
GLUCOSE UR STRIP-MCNC: NEGATIVE MG/DL
HCT VFR BLD AUTO: 39.1 % (ref 34–46.6)
HGB BLD-MCNC: 13.1 G/DL (ref 12–15.9)
HGB UR QL STRIP.AUTO: NEGATIVE
HYALINE CASTS UR QL AUTO: ABNORMAL /LPF
KETONES UR QL STRIP: NEGATIVE
LEUKOCYTE ESTERASE UR QL STRIP.AUTO: ABNORMAL
LIPASE SERPL-CCNC: 50 U/L (ref 13–60)
LYMPHOCYTES # BLD AUTO: 2.3 10*3/MM3 (ref 0.7–3.1)
LYMPHOCYTES NFR BLD AUTO: 25.3 % (ref 19.6–45.3)
MCH RBC QN AUTO: 29.3 PG (ref 26.6–33)
MCHC RBC AUTO-ENTMCNC: 33.4 G/DL (ref 31.5–35.7)
MCV RBC AUTO: 87.6 FL (ref 79–97)
MONOCYTES # BLD AUTO: 0.9 10*3/MM3 (ref 0.1–0.9)
MONOCYTES NFR BLD AUTO: 9.7 % (ref 5–12)
NEUTROPHILS NFR BLD AUTO: 5.7 10*3/MM3 (ref 1.7–7)
NEUTROPHILS NFR BLD AUTO: 63.4 % (ref 42.7–76)
NITRITE UR QL STRIP: NEGATIVE
NRBC BLD AUTO-RTO: 0 /100 WBC (ref 0–0.2)
PH UR STRIP.AUTO: 8.5 [PH] (ref 5–8)
PLATELET # BLD AUTO: 319 10*3/MM3 (ref 140–450)
PMV BLD AUTO: 8 FL (ref 6–12)
POTASSIUM SERPL-SCNC: 3.9 MMOL/L (ref 3.5–5.2)
PROT SERPL-MCNC: 7.5 G/DL (ref 6–8.5)
PROT UR QL STRIP: ABNORMAL
RBC # BLD AUTO: 4.47 10*6/MM3 (ref 3.77–5.28)
RBC # UR STRIP: ABNORMAL /HPF
REF LAB TEST METHOD: ABNORMAL
SODIUM SERPL-SCNC: 141 MMOL/L (ref 136–145)
SP GR UR STRIP: 1.02 (ref 1–1.03)
SQUAMOUS #/AREA URNS HPF: ABNORMAL /HPF
UROBILINOGEN UR QL STRIP: ABNORMAL
WBC # UR STRIP: ABNORMAL /HPF
WBC NRBC COR # BLD: 9 10*3/MM3 (ref 3.4–10.8)

## 2023-02-21 PROCEDURE — 81001 URINALYSIS AUTO W/SCOPE: CPT | Performed by: PHYSICIAN ASSISTANT

## 2023-02-21 PROCEDURE — 0 IOPAMIDOL PER 1 ML: Performed by: PHYSICIAN ASSISTANT

## 2023-02-21 PROCEDURE — 85025 COMPLETE CBC W/AUTO DIFF WBC: CPT | Performed by: PHYSICIAN ASSISTANT

## 2023-02-21 PROCEDURE — 99283 EMERGENCY DEPT VISIT LOW MDM: CPT

## 2023-02-21 PROCEDURE — 80053 COMPREHEN METABOLIC PANEL: CPT | Performed by: PHYSICIAN ASSISTANT

## 2023-02-21 PROCEDURE — 83690 ASSAY OF LIPASE: CPT | Performed by: PHYSICIAN ASSISTANT

## 2023-02-21 PROCEDURE — 74177 CT ABD & PELVIS W/CONTRAST: CPT

## 2023-02-21 RX ORDER — ONDANSETRON 4 MG/1
4 TABLET, ORALLY DISINTEGRATING ORAL EVERY 8 HOURS PRN
Qty: 15 TABLET | Refills: 0 | Status: SHIPPED | OUTPATIENT
Start: 2023-02-21 | End: 2023-02-24 | Stop reason: SDUPTHER

## 2023-02-21 RX ORDER — SODIUM CHLORIDE 0.9 % (FLUSH) 0.9 %
10 SYRINGE (ML) INJECTION AS NEEDED
Status: DISCONTINUED | OUTPATIENT
Start: 2023-02-21 | End: 2023-02-22 | Stop reason: HOSPADM

## 2023-02-21 RX ADMIN — IOPAMIDOL 100 ML: 755 INJECTION, SOLUTION INTRAVENOUS at 21:36

## 2023-02-22 NOTE — ED PROVIDER NOTES
Subjective    Provider in Triage Note  Patient is a 67-year-old female presents to the ED with complaints of  Acute severe generalized abdominal pain over the past 6 weeks.  Patient states the pain progressed today.  Patient states she was told by her PCP if her pain gets worse to come to the ED.  Patient states she did talk to her PCP and does have an outpatient CT of her abdomen and pelvis ordered but not scheduled.  She reports associated nausea no vomiting or fever.  She does report some intermittent constipation.  No urinary complaints.  Patient states her last colonoscopy was several years ago.  No reports of chest pain or shortness of breath. No black or bloody stool.       History of Present Illness  Agree with above unless otherwise noted.  Patient states pain is worse in left upper quadrant to me.  Has diffuse pain all over.  Has had intermittent diarrhea and constipation.  Review of Systems   Cardiovascular: Negative for chest pain.   Gastrointestinal: Positive for abdominal pain, constipation, diarrhea and nausea. Negative for vomiting.       Past Medical History:   Diagnosis Date   • Arthritis    • Atrial fibrillation ([facility])    • Bradycardia     Dr. Charles   • Cataract    • COPD (chronic obstructive pulmonary disease) ([facility])     Dr. Rob   • Diabetes mellitus, type 2 ([facility])     sees Dr. Archibald at [place]   • DJD (degenerative joint disease)     possible herniated disc, sees Pain Mgmt in [place]   • Emphysema of lung ([facility])    •     • GERD (gastroesophageal reflux disease)    • History of combined right and left heart catheterization 2018    minimal CAD on heart cath done    • Hyperlipidemia    • Hypertension    • Pain    • Sleep apnea     wears CPAP machine, sees Darron       Allergies   Allergen Reactions   • Ibuprofen GI Intolerance   • Prednisone Other (See Comments)   • Pregabalin Other (See Comments)     jerking   • Tramadol Other (See Comments)     Legs jerked   • Adhesive Tape Other (See  Comments)     irritation   • Levofloxacin Other (See Comments)     Increase sunburn   • Sulfamethoxazole-Trimethoprim Swelling       Past Surgical History:   Procedure Laterality Date   • ABLATION OF DYSRHYTHMIC FOCUS     • CARDIAC CATHETERIZATION      and Angiograph    • CARDIAC ELECTROPHYSIOLOGY PROCEDURE N/A 12/10/2019    Procedure: pvc ablation;  Surgeon: Alfredo Iglesias MD;  Location: [facility] INVASIVE LOCATION;  Service: Cardiovascular   •  SECTION      x 1   • COLON RESECTION     • COLONOSCOPY  2012   • COLOSTOMY      and reversal in her 20's due to problems with childbirth   • COLOSTOMY CLOSURE     • OVARIAN CYST SURGERY     • ROTATOR CUFF REPAIR Left 2009    x2    • TOTAL ABDOMINAL HYSTERECTOMY WITH SALPINGO OOPHORECTOMY         Family History   Problem Relation Age of Onset   • Heart disease Mother    • Hypertension Mother    • Stroke Mother    • Seizures Mother    • Heart disease Father    • Hypertension Father    • Lung disease Father    • Stroke Father    • Cancer Sister         unknown   • Hypertension Brother    • Diabetes Brother    • Hyperlipidemia Other        Social History     Socioeconomic History   • Marital status:    Tobacco Use   • Smoking status: Former     Packs/day: 1.00     Years: 48.00     Pack years: 48.00     Types: Cigarettes     Quit date: 3/1/2020     Years since quittin.9   • Smokeless tobacco: Never   Vaping Use   • Vaping Use: Never used   Substance and Sexual Activity   • Alcohol use: No   • Drug use: No   • Sexual activity: Defer           Objective   Physical Exam  Constitutional:  No acute distress.  Head:  Atraumatic.  Normocephalic.   Eyes:  No scleral icterus. Normal conjunctivae  ENT:  Moist mucosa.  No nasal discharge present.  Cardiovascular:  Well perfused.  Equal pulses.  Regular rate.  Normal capillary refill.    Pulmonary/Chest:  No respiratory distress.  Airway patent.  No tachypnea.  No accessory muscle usage.    Abdominal:  " Nondistended.  Diffuse tenderness to palpation without rebound or guarding.  Elevated BMI.  Extremities:   No Deformity  Skin:  Warm, dry  Neurological:  Alert, awake, and appropriate.  Normal speech.      Procedures           ED Course      /62   Pulse 78   Temp 97.4 °F (36.3 °C)   Resp 18   Ht 162.6 cm (64\")   Wt 105 kg (232 lb 9.4 oz)   SpO2 98%   BMI 39.92 kg/m²   Labs Reviewed   COMPREHENSIVE METABOLIC PANEL - Abnormal; Notable for the following components:       Result Value    Glucose 170 (*)     CO2 30.0 (*)     Calcium 10.8 (*)     All other components within normal limits    Narrative:     GFR Normal >60  Chronic Kidney Disease <60  Kidney Failure <15     URINALYSIS W/ MICROSCOPIC IF INDICATED (NO CULTURE) - Abnormal; Notable for the following components:    pH, UA 8.5 (*)     Protein, UA Trace (*)     Leuk Esterase, UA Moderate (2+) (*)     All other components within normal limits   URINALYSIS, MICROSCOPIC ONLY - Abnormal; Notable for the following components:    RBC, UA 0-2 (*)     WBC, UA 31-50 (*)     All other components within normal limits   LIPASE - Normal   CBC WITH AUTO DIFFERENTIAL - Normal   CBC AND DIFFERENTIAL    Narrative:     The following orders were created for panel order CBC & Differential.  Procedure                               Abnormality         Status                     ---------                               -----------         ------                     CBC Auto Differential[[id number]]        Normal              Final result                 Please view results for these tests on the individual orders.     Medications   sodium chloride 0.9 % flush 10 mL (has no administration in time range)   iopamidol (ISOVUE-370) 76 % injection 100 mL (100 mL Intravenous Given 2/21/23 2136)     CT Abdomen Pelvis With Contrast    Result Date: 2/21/2023  1. Cholelithiasis. The gallbladder is not distended. However, there is some haziness of the pericholecystic fat which could be " caused by cholecystitis, correlate clinically 2. Bilateral adrenal nodules, adenomas or metastases. MRI may help clarify 3. Bilateral hypoattenuating renal lesions, proteinaceous cysts versus solid masses. Contrast-enhanced MRI may help clarify 4. Possible filling defect in the duodenal bulb. Consider upper GI examination 5. Small amount of ascites 6. Retroperitoneal and upper abdominal adenopathy, and mildly enlarged lymph nodes in the lower thorax. Considerations include hematologic malignancy and metastatic disease Electronically Signed: Son Montelongo  2/21/2023 9:58 PM EST  Workstation ID: [id number]                                         [facility]     Reviewed note from Dr. Lou Aparicio patient was seen for abdominal pain going on for 3 weeks.  Outpatient CT scan was ordered.    My interpretation of CT was concerning for some type of possible partial gastric outlet obstruction.  There was several other findings on radiologist interpretation.    Pain worse in left upper quadrant.  Not having any right upper quadrant tenderness to palpation and normal liver enzymes low suspicion for cholecystitis especially as he gets that been going on for 6 weeks.  Discussed adrenal cyst and renal cysts with patient and she will follow-up with PCP for outpatient MRI.  Discussed systemic findings on CT.  Recommended follow-up with GI as patient needs scope to further evaluate.  Discussed with patient that cancer was on the differential.  Final diagnoses:   Abdominal pain, unspecified abdominal location   Adrenal cyst (HCC)   Calculus of gallbladder without cholecystitis without obstruction   Renal cyst       ED Disposition  ED Disposition     ED Disposition   Discharge    Condition   Stable    Comment   --             Lou Curran MD  [address]  [address]  [place] [address]  [phone]    In 2 days  Tell your primary care physician you need an appointment for close ER follow-up as instructed.  You had a cyst on your  adrenal glands and kidneys.  It is unclear what these cysts are.  Recommend outpatient MRI to further evaluate.  Speak with your primary care provider about ordering this.    [facility]  [address] [facility] [address] [phone]    There was an abnormality in your stomach that does not seem to be emptying correctly.  Follow-up with gastroenterology for further work-up and evaluation.         Medication List      New Prescriptions    ondansetron ODT 4 MG disintegrating tablet  Commonly known as: ZOFRAN-ODT  Place 1 tablet on the tongue Every 8 (Eight) Hours As Needed for Nausea or Vomiting for up to 5 days.           Where to Get Your Medications      These medications were sent to [facility]/pharmacy #[id number] - [facility] [place] - [address] [place] [address] [phone] [place] [phone] [place] [address] [place] [address]    Hours: 24-hours Phone: [phone]   · ondansetron ODT 4 MG disintegrating tablet          Alvaro Marrero MD  02/22/23 0008

## 2023-02-24 ENCOUNTER — OFFICE VISIT (OUTPATIENT)
Dept: FAMILY MEDICINE CLINIC | Facility: CLINIC | Age: 68
End: 2023-02-24
Payer: MEDICAID

## 2023-02-24 VITALS
WEIGHT: 230.8 LBS | BODY MASS INDEX: 39.4 KG/M2 | HEIGHT: 64 IN | TEMPERATURE: 97.7 F | SYSTOLIC BLOOD PRESSURE: 116 MMHG | OXYGEN SATURATION: 94 % | DIASTOLIC BLOOD PRESSURE: 76 MMHG | HEART RATE: 84 BPM

## 2023-02-24 DIAGNOSIS — K92.1 HEMATOCHEZIA: ICD-10-CM

## 2023-02-24 DIAGNOSIS — K80.20 CALCULUS OF GALLBLADDER WITHOUT CHOLECYSTITIS WITHOUT OBSTRUCTION: ICD-10-CM

## 2023-02-24 DIAGNOSIS — N28.89 RENAL MASS: ICD-10-CM

## 2023-02-24 DIAGNOSIS — R10.84 GENERALIZED ABDOMINAL PAIN: Primary | ICD-10-CM

## 2023-02-24 DIAGNOSIS — E27.8 ADRENAL MASS: ICD-10-CM

## 2023-02-24 PROCEDURE — 99214 OFFICE O/P EST MOD 30 MIN: CPT | Performed by: FAMILY MEDICINE

## 2023-02-24 RX ORDER — CHOLECALCIFEROL (VITAMIN D3) 25 MCG
TABLET ORAL
COMMUNITY
Start: 2023-02-23 | End: 2023-02-28 | Stop reason: SDUPTHER

## 2023-02-24 RX ORDER — ONDANSETRON 4 MG/1
4 TABLET, ORALLY DISINTEGRATING ORAL EVERY 8 HOURS PRN
Qty: 15 TABLET | Refills: 0
Start: 2023-02-24 | End: 2023-03-01

## 2023-02-24 NOTE — PROGRESS NOTES
"Chief Complaint  Hospital Follow Up Visit (had a cyst on adrenal glands and kidneys- MRI was suggested ), Nausea, and Abdominal Pain    Subjective        Caterina Mason presents to [facility]  History of Present Illness  She was recently in the ER for abdominal pain and had a CT scan of her abdomen done that shows multiple abnormalities:  1. Cholelithiasis. The gallbladder is not distended. However, there is some haziness of the pericholecystic fat which could be caused by cholecystitis, correlate clinically  2. Bilateral adrenal nodules, adenomas or metastases. MRI may help clarify  3. Bilateral hypoattenuating renal lesions, proteinaceous cysts versus solid masses. Contrast-enhanced MRI may help clarify  4. Possible filling defect in the duodenal bulb. Consider upper GI examination  5. Small amount of ascites  6. Retroperitoneal and upper abdominal adenopathy, and mildly enlarged lymph nodes in the lower thorax. Considerations include hematologic malignancy and metastatic disease  Nausea  This is a chronic problem. The current episode started more than 1 year ago. The problem occurs daily. The problem has been waxing and waning. Associated symptoms include abdominal pain, fatigue and nausea. Pertinent negatives include no fever.   Abdominal Pain  This is a new problem. The current episode started more than 1 month ago. The problem occurs constantly. The problem has been gradually worsening. The pain is located in the generalized abdominal region. The pain is moderate. The quality of the pain is aching. Associated symptoms include constipation, hematochezia and nausea. Pertinent negatives include no fever.       Objective   Vital Signs:  /76 (BP Location: Right arm, Patient Position: Sitting, Cuff Size: Large Adult)   Pulse 84   Temp 97.7 °F (36.5 °C) (Infrared)   Ht 162.6 cm (64\")   Wt 105 kg (230 lb 12.8 oz)   SpO2 94%   BMI 39.62 kg/m²   Estimated body mass index " "is 39.62 kg/m² as calculated from the following:    Height as of this encounter: 162.6 cm (64\").    Weight as of this encounter: 105 kg (230 lb 12.8 oz).             Physical Exam  Vitals and nursing note reviewed.   Constitutional:       General: She is not in acute distress.     Appearance: She is well-developed.   HENT:      Head: Normocephalic.   Eyes:      General: Lids are normal.   Neck:      Thyroid: No thyroid mass or thyromegaly.      Trachea: Trachea normal.   Cardiovascular:      Rate and Rhythm: Normal rate and regular rhythm.      Heart sounds: Normal heart sounds.   Pulmonary:      Effort: Pulmonary effort is normal.      Breath sounds: Normal breath sounds.   Abdominal:      Palpations: Abdomen is soft.      Tenderness: There is abdominal tenderness.   Musculoskeletal:      Cervical back: Normal range of motion.   Lymphadenopathy:      Cervical: No cervical adenopathy.   Skin:     General: Skin is warm and dry.   Neurological:      Mental Status: She is alert and oriented to person, place, and time.   Psychiatric:         Attention and Perception: She is attentive.         Mood and Affect: Mood normal.         Speech: Speech normal.         Behavior: Behavior normal.        Result Review :  The following data was reviewed by: Lou Curran MD on 02/24/2023:  Common labs    Common Labs 1/2/23 1/2/23 2/7/23 2/7/23 2/21/23 2/21/23    1430 1430 0816 0816 1952 1952   Glucose  331 (A)  273 (A)  170 (A)   BUN  9  12  13   Creatinine  0.79  0.71  0.81   Sodium  141  140  141   Potassium  3.9  3.7  3.9   Chloride  100  103  102   Calcium  10.7 (A)  9.9  10.8 (A)   Albumin    4.2  3.9   Total Bilirubin    0.8  0.7   Alkaline Phosphatase    65  61   AST (SGOT)    41 (A)  31   ALT (SGPT)    42 (A)  24   WBC 7.20  8.19  9.00    Hemoglobin 13.7  13.3  13.1    Hematocrit 41.2  41.5  39.1    Platelets 347  299  319    (A) Abnormal value                         Assessment and Plan   Diagnoses and all orders for " this visit:    1. Generalized abdominal pain (Primary)  -     MRI Abdomen With Contrast; Future  -     Ambulatory Referral For Screening Colonoscopy    2. Adrenal mass (HCC)  -     MRI Abdomen With Contrast; Future  -     Ambulatory Referral For Screening Colonoscopy    3. Renal mass  -     MRI Abdomen With Contrast; Future  -     Ambulatory Referral For Screening Colonoscopy    4. Hematochezia  -     Ambulatory Referral For Screening Colonoscopy    5. Calculus of gallbladder without cholecystitis without obstruction    Other orders  -     ondansetron ODT (ZOFRAN-ODT) 4 MG disintegrating tablet; Place 1 tablet on the tongue Every 8 (Eight) Hours As Needed for Nausea or Vomiting for up to 5 days.  Dispense: 15 tablet; Refill: 0             Follow Up   No follow-ups on file.  Patient was given instructions and counseling regarding her condition or for health maintenance advice. Please see specific information pulled into the AVS if appropriate.

## 2023-02-28 ENCOUNTER — TELEPHONE (OUTPATIENT)
Dept: FAMILY MEDICINE CLINIC | Facility: CLINIC | Age: 68
End: 2023-02-28
Payer: MEDICAID

## 2023-02-28 ENCOUNTER — HOSPITAL ENCOUNTER (OUTPATIENT)
Facility: HOSPITAL | Age: 68
Discharge: HOME | End: 2023-03-03
Attending: EMERGENCY MEDICINE | Admitting: INTERNAL MEDICINE
Payer: MEDICAID

## 2023-02-28 DIAGNOSIS — K85.90 ACUTE PANCREATITIS, UNSPECIFIED COMPLICATION STATUS, UNSPECIFIED PANCREATITIS TYPE: Primary | ICD-10-CM

## 2023-02-28 DIAGNOSIS — R07.89 CHEST PAIN, ATYPICAL: ICD-10-CM

## 2023-02-28 DIAGNOSIS — R10.84 GENERALIZED ABDOMINAL PAIN: ICD-10-CM

## 2023-02-28 DIAGNOSIS — R93.5 ABNORMAL CT OF THE ABDOMEN: ICD-10-CM

## 2023-02-28 DIAGNOSIS — K92.1 HEMATOCHEZIA: ICD-10-CM

## 2023-02-28 DIAGNOSIS — K21.9 GASTROESOPHAGEAL REFLUX DISEASE WITHOUT ESOPHAGITIS: ICD-10-CM

## 2023-02-28 LAB
ALBUMIN SERPL-MCNC: 3.5 G/DL (ref 3.5–5.2)
ALBUMIN/GLOB SERPL: 1.1 G/DL
ALP SERPL-CCNC: 49 U/L (ref 39–117)
ALT SERPL W P-5'-P-CCNC: 17 U/L (ref 1–33)
ANION GAP SERPL CALCULATED.3IONS-SCNC: 11 MMOL/L (ref 5–15)
AST SERPL-CCNC: 23 U/L (ref 1–32)
BASOPHILS # BLD AUTO: 0 10*3/MM3 (ref 0–0.2)
BASOPHILS NFR BLD AUTO: 0.4 % (ref 0–1.5)
BILIRUB SERPL-MCNC: 0.5 MG/DL (ref 0–1.2)
BUN SERPL-MCNC: 14 MG/DL (ref 8–23)
BUN/CREAT SERPL: 15.9 (ref 7–25)
CALCIUM SPEC-SCNC: 10.2 MG/DL (ref 8.6–10.5)
CHLORIDE SERPL-SCNC: 103 MMOL/L (ref 98–107)
CO2 SERPL-SCNC: 27 MMOL/L (ref 22–29)
CREAT SERPL-MCNC: 0.88 MG/DL (ref 0.57–1)
DEPRECATED RDW RBC AUTO: 45.9 FL (ref 37–54)
EGFRCR SERPLBLD CKD-EPI 2021: 72.1 ML/MIN/1.73
EOSINOPHIL # BLD AUTO: 0.2 10*3/MM3 (ref 0–0.4)
EOSINOPHIL NFR BLD AUTO: 1.8 % (ref 0.3–6.2)
ERYTHROCYTE [DISTWIDTH] IN BLOOD BY AUTOMATED COUNT: 14.2 % (ref 12.3–15.4)
GLOBULIN UR ELPH-MCNC: 3.3 GM/DL
GLUCOSE BLDC GLUCOMTR-MCNC: 127 MG/DL (ref 70–105)
GLUCOSE BLDC GLUCOMTR-MCNC: 130 MG/DL (ref 70–105)
GLUCOSE SERPL-MCNC: 160 MG/DL (ref 65–99)
HCT VFR BLD AUTO: 34 % (ref 34–46.6)
HGB BLD-MCNC: 11.7 G/DL (ref 12–15.9)
LIPASE SERPL-CCNC: 130 U/L (ref 13–60)
LYMPHOCYTES # BLD AUTO: 1.7 10*3/MM3 (ref 0.7–3.1)
LYMPHOCYTES NFR BLD AUTO: 20.1 % (ref 19.6–45.3)
MCH RBC QN AUTO: 30.1 PG (ref 26.6–33)
MCHC RBC AUTO-ENTMCNC: 34.5 G/DL (ref 31.5–35.7)
MCV RBC AUTO: 87.4 FL (ref 79–97)
MONOCYTES # BLD AUTO: 0.8 10*3/MM3 (ref 0.1–0.9)
MONOCYTES NFR BLD AUTO: 9.6 % (ref 5–12)
NEUTROPHILS NFR BLD AUTO: 5.6 10*3/MM3 (ref 1.7–7)
NEUTROPHILS NFR BLD AUTO: 68.1 % (ref 42.7–76)
NRBC BLD AUTO-RTO: 0.1 /100 WBC (ref 0–0.2)
PLATELET # BLD AUTO: 283 10*3/MM3 (ref 140–450)
PMV BLD AUTO: 7.7 FL (ref 6–12)
POTASSIUM SERPL-SCNC: 3.8 MMOL/L (ref 3.5–5.2)
PROT SERPL-MCNC: 6.8 G/DL (ref 6–8.5)
RBC # BLD AUTO: 3.89 10*6/MM3 (ref 3.77–5.28)
SODIUM SERPL-SCNC: 141 MMOL/L (ref 136–145)
WBC NRBC COR # BLD: 8.3 10*3/MM3 (ref 3.4–10.8)

## 2023-02-28 PROCEDURE — 80053 COMPREHEN METABOLIC PANEL: CPT | Performed by: INTERNAL MEDICINE

## 2023-02-28 PROCEDURE — 96374 THER/PROPH/DIAG INJ IV PUSH: CPT

## 2023-02-28 PROCEDURE — 94799 UNLISTED PULMONARY SVC/PX: CPT

## 2023-02-28 PROCEDURE — G0378 HOSPITAL OBSERVATION PER HR: HCPCS

## 2023-02-28 PROCEDURE — 96376 TX/PRO/DX INJ SAME DRUG ADON: CPT

## 2023-02-28 PROCEDURE — 36415 COLL VENOUS BLD VENIPUNCTURE: CPT | Performed by: NURSE PRACTITIONER

## 2023-02-28 PROCEDURE — 25010000002 ONDANSETRON PER 1 MG: Performed by: INTERNAL MEDICINE

## 2023-02-28 PROCEDURE — 99285 EMERGENCY DEPT VISIT HI MDM: CPT

## 2023-02-28 PROCEDURE — 25010000002 ONDANSETRON PER 1 MG: Performed by: NURSE PRACTITIONER

## 2023-02-28 PROCEDURE — 83735 ASSAY OF MAGNESIUM: CPT | Performed by: INTERNAL MEDICINE

## 2023-02-28 PROCEDURE — 85652 RBC SED RATE AUTOMATED: CPT | Performed by: INTERNAL MEDICINE

## 2023-02-28 PROCEDURE — 80053 COMPREHEN METABOLIC PANEL: CPT | Performed by: EMERGENCY MEDICINE

## 2023-02-28 PROCEDURE — 96375 TX/PRO/DX INJ NEW DRUG ADDON: CPT

## 2023-02-28 PROCEDURE — 94640 AIRWAY INHALATION TREATMENT: CPT

## 2023-02-28 PROCEDURE — 86140 C-REACTIVE PROTEIN: CPT | Performed by: INTERNAL MEDICINE

## 2023-02-28 PROCEDURE — 84100 ASSAY OF PHOSPHORUS: CPT | Performed by: INTERNAL MEDICINE

## 2023-02-28 PROCEDURE — 85025 COMPLETE CBC W/AUTO DIFF WBC: CPT | Performed by: NURSE PRACTITIONER

## 2023-02-28 PROCEDURE — 25010000002 MORPHINE PER 10 MG: Performed by: NURSE PRACTITIONER

## 2023-02-28 PROCEDURE — 85025 COMPLETE CBC W/AUTO DIFF WBC: CPT | Performed by: EMERGENCY MEDICINE

## 2023-02-28 PROCEDURE — 99284 EMERGENCY DEPT VISIT MOD MDM: CPT

## 2023-02-28 PROCEDURE — 25010000002 MORPHINE PER 10 MG: Performed by: EMERGENCY MEDICINE

## 2023-02-28 PROCEDURE — 83690 ASSAY OF LIPASE: CPT | Performed by: EMERGENCY MEDICINE

## 2023-02-28 PROCEDURE — 82962 GLUCOSE BLOOD TEST: CPT

## 2023-02-28 RX ORDER — SODIUM CHLORIDE 0.9 % (FLUSH) 0.9 %
10 SYRINGE (ML) INJECTION AS NEEDED
Status: DISCONTINUED | OUTPATIENT
Start: 2023-02-28 | End: 2023-03-03 | Stop reason: HOSPADM

## 2023-02-28 RX ORDER — DILTIAZEM HYDROCHLORIDE 120 MG/1
120 CAPSULE, COATED, EXTENDED RELEASE ORAL DAILY
Status: DISCONTINUED | OUTPATIENT
Start: 2023-02-28 | End: 2023-03-02

## 2023-02-28 RX ORDER — INSULIN LISPRO 100 [IU]/ML
14 INJECTION, SOLUTION INTRAVENOUS; SUBCUTANEOUS
Status: DISCONTINUED | OUTPATIENT
Start: 2023-02-28 | End: 2023-03-03 | Stop reason: HOSPADM

## 2023-02-28 RX ORDER — FUROSEMIDE 40 MG/1
40 TABLET ORAL DAILY
Status: DISCONTINUED | OUTPATIENT
Start: 2023-03-01 | End: 2023-03-03 | Stop reason: HOSPADM

## 2023-02-28 RX ORDER — BUDESONIDE AND FORMOTEROL FUMARATE DIHYDRATE 160; 4.5 UG/1; UG/1
2 AEROSOL RESPIRATORY (INHALATION) 2 TIMES DAILY
Status: DISCONTINUED | OUTPATIENT
Start: 2023-02-28 | End: 2023-03-03 | Stop reason: HOSPADM

## 2023-02-28 RX ORDER — INSULIN LISPRO 100 [IU]/ML
14 INJECTION, SOLUTION INTRAVENOUS; SUBCUTANEOUS
Status: DISCONTINUED | OUTPATIENT
Start: 2023-02-28 | End: 2023-02-28

## 2023-02-28 RX ORDER — SODIUM CHLORIDE 9 MG/ML
40 INJECTION, SOLUTION INTRAVENOUS AS NEEDED
Status: DISCONTINUED | OUTPATIENT
Start: 2023-02-28 | End: 2023-03-03 | Stop reason: HOSPADM

## 2023-02-28 RX ORDER — FENOFIBRATE 145 MG/1
145 TABLET, COATED ORAL DAILY
Status: DISCONTINUED | OUTPATIENT
Start: 2023-03-01 | End: 2023-03-03 | Stop reason: HOSPADM

## 2023-02-28 RX ORDER — ACETAMINOPHEN 650 MG/1
650 SUPPOSITORY RECTAL EVERY 4 HOURS PRN
Status: DISCONTINUED | OUTPATIENT
Start: 2023-02-28 | End: 2023-03-03 | Stop reason: HOSPADM

## 2023-02-28 RX ORDER — LISINOPRIL 20 MG/1
20 TABLET ORAL DAILY
Status: DISCONTINUED | OUTPATIENT
Start: 2023-02-28 | End: 2023-03-03 | Stop reason: HOSPADM

## 2023-02-28 RX ORDER — POTASSIUM CHLORIDE 750 MG/1
10 TABLET, FILM COATED, EXTENDED RELEASE ORAL DAILY
Status: DISCONTINUED | OUTPATIENT
Start: 2023-03-01 | End: 2023-03-03 | Stop reason: HOSPADM

## 2023-02-28 RX ORDER — ALBUTEROL SULFATE 90 UG/1
2 AEROSOL, METERED RESPIRATORY (INHALATION) EVERY 4 HOURS PRN
Status: DISCONTINUED | OUTPATIENT
Start: 2023-02-28 | End: 2023-03-03 | Stop reason: HOSPADM

## 2023-02-28 RX ORDER — ACETAMINOPHEN 160 MG/5ML
650 SOLUTION ORAL EVERY 4 HOURS PRN
Status: DISCONTINUED | OUTPATIENT
Start: 2023-02-28 | End: 2023-03-03 | Stop reason: HOSPADM

## 2023-02-28 RX ORDER — DICYCLOMINE HYDROCHLORIDE 10 MG/1
10 CAPSULE ORAL
Status: DISCONTINUED | OUTPATIENT
Start: 2023-02-28 | End: 2023-03-03 | Stop reason: HOSPADM

## 2023-02-28 RX ORDER — ACETAMINOPHEN 325 MG/1
650 TABLET ORAL EVERY 4 HOURS PRN
Status: DISCONTINUED | OUTPATIENT
Start: 2023-02-28 | End: 2023-03-03 | Stop reason: HOSPADM

## 2023-02-28 RX ORDER — HYDROCODONE BITARTRATE AND ACETAMINOPHEN 7.5; 325 MG/1; MG/1
1 TABLET ORAL 3 TIMES DAILY PRN
Status: DISCONTINUED | OUTPATIENT
Start: 2023-02-28 | End: 2023-03-03 | Stop reason: HOSPADM

## 2023-02-28 RX ORDER — MORPHINE SULFATE 2 MG/ML
2 INJECTION, SOLUTION INTRAMUSCULAR; INTRAVENOUS EVERY 4 HOURS PRN
Status: DISCONTINUED | OUTPATIENT
Start: 2023-02-28 | End: 2023-03-01

## 2023-02-28 RX ORDER — ATORVASTATIN CALCIUM 20 MG/1
20 TABLET, FILM COATED ORAL DAILY
Status: DISCONTINUED | OUTPATIENT
Start: 2023-03-01 | End: 2023-03-03 | Stop reason: HOSPADM

## 2023-02-28 RX ORDER — SODIUM CHLORIDE 0.9 % (FLUSH) 0.9 %
10 SYRINGE (ML) INJECTION EVERY 12 HOURS SCHEDULED
Status: DISCONTINUED | OUTPATIENT
Start: 2023-02-28 | End: 2023-03-03 | Stop reason: HOSPADM

## 2023-02-28 RX ORDER — ONDANSETRON 2 MG/ML
4 INJECTION INTRAMUSCULAR; INTRAVENOUS EVERY 6 HOURS PRN
Status: DISCONTINUED | OUTPATIENT
Start: 2023-02-28 | End: 2023-03-02

## 2023-02-28 RX ORDER — MORPHINE SULFATE 2 MG/ML
2 INJECTION, SOLUTION INTRAMUSCULAR; INTRAVENOUS ONCE
Status: COMPLETED | OUTPATIENT
Start: 2023-02-28 | End: 2023-02-28

## 2023-02-28 RX ORDER — ONDANSETRON 2 MG/ML
4 INJECTION INTRAMUSCULAR; INTRAVENOUS EVERY 6 HOURS PRN
Status: DISCONTINUED | OUTPATIENT
Start: 2023-02-28 | End: 2023-02-28 | Stop reason: SDUPTHER

## 2023-02-28 RX ADMIN — MORPHINE SULFATE 2 MG: 2 INJECTION, SOLUTION INTRAMUSCULAR; INTRAVENOUS at 18:08

## 2023-02-28 RX ADMIN — APIXABAN 5 MG: 5 TABLET, FILM COATED ORAL at 20:42

## 2023-02-28 RX ADMIN — MORPHINE SULFATE 2 MG: 2 INJECTION, SOLUTION INTRAMUSCULAR; INTRAVENOUS at 16:02

## 2023-02-28 RX ADMIN — MORPHINE SULFATE 2 MG: 2 INJECTION, SOLUTION INTRAMUSCULAR; INTRAVENOUS at 21:56

## 2023-02-28 RX ADMIN — Medication 10 ML: at 20:42

## 2023-02-28 RX ADMIN — ONDANSETRON 4 MG: 2 INJECTION INTRAMUSCULAR; INTRAVENOUS at 16:15

## 2023-02-28 RX ADMIN — BUDESONIDE AND FORMOTEROL FUMARATE DIHYDRATE 2 PUFF: 160; 4.5 AEROSOL RESPIRATORY (INHALATION) at 20:14

## 2023-02-28 RX ADMIN — ONDANSETRON 4 MG: 2 INJECTION INTRAMUSCULAR; INTRAVENOUS at 21:56

## 2023-02-28 RX ADMIN — METOPROLOL TARTRATE 25 MG: 25 TABLET, FILM COATED ORAL at 20:42

## 2023-02-28 NOTE — TELEPHONE ENCOUNTER
Agnes was verbally informed that Caterina is where she needs to be and she will be taken care of and if they deem it necessary they will have a MRI done.

## 2023-02-28 NOTE — TELEPHONE ENCOUNTER
Caller: Agnes Moncada    Relationship: Emergency Contact    Best call back number: [phone]    What orders are you requesting (i.e. lab or imaging): MRI    In what timeframe would the patient need to come in: ASAP    Additional notes: PATIENTS HARRIS STATED THAT DR SNOW HAD ADVISED PATIENT TO GO TO THE ER IS HER SYMPTOMS WORSENED.    SHE STATED PATIENT WENT TO THE ER AND THEY STATED THAT SHE NEEDS TO HAVE AN EMERGENCY MRI.    SHE ALSO STATED THAT PATIENT IS GOING BACK TO THE ER FOR HER STOMACH ISSUES.    PLEASE CALL.

## 2023-03-01 ENCOUNTER — INPATIENT HOSPITAL (OUTPATIENT)
Dept: URBAN - METROPOLITAN AREA HOSPITAL 84 | Facility: HOSPITAL | Age: 68
End: 2023-03-01
Payer: COMMERCIAL

## 2023-03-01 ENCOUNTER — APPOINTMENT (OUTPATIENT)
Dept: ULTRASOUND IMAGING | Facility: HOSPITAL | Age: 68
End: 2023-03-01
Payer: MEDICAID

## 2023-03-01 ENCOUNTER — APPOINTMENT (OUTPATIENT)
Dept: MRI IMAGING | Facility: HOSPITAL | Age: 68
End: 2023-03-01
Payer: MEDICAID

## 2023-03-01 DIAGNOSIS — R10.9 UNSPECIFIED ABDOMINAL PAIN: ICD-10-CM

## 2023-03-01 DIAGNOSIS — K59.00 CONSTIPATION, UNSPECIFIED: ICD-10-CM

## 2023-03-01 DIAGNOSIS — R10.84 GENERALIZED ABDOMINAL PAIN: ICD-10-CM

## 2023-03-01 DIAGNOSIS — R11.2 NAUSEA WITH VOMITING, UNSPECIFIED: ICD-10-CM

## 2023-03-01 PROBLEM — R93.5 ABNORMAL CT OF THE ABDOMEN: Status: ACTIVE | Noted: 2023-02-28

## 2023-03-01 LAB
ALBUMIN SERPL-MCNC: 3.7 G/DL (ref 3.5–5.2)
ALBUMIN/GLOB SERPL: 1.1 G/DL
ALP SERPL-CCNC: 52 U/L (ref 39–117)
ALT SERPL W P-5'-P-CCNC: 17 U/L (ref 1–33)
ANION GAP SERPL CALCULATED.3IONS-SCNC: 12 MMOL/L (ref 5–15)
AST SERPL-CCNC: 29 U/L (ref 1–32)
BASOPHILS # BLD AUTO: 0 10*3/MM3 (ref 0–0.2)
BASOPHILS NFR BLD AUTO: 0.5 % (ref 0–1.5)
BILIRUB SERPL-MCNC: 0.5 MG/DL (ref 0–1.2)
BUN SERPL-MCNC: 13 MG/DL (ref 8–23)
BUN/CREAT SERPL: 15.7 (ref 7–25)
CALCIUM SPEC-SCNC: 9.9 MG/DL (ref 8.6–10.5)
CHLORIDE SERPL-SCNC: 104 MMOL/L (ref 98–107)
CO2 SERPL-SCNC: 26 MMOL/L (ref 22–29)
CREAT SERPL-MCNC: 0.83 MG/DL (ref 0.57–1)
CRP SERPL-MCNC: 1.2 MG/DL (ref 0–0.5)
DEPRECATED RDW RBC AUTO: 45.1 FL (ref 37–54)
EGFRCR SERPLBLD CKD-EPI 2021: 77.4 ML/MIN/1.73
EOSINOPHIL # BLD AUTO: 0.1 10*3/MM3 (ref 0–0.4)
EOSINOPHIL NFR BLD AUTO: 1.6 % (ref 0.3–6.2)
ERYTHROCYTE [DISTWIDTH] IN BLOOD BY AUTOMATED COUNT: 14.5 % (ref 12.3–15.4)
ERYTHROCYTE [SEDIMENTATION RATE] IN BLOOD: 23 MM/HR (ref 0–30)
GLOBULIN UR ELPH-MCNC: 3.3 GM/DL
GLUCOSE BLDC GLUCOMTR-MCNC: 142 MG/DL (ref 70–105)
GLUCOSE BLDC GLUCOMTR-MCNC: 168 MG/DL (ref 70–105)
GLUCOSE BLDC GLUCOMTR-MCNC: 169 MG/DL (ref 70–105)
GLUCOSE BLDC GLUCOMTR-MCNC: 170 MG/DL (ref 70–105)
GLUCOSE BLDC GLUCOMTR-MCNC: 204 MG/DL (ref 70–105)
GLUCOSE SERPL-MCNC: 146 MG/DL (ref 65–99)
HCT VFR BLD AUTO: 35.9 % (ref 34–46.6)
HGB BLD-MCNC: 11.5 G/DL (ref 12–15.9)
LYMPHOCYTES # BLD AUTO: 1.9 10*3/MM3 (ref 0.7–3.1)
LYMPHOCYTES NFR BLD AUTO: 21.6 % (ref 19.6–45.3)
MAGNESIUM SERPL-MCNC: 1.7 MG/DL (ref 1.6–2.4)
MCH RBC QN AUTO: 28.7 PG (ref 26.6–33)
MCHC RBC AUTO-ENTMCNC: 32 G/DL (ref 31.5–35.7)
MCV RBC AUTO: 89.6 FL (ref 79–97)
MONOCYTES # BLD AUTO: 0.9 10*3/MM3 (ref 0.1–0.9)
MONOCYTES NFR BLD AUTO: 10.7 % (ref 5–12)
NEUTROPHILS NFR BLD AUTO: 5.7 10*3/MM3 (ref 1.7–7)
NEUTROPHILS NFR BLD AUTO: 65.6 % (ref 42.7–76)
NRBC BLD AUTO-RTO: 0.1 /100 WBC (ref 0–0.2)
PHOSPHATE SERPL-MCNC: 2.6 MG/DL (ref 2.5–4.5)
PLATELET # BLD AUTO: 285 10*3/MM3 (ref 140–450)
PMV BLD AUTO: 8.6 FL (ref 6–12)
POTASSIUM SERPL-SCNC: 4.4 MMOL/L (ref 3.5–5.2)
PROT SERPL-MCNC: 7 G/DL (ref 6–8.5)
RBC # BLD AUTO: 4.01 10*6/MM3 (ref 3.77–5.28)
SODIUM SERPL-SCNC: 142 MMOL/L (ref 136–145)
WBC NRBC COR # BLD: 8.7 10*3/MM3 (ref 3.4–10.8)

## 2023-03-01 PROCEDURE — G0378 HOSPITAL OBSERVATION PER HR: HCPCS

## 2023-03-01 PROCEDURE — 96376 TX/PRO/DX INJ SAME DRUG ADON: CPT

## 2023-03-01 PROCEDURE — 94799 UNLISTED PULMONARY SVC/PX: CPT

## 2023-03-01 PROCEDURE — 94664 DEMO&/EVAL PT USE INHALER: CPT

## 2023-03-01 PROCEDURE — A9579 GAD-BASE MR CONTRAST NOS,1ML: HCPCS | Performed by: INTERNAL MEDICINE

## 2023-03-01 PROCEDURE — 74183 MRI ABD W/O CNTR FLWD CNTR: CPT

## 2023-03-01 PROCEDURE — 96375 TX/PRO/DX INJ NEW DRUG ADDON: CPT

## 2023-03-01 PROCEDURE — 25010000002 MORPHINE PER 10 MG: Performed by: HOSPITALIST

## 2023-03-01 PROCEDURE — 99222 1ST HOSP IP/OBS MODERATE 55: CPT | Performed by: NURSE PRACTITIONER

## 2023-03-01 PROCEDURE — 63710000001 INSULIN LISPRO (HUMAN) PER 5 UNITS: Performed by: NURSE PRACTITIONER

## 2023-03-01 PROCEDURE — 82962 GLUCOSE BLOOD TEST: CPT

## 2023-03-01 PROCEDURE — 25010000002 GADOTERIDOL PER 1 ML: Performed by: INTERNAL MEDICINE

## 2023-03-01 PROCEDURE — 76705 ECHO EXAM OF ABDOMEN: CPT

## 2023-03-01 PROCEDURE — 94761 N-INVAS EAR/PLS OXIMETRY MLT: CPT

## 2023-03-01 RX ORDER — DICYCLOMINE HYDROCHLORIDE 10 MG/1
CAPSULE ORAL
Qty: 120 CAPSULE | Refills: 0 | Status: ON HOLD | OUTPATIENT
Start: 2023-03-01 | End: 2023-03-07

## 2023-03-01 RX ORDER — SODIUM CHLORIDE, SODIUM LACTATE, POTASSIUM CHLORIDE, CALCIUM CHLORIDE 600; 310; 30; 20 MG/100ML; MG/100ML; MG/100ML; MG/100ML
100 INJECTION, SOLUTION INTRAVENOUS CONTINUOUS
Status: DISPENSED | OUTPATIENT
Start: 2023-03-02 | End: 2023-03-02

## 2023-03-01 RX ORDER — SODIUM CHLORIDE 9 MG/ML
40 INJECTION, SOLUTION INTRAVENOUS AS NEEDED
Status: DISCONTINUED | OUTPATIENT
Start: 2023-03-01 | End: 2023-03-03 | Stop reason: HOSPADM

## 2023-03-01 RX ORDER — SODIUM CHLORIDE 0.9 % (FLUSH) 0.9 %
3 SYRINGE (ML) INJECTION EVERY 12 HOURS SCHEDULED
Status: DISCONTINUED | OUTPATIENT
Start: 2023-03-02 | End: 2023-03-02 | Stop reason: HOSPADM

## 2023-03-01 RX ORDER — PANTOPRAZOLE SODIUM 40 MG/10ML
40 INJECTION, POWDER, LYOPHILIZED, FOR SOLUTION INTRAVENOUS
Status: DISCONTINUED | OUTPATIENT
Start: 2023-03-01 | End: 2023-03-03

## 2023-03-01 RX ORDER — SODIUM CHLORIDE 0.9 % (FLUSH) 0.9 %
3-10 SYRINGE (ML) INJECTION AS NEEDED
Status: DISCONTINUED | OUTPATIENT
Start: 2023-03-01 | End: 2023-03-02 | Stop reason: HOSPADM

## 2023-03-01 RX ORDER — SORBITOL SOLUTION 70 %
50 SOLUTION, ORAL MISCELLANEOUS ONCE
Status: COMPLETED | OUTPATIENT
Start: 2023-03-02 | End: 2023-03-02

## 2023-03-01 RX ORDER — BISACODYL 5 MG/1
20 TABLET, DELAYED RELEASE ORAL ONCE
Status: COMPLETED | OUTPATIENT
Start: 2023-03-01 | End: 2023-03-02

## 2023-03-01 RX ORDER — SODIUM, POTASSIUM,MAG SULFATES 17.5-3.13G
1 SOLUTION, RECONSTITUTED, ORAL ORAL EVERY 12 HOURS
Status: COMPLETED | OUTPATIENT
Start: 2023-03-01 | End: 2023-03-02

## 2023-03-01 RX ORDER — METOPROLOL TARTRATE 5 MG/5ML
5 INJECTION INTRAVENOUS ONCE
Status: COMPLETED | OUTPATIENT
Start: 2023-03-02 | End: 2023-03-02

## 2023-03-01 RX ADMIN — DICYCLOMINE HYDROCHLORIDE 10 MG: 10 CAPSULE ORAL at 22:22

## 2023-03-01 RX ADMIN — MORPHINE SULFATE 4 MG: 4 INJECTION, SOLUTION INTRAMUSCULAR; INTRAVENOUS at 00:57

## 2023-03-01 RX ADMIN — Medication 400 MG: at 22:22

## 2023-03-01 RX ADMIN — MORPHINE SULFATE 4 MG: 4 INJECTION, SOLUTION INTRAMUSCULAR; INTRAVENOUS at 15:54

## 2023-03-01 RX ADMIN — FENOFIBRATE 145 MG: 145 TABLET ORAL at 10:40

## 2023-03-01 RX ADMIN — INSULIN LISPRO 14 UNITS: 100 INJECTION, SOLUTION INTRAVENOUS; SUBCUTANEOUS at 18:14

## 2023-03-01 RX ADMIN — METOPROLOL TARTRATE 25 MG: 25 TABLET, FILM COATED ORAL at 10:40

## 2023-03-01 RX ADMIN — MORPHINE SULFATE 4 MG: 4 INJECTION, SOLUTION INTRAMUSCULAR; INTRAVENOUS at 05:55

## 2023-03-01 RX ADMIN — SODIUM SULFATE, POTASSIUM SULFATE, MAGNESIUM SULFATE 1 BOTTLE: 1.6; 3.13; 17.5 SOLUTION ORAL at 18:14

## 2023-03-01 RX ADMIN — PANTOPRAZOLE SODIUM 40 MG: 40 INJECTION, POWDER, LYOPHILIZED, FOR SOLUTION INTRAVENOUS at 10:39

## 2023-03-01 RX ADMIN — DILTIAZEM HYDROCHLORIDE 120 MG: 120 CAPSULE, COATED, EXTENDED RELEASE ORAL at 10:40

## 2023-03-01 RX ADMIN — Medication 10 ML: at 10:40

## 2023-03-01 RX ADMIN — ATORVASTATIN CALCIUM 20 MG: 20 TABLET, FILM COATED ORAL at 10:40

## 2023-03-01 RX ADMIN — GADOTERIDOL 20 ML: 279.3 INJECTION, SOLUTION INTRAVENOUS at 17:53

## 2023-03-01 RX ADMIN — INSULIN LISPRO 14 UNITS: 100 INJECTION, SOLUTION INTRAVENOUS; SUBCUTANEOUS at 10:41

## 2023-03-01 RX ADMIN — DICYCLOMINE HYDROCHLORIDE 10 MG: 10 CAPSULE ORAL at 10:40

## 2023-03-01 RX ADMIN — POTASSIUM CHLORIDE 10 MEQ: 750 TABLET, EXTENDED RELEASE ORAL at 10:41

## 2023-03-01 RX ADMIN — BUDESONIDE AND FORMOTEROL FUMARATE DIHYDRATE 2 PUFF: 160; 4.5 AEROSOL RESPIRATORY (INHALATION) at 07:16

## 2023-03-01 RX ADMIN — FUROSEMIDE 40 MG: 40 TABLET ORAL at 10:40

## 2023-03-01 RX ADMIN — METOPROLOL TARTRATE 25 MG: 25 TABLET, FILM COATED ORAL at 22:22

## 2023-03-01 RX ADMIN — HYDROCODONE BITARTRATE AND ACETAMINOPHEN 1 TABLET: 7.5; 325 TABLET ORAL at 10:40

## 2023-03-01 RX ADMIN — Medication 10 ML: at 22:22

## 2023-03-01 RX ADMIN — Medication 400 MG: at 10:41

## 2023-03-01 NOTE — PROGRESS NOTES
[facility] Services Daily Progress Note    Patient Name: Caterina Mason  : 1955  MRN: [id number]  Primary Care Physician:  Lou Curran MD  Date of admission: 2023      Subjective      Chief Complaint: Abdominal pain      Patient Reports she is feeling better today.  Concerned about renal lesions noted on imaging.  Seen by urology.  Pending EGD tomorrow.    ROS negative except as above      Objective      Vitals:   Temp:  [97.5 °F (36.4 °C)-98.1 °F (36.7 °C)] 97.8 °F (36.6 °C)  Heart Rate:  [] 100  Resp:  [15-21] 20  BP: (106-124)/(51-71) 122/57  Flow (L/min):  [3] 3    Physical Exam  Vitals reviewed.   Constitutional:       Comments: Sitting up in the chair no distress   HENT:      Head: Normocephalic.      Nose: Nose normal.      Mouth/Throat:      Mouth: Mucous membranes are moist.   Cardiovascular:      Rate and Rhythm: Normal rate and regular rhythm.      Pulses: Normal pulses.      Heart sounds: Normal heart sounds.   Pulmonary:      Effort: Pulmonary effort is normal.      Breath sounds: Normal breath sounds.   Abdominal:      General: Abdomen is flat.      Palpations: Abdomen is soft.   Musculoskeletal:         General: Normal range of motion.      Cervical back: Normal range of motion.   Skin:     General: Skin is warm.   Neurological:      General: No focal deficit present.      Mental Status: She is alert and oriented to person, place, and time.   Psychiatric:         Mood and Affect: Mood normal.         Behavior: Behavior normal.             Result Review    Result Review:  I have personally reviewed the results from the time of this admission to 3/1/2023 16:22 EST and agree with these findings:  [x]  Laboratory  []  Microbiology  []  Radiology  []  EKG/Telemetry   []  Cardiology/Vascular   []  Pathology  []  Old records  []  Other:  Most notable findings include: Normal lab work          Assessment & Plan      Brief Patient  Summary:  Caterina Mason is a 67 y.o. female who presents with pancreatitis and multiple intra-abdominal lesions versus cysts versus metastases      atorvastatin, 20 mg, Oral, Daily  bisacodyl, 20 mg, Oral, Once  budesonide-formoterol, 2 puff, Inhalation, BID  dicyclomine, 10 mg, Oral, 4x Daily AC & at Bedtime  dilTIAZem CD, 120 mg, Oral, Daily  fenofibrate, 145 mg, Oral, Daily  furosemide, 40 mg, Oral, Daily  insulin glargine, 40 Units, Subcutaneous, Nightly  insulin lispro, 14 Units, Subcutaneous, TID With Meals  lisinopril, 20 mg, Oral, Daily  magnesium oxide, 400 mg, Oral, BID  metoprolol tartrate, 25 mg, Oral, Q12H  pantoprazole, 40 mg, Intravenous, QAM AC  potassium chloride, 10 mEq, Oral, Daily  sodium chloride, 10 mL, Intravenous, Q12H  sodium-potassium-magnesium sulfates, 1 bottle, Oral, Q12H  [START ON 3/2/2023] sorbitol, 50 mL, Oral, Once             Active Hospital Problems:  Active Hospital Problems    Diagnosis    • **Acute pancreatitis, unspecified complication status, unspecified pancreatitis type    • Abnormal CT of the abdomen    • Hematochezia    • Generalized abdominal pain    • Gastroesophageal reflux disease      Plan:      Abdominal pain  Pancreatitis  - CT from 2/21/2023 reviewed: Cholecystitis  - Pain control: Morphine as needed then home Norco when able to take oral  - GI consulted pending scope March 2     Type 2 diabetes  - Glucose 160  - Last A1c 11.8 on 12/1/22  - Continue home insulin regimen, except Janumet on hold  - Accu-Cheks before meals and at bedtime to trend  - Currently n.p.o.     COPD  Obstructive sleep apnea  - Currently on room air  - Uses CPAP at home and follows   - CPAP at bedtime/as needed  - Symbicort and albuterol     Hypertension  - /61  - Metoprolol and lisinopril  - Lasix and K-Dur    - Monitor daily labs.     Hyperlipidemia  - Lipid panel from 12/1/2022 reviewed  - Fenofibrate      History of A. fib  - EKG on 1/27/2023 still shows A-fib  -  Eliquis and Cardizem    Cyst versus metastases on imaging  Urology consulted for renal cysts  Outpatient work-up recommended  Perhaps EGD/colonoscopy results will reveal more information regarding lesions/metastases     DVT prophylaxis    -Eliquis     CODE STATUS: Full code  Admission Status:  I believe this patient meets observation status.     I discussed the patient's findings and my recommendations with patient. 03/01/23      Electronically signed by Michael Lozano MD, 03/01/23, 16:22 EST.  [facility] Hospitalist Team

## 2023-03-01 NOTE — CONSULTS
GI CONSULT  NOTE:    Referring Provider:  Taina Chacon NP    Chief complaint: Abdominal pain, nausea/vomiting, constipation    Subjective .     History of present illness: Caterina Mason is a 67 y.o. female with history of A-fib on Eliquis, COPD, DMII, hypertension, hyperlipidemia, RUSLAN, hysterectomy, and previous colon resection (reportedly following a complication in childbirth) who presents with complaints of abdominal pain.  The patient reports that she has been having abdominal pain for the past 6 weeks.  States that her pain is generalized and she describes the pain as cramping/stabbing in nature.  Her pain seems to be worse after eating.  Unable to identify any alleviating factors.  She reports the development of nausea/vomiting over the past 2 days.  No heartburn or dysphagia.  Denies any NSAID use.  States that she does struggle with regular constipation at home.  States that she will skip multiple days in between bowel movements and then have multiple episodes of diarrhea.  She states that typically eating diabetic candy will help her have a bowel movement.  Her last bowel movement occurred yesterday morning.  She does report bright red blood per rectum, but denies any melena.  She denies any previous history of pancreatitis or liver issues.  Denies any alcohol or tobacco use.      Endo History:  Reports last colonoscopy approximately 17 years ago.    Past Medical History:  Past Medical History:   Diagnosis Date   • Arthritis    • Atrial fibrillation (HCC)    • Bradycardia     Dr. Charles   • Cataract    • COPD (chronic obstructive pulmonary disease) (Formerly [facility])     Dr. Rob   • Diabetes mellitus, type 2 (HCC)     sees Dr. Archibald at [place]   • DJD (degenerative joint disease)     possible herniated disc, sees Pain Mgmt in [place]   • Emphysema of lung (Formerly [facility])    •     • GERD (gastroesophageal reflux disease)    • History of combined right and left heart catheterization 2018    minimal CAD on heart  cath done    • Hyperlipidemia    • Hypertension    • Pain    • Sleep apnea     wears CPAP machine, sees Darron       Past Surgical History:  Past Surgical History:   Procedure Laterality Date   • ABLATION OF DYSRHYTHMIC FOCUS     • CARDIAC CATHETERIZATION      and Angiograph    • CARDIAC ELECTROPHYSIOLOGY PROCEDURE N/A 12/10/2019    Procedure: pvc ablation;  Surgeon: Alfredo Iglesias MD;  Location: [facility] INVASIVE LOCATION;  Service: Cardiovascular   •  SECTION      x 1   • COLON RESECTION     • COLONOSCOPY  2012   • COLOSTOMY      and reversal in her 20's due to problems with childbirth   • COLOSTOMY CLOSURE     • OVARIAN CYST SURGERY     • ROTATOR CUFF REPAIR Left 2009    x2    • TOTAL ABDOMINAL HYSTERECTOMY WITH SALPINGO OOPHORECTOMY         Social History:  Social History     Tobacco Use   • Smoking status: Former     Packs/day: 1.00     Years: 48.00     Pack years: 48.00     Types: Cigarettes     Quit date: 3/1/2020     Years since quitting: 3.0   • Smokeless tobacco: Never   Vaping Use   • Vaping Use: Never used   Substance Use Topics   • Alcohol use: No   • Drug use: No       Family History:  Family History   Problem Relation Age of Onset   • Heart disease Mother    • Hypertension Mother    • Stroke Mother    • Seizures Mother    • Heart disease Father    • Hypertension Father    • Lung disease Father    • Stroke Father    • Cancer Sister         unknown   • Hypertension Brother    • Diabetes Brother    • Hyperlipidemia Other        Medications:  Medications Prior to Admission   Medication Sig Dispense Refill Last Dose   • albuterol sulfate  (90 Base) MCG/ACT inhaler INHALE 2 PUFFS EVERY 4 HOURS AS NEEDED FOR WHEEZING OR SHORTNESS OF AIR 18 g 1    • atorvastatin (LIPITOR) 40 MG tablet TAKE 1/2 TABLET BY MOUTH EVERY DAY 45 tablet 1    • budesonide-formoterol (SYMBICORT) 160-4.5 MCG/ACT inhaler Inhale 2 puffs 2 (Two) Times a Day.      • Cholecalciferol 25 MCG (1000 UT)  capsule TAKE 1 TABLET BY MOUTH TWICE A DAY *OTC NOT COVERED* 60 capsule 5    • dicyclomine (BENTYL) 10 MG capsule Take 1 capsule by mouth 4 (Four) Times a Day Before Meals & at Bedtime. 120 capsule 0    • dilTIAZem CD (CARDIZEM CD) 120 MG 24 hr capsule TAKE 1 CAPSULE BY MOUTH EVERY DAY 90 capsule 1    • Eliquis 5 MG tablet tablet TAKE 1 TABLET BY MOUTH 2 (TWO) TIMES A DAY. PATIENT NEEDS AN APPT BEFORE ANY MORE REFILLS 60 tablet 1    • fenofibrate 160 MG tablet TAKE 1 TABLET BY MOUTH EVERY DAY 90 tablet 1    • furosemide (LASIX) 40 MG tablet TAKE 1 TABLET BY MOUTH EVERY DAY 90 tablet 1    • HumaLOG KwikPen 100 UNIT/ML solution pen-injector Inject 14 Units under the skin into the appropriate area as directed 3 (Three) Times a Day Before Meals. 15 mL 1    • HYDROcodone-acetaminophen (NORCO) 7.5-325 MG per tablet Take 1 tablet by mouth 3 (Three) Times a Day As Needed for Moderate Pain.      • Insulin Glargine (BASAGLAR KWIKPEN) 100 UNIT/ML injection pen INJECT 40 UNITS UNDER THE SKIN INTO THE APPROPRIATE AREA AS DIRECTED EVERY NIGHT. 45 mL 0    • Janumet XR  MG tablet TAKE 2 TABLETS BY MOUTH EVERY DAY 60 tablet 3    • lisinopril (PRINIVIL,ZESTRIL) 20 MG tablet TAKE 1 TABLET BY MOUTH EVERY DAY 90 tablet 1    • magnesium oxide (MAG-OX) 400 MG tablet TAKE 1 TABLET BY MOUTH TWICE A DAY 60 tablet 6    • metoprolol tartrate (LOPRESSOR) 25 MG tablet TAKE 1 TABLET BY MOUTH EVERY 12 HOURS FOR 30 DAYS. 180 tablet 3    • ondansetron ODT (ZOFRAN-ODT) 4 MG disintegrating tablet Place 1 tablet on the tongue Every 8 (Eight) Hours As Needed for Nausea or Vomiting for up to 5 days. 15 tablet 0    • potassium chloride (MICRO-K) 10 MEQ CR capsule TAKE 1 CAPSULE BY MOUTH EVERY DAY 90 capsule 1        Scheduled Meds:atorvastatin, 20 mg, Oral, Daily  bisacodyl, 20 mg, Oral, Once  budesonide-formoterol, 2 puff, Inhalation, BID  dicyclomine, 10 mg, Oral, 4x Daily AC & at Bedtime  dilTIAZem CD, 120 mg, Oral, Daily  fenofibrate, 145 mg,  Oral, Daily  furosemide, 40 mg, Oral, Daily  insulin glargine, 40 Units, Subcutaneous, Nightly  insulin lispro, 14 Units, Subcutaneous, TID With Meals  lisinopril, 20 mg, Oral, Daily  magnesium oxide, 400 mg, Oral, BID  metoprolol tartrate, 25 mg, Oral, Q12H  potassium chloride, 10 mEq, Oral, Daily  sodium chloride, 10 mL, Intravenous, Q12H  sodium-potassium-magnesium sulfates, 1 bottle, Oral, Q12H  [START ON 3/2/2023] sorbitol, 50 mL, Oral, Once      Continuous Infusions:   PRN Meds:.•  acetaminophen **OR** acetaminophen **OR** acetaminophen  •  albuterol sulfate HFA  •  HYDROcodone-acetaminophen  •  Morphine  •  ondansetron  •  [COMPLETED] Insert Peripheral IV **AND** sodium chloride  •  sodium chloride  •  sodium chloride  •  sodium chloride    ALLERGIES:  Ibuprofen, Prednisone, Pregabalin, Tramadol, Adhesive tape, Levofloxacin, and Sulfamethoxazole-trimethoprim    ROS:  The following systems were reviewed and negative;   Constitution:  No fevers, chills, no unintentional weight loss  Skin: no rash, no jaundice  Eyes:  No blurry vision, no eye pain  HENT:  No change in hearing or smell  Resp:  No dyspnea or cough  CV:  No chest pain or palpitations  :  No dysuria, hematuria  Musculoskeletal:  No leg cramps or arthralgias  Neuro:  No tremor, no numbness  Psych:  No depression or confusion    Objective     Vital Signs:   Vitals:    03/01/23 0402 03/01/23 0500 03/01/23 0716 03/01/23 0717   BP:    106/66   BP Location:    Left arm   Patient Position:    Sitting   Pulse: 73 84 82 87   Resp:   16 18   Temp:    98 °F (36.7 °C)   TempSrc:    Oral   SpO2: 100% 98% 92% 92%   Weight:       Height:           Physical Exam:       General Appearance:    Awake and alert, in no acute distress, obese habitus   Head:    Normocephalic, without obvious abnormality, atraumatic   Throat:   No oral lesions, no thrush, oral mucosa moist   Lungs:     Respirations regular, even and unlabored   Chest Wall:    No abnormalities observed    Abdomen:     Soft, generalized tenderness, no rebound or guarding, non-distended   Rectal:     Deferred   Extremities:   Moves all extremities, no edema, no cyanosis   Pulses:   Pulses palpable and equal bilaterally   Skin:   No rash, no jaundice, normal palpation   Lymph nodes:   No cervical, supraclavicular or submandibular palpable adenopathy   Neurologic:   Cranial nerves 2 - 12 grossly intact, no asterixis       Results Review:   I reviewed the patient's labs and imaging.  CBC    Results from last 7 days   Lab Units 02/28/23 2237 02/28/23  1343   WBC 10*3/mm3 8.70 8.30   HEMOGLOBIN g/dL 11.5* 11.7*   PLATELETS 10*3/mm3 285 283     CMP   Results from last 7 days   Lab Units 02/28/23 2237 02/28/23  1343   SODIUM mmol/L 142 141   POTASSIUM mmol/L 4.4 3.8   CHLORIDE mmol/L 104 103   CO2 mmol/L 26.0 27.0   BUN mg/dL 13 14   CREATININE mg/dL 0.83 0.88   GLUCOSE mg/dL 146* 160*   ALBUMIN g/dL 3.7 3.5   BILIRUBIN mg/dL 0.5 0.5   ALK PHOS U/L 52 49   AST (SGOT) U/L 29 23   ALT (SGPT) U/L 17 17   MAGNESIUM mg/dL 1.7  --    PHOSPHORUS mg/dL 2.6  --    LIPASE U/L  --  130*     Cr Clearance Estimated Creatinine Clearance: 77.3 mL/min (by C-G formula based on SCr of 0.83 mg/dL).  Coag     HbA1C   Lab Results   Component Value Date    HGBA1C 11.8 (H) 12/01/2022    HGBA1C 11.5 (H) 05/31/2022    HGBA1C 11.6 (H) 11/16/2021     Blood Glucose   Glucose   Date/Time Value Ref Range Status   03/01/2023 0722 170 (H) 70 - 105 mg/dL Final     Comment:     Serial Number: [id number]:  [id number]   02/28/2023 2029 130 (H) 70 - 105 mg/dL Final     Comment:     Serial Number: [id number]:  [id number]   02/28/2023 1820 127 (H) 70 - 105 mg/dL Final     Comment:     Serial Number: [id number]:  [id number]     Infection     UA      Radiology(recent) No radiology results for the last day       ASSESSMENT:  -Generalized abdominal pain  -Nausea/vomiting  -Constipation  -Rectal bleeding  -Abnormal CT showing possible filling  defect in the duodenal bulb  -Normocytic anemia  -Elevated lipase  -A-fib on Eliquis  -COPD  -DMII  -Hypertension  -Hyperlipidemia  -RUSLAN  -History of hysterectomy  -History of colon resection (reportedly due to complication from childbirth)    PLAN:  Patient is a 67-year-old female with history of A-fib on Eliquis, DMII, COPD, and previous colon resection who presents with complaints of abdominal pain, nausea/vomiting, and constipation.    CT abdomen/pelvis W shows cholelithiasis with questionable cholecystitis, bilateral adrenal nodules, bilateral renal lesions, and a possible filling defect in the duodenal bulb.  We will plan EGD/colonoscopy tomorrow for further evaluation of her complaints.  Clear liquid diet.  N.p.o. following completion of bowel prep.  Mild elevation in lipase noted.  No evidence of acute pancreatitis.  Rule out ulcer.  Hemoglobin 11.5.  Continue to monitor H/H and transfuse as needed.  Antiemetics/analgesics as needed.  Start PPI.  Urology has been consulted for further evaluation of the patient's renal lesions evidenced on CT.  Supportive care.      I discussed the patients findings and my recommendations with the patient.  I will discuss case with Dr. Pichardo and change the plan accordingly.    We appreciate the referral.    Electronically signed by YAW Hernández, 03/01/23, 10:10 AM EST.

## 2023-03-01 NOTE — CASE MANAGEMENT/SOCIAL WORK
Discharge Planning Assessment  [facility]     Patient Name: Caterina Mason  MRN: [id number]  Today's Date: 3/1/2023    Admit Date: 2/28/2023    Plan: Home with family.   can transport at discharge.   Discharge Needs Assessment     Row Name 03/01/23 1401       Living Environment    People in Home spouse    Name(s) of People in Home carolann Waqas    Current Living Arrangements home    Primary Care Provided by self    Provides Primary Care For no one    Family Caregiver if Needed spouse    Family Caregiver Names spouse Waqas    Quality of Family Relationships supportive    Able to Return to Prior Arrangements yes       Resource/Environmental Concerns    Resource/Environmental Concerns none    Transportation Concerns none       Transition Planning    Patient/Family Anticipates Transition to home with family    Patient/Family Anticipated Services at Transition none    Transportation Anticipated family or friend will provide       Discharge Needs Assessment    Readmission Within the Last 30 Days no previous admission in last 30 days    Equipment Currently Used at Home cpap;cane, straight    Concerns to be Addressed care coordination/care conferences;discharge planning    Anticipated Changes Related to Illness none    Equipment Needed After Discharge none    Provided Post Acute Provider List? N/A    N/A Provider List Comment denies dc needs               Discharge Plan     Row Name 03/01/23 1403       Plan    Plan Home with family.   can transport at discharge.    Patient/Family in Agreement with Plan yes    Plan Comments Met  with patient at bedside.  Confirmed PCP and pharmacy.  Lives at home with spouse, who is able to transport at discharge.  Denies dc needs at this time.              Continued Care and Services - Admitted Since 2/28/2023    Coordination has not been started for this encounter.     Selected Continued Care - Episodes Includes continued care and service providers with selected services  from the active episodes listed below    [facility] Episode start date: 2/8/2023   There are no active outsourced providers for this episode.               Expected Discharge Date and Time     Expected Discharge Date Expected Discharge Time    Mar 3, 2023          Demographic Summary     Row Name 03/01/23 1358       General Information    Admission Type observation    Arrived From emergency department    Referral Source admission list    Reason for Consult discharge planning    Preferred Language English       Contact Information    Permission Granted to Share Info With                Functional Status     Row Name 03/01/23 1401       Functional Status    Usual Activity Tolerance good    Current Activity Tolerance good       Functional Status, IADL    Medications independent    Meal Preparation independent    Housekeeping independent    Laundry independent    Shopping independent       Mental Status    General Appearance WDL WDL       Mental Status Summary    Recent Changes in Mental Status/Cognitive Functioning no changes                      Deb Ibanez RN   Met with patient in room wearing PPE: mask, face shield/goggles, gloves, gown.      Maintained distance greater than six feet and spent less than 15 minutes in the room.

## 2023-03-01 NOTE — SIGNIFICANT NOTE
03/01/23 1406   Living Situation   Current Living Arrangements home   Potentially Unsafe Housing Conditions none   Food Insecurity   Within the past 12 months, you worried that your food would run out before you got the money to buy more. Never true   Within the past 12 months, the food you bought just didn't last and you didn't have money to get more. Never true   Transportation Needs   In the past 12 months, has lack of transportation kept you from medical appointments or from getting medications? no   In the past 12 months, has lack of transportation kept you from meetings, work, or from getting things needed for daily living? No   Utilities   In the past 12 months has the electric, gas, oil, or water company threatened to shut off services in your home? No   Financial Resource Strain   How hard is it for you to pay for the very basics like food, housing, medical care, and heating? Not hard   Employment   Do you want help finding or keeping work or a job? I do not need or want help   Family and Community Support   If for any reason you need help with day-to-day activities such as bathing, preparing meals, shopping, managing finances, etc., do you get the help you need? I don't need any help   How often do you feel lonely or isolated from those around you? Never   Education   Preferred Language English   Do you want help with school or training? For example, starting or completing job training or getting a high school diploma, GED or equivalent No   Physical Activity   On average, how many days per week do you engage in moderate to strenuous exercise (like a brisk walk)? 0 days   Alcohol Use   Q1: How often do you have a drink containing alcohol? Never   Q2: How many drinks containing alcohol do you have on a typical day when you are drinking? None   Q3: How often do you have six or more drinks on one occasion? Never

## 2023-03-01 NOTE — PLAN OF CARE
Problem: Adult Inpatient Plan of Care  Goal: Plan of Care Review  Outcome: Ongoing, Progressing  Flowsheets (Taken 3/1/2023 1638)  Progress: no change  Plan of Care Reviewed With: patient  Goal: Patient-Specific Goal (Individualized)  Outcome: Ongoing, Progressing  Goal: Absence of Hospital-Acquired Illness or Injury  Outcome: Ongoing, Progressing  Intervention: Identify and Manage Fall Risk  Recent Flowsheet Documentation  Taken 3/1/2023 1600 by Judi Cabral RN  Safety Promotion/Fall Prevention:   activity supervised   assistive device/personal items within reach   clutter free environment maintained   safety round/check completed  Taken 3/1/2023 1500 by Judi Cabral RN  Safety Promotion/Fall Prevention: safety round/check completed  Intervention: Prevent Skin Injury  Recent Flowsheet Documentation  Taken 3/1/2023 1600 by Judi Cabral RN  Body Position: position changed independently  Taken 3/1/2023 1500 by Judi Cabral RN  Body Position: position changed independently  Skin Protection: adhesive use limited  Intervention: Prevent and Manage VTE (Venous Thromboembolism) Risk  Recent Flowsheet Documentation  Taken 3/1/2023 1600 by Judi Cabral RN  Activity Management: activity adjusted per tolerance  Taken 3/1/2023 1500 by Judi Cabral RN  Activity Management: activity adjusted per tolerance  Intervention: Prevent Infection  Recent Flowsheet Documentation  Taken 3/1/2023 1600 by Judi Cabral RN  Infection Prevention: hand hygiene promoted  Taken 3/1/2023 1500 by Judi Cabral RN  Infection Prevention: hand hygiene promoted  Goal: Optimal Comfort and Wellbeing  Outcome: Ongoing, Progressing  Goal: Readiness for Transition of Care  Outcome: Ongoing, Progressing     Problem: Pain Acute  Goal: Acceptable Pain Control and Functional Ability  Outcome: Ongoing, Progressing  Intervention: Prevent or Manage Pain  Recent Flowsheet Documentation  Taken 3/1/2023 1600 by Judi Cabral RN  Medication  Review/Management: medications reviewed  Taken 3/1/2023 1500 by Judi Cabral RN  Sensory Stimulation Regulation: auditory stimulation minimized  Sleep/Rest Enhancement: awakenings minimized  Medication Review/Management: medications reviewed  Intervention: Optimize Psychosocial Wellbeing  Recent Flowsheet Documentation  Taken 3/1/2023 1500 by Judi Cabral RN  Supportive Measures: active listening utilized  Diversional Activities: television     Problem: Fluid Imbalance (Pancreatitis)  Goal: Fluid Balance  Outcome: Ongoing, Progressing     Problem: Infection (Pancreatitis)  Goal: Infection Symptom Resolution  Outcome: Ongoing, Progressing     Problem: Nutrition Impaired (Pancreatitis)  Goal: Optimal Nutrition Intake  Outcome: Ongoing, Progressing     Problem: Pain (Pancreatitis)  Goal: Acceptable Pain Control  Outcome: Ongoing, Progressing  Intervention: Monitor and Manage Pain  Recent Flowsheet Documentation  Taken 3/1/2023 1500 by Judi Cabral RN  Sensory Stimulation Regulation: auditory stimulation minimized  Diversional Activities: television  Sleep/Rest Enhancement: awakenings minimized     Problem: Respiratory Compromise (Pancreatitis)  Goal: Effective Oxygenation and Ventilation  Outcome: Ongoing, Progressing  Intervention: Optimize Oxygenation and Ventilation  Recent Flowsheet Documentation  Taken 3/1/2023 1600 by Judi Cabral RN  Activity Management: activity adjusted per tolerance  Head of Bed (HOB) Positioning:   HOB elevated   HOB at 30-45 degrees  Taken 3/1/2023 1500 by Judi Cabral RN  Activity Management: activity adjusted per tolerance  Head of Bed (HOB) Positioning:   HOB elevated   HOB at 30-45 degrees  Airway/Ventilation Management: airway patency maintained  Cough And Deep Breathing: done independently per patient     Problem: Fall Injury Risk  Goal: Absence of Fall and Fall-Related Injury  Outcome: Ongoing, Progressing  Intervention: Identify and Manage Contributors  Recent  Flowsheet Documentation  Taken 3/1/2023 1600 by Judi Cabral RN  Medication Review/Management: medications reviewed  Self-Care Promotion: independence encouraged  Taken 3/1/2023 1500 by Judi Cabral RN  Medication Review/Management: medications reviewed  Self-Care Promotion: independence encouraged  Intervention: Promote Injury-Free Environment  Recent Flowsheet Documentation  Taken 3/1/2023 1600 by Judi Cabral RN  Safety Promotion/Fall Prevention:   activity supervised   assistive device/personal items within reach   clutter free environment maintained   safety round/check completed  Taken 3/1/2023 1500 by Judi Cabral RN  Safety Promotion/Fall Prevention: safety round/check completed   Goal Outcome Evaluation:  Plan of Care Reviewed With: patient        Progress: no change     Patient came to unit from [facility] and will be prepped for EGD and colonoscopy today, patient is going down for MRI per urology, patients family at bedside will Dr. Galindo in room and has been updated on events and procedures.  Patients pain is being adequately covered with PRN pain medication, patient is on clear liquids and will be NPO after midnight for above procedures, patient is aware and consents are signed.

## 2023-03-01 NOTE — CONSULTS
[facility]      Patient Identification:  NAME:  Caterina Mason  Age:  67 y.o.   Sex:  female   :  1955   MRN:  [id number]       Chief complaint/Reason for consult: Renal lesion    History of present illness:  67 y.o. female consulted for bilateral adrenal nodules as well as hypoattenuating renal lesions.  She denies hematuria and flank pain.  She is admitted with pancreatitis.      Past medical history:  Past Medical History:   Diagnosis Date   • Arthritis    • Atrial fibrillation (HCC)    • Bradycardia     Dr. Charles   • Cataract    • COPD (chronic obstructive pulmonary disease) ([facility])     Dr. Rob   • Diabetes mellitus, type 2 ([facility])     sees Dr. Archibald at [place]   • DJD (degenerative joint disease)     possible herniated disc, sees Pain Mgmt in [place]   • Emphysema of lung (HCC)    •     • GERD (gastroesophageal reflux disease)    • History of combined right and left heart catheterization 2018    minimal CAD on heart cath done    • Hyperlipidemia    • Hypertension    • Pain    • Sleep apnea     wears CPAP machine, sees Darron       Past surgical history:  Past Surgical History:   Procedure Laterality Date   • ABLATION OF DYSRHYTHMIC FOCUS     • CARDIAC CATHETERIZATION      and Angiograph    • CARDIAC ELECTROPHYSIOLOGY PROCEDURE N/A 12/10/2019    Procedure: pvc ablation;  Surgeon: Alfredo Iglesias MD;  Location: [facility] INVASIVE LOCATION;  Service: Cardiovascular   •  SECTION      x 1   • COLON RESECTION     • COLONOSCOPY  2012   • COLOSTOMY      and reversal in her 20's due to problems with childbirth   • COLOSTOMY CLOSURE     • OVARIAN CYST SURGERY     • ROTATOR CUFF REPAIR Left 2009    x2    • TOTAL ABDOMINAL HYSTERECTOMY WITH SALPINGO OOPHORECTOMY         Allergies:  Ibuprofen, Prednisone, Pregabalin, Tramadol, Adhesive tape, Levofloxacin, and Sulfamethoxazole-trimethoprim    Home medications:  Medications Prior to Admission   Medication Sig  Dispense Refill Last Dose   • albuterol sulfate  (90 Base) MCG/ACT inhaler INHALE 2 PUFFS EVERY 4 HOURS AS NEEDED FOR WHEEZING OR SHORTNESS OF AIR 18 g 1    • atorvastatin (LIPITOR) 40 MG tablet TAKE 1/2 TABLET BY MOUTH EVERY DAY 45 tablet 1    • budesonide-formoterol (SYMBICORT) 160-4.5 MCG/ACT inhaler Inhale 2 puffs 2 (Two) Times a Day.      • Cholecalciferol 25 MCG (1000 UT) capsule TAKE 1 TABLET BY MOUTH TWICE A DAY *OTC NOT COVERED* 60 capsule 5    • dilTIAZem CD (CARDIZEM CD) 120 MG 24 hr capsule TAKE 1 CAPSULE BY MOUTH EVERY DAY 90 capsule 1    • Eliquis 5 MG tablet tablet TAKE 1 TABLET BY MOUTH 2 (TWO) TIMES A DAY. PATIENT NEEDS AN APPT BEFORE ANY MORE REFILLS 60 tablet 1    • fenofibrate 160 MG tablet TAKE 1 TABLET BY MOUTH EVERY DAY 90 tablet 1    • furosemide (LASIX) 40 MG tablet TAKE 1 TABLET BY MOUTH EVERY DAY 90 tablet 1    • HumaLOG KwikPen 100 UNIT/ML solution pen-injector Inject 14 Units under the skin into the appropriate area as directed 3 (Three) Times a Day Before Meals. 15 mL 1    • HYDROcodone-acetaminophen (NORCO) 7.5-325 MG per tablet Take 1 tablet by mouth 3 (Three) Times a Day As Needed for Moderate Pain.      • Insulin Glargine (BASAGLAR KWIKPEN) 100 UNIT/ML injection pen INJECT 40 UNITS UNDER THE SKIN INTO THE APPROPRIATE AREA AS DIRECTED EVERY NIGHT. 45 mL 0    • Janumet XR  MG tablet TAKE 2 TABLETS BY MOUTH EVERY DAY 60 tablet 3    • lisinopril (PRINIVIL,ZESTRIL) 20 MG tablet TAKE 1 TABLET BY MOUTH EVERY DAY 90 tablet 1    • magnesium oxide (MAG-OX) 400 MG tablet TAKE 1 TABLET BY MOUTH TWICE A DAY 60 tablet 6    • metoprolol tartrate (LOPRESSOR) 25 MG tablet TAKE 1 TABLET BY MOUTH EVERY 12 HOURS FOR 30 DAYS. 180 tablet 3    • ondansetron ODT (ZOFRAN-ODT) 4 MG disintegrating tablet Place 1 tablet on the tongue Every 8 (Eight) Hours As Needed for Nausea or Vomiting for up to 5 days. 15 tablet 0    • potassium chloride (MICRO-K) 10 MEQ CR capsule TAKE 1 CAPSULE BY MOUTH  EVERY DAY 90 capsule 1         Hospital medications:  atorvastatin, 20 mg, Oral, Daily  bisacodyl, 20 mg, Oral, Once  budesonide-formoterol, 2 puff, Inhalation, BID  dicyclomine, 10 mg, Oral, 4x Daily AC & at Bedtime  dilTIAZem CD, 120 mg, Oral, Daily  fenofibrate, 145 mg, Oral, Daily  furosemide, 40 mg, Oral, Daily  insulin glargine, 40 Units, Subcutaneous, Nightly  insulin lispro, 14 Units, Subcutaneous, TID With Meals  lisinopril, 20 mg, Oral, Daily  magnesium oxide, 400 mg, Oral, BID  metoprolol tartrate, 25 mg, Oral, Q12H  pantoprazole, 40 mg, Intravenous, QAM AC  potassium chloride, 10 mEq, Oral, Daily  sodium chloride, 10 mL, Intravenous, Q12H  sodium-potassium-magnesium sulfates, 1 bottle, Oral, Q12H  [START ON 3/2/2023] sorbitol, 50 mL, Oral, Once         •  acetaminophen **OR** acetaminophen **OR** acetaminophen  •  albuterol sulfate HFA  •  HYDROcodone-acetaminophen  •  Morphine  •  ondansetron  •  [COMPLETED] Insert Peripheral IV **AND** sodium chloride  •  sodium chloride  •  sodium chloride  •  sodium chloride    Family history:  Family History   Problem Relation Age of Onset   • Heart disease Mother    • Hypertension Mother    • Stroke Mother    • Seizures Mother    • Heart disease Father    • Hypertension Father    • Lung disease Father    • Stroke Father    • Cancer Sister         unknown   • Hypertension Brother    • Diabetes Brother    • Hyperlipidemia Other        Social history:  Social History     Tobacco Use   • Smoking status: Former     Packs/day: 1.00     Years: 48.00     Pack years: 48.00     Types: Cigarettes     Quit date: 3/1/2020     Years since quitting: 3.0   • Smokeless tobacco: Never   Vaping Use   • Vaping Use: Never used   Substance Use Topics   • Alcohol use: No   • Drug use: No       REVIEW OF SYSTEMS:      Objective:  TMax 24 hours:   Temp (24hrs), Av.9 °F (36.6 °C), Min:97.5 °F (36.4 °C), Max:98.1 °F (36.7 °C)      Vitals Ranges:   Temp:  [97.5 °F (36.4 °C)-98.1 °F (36.7  °C)] 97.8 °F (36.6 °C)  Heart Rate:  [] 100  Resp:  [15-21] 20  BP: (106-129)/(51-71) 122/57    Intake/Output Last 3 shifts:  I/O last 3 completed shifts:  In: -   Out: 300 [Urine:300]     Physical Exam:    General Appearance:    Alert, cooperative, NAD   HEENT:    No trauma, hearing intact   Lungs:     Respirations unlabored, no audible wheezing    Heart:    No cyanosis, No significant edema   Abdomen:     Soft, ND    :    No suprapubic distention               Neuro/Psych:   Mood/affect pleasant, no focal findings       Results review:   I reviewed the patient's new clinical results.    Data review:  Lab Results (last 24 hours)     Procedure Component Value Units Date/Time    POC Glucose Once [[id number]]  (Abnormal) Collected: 03/01/23 1112    Specimen: Blood Updated: 03/01/23 1113     Glucose 142 mg/dL      Comment: Serial Number: [id number]:  [id number]       POC Glucose Once [[id number]]  (Abnormal) Collected: 03/01/23 0722    Specimen: Blood Updated: 03/01/23 0723     Glucose 170 mg/dL      Comment: Serial Number: [id number]:  [id number]       Sedimentation Rate [[id number]]  (Normal) Collected: 02/28/23 2237    Specimen: Blood Updated: 03/01/23 0018     Sed Rate 23 mm/hr     CBC & Differential [[id number]]  (Abnormal) Collected: 02/28/23 2237    Specimen: Blood Updated: 03/01/23 0014    Narrative:      The following orders were created for panel order CBC & Differential.  Procedure                               Abnormality         Status                     ---------                               -----------         ------                     CBC Auto Differential[[id number]]        Abnormal            Final result                 Please view results for these tests on the individual orders.    CBC Auto Differential [[id number]]  (Abnormal) Collected: 02/28/23 2237    Specimen: Blood Updated: 03/01/23 0014     WBC 8.70 10*3/mm3      RBC 4.01 10*6/mm3      Hemoglobin 11.5 g/dL      Hematocrit  35.9 %      MCV 89.6 fL      MCH 28.7 pg      MCHC 32.0 g/dL      RDW 14.5 %      RDW-SD 45.1 fl      MPV 8.6 fL      Platelets 285 10*3/mm3      Neutrophil % 65.6 %      Lymphocyte % 21.6 %      Monocyte % 10.7 %      Eosinophil % 1.6 %      Basophil % 0.5 %      Neutrophils, Absolute 5.70 10*3/mm3      Lymphocytes, Absolute 1.90 10*3/mm3      Monocytes, Absolute 0.90 10*3/mm3      Eosinophils, Absolute 0.10 10*3/mm3      Basophils, Absolute 0.00 10*3/mm3      nRBC 0.1 /100 WBC     Comprehensive Metabolic Panel [[id number]]  (Abnormal) Collected: 02/28/23 2237    Specimen: Blood Updated: 03/01/23 0003     Glucose 146 mg/dL      BUN 13 mg/dL      Creatinine 0.83 mg/dL      Sodium 142 mmol/L      Potassium 4.4 mmol/L      Chloride 104 mmol/L      CO2 26.0 mmol/L      Calcium 9.9 mg/dL      Total Protein 7.0 g/dL      Albumin 3.7 g/dL      ALT (SGPT) 17 U/L      AST (SGOT) 29 U/L      Alkaline Phosphatase 52 U/L      Total Bilirubin 0.5 mg/dL      Globulin 3.3 gm/dL      A/G Ratio 1.1 g/dL      BUN/Creatinine Ratio 15.7     Anion Gap 12.0 mmol/L      eGFR 77.4 mL/min/1.73     Narrative:      GFR Normal >60  Chronic Kidney Disease <60  Kidney Failure <15      Phosphorus [[id number]]  (Normal) Collected: 02/28/23 2237    Specimen: Blood Updated: 03/01/23 0003     Phosphorus 2.6 mg/dL     C-reactive Protein [[id number]]  (Abnormal) Collected: 02/28/23 2237    Specimen: Blood Updated: 03/01/23 0003     C-Reactive Protein 1.20 mg/dL     Magnesium [[id number]]  (Normal) Collected: 02/28/23 2237    Specimen: Blood Updated: 03/01/23 0003     Magnesium 1.7 mg/dL     POC Glucose Once [[id number]]  (Abnormal) Collected: 02/28/23 2029    Specimen: Blood Updated: 02/28/23 2030     Glucose 130 mg/dL      Comment: Serial Number: [id number]:  [id number]       POC Glucose Once [[id number]]  (Abnormal) Collected: 02/28/23 1820    Specimen: Blood Updated: 02/28/23 1821     Glucose 127 mg/dL      Comment: Serial Number:  [id number]:  [id number]              Imaging:  Imaging Results (Last 24 Hours)     Procedure Component Value Units Date/Time    US Abdomen Limited [[id number]] Collected: 03/01/23 1037     Updated: 03/01/23 1048    Narrative:      US ABDOMEN LIMITED    Date of Exam: 3/1/2023 10:10 AM EST    Indication: cholelithiasis.    Comparison: CT abdomen and pelvis with contrast 2/21/2023.    Technique: Grayscale and color Doppler ultrasound evaluation of the right upper quadrant was performed.    Findings:  The liver is enlarged up to 21.16 craniocaudally. The liver demonstrates a diffusely coarsened echotexture, suggestive of steatosis. No focal liver lesion is identified. The does not appear grossly cirrhotic. No focal liver lesions are identified.    Common bile duct measures 8 mm, just slightly above upper limits normal for the patient's stated age. No choledocholithiasis or obstructing abnormality seen.    A few tiny layering gallstones are present. The gallbladder does not appear inflamed or thickened on this examination.    The main portal vein is patent. The imaged hepatic veins are patent.    Right kidney measures 10.9 cm in length, without shadowing stone or hydronephrosis. Right renal cyst seen on the previous CT are not well-visualized on today's ultrasound examination.    Trace ascites is seen adjacent to the right hepatic lobe.    The pancreas is mostly obscured by overlying bowel gas. The visualized portion of the pancreatic head and neck appear unremarkable.          Impression:      Impression:    1. Cholelithiasis, without sonographic evidence of cholecystitis.  2. Common bile duct measures 8 mm, just slightly above upper limits normal for the patient's stated age. No intrahepatic biliary ductal dilation is seen. No obstructing biliary abnormality is identified.  3. Hepatomegaly with features of diffuse hepatic steatosis.  4. The visualized is the pancreas have a normal sonographic appearance.  5. The  right renal cyst seen on the prior CT are not visible on today's ultrasound.    Electronically Signed: Emeli Chloe    3/1/2023 10:45 AM EST    Workstation ID: [id number]             Assessment:       Acute pancreatitis, unspecified complication status, unspecified pancreatitis type    Gastroesophageal reflux disease    Generalized abdominal pain    Hematochezia    Abnormal CT of the abdomen      Bilateral adrenal nodules up to 1.3 cm  2.8 cm left renal lesion and 1.5 cm right renal lesion and multiple cysts    Plan:     CT images reviewed, needs follow-up imaging   Check MRI with and without contrast this can be done as inpatient versus outpatient  We will arrange follow-up for her to discuss further pending imaging results  Okay for discharge from urology standpoint    Kris Galindo MD  [facility]  [facility], [address]  [place] [address]  Office: [phone]  Cell: [phone]  Also available via Epic Secure Chat  03/01/23  15:53 EST

## 2023-03-01 NOTE — PLAN OF CARE
Goal Outcome Evaluation:  Plan of Care Reviewed With: patient        Progress: no change  Outcome Evaluation: Pt is here with acute pancretitis, strict npo, Pt is resting with call light in reach, will continue to monitor.

## 2023-03-02 ENCOUNTER — ANESTHESIA (OUTPATIENT)
Dept: GASTROENTEROLOGY | Facility: HOSPITAL | Age: 68
End: 2023-03-02
Payer: MEDICAID

## 2023-03-02 ENCOUNTER — ON CAMPUS - OUTPATIENT (OUTPATIENT)
Dept: URBAN - METROPOLITAN AREA HOSPITAL 85 | Facility: HOSPITAL | Age: 68
End: 2023-03-02
Payer: COMMERCIAL

## 2023-03-02 ENCOUNTER — APPOINTMENT (OUTPATIENT)
Dept: CT IMAGING | Facility: HOSPITAL | Age: 68
End: 2023-03-02
Payer: MEDICAID

## 2023-03-02 ENCOUNTER — ANESTHESIA EVENT (OUTPATIENT)
Dept: GASTROENTEROLOGY | Facility: HOSPITAL | Age: 68
End: 2023-03-02
Payer: MEDICAID

## 2023-03-02 DIAGNOSIS — K44.9 DIAPHRAGMATIC HERNIA WITHOUT OBSTRUCTION OR GANGRENE: ICD-10-CM

## 2023-03-02 DIAGNOSIS — R93.3 ABNORMAL FINDINGS ON DIAGNOSTIC IMAGING OF OTHER PARTS OF DI: ICD-10-CM

## 2023-03-02 DIAGNOSIS — K92.1 MELENA: ICD-10-CM

## 2023-03-02 DIAGNOSIS — K64.0 FIRST DEGREE HEMORRHOIDS: ICD-10-CM

## 2023-03-02 DIAGNOSIS — K63.5 POLYP OF COLON: ICD-10-CM

## 2023-03-02 LAB
ALBUMIN SERPL-MCNC: 3.5 G/DL (ref 3.5–5.2)
ALBUMIN/GLOB SERPL: 1.1 G/DL
ALP SERPL-CCNC: 47 U/L (ref 39–117)
ALT SERPL W P-5'-P-CCNC: 16 U/L (ref 1–33)
ANION GAP SERPL CALCULATED.3IONS-SCNC: 9 MMOL/L (ref 5–15)
AST SERPL-CCNC: 26 U/L (ref 1–32)
BASOPHILS # BLD AUTO: 0 10*3/MM3 (ref 0–0.2)
BASOPHILS NFR BLD AUTO: 0.3 % (ref 0–1.5)
BILIRUB SERPL-MCNC: 0.5 MG/DL (ref 0–1.2)
BUN SERPL-MCNC: 10 MG/DL (ref 8–23)
BUN/CREAT SERPL: 11.9 (ref 7–25)
CALCIUM SPEC-SCNC: 8.9 MG/DL (ref 8.6–10.5)
CHLORIDE SERPL-SCNC: 105 MMOL/L (ref 98–107)
CO2 SERPL-SCNC: 27 MMOL/L (ref 22–29)
CREAT SERPL-MCNC: 0.84 MG/DL (ref 0.57–1)
DEPRECATED RDW RBC AUTO: 44.6 FL (ref 37–54)
EGFRCR SERPLBLD CKD-EPI 2021: 76.3 ML/MIN/1.73
EOSINOPHIL # BLD AUTO: 0.1 10*3/MM3 (ref 0–0.4)
EOSINOPHIL NFR BLD AUTO: 1.4 % (ref 0.3–6.2)
ERYTHROCYTE [DISTWIDTH] IN BLOOD BY AUTOMATED COUNT: 14.3 % (ref 12.3–15.4)
GLOBULIN UR ELPH-MCNC: 3.3 GM/DL
GLUCOSE BLDC GLUCOMTR-MCNC: 144 MG/DL (ref 70–105)
GLUCOSE BLDC GLUCOMTR-MCNC: 174 MG/DL (ref 70–105)
GLUCOSE BLDC GLUCOMTR-MCNC: 185 MG/DL (ref 70–105)
GLUCOSE BLDC GLUCOMTR-MCNC: 187 MG/DL (ref 70–105)
GLUCOSE BLDC GLUCOMTR-MCNC: 214 MG/DL (ref 70–105)
GLUCOSE SERPL-MCNC: 150 MG/DL (ref 65–99)
HCT VFR BLD AUTO: 34.5 % (ref 34–46.6)
HGB BLD-MCNC: 11.2 G/DL (ref 12–15.9)
LIPASE SERPL-CCNC: 173 U/L (ref 13–60)
LYMPHOCYTES # BLD AUTO: 1.1 10*3/MM3 (ref 0.7–3.1)
LYMPHOCYTES NFR BLD AUTO: 13.7 % (ref 19.6–45.3)
MAGNESIUM SERPL-MCNC: 1.7 MG/DL (ref 1.6–2.4)
MCH RBC QN AUTO: 28.9 PG (ref 26.6–33)
MCHC RBC AUTO-ENTMCNC: 32.4 G/DL (ref 31.5–35.7)
MCV RBC AUTO: 89.1 FL (ref 79–97)
MONOCYTES # BLD AUTO: 0.7 10*3/MM3 (ref 0.1–0.9)
MONOCYTES NFR BLD AUTO: 9.1 % (ref 5–12)
NEUTROPHILS NFR BLD AUTO: 6 10*3/MM3 (ref 1.7–7)
NEUTROPHILS NFR BLD AUTO: 75.5 % (ref 42.7–76)
NRBC BLD AUTO-RTO: 0 /100 WBC (ref 0–0.2)
PHOSPHATE SERPL-MCNC: 2.4 MG/DL (ref 2.5–4.5)
PLATELET # BLD AUTO: 289 10*3/MM3 (ref 140–450)
PMV BLD AUTO: 7.6 FL (ref 6–12)
POTASSIUM SERPL-SCNC: 3.6 MMOL/L (ref 3.5–5.2)
PROT SERPL-MCNC: 6.8 G/DL (ref 6–8.5)
RBC # BLD AUTO: 3.87 10*6/MM3 (ref 3.77–5.28)
SODIUM SERPL-SCNC: 141 MMOL/L (ref 136–145)
WBC NRBC COR # BLD: 7.9 10*3/MM3 (ref 3.4–10.8)

## 2023-03-02 PROCEDURE — 43239 EGD BIOPSY SINGLE/MULTIPLE: CPT | Performed by: INTERNAL MEDICINE

## 2023-03-02 PROCEDURE — 84100 ASSAY OF PHOSPHORUS: CPT | Performed by: INTERNAL MEDICINE

## 2023-03-02 PROCEDURE — C1769 GUIDE WIRE: HCPCS | Performed by: INTERNAL MEDICINE

## 2023-03-02 PROCEDURE — 63710000001 INSULIN LISPRO (HUMAN) PER 5 UNITS: Performed by: INTERNAL MEDICINE

## 2023-03-02 PROCEDURE — 25010000002 PROPOFOL 200 MG/20ML EMULSION: Performed by: NURSE ANESTHETIST, CERTIFIED REGISTERED

## 2023-03-02 PROCEDURE — 25010000002 MORPHINE PER 10 MG: Performed by: HOSPITALIST

## 2023-03-02 PROCEDURE — 99222 1ST HOSP IP/OBS MODERATE 55: CPT | Performed by: INTERNAL MEDICINE

## 2023-03-02 PROCEDURE — G0378 HOSPITAL OBSERVATION PER HR: HCPCS

## 2023-03-02 PROCEDURE — 71250 CT THORAX DX C-: CPT

## 2023-03-02 PROCEDURE — 85025 COMPLETE CBC W/AUTO DIFF WBC: CPT | Performed by: NURSE PRACTITIONER

## 2023-03-02 PROCEDURE — 25010000002 PROCHLORPERAZINE 10 MG/2ML SOLUTION: Performed by: INTERNAL MEDICINE

## 2023-03-02 PROCEDURE — 63710000001 INSULIN LISPRO (HUMAN) PER 5 UNITS: Performed by: NURSE PRACTITIONER

## 2023-03-02 PROCEDURE — 88305 TISSUE EXAM BY PATHOLOGIST: CPT | Performed by: INTERNAL MEDICINE

## 2023-03-02 PROCEDURE — 94664 DEMO&/EVAL PT USE INHALER: CPT

## 2023-03-02 PROCEDURE — 25010000002 ONDANSETRON PER 1 MG: Performed by: NURSE PRACTITIONER

## 2023-03-02 PROCEDURE — 99214 OFFICE O/P EST MOD 30 MIN: CPT | Performed by: INTERNAL MEDICINE

## 2023-03-02 PROCEDURE — 83690 ASSAY OF LIPASE: CPT | Performed by: INTERNAL MEDICINE

## 2023-03-02 PROCEDURE — 83735 ASSAY OF MAGNESIUM: CPT | Performed by: INTERNAL MEDICINE

## 2023-03-02 PROCEDURE — 25010000002 ONDANSETRON PER 1 MG: Performed by: NURSE ANESTHETIST, CERTIFIED REGISTERED

## 2023-03-02 PROCEDURE — 96375 TX/PRO/DX INJ NEW DRUG ADDON: CPT

## 2023-03-02 PROCEDURE — 45380 COLONOSCOPY AND BIOPSY: CPT | Mod: 59 | Performed by: INTERNAL MEDICINE

## 2023-03-02 PROCEDURE — 96376 TX/PRO/DX INJ SAME DRUG ADON: CPT

## 2023-03-02 PROCEDURE — 88342 IMHCHEM/IMCYTCHM 1ST ANTB: CPT | Performed by: INTERNAL MEDICINE

## 2023-03-02 PROCEDURE — 82962 GLUCOSE BLOOD TEST: CPT

## 2023-03-02 PROCEDURE — 94761 N-INVAS EAR/PLS OXIMETRY MLT: CPT

## 2023-03-02 PROCEDURE — 80053 COMPREHEN METABOLIC PANEL: CPT | Performed by: INTERNAL MEDICINE

## 2023-03-02 PROCEDURE — 94799 UNLISTED PULMONARY SVC/PX: CPT

## 2023-03-02 PROCEDURE — 45385 COLONOSCOPY W/LESION REMOVAL: CPT | Performed by: INTERNAL MEDICINE

## 2023-03-02 RX ORDER — SORBITOL SOLUTION 70 %
50 SOLUTION, ORAL MISCELLANEOUS ONCE
Status: DISCONTINUED | OUTPATIENT
Start: 2023-03-02 | End: 2023-03-03 | Stop reason: HOSPADM

## 2023-03-02 RX ORDER — ONDANSETRON 2 MG/ML
4 INJECTION INTRAMUSCULAR; INTRAVENOUS ONCE AS NEEDED
Status: COMPLETED | OUTPATIENT
Start: 2023-03-02 | End: 2023-03-02

## 2023-03-02 RX ORDER — DILTIAZEM HYDROCHLORIDE 180 MG/1
180 CAPSULE, COATED, EXTENDED RELEASE ORAL DAILY
Status: DISCONTINUED | OUTPATIENT
Start: 2023-03-03 | End: 2023-03-03 | Stop reason: HOSPADM

## 2023-03-02 RX ORDER — SODIUM CHLORIDE 9 MG/ML
50 INJECTION, SOLUTION INTRAVENOUS CONTINUOUS
Status: DISCONTINUED | OUTPATIENT
Start: 2023-03-02 | End: 2023-03-03

## 2023-03-02 RX ORDER — PROCHLORPERAZINE EDISYLATE 5 MG/ML
10 INJECTION INTRAMUSCULAR; INTRAVENOUS EVERY 6 HOURS PRN
Status: DISCONTINUED | OUTPATIENT
Start: 2023-03-02 | End: 2023-03-03 | Stop reason: HOSPADM

## 2023-03-02 RX ORDER — PROPOFOL 10 MG/ML
INJECTION, EMULSION INTRAVENOUS AS NEEDED
Status: DISCONTINUED | OUTPATIENT
Start: 2023-03-02 | End: 2023-03-02 | Stop reason: SURG

## 2023-03-02 RX ORDER — SODIUM CHLORIDE 9 MG/ML
INJECTION, SOLUTION INTRAVENOUS CONTINUOUS PRN
Status: DISCONTINUED | OUTPATIENT
Start: 2023-03-02 | End: 2023-03-02 | Stop reason: SURG

## 2023-03-02 RX ORDER — DIGOXIN 125 MCG
125 TABLET ORAL
Status: DISCONTINUED | OUTPATIENT
Start: 2023-03-03 | End: 2023-03-03 | Stop reason: HOSPADM

## 2023-03-02 RX ORDER — METOPROLOL TARTRATE 50 MG/1
100 TABLET, FILM COATED ORAL EVERY 12 HOURS SCHEDULED
Status: DISCONTINUED | OUTPATIENT
Start: 2023-03-02 | End: 2023-03-03 | Stop reason: HOSPADM

## 2023-03-02 RX ORDER — DILTIAZEM HYDROCHLORIDE 5 MG/ML
0.25 INJECTION INTRAVENOUS ONCE
Status: COMPLETED | OUTPATIENT
Start: 2023-03-02 | End: 2023-03-02

## 2023-03-02 RX ORDER — ONDANSETRON 4 MG/1
4 TABLET, FILM COATED ORAL EVERY 6 HOURS PRN
Status: DISCONTINUED | OUTPATIENT
Start: 2023-03-02 | End: 2023-03-03 | Stop reason: HOSPADM

## 2023-03-02 RX ORDER — ONDANSETRON 2 MG/ML
4 INJECTION INTRAMUSCULAR; INTRAVENOUS EVERY 6 HOURS PRN
Status: DISCONTINUED | OUTPATIENT
Start: 2023-03-02 | End: 2023-03-03 | Stop reason: HOSPADM

## 2023-03-02 RX ADMIN — METOPROLOL TARTRATE 100 MG: 50 TABLET, FILM COATED ORAL at 08:44

## 2023-03-02 RX ADMIN — PROPOFOL 40 MG: 10 INJECTION, EMULSION INTRAVENOUS at 11:43

## 2023-03-02 RX ADMIN — PROPOFOL 20 MG: 10 INJECTION, EMULSION INTRAVENOUS at 11:54

## 2023-03-02 RX ADMIN — PROPOFOL 30 MG: 10 INJECTION, EMULSION INTRAVENOUS at 11:44

## 2023-03-02 RX ADMIN — METOPROLOL TARTRATE 5 MG: 1 INJECTION, SOLUTION INTRAVENOUS at 00:02

## 2023-03-02 RX ADMIN — DILTIAZEM HYDROCHLORIDE 26 MG: 5 INJECTION INTRAVENOUS at 04:02

## 2023-03-02 RX ADMIN — ONDANSETRON 4 MG: 2 INJECTION INTRAMUSCULAR; INTRAVENOUS at 00:02

## 2023-03-02 RX ADMIN — BUDESONIDE AND FORMOTEROL FUMARATE DIHYDRATE 2 PUFF: 160; 4.5 AEROSOL RESPIRATORY (INHALATION) at 06:40

## 2023-03-02 RX ADMIN — PROPOFOL 20 MG: 10 INJECTION, EMULSION INTRAVENOUS at 11:56

## 2023-03-02 RX ADMIN — ONDANSETRON 4 MG: 2 INJECTION INTRAMUSCULAR; INTRAVENOUS at 12:38

## 2023-03-02 RX ADMIN — DILTIAZEM HYDROCHLORIDE 120 MG: 120 CAPSULE, COATED, EXTENDED RELEASE ORAL at 08:45

## 2023-03-02 RX ADMIN — LISINOPRIL 20 MG: 20 TABLET ORAL at 08:44

## 2023-03-02 RX ADMIN — HYDROCODONE BITARTRATE AND ACETAMINOPHEN 1 TABLET: 7.5; 325 TABLET ORAL at 18:27

## 2023-03-02 RX ADMIN — DICYCLOMINE HYDROCHLORIDE 10 MG: 10 CAPSULE ORAL at 13:10

## 2023-03-02 RX ADMIN — BISACODYL 20 MG: 5 TABLET, COATED ORAL at 00:03

## 2023-03-02 RX ADMIN — DICYCLOMINE HYDROCHLORIDE 10 MG: 10 CAPSULE ORAL at 21:50

## 2023-03-02 RX ADMIN — SODIUM CHLORIDE: 9 INJECTION, SOLUTION INTRAVENOUS at 11:39

## 2023-03-02 RX ADMIN — PROPOFOL 100 MG: 10 INJECTION, EMULSION INTRAVENOUS at 11:42

## 2023-03-02 RX ADMIN — SODIUM SULFATE, POTASSIUM SULFATE, MAGNESIUM SULFATE 1 BOTTLE: 1.6; 3.13; 17.5 SOLUTION ORAL at 03:26

## 2023-03-02 RX ADMIN — INSULIN LISPRO 14 UNITS: 100 INJECTION, SOLUTION INTRAVENOUS; SUBCUTANEOUS at 18:21

## 2023-03-02 RX ADMIN — Medication 400 MG: at 21:50

## 2023-03-02 RX ADMIN — PROPOFOL 20 MG: 10 INJECTION, EMULSION INTRAVENOUS at 11:46

## 2023-03-02 RX ADMIN — Medication 10 ML: at 21:50

## 2023-03-02 RX ADMIN — INSULIN LISPRO 14 UNITS: 100 INJECTION, SOLUTION INTRAVENOUS; SUBCUTANEOUS at 08:44

## 2023-03-02 RX ADMIN — PROPOFOL 20 MG: 10 INJECTION, EMULSION INTRAVENOUS at 12:01

## 2023-03-02 RX ADMIN — SODIUM CHLORIDE 50 ML/HR: 9 INJECTION, SOLUTION INTRAVENOUS at 13:23

## 2023-03-02 RX ADMIN — PANTOPRAZOLE SODIUM 40 MG: 40 INJECTION, POWDER, LYOPHILIZED, FOR SOLUTION INTRAVENOUS at 07:38

## 2023-03-02 RX ADMIN — MORPHINE SULFATE 4 MG: 4 INJECTION, SOLUTION INTRAMUSCULAR; INTRAVENOUS at 03:26

## 2023-03-02 RX ADMIN — PROPOFOL 20 MG: 10 INJECTION, EMULSION INTRAVENOUS at 11:48

## 2023-03-02 RX ADMIN — Medication 3 ML: at 00:04

## 2023-03-02 RX ADMIN — PROPOFOL 20 MG: 10 INJECTION, EMULSION INTRAVENOUS at 11:58

## 2023-03-02 RX ADMIN — Medication 400 MG: at 08:45

## 2023-03-02 RX ADMIN — FUROSEMIDE 40 MG: 40 TABLET ORAL at 08:45

## 2023-03-02 RX ADMIN — DICYCLOMINE HYDROCHLORIDE 10 MG: 10 CAPSULE ORAL at 18:21

## 2023-03-02 RX ADMIN — PROCHLORPERAZINE EDISYLATE 10 MG: 5 INJECTION INTRAMUSCULAR; INTRAVENOUS at 14:44

## 2023-03-02 RX ADMIN — SODIUM CHLORIDE, POTASSIUM CHLORIDE, SODIUM LACTATE AND CALCIUM CHLORIDE 100 ML/HR: 600; 310; 30; 20 INJECTION, SOLUTION INTRAVENOUS at 00:03

## 2023-03-02 RX ADMIN — MORPHINE SULFATE 4 MG: 4 INJECTION, SOLUTION INTRAMUSCULAR; INTRAVENOUS at 14:44

## 2023-03-02 RX ADMIN — BUDESONIDE AND FORMOTEROL FUMARATE DIHYDRATE 2 PUFF: 160; 4.5 AEROSOL RESPIRATORY (INHALATION) at 18:39

## 2023-03-02 RX ADMIN — PROPOFOL 20 MG: 10 INJECTION, EMULSION INTRAVENOUS at 11:52

## 2023-03-02 RX ADMIN — SORBITOL SOLUTION (BULK) 50 ML: 70 SOLUTION at 05:03

## 2023-03-02 RX ADMIN — ATORVASTATIN CALCIUM 20 MG: 20 TABLET, FILM COATED ORAL at 08:45

## 2023-03-02 RX ADMIN — POTASSIUM CHLORIDE 10 MEQ: 750 TABLET, EXTENDED RELEASE ORAL at 08:45

## 2023-03-02 RX ADMIN — METOPROLOL TARTRATE 100 MG: 50 TABLET, FILM COATED ORAL at 21:50

## 2023-03-02 RX ADMIN — FENOFIBRATE 145 MG: 145 TABLET ORAL at 08:44

## 2023-03-02 RX ADMIN — PROPOFOL 20 MG: 10 INJECTION, EMULSION INTRAVENOUS at 11:50

## 2023-03-02 RX ADMIN — INSULIN LISPRO 14 UNITS: 100 INJECTION, SOLUTION INTRAVENOUS; SUBCUTANEOUS at 13:23

## 2023-03-02 NOTE — CONSULTS
Hematology/Oncology Inpatient Consultation    Patient name: Caterina Mason  : 1955  MRN: [id number]  Referring Provider: Dr. 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 female who presented to [facility] on 2023 with complaints of abdominal pain that was intermittent over the past 6 weeks but became more acute and severe over the past week.  Past medical history of type 2 diabetes mellitus, COPD, hypertension, hyperlipidemia, and atrial fibrillation on anticoagulation with apixaban.  She reported the abdominal pain was also accompanied by persistent diarrhea and intermittent vomiting.  A recent CT of the abdomen and pelvis dated 2023 showed cholelithiasis; bilateral adrenal nodules, adenomas or metastasis; bilateral hypoattenuating renal lesions, proteinaceous cysts versus solid masses; possible filling defect in the duodenal bulb; small amount of ascites; retroperitoneal and upper abdominal adenopathy, mildly enlarged lymph nodes in the lower thorax.    Evaluation in the ED: WBC 8.70, hemoglobin 11.5, MCV 89.6, platelets 285,000.  Normal LFTs and renal function.  She was diagnosed with suspected pancreatitis and admitted to inpatient for evaluation by gastroenterology.    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    23  Hematology/Oncology was consulted for the malignant appearing infiltrative and ulcerated lesion of the pylorus and duodenal bulb.    He/She  has a past medical history of Arthritis, Atrial fibrillation (HCC), Bradycardia, Cataract, COPD (chronic obstructive pulmonary disease) (HCC), Diabetes mellitus, type 2 (HCC), DJD  (degenerative joint disease), Emphysema of lung (HCC), , GERD (gastroesophageal reflux disease), History of combined right and left heart catheterization (2018), Hyperlipidemia, Hypertension, Pain, and Sleep apnea.    PCP: Lou Curran MD    History:  Past Medical History:   Diagnosis Date   • Arthritis    • Atrial fibrillation (HCC)    • Bradycardia     Dr. Charles   • Cataract    • COPD (chronic obstructive pulmonary disease) ([facility])     Dr. Rob   • Diabetes mellitus, type 2 ([facility])     sees Dr. Archibald at [place]   • DJD (degenerative joint disease)     possible herniated disc, sees Pain Mgmt in [place]   • Emphysema of lung (HCC)    •     • GERD (gastroesophageal reflux disease)    • History of combined right and left heart catheterization 2018    minimal CAD on heart cath done    • Hyperlipidemia    • Hypertension    • Pain    • Sleep apnea     wears CPAP machine, sees Darron   ,   Past Surgical History:   Procedure Laterality Date   • ABLATION OF DYSRHYTHMIC FOCUS     • CARDIAC CATHETERIZATION      and Angiograph    • CARDIAC ELECTROPHYSIOLOGY PROCEDURE N/A 12/10/2019    Procedure: pvc ablation;  Surgeon: Alfredo Iglesias MD;  Location: [facility] INVASIVE LOCATION;  Service: Cardiovascular   •  SECTION      x 1   • COLON RESECTION     • COLONOSCOPY  2012   • COLOSTOMY      and reversal in her 20's due to problems with childbirth   • COLOSTOMY CLOSURE     • OVARIAN CYST SURGERY     • ROTATOR CUFF REPAIR Left 2009    x2    • TOTAL ABDOMINAL HYSTERECTOMY WITH SALPINGO OOPHORECTOMY     ,   Family History   Problem Relation Age of Onset   • Heart disease Mother    • Hypertension Mother    • Stroke Mother    • Seizures Mother    • Heart disease Father    • Hypertension Father    • Lung disease Father    • Stroke Father    • Cancer Sister         unknown   • Hypertension Brother    • Diabetes Brother    • Hyperlipidemia Other    ,   Social History     Tobacco Use   •  Smoking status: Former     Packs/day: 1.00     Years: 48.00     Pack years: 48.00     Types: Cigarettes     Quit date: 3/1/2020     Years since quitting: 3.0   • Smokeless tobacco: Never   Vaping Use   • Vaping Use: Never used   Substance Use Topics   • Alcohol use: No   • Drug use: No   ,   Medications Prior to Admission   Medication Sig Dispense Refill Last Dose   • albuterol sulfate  (90 Base) MCG/ACT inhaler INHALE 2 PUFFS EVERY 4 HOURS AS NEEDED FOR WHEEZING OR SHORTNESS OF AIR 18 g 1    • atorvastatin (LIPITOR) 40 MG tablet TAKE 1/2 TABLET BY MOUTH EVERY DAY 45 tablet 1    • budesonide-formoterol (SYMBICORT) 160-4.5 MCG/ACT inhaler Inhale 2 puffs 2 (Two) Times a Day.      • Cholecalciferol 25 MCG (1000 UT) capsule TAKE 1 TABLET BY MOUTH TWICE A DAY *OTC NOT COVERED* 60 capsule 5    • dilTIAZem CD (CARDIZEM CD) 120 MG 24 hr capsule TAKE 1 CAPSULE BY MOUTH EVERY DAY 90 capsule 1    • Eliquis 5 MG tablet tablet TAKE 1 TABLET BY MOUTH 2 (TWO) TIMES A DAY. PATIENT NEEDS AN APPT BEFORE ANY MORE REFILLS 60 tablet 1    • fenofibrate 160 MG tablet TAKE 1 TABLET BY MOUTH EVERY DAY 90 tablet 1    • furosemide (LASIX) 40 MG tablet TAKE 1 TABLET BY MOUTH EVERY DAY 90 tablet 1    • HumaLOG KwikPen 100 UNIT/ML solution pen-injector Inject 14 Units under the skin into the appropriate area as directed 3 (Three) Times a Day Before Meals. 15 mL 1    • HYDROcodone-acetaminophen (NORCO) 7.5-325 MG per tablet Take 1 tablet by mouth 3 (Three) Times a Day As Needed for Moderate Pain.      • Insulin Glargine (BASAGLAR KWIKPEN) 100 UNIT/ML injection pen INJECT 40 UNITS UNDER THE SKIN INTO THE APPROPRIATE AREA AS DIRECTED EVERY NIGHT. 45 mL 0    • Janumet XR  MG tablet TAKE 2 TABLETS BY MOUTH EVERY DAY 60 tablet 3    • lisinopril (PRINIVIL,ZESTRIL) 20 MG tablet TAKE 1 TABLET BY MOUTH EVERY DAY 90 tablet 1    • magnesium oxide (MAG-OX) 400 MG tablet TAKE 1 TABLET BY MOUTH TWICE A DAY 60 tablet 6    • metoprolol tartrate  "(LOPRESSOR) 25 MG tablet TAKE 1 TABLET BY MOUTH EVERY 12 HOURS FOR 30 DAYS. 180 tablet 3    • [] ondansetron ODT (ZOFRAN-ODT) 4 MG disintegrating tablet Place 1 tablet on the tongue Every 8 (Eight) Hours As Needed for Nausea or Vomiting for up to 5 days. 15 tablet 0    • potassium chloride (MICRO-K) 10 MEQ CR capsule TAKE 1 CAPSULE BY MOUTH EVERY DAY 90 capsule 1    , Scheduled Meds:  atorvastatin, 20 mg, Oral, Daily  budesonide-formoterol, 2 puff, Inhalation, BID  dicyclomine, 10 mg, Oral, 4x Daily AC & at Bedtime  [START ON 3/3/2023] dilTIAZem CD, 180 mg, Oral, Daily  fenofibrate, 145 mg, Oral, Daily  furosemide, 40 mg, Oral, Daily  insulin glargine, 40 Units, Subcutaneous, Nightly  insulin lispro, 14 Units, Subcutaneous, TID With Meals  lisinopril, 20 mg, Oral, Daily  magnesium oxide, 400 mg, Oral, BID  metoprolol tartrate, 100 mg, Oral, Q12H  pantoprazole, 40 mg, Intravenous, QAM AC  potassium chloride, 10 mEq, Oral, Daily  sodium chloride, 10 mL, Intravenous, Q12H  sorbitol, 50 mL, Oral, Once    , Continuous Infusions:  sodium chloride, 50 mL/hr, Last Rate: 50 mL/hr (23 1323)    , PRN Meds:  •  acetaminophen **OR** acetaminophen **OR** acetaminophen  •  albuterol sulfate HFA  •  HYDROcodone-acetaminophen  •  Morphine  •  ondansetron **OR** ondansetron  •  prochlorperazine  •  [COMPLETED] Insert Peripheral IV **AND** sodium chloride  •  sodium chloride  •  sodium chloride  •  sodium chloride   Allergies:  Ibuprofen, Prednisone, Pregabalin, Tramadol, Adhesive tape, Levofloxacin, and Sulfamethoxazole-trimethoprim    Subjective     ROS:  Review of Systems     Objective   Vital Signs:   /88   Pulse 95   Temp 97.6 °F (36.4 °C) (Oral)   Resp 14   Ht 162.6 cm (64\")   Wt 104 kg (229 lb 4.5 oz)   SpO2 100%   BMI 39.36 kg/m²     Physical Exam: (performed by MD)  Physical Exam    Results Review:  Lab Results (last 48 hours)     Procedure Component Value Units Date/Time    Tissue Pathology Exam " [[id number]] Collected: 03/02/23 1146    Specimen: Tissue from Small Intestine, Duodenum; Tissue from Gastric, Body; Tissue from Large Intestine, Cecum; Tissue from Large Intestine, Right / Ascending Colon; Tissue from Large Intestine; Tissue from Large Intestine, Rectum Updated: 03/02/23 1310    POC Glucose Once [[id number]]  (Abnormal) Collected: 03/02/23 1008    Specimen: Blood Updated: 03/02/23 1009     Glucose 187 mg/dL      Comment: Serial Number: [id number]:  [id number]       POC Glucose Once [[id number]]  (Abnormal) Collected: 03/02/23 0719    Specimen: Blood Updated: 03/02/23 0720     Glucose 214 mg/dL      Comment: Serial Number: [id number]:  [id number]       POC Glucose Once [[id number]]  (Abnormal) Collected: 03/01/23 2221    Specimen: Blood Updated: 03/01/23 2222     Glucose 168 mg/dL      Comment: Serial Number: [id number]:  [id number]       POC Glucose Once [[id number]]  (Abnormal) Collected: 03/01/23 1937    Specimen: Blood Updated: 03/01/23 1939     Glucose 204 mg/dL      Comment: Serial Number: [id number]:  [id number]       POC Glucose Once [[id number]]  (Abnormal) Collected: 03/01/23 1813    Specimen: Blood Updated: 03/01/23 1816     Glucose 169 mg/dL      Comment: Serial Number: [id number]:  [id number]       POC Glucose Once [[id number]]  (Abnormal) Collected: 03/01/23 1112    Specimen: Blood Updated: 03/01/23 1113     Glucose 142 mg/dL      Comment: Serial Number: [id number]:  [id number]       POC Glucose Once [[id number]]  (Abnormal) Collected: 03/01/23 0722    Specimen: Blood Updated: 03/01/23 0723     Glucose 170 mg/dL      Comment: Serial Number: [id number]:  [id number]       Sedimentation Rate [[id number]]  (Normal) Collected: 02/28/23 2237    Specimen: Blood Updated: 03/01/23 0018     Sed Rate 23 mm/hr     CBC & Differential [[id number]]  (Abnormal) Collected: 02/28/23 2237    Specimen: Blood Updated: 03/01/23 0014    Narrative:      The following orders  were created for panel order CBC & Differential.  Procedure                               Abnormality         Status                     ---------                               -----------         ------                     CBC Auto Differential[[id number]]        Abnormal            Final result                 Please view results for these tests on the individual orders.    CBC Auto Differential [[id number]]  (Abnormal) Collected: 02/28/23 2237    Specimen: Blood Updated: 03/01/23 0014     WBC 8.70 10*3/mm3      RBC 4.01 10*6/mm3      Hemoglobin 11.5 g/dL      Hematocrit 35.9 %      MCV 89.6 fL      MCH 28.7 pg      MCHC 32.0 g/dL      RDW 14.5 %      RDW-SD 45.1 fl      MPV 8.6 fL      Platelets 285 10*3/mm3      Neutrophil % 65.6 %      Lymphocyte % 21.6 %      Monocyte % 10.7 %      Eosinophil % 1.6 %      Basophil % 0.5 %      Neutrophils, Absolute 5.70 10*3/mm3      Lymphocytes, Absolute 1.90 10*3/mm3      Monocytes, Absolute 0.90 10*3/mm3      Eosinophils, Absolute 0.10 10*3/mm3      Basophils, Absolute 0.00 10*3/mm3      nRBC 0.1 /100 WBC     Comprehensive Metabolic Panel [[id number]]  (Abnormal) Collected: 02/28/23 2237    Specimen: Blood Updated: 03/01/23 0003     Glucose 146 mg/dL      BUN 13 mg/dL      Creatinine 0.83 mg/dL      Sodium 142 mmol/L      Potassium 4.4 mmol/L      Chloride 104 mmol/L      CO2 26.0 mmol/L      Calcium 9.9 mg/dL      Total Protein 7.0 g/dL      Albumin 3.7 g/dL      ALT (SGPT) 17 U/L      AST (SGOT) 29 U/L      Alkaline Phosphatase 52 U/L      Total Bilirubin 0.5 mg/dL      Globulin 3.3 gm/dL      A/G Ratio 1.1 g/dL      BUN/Creatinine Ratio 15.7     Anion Gap 12.0 mmol/L      eGFR 77.4 mL/min/1.73     Narrative:      GFR Normal >60  Chronic Kidney Disease <60  Kidney Failure <15      Phosphorus [[id number]]  (Normal) Collected: 02/28/23 2237    Specimen: Blood Updated: 03/01/23 0003     Phosphorus 2.6 mg/dL     C-reactive Protein [[id number]]  (Abnormal) Collected: 02/28/23 2237     Specimen: Blood Updated: 03/01/23 0003     C-Reactive Protein 1.20 mg/dL     Magnesium [[id number]]  (Normal) Collected: 02/28/23 2237    Specimen: Blood Updated: 03/01/23 0003     Magnesium 1.7 mg/dL     POC Glucose Once [[id number]]  (Abnormal) Collected: 02/28/23 2029    Specimen: Blood Updated: 02/28/23 2030     Glucose 130 mg/dL      Comment: Serial Number: [id number]:  [id number]       POC Glucose Once [[id number]]  (Abnormal) Collected: 02/28/23 1820    Specimen: Blood Updated: 02/28/23 1821     Glucose 127 mg/dL      Comment: Serial Number: [id number]:  [id number]       Comprehensive Metabolic Panel [[id number]]  (Abnormal) Collected: 02/28/23 1343    Specimen: Blood Updated: 02/28/23 1414     Glucose 160 mg/dL      BUN 14 mg/dL      Creatinine 0.88 mg/dL      Sodium 141 mmol/L      Potassium 3.8 mmol/L      Chloride 103 mmol/L      CO2 27.0 mmol/L      Calcium 10.2 mg/dL      Total Protein 6.8 g/dL      Albumin 3.5 g/dL      ALT (SGPT) 17 U/L      AST (SGOT) 23 U/L      Alkaline Phosphatase 49 U/L      Total Bilirubin 0.5 mg/dL      Globulin 3.3 gm/dL      A/G Ratio 1.1 g/dL      BUN/Creatinine Ratio 15.9     Anion Gap 11.0 mmol/L      eGFR 72.1 mL/min/1.73     Narrative:      GFR Normal >60  Chronic Kidney Disease <60  Kidney Failure <15      Lipase [[id number]]  (Abnormal) Collected: 02/28/23 1343    Specimen: Blood Updated: 02/28/23 1414     Lipase 130 U/L     CBC & Differential [[id number]]  (Abnormal) Collected: 02/28/23 1343    Specimen: Blood Updated: 02/28/23 1350    Narrative:      The following orders were created for panel order CBC & Differential.  Procedure                               Abnormality         Status                     ---------                               -----------         ------                     CBC Auto Differential[[id number]]        Abnormal            Final result                 Please view results for these tests on the individual orders.    CBC Auto  Differential [[id number]]  (Abnormal) Collected: 02/28/23 1343    Specimen: Blood Updated: 02/28/23 1350     WBC 8.30 10*3/mm3      RBC 3.89 10*6/mm3      Hemoglobin 11.7 g/dL      Hematocrit 34.0 %      MCV 87.4 fL      MCH 30.1 pg      MCHC 34.5 g/dL      RDW 14.2 %      RDW-SD 45.9 fl      MPV 7.7 fL      Platelets 283 10*3/mm3      Neutrophil % 68.1 %      Lymphocyte % 20.1 %      Monocyte % 9.6 %      Eosinophil % 1.8 %      Basophil % 0.4 %      Neutrophils, Absolute 5.60 10*3/mm3      Lymphocytes, Absolute 1.70 10*3/mm3      Monocytes, Absolute 0.80 10*3/mm3      Eosinophils, Absolute 0.20 10*3/mm3      Basophils, Absolute 0.00 10*3/mm3      nRBC 0.1 /100 WBC            Pending Results: Pathology    Imaging Reviewed:   CT Chest Without Contrast Diagnostic    Result Date: 3/2/2023  Impression: 1. Interval development of small lymph nodes in the epicardial space on the right measuring up to about 13 mm in greatest diameter. There has also been interval development of a small node in the anterior mediastinum measuring 11 x 7 mm. In the upper abdomen, there is clearly periportal and retroperitoneal lymphadenopathy. The findings raise the question of lymphoma or metastatic disease. No suggestion of a primary lesion in the chest. 2. Coronary atherosclerotic calcifications. 3. Ascites Electronically Signed: Ronak Grubbs  3/2/2023 2:48 PM EST  Workstation ID: [id number]    MRI Abdomen With & Without Contrast    Result Date: 3/1/2023  Impression: 1. Abnormal hypoenhancing mass measuring 3.5 x 3.1 cm within the duodenal bulb. This appears partially intramural and partially endoluminal. Differential considerations would include gastric or duodenal malignancy, leiomyoma, or less likely GIST. Recommend further evaluation with endoscopy. 2. Enlarged celiac axis, portacaval, and retroperitoneal lymph nodes. Further management would be dependent on pathologic diagnosis of the duodenal mass. 3. Hemorrhagic or proteinaceous cyst  within the posterior mid left kidney and exophytic arising from the posterior inferior aspect of the right kidney. No evidence of enhancing renal mass. Additional simple appearing renal cysts are also present. 4. Limited assessment of the small bilateral adrenal nodules. There is no definite loss of signal to suggest clear lipid rich adrenal adenomas. These could be followed with serial imaging. 5. Cholelithiasis without MR findings of acute cholecystitis. No evidence of choledocholithiasis. 6. Small volume perihepatic ascites. Electronically Signed: Milton Carter  3/1/2023 6:41 PM EST  Workstation ID: [id number] [email or web address]    US Abdomen Limited    Result Date: 3/1/2023  Impression: 1. Cholelithiasis, without sonographic evidence of cholecystitis. 2. Common bile duct measures 8 mm, just slightly above upper limits normal for the patient's stated age. No intrahepatic biliary ductal dilation is seen. No obstructing biliary abnormality is identified. 3. Hepatomegaly with features of diffuse hepatic steatosis. 4. The visualized is the pancreas have a normal sonographic appearance. 5. The right renal cyst seen on the prior CT are not visible on today's ultrasound. Electronically Signed: Emeli Corona  3/1/2023 10:45 AM EST  Workstation ID: [id number]           Assessment & Plan   ASSESSMENT  1. 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  MRI of the abdomen and pelvis showed a 3.5 x 3.1 cm mass within the duodenal bulb.  Differential considerations would include gastric or duodenal malignancy, leiomyoma, or less likely just.  Enlarged celiac axis, portocaval, retroperitoneal lymph nodes.  Follow pathology. Depending on results will likely need a PET CT outpatient.  2. Bilateral adrenal nodules  3. Bilateral renal cysts, urology following and planning to follow-up outpatient  4. Multiple chronic conditions: Type 2 diabetes mellitus, COPD,  hypertension, hyperlipidemia, atrial fibrillation on anticoagulation with apixaban.    PLAN  1. As above    Electronically signed by Rosalba Sloan, YAW, 03/02/23, 1:12 PM EST.    3/2/2023: All the information was obtained from a review of the medical record and a conversation with Ms. Mason's spouse, who was in the room with her.  Ms. Mason was admitted for investigation and treatment of persistent abdominal pain that had been getting progressively worse for at least a few weeks. It was, particularly in the last week, accompanied by nausea and vomiting that made oral intake progressively more difficult. In addition she had been having diarrhea. For a long period of time, at least 5 years, according to her spouse, her performance status had been declining and she had been spending the majority of her time in bed sleeping. A clear explanation for this was not available. She had several medical problems that included chronic lung disease and sleep apnea and diabetes mellitus. In spite of the difficulties eating and the persistent nausea and vomiting she had not seemed to lose weight. She had been afebrile. 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 Biopsies were sent. Imaging suggested metastatic involvement of several lymph nodes in the retroperitoneum and also in the mediastinum. Bilateral adrenal nodules of undetermined significance were present, as well. On exam she was sleeping and did not wake up for my exam. The lungs were diminished bilaterally and the heart regular. The abdomen soft. No edema. Reviewed the images of the scans and their reports. Reviewed the laboratory exams. She has mild normocytic anemia. At this point there is no need for intervention from this point of view. Awaiting the report of pathology. Discussed with her spouse and answered questions. Will follow.  I had a  conversation with Ms. Luther TIDWELL and concur with her note. The analysis and plan were formulated by me.     Teofilo Landry MD on 3/2/2023 at 18:00

## 2023-03-02 NOTE — PROGRESS NOTES
[facility] Medicine Services Daily Progress Note    Patient Name: Caterina Mason  : 1955  MRN: [id number]  Primary Care Physician:  Lou Curran MD  Date of admission: 2023      Subjective      Chief Complaint: Abdominal pain     Patient Reports she is feeling bloated.  Had EGD and colonoscopy revealing gastric mass.  The patient will be seen by oncology.  Biopsies taken.     ROS negative except as above    Objective      Vitals:   Temp:  [97.6 °F (36.4 °C)-98.7 °F (37.1 °C)] 97.6 °F (36.4 °C)  Heart Rate:  [] 95  Resp:  [14-19] 14  BP: (114-165)/(62-88) 114/88  Flow (L/min):  [3-4] 4    Physical Exam  Vitals reviewed.   Constitutional:       Comments: Sitting up in the  bed no distress  family at bedside today  HENT:      Head: Normocephalic.      Nose: Nose normal.      Mouth/Throat:      Mouth: Mucous membranes are moist.   Cardiovascular:      Rate and Rhythm: Normal rate and regular rhythm.      Pulses: Normal pulses.      Heart sounds: Normal heart sounds.   Pulmonary:      Effort: Pulmonary effort is normal.      Breath sounds: Normal breath sounds.   Abdominal:      General: Abdomen is somewhat distended.      Palpations: Abdomen is soft.   Musculoskeletal:         General: Normal range of motion.      Cervical back: Normal range of motion.   Skin:     General: Skin is warm.   Neurological:      General: No focal deficit present.      Mental Status: She is alert and oriented to person, place, and time.   Psychiatric:         Mood and Affect: Mood normal.         Behavior: Behavior normal.        Result Review    Result Review:  I have personally reviewed the results from the time of this admission to 3/2/2023 15:21 EST and agree with these findings:  [x]  Laboratory  []  Microbiology  []  Radiology  []  EKG/Telemetry   []  Cardiology/Vascular   []  Pathology  []  Old records  []  Other:  Most notable findings include: Phosphorus 2.4          Assessment &  Plan       Brief Patient Summary:  Caterina Mason is a 67 y.o. female who presents with pancreatitis and multiple intra-abdominal lesions versus cysts versus metastases        atorvastatin, 20 mg, Oral, Daily  bisacodyl, 20 mg, Oral, Once  budesonide-formoterol, 2 puff, Inhalation, BID  dicyclomine, 10 mg, Oral, 4x Daily AC & at Bedtime  dilTIAZem CD, 120 mg, Oral, Daily  fenofibrate, 145 mg, Oral, Daily  furosemide, 40 mg, Oral, Daily  insulin glargine, 40 Units, Subcutaneous, Nightly  insulin lispro, 14 Units, Subcutaneous, TID With Meals  lisinopril, 20 mg, Oral, Daily  magnesium oxide, 400 mg, Oral, BID  metoprolol tartrate, 25 mg, Oral, Q12H  pantoprazole, 40 mg, Intravenous, QAM AC  potassium chloride, 10 mEq, Oral, Daily  sodium chloride, 10 mL, Intravenous, Q12H  sodium-potassium-magnesium sulfates, 1 bottle, Oral, Q12H  [START ON 3/2/2023] sorbitol, 50 mL, Oral, Once               Active Hospital Problems:       Active Hospital Problems     Diagnosis     • **Acute pancreatitis, unspecified complication status, unspecified pancreatitis type     • Abnormal CT of the abdomen     • Hematochezia     • Generalized abdominal pain     • Gastroesophageal reflux disease        Plan:      Abdominal pain  Pancreatitis  - CT from 2/21/2023 reviewed: Cholecystitis  - Pain control: Morphine as needed then home Norco when able to take oral  - GI consulted  status post EGD colonoscopy revealing gastric pyloric mass  -Oncology consulted  -Biopsies taken during scope     Type 2 diabetes  - Glucose 160  - Last A1c 11.8 on 12/1/22  - Continue home insulin regimen, except Janumet on hold  - Accu-Cheks before meals and at bedtime to trend  - Currently n.p.o.     COPD  Obstructive sleep apnea  - Currently on room air  - Uses CPAP at home and follows   - CPAP at bedtime/as needed  - Symbicort and albuterol     Hypertension  - BP 118/61  - Metoprolol and lisinopril  - Lasix and K-Dur    - Monitor daily  labs.     Hyperlipidemia  - Lipid panel from 12/1/2022 reviewed  - Fenofibrate      History of A. fib  - EKG on 1/27/2023 still shows A-fib  - Eliquis and Cardizem     Cyst versus metastases on imaging  Found to have gastric mass on scope.  Oncology consulted     DVT prophylaxis     -Eliquis     CODE STATUS: Full code  Admission Status:  I believe this patient meets observation status.     I discussed the patient's findings and my recommendations with patient.   03/02/23      Electronically signed by Michael Lozano MD, 03/02/23, 15:21 EST.  [facility] Hospitalist Team

## 2023-03-02 NOTE — PAYOR COMM NOTE
"AUTHORIZATION PENDING:   PLEASE CALL OR FAX DETERMINATION TO CONTACT BELOW. THANK YOU.        Deb Cherry RN MSN  /UR  [facility]  [phone] office  [phone] fax  [email or web address]    [facility]  NPI: [id number]  Tax: [id number]            Martin Mason (67 y.o. Female)     Date of Birth   1955    Social Security Number       Address   [address] [place] [address] [place] [address]    Home Phone   [phone]    MRN   [id number]       Mu-ism   None    Marital Status                               Admission Date   2/28/23    Admission Type   Emergency    Admitting Provider   Michael Lozano MD    Attending Provider   Michael Lozano MD    Department, Room/Bed   [facility] 2A PEDIATRICS, 209/1       Discharge Date       Discharge Disposition       Discharge Destination                               Attending Provider: Michael Lozano MD    Allergies: Ibuprofen, Prednisone, Pregabalin, Tramadol, Adhesive Tape, Levofloxacin, Sulfamethoxazole-trimethoprim    Isolation: None   Infection: None   Code Status: CPR    Ht: 162.6 cm (64\")   Wt: 104 kg (229 lb 4.5 oz)    Admission Cmt: None   Principal Problem: Acute pancreatitis, unspecified complication status, unspecified pancreatitis type [K85.90]                 Active Insurance as of 2/28/2023     Primary Coverage     Payor Plan Insurance Group Employer/Plan Group    [employer] [place] - [employer] [id number]     Payor Plan Address Payor Plan Phone Number Payor Plan Fax Number Effective Dates    MAIL STOP:   4/1/2017 - None Entered    [address] [address]       [facility] [address]       Subscriber Name Subscriber Birth Date Member ID       MARTIN MASON 1955 [id number]                 Emergency Contacts      (Rel.) Home Phone Work Phone Mobile Phone    CHRIS,LEIDY (Daughter) [phone] -- [phone]    BARBARA MASON (Spouse) [phone] " -- --    Foremen,Agnes (Daughter) -- -- [phone]        23 1539  Inpatient Admission  Once     Completed     Level of Care: Telemetry    Diagnosis: Acute pancreatitis, unspecified complication status, unspecified pancreatitis type [7146465]    Admitting Physician: LILY LOZANO [[id number]]    Attending Physician: LILY LOZANO [[id number]]    Certification: I Certify That Inpatient Hospital Services Are Medically Necessary For Greater Than 2 Midnights        23 1538   23 1631  Initiate Observation Status  Once     Completed     Level of Care: Telemetry    Diagnosis: Acute pancreatitis, unspecified complication status, unspecified pancreatitis type [[id number]]    Admitting Physician: LILY LOZANO [[id number]]                    History & Physical      Taina Chacon APRN at 23 1625     Attestation signed by Lily Lozano MD at 23 1821    Agree with APRN note                    [facility]  History & Physical    Patient Name: Caterina Mason  : 1955  MRN: [id number]  Primary Care Physician:  Lou Curran MD  Date of admission: 2023  Date and Time of Service: 2023   at 17:23 EST      Subjective       Chief Complaint: Abd pain    History of Present Illness: Caterina Mason is a 67 y.o. female who presented to [facility] on 2023 complaining of abdominal pain for the past several weeks that is been intermittent but became more acute and severe over the past 1 week.  She is also had persistent diarrhea with intermittent vomiting.  She has seen her physician several times as well as the ED with a negative evaluation.  Patient reports she has never had pancreatitis in the past.    In ED, afebrile 98.3, /61 and on room air.  Glucose 160, lipase 130, WBC 8.30, EKG on 2023 showed A-fib.  Patient has a history of A-fib and is on Eliquis.    It is presumed she is having pancreatitis based off symptoms and recent CT  scan.    CT Abdomen Pelvis With Contrast    Result Date: 2023  1. Cholelithiasis. The gallbladder is not distended. However, there is some haziness of the pericholecystic fat which could be caused by cholecystitis, correlate clinically 2. Bilateral adrenal nodules, adenomas or metastases. MRI may help clarify 3. Bilateral hypoattenuating renal lesions, proteinaceous cysts versus solid masses. Contrast-enhanced MRI may help clarify 4. Possible filling defect in the duodenal bulb. Consider upper GI examination 5. Small amount of ascites 6. Retroperitoneal and upper abdominal adenopathy, and mildly enlarged lymph nodes in the lower thorax. Considerations include hematologic malignancy and metastatic disease Electronically Signed: Son Montelongo  2023 9:58 PM EST  Workstation ID: [id number]          Review of Systems   Gastrointestinal: Positive for abdominal pain and nausea.        Had nausea earlier, currently not.  Generalized abdominal tenderness.  Reports being very hungry, attempted to eat earlier today but could not.   All other systems reviewed and are negative.       Personal History     Past Medical History:   Diagnosis Date   • Arthritis    • Atrial fibrillation (HCC)    • Bradycardia     Dr. Charles   • Cataract    • COPD (chronic obstructive pulmonary disease) (HCC)     Dr. Rob   • Diabetes mellitus, type 2 (HCC)     sees Dr. Archibald at [place]   • DJD (degenerative joint disease)     possible herniated disc, sees Pain Mgmt in [place]   • Emphysema of lung (HCC)    •     • GERD (gastroesophageal reflux disease)    • History of combined right and left heart catheterization 2018    minimal CAD on heart cath done    • Hyperlipidemia    • Hypertension    • Pain    • Sleep apnea     wears CPAP machine, sees Darron       Past Surgical History:   Procedure Laterality Date   • ABLATION OF DYSRHYTHMIC FOCUS     • CARDIAC CATHETERIZATION  2018    and Angiograph    • CARDIAC ELECTROPHYSIOLOGY PROCEDURE  N/A 12/10/2019    Procedure: pvc ablation;  Surgeon: Alfredo Iglesias MD;  Location: [facility] INVASIVE LOC[facility];  Service: Cardiovascular   •  SECTION      x 1   • COLON RESECTION     • COLONOSCOPY  2012   • COLOSTOMY      and reversal in her 20's due to problems with childbirth   • COLOSTOMY CLOSURE     • OVARIAN CYST SURGERY     • ROTATOR CUFF REPAIR Left 2009    x2    • TOTAL ABDOMINAL HYSTERECTOMY WITH SALPINGO OOPHORECTOMY         Family History: family history includes Cancer in her sister; Diabetes in her brother; Heart disease in her father and mother; Hyperlipidemia in an other family member; Hypertension in her brother, father, and mother; Lung disease in her father; Seizures in her mother; Stroke in her father and mother. Otherwise pertinent FHx was reviewed and not pertinent to current issue.    Social History:  reports that she quit smoking about 2 years ago. Her smoking use included cigarettes. She has a 48.00 pack-year smoking history. She has never used smokeless tobacco. She reports that she does not drink alcohol and does not use drugs.    Home Medications:  Prior to Admission Medications     Prescriptions Last Dose Informant Patient Reported? Taking?    albuterol sulfate  (90 Base) MCG/ACT inhaler   No Yes    INHALE 2 PUFFS EVERY 4 HOURS AS NEEDED FOR WHEEZING OR SHORTNESS OF AIR    atorvastatin (LIPITOR) 40 MG tablet   No Yes    TAKE 1/2 TABLET BY MOUTH EVERY DAY    budesonide-formoterol (SYMBICORT) 160-4.5 MCG/ACT inhaler   Yes Yes    Inhale 2 puffs 2 (Two) Times a Day.    Cholecalciferol 25 MCG (1000 UT) capsule   No Yes    TAKE 1 TABLET BY MOUTH TWICE A DAY *OTC NOT COVERED*    dicyclomine (BENTYL) 10 MG capsule   No Yes    Take 1 capsule by mouth 4 (Four) Times a Day Before Meals & at Bedtime.    dilTIAZem CD (CARDIZEM CD) 120 MG 24 hr capsule   No Yes    TAKE 1 CAPSULE BY MOUTH EVERY DAY    Eliquis 5 MG tablet tablet   No Yes    TAKE 1 TABLET BY MOUTH 2 (TWO)  TIMES A DAY. PATIENT NEEDS AN APPT BEFORE ANY MORE REFILLS    fenofibrate 160 MG tablet   No Yes    TAKE 1 TABLET BY MOUTH EVERY DAY    furosemide (LASIX) 40 MG tablet   No Yes    TAKE 1 TABLET BY MOUTH EVERY DAY    HumaLOG KwikPen 100 UNIT/ML solution pen-injector   No Yes    Inject 14 Units under the skin into the appropriate area as directed 3 (Three) Times a Day Before Meals.    HYDROcodone-acetaminophen (NORCO) 7.5-325 MG per tablet   Yes Yes    Take 1 tablet by mouth 3 (Three) Times a Day As Needed for Moderate Pain.    Insulin Glargine (BASAGLAR KWIKPEN) 100 UNIT/ML injection pen   No Yes    INJECT 40 UNITS UNDER THE SKIN INTO THE APPROPRIATE AREA AS DIRECTED EVERY NIGHT.    Janumet XR  MG tablet   No Yes    TAKE 2 TABLETS BY MOUTH EVERY DAY    lisinopril (PRINIVIL,ZESTRIL) 20 MG tablet   No Yes    TAKE 1 TABLET BY MOUTH EVERY DAY    magnesium oxide (MAG-OX) 400 MG tablet   No Yes    TAKE 1 TABLET BY MOUTH TWICE A DAY    metoprolol tartrate (LOPRESSOR) 25 MG tablet   No Yes    TAKE 1 TABLET BY MOUTH EVERY 12 HOURS FOR 30 DAYS.    ondansetron ODT (ZOFRAN-ODT) 4 MG disintegrating tablet   No Yes    Place 1 tablet on the tongue Every 8 (Eight) Hours As Needed for Nausea or Vomiting for up to 5 days.    potassium chloride (MICRO-K) 10 MEQ CR capsule   No Yes    TAKE 1 CAPSULE BY MOUTH EVERY DAY            Allergies:  Allergies   Allergen Reactions   • Ibuprofen GI Intolerance   • Prednisone Other (See Comments)   • Pregabalin Other (See Comments)     jerking   • Tramadol Other (See Comments)     Legs jerked   • Adhesive Tape Other (See Comments)     irritation   • Levofloxacin Other (See Comments)     Increase sunburn   • Sulfamethoxazole-Trimethoprim Swelling       Objective       Vitals:   Temp:  [98.3 °F (36.8 °C)] 98.3 °F (36.8 °C)  Heart Rate:  [] 84  Resp:  [16] 16  BP: (114-127)/(56-66) 118/61    Physical Exam  Vitals reviewed.   Constitutional:       Appearance: She is obese.   HENT:       Head: Normocephalic.      Mouth/Throat:      Mouth: Mucous membranes are dry.   Eyes:      Pupils: Pupils are equal, round, and reactive to light.   Cardiovascular:      Rate and Rhythm: Normal rate and regular rhythm.      Pulses: Normal pulses.      Heart sounds: Normal heart sounds.   Pulmonary:      Effort: Pulmonary effort is normal.      Breath sounds: Normal breath sounds.   Abdominal:      General: Bowel sounds are normal.      Palpations: Abdomen is soft.      Tenderness: There is generalized abdominal tenderness and tenderness in the right upper quadrant.   Musculoskeletal:         General: Normal range of motion.   Skin:     General: Skin is warm and dry.      Capillary Refill: Capillary refill takes less than 2 seconds.   Neurological:      General: No focal deficit present.      Mental Status: She is alert and oriented to person, place, and time.   Psychiatric:         Mood and Affect: Mood normal.         Behavior: Behavior normal.         Result Review    Result Review:  I have personally reviewed the results from the time of this admission to 2/28/2023 16:26 EST and agree with these findings:  [x]  Laboratory  [x]  Microbiology  [x]  Radiology  [x]  EKG/Telemetry   []  Cardiology/Vascular   []  Pathology  []  Old records  []  Other:      Assessment & Plan        Active Hospital Problems:  There are no active hospital problems to display for this patient.    Plan:     Abdominal pain  Pancreatitis  - CT from 2/21/2023 reviewed: Cholecystitis  - N.p.o.  - Pain control: Morphine as needed then home Norco when able to take oral  - GI consulted    Type 2 diabetes  - Glucose 160  - Last A1c 11.8 on 12/1/22  - Continue home insulin regimen, except Janumet on hold  - Accu-Cheks before meals and at bedtime to trend  - Currently n.p.o.     COPD  Obstructive sleep apnea  - Currently on room air  - Uses CPAP at home and follows   - CPAP at bedtime/as needed  - Symbicort and albuterol     Hypertension  -  /61  - Metoprolol and lisinopril  - Lasix and K-Dur    - Monitor daily labs.     Hyperlipidemia  - Lipid panel from 2022 reviewed  - Fenofibrate      History of A. fib  - EKG on 2023 still shows A-fib  - Eliquis and Cardizem     DVT prophylaxis  -Eliquis    CODE STATUS: Full code     Admission Status:  I believe this patient meets observation status.    I discussed the patient's findings and my recommendations with patient.      Signature: Electronically signed by YAW Sutton, 23, 16:26 EST.  [facility] Hospitalist Team    Electronically signed by Michael Loazno MD at 23 1821          Emergency Department Notes      Rhys Ortega MD at 23 1547          Subjective   History of Present Illness  Patient is a 67-year-old female complaint of abdominal pain for the past several weeks that is been intermittent but became more acute and severe over the past 1 week.  She is also had persistent diarrhea with intermittent vomiting.  She seniors physician several times as well as the ED with a negative evaluation.        Review of Systems  Negative for cough fever chest pain shortness of breath dysuria or other complaint  Past Medical History:   Diagnosis Date   • Arthritis    • Atrial fibrillation ([facility])    • Bradycardia     Dr. Charles   • Cataract    • COPD (chronic obstructive pulmonary disease) ([facility])     Dr. Rob   • Diabetes mellitus, type 2 ([facility])     sees Dr. Archibald at [place]   • DJD (degenerative joint disease)     possible herniated disc, sees Pain Mgmt in [place]   • Emphysema of lung ([facility])    •     • GERD (gastroesophageal reflux disease)    • History of combined right and left heart catheterization 2018    minimal CAD on heart cath done    • Hyperlipidemia    • Hypertension    • Pain    • Sleep apnea     wears CPAP machine, sees Darron       Allergies   Allergen Reactions   • Ibuprofen GI Intolerance   • Prednisone Other (See Comments)   • Pregabalin Other (See  Comments)     jerking   • Tramadol Other (See Comments)     Legs jerked   • Adhesive Tape Other (See Comments)     irritation   • Levofloxacin Other (See Comments)     Increase sunburn   • Sulfamethoxazole-Trimethoprim Swelling       Past Surgical History:   Procedure Laterality Date   • ABLATION OF DYSRHYTHMIC FOCUS     • CARDIAC CATHETERIZATION      and Angiograph    • CARDIAC ELECTROPHYSIOLOGY PROCEDURE N/A 12/10/2019    Procedure: pvc ablation;  Surgeon: Alfredo Iglesias MD;  Location: [facility] INVASIVE LOCATION;  Service: Cardiovascular   •  SECTION      x 1   • COLON RESECTION     • COLONOSCOPY  2012   • COLOSTOMY      and reversal in her 20's due to problems with childbirth   • COLOSTOMY CLOSURE     • OVARIAN CYST SURGERY     • ROTATOR CUFF REPAIR Left 2009    x2    • TOTAL ABDOMINAL HYSTERECTOMY WITH SALPINGO OOPHORECTOMY         Family History   Problem Relation Age of Onset   • Heart disease Mother    • Hypertension Mother    • Stroke Mother    • Seizures Mother    • Heart disease Father    • Hypertension Father    • Lung disease Father    • Stroke Father    • Cancer Sister         unknown   • Hypertension Brother    • Diabetes Brother    • Hyperlipidemia Other        Social History     Socioeconomic History   • Marital status:    Tobacco Use   • Smoking status: Former     Packs/day: 1.00     Years: 48.00     Pack years: 48.00     Types: Cigarettes     Quit date: 3/1/2020     Years since quittin.9   • Smokeless tobacco: Never   Vaping Use   • Vaping Use: Never used   Substance and Sexual Activity   • Alcohol use: No   • Drug use: No   • Sexual activity: Defer           Objective   Physical Exam   Neck has no adenopathy JVD or bruits.  Lungs are clear.  Heart has regular rate rhythm without murmur rub or gallop.  Chest is nontender.  Ab soft with mild to moderate diffuse epigastric tenderness.  Patient has normal bowel sounds without rebound or guarding.  Back has no  CVA tenderness.  Extremities M unremarkable.    Procedures          ED Course      Results for orders placed or performed during the hospital encounter of 02/28/23   Comprehensive Metabolic Panel    Specimen: Blood   Result Value Ref Range    Glucose 160 (H) 65 - 99 mg/dL    BUN 14 8 - 23 mg/dL    Creatinine 0.88 0.57 - 1.00 mg/dL    Sodium 141 136 - 145 mmol/L    Potassium 3.8 3.5 - 5.2 mmol/L    Chloride 103 98 - 107 mmol/L    CO2 27.0 22.0 - 29.0 mmol/L    Calcium 10.2 8.6 - 10.5 mg/dL    Total Protein 6.8 6.0 - 8.5 g/dL    Albumin 3.5 3.5 - 5.2 g/dL    ALT (SGPT) 17 1 - 33 U/L    AST (SGOT) 23 1 - 32 U/L    Alkaline Phosphatase 49 39 - 117 U/L    Total Bilirubin 0.5 0.0 - 1.2 mg/dL    Globulin 3.3 gm/dL    A/G Ratio 1.1 g/dL    BUN/Creatinine Ratio 15.9 7.0 - 25.0    Anion Gap 11.0 5.0 - 15.0 mmol/L    eGFR 72.1 >60.0 mL/min/1.73   Lipase    Specimen: Blood   Result Value Ref Range    Lipase 130 (H) 13 - 60 U/L   CBC Auto Differential    Specimen: Blood   Result Value Ref Range    WBC 8.30 3.40 - 10.80 10*3/mm3    RBC 3.89 3.77 - 5.28 10*6/mm3    Hemoglobin 11.7 (L) 12.0 - 15.9 g/dL    Hematocrit 34.0 34.0 - 46.6 %    MCV 87.4 79.0 - 97.0 fL    MCH 30.1 26.6 - 33.0 pg    MCHC 34.5 31.5 - 35.7 g/dL    RDW 14.2 12.3 - 15.4 %    RDW-SD 45.9 37.0 - 54.0 fl    MPV 7.7 6.0 - 12.0 fL    Platelets 283 140 - 450 10*3/mm3    Neutrophil % 68.1 42.7 - 76.0 %    Lymphocyte % 20.1 19.6 - 45.3 %    Monocyte % 9.6 5.0 - 12.0 %    Eosinophil % 1.8 0.3 - 6.2 %    Basophil % 0.4 0.0 - 1.5 %    Neutrophils, Absolute 5.60 1.70 - 7.00 10*3/mm3    Lymphocytes, Absolute 1.70 0.70 - 3.10 10*3/mm3    Monocytes, Absolute 0.80 0.10 - 0.90 10*3/mm3    Eosinophils, Absolute 0.20 0.00 - 0.40 10*3/mm3    Basophils, Absolute 0.00 0.00 - 0.20 10*3/mm3    nRBC 0.1 0.0 - 0.2 /100 WBC     No radiology results for the last day                                       Medical Decision Making  Patient has findings consistent with acute pancreatitis.   There is no evidence of hepatitis or other metabolic or inflammatory process.  Patient has no acute kidney injury or electrolyte abnormality.  There is no evidence acute infectious process at this time.  Patient was given IV fluids as well as morphine.  She will be admitted for further pain control and evaluation.  I did speak to the on-call hospitalist who agrees with admission and treatment plan.    Amount and/or Complexity of Data Reviewed  Labs: ordered. Decision-making details documented in ED Course.      Risk  Prescription drug management.  Parenteral controlled substances.          Final diagnoses:   Acute pancreatitis, unspecified complication status, unspecified pancreatitis type       ED Disposition  ED Disposition     ED Disposition   Decision to Admit    Condition   --    Comment   --             No follow-up provider specified.       Medication List      No changes were made to your prescriptions during this visit.          Rhys Ortega MD  02/28/23 1549      Electronically signed by Rhys Ortega MD at 02/28/23 1545          Operative/Procedure Notes (all)      Mary Jo Pichardo MD at 03/02/23 1142  Version 2 of 2       COLONOSCOPY, ESOPHAGOGASTRODUODENOSCOPY Procedure Report    Patient Name:  Caterina Mason  YOB: 1955    Date of Surgery:  3/2/2023     Pre-Op Diagnosis:  Hematochezia [K92.1]  Generalized abdominal pain [R10.84]  Gastroesophageal reflux disease without esophagitis [K21.9]  Abnormal CT of the abdomen [R93.5]    Post Op Diagnosis:  Malignant appearing lesion of the pylorus and duodenal bulb  Small hiatal hernia  Colon polyps x3  Internal hemorrhoids      Procedure/CPT® Codes:      Procedure(s):  COLONOSCOPY WITH POLYPECTOMY X 3 AND RANDOM COLON BIOPSIES  ESOPHAGOGASTRODUODENOSCOPY WITH BIOPSY X2 AREAS    Staff:  Surgeon(s):  Mary Jo Pichardo MD         Anesthesia: Monitored Anesthesia Care    Implants:    Nothing was implanted during the  procedure    Specimen:        See Below    No blood loss    Complications:  None     Description of Procedure:  Informed consent was obtained for the procedure, including sedation.  Risks of perforation, hemorrhage, adverse drug reaction and aspiration were discussed.  The patient was brought into the endoscopy suite. Continuous cardiopulmonary monitoring was performed. The patient was placed in the left lateral decubitus position.  The bite block was inserted into the patient's mouth. After adequate sedation was attained, the Olympus gastroscope was inserted into the patient's mouth and advanced to the second portion of the duodenum without difficulty.  Circumferential examination was performed. A retroflex exam was performed in the patient's stomach.  On completion of the exam, the bowel was decompressed, the scope was removed from the patient, the patient tolerated the procedure well, there were no immediate post-operative complications.     Examination of the esophagus showed normal mucosa and 2 cm hiatal hernia  Examination of the stomach showed abnormal mucosa of the pylorus nodularity and discoloration.  Firm on biopsy, suspect infiltrating tumor.  Confluent with lesion in the duodenal bulb.  Retroflex examination of the stomach was normal   Examination of the duodenum showed ulcerative mass lesion in the proximal duodenal bulb.  Biopsies were obtained    Subsequently, the colonoscopy was performed with the patient in the left lateral decubitus position. The digital rectal exam was performed which showed skin tags.  Subsequently, the Olympus colonoscope was inserted into the patient's rectum and advanced to the level of the cecum and terminal ileum without difficulty.  The bowel prep was excellent.  Circumferential examination of the patient's colon was performed on scope withdrawal.  A retroflex exam was performed in the rectum which showed small internal hemorrhoids.  The bowel was decompressed, the scope  was withdrawn from the patient, and the patient tolerated the procedure well. There were no immediate post-operative complications.     Findings:   Normal mucosa of the terminal ileum  Cecal polyp x1, 6 mm in size, removed in single piece fashion cold snare polypectomy  Ascending colon polyp x1, 8 mm in size, removed in single piece fashion cold snare polypectomy.  Postsurgical changes of the distal sigmoid colon  Small internal hemorrhoids.        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  Anticipate need for oncology consult following pathologic diagnosis.    CT chest  PPI daily  Repeat colonoscopy in 3 years of polyps are adenomas, 5 years if they are hyperplastic  Ok to resume Eliquis in 3 days      Mary Jo Pichardo MD     Date: 3/2/2023  Time: 12:12 EST            Electronically signed by Mary Jo Pichardo MD at 03/02/23 1234     Mary Jo Pichardo MD at 03/02/23 1142  Version 1 of 2       COLONOSCOPY, ESOPHAGOGASTRODUODENOSCOPY Procedure Report    Patient Name:  Caterina Mason  YOB: 1955    Date of Surgery:  3/2/2023     Pre-Op Diagnosis:  Hematochezia [K92.1]  Generalized abdominal pain [R10.84]  Gastroesophageal reflux disease without esophagitis [K21.9]  Abnormal CT of the abdomen [R93.5]    Post Op Diagnosis:  Malignant appearing lesion of the pylorus and duodenal bulb  Small hiatal hernia  Colon polyps x3  Internal hemorrhoids      Procedure/CPT® Codes:      Procedure(s):  COLONOSCOPY WITH POLYPECTOMY X 3 AND RANDOM COLON BIOPSIES  ESOPHAGOGASTRODUODENOSCOPY WITH BIOPSY X2 AREAS    Staff:  Surgeon(s):  Mary Jo Pichardo MD         Anesthesia: Monitored Anesthesia Care    Implants:    Nothing was implanted during the procedure    Specimen:        See Below    No blood loss    Complications:  None      Description of Procedure:  Informed consent was obtained for the procedure, including sedation.  Risks of perforation, hemorrhage, adverse drug reaction and aspiration were discussed.  The patient was brought into the endoscopy suite. Continuous cardiopulmonary monitoring was performed. The patient was placed in the left lateral decubitus position.  The bite block was inserted into the patient's mouth. After adequate sedation was attained, the Olympus gastroscope was inserted into the patient's mouth and advanced to the second portion of the duodenum without difficulty.  Circumferential examination was performed. A retroflex exam was performed in the patient's stomach.  On completion of the exam, the bowel was decompressed, the scope was removed from the patient, the patient tolerated the procedure well, there were no immediate post-operative complications.     Examination of the esophagus showed normal mucosa and 2 cm hiatal hernia  Examination of the stomach showed abnormal mucosa of the pylorus nodularity and discoloration.  Firm on biopsy, suspect infiltrating tumor.  Confluent with lesion in the duodenal bulb.  Retroflex examination of the stomach was normal   Examination of the duodenum showed ulcerative mass lesion in the proximal duodenal bulb.  Biopsies were obtained    Subsequently, the colonoscopy was performed with the patient in the left lateral decubitus position. The digital rectal exam was performed which showed skin tags.  Subsequently, the Olympus colonoscope was inserted into the patient's rectum and advanced to the level of the cecum and terminal ileum without difficulty.  The bowel prep was excellent.  Circumferential examination of the patient's colon was performed on scope withdrawal.  A retroflex exam was performed in the rectum which showed small internal hemorrhoids.  The bowel was decompressed, the scope was withdrawn from the patient, and the patient tolerated the procedure well.  There were no immediate post-operative complications.     Findings:   Normal mucosa of the terminal ileum  Cecal polyp x1, 6 mm in size, removed in single piece fashion cold snare polypectomy  Ascending colon polyp x1, 8 mm in size, removed in single piece fashion cold snare polypectomy.  Postsurgical changes of the distal sigmoid colon  Small internal hemorrhoids.        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  Anticipate need for oncology consult following pathologic diagnosis.    CT chest  PPI daily  Repeat colonoscopy in 3 years of polyps are adenomas, 5 years if they are hyperplastic      Mary Jo Pichardo MD     Date: 3/2/2023  Time: 12:12 EST            Electronically signed by Mary Jo Pichardo MD at 23 1221          Physician Progress Notes (all)      Michael Lozano MD at 23 1521              [facility] Medicine Services Daily Progress Note    Patient Name: Caterina Mason  : 1955  MRN: [id number]  Primary Care Physician:  Lou Curran MD  Date of admission: 2023      Subjective       Chief Complaint: Abdominal pain     Patient Reports she is feeling bloated.  Had EGD and colonoscopy revealing gastric mass.  The patient will be seen by oncology.  Biopsies taken.     ROS negative except as above    Objective       Vitals:   Temp:  [97.6 °F (36.4 °C)-98.7 °F (37.1 °C)] 97.6 °F (36.4 °C)  Heart Rate:  [] 95  Resp:  [14-19] 14  BP: (114-165)/(62-88) 114/88  Flow (L/min):  [3-4] 4    Physical Exam  Vitals reviewed.   Constitutional:       Comments: Sitting up in the  bed no distress  family at bedside today  HENT:      Head: Normocephalic.      Nose: Nose normal.      Mouth/Throat:      Mouth: Mucous membranes are moist.   Cardiovascular:      Rate and Rhythm: Normal rate and regular  rhythm.      Pulses: Normal pulses.      Heart sounds: Normal heart sounds.   Pulmonary:      Effort: Pulmonary effort is normal.      Breath sounds: Normal breath sounds.   Abdominal:      General: Abdomen is somewhat distended.      Palpations: Abdomen is soft.   Musculoskeletal:         General: Normal range of motion.      Cervical back: Normal range of motion.   Skin:     General: Skin is warm.   Neurological:      General: No focal deficit present.      Mental Status: She is alert and oriented to person, place, and time.   Psychiatric:         Mood and Affect: Mood normal.         Behavior: Behavior normal.        Result Review    Result Review:  I have personally reviewed the results from the time of this admission to 3/2/2023 15:21 EST and agree with these findings:  [x]  Laboratory  []  Microbiology  []  Radiology  []  EKG/Telemetry   []  Cardiology/Vascular   []  Pathology  []  Old records  []  Other:  Most notable findings include: Phosphorus 2.4          Assessment & Plan       Brief Patient Summary:  Caterina Mason is a 67 y.o. female who presents with pancreatitis and multiple intra-abdominal lesions versus cysts versus metastases        atorvastatin, 20 mg, Oral, Daily  bisacodyl, 20 mg, Oral, Once  budesonide-formoterol, 2 puff, Inhalation, BID  dicyclomine, 10 mg, Oral, 4x Daily AC & at Bedtime  dilTIAZem CD, 120 mg, Oral, Daily  fenofibrate, 145 mg, Oral, Daily  furosemide, 40 mg, Oral, Daily  insulin glargine, 40 Units, Subcutaneous, Nightly  insulin lispro, 14 Units, Subcutaneous, TID With Meals  lisinopril, 20 mg, Oral, Daily  magnesium oxide, 400 mg, Oral, BID  metoprolol tartrate, 25 mg, Oral, Q12H  pantoprazole, 40 mg, Intravenous, QAM AC  potassium chloride, 10 mEq, Oral, Daily  sodium chloride, 10 mL, Intravenous, Q12H  sodium-potassium-magnesium sulfates, 1 bottle, Oral, Q12H  [START ON 3/2/2023] sorbitol, 50 mL, Oral, Once               Active Hospital Problems:       Active Hospital  Problems     Diagnosis     • **Acute pancreatitis, unspecified complication status, unspecified pancreatitis type     • Abnormal CT of the abdomen     • Hematochezia     • Generalized abdominal pain     • Gastroesophageal reflux disease        Plan:      Abdominal pain  Pancreatitis  - CT from 2023 reviewed: Cholecystitis  - Pain control: Morphine as needed then home Norco when able to take oral  - GI consulted  status post EGD colonoscopy revealing gastric pyloric mass  -Oncology consulted  -Biopsies taken during scope     Type 2 diabetes  - Glucose 160  - Last A1c 11.8 on 22  - Continue home insulin regimen, except Janumet on hold  - Accu-Cheks before meals and at bedtime to trend  - Currently n.p.o.     COPD  Obstructive sleep apnea  - Currently on room air  - Uses CPAP at home and follows   - CPAP at bedtime/as needed  - Symbicort and albuterol     Hypertension  - BP 118/61  - Metoprolol and lisinopril  - Lasix and K-Dur    - Monitor daily labs.     Hyperlipidemia  - Lipid panel from 2022 reviewed  - Fenofibrate      History of A. fib  - EKG on 2023 still shows A-fib  - Eliquis and Cardizem     Cyst versus metastases on imaging  Found to have gastric mass on scope.  Oncology consulted     DVT prophylaxis     -Eliquis     CODE STATUS: Full code  Admission Status:  I believe this patient meets observation status.     I discussed the patient's findings and my recommendations with patient.   23      Electronically signed by Michael Lozano MD, 23, 15:21 EST.  [facility] Hospitalist Team             Electronically signed by Michael Lozano MD at 23 1523     Kris Galindo MD at 23 0811            [facility] DAILY PROGRESS NOTE    Patient Identification  Name: Caterina Mason  Age: 67 y.o.  Sex: female  :  1955  MRN: [id number]    Date: 3/2/2023             Subjective:  Interval History: MRI done yesterday     Objective:    Scheduled Meds:atorvastatin,  "20 mg, Oral, Daily  budesonide-formoterol, 2 puff, Inhalation, BID  dicyclomine, 10 mg, Oral, 4x Daily AC & at Bedtime  dilTIAZem CD, 120 mg, Oral, Daily  fenofibrate, 145 mg, Oral, Daily  furosemide, 40 mg, Oral, Daily  insulin glargine, 40 Units, Subcutaneous, Nightly  insulin lispro, 14 Units, Subcutaneous, TID With Meals  lisinopril, 20 mg, Oral, Daily  magnesium oxide, 400 mg, Oral, BID  metoprolol tartrate, 100 mg, Oral, Q12H  pantoprazole, 40 mg, Intravenous, QAM AC  potassium chloride, 10 mEq, Oral, Daily  sodium chloride, 10 mL, Intravenous, Q12H  sodium chloride, 3 mL, Intravenous, Q12H  sorbitol, 50 mL, Oral, Once      Continuous Infusions:lactated ringers, 100 mL/hr, Last Rate: 100 mL/hr (03/02/23 0315)      PRN Meds:•  acetaminophen **OR** acetaminophen **OR** acetaminophen  •  albuterol sulfate HFA  •  HYDROcodone-acetaminophen  •  Morphine  •  ondansetron  •  [COMPLETED] Insert Peripheral IV **AND** sodium chloride  •  sodium chloride  •  sodium chloride  •  sodium chloride  •  sodium chloride    Vital signs in last 24 hours:  Temp:  [97.6 °F (36.4 °C)-98.7 °F (37.1 °C)] 97.6 °F (36.4 °C)  Heart Rate:  [100-138] 116  Resp:  [14-20] 19  BP: (106-143)/(51-76) 143/76    Intake/Output:    Intake/Output Summary (Last 24 hours) at 3/2/2023 0811  Last data filed at 3/1/2023 1912  Gross per 24 hour   Intake 720 ml   Output --   Net 720 ml       Exam:  /76 (BP Location: Left arm, Patient Position: Sitting)   Pulse 116   Temp 97.6 °F (36.4 °C) (Oral)   Resp 19   Ht 162.6 cm (64\")   Wt 104 kg (229 lb 4.5 oz)   SpO2 95%   BMI 39.36 kg/m²     General Appearance:    Alert, cooperative, no distress, appears stated age               Abdomen:     Soft, ND   :    No suprapubic distention                Data Review:  All labs (24hrs):   Recent Results (from the past 24 hour(s))   POC Glucose Once    Collection Time: 03/01/23 11:12 AM    Specimen: Blood   Result Value Ref Range    Glucose 142 (H) 70 - 105 " mg/dL   POC Glucose Once    Collection Time: 03/01/23  6:13 PM    Specimen: Blood   Result Value Ref Range    Glucose 169 (H) 70 - 105 mg/dL   POC Glucose Once    Collection Time: 03/01/23  7:37 PM    Specimen: Blood   Result Value Ref Range    Glucose 204 (H) 70 - 105 mg/dL   POC Glucose Once    Collection Time: 03/01/23 10:21 PM    Specimen: Blood   Result Value Ref Range    Glucose 168 (H) 70 - 105 mg/dL   POC Glucose Once    Collection Time: 03/02/23  7:19 AM    Specimen: Blood   Result Value Ref Range    Glucose 214 (H) 70 - 105 mg/dL      Imaging Results (Last 24 Hours)     Procedure Component Value Units Date/Time    MRI Abdomen With & Without Contrast [[id number]] Collected: 03/01/23 1815     Updated: 03/01/23 1843    Narrative:      MRI ABDOMEN W WO CONTRAST    Date of Exam: 3/1/2023 5:00 PM EST    Indication: Renal and Adrenal lesions. Abnormal CT.     Comparison: None available.    Technique:  Routine multiplanar/multisequence images of the abdomen were obtained before and after the uneventful administration of 20 mL Prohance.    Findings:  The liver is normal in size measuring 15.2 cm in craniocaudal dimension. There is no evidence of hepatic steatosis or iron deposition. There is no focal liver lesion.    The gallbladder is present with multiple gallstones. There is no significant gallbladder wall thickening. There is right upper quadrant perihepatic ascites and pericholecystic fluid. The common hepatic duct measures 8 mm and the distal common bile duct   measures 5 mm. There are no filling defects to suggest choledocholithiasis.    The spleen is at the upper limit of normal measuring 12.6 cm in craniocaudal dimension. There is subtle nodular thickening of the adrenal glands. There is no evidence of definitive underlying lipid rich adrenal adenoma. There is some motion artifact   which limits assessment of the small adrenal nodules. These can be followed on serial imaging. There is normal intrinsic T1  signal within the pancreas. There is no pancreatic ductal dilatation. There are multiple tiny T2 hyperintense foci within the   pancreas likely representing small side branches or small branch duct type IPMN. These measure up to 3-4 mm in size and are likely of little clinical significance.    The kidneys are symmetric in size and enhancement. There is a T1 intermediate and T2 hyperintense 2.6 cm lesion within the posterior mid aspect of the left kidney. This does not demonstrate any internal enhancement as confirmed by subtraction imaging and   is compatible with a hemorrhagic or proteinaceous cyst. There is an additional 1.2 cm partially exophytic hemorrhagic or proteinaceous cyst arising from the inferior lateral aspect of the right kidney. There are additional small T2 hyperintense simple   appearing cysts without evidence of internal enhancement. There is no hydronephrosis or hydroureter.    There is an abnormal hypoenhancing mass measuring 3.5 x 3.1 cm which appears to be centered within the duodenal bulb. This is partially intramural and partially endoluminal and would be best evaluated with endoscopy. There were abnormal upper abdominal   lymph nodes including a 1.8 cm short axis lymph node adjacent to the common hepatic artery, a portal caval lymph node measuring 16 mm, and retroperitoneal adenopathy surrounding the abdominal aorta measuring 12 and 13 mm in short axis.      Impression:      Impression:  1. Abnormal hypoenhancing mass measuring 3.5 x 3.1 cm within the duodenal bulb. This appears partially intramural and partially endoluminal. Differential considerations would include gastric or duodenal malignancy, leiomyoma, or less likely GIST.   Recommend further evaluation with endoscopy.  2. Enlarged celiac axis, portacaval, and retroperitoneal lymph nodes. Further management would be dependent on pathologic diagnosis of the duodenal mass.  3. Hemorrhagic or proteinaceous cyst within the posterior mid  left kidney and exophytic arising from the posterior inferior aspect of the right kidney. No evidence of enhancing renal mass. Additional simple appearing renal cysts are also present.  4. Limited assessment of the small bilateral adrenal nodules. There is no definite loss of signal to suggest clear lipid rich adrenal adenomas. These could be followed with serial imaging.  5. Cholelithiasis without MR findings of acute cholecystitis. No evidence of choledocholithiasis.  6. Small volume perihepatic ascites.    Electronically Signed: Milton Carter    3/1/2023 6:41 PM EST    Workstation ID: [id number]  [employer]    US Abdomen Limited [[id number]] Collected: 03/01/23 1037     Updated: 03/01/23 1048    Narrative:      US ABDOMEN LIMITED    Date of Exam: 3/1/2023 10:10 AM EST    Indication: cholelithiasis.    Comparison: CT abdomen and pelvis with contrast 2/21/2023.    Technique: Grayscale and color Doppler ultrasound evaluation of the right upper quadrant was performed.    Findings:  The liver is enlarged up to 21.16 craniocaudally. The liver demonstrates a diffusely coarsened echotexture, suggestive of steatosis. No focal liver lesion is identified. The does not appear grossly cirrhotic. No focal liver lesions are identified.    Common bile duct measures 8 mm, just slightly above upper limits normal for the patient's stated age. No choledocholithiasis or obstructing abnormality seen.    A few tiny layering gallstones are present. The gallbladder does not appear inflamed or thickened on this examination.    The main portal vein is patent. The imaged hepatic veins are patent.    Right kidney measures 10.9 cm in length, without shadowing stone or hydronephrosis. Right renal cyst seen on the previous CT are not well-visualized on today's ultrasound examination.    Trace ascites is seen adjacent to the right hepatic lobe.    The pancreas is mostly obscured by overlying bowel gas. The visualized portion of the pancreatic head  and neck appear unremarkable.          Impression:      Impression:    1. Cholelithiasis, without sonographic evidence of cholecystitis.  2. Common bile duct measures 8 mm, just slightly above upper limits normal for the patient's stated age. No intrahepatic biliary ductal dilation is seen. No obstructing biliary abnormality is identified.  3. Hepatomegaly with features of diffuse hepatic steatosis.  4. The visualized is the pancreas have a normal sonographic appearance.  5. The right renal cyst seen on the prior CT are not visible on today's ultrasound.    Electronically Signed: Emeli Adamschip    3/1/2023 10:45 AM EST    Workstation ID: [id number]           Assessment:    Acute pancreatitis, unspecified complication status, unspecified pancreatitis type    Gastroesophageal reflux disease    Generalized abdominal pain    Hematochezia    Abnormal CT of the abdomen    Bilateral small adrenal nodules  Renal cysts     Plan:      MRI images reviewed no enhancing renal mass, small adrenal nodules, will follow as outpatient, follow up 1 month to establish     Kris Galindo MD  [facility]  [facility] [address], [address]  [place] [address]  Office: [phone]  Cell: [phone]  Also available via Epic Secure Chat  23  08:11 EST       Electronically signed by Kris Galindo MD at 23 0812     Michael Lozano MD at 23 [facility]2              [facility] Medicine Services Daily Progress Note    Patient Name: Caterina Mason  : 1955  MRN: [id number]  Primary Care Physician:  Lou Curran MD  Date of admission: 2023      Subjective       Chief Complaint: Abdominal pain      Patient Reports she is feeling better today.  Concerned about renal lesions noted on imaging.  Seen by urology.  Pending EGD tomorrow.    ROS negative except as above      Objective       Vitals:   Temp:  [97.5 °F (36.4 °C)-98.1 °F (36.7 °C)] 97.8 °F (36.6 °C)  Heart Rate:  [] 100  Resp:  [15-21]  20  BP: (106-124)/(51-71) 122/57  Flow (L/min):  [3] 3    Physical Exam  Vitals reviewed.   Constitutional:       Comments: Sitting up in the chair no distress   HENT:      Head: Normocephalic.      Nose: Nose normal.      Mouth/Throat:      Mouth: Mucous membranes are moist.   Cardiovascular:      Rate and Rhythm: Normal rate and regular rhythm.      Pulses: Normal pulses.      Heart sounds: Normal heart sounds.   Pulmonary:      Effort: Pulmonary effort is normal.      Breath sounds: Normal breath sounds.   Abdominal:      General: Abdomen is flat.      Palpations: Abdomen is soft.   Musculoskeletal:         General: Normal range of motion.      Cervical back: Normal range of motion.   Skin:     General: Skin is warm.   Neurological:      General: No focal deficit present.      Mental Status: She is alert and oriented to person, place, and time.   Psychiatric:         Mood and Affect: Mood normal.         Behavior: Behavior normal.             Result Review    Result Review:  I have personally reviewed the results from the time of this admission to 3/1/2023 16:22 EST and agree with these findings:  [x]  Laboratory  []  Microbiology  []  Radiology  []  EKG/Telemetry   []  Cardiology/Vascular   []  Pathology  []  Old records  []  Other:  Most notable findings include: Normal lab work          Assessment & Plan      Brief Patient Summary:  Caterina Mason is a 67 y.o. female who presents with pancreatitis and multiple intra-abdominal lesions versus cysts versus metastases      atorvastatin, 20 mg, Oral, Daily  bisacodyl, 20 mg, Oral, Once  budesonide-formoterol, 2 puff, Inhalation, BID  dicyclomine, 10 mg, Oral, 4x Daily AC & at Bedtime  dilTIAZem CD, 120 mg, Oral, Daily  fenofibrate, 145 mg, Oral, Daily  furosemide, 40 mg, Oral, Daily  insulin glargine, 40 Units, Subcutaneous, Nightly  insulin lispro, 14 Units, Subcutaneous, TID With Meals  lisinopril, 20 mg, Oral, Daily  magnesium oxide, 400 mg, Oral,  BID  metoprolol tartrate, 25 mg, Oral, Q12H  pantoprazole, 40 mg, Intravenous, QAM AC  potassium chloride, 10 mEq, Oral, Daily  sodium chloride, 10 mL, Intravenous, Q12H  sodium-potassium-magnesium sulfates, 1 bottle, Oral, Q12H  [START ON 3/2/2023] sorbitol, 50 mL, Oral, Once             Active Hospital Problems:  Active Hospital Problems    Diagnosis    • **Acute pancreatitis, unspecified complication status, unspecified pancreatitis type    • Abnormal CT of the abdomen    • Hematochezia    • Generalized abdominal pain    • Gastroesophageal reflux disease      Plan:      Abdominal pain  Pancreatitis  - CT from 2/21/2023 reviewed: Cholecystitis  - Pain control: Morphine as needed then home Norco when able to take oral  - GI consulted pending scope March 2     Type 2 diabetes  - Glucose 160  - Last A1c 11.8 on 12/1/22  - Continue home insulin regimen, except Janumet on hold  - Accu-Cheks before meals and at bedtime to trend  - Currently n.p.o.     COPD  Obstructive sleep apnea  - Currently on room air  - Uses CPAP at home and follows   - CPAP at bedtime/as needed  - Symbicort and albuterol     Hypertension  - /61  - Metoprolol and lisinopril  - Lasix and K-Dur    - Monitor daily labs.     Hyperlipidemia  - Lipid panel from 12/1/2022 reviewed  - Fenofibrate      History of A. fib  - EKG on 1/27/2023 still shows A-fib  - Eliquis and Cardizem    Cyst versus metastases on imaging  Urology consulted for renal cysts  Outpatient work-up recommended  Perhaps EGD/colonoscopy results will reveal more information regarding lesions/metastases     DVT prophylaxis    -Eliquis     CODE STATUS: Full code  Admission Status:  I believe this patient meets observation status.     I discussed the patient's findings and my recommendations with patient. 03/01/23      Electronically signed by Michael Lozano MD, 03/01/23, 16:22 EST.  [facility] Hospitalist Team             Electronically signed by Michael Lozano MD at  23 1628          Consult Notes (all)      Kris Galindo MD at 23 1552      Consult Orders    1. Inpatient Urology Consult [[id number]] ordered by Mary Jo Pichardo MD at 23 0907                    [facility] CONS[facility]      Patient Identification:  NAME:  Caterina Mason  Age:  67 y.o.   Sex:  female   :  1955   MRN:  [id number]       Chief complaint/Reason for consult: Renal lesion    History of present illness:  67 y.o. female consulted for bilateral adrenal nodules as well as hypoattenuating renal lesions.  She denies hematuria and flank pain.  She is admitted with pancreatitis.      Past medical history:  Past Medical History:   Diagnosis Date   • Arthritis    • Atrial fibrillation ([facility])    • Bradycardia     Dr. Charles   • Cataract    • COPD (chronic obstructive pulmonary disease) ([facility])     Dr. Rob   • Diabetes mellitus, type 2 ([facility])     sees Dr. Archibald at [place]   • DJD (degenerative joint disease)     possible herniated disc, sees Pain Mgmt in [place]   • Emphysema of lung ([facility])    •     • GERD (gastroesophageal reflux disease)    • History of combined right and left heart catheterization 2018    minimal CAD on heart cath done    • Hyperlipidemia    • Hypertension    • Pain    • Sleep apnea     wears CPAP machine, sees Darron       Past surgical history:  Past Surgical History:   Procedure Laterality Date   • ABLATION OF DYSRHYTHMIC FOCUS     • CARDIAC CATHETERIZATION      and Angiograph    • CARDIAC ELECTROPHYSIOLOGY PROCEDURE N/A 12/10/2019    Procedure: pvc ablation;  Surgeon: Alfredo Iglesias MD;  Location: [facility] INVASIVE LOCATION;  Service: Cardiovascular   •  SECTION      x 1   • COLON RESECTION     • COLONOSCOPY  2012   • COLOSTOMY      and reversal in her 20's due to problems with childbirth   • COLOSTOMY CLOSURE     • OVARIAN CYST SURGERY     • ROTATOR CUFF REPAIR Left 2009    x2    • TOTAL ABDOMINAL HYSTERECTOMY WITH SALPINGO  OOPHORECTOMY  2005       Allergies:  Ibuprofen, Prednisone, Pregabalin, Tramadol, Adhesive tape, Levofloxacin, and Sulfamethoxazole-trimethoprim    Home medications:  Medications Prior to Admission   Medication Sig Dispense Refill Last Dose   • albuterol sulfate  (90 Base) MCG/ACT inhaler INHALE 2 PUFFS EVERY 4 HOURS AS NEEDED FOR WHEEZING OR SHORTNESS OF AIR 18 g 1    • atorvastatin (LIPITOR) 40 MG tablet TAKE 1/2 TABLET BY MOUTH EVERY DAY 45 tablet 1    • budesonide-formoterol (SYMBICORT) 160-4.5 MCG/ACT inhaler Inhale 2 puffs 2 (Two) Times a Day.      • Cholecalciferol 25 MCG (1000 UT) capsule TAKE 1 TABLET BY MOUTH TWICE A DAY *OTC NOT COVERED* 60 capsule 5    • dilTIAZem CD (CARDIZEM CD) 120 MG 24 hr capsule TAKE 1 CAPSULE BY MOUTH EVERY DAY 90 capsule 1    • Eliquis 5 MG tablet tablet TAKE 1 TABLET BY MOUTH 2 (TWO) TIMES A DAY. PATIENT NEEDS AN APPT BEFORE ANY MORE REFILLS 60 tablet 1    • fenofibrate 160 MG tablet TAKE 1 TABLET BY MOUTH EVERY DAY 90 tablet 1    • furosemide (LASIX) 40 MG tablet TAKE 1 TABLET BY MOUTH EVERY DAY 90 tablet 1    • HumaLOG KwikPen 100 UNIT/ML solution pen-injector Inject 14 Units under the skin into the appropriate area as directed 3 (Three) Times a Day Before Meals. 15 mL 1    • HYDROcodone-acetaminophen (NORCO) 7.5-325 MG per tablet Take 1 tablet by mouth 3 (Three) Times a Day As Needed for Moderate Pain.      • Insulin Glargine (BASAGLAR KWIKPEN) 100 UNIT/ML injection pen INJECT 40 UNITS UNDER THE SKIN INTO THE APPROPRIATE AREA AS DIRECTED EVERY NIGHT. 45 mL 0    • Janumet XR  MG tablet TAKE 2 TABLETS BY MOUTH EVERY DAY 60 tablet 3    • lisinopril (PRINIVIL,ZESTRIL) 20 MG tablet TAKE 1 TABLET BY MOUTH EVERY DAY 90 tablet 1    • magnesium oxide (MAG-OX) 400 MG tablet TAKE 1 TABLET BY MOUTH TWICE A DAY 60 tablet 6    • metoprolol tartrate (LOPRESSOR) 25 MG tablet TAKE 1 TABLET BY MOUTH EVERY 12 HOURS FOR 30 DAYS. 180 tablet 3    • ondansetron ODT (ZOFRAN-ODT) 4 MG  disintegrating tablet Place 1 tablet on the tongue Every 8 (Eight) Hours As Needed for Nausea or Vomiting for up to 5 days. 15 tablet 0    • potassium chloride (MICRO-K) 10 MEQ CR capsule TAKE 1 CAPSULE BY MOUTH EVERY DAY 90 capsule 1         Hospital medications:  atorvastatin, 20 mg, Oral, Daily  bisacodyl, 20 mg, Oral, Once  budesonide-formoterol, 2 puff, Inhalation, BID  dicyclomine, 10 mg, Oral, 4x Daily AC & at Bedtime  dilTIAZem CD, 120 mg, Oral, Daily  fenofibrate, 145 mg, Oral, Daily  furosemide, 40 mg, Oral, Daily  insulin glargine, 40 Units, Subcutaneous, Nightly  insulin lispro, 14 Units, Subcutaneous, TID With Meals  lisinopril, 20 mg, Oral, Daily  magnesium oxide, 400 mg, Oral, BID  metoprolol tartrate, 25 mg, Oral, Q12H  pantoprazole, 40 mg, Intravenous, QAM AC  potassium chloride, 10 mEq, Oral, Daily  sodium chloride, 10 mL, Intravenous, Q12H  sodium-potassium-magnesium sulfates, 1 bottle, Oral, Q12H  [START ON 3/2/2023] sorbitol, 50 mL, Oral, Once         •  acetaminophen **OR** acetaminophen **OR** acetaminophen  •  albuterol sulfate HFA  •  HYDROcodone-acetaminophen  •  Morphine  •  ondansetron  •  [COMPLETED] Insert Peripheral IV **AND** sodium chloride  •  sodium chloride  •  sodium chloride  •  sodium chloride    Family history:  Family History   Problem Relation Age of Onset   • Heart disease Mother    • Hypertension Mother    • Stroke Mother    • Seizures Mother    • Heart disease Father    • Hypertension Father    • Lung disease Father    • Stroke Father    • Cancer Sister         unknown   • Hypertension Brother    • Diabetes Brother    • Hyperlipidemia Other        Social history:  Social History     Tobacco Use   • Smoking status: Former     Packs/day: 1.00     Years: 48.00     Pack years: 48.00     Types: Cigarettes     Quit date: 3/1/2020     Years since quitting: 3.0   • Smokeless tobacco: Never   Vaping Use   • Vaping Use: Never used   Substance Use Topics   • Alcohol use: No   • Drug  use: No       REVIEW OF SYSTEMS:      Objective:  TMax 24 hours:   Temp (24hrs), Av.9 °F (36.6 °C), Min:97.5 °F (36.4 °C), Max:98.1 °F (36.7 °C)      Vitals Ranges:   Temp:  [97.5 °F (36.4 °C)-98.1 °F (36.7 °C)] 97.8 °F (36.6 °C)  Heart Rate:  [] 100  Resp:  [15-21] 20  BP: (106-129)/(51-71) 122/57    Intake/Output Last 3 shifts:  I/O last 3 completed shifts:  In: -   Out: 300 [Urine:300]     Physical Exam:    General Appearance:    Alert, cooperative, NAD   HEENT:    No trauma, hearing intact   Lungs:     Respirations unlabored, no audible wheezing    Heart:    No cyanosis, No significant edema   Abdomen:     Soft, ND    :    No suprapubic distention               Neuro/Psych:   Mood/affect pleasant, no focal findings       Results review:   I reviewed the patient's new clinical results.    Data review:  Lab Results (last 24 hours)     Procedure Component Value Units Date/Time    POC Glucose Once [[id number]]  (Abnormal) Collected: 23 1112    Specimen: Blood Updated: 23 1113     Glucose 142 mg/dL      Comment: Serial Number: [id number]:  [id number]       POC Glucose Once [[id number]]  (Abnormal) Collected: 23 0722    Specimen: Blood Updated: 23     Glucose 170 mg/dL      Comment: Serial Number: [id number]:  [id number]       Sedimentation Rate [[id number]]  (Normal) Collected: 23    Specimen: Blood Updated: 23     Sed Rate 23 mm/hr     CBC & Differential [[id number]]  (Abnormal) Collected: 23    Specimen: Blood Updated: 23    Narrative:      The following orders were created for panel order CBC & Differential.  Procedure                               Abnormality         Status                     ---------                               -----------         ------                     CBC Auto Differential[[id number]]        Abnormal            Final result                 Please view results for these tests on the individual  orders.    CBC Auto Differential [[id number]]  (Abnormal) Collected: 02/28/23 2237    Specimen: Blood Updated: 03/01/23 0014     WBC 8.70 10*3/mm3      RBC 4.01 10*6/mm3      Hemoglobin 11.5 g/dL      Hematocrit 35.9 %      MCV 89.6 fL      MCH 28.7 pg      MCHC 32.0 g/dL      RDW 14.5 %      RDW-SD 45.1 fl      MPV 8.6 fL      Platelets 285 10*3/mm3      Neutrophil % 65.6 %      Lymphocyte % 21.6 %      Monocyte % 10.7 %      Eosinophil % 1.6 %      Basophil % 0.5 %      Neutrophils, Absolute 5.70 10*3/mm3      Lymphocytes, Absolute 1.90 10*3/mm3      Monocytes, Absolute 0.90 10*3/mm3      Eosinophils, Absolute 0.10 10*3/mm3      Basophils, Absolute 0.00 10*3/mm3      nRBC 0.1 /100 WBC     Comprehensive Metabolic Panel [[id number]]  (Abnormal) Collected: 02/28/23 2237    Specimen: Blood Updated: 03/01/23 0003     Glucose 146 mg/dL      BUN 13 mg/dL      Creatinine 0.83 mg/dL      Sodium 142 mmol/L      Potassium 4.4 mmol/L      Chloride 104 mmol/L      CO2 26.0 mmol/L      Calcium 9.9 mg/dL      Total Protein 7.0 g/dL      Albumin 3.7 g/dL      ALT (SGPT) 17 U/L      AST (SGOT) 29 U/L      Alkaline Phosphatase 52 U/L      Total Bilirubin 0.5 mg/dL      Globulin 3.3 gm/dL      A/G Ratio 1.1 g/dL      BUN/Creatinine Ratio 15.7     Anion Gap 12.0 mmol/L      eGFR 77.4 mL/min/1.73     Narrative:      GFR Normal >60  Chronic Kidney Disease <60  Kidney Failure <15      Phosphorus [[id number]]  (Normal) Collected: 02/28/23 2237    Specimen: Blood Updated: 03/01/23 0003     Phosphorus 2.6 mg/dL     C-reactive Protein [[id number]]  (Abnormal) Collected: 02/28/23 2237    Specimen: Blood Updated: 03/01/23 0003     C-Reactive Protein 1.20 mg/dL     Magnesium [[id number]]  (Normal) Collected: 02/28/23 2237    Specimen: Blood Updated: 03/01/23 0003     Magnesium 1.7 mg/dL     POC Glucose Once [[id number]]  (Abnormal) Collected: 02/28/23 2029    Specimen: Blood Updated: 02/28/23 2030     Glucose 130 mg/dL      Comment: Serial Number:  [id number]:  [id number]       POC Glucose Once [[id number]]  (Abnormal) Collected: 02/28/23 1820    Specimen: Blood Updated: 02/28/23 1821     Glucose 127 mg/dL      Comment: Serial Number: [id number]:  [id number]              Imaging:  Imaging Results (Last 24 Hours)     Procedure Component Value Units Date/Time    US Abdomen Limited [[id number]] Collected: 03/01/23 1037     Updated: 03/01/23 1048    Narrative:      US ABDOMEN LIMITED    Date of Exam: 3/1/2023 10:10 AM EST    Indication: cholelithiasis.    Comparison: CT abdomen and pelvis with contrast 2/21/2023.    Technique: Grayscale and color Doppler ultrasound evaluation of the right upper quadrant was performed.    Findings:  The liver is enlarged up to 21.16 craniocaudally. The liver demonstrates a diffusely coarsened echotexture, suggestive of steatosis. No focal liver lesion is identified. The does not appear grossly cirrhotic. No focal liver lesions are identified.    Common bile duct measures 8 mm, just slightly above upper limits normal for the patient's stated age. No choledocholithiasis or obstructing abnormality seen.    A few tiny layering gallstones are present. The gallbladder does not appear inflamed or thickened on this examination.    The main portal vein is patent. The imaged hepatic veins are patent.    Right kidney measures 10.9 cm in length, without shadowing stone or hydronephrosis. Right renal cyst seen on the previous CT are not well-visualized on today's ultrasound examination.    Trace ascites is seen adjacent to the right hepatic lobe.    The pancreas is mostly obscured by overlying bowel gas. The visualized portion of the pancreatic head and neck appear unremarkable.          Impression:      Impression:    1. Cholelithiasis, without sonographic evidence of cholecystitis.  2. Common bile duct measures 8 mm, just slightly above upper limits normal for the patient's stated age. No intrahepatic biliary ductal dilation is  seen. No obstructing biliary abnormality is identified.  3. Hepatomegaly with features of diffuse hepatic steatosis.  4. The visualized is the pancreas have a normal sonographic appearance.  5. The right renal cyst seen on the prior CT are not visible on today's ultrasound.    Electronically Signed: Emeli Raemary    3/1/2023 10:45 AM EST    Workstation ID: [id number]             Assessment:       Acute pancreatitis, unspecified complication status, unspecified pancreatitis type    Gastroesophageal reflux disease    Generalized abdominal pain    Hematochezia    Abnormal CT of the abdomen      Bilateral adrenal nodules up to 1.3 cm  2.8 cm left renal lesion and 1.5 cm right renal lesion and multiple cysts    Plan:     CT images reviewed, needs follow-up imaging   Check MRI with and without contrast this can be done as inpatient versus outpatient  We will arrange follow-up for her to discuss further pending imaging results  Okay for discharge from urology standpoint    Kris Galindo MD  [facility]  [facility]  [place] [address]  Office: [phone]  Cell: [phone]  Also available via Epic Secure Chat  03/01/23  15:53 EST       Electronically signed by Kris Galindo MD at 03/01/23 1558     Tova Mays APRN at 03/01/23 1010      Consult Orders    1. Inpatient Gastroenterology Consult [[id number]] ordered by Taina Chacon APRN at 02/28/23 1631          Attestation signed by Mary Jo Pichardo MD at 03/01/23 1631    I have reviewed this documentation and agree.  I, the attending physician, have performed the history taking, physical exam, and medical decision making for this patient.  Patient was seen this morning on rounds.  She is a 67-year-old who presented with complaints of generalized abdominal pain.  It started approximately 6 weeks ago.  Pain is not localized and occurs generally across her entire abdomen.  She describes it as cramping and stabbing.  Sometimes worse with  p.o.  She has had some nausea and vomiting.  She has alternating constipation and diarrhea, last bowel movement was yesterday.  She has seen some blood in the rectum.  Last colonoscopy possibly 17 years ago.  No personal or family history of pancreatic or liver disease.  No alcohol or tobacco.  On exam she was sitting in the chair in no distress with tenderness in the epigastrium, right and left upper quadrants on moderate palpation without rebound or guarding abdomen is soft and nondistended and obese.  Lipase is 130 CBC CMP are normal CRP 1.2.  ET scan shows cholelithiasis, haziness of the pericholecystic fat, adrenal nodules, renal lesions, possible filling defect in the duodenal bulb.  1.  Abnormal CT of the duodenum-we will plan EGD in the morning  2.  Bright red blood per rectum-particularly with the setting of generalized abdominal pain and change in bowel habits, she could have a colonic lesion though CT with contrast was benign.  I will plan a colonoscopy in the morning.  3.  Elevated lipase-acute pancreatitis felt less likely, pancreas normal on enhanced CT scan.  4.  Rdojfvzogx-rugjwl-fh right upper quadrant ultrasound shows cholelithiasis without evidence of cholecystitis and borderline biliary dilation as well as hepatomegaly with steatosis.  LFTs remain normal, choledocholithiasis felt less likely.  Will monitor LFTs and can consider MRCP if they become abnormal.  5.  Renal and adrenal lesions-urology seen the patient and plans MRI  6.  A-fib-holding Eliquis  7.  Remote history of colostomy and reversal-details unclear, timing was related to obstetric delivery of a child.  8.  Small amount of ascites-no cirrhosis on imaging.  May be reactive.  Insufficient fluid to tap.                  GI CONSULT  NOTE:    Referring Provider:  Taina Chacon NP    Chief complaint: Abdominal pain, nausea/vomiting, constipation    Subjective .     History of present illness: Caterina Mason is a 67 y.o. female with  history of A-fib on Eliquis, COPD, DMII, hypertension, hyperlipidemia, RUSLAN, hysterectomy, and previous colon resection (reportedly following a complication in childbirth) who presents with complaints of abdominal pain.  The patient reports that she has been having abdominal pain for the past 6 weeks.  States that her pain is generalized and she describes the pain as cramping/stabbing in nature.  Her pain seems to be worse after eating.  Unable to identify any alleviating factors.  She reports the development of nausea/vomiting over the past 2 days.  No heartburn or dysphagia.  Denies any NSAID use.  States that she does struggle with regular constipation at home.  States that she will skip multiple days in between bowel movements and then have multiple episodes of diarrhea.  She states that typically eating diabetic candy will help her have a bowel movement.  Her last bowel movement occurred yesterday morning.  She does report bright red blood per rectum, but denies any melena.  She denies any previous history of pancreatitis or liver issues.  Denies any alcohol or tobacco use.      Endo History:  Reports last colonoscopy approximately 17 years ago.    Past Medical History:  Past Medical History:   Diagnosis Date   • Arthritis    • Atrial fibrillation (HCC)    • Bradycardia     Dr. Charles   • Cataract    • COPD (chronic obstructive pulmonary disease) ([facility])     Dr. Rob   • Diabetes mellitus, type 2 (HCC)     sees Dr. Archibald at [place]   • DJD (degenerative joint disease)     possible herniated disc, sees Pain Mgmt in [place]   • Emphysema of lung (HCC)    •     • GERD (gastroesophageal reflux disease)    • History of combined right and left heart catheterization 2018    minimal CAD on heart cath done    • Hyperlipidemia    • Hypertension    • Pain    • Sleep apnea     wears CPAP machine, sees Darron       Past Surgical History:  Past Surgical History:   Procedure Laterality Date   • ABLATION OF DYSRHYTHMIC  FOCUS     • CARDIAC CATHETERIZATION  2018    and Angiograph    • CARDIAC ELECTROPHYSIOLOGY PROCEDURE N/A 12/10/2019    Procedure: pvc ablation;  Surgeon: Alfredo Iglesias MD;  Location: [facility] INVASIVE LOCATION;  Service: Cardiovascular   •  SECTION      x 1   • COLON RESECTION     • COLONOSCOPY  2012   • COLOSTOMY      and reversal in her 20's due to problems with childbirth   • COLOSTOMY CLOSURE     • OVARIAN CYST SURGERY     • ROTATOR CUFF REPAIR Left 2009    x2    • TOTAL ABDOMINAL HYSTERECTOMY WITH SALPINGO OOPHORECTOMY         Social History:  Social History     Tobacco Use   • Smoking status: Former     Packs/day: 1.00     Years: 48.00     Pack years: 48.00     Types: Cigarettes     Quit date: 3/1/2020     Years since quitting: 3.0   • Smokeless tobacco: Never   Vaping Use   • Vaping Use: Never used   Substance Use Topics   • Alcohol use: No   • Drug use: No       Family History:  Family History   Problem Relation Age of Onset   • Heart disease Mother    • Hypertension Mother    • Stroke Mother    • Seizures Mother    • Heart disease Father    • Hypertension Father    • Lung disease Father    • Stroke Father    • Cancer Sister         unknown   • Hypertension Brother    • Diabetes Brother    • Hyperlipidemia Other        Medications:  Medications Prior to Admission   Medication Sig Dispense Refill Last Dose   • albuterol sulfate  (90 Base) MCG/ACT inhaler INHALE 2 PUFFS EVERY 4 HOURS AS NEEDED FOR WHEEZING OR SHORTNESS OF AIR 18 g 1    • atorvastatin (LIPITOR) 40 MG tablet TAKE 1/2 TABLET BY MOUTH EVERY DAY 45 tablet 1    • budesonide-formoterol (SYMBICORT) 160-4.5 MCG/ACT inhaler Inhale 2 puffs 2 (Two) Times a Day.      • Cholecalciferol 25 MCG (1000 UT) capsule TAKE 1 TABLET BY MOUTH TWICE A DAY *OTC NOT COVERED* 60 capsule 5    • dicyclomine (BENTYL) 10 MG capsule Take 1 capsule by mouth 4 (Four) Times a Day Before Meals & at Bedtime. 120 capsule 0    • dilTIAZem CD  (CARDIZEM CD) 120 MG 24 hr capsule TAKE 1 CAPSULE BY MOUTH EVERY DAY 90 capsule 1    • Eliquis 5 MG tablet tablet TAKE 1 TABLET BY MOUTH 2 (TWO) TIMES A DAY. PATIENT NEEDS AN APPT BEFORE ANY MORE REFILLS 60 tablet 1    • fenofibrate 160 MG tablet TAKE 1 TABLET BY MOUTH EVERY DAY 90 tablet 1    • furosemide (LASIX) 40 MG tablet TAKE 1 TABLET BY MOUTH EVERY DAY 90 tablet 1    • HumaLOG KwikPen 100 UNIT/ML solution pen-injector Inject 14 Units under the skin into the appropriate area as directed 3 (Three) Times a Day Before Meals. 15 mL 1    • HYDROcodone-acetaminophen (NORCO) 7.5-325 MG per tablet Take 1 tablet by mouth 3 (Three) Times a Day As Needed for Moderate Pain.      • Insulin Glargine (BASAGLAR KWIKPEN) 100 UNIT/ML injection pen INJECT 40 UNITS UNDER THE SKIN INTO THE APPROPRIATE AREA AS DIRECTED EVERY NIGHT. 45 mL 0    • Janumet XR  MG tablet TAKE 2 TABLETS BY MOUTH EVERY DAY 60 tablet 3    • lisinopril (PRINIVIL,ZESTRIL) 20 MG tablet TAKE 1 TABLET BY MOUTH EVERY DAY 90 tablet 1    • magnesium oxide (MAG-OX) 400 MG tablet TAKE 1 TABLET BY MOUTH TWICE A DAY 60 tablet 6    • metoprolol tartrate (LOPRESSOR) 25 MG tablet TAKE 1 TABLET BY MOUTH EVERY 12 HOURS FOR 30 DAYS. 180 tablet 3    • ondansetron ODT (ZOFRAN-ODT) 4 MG disintegrating tablet Place 1 tablet on the tongue Every 8 (Eight) Hours As Needed for Nausea or Vomiting for up to 5 days. 15 tablet 0    • potassium chloride (MICRO-K) 10 MEQ CR capsule TAKE 1 CAPSULE BY MOUTH EVERY DAY 90 capsule 1        Scheduled Meds:atorvastatin, 20 mg, Oral, Daily  bisacodyl, 20 mg, Oral, Once  budesonide-formoterol, 2 puff, Inhalation, BID  dicyclomine, 10 mg, Oral, 4x Daily AC & at Bedtime  dilTIAZem CD, 120 mg, Oral, Daily  fenofibrate, 145 mg, Oral, Daily  furosemide, 40 mg, Oral, Daily  insulin glargine, 40 Units, Subcutaneous, Nightly  insulin lispro, 14 Units, Subcutaneous, TID With Meals  lisinopril, 20 mg, Oral, Daily  magnesium oxide, 400 mg, Oral,  BID  metoprolol tartrate, 25 mg, Oral, Q12H  potassium chloride, 10 mEq, Oral, Daily  sodium chloride, 10 mL, Intravenous, Q12H  sodium-potassium-magnesium sulfates, 1 bottle, Oral, Q12H  [START ON 3/2/2023] sorbitol, 50 mL, Oral, Once      Continuous Infusions:   PRN Meds:.•  acetaminophen **OR** acetaminophen **OR** acetaminophen  •  albuterol sulfate HFA  •  HYDROcodone-acetaminophen  •  Morphine  •  ondansetron  •  [COMPLETED] Insert Peripheral IV **AND** sodium chloride  •  sodium chloride  •  sodium chloride  •  sodium chloride    ALLERGIES:  Ibuprofen, Prednisone, Pregabalin, Tramadol, Adhesive tape, Levofloxacin, and Sulfamethoxazole-trimethoprim    ROS:  The following systems were reviewed and negative;   Constitution:  No fevers, chills, no unintentional weight loss  Skin: no rash, no jaundice  Eyes:  No blurry vision, no eye pain  HENT:  No change in hearing or smell  Resp:  No dyspnea or cough  CV:  No chest pain or palpitations  :  No dysuria, hematuria  Musculoskeletal:  No leg cramps or arthralgias  Neuro:  No tremor, no numbness  Psych:  No depression or confusion    Objective     Vital Signs:   Vitals:    03/01/23 0402 03/01/23 0500 03/01/23 0716 03/01/23 0717   BP:    106/66   BP Location:    Left arm   Patient Position:    Sitting   Pulse: 73 84 82 87   Resp:   16 18   Temp:    98 °F (36.7 °C)   TempSrc:    Oral   SpO2: 100% 98% 92% 92%   Weight:       Height:           Physical Exam:       General Appearance:    Awake and alert, in no acute distress, obese habitus   Head:    Normocephalic, without obvious abnormality, atraumatic   Throat:   No oral lesions, no thrush, oral mucosa moist   Lungs:     Respirations regular, even and unlabored   Chest Wall:    No abnormalities observed   Abdomen:     Soft, generalized tenderness, no rebound or guarding, non-distended   Rectal:     Deferred   Extremities:   Moves all extremities, no edema, no cyanosis   Pulses:   Pulses palpable and equal bilaterally    Skin:   No rash, no jaundice, normal palpation   Lymph nodes:   No cervical, supraclavicular or submandibular palpable adenopathy   Neurologic:   Cranial nerves 2 - 12 grossly intact, no asterixis       Results Review:   I reviewed the patient's labs and imaging.  CBC    Results from last 7 days   Lab Units 02/28/23 2237 02/28/23  1343   WBC 10*3/mm3 8.70 8.30   HEMOGLOBIN g/dL 11.5* 11.7*   PLATELETS 10*3/mm3 285 283     CMP   Results from last 7 days   Lab Units 02/28/23 2237 02/28/23  1343   SODIUM mmol/L 142 141   POTASSIUM mmol/L 4.4 3.8   CHLORIDE mmol/L 104 103   CO2 mmol/L 26.0 27.0   BUN mg/dL 13 14   CREATININE mg/dL 0.83 0.88   GLUCOSE mg/dL 146* 160*   ALBUMIN g/dL 3.7 3.5   BILIRUBIN mg/dL 0.5 0.5   ALK PHOS U/L 52 49   AST (SGOT) U/L 29 23   ALT (SGPT) U/L 17 17   MAGNESIUM mg/dL 1.7  --    PHOSPHORUS mg/dL 2.6  --    LIPASE U/L  --  130*     Cr Clearance Estimated Creatinine Clearance: 77.3 mL/min (by C-G formula based on SCr of 0.83 mg/dL).  Coag     HbA1C   Lab Results   Component Value Date    HGBA1C 11.8 (H) 12/01/2022    HGBA1C 11.5 (H) 05/31/2022    HGBA1C 11.6 (H) 11/16/2021     Blood Glucose   Glucose   Date/Time Value Ref Range Status   03/01/2023 0722 170 (H) 70 - 105 mg/dL Final     Comment:     Serial Number: [id number]:  [id number]   02/28/2023 2029 130 (H) 70 - 105 mg/dL Final     Comment:     Serial Number: [id number]:  [id number]   02/28/2023 1820 127 (H) 70 - 105 mg/dL Final     Comment:     Serial Number: [id number]:  [id number]     Infection     UA      Radiology(recent) No radiology results for the last day       ASSESSMENT:  -Generalized abdominal pain  -Nausea/vomiting  -Constipation  -Rectal bleeding  -Abnormal CT showing possible filling defect in the duodenal bulb  -Normocytic anemia  -Elevated lipase  -A-fib on Eliquis  -COPD  -DMII  -Hypertension  -Hyperlipidemia  -RUSLAN  -History of hysterectomy  -History of colon resection (reportedly due to  complication from childbirth)    PLAN:  Patient is a 67-year-old female with history of A-fib on Eliquis, DMII, COPD, and previous colon resection who presents with complaints of abdominal pain, nausea/vomiting, and constipation.    CT abdomen/pelvis W shows cholelithiasis with questionable cholecystitis, bilateral adrenal nodules, bilateral renal lesions, and a possible filling defect in the duodenal bulb.  We will plan EGD/colonoscopy tomorrow for further evaluation of her complaints.  Clear liquid diet.  N.p.o. following completion of bowel prep.  Mild elevation in lipase noted.  No evidence of acute pancreatitis.  Rule out ulcer.  Hemoglobin 11.5.  Continue to monitor H/H and transfuse as needed.  Antiemetics/analgesics as needed.  Start PPI.  Urology has been consulted for further evaluation of the patient's renal lesions evidenced on CT.  Supportive care.      I discussed the patients findings and my recommendations with the patient.  I will discuss case with Dr. Pichardo and change the plan accordingly.    We appreciate the referral.    Electronically signed by YAW Hernández, 03/01/23, 10:10 AM EST.                Electronically signed by Mary Jo Pichardo MD at 03/01/23 4325

## 2023-03-02 NOTE — ANESTHESIA POSTPROCEDURE EVALUATION
Patient: Caterina Mason    Procedure Summary     Date: 03/02/23 Room / Location: [facility] ENDOSCOPY 1 / [facility] ENDOSCOPY    Anesthesia Start: 1139 Anesthesia Stop: 1211    Procedures:       COLONOSCOPY WITH POLYPECTOMY X 3 AND RANDOM COLON BIOPSIES      ESOPHAGOGASTRODUODENOSCOPY WITH BIOPSY X2 AREAS Diagnosis:       Hematochezia      Generalized abdominal pain      Gastroesophageal reflux disease without esophagitis      Abnormal CT of the abdomen      (Hematochezia [K92.1])      (Generalized abdominal pain [R10.84])      (Gastroesophageal reflux disease without esophagitis [K21.9])      (Abnormal CT of the abdomen [R93.5])    Surgeons: Mary Jo Pichardo MD Provider: Kd Cormier MD    Anesthesia Type: general ASA Status: 3          Anesthesia Type: general    Vitals  Vitals Value Taken Time   /81 03/02/23 1219   Temp     Pulse 85 03/02/23 1229   Resp 14 03/02/23 1219   SpO2 87 % 03/02/23 1229   Vitals shown include unvalidated device data.        Post Anesthesia Care and Evaluation    Patient location during evaluation: PHASE II  Patient participation: complete - patient participated  Level of consciousness: awake  Pain score: 1  Pain management: satisfactory to patient  Anesthetic complications: No anesthetic complications  PONV Status: none  Cardiovascular status: acceptable  Respiratory status: acceptable  Hydration status: acceptable

## 2023-03-02 NOTE — CONSULTS
CARDIOLOGY CONSULT NOTE      Referring Provider: Dr. Madison    Reason for Consultation: Tachycardia    Attending: Michael Lozano MD    Chief complaint    Abdominal pain    Subjective .     History of present illness:  Caterina Mason is a 67 y.o. female who presents with abdominal pain.     She presented to the emergency room on February 28, 2023 with complaints of abdominal pain that has been ongoing for several weeks.  It has become more acute and severe.  It was associated with intermittent vomiting and diarrhea.    Patient has been evaluated by GI with plans to have EGD done today.  Cardiology was consulted due to atrial fibrillation with RVR    Patient is a 67-year-old female who is known to have hypertension, dyslipidemia, COPD, diabetes, permanent atrial fibrillation.    In February 2017 the patient was admitted to [facility] with symptoms of dizziness and found to have frequent PVCs.  Holter monitor showed PVCs greater than 30,000 in a 24-hour period.  She underwent stress Myoview which was negative for ischemia or infarct.  Echocardiogram showed her ejection fraction to be 55%.    Echocardiogram was repeated in 2021 and revealed her ejection fraction to be 55% with no significant valvular flow abnormalities    On last office visit patient was noted to be in atrial fibrillation with right bundle branch block and isolated PVCs.  Stress Myoview was ordered in January 2023 but patient has yet to have this completed.        Review of Systems   Constitutional: Negative for chills and fever.   HENT: Negative for ear discharge and nosebleeds.    Eyes: Negative for discharge and redness.   Cardiovascular: Positive for irregular heartbeat and palpitations. Negative for chest pain, orthopnea, paroxysmal nocturnal dyspnea and syncope.   Respiratory: Positive for shortness of breath. Negative for cough and wheezing.    Endocrine: Negative for heat intolerance.   Skin: Negative for rash.   Musculoskeletal:  Negative for arthritis and myalgias.   Gastrointestinal: Positive for abdominal pain. Negative for melena, nausea and vomiting.   Genitourinary: Negative for dysuria and hematuria.   Neurological: Negative for dizziness, light-headedness, numbness and tremors.   Psychiatric/Behavioral: Negative for depression. The patient is not nervous/anxious.      Pertinent items are noted in HPI, all other systems reviewed and negative  History  Past Medical History:   Diagnosis Date   • Arthritis    • Atrial fibrillation ([facility])    • Bradycardia     Dr. Charles   • Cataract    • COPD (chronic obstructive pulmonary disease) ([facility])     Dr. Rob   • Diabetes mellitus, type 2 ([facility])     sees Dr. Archibald at [place]   • DJD (degenerative joint disease)     possible herniated disc, sees Pain Mgmt in [place]   • Emphysema of lung ([facility])    •     • GERD (gastroesophageal reflux disease)    • History of combined right and left heart catheterization 2018    minimal CAD on heart cath done    • Hyperlipidemia    • Hypertension    • Pain    • Sleep apnea     wears CPAP machine, sees Darron       Past Surgical History:   Procedure Laterality Date   • ABLATION OF DYSRHYTHMIC FOCUS     • CARDIAC CATHETERIZATION      and Angiograph    • CARDIAC ELECTROPHYSIOLOGY PROCEDURE N/A 12/10/2019    Procedure: pvc ablation;  Surgeon: Alfredo Iglesias MD;  Location: [facility] INVASIVE LOCATION;  Service: Cardiovascular   •  SECTION      x 1   • COLON RESECTION     • COLONOSCOPY  2012   • COLOSTOMY      and reversal in her 20's due to problems with childbirth   • COLOSTOMY CLOSURE     • OVARIAN CYST SURGERY     • ROTATOR CUFF REPAIR Left 2009    x2    • TOTAL ABDOMINAL HYSTERECTOMY WITH SALPINGO OOPHORECTOMY         Family History   Problem Relation Age of Onset   • Heart disease Mother    • Hypertension Mother    • Stroke Mother    • Seizures Mother    • Heart disease Father    • Hypertension Father    • Lung disease Father     • Stroke Father    • Cancer Sister         unknown   • Hypertension Brother    • Diabetes Brother    • Hyperlipidemia Other        Social History     Tobacco Use   • Smoking status: Former     Packs/day: 1.00     Years: 48.00     Pack years: 48.00     Types: Cigarettes     Quit date: 3/1/2020     Years since quitting: 3.0   • Smokeless tobacco: Never   Vaping Use   • Vaping Use: Never used   Substance Use Topics   • Alcohol use: No   • Drug use: No        Medications Prior to Admission   Medication Sig Dispense Refill Last Dose   • albuterol sulfate  (90 Base) MCG/ACT inhaler INHALE 2 PUFFS EVERY 4 HOURS AS NEEDED FOR WHEEZING OR SHORTNESS OF AIR 18 g 1    • atorvastatin (LIPITOR) 40 MG tablet TAKE 1/2 TABLET BY MOUTH EVERY DAY 45 tablet 1    • budesonide-formoterol (SYMBICORT) 160-4.5 MCG/ACT inhaler Inhale 2 puffs 2 (Two) Times a Day.      • Cholecalciferol 25 MCG (1000 UT) capsule TAKE 1 TABLET BY MOUTH TWICE A DAY *OTC NOT COVERED* 60 capsule 5    • dilTIAZem CD (CARDIZEM CD) 120 MG 24 hr capsule TAKE 1 CAPSULE BY MOUTH EVERY DAY 90 capsule 1    • Eliquis 5 MG tablet tablet TAKE 1 TABLET BY MOUTH 2 (TWO) TIMES A DAY. PATIENT NEEDS AN APPT BEFORE ANY MORE REFILLS 60 tablet 1    • fenofibrate 160 MG tablet TAKE 1 TABLET BY MOUTH EVERY DAY 90 tablet 1    • furosemide (LASIX) 40 MG tablet TAKE 1 TABLET BY MOUTH EVERY DAY 90 tablet 1    • HumaLOG KwikPen 100 UNIT/ML solution pen-injector Inject 14 Units under the skin into the appropriate area as directed 3 (Three) Times a Day Before Meals. 15 mL 1    • HYDROcodone-acetaminophen (NORCO) 7.5-325 MG per tablet Take 1 tablet by mouth 3 (Three) Times a Day As Needed for Moderate Pain.      • Insulin Glargine (BASAGLAR KWIKPEN) 100 UNIT/ML injection pen INJECT 40 UNITS UNDER THE SKIN INTO THE APPROPRIATE AREA AS DIRECTED EVERY NIGHT. 45 mL 0    • Janumet XR  MG tablet TAKE 2 TABLETS BY MOUTH EVERY DAY 60 tablet 3    • lisinopril (PRINIVIL,ZESTRIL) 20 MG  tablet TAKE 1 TABLET BY MOUTH EVERY DAY 90 tablet 1    • magnesium oxide (MAG-OX) 400 MG tablet TAKE 1 TABLET BY MOUTH TWICE A DAY 60 tablet 6    • metoprolol tartrate (LOPRESSOR) 25 MG tablet TAKE 1 TABLET BY MOUTH EVERY 12 HOURS FOR 30 DAYS. 180 tablet 3    • [] ondansetron ODT (ZOFRAN-ODT) 4 MG disintegrating tablet Place 1 tablet on the tongue Every 8 (Eight) Hours As Needed for Nausea or Vomiting for up to 5 days. 15 tablet 0    • potassium chloride (MICRO-K) 10 MEQ CR capsule TAKE 1 CAPSULE BY MOUTH EVERY DAY 90 capsule 1          Ibuprofen, Prednisone, Pregabalin, Tramadol, Adhesive tape, Levofloxacin, and Sulfamethoxazole-trimethoprim    Scheduled Meds:atorvastatin, 20 mg, Oral, Daily  budesonide-formoterol, 2 puff, Inhalation, BID  dicyclomine, 10 mg, Oral, 4x Daily AC & at Bedtime  [START ON 3/3/2023] dilTIAZem CD, 180 mg, Oral, Daily  fenofibrate, 145 mg, Oral, Daily  furosemide, 40 mg, Oral, Daily  insulin glargine, 40 Units, Subcutaneous, Nightly  insulin lispro, 14 Units, Subcutaneous, TID With Meals  lisinopril, 20 mg, Oral, Daily  magnesium oxide, 400 mg, Oral, BID  metoprolol tartrate, 100 mg, Oral, Q12H  pantoprazole, 40 mg, Intravenous, QAM AC  potassium chloride, 10 mEq, Oral, Daily  sodium chloride, 10 mL, Intravenous, Q12H  sorbitol, 50 mL, Oral, Once      Continuous Infusions:sodium chloride, 50 mL/hr, Last Rate: 50 mL/hr (23 1323)      PRN Meds:.•  acetaminophen **OR** acetaminophen **OR** acetaminophen  •  albuterol sulfate HFA  •  HYDROcodone-acetaminophen  •  Morphine  •  ondansetron **OR** ondansetron  •  prochlorperazine  •  [COMPLETED] Insert Peripheral IV **AND** sodium chloride  •  sodium chloride  •  sodium chloride  •  sodium chloride    Objective     VITAL SIGNS  Vitals:    23 0745 23 1214 23 1219 23 1230   BP:  122/63 165/81 114/88   BP Location:  Left arm     Patient Position:       Pulse:  74 95    Resp: 19 18 14    Temp: 97.6 °F (36.4 °C)     "  TempSrc: Oral      SpO2: 95% 92% 100%    Weight:       Height:           Flowsheet Rows    Flowsheet Row First Filed Value   Admission Height 162.6 cm (64\") Documented at 02/28/2023 1257   Admission Weight 104 kg (229 lb 4.5 oz) Documented at 02/28/2023 1257          Body mass index is 39.36 kg/m².     TELEMETRY: Atrial Fibrillation RVR    Physical Exam:  Constitutional:       Appearance: Well-developed.   Eyes:      General: No scleral icterus.        Right eye: No discharge.   HENT:      Head: Normocephalic and atraumatic.   Neck:      Thyroid: No thyromegaly.      Lymphadenopathy: No cervical adenopathy.   Pulmonary:      Effort: Pulmonary effort is normal. No respiratory distress.      Breath sounds: Normal breath sounds. No wheezing. No rales.   Cardiovascular:      Normal rate. Irregularly irregular rhythm.      No gallop.   Abdominal:      Tenderness: There is no abdominal tenderness.   Skin:     Findings: No erythema or rash.   Neurological:      Mental Status: Alert and oriented to person, place, and time.          Results Review:   I reviewed the patient's new clinical results.    CBC    Results from last 7 days   Lab Units 03/02/23  1323 02/28/23 2237 02/28/23  1343   WBC 10*3/mm3 7.90 8.70 8.30   HEMOGLOBIN g/dL 11.2* 11.5* 11.7*   PLATELETS 10*3/mm3 289 285 283     BMP   Results from last 7 days   Lab Units 03/02/23  1323 02/28/23 2237 02/28/23  1343   SODIUM mmol/L 141 142 141   POTASSIUM mmol/L 3.6 4.4 3.8   CHLORIDE mmol/L 105 104 103   CO2 mmol/L 27.0 26.0 27.0   BUN mg/dL 10 13 14   CREATININE mg/dL 0.84 0.83 0.88   GLUCOSE mg/dL 150* 146* 160*   MAGNESIUM mg/dL 1.7 1.7  --    PHOSPHORUS mg/dL 2.4* 2.6  --      Cr Clearance Estimated Creatinine Clearance: 76.3 mL/min (by C-G formula based on SCr of 0.84 mg/dL).  Coag     HbA1C   Lab Results   Component Value Date    HGBA1C 11.8 (H) 12/01/2022    HGBA1C 11.5 (H) 05/31/2022    HGBA1C 11.6 (H) 11/16/2021     Blood Glucose   Glucose   Date/Time " Value Ref Range Status   03/02/2023 1315 144 (H) 70 - 105 mg/dL Final     Comment:     Serial Number: [id number]:  [id number]   03/02/2023 1008 187 (H) 70 - 105 mg/dL Final     Comment:     Serial Number: [id number]:  [id number]   03/02/2023 0719 214 (H) 70 - 105 mg/dL Final     Comment:     Serial Number: [id number]:  [id number]   03/01/2023 2221 168 (H) 70 - 105 mg/dL Final     Comment:     Serial Number: [id number]:  [id number]   03/01/2023 1937 204 (H) 70 - 105 mg/dL Final     Comment:     Serial Number: [id number]:  [id number]   03/01/2023 1813 169 (H) 70 - 105 mg/dL Final     Comment:     Serial Number: [id number]:  [id number]   03/01/2023 1112 142 (H) 70 - 105 mg/dL Final     Comment:     Serial Number: [id number]:  [id number]   03/01/2023 0722 170 (H) 70 - 105 mg/dL Final     Comment:     Serial Number: [id number]:  [id number]     Infection     CMP   Results from last 7 days   Lab Units 03/02/23  1323 02/28/23  2237 02/28/23  1343   SODIUM mmol/L 141 142 141   POTASSIUM mmol/L 3.6 4.4 3.8   CHLORIDE mmol/L 105 104 103   CO2 mmol/L 27.0 26.0 27.0   BUN mg/dL 10 13 14   CREATININE mg/dL 0.84 0.83 0.88   GLUCOSE mg/dL 150* 146* 160*   ALBUMIN g/dL 3.5 3.7 3.5   BILIRUBIN mg/dL 0.5 0.5 0.5   ALK PHOS U/L 47 52 49   AST (SGOT) U/L 26 29 23   ALT (SGPT) U/L 16 17 17   LIPASE U/L 173*  --  130*     ABG      UA      BRUNA  No results found for: POCMETH, POCAMPHET, POCBARBITUR, POCBENZO, POCCOCAINE, POCOPIATES, POCOXYCODO, POCPHENCYC, POCPROPOXY, POCTHC, POCTRICYC  Lysis Labs   Results from last 7 days   Lab Units 03/02/23  1323 02/28/23  2237 02/28/23  1343   HEMOGLOBIN g/dL 11.2* 11.5* 11.7*   PLATELETS 10*3/mm3 289 285 283   CREATININE mg/dL 0.84 0.83 0.88     Radiology(recent) CT Chest Without Contrast Diagnostic    Result Date: 3/2/2023  Impression: 1. Interval development of small lymph nodes in the epicardial space on the right measuring up to about 13 mm in  greatest diameter. There has also been interval development of a small node in the anterior mediastinum measuring 11 x 7 mm. In the upper abdomen, there is clearly periportal and retroperitoneal lymphadenopathy. The findings raise the question of lymphoma or metastatic disease. No suggestion of a primary lesion in the chest. 2. Coronary atherosclerotic calcifications. 3. Ascites Electronically Signed: Ronak Grubbs  3/2/2023 2:48 PM EST  Workstation ID: [id number]    MRI Abdomen With & Without Contrast    Result Date: 3/1/2023  Impression: 1. Abnormal hypoenhancing mass measuring 3.5 x 3.1 cm within the duodenal bulb. This appears partially intramural and partially endoluminal. Differential considerations would include gastric or duodenal malignancy, leiomyoma, or less likely GIST. Recommend further evaluation with endoscopy. 2. Enlarged celiac axis, portacaval, and retroperitoneal lymph nodes. Further management would be dependent on pathologic diagnosis of the duodenal mass. 3. Hemorrhagic or proteinaceous cyst within the posterior mid left kidney and exophytic arising from the posterior inferior aspect of the right kidney. No evidence of enhancing renal mass. Additional simple appearing renal cysts are also present. 4. Limited assessment of the small bilateral adrenal nodules. There is no definite loss of signal to suggest clear lipid rich adrenal adenomas. These could be followed with serial imaging. 5. Cholelithiasis without MR findings of acute cholecystitis. No evidence of choledocholithiasis. 6. Small volume perihepatic ascites. Electronically Signed: Milton Carter  3/1/2023 6:41 PM EST  Workstation ID: [id number] Prohance    US Abdomen Limited    Result Date: 3/1/2023  Impression: 1. Cholelithiasis, without sonographic evidence of cholecystitis. 2. Common bile duct measures 8 mm, just slightly above upper limits normal for the patient's stated age. No intrahepatic biliary ductal dilation is seen. No  obstructing biliary abnormality is identified. 3. Hepatomegaly with features of diffuse hepatic steatosis. 4. The visualized is the pancreas have a normal sonographic appearance. 5. The right renal cyst seen on the prior CT are not visible on today's ultrasound. Electronically Signed: Emeli Adamschip  3/1/2023 10:45 AM EST  Workstation ID: [id number]            Imaging Results (Last 24 Hours)     Procedure Component Value Units Date/Time    CT Chest Without Contrast Diagnostic [[id number]] Collected: 03/02/23 1440     Updated: 03/02/23 1450    Narrative:      CT CHEST WO CONTRAST DIAGNOSTIC    Date of Exam: 3/2/2023 2:06 PM EST    Indication: Abnormal xray - adenopathy.    Comparison: 10/5/2021    Technique: Axial CT images were obtained of the chest without contrast administration.  Sagittal and coronal reconstructions were performed.  Automated exposure control and iterative reconstruction methods were used.     Findings:  There is atelectasis in the right middle lobe medially, unchanged from the last study. There is minimal left basilar atelectasis as well. No pleural fluid is identified. No acute infiltrates are present. There is no supraclavicular or axillary   lymphadenopathy. There are a few calcified nodes in the precarinal region. There are atherosclerotic calcifications in the coronary arteries. Small calcified nodes are present in the right hilum. There is a small prevascular lymph node measuring 11 x 7   mm in the anterior mediastinum. It is new compared with the patient's previous CT.  There are small epicardial nodes identified along the right heart border. The largest node measures 13 x 9 mm. These of increased in size since the last study. There is   no pericardial fluid identified. No destructive bone lesions are identified in the spine. There is ascites in the upper abdomen. There is periportal lymphadenopathy present as well as retroperitoneal lymphadenopathy.        Impression:      Impression:    1.  Interval development of small lymph nodes in the epicardial space on the right measuring up to about 13 mm in greatest diameter. There has also been interval development of a small node in the anterior mediastinum measuring 11 x 7 mm. In the upper   abdomen, there is clearly periportal and retroperitoneal lymphadenopathy. The findings raise the question of lymphoma or metastatic disease. No suggestion of a primary lesion in the chest.  2. Coronary atherosclerotic calcifications.  3. Ascites    Electronically Signed: Ronak Grubbs    3/2/2023 2:48 PM EST    Workstation ID: [id number]    MRI Abdomen With & Without Contrast [[id number]] Collected: 03/01/23 1815     Updated: 03/01/23 1843    Narrative:      MRI ABDOMEN W WO CONTRAST    Date of Exam: 3/1/2023 5:00 PM EST    Indication: Renal and Adrenal lesions. Abnormal CT.     Comparison: None available.    Technique:  Routine multiplanar/multisequence images of the abdomen were obtained before and after the uneventful administration of 20 mL Prohance.    Findings:  The liver is normal in size measuring 15.2 cm in craniocaudal dimension. There is no evidence of hepatic steatosis or iron deposition. There is no focal liver lesion.    The gallbladder is present with multiple gallstones. There is no significant gallbladder wall thickening. There is right upper quadrant perihepatic ascites and pericholecystic fluid. The common hepatic duct measures 8 mm and the distal common bile duct   measures 5 mm. There are no filling defects to suggest choledocholithiasis.    The spleen is at the upper limit of normal measuring 12.6 cm in craniocaudal dimension. There is subtle nodular thickening of the adrenal glands. There is no evidence of definitive underlying lipid rich adrenal adenoma. There is some motion artifact   which limits assessment of the small adrenal nodules. These can be followed on serial imaging. There is normal intrinsic T1 signal within the pancreas. There is no  pancreatic ductal dilatation. There are multiple tiny T2 hyperintense foci within the   pancreas likely representing small side branches or small branch duct type IPMN. These measure up to 3-4 mm in size and are likely of little clinical significance.    The kidneys are symmetric in size and enhancement. There is a T1 intermediate and T2 hyperintense 2.6 cm lesion within the posterior mid aspect of the left kidney. This does not demonstrate any internal enhancement as confirmed by subtraction imaging and   is compatible with a hemorrhagic or proteinaceous cyst. There is an additional 1.2 cm partially exophytic hemorrhagic or proteinaceous cyst arising from the inferior lateral aspect of the right kidney. There are additional small T2 hyperintense simple   appearing cysts without evidence of internal enhancement. There is no hydronephrosis or hydroureter.    There is an abnormal hypoenhancing mass measuring 3.5 x 3.1 cm which appears to be centered within the duodenal bulb. This is partially intramural and partially endoluminal and would be best evaluated with endoscopy. There were abnormal upper abdominal   lymph nodes including a 1.8 cm short axis lymph node adjacent to the common hepatic artery, a portal caval lymph node measuring 16 mm, and retroperitoneal adenopathy surrounding the abdominal aorta measuring 12 and 13 mm in short axis.      Impression:      Impression:  1. Abnormal hypoenhancing mass measuring 3.5 x 3.1 cm within the duodenal bulb. This appears partially intramural and partially endoluminal. Differential considerations would include gastric or duodenal malignancy, leiomyoma, or less likely GIST.   Recommend further evaluation with endoscopy.  2. Enlarged celiac axis, portacaval, and retroperitoneal lymph nodes. Further management would be dependent on pathologic diagnosis of the duodenal mass.  3. Hemorrhagic or proteinaceous cyst within the posterior mid left kidney and exophytic arising from  the posterior inferior aspect of the right kidney. No evidence of enhancing renal mass. Additional simple appearing renal cysts are also present.  4. Limited assessment of the small bilateral adrenal nodules. There is no definite loss of signal to suggest clear lipid rich adrenal adenomas. These could be followed with serial imaging.  5. Cholelithiasis without MR findings of acute cholecystitis. No evidence of choledocholithiasis.  6. Small volume perihepatic ascites.    Electronically Signed: Milton Ayalaelor    3/1/2023 6:41 PM EST    Workstation ID: [id number]  PFI Acquisition          EKG      I personally viewed and interpreted the patient's EKG/Telemetry data:    ECHOCARDIOGRAM:  12/2022    Additional Study Details    Study Quality The quality of the study is limited due to patient body habitus.   Enhancement Agent Details Verbal consent was obtained from the patient to use Lumason image enhancer in order to optimize the study. The use of Lumason was indicated to improve delineation of the left ventricular endocardial border.  5 mL of Lumason was manually activated.  A total of 2 mL of the activated Lumason was administered and the remaining contrast was wasted and discarded.   No adverse reaction to image enhancer was noted.     Echocardiogram Findings    Left Ventricle Left ventricular systolic function is normal. Left ventricular ejection fraction appears to be 56 - 60%.     Normal left ventricular cavity size and wall thickness noted. All left ventricular wall segments contract normally. Left ventricular diastolic function was normal. Normal left atrial pressure. No evidence of left ventricular thrombus or mass present.   Right Ventricle Normal right ventricular cavity size and systolic function noted.   Left Atrium Normal left atrial cavity size noted.   Right Atrium Normal right atrial cavity size noted.   Aortic Valve The aortic valve is not well visualized. No significant aortic valve regurgitation is present.  No hemodynamically significant aortic valve stenosis is present.   Mitral Valve The mitral valve is grossly normal in structure. No significant mitral valve regurgitation is present. No significant mitral valve stenosis is present.   Tricuspid Valve Tricuspid valve not well visualized. No significant tricuspid valve regurgitation or significant stenosis present.   Pulmonic Valve The pulmonic valve is not well visualized.   Greater Vessels No dilation of the aortic root is present.   Pericardium There is no evidence of pericardial effusion. .       STRESS MYOVIEW:  DATE OF EXAM:  02/01/2017     Indication:  Bradycardia, ventricular bigeminy, shortness for breath     Procedure:     Patient gave informed consent and was stressed according to Lexiscan  protocol.  Lexiscan was given in amount of 0.4 mg over the interval of  18 seconds as per protocol.  Patient tolerated the infusion well.  Subsequently, radionuclide tracer was given intravenously.  Patient  complained of shortness of breath but no chest pain was reported.     EKG finding:     Baseline EKG showed ventricular bigeminy with sinus rhythm.  During  Lexiscan infusion, patient remained in sinus rhythm and no new changes  were noted.  Underlying right bundle branch block pattern was noted.  Left axis deviation was present.     Cardiolite imaging:     Patient underwent one-day protocol.  Patient received 7.5 mCi of  technetium sestamibi at rest and 23.0 mCi of technetium sestamibi  during peak exercise patient underwent gated SPECT and SPECT imaging.     There was normal uptake of radionuclide trace and in inferior wall and  septum and the anterior wall there was small defect of the apex was  noted which showed no reversibility and it was fixed.  Four DM SPECT  imaging showed small fixed apical defect.  Mild hypokinesis of apex  was noted.     Conclusion:  1. No myocardial ischemia.  2. Two small fixed apical defect mi versus attenuation artifact.  3. Left  ventricular ejection fraction on gated SPECT is 43% which can  be low because of premature ventricular contraction.  4. No segmental wall motion abnormality noted except for apical  hypokinesis.           Electronically Signed by Jesse Charles M.D. on 02/01/2017 13:15:45  Jesse Charles M.D.     /[place]  DD: 02/01/2017 13:15:35 DT:      CARDIAC CATHETERIZATION:    OTHER:         Assessment & Plan       Acute pancreatitis, unspecified complication status, unspecified pancreatitis type    Gastroesophageal reflux disease    Generalized abdominal pain    Hematochezia    Abnormal CT of the abdomen      Assessment:    Atrial fibrillation with RVR  Acute pancreatitis  Abdominal pain  Diabetes  COPD  Hypertension  Dyslipidemia    Plan:    We will titrate medical therapy  Increase diltiazem  Consider dose of IV digoxin if recurrent RVR  To have EGD today  Additional recommendations per Dr. Charles    Patient is seen and examined and findings are verified.  All data is reviewed by me personally.  Assessment and plan formulated by APC was done after discussion with attending.  I spent more than 50% of time in taking care of the patient.    Patient is admitted with symptom of abdominal pain and palpitation.  Patient reports that she has been having abdominal pain from loss few months but it is getting worse in last few weeks.    Hemodynamics are stable.  Heart rate is poorly controlled.    Normal S1 and S2.  Heart rate is irregular.  Abdominal exam showed it is tender and slightly full.  No leg edema noted.    I would start digoxin 0.125 mg daily.  Lopressor would be increased.  However MRI is consistent with possible gastric mass/malignancy with regional lymphadenopathy.  Patient underwent upper endoscopy and malignant ulcer is suspected.  Recommend hematology/oncology consultation.    Electronically signed by Jesse Charles MD, 03/02/23, 5:47 PM EST.      I discussed the patients findings and my recommendations with patient and  RN      Electronically signed by YAW Cortes, 03/02/23, 4:17 PM EST.      Jesse Charles MD  03/02/23  17:47 EST

## 2023-03-02 NOTE — ANESTHESIA PREPROCEDURE EVALUATION
Anesthesia Evaluation     Patient summary reviewed and Nursing notes reviewed   NPO Solid Status: > 8 hours             Airway   Mallampati: II  TM distance: >3 FB  Neck ROM: full  No difficulty expected  Dental - normal exam   (+) edentulous    Pulmonary - normal exam   (+) a smoker Former, COPD moderate, shortness of breath, sleep apnea on CPAP,   Cardiovascular - normal exam    ECG reviewed    (+) hypertension, dysrhythmias Atrial Fib, CHF ,       Neuro/Psych  GI/Hepatic/Renal/Endo    (+) morbid obesity, GERD poorly controlled,  diabetes mellitus type 2 using insulin,     Musculoskeletal     Abdominal  - normal exam    Bowel sounds: normal.   Substance History      OB/GYN          Other   arthritis,                      Anesthesia Plan    ASA 3     general       Anesthetic plan, risks, benefits, and alternatives have been provided, discussed and informed consent has been obtained with: patient.        CODE STATUS:    Level Of Support Discussed With: Patient  Code Status (Patient has no pulse and is not breathing): CPR (Attempt to Resuscitate)  Medical Interventions (Patient has pulse or is breathing): Full Support

## 2023-03-02 NOTE — ANESTHESIA POSTPROCEDURE EVALUATION
Patient: Caterina Mason    Procedure Summary     Date: 03/02/23 Room / Location: [facility] ENDOSCOPY 1 / [facility] ENDOSCOPY    Anesthesia Start: 1139 Anesthesia Stop: 1211    Procedures:       COLONOSCOPY WITH POLYPECTOMY X 3 AND RANDOM COLON BIOPSIES      ESOPHAGOGASTRODUODENOSCOPY WITH BIOPSY X2 AREAS Diagnosis:       Hematochezia      Generalized abdominal pain      Gastroesophageal reflux disease without esophagitis      Abnormal CT of the abdomen      (Hematochezia [K92.1])      (Generalized abdominal pain [R10.84])      (Gastroesophageal reflux disease without esophagitis [K21.9])      (Abnormal CT of the abdomen [R93.5])    Surgeons: Mary Jo Pichardo MD Provider: Kd Cormier MD    Anesthesia Type: general ASA Status: 3          Anesthesia Type: general    Vitals  Vitals Value Taken Time   /81 03/02/23 1219   Temp     Pulse 88 03/02/23 1228   Resp 14 03/02/23 1219   SpO2 92 % 03/02/23 1228   Vitals shown include unvalidated device data.        Post Anesthesia Care and Evaluation    Patient location during evaluation: PHASE II  Patient participation: complete - patient participated  Level of consciousness: awake  Pain score: 1  Pain management: satisfactory to patient  Anesthetic complications: No anesthetic complications  PONV Status: none  Cardiovascular status: acceptable  Respiratory status: acceptable  Hydration status: acceptable

## 2023-03-02 NOTE — OP NOTE
COLONOSCOPY, ESOPHAGOGASTRODUODENOSCOPY Procedure Report    Patient Name:  Caterina Mason  YOB: 1955    Date of Surgery:  3/2/2023     Pre-Op Diagnosis:  Hematochezia [K92.1]  Generalized abdominal pain [R10.84]  Gastroesophageal reflux disease without esophagitis [K21.9]  Abnormal CT of the abdomen [R93.5]    Post Op Diagnosis:  Malignant appearing lesion of the pylorus and duodenal bulb  Small hiatal hernia  Colon polyps x3  Internal hemorrhoids      Procedure/CPT® Codes:      Procedure(s):  COLONOSCOPY WITH POLYPECTOMY X 3 AND RANDOM COLON BIOPSIES  ESOPHAGOGASTRODUODENOSCOPY WITH BIOPSY X2 AREAS    Staff:  Surgeon(s):  Mary Jo Pichardo MD         Anesthesia: Monitored Anesthesia Care    Implants:    Nothing was implanted during the procedure    Specimen:        See Below    No blood loss    Complications:  None     Description of Procedure:  Informed consent was obtained for the procedure, including sedation.  Risks of perforation, hemorrhage, adverse drug reaction and aspiration were discussed.  The patient was brought into the endoscopy suite. Continuous cardiopulmonary monitoring was performed. The patient was placed in the left lateral decubitus position.  The bite block was inserted into the patient's mouth. After adequate sedation was attained, the Olympus gastroscope was inserted into the patient's mouth and advanced to the second portion of the duodenum without difficulty.  Circumferential examination was performed. A retroflex exam was performed in the patient's stomach.  On completion of the exam, the bowel was decompressed, the scope was removed from the patient, the patient tolerated the procedure well, there were no immediate post-operative complications.     Examination of the esophagus showed normal mucosa and 2 cm hiatal hernia  Examination of the stomach showed abnormal mucosa of the pylorus nodularity and discoloration.  Firm on biopsy, suspect infiltrating tumor.   Confluent with lesion in the duodenal bulb.  Retroflex examination of the stomach was normal   Examination of the duodenum showed ulcerative mass lesion in the proximal duodenal bulb.  Biopsies were obtained    Subsequently, the colonoscopy was performed with the patient in the left lateral decubitus position. The digital rectal exam was performed which showed skin tags.  Subsequently, the Olympus colonoscope was inserted into the patient's rectum and advanced to the level of the cecum and terminal ileum without difficulty.  The bowel prep was excellent.  Circumferential examination of the patient's colon was performed on scope withdrawal.  A retroflex exam was performed in the rectum which showed small internal hemorrhoids.  The bowel was decompressed, the scope was withdrawn from the patient, and the patient tolerated the procedure well. There were no immediate post-operative complications.     Findings:   Normal mucosa of the terminal ileum  Cecal polyp x1, 6 mm in size, removed in single piece fashion cold snare polypectomy  Ascending colon polyp x1, 8 mm in size, removed in single piece fashion cold snare polypectomy.  Postsurgical changes of the distal sigmoid colon  Small internal hemorrhoids.        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  Anticipate need for oncology consult following pathologic diagnosis.    CT chest  PPI daily  Repeat colonoscopy in 3 years of polyps are adenomas, 5 years if they are hyperplastic  Ok to resume Eliquis in 3 days      Mary Jo Pichardo MD     Date: 3/2/2023  Time: 12:12 EST

## 2023-03-02 NOTE — CASE MANAGEMENT/SOCIAL WORK
Continued Stay Note  MARIEL Roach     Patient Name: Caterina Mason  MRN: [id number]  Today's Date: 3/2/2023    Admit Date: 2/28/2023    Plan: D/C plan: Anticipate home with family.  to transport.   Discharge Plan     Row Name 03/02/23 1710       Plan    Plan D/C plan: Anticipate home with family.  to transport.    Patient/Family in Agreement with Plan yes    Plan Comments Anticipate home. EGD/Colonoscopy today.              Phone communication or documentation only - no physical contact with patient or family.      Hamilton Avery RN

## 2023-03-02 NOTE — PROGRESS NOTES
"  [facility] DAILY PROGRESS NOTE    Patient Identification  Name: Caterina Mason  Age: 67 y.o.  Sex: female  :  1955  MRN: [id number]    Date: 3/2/2023             Subjective:  Interval History: MRI done yesterday     Objective:    Scheduled Meds:atorvastatin, 20 mg, Oral, Daily  budesonide-formoterol, 2 puff, Inhalation, BID  dicyclomine, 10 mg, Oral, 4x Daily AC & at Bedtime  dilTIAZem CD, 120 mg, Oral, Daily  fenofibrate, 145 mg, Oral, Daily  furosemide, 40 mg, Oral, Daily  insulin glargine, 40 Units, Subcutaneous, Nightly  insulin lispro, 14 Units, Subcutaneous, TID With Meals  lisinopril, 20 mg, Oral, Daily  magnesium oxide, 400 mg, Oral, BID  metoprolol tartrate, 100 mg, Oral, Q12H  pantoprazole, 40 mg, Intravenous, QAM AC  potassium chloride, 10 mEq, Oral, Daily  sodium chloride, 10 mL, Intravenous, Q12H  sodium chloride, 3 mL, Intravenous, Q12H  sorbitol, 50 mL, Oral, Once      Continuous Infusions:lactated ringers, 100 mL/hr, Last Rate: 100 mL/hr (23 0315)      PRN Meds:•  acetaminophen **OR** acetaminophen **OR** acetaminophen  •  albuterol sulfate HFA  •  HYDROcodone-acetaminophen  •  Morphine  •  ondansetron  •  [COMPLETED] Insert Peripheral IV **AND** sodium chloride  •  sodium chloride  •  sodium chloride  •  sodium chloride  •  sodium chloride    Vital signs in last 24 hours:  Temp:  [97.6 °F (36.4 °C)-98.7 °F (37.1 °C)] 97.6 °F (36.4 °C)  Heart Rate:  [100-138] 116  Resp:  [14-20] 19  BP: (106-143)/(51-76) 143/76    Intake/Output:    Intake/Output Summary (Last 24 hours) at 3/2/2023 0811  Last data filed at 3/1/2023 1912  Gross per 24 hour   Intake 720 ml   Output --   Net 720 ml       Exam:  /76 (BP Location: Left arm, Patient Position: Sitting)   Pulse 116   Temp 97.6 °F (36.4 °C) (Oral)   Resp 19   Ht 162.6 cm (64\")   Wt 104 kg (229 lb 4.5 oz)   SpO2 95%   BMI 39.36 kg/m²     General Appearance:    Alert, cooperative, no distress, appears stated age             "   Abdomen:     Soft, ND   :    No suprapubic distention                Data Review:  All labs (24hrs):   Recent Results (from the past 24 hour(s))   POC Glucose Once    Collection Time: 03/01/23 11:12 AM    Specimen: Blood   Result Value Ref Range    Glucose 142 (H) 70 - 105 mg/dL   POC Glucose Once    Collection Time: 03/01/23  6:13 PM    Specimen: Blood   Result Value Ref Range    Glucose 169 (H) 70 - 105 mg/dL   POC Glucose Once    Collection Time: 03/01/23  7:37 PM    Specimen: Blood   Result Value Ref Range    Glucose 204 (H) 70 - 105 mg/dL   POC Glucose Once    Collection Time: 03/01/23 10:21 PM    Specimen: Blood   Result Value Ref Range    Glucose 168 (H) 70 - 105 mg/dL   POC Glucose Once    Collection Time: 03/02/23  7:19 AM    Specimen: Blood   Result Value Ref Range    Glucose 214 (H) 70 - 105 mg/dL      Imaging Results (Last 24 Hours)     Procedure Component Value Units Date/Time    MRI Abdomen With & Without Contrast [[id number]] Collected: 03/01/23 1815     Updated: 03/01/23 1843    Narrative:      MRI ABDOMEN W WO CONTRAST    Date of Exam: 3/1/2023 5:00 PM EST    Indication: Renal and Adrenal lesions. Abnormal CT.     Comparison: None available.    Technique:  Routine multiplanar/multisequence images of the abdomen were obtained before and after the uneventful administration of 20 mL Prohance.    Findings:  The liver is normal in size measuring 15.2 cm in craniocaudal dimension. There is no evidence of hepatic steatosis or iron deposition. There is no focal liver lesion.    The gallbladder is present with multiple gallstones. There is no significant gallbladder wall thickening. There is right upper quadrant perihepatic ascites and pericholecystic fluid. The common hepatic duct measures 8 mm and the distal common bile duct   measures 5 mm. There are no filling defects to suggest choledocholithiasis.    The spleen is at the upper limit of normal measuring 12.6 cm in craniocaudal dimension. There is  subtle nodular thickening of the adrenal glands. There is no evidence of definitive underlying lipid rich adrenal adenoma. There is some motion artifact   which limits assessment of the small adrenal nodules. These can be followed on serial imaging. There is normal intrinsic T1 signal within the pancreas. There is no pancreatic ductal dilatation. There are multiple tiny T2 hyperintense foci within the   pancreas likely representing small side branches or small branch duct type IPMN. These measure up to 3-4 mm in size and are likely of little clinical significance.    The kidneys are symmetric in size and enhancement. There is a T1 intermediate and T2 hyperintense 2.6 cm lesion within the posterior mid aspect of the left kidney. This does not demonstrate any internal enhancement as confirmed by subtraction imaging and   is compatible with a hemorrhagic or proteinaceous cyst. There is an additional 1.2 cm partially exophytic hemorrhagic or proteinaceous cyst arising from the inferior lateral aspect of the right kidney. There are additional small T2 hyperintense simple   appearing cysts without evidence of internal enhancement. There is no hydronephrosis or hydroureter.    There is an abnormal hypoenhancing mass measuring 3.5 x 3.1 cm which appears to be centered within the duodenal bulb. This is partially intramural and partially endoluminal and would be best evaluated with endoscopy. There were abnormal upper abdominal   lymph nodes including a 1.8 cm short axis lymph node adjacent to the common hepatic artery, a portal caval lymph node measuring 16 mm, and retroperitoneal adenopathy surrounding the abdominal aorta measuring 12 and 13 mm in short axis.      Impression:      Impression:  1. Abnormal hypoenhancing mass measuring 3.5 x 3.1 cm within the duodenal bulb. This appears partially intramural and partially endoluminal. Differential considerations would include gastric or duodenal malignancy, leiomyoma, or less  likely GIST.   Recommend further evaluation with endoscopy.  2. Enlarged celiac axis, portacaval, and retroperitoneal lymph nodes. Further management would be dependent on pathologic diagnosis of the duodenal mass.  3. Hemorrhagic or proteinaceous cyst within the posterior mid left kidney and exophytic arising from the posterior inferior aspect of the right kidney. No evidence of enhancing renal mass. Additional simple appearing renal cysts are also present.  4. Limited assessment of the small bilateral adrenal nodules. There is no definite loss of signal to suggest clear lipid rich adrenal adenomas. These could be followed with serial imaging.  5. Cholelithiasis without MR findings of acute cholecystitis. No evidence of choledocholithiasis.  6. Small volume perihepatic ascites.    Electronically Signed: Milton Ayalaelor    3/1/2023 6:41 PM EST    Workstation ID: [id number]  [employer]    US Abdomen Limited [[id number]] Collected: 03/01/23 1037     Updated: 03/01/23 1048    Narrative:      US ABDOMEN LIMITED    Date of Exam: 3/1/2023 10:10 AM EST    Indication: cholelithiasis.    Comparison: CT abdomen and pelvis with contrast 2/21/2023.    Technique: Grayscale and color Doppler ultrasound evaluation of the right upper quadrant was performed.    Findings:  The liver is enlarged up to 21.16 craniocaudally. The liver demonstrates a diffusely coarsened echotexture, suggestive of steatosis. No focal liver lesion is identified. The does not appear grossly cirrhotic. No focal liver lesions are identified.    Common bile duct measures 8 mm, just slightly above upper limits normal for the patient's stated age. No choledocholithiasis or obstructing abnormality seen.    A few tiny layering gallstones are present. The gallbladder does not appear inflamed or thickened on this examination.    The main portal vein is patent. The imaged hepatic veins are patent.    Right kidney measures 10.9 cm in length, without shadowing stone or  hydronephrosis. Right renal cyst seen on the previous CT are not well-visualized on today's ultrasound examination.    Trace ascites is seen adjacent to the right hepatic lobe.    The pancreas is mostly obscured by overlying bowel gas. The visualized portion of the pancreatic head and neck appear unremarkable.          Impression:      Impression:    1. Cholelithiasis, without sonographic evidence of cholecystitis.  2. Common bile duct measures 8 mm, just slightly above upper limits normal for the patient's stated age. No intrahepatic biliary ductal dilation is seen. No obstructing biliary abnormality is identified.  3. Hepatomegaly with features of diffuse hepatic steatosis.  4. The visualized is the pancreas have a normal sonographic appearance.  5. The right renal cyst seen on the prior CT are not visible on today's ultrasound.    Electronically Signed: Emeli Corona    3/1/2023 10:45 AM EST    Workstation ID: [id number]           Assessment:    Acute pancreatitis, unspecified complication status, unspecified pancreatitis type    Gastroesophageal reflux disease    Generalized abdominal pain    Hematochezia    Abnormal CT of the abdomen    Bilateral small adrenal nodules  Renal cysts     Plan:      MRI images reviewed no enhancing renal mass, small adrenal nodules, will follow as outpatient, follow up 1 month to establish     Kris Galindo MD  [facility]  [facility], [address]  [place] [address]  Office: [phone]  Cell: [phone]  Also available via Epic Secure Chat  03/02/23  08:11 EST

## 2023-03-02 NOTE — PLAN OF CARE
Problem: Adult Inpatient Plan of Care  Goal: Plan of Care Review  Outcome: Ongoing, Progressing  Goal: Patient-Specific Goal (Individualized)  Outcome: Ongoing, Progressing  Goal: Absence of Hospital-Acquired Illness or Injury  Outcome: Ongoing, Progressing  Intervention: Identify and Manage Fall Risk  Recent Flowsheet Documentation  Taken 3/2/2023 0401 by Swati Gandara RN  Safety Promotion/Fall Prevention: safety round/check completed  Taken 3/2/2023 0200 by Swati Gandara RN  Safety Promotion/Fall Prevention: safety round/check completed  Taken 3/2/2023 0000 by Swati Gandara RN  Safety Promotion/Fall Prevention: safety round/check completed  Taken 3/1/2023 2200 by Swati Gandara RN  Safety Promotion/Fall Prevention: safety round/check completed  Taken 3/1/2023 2050 by Swati Gandara RN  Safety Promotion/Fall Prevention: safety round/check completed  Intervention: Prevent Skin Injury  Recent Flowsheet Documentation  Taken 3/1/2023 2050 by Swati Gandara RN  Body Position: position changed independently  Intervention: Prevent and Manage VTE (Venous Thromboembolism) Risk  Recent Flowsheet Documentation  Taken 3/1/2023 2050 by Swati Gandara RN  Activity Management: up ad gisela  Goal: Optimal Comfort and Wellbeing  Outcome: Ongoing, Progressing  Intervention: Provide Person-Centered Care  Recent Flowsheet Documentation  Taken 3/1/2023 2050 by Swati Gandara RN  Trust Relationship/Rapport:   choices provided   care explained   questions encouraged   questions answered   thoughts/feelings acknowledged  Goal: Readiness for Transition of Care  Outcome: Ongoing, Progressing     Problem: Pain Acute  Goal: Acceptable Pain Control and Functional Ability  Outcome: Ongoing, Progressing  Intervention: Prevent or Manage Pain  Recent Flowsheet Documentation  Taken 3/2/2023 0401 by Swati Gandara RN  Medication Review/Management: medications reviewed  Taken 3/2/2023 0200 by Swati Gandara RN  Medication  Review/Management: medications reviewed  Taken 3/2/2023 0000 by Swati Gandara RN  Medication Review/Management: medications reviewed  Taken 3/1/2023 2200 by Swati Gandara RN  Medication Review/Management: medications reviewed  Taken 3/1/2023 2050 by Swati Gandara RN  Medication Review/Management: medications reviewed  Intervention: Optimize Psychosocial Wellbeing  Recent Flowsheet Documentation  Taken 3/1/2023 2050 by Swati Gandara RN  Diversional Activities:   television   smartphone     Problem: Fluid Imbalance (Pancreatitis)  Goal: Fluid Balance  Outcome: Ongoing, Progressing     Problem: Infection (Pancreatitis)  Goal: Infection Symptom Resolution  Outcome: Ongoing, Progressing     Problem: Nutrition Impaired (Pancreatitis)  Goal: Optimal Nutrition Intake  Outcome: Ongoing, Progressing     Problem: Pain (Pancreatitis)  Goal: Acceptable Pain Control  Outcome: Ongoing, Progressing  Intervention: Monitor and Manage Pain  Recent Flowsheet Documentation  Taken 3/1/2023 2050 by Swati Gandara RN  Diversional Activities:   television   smartphone     Problem: Respiratory Compromise (Pancreatitis)  Goal: Effective Oxygenation and Ventilation  Outcome: Ongoing, Progressing  Intervention: Optimize Oxygenation and Ventilation  Recent Flowsheet Documentation  Taken 3/1/2023 2050 by Swati Gandara RN  Activity Management: up ad gisela     Problem: Fall Injury Risk  Goal: Absence of Fall and Fall-Related Injury  Outcome: Ongoing, Progressing  Intervention: Identify and Manage Contributors  Recent Flowsheet Documentation  Taken 3/2/2023 0401 by Swati Gandara RN  Medication Review/Management: medications reviewed  Taken 3/2/2023 0200 by Swati Gandara RN  Medication Review/Management: medications reviewed  Taken 3/2/2023 0000 by Swati Gandara RN  Medication Review/Management: medications reviewed  Taken 3/1/2023 2200 by Swati Gandara RN  Medication Review/Management: medications reviewed  Taken 3/1/2023  2050 by Swati Gandara RN  Medication Review/Management: medications reviewed  Intervention: Promote Injury-Free Environment  Recent Flowsheet Documentation  Taken 3/2/2023 0401 by Swati Gandara RN  Safety Promotion/Fall Prevention: safety round/check completed  Taken 3/2/2023 0200 by Swati Gandara RN  Safety Promotion/Fall Prevention: safety round/check completed  Taken 3/2/2023 0000 by Swati Gandara RN  Safety Promotion/Fall Prevention: safety round/check completed  Taken 3/1/2023 2200 by Swati Gandara RN  Safety Promotion/Fall Prevention: safety round/check completed  Taken 3/1/2023 2050 by Swati Gandara RN  Safety Promotion/Fall Prevention: safety round/check completed   Goal Outcome Evaluation:      Pt did not rest well during shift. Has been up most of night using bathroom, states she is having a hard time finishing second round of bowel prep medication. RN administered PRN zofran and PRN pain medication. Pt had a run of afib last night, 1x dose of IV Cardizem administered. Pt HR not controlled.

## 2023-03-03 ENCOUNTER — INPATIENT HOSPITAL (OUTPATIENT)
Dept: URBAN - METROPOLITAN AREA HOSPITAL 84 | Facility: HOSPITAL | Age: 68
End: 2023-03-03
Payer: COMMERCIAL

## 2023-03-03 ENCOUNTER — READMISSION MANAGEMENT (OUTPATIENT)
Dept: CALL CENTER | Facility: HOSPITAL | Age: 68
End: 2023-03-03
Payer: MEDICAID

## 2023-03-03 VITALS
RESPIRATION RATE: 16 BRPM | HEART RATE: 84 BPM | OXYGEN SATURATION: 95 % | TEMPERATURE: 98.8 F | SYSTOLIC BLOOD PRESSURE: 126 MMHG | HEIGHT: 64 IN | BODY MASS INDEX: 39.14 KG/M2 | DIASTOLIC BLOOD PRESSURE: 65 MMHG | WEIGHT: 229.28 LBS

## 2023-03-03 DIAGNOSIS — K62.5 HEMORRHAGE OF ANUS AND RECTUM: ICD-10-CM

## 2023-03-03 DIAGNOSIS — K59.00 CONSTIPATION, UNSPECIFIED: ICD-10-CM

## 2023-03-03 LAB
ALBUMIN SERPL-MCNC: 3.5 G/DL (ref 3.5–5.2)
ALBUMIN/GLOB SERPL: 1.2 G/DL
ALP SERPL-CCNC: 51 U/L (ref 39–117)
ALT SERPL W P-5'-P-CCNC: 15 U/L (ref 1–33)
ANION GAP SERPL CALCULATED.3IONS-SCNC: 9 MMOL/L (ref 5–15)
AST SERPL-CCNC: 26 U/L (ref 1–32)
BASOPHILS # BLD AUTO: 0 10*3/MM3 (ref 0–0.2)
BASOPHILS NFR BLD AUTO: 0.3 % (ref 0–1.5)
BILIRUB SERPL-MCNC: 0.5 MG/DL (ref 0–1.2)
BUN SERPL-MCNC: 9 MG/DL (ref 8–23)
BUN/CREAT SERPL: 13 (ref 7–25)
CALCIUM SPEC-SCNC: 9 MG/DL (ref 8.6–10.5)
CHLORIDE SERPL-SCNC: 106 MMOL/L (ref 98–107)
CO2 SERPL-SCNC: 28 MMOL/L (ref 22–29)
CREAT SERPL-MCNC: 0.69 MG/DL (ref 0.57–1)
DEPRECATED RDW RBC AUTO: 44.6 FL (ref 37–54)
EGFRCR SERPLBLD CKD-EPI 2021: 95.3 ML/MIN/1.73
EOSINOPHIL # BLD AUTO: 0.2 10*3/MM3 (ref 0–0.4)
EOSINOPHIL NFR BLD AUTO: 2 % (ref 0.3–6.2)
ERYTHROCYTE [DISTWIDTH] IN BLOOD BY AUTOMATED COUNT: 14.4 % (ref 12.3–15.4)
GLOBULIN UR ELPH-MCNC: 3 GM/DL
GLUCOSE BLDC GLUCOMTR-MCNC: 199 MG/DL (ref 70–105)
GLUCOSE BLDC GLUCOMTR-MCNC: 256 MG/DL (ref 70–105)
GLUCOSE SERPL-MCNC: 213 MG/DL (ref 65–99)
HCT VFR BLD AUTO: 34 % (ref 34–46.6)
HGB BLD-MCNC: 11.1 G/DL (ref 12–15.9)
LYMPHOCYTES # BLD AUTO: 1.7 10*3/MM3 (ref 0.7–3.1)
LYMPHOCYTES NFR BLD AUTO: 19.8 % (ref 19.6–45.3)
MAGNESIUM SERPL-MCNC: 1.7 MG/DL (ref 1.6–2.4)
MCH RBC QN AUTO: 29 PG (ref 26.6–33)
MCHC RBC AUTO-ENTMCNC: 32.7 G/DL (ref 31.5–35.7)
MCV RBC AUTO: 88.8 FL (ref 79–97)
MONOCYTES # BLD AUTO: 0.9 10*3/MM3 (ref 0.1–0.9)
MONOCYTES NFR BLD AUTO: 10.6 % (ref 5–12)
NEUTROPHILS NFR BLD AUTO: 5.6 10*3/MM3 (ref 1.7–7)
NEUTROPHILS NFR BLD AUTO: 67.3 % (ref 42.7–76)
NRBC BLD AUTO-RTO: 0.1 /100 WBC (ref 0–0.2)
PHOSPHATE SERPL-MCNC: 2.7 MG/DL (ref 2.5–4.5)
PLATELET # BLD AUTO: 266 10*3/MM3 (ref 140–450)
PMV BLD AUTO: 8.1 FL (ref 6–12)
POTASSIUM SERPL-SCNC: 4.4 MMOL/L (ref 3.5–5.2)
PROT SERPL-MCNC: 6.5 G/DL (ref 6–8.5)
RBC # BLD AUTO: 3.83 10*6/MM3 (ref 3.77–5.28)
SODIUM SERPL-SCNC: 143 MMOL/L (ref 136–145)
WBC NRBC COR # BLD: 8.4 10*3/MM3 (ref 3.4–10.8)

## 2023-03-03 PROCEDURE — 94799 UNLISTED PULMONARY SVC/PX: CPT

## 2023-03-03 PROCEDURE — 99214 OFFICE O/P EST MOD 30 MIN: CPT | Performed by: INTERNAL MEDICINE

## 2023-03-03 PROCEDURE — 85025 COMPLETE CBC W/AUTO DIFF WBC: CPT | Performed by: NURSE PRACTITIONER

## 2023-03-03 PROCEDURE — 82962 GLUCOSE BLOOD TEST: CPT

## 2023-03-03 PROCEDURE — 80053 COMPREHEN METABOLIC PANEL: CPT | Performed by: INTERNAL MEDICINE

## 2023-03-03 PROCEDURE — G0378 HOSPITAL OBSERVATION PER HR: HCPCS

## 2023-03-03 PROCEDURE — 63710000001 INSULIN LISPRO (HUMAN) PER 5 UNITS: Performed by: INTERNAL MEDICINE

## 2023-03-03 PROCEDURE — 83735 ASSAY OF MAGNESIUM: CPT | Performed by: INTERNAL MEDICINE

## 2023-03-03 PROCEDURE — 84100 ASSAY OF PHOSPHORUS: CPT | Performed by: INTERNAL MEDICINE

## 2023-03-03 PROCEDURE — 99232 SBSQ HOSP IP/OBS MODERATE 35: CPT | Performed by: INTERNAL MEDICINE

## 2023-03-03 PROCEDURE — 99232 SBSQ HOSP IP/OBS MODERATE 35: CPT | Performed by: NURSE PRACTITIONER

## 2023-03-03 RX ORDER — CHOLECALCIFEROL (VITAMIN D3) 50 MCG
40 CAPSULE ORAL
Qty: 60 CAPSULE | Refills: 1 | Status: SHIPPED | OUTPATIENT
Start: 2023-03-04

## 2023-03-03 RX ORDER — METOPROLOL TARTRATE 100 MG/1
100 TABLET ORAL EVERY 12 HOURS SCHEDULED
Qty: 60 TABLET | Refills: 0 | Status: SHIPPED | OUTPATIENT
Start: 2023-03-03

## 2023-03-03 RX ORDER — DIGOXIN 125 MCG
125 TABLET ORAL
Qty: 30 TABLET | Refills: 0 | Status: SHIPPED | OUTPATIENT
Start: 2023-03-04

## 2023-03-03 RX ORDER — DILTIAZEM HYDROCHLORIDE 180 MG/1
180 CAPSULE, COATED, EXTENDED RELEASE ORAL DAILY
Qty: 30 CAPSULE | Refills: 0 | Status: SHIPPED | OUTPATIENT
Start: 2023-03-04

## 2023-03-03 RX ORDER — PANTOPRAZOLE SODIUM 40 MG/1
40 TABLET, DELAYED RELEASE ORAL
Status: DISCONTINUED | OUTPATIENT
Start: 2023-03-04 | End: 2023-03-03 | Stop reason: HOSPADM

## 2023-03-03 RX ADMIN — ATORVASTATIN CALCIUM 20 MG: 20 TABLET, FILM COATED ORAL at 08:51

## 2023-03-03 RX ADMIN — BUDESONIDE AND FORMOTEROL FUMARATE DIHYDRATE 2 PUFF: 160; 4.5 AEROSOL RESPIRATORY (INHALATION) at 07:05

## 2023-03-03 RX ADMIN — PANTOPRAZOLE SODIUM 40 MG: 40 INJECTION, POWDER, LYOPHILIZED, FOR SOLUTION INTRAVENOUS at 07:50

## 2023-03-03 RX ADMIN — INSULIN LISPRO 14 UNITS: 100 INJECTION, SOLUTION INTRAVENOUS; SUBCUTANEOUS at 12:29

## 2023-03-03 RX ADMIN — FENOFIBRATE 145 MG: 145 TABLET ORAL at 08:51

## 2023-03-03 RX ADMIN — LISINOPRIL 20 MG: 20 TABLET ORAL at 08:51

## 2023-03-03 RX ADMIN — HYDROCODONE BITARTRATE AND ACETAMINOPHEN 1 TABLET: 7.5; 325 TABLET ORAL at 01:37

## 2023-03-03 RX ADMIN — DICYCLOMINE HYDROCHLORIDE 10 MG: 10 CAPSULE ORAL at 12:30

## 2023-03-03 RX ADMIN — METOPROLOL TARTRATE 100 MG: 50 TABLET, FILM COATED ORAL at 08:51

## 2023-03-03 RX ADMIN — DIGOXIN 125 MCG: 125 TABLET ORAL at 12:30

## 2023-03-03 RX ADMIN — DILTIAZEM HYDROCHLORIDE 180 MG: 180 CAPSULE, COATED, EXTENDED RELEASE ORAL at 08:51

## 2023-03-03 RX ADMIN — DICYCLOMINE HYDROCHLORIDE 10 MG: 10 CAPSULE ORAL at 07:48

## 2023-03-03 RX ADMIN — INSULIN LISPRO 14 UNITS: 100 INJECTION, SOLUTION INTRAVENOUS; SUBCUTANEOUS at 08:51

## 2023-03-03 RX ADMIN — HYDROCODONE BITARTRATE AND ACETAMINOPHEN 1 TABLET: 7.5; 325 TABLET ORAL at 09:45

## 2023-03-03 RX ADMIN — Medication 400 MG: at 08:51

## 2023-03-03 RX ADMIN — Medication 10 ML: at 07:52

## 2023-03-03 RX ADMIN — FUROSEMIDE 40 MG: 40 TABLET ORAL at 08:51

## 2023-03-03 RX ADMIN — POTASSIUM CHLORIDE 10 MEQ: 750 TABLET, EXTENDED RELEASE ORAL at 08:51

## 2023-03-03 NOTE — PROGRESS NOTES
LOS: 1 day   Patient Care Team:  Lou Curran MD as PCP - General  Jesse Charles MD as Consulting Physician (Cardiology)  Bautista Archibald MD as Consulting Physician (Endocrinology)  Jane Montero MD as Consulting Physician (Obstetrics and Gynecology)      Subjective     Interval History:     Subjective: Patient reports that she is feeling some better today.  Continues to have some upper abdominal pain, but states that it has improved.  No nausea/vomiting.  Tolerating diet.      ROS:   No chest pain, shortness of breath, or cough.        Medication Review:     Current Facility-Administered Medications:   •  acetaminophen (TYLENOL) tablet 650 mg, 650 mg, Oral, Q4H PRN **OR** acetaminophen (TYLENOL) 160 MG/5ML solution 650 mg, 650 mg, Oral, Q4H PRN **OR** acetaminophen (TYLENOL) suppository 650 mg, 650 mg, Rectal, Q4H PRN, Mary Jo Pichardo MD  •  albuterol sulfate HFA (PROVENTIL HFA;VENTOLIN HFA;PROAIR HFA) inhaler 2 puff, 2 puff, Inhalation, Q4H PRN, Mary Jo Pichardo MD  •  atorvastatin (LIPITOR) tablet 20 mg, 20 mg, Oral, Daily, Mary Jo Pichardo MD, 20 mg at 03/03/23 0851  •  budesonide-formoterol (SYMBICORT) 160-4.5 MCG/ACT inhaler 2 puff, 2 puff, Inhalation, BID, Mary Jo Pichardo MD, 2 puff at 03/03/23 0705  •  dicyclomine (BENTYL) capsule 10 mg, 10 mg, Oral, 4x Daily AC & at Bedtime, Mary Jo Pichardo MD, 10 mg at 03/03/23 0748  •  digoxin (LANOXIN) tablet 125 mcg, 125 mcg, Oral, Daily, Jesse Charles MD  •  dilTIAZem CD (CARDIZEM CD) 24 hr capsule 180 mg, 180 mg, Oral, Daily, Calli Weir APRN, 180 mg at 03/03/23 0851  •  fenofibrate (TRICOR) tablet 145 mg, 145 mg, Oral, Daily, Mary Jo Pichardo MD, 145 mg at 03/03/23 0851  •  furosemide (LASIX) tablet 40 mg, 40 mg, Oral, Daily, Mary Jo Pichardo MD, 40 mg at 03/03/23 0851  •  HYDROcodone-acetaminophen (NORCO) 7.5-325 MG per tablet 1 tablet, 1 tablet, Oral, TID PRN, Mary Jo Pichardo MD, 1 tablet at 03/03/23  0945  •  insulin glargine (LANTUS, SEMGLEE) injection 40 Units, 40 Units, Subcutaneous, Nightly, Mary Jo Pichardo MD  •  insulin lispro (ADMELOG) injection 14 Units, 14 Units, Subcutaneous, TID With Meals, Mary Jo Pichardo MD, 14 Units at 03/03/23 0851  •  lisinopril (PRINIVIL,ZESTRIL) tablet 20 mg, 20 mg, Oral, Daily, Mary Jo Pichardo MD, 20 mg at 03/03/23 0851  •  magnesium oxide (MAG-OX) tablet 400 mg, 400 mg, Oral, BID, Mary Jo Pichardo MD, 400 mg at 03/03/23 0851  •  metoprolol tartrate (LOPRESSOR) tablet 100 mg, 100 mg, Oral, Q12H, Mary Jo Pichardo MD, 100 mg at 03/03/23 0851  •  ondansetron (ZOFRAN) tablet 4 mg, 4 mg, Oral, Q6H PRN **OR** ondansetron (ZOFRAN) injection 4 mg, 4 mg, Intravenous, Q6H PRN, Mary Jo Pichardo MD  •  pantoprazole (PROTONIX) injection 40 mg, 40 mg, Intravenous, QAM AC, Mary Jo Pichardo MD, 40 mg at 03/03/23 0750  •  potassium chloride (K-DUR,KLOR-CON) ER tablet 10 mEq, 10 mEq, Oral, Daily, Mary Jo Pichardo MD, 10 mEq at 03/03/23 0851  •  prochlorperazine (COMPAZINE) injection 10 mg, 10 mg, Intravenous, Q6H PRN, Michael Lozano MD, 10 mg at 03/02/23 1444  •  [COMPLETED] Insert Peripheral IV, , , Once **AND** sodium chloride 0.9 % flush 10 mL, 10 mL, Intravenous, PRN, Mary Jo Pichardo MD  •  sodium chloride 0.9 % flush 10 mL, 10 mL, Intravenous, Q12H, Mary Jo Pichardo MD, 10 mL at 03/02/23 2150  •  sodium chloride 0.9 % flush 10 mL, 10 mL, Intravenous, PRN, Mary Jo Pichardo MD, 10 mL at 03/03/23 0752  •  sodium chloride 0.9 % infusion 40 mL, 40 mL, Intravenous, PRN, Mary Jo Pichardo MD  •  sodium chloride 0.9 % infusion 40 mL, 40 mL, Intravenous, PRN, Mary Jo Pichardo MD  •  sorbitol solution 50 mL, 50 mL, Oral, Once, Mary Jo Pichardo MD      Objective     Vital Signs  Vitals:    03/03/23 0511 03/03/23 0705 03/03/23 0708 03/03/23 0800   BP: 118/70   123/59   BP Location: Left arm   Left arm   Patient Position: Sitting    Sitting   Pulse: 80  81 84   Resp: 22 16 16 13   Temp: 98.1 °F (36.7 °C)   98.2 °F (36.8 °C)   TempSrc: Oral   Oral   SpO2: 95%  91% 97%   Weight:       Height:           Physical Exam:     General Appearance:    Awake and alert, in no acute distress, morbidly obese habitus   Head:    Normocephalic, without obvious abnormality   Eyes:          Conjunctivae normal, anicteric sclera   Throat:   No oral lesions, no thrush, oral mucosa moist   Neck:   No adenopathy, supple, no JVD   Lungs:     respirations regular, even and unlabored   Abdomen:     Soft, upper abdominal tenderness, no rebound or guarding, non-distended   Rectal:     Deferred   Extremities:   No edema, no cyanosis   Skin:   No bruising or rash, no jaundice        Results Review:    CBC    Results from last 7 days   Lab Units 03/03/23  0634 03/02/23  1323 02/28/23 2237 02/28/23  1343   WBC 10*3/mm3 8.40 7.90 8.70 8.30   HEMOGLOBIN g/dL 11.1* 11.2* 11.5* 11.7*   PLATELETS 10*3/mm3 266 289 285 283     CMP   Results from last 7 days   Lab Units 03/03/23  0634 03/02/23  1323 02/28/23 2237 02/28/23  1343   SODIUM mmol/L 143 141 142 141   POTASSIUM mmol/L 4.4 3.6 4.4 3.8   CHLORIDE mmol/L 106 105 104 103   CO2 mmol/L 28.0 27.0 26.0 27.0   BUN mg/dL 9 10 13 14   CREATININE mg/dL 0.69 0.84 0.83 0.88   GLUCOSE mg/dL 213* 150* 146* 160*   ALBUMIN g/dL 3.5 3.5 3.7 3.5   BILIRUBIN mg/dL 0.5 0.5 0.5 0.5   ALK PHOS U/L 51 47 52 49   AST (SGOT) U/L 26 26 29 23   ALT (SGPT) U/L 15 16 17 17   MAGNESIUM mg/dL 1.7 1.7 1.7  --    PHOSPHORUS mg/dL 2.7 2.4* 2.6  --    LIPASE U/L  --  173*  --  130*     Cr Clearance Estimated Creatinine Clearance: 92.9 mL/min (by C-G formula based on SCr of 0.69 mg/dL).  Coag     HbA1C   Lab Results   Component Value Date    HGBA1C 11.8 (H) 12/01/2022    HGBA1C 11.5 (H) 05/31/2022    HGBA1C 11.6 (H) 11/16/2021     Blood Glucose   Glucose   Date/Time Value Ref Range Status   03/03/2023 0802 199 (H) 70 - 105 mg/dL Final     Comment:     Serial  Number: [id number]:  [id number]   03/02/2023 2102 185 (H) 70 - 105 mg/dL Final     Comment:     Serial Number: [id number]:  [id number]   03/02/2023 1813 174 (H) 70 - 105 mg/dL Final     Comment:     Serial Number: [id number]:  [id number]   03/02/2023 1315 144 (H) 70 - 105 mg/dL Final     Comment:     Serial Number: [id number]:  [id number]   03/02/2023 1008 187 (H) 70 - 105 mg/dL Final     Comment:     Serial Number: [id number]:  [id number]   03/02/2023 0719 214 (H) 70 - 105 mg/dL Final     Comment:     Serial Number: [id number]:  [id number]   03/01/2023 2221 168 (H) 70 - 105 mg/dL Final     Comment:     Serial Number: [id number]:  [id number]   03/01/2023 1937 204 (H) 70 - 105 mg/dL Final     Comment:     Serial Number: [id number]:  [id number]     Infection     UA      Radiology(recent) CT Chest Without Contrast Diagnostic    Result Date: 3/2/2023  Impression: 1. Interval development of small lymph nodes in the epicardial space on the right measuring up to about 13 mm in greatest diameter. There has also been interval development of a small node in the anterior mediastinum measuring 11 x 7 mm. In the upper abdomen, there is clearly periportal and retroperitoneal lymphadenopathy. The findings raise the question of lymphoma or metastatic disease. No suggestion of a primary lesion in the chest. 2. Coronary atherosclerotic calcifications. 3. Ascites Electronically Signed: Ronak Grubbs  3/2/2023 2:48 PM EST  Workstation ID: [id number]    MRI Abdomen With & Without Contrast    Result Date: 3/1/2023  Impression: 1. Abnormal hypoenhancing mass measuring 3.5 x 3.1 cm within the duodenal bulb. This appears partially intramural and partially endoluminal. Differential considerations would include gastric or duodenal malignancy, leiomyoma, or less likely GIST. Recommend further evaluation with endoscopy. 2. Enlarged celiac axis, portacaval, and retroperitoneal lymph nodes. Further  management would be dependent on pathologic diagnosis of the duodenal mass. 3. Hemorrhagic or proteinaceous cyst within the posterior mid left kidney and exophytic arising from the posterior inferior aspect of the right kidney. No evidence of enhancing renal mass. Additional simple appearing renal cysts are also present. 4. Limited assessment of the small bilateral adrenal nodules. There is no definite loss of signal to suggest clear lipid rich adrenal adenomas. These could be followed with serial imaging. 5. Cholelithiasis without MR findings of acute cholecystitis. No evidence of choledocholithiasis. 6. Small volume perihepatic ascites. Electronically Signed: Milton Carter  3/1/2023 6:41 PM EST  Workstation ID: [id number] [employer]         Assessment & Plan     ASSESSMENT:  -Generalized abdominal pain  -Nausea/vomiting  -Constipation  -Rectal bleeding  -Abnormal CT showing possible filling defect in the duodenal bulb  -Normocytic anemia  -Elevated lipase  -A-fib on Eliquis  -COPD  -DMII  -Hypertension  -Hyperlipidemia  -RUSLAN  -History of hysterectomy  -History of colon resection (reportedly due to complication from childbirth)     PLAN:  Patient is a 67-year-old female with history of A-fib on Eliquis, DMII, COPD, and previous colon resection who presents with complaints of abdominal pain, nausea/vomiting, and constipation.    Patient reports that she is feeling some better today.  Denies nausea/vomiting and has been tolerating diet.  Continues to have some upper abdominal pain, but states that overall it is improving.  S/p EGD/colonoscopy yesterday showing a malignant appearing lesion of the pylorus and duodenal bulb, hiatal hernia, colon polyps x3, and internal hemorrhoids.  Hemoglobin 11.1 today from 11.2 yesterday.  Continue to monitor H/H and transfuse as needed.  CT chest does show some suspicious lymph nodes.  Oncology has been consulted and are planning outpatient PET scan.  Diet as tolerated.  Continue  PPI.  Likely home later today from GI standpoint.  She can follow-up in our office in 3 to 4 weeks after discharge.        Electronically signed by YAW Hernández, 03/03/23, 10:38 AM EST.

## 2023-03-03 NOTE — CASE MANAGEMENT/SOCIAL WORK
Social Work Assessment  [facility]     Patient Name: Caterina Mason  MRN: [id number]  Today's Date: 3/3/2023    Admit Date: 2/28/2023       Psychosocial     Row Name 03/03/23 1008       Values/Beliefs    Spiritual, Cultural Beliefs, Church Practices, Values that Affect Care no       Behavior WDL    Behavior WDL WDL       Emotion Mood WDL    Emotion/Mood/Affect WDL WDL       Mental Health    Little Interest or Pleasure in Doing Things 2-->more than half the days    Feeling Down, Depressed or Hopeless 1-->several days    Do you feel stress - tense, restless, nervous, or anxious, or unable to sleep at night because your mind is troubled all the time - these days? Not at all       Speech WDL    Speech WDL WDL       Perceptual State WDL    Perceptual State WDL WDL       Thought Process WDL    Thought Process WDL WDL       Intellectual Performance WDL    Intellectual Performance WDL WDL    Level of Consciousness Alert       Coping/Stress    Major Change/Loss/Stressor medical condition/diagnosis    Patient Personal Strengths expressive of needs    Sources of Support spouse    Techniques to [place] with Loss/Stress/Change none    Reaction to Health Status accepting    Understanding of Condition and Treatment adequate understanding of medical condition       Developmental Stage (Eriksson's)    Developmental Stage Stage 8 (65 years-death/Late Adulthood) Integrity vs. Despair       C-SSRS (Recent)    Q1 Wished to be Dead (Past Month) no    Q2 Suicidal Thoughts (Past Month) no    Q6 Suicide Behavior (Lifetime) no       Violence Risk    Feels Like Hurting Others no    Previous Attempt to Harm Others no               Abuse/Neglect     Row Name 03/03/23 1014       Personal Safety    Feels Unsafe at Home or Work/School no    Feels Threatened by Someone no    Does Anyone Try to Keep You From Having Contact with Others or Doing Things Outside Your Home? no    Physical Signs of Abuse Present no               Legal     Row Name  03/03/23 1014       Financial Resource Strain    How hard is it for you to pay for the very basics like food, housing, medical care, and heating? Not hard       Financial/Legal    Source of Income social security       Legal    Criminal Activity/Legal Involvement none               Substance Abuse     Row Name 03/03/23 1014       Substance Use    Substance Use Status current caffeine use    Caffeine Type pop/soda       AUDIT-C (Alcohol Use Disorders ID Test)    Q1: How often do you have a drink containing alcohol? Never    Q2: How many drinks containing alcohol do you have on a typical day when you are drinking? None    Q3: How often do you have six or more drinks on one occasion? Never    Audit-C Score 0               Met with patient in room wearing PPE: mask.  Maintained distance greater than six feet and spent less than 15 minutes in the room.    Mandy Neville LCSW    Office: [phone]  Fax: [phone]  [email or web address]

## 2023-03-03 NOTE — DISCHARGE SUMMARY
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  Date of Discharge: 2023  Primary Care Physician: Lou Curran MD      Presenting Problem:   Acute pancreatitis, unspecified complication status, unspecified pancreatitis type [K85.90]    Active and Resolved Hospital Problems:  Active Hospital Problems    Diagnosis POA   • **Acute pancreatitis, unspecified complication status, unspecified pancreatitis type [K85.90] Yes   • Abnormal CT of the abdomen [R93.5] Unknown   • Hematochezia [K92.1] Unknown   • Generalized abdominal pain [R10.84] Unknown   • Gastroesophageal reflux disease [K21.9] Unknown      Resolved Hospital Problems   No resolved problems to display.         Hospital Course     Hospital Course:  Caterina Mason is a 67 y.o. female who presented to the hospital with abdominal pain and bloating.  The patient was evaluated by gastroenterology and taken for an EGD.  Patient was found to have a mass on her pylorus.  Biopsies were taken.  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  Oncology was consulted and the patient was instructed to follow-up once the final biopsy results are back for further treatment options.  She was scheduled for a PET scan as an outpatient.  Once she was cleared by oncology and GI she was sent home in stable condition with stable vitals.  Patient had uncontrolled A-fib with RVR during her admission and was seen by her cardiologist Dr. Charles.  The patient was then discharged in stable condition with stable vitals. She was to follow-up regarding the several lesions noted on her adrenals.  Metastatic disease needed to be ruled out with a PET scan.            Reasons For Change In Medications and  Indications for New Medications:      Day of Discharge     Vital Signs:  Temp:  [97.6 °F (36.4 °C)-98.8 °F (37.1 °C)] 98.8 °F (37.1 °C)  Heart Rate:  [] 84  Resp:  [13-22] 16  BP: (118-132)/(59-88) 126/65    Physical Exam:  Physical Exam  Vitals reviewed.   Constitutional:       Comments: Family at bedside   HENT:      Head: Normocephalic.      Nose: Nose normal.      Mouth/Throat:      Mouth: Mucous membranes are moist.   Cardiovascular:      Rate and Rhythm: Normal rate and regular rhythm.      Pulses: Normal pulses.      Heart sounds: Normal heart sounds.   Pulmonary:      Effort: Pulmonary effort is normal.      Breath sounds: Normal breath sounds.   Abdominal:      General: Abdomen is flat.      Palpations: Abdomen is soft.      Comments: Ascites present   Musculoskeletal:         General: Normal range of motion.      Cervical back: Normal range of motion.   Skin:     General: Skin is warm.   Neurological:      General: No focal deficit present.      Mental Status: She is alert and oriented to person, place, and time.   Psychiatric:         Mood and Affect: Mood normal.         Behavior: Behavior normal.            Pertinent  and/or Most Recent Results     LAB RESULTS:      Lab 03/03/23  0634 03/02/23  1323 02/28/23 2237 02/28/23  1343   WBC 8.40 7.90 8.70 8.30   HEMOGLOBIN 11.1* 11.2* 11.5* 11.7*   HEMATOCRIT 34.0 34.5 35.9 34.0   PLATELETS 266 289 285 283   NEUTROS ABS 5.60 6.00 5.70 5.60   LYMPHS ABS 1.70 1.10 1.90 1.70   MONOS ABS 0.90 0.70 0.90 0.80   EOS ABS 0.20 0.10 0.10 0.20   MCV 88.8 89.1 89.6 87.4   SED RATE  --   --  23  --    CRP  --   --  1.20*  --          Lab 03/03/23  0634 03/02/23  1323 02/28/23 2237 02/28/23  1343   SODIUM 143 141 142 141   POTASSIUM 4.4 3.6 4.4 3.8   CHLORIDE 106 105 104 103   CO2 28.0 27.0 26.0 27.0   ANION GAP 9.0 9.0 12.0 11.0   BUN 9 10 13 14   CREATININE 0.69 0.84 0.83 0.88   EGFR 95.3 76.3 77.4 72.1   GLUCOSE 213* 150* 146* 160*   CALCIUM 9.0 8.9 9.9 10.2    MAGNESIUM 1.7 1.7 1.7  --    PHOSPHORUS 2.7 2.4* 2.6  --          Lab 03/03/23  0634 03/02/23  1323 02/28/23  2237 02/28/23  1343   TOTAL PROTEIN 6.5 6.8 7.0 6.8   ALBUMIN 3.5 3.5 3.7 3.5   GLOBULIN 3.0 3.3 3.3 3.3   ALT (SGPT) 15 16 17 17   AST (SGOT) 26 26 29 23   BILIRUBIN 0.5 0.5 0.5 0.5   ALK PHOS 51 47 52 49   LIPASE  --  173*  --  130*                     Brief Urine Lab Results  (Last result in the past 365 days)      Color   Clarity   Blood   Leuk Est   Nitrite   Protein   CREAT   Urine HCG        02/21/23 1959 Yellow   Clear   Negative   Moderate (2+)   Negative   Trace               Microbiology Results (last 10 days)     ** No results found for the last 240 hours. **          CT Chest Without Contrast Diagnostic    Result Date: 3/2/2023  Impression: Impression: 1. Interval development of small lymph nodes in the epicardial space on the right measuring up to about 13 mm in greatest diameter. There has also been interval development of a small node in the anterior mediastinum measuring 11 x 7 mm. In the upper abdomen, there is clearly periportal and retroperitoneal lymphadenopathy. The findings raise the question of lymphoma or metastatic disease. No suggestion of a primary lesion in the chest. 2. Coronary atherosclerotic calcifications. 3. Ascites Electronically Signed: Ronak Grubbs  3/2/2023 2:48 PM EST  Workstation ID: [id number]    MRI Abdomen With & Without Contrast    Result Date: 3/1/2023  Impression: Impression: 1. Abnormal hypoenhancing mass measuring 3.5 x 3.1 cm within the duodenal bulb. This appears partially intramural and partially endoluminal. Differential considerations would include gastric or duodenal malignancy, leiomyoma, or less likely GIST. Recommend further evaluation with endoscopy. 2. Enlarged celiac axis, portacaval, and retroperitoneal lymph nodes. Further management would be dependent on pathologic diagnosis of the duodenal mass. 3. Hemorrhagic or proteinaceous cyst within the  posterior mid left kidney and exophytic arising from the posterior inferior aspect of the right kidney. No evidence of enhancing renal mass. Additional simple appearing renal cysts are also present. 4. Limited assessment of the small bilateral adrenal nodules. There is no definite loss of signal to suggest clear lipid rich adrenal adenomas. These could be followed with serial imaging. 5. Cholelithiasis without MR findings of acute cholecystitis. No evidence of choledocholithiasis. 6. Small volume perihepatic ascites. Electronically Signed: Milton Carter  3/1/2023 6:41 PM EST  Workstation ID: [id number]    CT Abdomen Pelvis With Contrast    Result Date: 2/21/2023  Impression: 1. Cholelithiasis. The gallbladder is not distended. However, there is some haziness of the pericholecystic fat which could be caused by cholecystitis, correlate clinically 2. Bilateral adrenal nodules, adenomas or metastases. MRI may help clarify 3. Bilateral hypoattenuating renal lesions, proteinaceous cysts versus solid masses. Contrast-enhanced MRI may help clarify 4. Possible filling defect in the duodenal bulb. Consider upper GI examination 5. Small amount of ascites 6. Retroperitoneal and upper abdominal adenopathy, and mildly enlarged lymph nodes in the lower thorax. Considerations include hematologic malignancy and metastatic disease Electronically Signed: Son Montelongo  2/21/2023 9:58 PM EST  Workstation ID: [id number]    US Abdomen Limited    Result Date: 3/1/2023  Impression: Impression: 1. Cholelithiasis, without sonographic evidence of cholecystitis. 2. Common bile duct measures 8 mm, just slightly above upper limits normal for the patient's stated age. No intrahepatic biliary ductal dilation is seen. No obstructing biliary abnormality is identified. 3. Hepatomegaly with features of diffuse hepatic steatosis. 4. The visualized is the pancreas have a normal sonographic appearance. 5. The right renal cyst seen on the prior CT  are not visible on today's ultrasound. Electronically Signed: Emeli Raemary  3/1/2023 10:45 AM EST  Workstation ID: [id number]              Results for orders placed during the hospital encounter of 11/30/22    Adult Transthoracic Echo Limited W/ Cont if Necessary Per Protocol    Interpretation Summary  •  Left ventricular systolic function is normal. Left ventricular ejection fraction appears to be 56 - 60%.  •  Left ventricular diastolic function was normal.      Labs Pending at Discharge:      Procedures Performed  Procedure(s):  COLONOSCOPY WITH POLYPECTOMY X 3 AND RANDOM COLON BIOPSIES  ESOPHAGOGASTRODUODENOSCOPY WITH BIOPSY X2 AREAS         Consults:   Consults     Date and Time Order Name Status Description    3/2/2023 12:51 PM Hematology & Oncology Inpatient Consult Completed     3/2/2023 10:04 AM Inpatient Cardiology Consult Completed     3/1/2023  9:07 AM Inpatient Urology Consult Completed     2/28/2023  4:31 PM Inpatient Gastroenterology Consult Completed     2/28/2023  3:28 PM Hospitalist (on-call MD unless specified)              Discharge Details        Discharge Medications      New Medications      Instructions Start Date   digoxin 125 MCG tablet  Commonly known as: LANOXIN   125 mcg, Oral, Daily Digoxin   Start Date: March 4, 2023     pantoprazole 40 MG EC tablet  Commonly known as: PROTONIX   40 mg, Oral, Every Morning Before Breakfast   Start Date: March 4, 2023        Changes to Medications      Instructions Start Date   dicyclomine 10 MG capsule  Commonly known as: BENTYL  What changed: See the new instructions.   TAKE 1 CAPSULE BY MOUTH FOUR TIMES A DAY BEFORE MEALS AND AT BEDTIME      dilTIAZem  MG 24 hr capsule  Commonly known as: CARDIZEM CD  What changed:   · medication strength  · how much to take   180 mg, Oral, Daily   Start Date: March 4, 2023     metoprolol tartrate 100 MG tablet  Commonly known as: LOPRESSOR  What changed:   · medication strength  · See the new instructions.    100 mg, Oral, Every 12 Hours Scheduled         Continue These Medications      Instructions Start Date   albuterol sulfate  (90 Base) MCG/ACT inhaler  Commonly known as: PROVENTIL HFA;VENTOLIN HFA;PROAIR HFA   INHALE 2 PUFFS EVERY 4 HOURS AS NEEDED FOR WHEEZING OR SHORTNESS OF AIR      atorvastatin 40 MG tablet  Commonly known as: LIPITOR   TAKE 1/2 TABLET BY MOUTH EVERY DAY      BASAGLAR KWIKPEN 100 UNIT/ML injection pen   40 Units, Subcutaneous, Nightly      budesonide-formoterol 160-4.5 MCG/ACT inhaler  Commonly known as: SYMBICORT   2 puffs, Inhalation, 2 Times Daily      Cholecalciferol 25 MCG (1000 UT) capsule   TAKE 1 TABLET BY MOUTH TWICE A DAY *OTC NOT COVERED*      fenofibrate 160 MG tablet   TAKE 1 TABLET BY MOUTH EVERY DAY      furosemide 40 MG tablet  Commonly known as: LASIX   TAKE 1 TABLET BY MOUTH EVERY DAY      HumaLOG KwikPen 100 UNIT/ML solution pen-injector  Generic drug: Insulin Lispro (1 Unit Dial)   14 Units, Subcutaneous, 3 Times Daily Before Meals      HYDROcodone-acetaminophen 7.5-325 MG per tablet  Commonly known as: NORCO   1 tablet, Oral, 3 Times Daily PRN      Janumet XR  MG tablet  Generic drug: SITagliptin-metFORMIN HCl ER   TAKE 2 TABLETS BY MOUTH EVERY DAY      lisinopril 20 MG tablet  Commonly known as: PRINIVIL,ZESTRIL   TAKE 1 TABLET BY MOUTH EVERY DAY      magnesium oxide 400 MG tablet  Commonly known as: MAG-OX   TAKE 1 TABLET BY MOUTH TWICE A DAY      potassium chloride 10 MEQ CR capsule  Commonly known as: MICRO-K   TAKE 1 CAPSULE BY MOUTH EVERY DAY         Stop These Medications    Eliquis 5 MG tablet tablet  Generic drug: apixaban        ASK your doctor about these medications      Instructions Start Date   ondansetron ODT 4 MG disintegrating tablet  Commonly known as: ZOFRAN-ODT  Ask about: Should I take this medication?   4 mg, Translingual, Every 8 Hours PRN             Allergies   Allergen Reactions   • Ibuprofen GI Intolerance   • Prednisone Other (See  Comments)   • Pregabalin Other (See Comments)     jerking   • Tramadol Other (See Comments)     Legs jerked   • Adhesive Tape Other (See Comments)     irritation   • Levofloxacin Other (See Comments)     Increase sunburn   • Sulfamethoxazole-Trimethoprim Swelling         Discharge Disposition:   Home or Self Care    Diet:  Hospital:  Diet Order   Procedures   • Diet: Cardiac Diets; Healthy Heart (2-3 Na+); Texture: Regular Texture (IDDSI 7); Fluid Consistency: Thin (IDDSI 0)         Discharge Activity:         CODE STATUS:  Code Status and Medical Interventions:   Ordered at: 02/28/23 1801     Level Of Support Discussed With:    Patient     Code Status (Patient has no pulse and is not breathing):    CPR (Attempt to Resuscitate)     Medical Interventions (Patient has pulse or is breathing):    Full Support         Future Appointments   Date Time Provider Department Center   3/10/2023  2:00 PM LAB MD [facility] ONC LAB NA  [facility]   3/10/2023  2:15 PM Teofilo Landry MD [facility] ONC NA [place]   3/18/2023 10:00 AM [facility] MRI 2 [facility] MRI [facility]   4/18/2023 10:30 AM Lou Curran MD [facility] PC NGATE [facility]   7/28/2023  2:15 PM Jesse Charles MD [facility] CARD CD [facility]           Time spent on Discharge including face to face service:  45 minutes    This patient has been examined wearing appropriate Personal Protective Equipment and discussed with Patient. 03/03/23      Signature: Michael Lozano MD

## 2023-03-03 NOTE — PLAN OF CARE
Goal Outcome Evaluation:    Goals met, patient discharged home with family  Problem: Adult Inpatient Plan of Care  Goal: Plan of Care Review  3/3/2023 1622 by Miladis Cuba RN  Outcome: Met  3/3/2023 1342 by Miladis Cuba RN  Outcome: Ongoing, Progressing  Flowsheets (Taken 3/3/2023 1342)  Progress: no change  Plan of Care Reviewed With: patient  Outcome Evaluation: Alert, oriented x 4, vital signs stable. Abdomen round, obese and distended, bowel tones audible all quadrants. Patient complains of abdominal cramping and unable to have BM today. Dr. Pichardo to bedside today to notify patient her pathology shows cancer in the small intestine. Patient wants to go home today, will DC when order placed. Called Hematology for outpatient follow up appointment. Patient tearful due to diagnosis of cancer. No distress noted. Will continue to follow the careplan.  Goal: Patient-Specific Goal (Individualized)  3/3/2023 1622 by Miladis Cuba RN  Outcome: Met  3/3/2023 1342 by Miladis Cuba RN  Outcome: Ongoing, Progressing  Goal: Absence of Hospital-Acquired Illness or Injury  3/3/2023 1622 by Miladis Cuba RN  Outcome: Met  3/3/2023 1342 by Miladis Cuba RN  Outcome: Ongoing, Progressing  Intervention: Identify and Manage Fall Risk  Recent Flowsheet Documentation  Taken 3/3/2023 1600 by Miladis Cuba RN  Safety Promotion/Fall Prevention:   safety round/check completed   room organization consistent   nonskid shoes/slippers when out of bed   clutter free environment maintained   assistive device/personal items within reach  Taken 3/3/2023 1400 by Miladis Cuba RN  Safety Promotion/Fall Prevention:   safety round/check completed   room organization consistent   nonskid shoes/slippers when out of bed   clutter free environment maintained   assistive device/personal items within reach  Taken 3/3/2023 1200 by Miladis Cuba RN  Safety Promotion/Fall Prevention:   safety round/check  completed   room organization consistent   nonskid shoes/slippers when out of bed   clutter free environment maintained   assistive device/personal items within reach  Taken 3/3/2023 1008 by Miladis Cuba RN  Safety Promotion/Fall Prevention:   safety round/check completed   room organization consistent   nonskid shoes/slippers when out of bed   clutter free environment maintained   assistive device/personal items within reach  Taken 3/3/2023 0800 by Miladis Cuba RN  Safety Promotion/Fall Prevention:   safety round/check completed   room organization consistent   nonskid shoes/slippers when out of bed   clutter free environment maintained   assistive device/personal items within reach  Intervention: Prevent Skin Injury  Recent Flowsheet Documentation  Taken 3/3/2023 0800 by Miladis Cuba RN  Body Position: position changed independently  Skin Protection:   adhesive use limited   transparent dressing maintained   tubing/devices free from skin contact  Intervention: Prevent and Manage VTE (Venous Thromboembolism) Risk  Recent Flowsheet Documentation  Taken 3/3/2023 0800 by Miladis Cuba RN  Activity Management:   activity adjusted per tolerance   activity encouraged   up ad gisela  VTE Prevention/Management:   bilateral   sequential compression devices off  Range of Motion: active ROM (range of motion) encouraged  Intervention: Prevent Infection  Recent Flowsheet Documentation  Taken 3/3/2023 1600 by Miladis Cuba RN  Infection Prevention:   single patient room provided   hand hygiene promoted   environmental surveillance performed  Taken 3/3/2023 1400 by Miladis Cuba RN  Infection Prevention:   single patient room provided   hand hygiene promoted   environmental surveillance performed  Taken 3/3/2023 1200 by Miladis Cuba RN  Infection Prevention:   single patient room provided   hand hygiene promoted   environmental surveillance performed  Taken 3/3/2023 1008 by Miladis Cuba  RN  Infection Prevention:   single patient room provided   hand hygiene promoted   environmental surveillance performed  Taken 3/3/2023 0800 by Miladis Cuba RN  Infection Prevention:   single patient room provided   hand hygiene promoted   environmental surveillance performed  Goal: Optimal Comfort and Wellbeing  3/3/2023 1622 by Miladis Cuba RN  Outcome: Met  3/3/2023 1342 by Miladis Cuba RN  Outcome: Ongoing, Progressing  Intervention: Monitor Pain and Promote Comfort  Recent Flowsheet Documentation  Taken 3/3/2023 1600 by Miladis Cuba RN  Pain Management Interventions: quiet environment facilitated  Taken 3/3/2023 1400 by Miladis Cuba RN  Pain Management Interventions:   pain management plan reviewed with patient/caregiver   pillow support provided   position adjusted   relaxation techniques promoted   quiet environment facilitated  Taken 3/3/2023 1008 by Miladis Cuba RN  Pain Management Interventions: see MAR  Taken 3/3/2023 0800 by Miladis Cuba RN  Pain Management Interventions:   pain management plan reviewed with patient/caregiver   quiet environment facilitated   position adjusted  Intervention: Provide Person-Centered Care  Recent Flowsheet Documentation  Taken 3/3/2023 0800 by Miladis Cuba RN  Trust Relationship/Rapport:   care explained   questions answered   questions encouraged  Goal: Readiness for Transition of Care  3/3/2023 1622 by Miladis Cuba RN  Outcome: Met  3/3/2023 1342 by Miladis Cuba RN  Outcome: Ongoing, Progressing     Problem: Pain Acute  Goal: Acceptable Pain Control and Functional Ability  3/3/2023 1622 by Miladis Cuba RN  Outcome: Met  3/3/2023 1342 by Miladis Cuba RN  Outcome: Ongoing, Progressing  Intervention: Prevent or Manage Pain  Recent Flowsheet Documentation  Taken 3/3/2023 1008 by Miladis Cuba RN  Medication Review/Management: medications reviewed  Taken 3/3/2023 0800 by Miladis Cuba RN  Bowel  Elimination Promotion:   adequate fluid intake promoted   ambulation promoted  Sleep/Rest Enhancement:   awakenings minimized   room darkened  Medication Review/Management: medications reviewed  Intervention: Develop Pain Management Plan  Recent Flowsheet Documentation  Taken 3/3/2023 1600 by Miladis Cuba RN  Pain Management Interventions: quiet environment facilitated  Taken 3/3/2023 1400 by Miladis Cuba RN  Pain Management Interventions:   pain management plan reviewed with patient/caregiver   pillow support provided   position adjusted   relaxation techniques promoted   quiet environment facilitated  Taken 3/3/2023 1008 by Miladis Cuba RN  Pain Management Interventions: see MAR  Taken 3/3/2023 0800 by Miladis Cuba RN  Pain Management Interventions:   pain management plan reviewed with patient/caregiver   quiet environment facilitated   position adjusted  Intervention: Optimize Psychosocial Wellbeing  Recent Flowsheet Documentation  Taken 3/3/2023 0800 by Miladis Cuba RN  Supportive Measures:   active listening utilized   self-care encouraged  Diversional Activities:   television   smartphone     Problem: Fluid Imbalance (Pancreatitis)  Goal: Fluid Balance  3/3/2023 1622 by Miladis Cuba RN  Outcome: Met  3/3/2023 1342 by Miladis Cuba RN  Outcome: Ongoing, Progressing     Problem: Infection (Pancreatitis)  Goal: Infection Symptom Resolution  3/3/2023 1622 by Miladis Cuba RN  Outcome: Met  3/3/2023 1342 by Miladis Cuba RN  Outcome: Ongoing, Progressing     Problem: Nutrition Impaired (Pancreatitis)  Goal: Optimal Nutrition Intake  3/3/2023 1622 by Miladis Cuba RN  Outcome: Met  3/3/2023 1342 by Miladis Cuba RN  Outcome: Ongoing, Progressing     Problem: Pain (Pancreatitis)  Goal: Acceptable Pain Control  3/3/2023 1622 by Miladis Cuba RN  Outcome: Met  3/3/2023 1342 by Miladis Cuba RN  Outcome: Ongoing, Progressing  Intervention: Monitor  and Manage Pain  Recent Flowsheet Documentation  Taken 3/3/2023 1600 by Miladis Cuba RN  Pain Management Interventions: quiet environment facilitated  Taken 3/3/2023 1400 by Miladis Cuba RN  Pain Management Interventions:   pain management plan reviewed with patient/caregiver   pillow support provided   position adjusted   relaxation techniques promoted   quiet environment facilitated  Taken 3/3/2023 1008 by Miladis Cuba RN  Pain Management Interventions: see MAR  Taken 3/3/2023 0800 by Miladis Cuba RN  Pain Management Interventions:   pain management plan reviewed with patient/caregiver   quiet environment facilitated   position adjusted  Diversional Activities:   television   smartphone  Sleep/Rest Enhancement:   awakenings minimized   room darkened     Problem: Respiratory Compromise (Pancreatitis)  Goal: Effective Oxygenation and Ventilation  3/3/2023 1622 by Miladis Cuba RN  Outcome: Met  3/3/2023 1342 by Miladis Cuba RN  Outcome: Ongoing, Progressing  Intervention: Optimize Oxygenation and Ventilation  Recent Flowsheet Documentation  Taken 3/3/2023 1600 by Miladis Cuba RN  Cough And Deep Breathing: done independently per patient  Taken 3/3/2023 1200 by Miladis Cuba RN  Cough And Deep Breathing: done independently per patient  Taken 3/3/2023 0800 by Miladis Cuba RN  Activity Management:   activity adjusted per tolerance   activity encouraged   up ad gisela  Head of Bed (HOB) Positioning: HOB elevated  Airway/Ventilation Management: pulmonary hygiene promoted  Cough And Deep Breathing: done independently per patient     Problem: Fall Injury Risk  Goal: Absence of Fall and Fall-Related Injury  3/3/2023 1622 by Miladis Cuba RN  Outcome: Met  3/3/2023 1342 by Miladis Cuba RN  Outcome: Ongoing, Progressing  Intervention: Identify and Manage Contributors  Recent Flowsheet Documentation  Taken 3/3/2023 1008 by Miladis Cuba RN  Medication  Review/Management: medications reviewed  Taken 3/3/2023 0800 by Miladis Cuba RN  Medication Review/Management: medications reviewed  Self-Care Promotion: independence encouraged  Intervention: Promote Injury-Free Environment  Recent Flowsheet Documentation  Taken 3/3/2023 1600 by Miladis Cuba RN  Safety Promotion/Fall Prevention:   safety round/check completed   room organization consistent   nonskid shoes/slippers when out of bed   clutter free environment maintained   assistive device/personal items within reach  Taken 3/3/2023 1400 by Miladis Cuba RN  Safety Promotion/Fall Prevention:   safety round/check completed   room organization consistent   nonskid shoes/slippers when out of bed   clutter free environment maintained   assistive device/personal items within reach  Taken 3/3/2023 1200 by Miladis Cuba RN  Safety Promotion/Fall Prevention:   safety round/check completed   room organization consistent   nonskid shoes/slippers when out of bed   clutter free environment maintained   assistive device/personal items within reach  Taken 3/3/2023 1008 by Miladis Cuba RN  Safety Promotion/Fall Prevention:   safety round/check completed   room organization consistent   nonskid shoes/slippers when out of bed   clutter free environment maintained   assistive device/personal items within reach  Taken 3/3/2023 0800 by Miladis Cuba RN  Safety Promotion/Fall Prevention:   safety round/check completed   room organization consistent   nonskid shoes/slippers when out of bed   clutter free environment maintained   assistive device/personal items within reach

## 2023-03-03 NOTE — CASE MANAGEMENT/SOCIAL WORK
Continued Stay Note   Doc     Patient Name: Caterina Mason  MRN: [id number]  Today's Date: 3/3/2023    Admit Date: 2/28/2023    Plan: D/C plan: Anticipate home.  to transport.   Discharge Plan     Row Name 03/03/23 1644       Plan    Plan D/C plan: Anticipate home.  to transport.    Patient/Family in Agreement with Plan yes    Plan Comments D/C orders in chart.  to transport pt home.                   Expected Discharge Date and Time     Expected Discharge Date Expected Discharge Time    Mar 3, 2023         Phone communication or documentation only - no physical contact with patient or family.      Hamilton Avery RN

## 2023-03-03 NOTE — PROGRESS NOTES
LOS: 1 day   Admitting Physician- Michael Lozano MD    Reason For Followup:    Abdominal pain  Possible malignant gastric ulcer  Atrial fibrillation chronic  Hypertension    Subjective     Patient is feeling better.  Abdominal pain is slightly better.    Objective     Heart rate is much better.    Review of Systems:   Review of Systems   Constitutional: Negative for chills and fever.   HENT: Negative for ear discharge and nosebleeds.    Eyes: Negative for discharge and redness.   Cardiovascular: Positive for palpitations. Negative for chest pain, orthopnea, paroxysmal nocturnal dyspnea and syncope.   Respiratory: Negative for cough, shortness of breath and wheezing.    Endocrine: Negative for heat intolerance.   Skin: Negative for rash.   Musculoskeletal: Negative for arthritis and myalgias.   Gastrointestinal: Positive for abdominal pain. Negative for melena, nausea and vomiting.   Genitourinary: Negative for dysuria and hematuria.   Neurological: Negative for dizziness, light-headedness, numbness and tremors.   Psychiatric/Behavioral: Negative for depression. The patient is not nervous/anxious.          Vital Signs  Vitals:    03/03/23 0511 03/03/23 0705 03/03/23 0708 03/03/23 0800   BP: 118/70   123/59   BP Location: Left arm   Left arm   Patient Position: Sitting   Sitting   Pulse: 80  81 84   Resp: 22 16 16 13   Temp: 98.1 °F (36.7 °C)   98.2 °F (36.8 °C)   TempSrc: Oral   Oral   SpO2: 95%  91% 97%   Weight:       Height:         Wt Readings from Last 1 Encounters:   02/28/23 104 kg (229 lb 4.5 oz)       Intake/Output Summary (Last 24 hours) at 3/3/2023 0943  Last data filed at 3/2/2023 1700  Gross per 24 hour   Intake 220 ml   Output --   Net 220 ml     Physical Exam:  Constitutional:       Appearance: Well-developed.   Eyes:      General: No scleral icterus.        Right eye: No discharge.   HENT:      Head: Normocephalic and atraumatic.   Neck:      Thyroid: No thyromegaly.      Lymphadenopathy: No  cervical adenopathy.   Pulmonary:      Effort: Pulmonary effort is normal. No respiratory distress.      Breath sounds: Normal breath sounds. No wheezing. No rales.   Cardiovascular:      Normal rate. Irregularly irregular rhythm.      No gallop.   Edema:     Peripheral edema absent.   Abdominal:      Tenderness: There is no abdominal tenderness.   Skin:     Findings: No erythema or rash.   Neurological:      Mental Status: Alert and oriented to person, place, and time.         Results Review:   Lab Results (last 24 hours)     Procedure Component Value Units Date/Time    POC Glucose Once [[id number]]  (Abnormal) Collected: 03/03/23 0802    Specimen: Blood Updated: 03/03/23 0805     Glucose 199 mg/dL      Comment: Serial Number: [id number]:  [id number]       Comprehensive Metabolic Panel [[id number]]  (Abnormal) Collected: 03/03/23 0634    Specimen: Blood Updated: 03/03/23 0737     Glucose 213 mg/dL      BUN 9 mg/dL      Creatinine 0.69 mg/dL      Sodium 143 mmol/L      Potassium 4.4 mmol/L      Chloride 106 mmol/L      CO2 28.0 mmol/L      Calcium 9.0 mg/dL      Total Protein 6.5 g/dL      Albumin 3.5 g/dL      ALT (SGPT) 15 U/L      AST (SGOT) 26 U/L      Alkaline Phosphatase 51 U/L      Total Bilirubin 0.5 mg/dL      Globulin 3.0 gm/dL      A/G Ratio 1.2 g/dL      BUN/Creatinine Ratio 13.0     Anion Gap 9.0 mmol/L      eGFR 95.3 mL/min/1.73     Narrative:      GFR Normal >60  Chronic Kidney Disease <60  Kidney Failure <15      Phosphorus [[id number]]  (Normal) Collected: 03/03/23 0634    Specimen: Blood Updated: 03/03/23 0737     Phosphorus 2.7 mg/dL     Magnesium [[id number]]  (Normal) Collected: 03/03/23 0634    Specimen: Blood Updated: 03/03/23 0737     Magnesium 1.7 mg/dL     CBC & Differential [[id number]]  (Abnormal) Collected: 03/03/23 0634    Specimen: Blood Updated: 03/03/23 0722    Narrative:      The following orders were created for panel order CBC & Differential.  Procedure                                Abnormality         Status                     ---------                               -----------         ------                     CBC Auto Differential[[id number]]        Abnormal            Final result                 Please view results for these tests on the individual orders.    CBC Auto Differential [[id number]]  (Abnormal) Collected: 03/03/23 0634    Specimen: Blood Updated: 03/03/23 0722     WBC 8.40 10*3/mm3      RBC 3.83 10*6/mm3      Hemoglobin 11.1 g/dL      Hematocrit 34.0 %      MCV 88.8 fL      MCH 29.0 pg      MCHC 32.7 g/dL      RDW 14.4 %      RDW-SD 44.6 fl      MPV 8.1 fL      Platelets 266 10*3/mm3      Neutrophil % 67.3 %      Lymphocyte % 19.8 %      Monocyte % 10.6 %      Eosinophil % 2.0 %      Basophil % 0.3 %      Neutrophils, Absolute 5.60 10*3/mm3      Lymphocytes, Absolute 1.70 10*3/mm3      Monocytes, Absolute 0.90 10*3/mm3      Eosinophils, Absolute 0.20 10*3/mm3      Basophils, Absolute 0.00 10*3/mm3      nRBC 0.1 /100 WBC     POC Glucose Once [[id number]]  (Abnormal) Collected: 03/02/23 2102    Specimen: Blood Updated: 03/02/23 2104     Glucose 185 mg/dL      Comment: Serial Number: [id number]:  [id number]       POC Glucose Once [[id number]]  (Abnormal) Collected: 03/02/23 1813    Specimen: Blood Updated: 03/02/23 1814     Glucose 174 mg/dL      Comment: Serial Number: [id number]:  [id number]       Comprehensive Metabolic Panel [[id number]]  (Abnormal) Collected: 03/02/23 1323    Specimen: Blood Updated: 03/02/23 1410     Glucose 150 mg/dL      BUN 10 mg/dL      Creatinine 0.84 mg/dL      Sodium 141 mmol/L      Potassium 3.6 mmol/L      Chloride 105 mmol/L      CO2 27.0 mmol/L      Calcium 8.9 mg/dL      Total Protein 6.8 g/dL      Albumin 3.5 g/dL      ALT (SGPT) 16 U/L      AST (SGOT) 26 U/L      Alkaline Phosphatase 47 U/L      Total Bilirubin 0.5 mg/dL      Globulin 3.3 gm/dL      A/G Ratio 1.1 g/dL      BUN/Creatinine Ratio 11.9     Anion Gap 9.0 mmol/L      eGFR  76.3 mL/min/1.73     Narrative:      GFR Normal >60  Chronic Kidney Disease <60  Kidney Failure <15      Phosphorus [[id number]]  (Abnormal) Collected: 03/02/23 1323    Specimen: Blood Updated: 03/02/23 1410     Phosphorus 2.4 mg/dL     Magnesium [[id number]]  (Normal) Collected: 03/02/23 1323    Specimen: Blood Updated: 03/02/23 1410     Magnesium 1.7 mg/dL     Lipase [[id number]]  (Abnormal) Collected: 03/02/23 1323    Specimen: Blood Updated: 03/02/23 1410     Lipase 173 U/L     CBC & Differential [[id number]]  (Abnormal) Collected: 03/02/23 1323    Specimen: Blood Updated: 03/02/23 1349    Narrative:      The following orders were created for panel order CBC & Differential.  Procedure                               Abnormality         Status                     ---------                               -----------         ------                     CBC Auto Differential[[id number]]        Abnormal            Final result                 Please view results for these tests on the individual orders.    CBC Auto Differential [[id number]]  (Abnormal) Collected: 03/02/23 1323    Specimen: Blood Updated: 03/02/23 1349     WBC 7.90 10*3/mm3      RBC 3.87 10*6/mm3      Hemoglobin 11.2 g/dL      Hematocrit 34.5 %      MCV 89.1 fL      MCH 28.9 pg      MCHC 32.4 g/dL      RDW 14.3 %      RDW-SD 44.6 fl      MPV 7.6 fL      Platelets 289 10*3/mm3      Neutrophil % 75.5 %      Lymphocyte % 13.7 %      Monocyte % 9.1 %      Eosinophil % 1.4 %      Basophil % 0.3 %      Neutrophils, Absolute 6.00 10*3/mm3      Lymphocytes, Absolute 1.10 10*3/mm3      Monocytes, Absolute 0.70 10*3/mm3      Eosinophils, Absolute 0.10 10*3/mm3      Basophils, Absolute 0.00 10*3/mm3      nRBC 0.0 /100 WBC     POC Glucose Once [[id number]]  (Abnormal) Collected: 03/02/23 1315    Specimen: Blood Updated: 03/02/23 1316     Glucose 144 mg/dL      Comment: Serial Number: [id number]:  [id number]       Tissue Pathology Exam [[id number]] Collected: 03/02/23  1146    Specimen: Tissue from Small Intestine, Duodenum; Tissue from Gastric, Body; Tissue from Large Intestine, Cecum; Tissue from Large Intestine, Right / Ascending Colon; Tissue from Large Intestine; Tissue from Large Intestine, Rectum Updated: 03/02/23 1310    POC Glucose Once [[id number]]  (Abnormal) Collected: 03/02/23 1008    Specimen: Blood Updated: 03/02/23 1009     Glucose 187 mg/dL      Comment: Serial Number: [id number]:  [id number]           Imaging Results (Last 72 Hours)     Procedure Component Value Units Date/Time    CT Chest Without Contrast Diagnostic [[id number]] Collected: 03/02/23 1440     Updated: 03/02/23 1450    Narrative:      CT CHEST WO CONTRAST DIAGNOSTIC    Date of Exam: 3/2/2023 2:06 PM EST    Indication: Abnormal xray - adenopathy.    Comparison: 10/5/2021    Technique: Axial CT images were obtained of the chest without contrast administration.  Sagittal and coronal reconstructions were performed.  Automated exposure control and iterative reconstruction methods were used.     Findings:  There is atelectasis in the right middle lobe medially, unchanged from the last study. There is minimal left basilar atelectasis as well. No pleural fluid is identified. No acute infiltrates are present. There is no supraclavicular or axillary   lymphadenopathy. There are a few calcified nodes in the precarinal region. There are atherosclerotic calcifications in the coronary arteries. Small calcified nodes are present in the right hilum. There is a small prevascular lymph node measuring 11 x 7   mm in the anterior mediastinum. It is new compared with the patient's previous CT.  There are small epicardial nodes identified along the right heart border. The largest node measures 13 x 9 mm. These of increased in size since the last study. There is   no pericardial fluid identified. No destructive bone lesions are identified in the spine. There is ascites in the upper abdomen. There is periportal  lymphadenopathy present as well as retroperitoneal lymphadenopathy.        Impression:      Impression:    1. Interval development of small lymph nodes in the epicardial space on the right measuring up to about 13 mm in greatest diameter. There has also been interval development of a small node in the anterior mediastinum measuring 11 x 7 mm. In the upper   abdomen, there is clearly periportal and retroperitoneal lymphadenopathy. The findings raise the question of lymphoma or metastatic disease. No suggestion of a primary lesion in the chest.  2. Coronary atherosclerotic calcifications.  3. Ascites    Electronically Signed: Ronak ROSS Romie    3/2/2023 2:48 PM EST    Workstation ID: [id number]    MRI Abdomen With & Without Contrast [[id number]] Collected: 03/01/23 1815     Updated: 03/01/23 1843    Narrative:      MRI ABDOMEN W WO CONTRAST    Date of Exam: 3/1/2023 5:00 PM EST    Indication: Renal and Adrenal lesions. Abnormal CT.     Comparison: None available.    Technique:  Routine multiplanar/multisequence images of the abdomen were obtained before and after the uneventful administration of 20 mL Prohance.    Findings:  The liver is normal in size measuring 15.2 cm in craniocaudal dimension. There is no evidence of hepatic steatosis or iron deposition. There is no focal liver lesion.    The gallbladder is present with multiple gallstones. There is no significant gallbladder wall thickening. There is right upper quadrant perihepatic ascites and pericholecystic fluid. The common hepatic duct measures 8 mm and the distal common bile duct   measures 5 mm. There are no filling defects to suggest choledocholithiasis.    The spleen is at the upper limit of normal measuring 12.6 cm in craniocaudal dimension. There is subtle nodular thickening of the adrenal glands. There is no evidence of definitive underlying lipid rich adrenal adenoma. There is some motion artifact   which limits assessment of the small adrenal nodules.  These can be followed on serial imaging. There is normal intrinsic T1 signal within the pancreas. There is no pancreatic ductal dilatation. There are multiple tiny T2 hyperintense foci within the   pancreas likely representing small side branches or small branch duct type IPMN. These measure up to 3-4 mm in size and are likely of little clinical significance.    The kidneys are symmetric in size and enhancement. There is a T1 intermediate and T2 hyperintense 2.6 cm lesion within the posterior mid aspect of the left kidney. This does not demonstrate any internal enhancement as confirmed by subtraction imaging and   is compatible with a hemorrhagic or proteinaceous cyst. There is an additional 1.2 cm partially exophytic hemorrhagic or proteinaceous cyst arising from the inferior lateral aspect of the right kidney. There are additional small T2 hyperintense simple   appearing cysts without evidence of internal enhancement. There is no hydronephrosis or hydroureter.    There is an abnormal hypoenhancing mass measuring 3.5 x 3.1 cm which appears to be centered within the duodenal bulb. This is partially intramural and partially endoluminal and would be best evaluated with endoscopy. There were abnormal upper abdominal   lymph nodes including a 1.8 cm short axis lymph node adjacent to the common hepatic artery, a portal caval lymph node measuring 16 mm, and retroperitoneal adenopathy surrounding the abdominal aorta measuring 12 and 13 mm in short axis.      Impression:      Impression:  1. Abnormal hypoenhancing mass measuring 3.5 x 3.1 cm within the duodenal bulb. This appears partially intramural and partially endoluminal. Differential considerations would include gastric or duodenal malignancy, leiomyoma, or less likely GIST.   Recommend further evaluation with endoscopy.  2. Enlarged celiac axis, portacaval, and retroperitoneal lymph nodes. Further management would be dependent on pathologic diagnosis of the duodenal  mass.  3. Hemorrhagic or proteinaceous cyst within the posterior mid left kidney and exophytic arising from the posterior inferior aspect of the right kidney. No evidence of enhancing renal mass. Additional simple appearing renal cysts are also present.  4. Limited assessment of the small bilateral adrenal nodules. There is no definite loss of signal to suggest clear lipid rich adrenal adenomas. These could be followed with serial imaging.  5. Cholelithiasis without MR findings of acute cholecystitis. No evidence of choledocholithiasis.  6. Small volume perihepatic ascites.    Electronically Signed: Milton Carter    3/1/2023 6:41 PM EST    Workstation ID: [id number]  [email or web address]    US Abdomen Limited [[id number]] Collected: 03/01/23 1037     Updated: 03/01/23 1048    Narrative:      US ABDOMEN LIMITED    Date of Exam: 3/1/2023 10:10 AM EST    Indication: cholelithiasis.    Comparison: CT abdomen and pelvis with contrast 2/21/2023.    Technique: Grayscale and color Doppler ultrasound evaluation of the right upper quadrant was performed.    Findings:  The liver is enlarged up to 21.16 craniocaudally. The liver demonstrates a diffusely coarsened echotexture, suggestive of steatosis. No focal liver lesion is identified. The does not appear grossly cirrhotic. No focal liver lesions are identified.    Common bile duct measures 8 mm, just slightly above upper limits normal for the patient's stated age. No choledocholithiasis or obstructing abnormality seen.    A few tiny layering gallstones are present. The gallbladder does not appear inflamed or thickened on this examination.    The main portal vein is patent. The imaged hepatic veins are patent.    Right kidney measures 10.9 cm in length, without shadowing stone or hydronephrosis. Right renal cyst seen on the previous CT are not well-visualized on today's ultrasound examination.    Trace ascites is seen adjacent to the right hepatic lobe.    The pancreas is mostly obscured  by overlying bowel gas. The visualized portion of the pancreatic head and neck appear unremarkable.          Impression:      Impression:    1. Cholelithiasis, without sonographic evidence of cholecystitis.  2. Common bile duct measures 8 mm, just slightly above upper limits normal for the patient's stated age. No intrahepatic biliary ductal dilation is seen. No obstructing biliary abnormality is identified.  3. Hepatomegaly with features of diffuse hepatic steatosis.  4. The visualized is the pancreas have a normal sonographic appearance.  5. The right renal cyst seen on the prior CT are not visible on today's ultrasound.    Electronically Signed: Emeli Corona    3/1/2023 10:45 AM EST    Workstation ID: [id number]        ECG/EMG Results (most recent)     Procedure Component Value Units Date/Time    SCANNED - TELEMETRY   [[id number]] Resulted: 02/28/23     Updated: 03/01/23 0613    SCANNED - TELEMETRY   [[id number]] Resulted: 02/28/23     Updated: 03/01/23 1043    SCANNED - TELEMETRY   [[id number]] Resulted: 02/28/23     Updated: 03/01/23 1346    SCANNED - TELEMETRY   [[id number]] Resulted: 02/28/23     Updated: 03/01/23 2224    SCANNED - TELEMETRY   [[id number]] Resulted: 02/28/23     Updated: 03/02/23 0654    SCANNED - TELEMETRY   [[id number]] Resulted: 02/28/23     Updated: 03/02/23 0702    SCANNED - TELEMETRY   [[id number]] Resulted: 02/28/23     Updated: 03/02/23 1036    SCANNED - TELEMETRY   [[id number]] Resulted: 02/28/23     Updated: 03/02/23 1036    SCANNED - TELEMETRY   [[id number]] Resulted: 02/28/23     Updated: 03/02/23 2107    SCANNED - TELEMETRY   [[id number]] Resulted: 02/28/23     Updated: 03/03/23 0623        CBC    Results from last 7 days   Lab Units 03/03/23  0634 03/02/23  1323 02/28/23  2237 02/28/23  1343   WBC 10*3/mm3 8.40 7.90 8.70 8.30   HEMOGLOBIN g/dL 11.1* 11.2* 11.5* 11.7*   PLATELETS 10*3/mm3 266 289 285 283     BMP   Results from last 7 days   Lab Units 03/03/23  0634 03/02/23  1328  02/28/23  2237 02/28/23  1343   SODIUM mmol/L 143 141 142 141   POTASSIUM mmol/L 4.4 3.6 4.4 3.8   CHLORIDE mmol/L 106 105 104 103   CO2 mmol/L 28.0 27.0 26.0 27.0   BUN mg/dL 9 10 13 14   CREATININE mg/dL 0.69 0.84 0.83 0.88   GLUCOSE mg/dL 213* 150* 146* 160*   MAGNESIUM mg/dL 1.7 1.7 1.7  --    PHOSPHORUS mg/dL 2.7 2.4* 2.6  --      CMP   Results from last 7 days   Lab Units 03/03/23  0634 03/02/23  1323 02/28/23  2237 02/28/23  1343   SODIUM mmol/L 143 141 142 141   POTASSIUM mmol/L 4.4 3.6 4.4 3.8   CHLORIDE mmol/L 106 105 104 103   CO2 mmol/L 28.0 27.0 26.0 27.0   BUN mg/dL 9 10 13 14   CREATININE mg/dL 0.69 0.84 0.83 0.88   GLUCOSE mg/dL 213* 150* 146* 160*   ALBUMIN g/dL 3.5 3.5 3.7 3.5   BILIRUBIN mg/dL 0.5 0.5 0.5 0.5   ALK PHOS U/L 51 47 52 49   AST (SGOT) U/L 26 26 29 23   ALT (SGPT) U/L 15 16 17 17   LIPASE U/L  --  173*  --  130*     Cardiac Studies:  Echo- Results for orders placed during the hospital encounter of 11/30/22    Adult Transthoracic Echo Limited W/ Cont if Necessary Per Protocol    Interpretation Summary  •  Left ventricular systolic function is normal. Left ventricular ejection fraction appears to be 56 - 60%.  •  Left ventricular diastolic function was normal.    Stress Myoview-  Cath-      Medication Review:   Scheduled Meds:atorvastatin, 20 mg, Oral, Daily  budesonide-formoterol, 2 puff, Inhalation, BID  dicyclomine, 10 mg, Oral, 4x Daily AC & at Bedtime  digoxin, 125 mcg, Oral, Daily  dilTIAZem CD, 180 mg, Oral, Daily  fenofibrate, 145 mg, Oral, Daily  furosemide, 40 mg, Oral, Daily  insulin glargine, 40 Units, Subcutaneous, Nightly  insulin lispro, 14 Units, Subcutaneous, TID With Meals  lisinopril, 20 mg, Oral, Daily  magnesium oxide, 400 mg, Oral, BID  metoprolol tartrate, 100 mg, Oral, Q12H  pantoprazole, 40 mg, Intravenous, QAM AC  potassium chloride, 10 mEq, Oral, Daily  sodium chloride, 10 mL, Intravenous, Q12H  sorbitol, 50 mL, Oral, Once      Continuous Infusions:   PRN  Meds:.•  acetaminophen **OR** acetaminophen **OR** acetaminophen  •  albuterol sulfate HFA  •  HYDROcodone-acetaminophen  •  ondansetron **OR** ondansetron  •  prochlorperazine  •  [COMPLETED] Insert Peripheral IV **AND** sodium chloride  •  sodium chloride  •  sodium chloride  •  sodium chloride      Assessment & Plan     MDM:    1.  Chronic atrial fibrillation:    I would recommend to restart Eliquis if okay with gastroenterologist.  Heart rate is better controlled after addition of digoxin and increase in Lopressor    2.  Hypertension:    Blood pressure is better.    3.  Abdominal pain:    Endoscopy showed possible malignant gastric ulcer.  Patient is being followed by gastroenterology.    4.  Shortness of breath:    Patient's shortness of breath is much better.  Current treatment would be continued      Jesse Charles MD  03/03/23  09:43 EST

## 2023-03-03 NOTE — PROGRESS NOTES
Hematology/Oncology Inpatient Progress Note    PATIENT NAME: Caterina Mason  : 1955  MRN: [id number]    CHIEF COMPLAINT: Abdominal pain    HISTORY OF PRESENT ILLNESS:    Caterina Mason is a 67 y.o. female who presented to [facility] on 2023 with complaints of abdominal pain that was intermittent over the past 6 weeks but became more acute and severe over the past week.  Past medical history of type 2 diabetes mellitus, COPD, hypertension, hyperlipidemia, and atrial fibrillation on anticoagulation with apixaban.  She reported the abdominal pain was also accompanied by persistent diarrhea and intermittent vomiting.  A recent CT of the abdomen and pelvis dated 2023 showed cholelithiasis; bilateral adrenal nodules, adenomas or metastasis; bilateral hypoattenuating renal lesions, proteinaceous cysts versus solid masses; possible filling defect in the duodenal bulb; small amount of ascites; retroperitoneal and upper abdominal adenopathy, mildly enlarged lymph nodes in the lower thorax.     Evaluation in the ED: WBC 8.70, hemoglobin 11.5, MCV 89.6, platelets 285,000.  Normal LFTs and renal function.  She was diagnosed with suspected pancreatitis and admitted to inpatient for evaluation by gastroenterology.     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     23  Hematology/Oncology was consulted for the malignant appearing infiltrative and ulcerated lesion of the pylorus and duodenal bulb.     He/She  has a past medical history of Arthritis, Atrial fibrillation (HCC), Bradycardia, Cataract, COPD (chronic obstructive pulmonary disease) (HCC), Diabetes mellitus, type 2 (HCC), DJD (degenerative joint disease), Emphysema of lung (HCC), , GERD (gastroesophageal reflux disease), History of combined right and left heart  catheterization (08/02/2018), Hyperlipidemia, Hypertension, Pain, and Sleep apnea.     PCP: Lou Curran MD    Subjective   3/3/2023: Feeling well and wanting to be discharged home. Was able to sleep and ate some this morning. No pain at this time.   ROS:  Review of Systems   Constitutional: Positive for activity change, appetite change and fatigue. Negative for chills, diaphoresis, fever and unexpected weight change.   HENT: Negative for congestion, dental problem, drooling, ear discharge, ear pain, facial swelling, hearing loss, mouth sores, nosebleeds, postnasal drip, rhinorrhea, sinus pressure, sinus pain, sneezing, sore throat, tinnitus, trouble swallowing and voice change.    Eyes: Negative for photophobia, pain, discharge, redness, itching and visual disturbance.   Respiratory: Negative for apnea, cough, choking, chest tightness, shortness of breath, wheezing and stridor.    Cardiovascular: Negative for chest pain, palpitations and leg swelling.   Gastrointestinal: Positive for abdominal distention, abdominal pain, diarrhea, nausea and vomiting. Negative for anal bleeding, blood in stool, constipation and rectal pain.   Endocrine: Negative for cold intolerance, heat intolerance, polydipsia and polyuria.   Genitourinary: Negative for decreased urine volume, difficulty urinating, dysuria, flank pain, frequency, genital sores, hematuria and urgency.   Musculoskeletal: Negative for arthralgias, back pain, gait problem, joint swelling, myalgias, neck pain and neck stiffness.   Skin: Negative for color change, pallor and rash.   Neurological: Negative for dizziness, tremors, seizures, syncope, facial asymmetry, speech difficulty, weakness, light-headedness, numbness and headaches.   Hematological: Negative for adenopathy. Does not bruise/bleed easily.   Psychiatric/Behavioral: Negative for agitation, behavioral problems, confusion, decreased concentration, hallucinations, self-injury, sleep disturbance and  "suicidal ideas. The patient is not nervous/anxious.         MEDICATIONS:    Scheduled Meds:  atorvastatin, 20 mg, Oral, Daily  budesonide-formoterol, 2 puff, Inhalation, BID  dicyclomine, 10 mg, Oral, 4x Daily AC & at Bedtime  digoxin, 125 mcg, Oral, Daily  dilTIAZem CD, 180 mg, Oral, Daily  fenofibrate, 145 mg, Oral, Daily  furosemide, 40 mg, Oral, Daily  insulin glargine, 40 Units, Subcutaneous, Nightly  insulin lispro, 14 Units, Subcutaneous, TID With Meals  lisinopril, 20 mg, Oral, Daily  magnesium oxide, 400 mg, Oral, BID  metoprolol tartrate, 100 mg, Oral, Q12H  [START ON 3/4/2023] pantoprazole, 40 mg, Oral, QAM AC  potassium chloride, 10 mEq, Oral, Daily  sodium chloride, 10 mL, Intravenous, Q12H  sorbitol, 50 mL, Oral, Once       Continuous Infusions:      PRN Meds:  •  acetaminophen **OR** acetaminophen **OR** acetaminophen  •  albuterol sulfate HFA  •  HYDROcodone-acetaminophen  •  ondansetron **OR** ondansetron  •  prochlorperazine  •  [COMPLETED] Insert Peripheral IV **AND** sodium chloride  •  sodium chloride  •  sodium chloride  •  sodium chloride     ALLERGIES:    Allergies   Allergen Reactions   • Ibuprofen GI Intolerance   • Prednisone Other (See Comments)   • Pregabalin Other (See Comments)     jerking   • Tramadol Other (See Comments)     Legs jerked   • Adhesive Tape Other (See Comments)     irritation   • Levofloxacin Other (See Comments)     Increase sunburn   • Sulfamethoxazole-Trimethoprim Swelling     Objective    VITALS:   /65 (BP Location: Left arm, Patient Position: Sitting)   Pulse 84   Temp 98.8 °F (37.1 °C) (Oral)   Resp 16   Ht 162.6 cm (64\")   Wt 104 kg (229 lb 4.5 oz)   SpO2 95%   BMI 39.36 kg/m²     PHYSICAL EXAM: (performed by MD)  Physical Exam  Constitutional:       General: She is not in acute distress.     Appearance: She is not ill-appearing, toxic-appearing or diaphoretic.      Comments: Appears chronically ill and in no distress. Sitting up in bed. Seems older " than the stated age.    HENT:      Head: Normocephalic and atraumatic.      Right Ear: External ear normal.      Left Ear: External ear normal.      Nose: Nose normal.      Mouth/Throat:      Mouth: Mucous membranes are moist.      Pharynx: Oropharynx is clear. No oropharyngeal exudate or posterior oropharyngeal erythema.   Eyes:      General: No scleral icterus.        Right eye: No discharge.         Left eye: No discharge.      Conjunctiva/sclera: Conjunctivae normal.      Pupils: Pupils are equal, round, and reactive to light.   Cardiovascular:      Rate and Rhythm: Normal rate and regular rhythm.      Pulses: Normal pulses.      Heart sounds: No murmur heard.    No friction rub. No gallop.   Pulmonary:      Effort: No respiratory distress.      Breath sounds: No stridor. No wheezing, rhonchi or rales.   Abdominal:      General: Bowel sounds are normal. There is no distension.      Palpations: Abdomen is soft. There is no mass.      Tenderness: There is no abdominal tenderness. There is no right CVA tenderness, left CVA tenderness, guarding or rebound.      Hernia: No hernia is present.      Comments: Protuberant soft and not tender.    Musculoskeletal:         General: No swelling, tenderness, deformity or signs of injury.      Cervical back: No rigidity.      Right lower leg: No edema.      Left lower leg: No edema.   Lymphadenopathy:      Cervical: No cervical adenopathy.   Skin:     Coloration: Skin is not jaundiced.      Findings: No bruising, lesion or rash.   Neurological:      General: No focal deficit present.      Mental Status: She is alert and oriented to person, place, and time.      Cranial Nerves: No cranial nerve deficit.      Motor: No weakness.      Gait: Gait normal.   Psychiatric:         Mood and Affect: Mood normal.         Behavior: Behavior normal.         Thought Content: Thought content normal.         Judgment: Judgment normal.     SAPPHIRE Landry MD performed the physical exam on  "3/3/2023 as documented above.       RECENT LABS:  Lab Results (last 24 hours)     Procedure Component Value Units Date/Time    Tissue Pathology Exam [[id number]] Collected: 03/02/23 1146    Specimen: Tissue from Small Intestine, Duodenum; Tissue from Gastric, Body; Tissue from Large Intestine, Cecum; Tissue from Large Intestine, Right / Ascending Colon; Tissue from Large Intestine; Tissue from Large Intestine, Rectum Updated: 03/03/23 1401     Case Report --     Surgical Pathology Report                         Case: [id number]                                  Authorizing Provider:  Mary Jo Pichardo MD   Collected:           03/02/2023 11:46 AM          Ordering Location:     [facility]  Received:            03/02/2023 01:10 PM                                 SUITES                                                                       Pathologist:           Isak Dueñas MD                                                            Specimens:   1) - Small Intestine, Duodenum, DUODENAL BULB MASS                                                  2) - Gastric, Body, ABNORMAL MUCOSA OF PYLORIS                                                      3) - Large Intestine, Cecum, X1                                                                     4) - Large Intestine, Right / Ascending Colon, X1                                                   5) - Large Intestine, RANDOM COLON BIOPSIES                                                         6) - Large Intestine, Rectum, X1                                                            Final Diagnosis --     Specimen #1 (Mass, duodenal bulb, biopsies):    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)    See comment    Specimen #2 (\"Abnormal mucosa of pylorus,\" biopsy):    Antral type mucosa with moderate chronic gastritis    No Helicobacter pylori organisms identified on H&E (see comment)    No intestinal metaplasia, dysplasia or malignancy " "identified     Specimen #3 (Polyp, cecum, polypectomy):    Tubular adenoma     Specimen #4 (Polyp, ascending, polypectomy):    Tubular adenoma     Specimen #5 (Mucosa, colon, random biopsies):    Colonic mucosa with no significant pathologic changes     Specimen #6 (Polyp, rectum, polypectomy):    Hyperplastic polyp    COY/sms        Comment --     Part #1: Dr. Pichardo was notified of the above findings via telephone message by Dr. Dueñas at 9:30 AM on 03/03/2023.     Part #2: H. Pylori immunostain will be reported in an addendum when available.    COY/sms        Gross Description --     1. Small Intestine, Duodenum.  Received in formalin designated \"Duodenal bulb mass\" are several fragments of tan tissue measuring 0.3 cm in greatest dimension, submitted in one cassette.     2. Gastric, Body.  Received in formalin designated \"Abnormal mucosa of pylorus\" are multiple fragments of tan tissue measuring 0.3 cm in greatest dimension, submitted in one cassette.     3. Large Intestine, Cecum.  Received in formalin designated \"Cecum x1\" is a single fragment of tan tissue measuring 0.3 cm in greatest dimension, submitted in one cassette.     4. Large Intestine, Right / Ascending Colon.  Received in formalin designated \"Ascending x1\" are a few fragments of tan tissue measuring 0.5 cm in greatest dimension, submitted in one cassette.     5. Large Intestine.  Received in formalin designated \"Random colon\" are multiple fragments of tan tissue measuring 0.5 cm in greatest dimension, submitted in one cassette.     6. Large Intestine, Rectum.  Received in formalin designated \"Rectum x1\" are a few fragments of tan tissue measuring 0.3 cm in greatest dimension, submitted in one cassette.     COY/sms       POC Glucose Once [[id number]]  (Abnormal) Collected: 03/03/23 1054    Specimen: Blood Updated: 03/03/23 1057     Glucose 256 mg/dL      Comment: Serial Number: [id number]:  [id number]       POC Glucose Once [[id number]]  " (Abnormal) Collected: 03/03/23 0802    Specimen: Blood Updated: 03/03/23 0805     Glucose 199 mg/dL      Comment: Serial Number: [id number]:  [id number]       Comprehensive Metabolic Panel [[id number]]  (Abnormal) Collected: 03/03/23 0634    Specimen: Blood Updated: 03/03/23 0737     Glucose 213 mg/dL      BUN 9 mg/dL      Creatinine 0.69 mg/dL      Sodium 143 mmol/L      Potassium 4.4 mmol/L      Chloride 106 mmol/L      CO2 28.0 mmol/L      Calcium 9.0 mg/dL      Total Protein 6.5 g/dL      Albumin 3.5 g/dL      ALT (SGPT) 15 U/L      AST (SGOT) 26 U/L      Alkaline Phosphatase 51 U/L      Total Bilirubin 0.5 mg/dL      Globulin 3.0 gm/dL      A/G Ratio 1.2 g/dL      BUN/Creatinine Ratio 13.0     Anion Gap 9.0 mmol/L      eGFR 95.3 mL/min/1.73     Narrative:      GFR Normal >60  Chronic Kidney Disease <60  Kidney Failure <15      Phosphorus [[id number]]  (Normal) Collected: 03/03/23 0634    Specimen: Blood Updated: 03/03/23 0737     Phosphorus 2.7 mg/dL     Magnesium [[id number]]  (Normal) Collected: 03/03/23 0634    Specimen: Blood Updated: 03/03/23 0737     Magnesium 1.7 mg/dL     CBC & Differential [[id number]]  (Abnormal) Collected: 03/03/23 0634    Specimen: Blood Updated: 03/03/23 0722    Narrative:      The following orders were created for panel order CBC & Differential.  Procedure                               Abnormality         Status                     ---------                               -----------         ------                     CBC Auto Differential[[id number]]        Abnormal            Final result                 Please view results for these tests on the individual orders.    CBC Auto Differential [[id number]]  (Abnormal) Collected: 03/03/23 0634    Specimen: Blood Updated: 03/03/23 0722     WBC 8.40 10*3/mm3      RBC 3.83 10*6/mm3      Hemoglobin 11.1 g/dL      Hematocrit 34.0 %      MCV 88.8 fL      MCH 29.0 pg      MCHC 32.7 g/dL      RDW 14.4 %      RDW-SD 44.6 fl      MPV 8.1 fL       Platelets 266 10*3/mm3      Neutrophil % 67.3 %      Lymphocyte % 19.8 %      Monocyte % 10.6 %      Eosinophil % 2.0 %      Basophil % 0.3 %      Neutrophils, Absolute 5.60 10*3/mm3      Lymphocytes, Absolute 1.70 10*3/mm3      Monocytes, Absolute 0.90 10*3/mm3      Eosinophils, Absolute 0.20 10*3/mm3      Basophils, Absolute 0.00 10*3/mm3      nRBC 0.1 /100 WBC     POC Glucose Once [[id number]]  (Abnormal) Collected: 03/02/23 2102    Specimen: Blood Updated: 03/02/23 2104     Glucose 185 mg/dL      Comment: Serial Number: [id number]:  [id number]       POC Glucose Once [[id number]]  (Abnormal) Collected: 03/02/23 1813    Specimen: Blood Updated: 03/02/23 1814     Glucose 174 mg/dL      Comment: Serial Number: [id number]:  [id number]           PENDING RESULTS: Tissue pathology    IMAGING REVIEWED:  CT Chest Without Contrast Diagnostic    Result Date: 3/2/2023  Impression: 1. Interval development of small lymph nodes in the epicardial space on the right measuring up to about 13 mm in greatest diameter. There has also been interval development of a small node in the anterior mediastinum measuring 11 x 7 mm. In the upper abdomen, there is clearly periportal and retroperitoneal lymphadenopathy. The findings raise the question of lymphoma or metastatic disease. No suggestion of a primary lesion in the chest. 2. Coronary atherosclerotic calcifications. 3. Ascites Electronically Signed: Ronak Grubbs  3/2/2023 2:48 PM EST  Workstation ID: [id number]    MRI Abdomen With & Without Contrast    Result Date: 3/1/2023  Impression: 1. Abnormal hypoenhancing mass measuring 3.5 x 3.1 cm within the duodenal bulb. This appears partially intramural and partially endoluminal. Differential considerations would include gastric or duodenal malignancy, leiomyoma, or less likely GIST. Recommend further evaluation with endoscopy. 2. Enlarged celiac axis, portacaval, and retroperitoneal lymph nodes. Further management would be  dependent on pathologic diagnosis of the duodenal mass. 3. Hemorrhagic or proteinaceous cyst within the posterior mid left kidney and exophytic arising from the posterior inferior aspect of the right kidney. No evidence of enhancing renal mass. Additional simple appearing renal cysts are also present. 4. Limited assessment of the small bilateral adrenal nodules. There is no definite loss of signal to suggest clear lipid rich adrenal adenomas. These could be followed with serial imaging. 5. Cholelithiasis without MR findings of acute cholecystitis. No evidence of choledocholithiasis. 6. Small volume perihepatic ascites. Electronically Signed: Milton Carter  3/1/2023 6:41 PM EST  Workstation ID: [id number] [employer]      Assessment & Plan   ASSESSMENT:  1. 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 Needs PET scan as outpatient. Discussed with her and her spouse and explained the importance of the final report of pathology and the staging of the disease. Next generation sequencing will be requested, as well.   2. Normocytic anemia.   3. Multiple chronic conditions: Type 2 diabetes mellitus, COPD, hypertension, hyperlipidemia, atrial fibrillation on anticoagulation with apixaban    PLAN:  1. As above    Teofilo Landry MD on 3/3/2023 at 15:31

## 2023-03-03 NOTE — OUTREACH NOTE
Prep Survey    Flowsheet Row Responses   [facility] facility patient discharged from? Doc   Is LACE score < 7 ? No   Eligibility [facility]   Date of Admission 02/28/23   Date of Discharge 03/03/23   Discharge Disposition Home or Self Care   Discharge diagnosis Acute pancreatitis   Does the patient have one of the following disease processes/diagnoses(primary or secondary)? Other   Does the patient have Home health ordered? No   Is there a DME ordered? No   Prep survey completed? Yes          MIC SQUIRES - Registered Nurse

## 2023-03-03 NOTE — PLAN OF CARE
Problem: Adult Inpatient Plan of Care  Goal: Plan of Care Review  Outcome: Ongoing, Progressing  Flowsheets (Taken 3/3/2023 1342)  Progress: no change  Plan of Care Reviewed With: patient  Outcome Evaluation: Alert, oriented x 4, vital signs stable. Abdomen round, obese and distended, bowel tones audible all quadrants. Patient complains of abdominal cramping and unable to have BM today. Dr. Pichardo to bedside today to notify patient her pathology shows cancer in the small intestine. Patient wants to go home today, will DC when order placed. Called Hematology for outpatient follow up appointment. Patient tearful due to diagnosis of cancer. No distress noted. Will continue to follow the careplan.  Goal: Patient-Specific Goal (Individualized)  Outcome: Ongoing, Progressing  Goal: Absence of Hospital-Acquired Illness or Injury  Outcome: Ongoing, Progressing  Intervention: Identify and Manage Fall Risk  Recent Flowsheet Documentation  Taken 3/3/2023 1200 by Miladis Cuba RN  Safety Promotion/Fall Prevention:   safety round/check completed   room organization consistent   nonskid shoes/slippers when out of bed   clutter free environment maintained   assistive device/personal items within reach  Taken 3/3/2023 1008 by Miladis Cuba RN  Safety Promotion/Fall Prevention:   safety round/check completed   room organization consistent   nonskid shoes/slippers when out of bed   clutter free environment maintained   assistive device/personal items within reach  Taken 3/3/2023 0800 by Miladis Cuba, RN  Safety Promotion/Fall Prevention:   safety round/check completed   room organization consistent   nonskid shoes/slippers when out of bed   clutter free environment maintained   assistive device/personal items within reach  Intervention: Prevent Skin Injury  Recent Flowsheet Documentation  Taken 3/3/2023 0800 by Miladis Cuba RN  Body Position: position changed independently  Skin Protection:   adhesive use  limited   transparent dressing maintained   tubing/devices free from skin contact  Intervention: Prevent and Manage VTE (Venous Thromboembolism) Risk  Recent Flowsheet Documentation  Taken 3/3/2023 0800 by Miladis Cuba RN  Activity Management:   activity adjusted per tolerance   activity encouraged   up ad gisela  VTE Prevention/Management:   bilateral   sequential compression devices off  Range of Motion: active ROM (range of motion) encouraged  Intervention: Prevent Infection  Recent Flowsheet Documentation  Taken 3/3/2023 1200 by Miladis Cuba RN  Infection Prevention:   single patient room provided   hand hygiene promoted   environmental surveillance performed  Taken 3/3/2023 1008 by Miladis Cuba RN  Infection Prevention:   single patient room provided   hand hygiene promoted   environmental surveillance performed  Taken 3/3/2023 0800 by Miladis Cuba RN  Infection Prevention:   single patient room provided   hand hygiene promoted   environmental surveillance performed  Goal: Optimal Comfort and Wellbeing  Outcome: Ongoing, Progressing  Intervention: Monitor Pain and Promote Comfort  Recent Flowsheet Documentation  Taken 3/3/2023 1008 by Miladis Cuba RN  Pain Management Interventions: see MAR  Taken 3/3/2023 0800 by Miladis Cuba RN  Pain Management Interventions:   pain management plan reviewed with patient/caregiver   quiet environment facilitated   position adjusted  Intervention: Provide Person-Centered Care  Recent Flowsheet Documentation  Taken 3/3/2023 0800 by Miladis Cuba RN  Trust Relationship/Rapport:   care explained   questions answered   questions encouraged  Goal: Readiness for Transition of Care  Outcome: Ongoing, Progressing     Problem: Pain Acute  Goal: Acceptable Pain Control and Functional Ability  Outcome: Ongoing, Progressing  Intervention: Prevent or Manage Pain  Recent Flowsheet Documentation  Taken 3/3/2023 1008 by Miladis Cuba RN  Medication  Review/Management: medications reviewed  Taken 3/3/2023 0800 by Miladis Cuba RN  Bowel Elimination Promotion:   adequate fluid intake promoted   ambulation promoted  Sleep/Rest Enhancement:   awakenings minimized   room darkened  Medication Review/Management: medications reviewed  Intervention: Develop Pain Management Plan  Recent Flowsheet Documentation  Taken 3/3/2023 1008 by Miladis Cuba RN  Pain Management Interventions: see MAR  Taken 3/3/2023 0800 by Miladis Cuba RN  Pain Management Interventions:   pain management plan reviewed with patient/caregiver   quiet environment facilitated   position adjusted  Intervention: Optimize Psychosocial Wellbeing  Recent Flowsheet Documentation  Taken 3/3/2023 0800 by Miladis Cuba RN  Supportive Measures:   active listening utilized   self-care encouraged  Diversional Activities:   television   smartphone     Problem: Fluid Imbalance (Pancreatitis)  Goal: Fluid Balance  Outcome: Ongoing, Progressing     Problem: Infection (Pancreatitis)  Goal: Infection Symptom Resolution  Outcome: Ongoing, Progressing     Problem: Nutrition Impaired (Pancreatitis)  Goal: Optimal Nutrition Intake  Outcome: Ongoing, Progressing     Problem: Pain (Pancreatitis)  Goal: Acceptable Pain Control  Outcome: Ongoing, Progressing  Intervention: Monitor and Manage Pain  Recent Flowsheet Documentation  Taken 3/3/2023 1008 by Miladis Cuba RN  Pain Management Interventions: see MAR  Taken 3/3/2023 0800 by Miladis Cuba RN  Pain Management Interventions:   pain management plan reviewed with patient/caregiver   quiet environment facilitated   position adjusted  Diversional Activities:   television   smartphone  Sleep/Rest Enhancement:   awakenings minimized   room darkened     Problem: Respiratory Compromise (Pancreatitis)  Goal: Effective Oxygenation and Ventilation  Outcome: Ongoing, Progressing  Intervention: Optimize Oxygenation and Ventilation  Recent Flowsheet  Documentation  Taken 3/3/2023 1200 by Miladis Cuba RN  Cough And Deep Breathing: done independently per patient  Taken 3/3/2023 0800 by Miladis Cuba RN  Activity Management:   activity adjusted per tolerance   activity encouraged   up ad gisela  Head of Bed (HOB) Positioning: HOB elevated  Airway/Ventilation Management: pulmonary hygiene promoted  Cough And Deep Breathing: done independently per patient     Problem: Fall Injury Risk  Goal: Absence of Fall and Fall-Related Injury  Outcome: Ongoing, Progressing  Intervention: Identify and Manage Contributors  Recent Flowsheet Documentation  Taken 3/3/2023 1008 by Miladis Cuba RN  Medication Review/Management: medications reviewed  Taken 3/3/2023 0800 by Miladis Cuba RN  Medication Review/Management: medications reviewed  Self-Care Promotion: independence encouraged  Intervention: Promote Injury-Free Environment  Recent Flowsheet Documentation  Taken 3/3/2023 1200 by Miladis Cuba RN  Safety Promotion/Fall Prevention:   safety round/check completed   room organization consistent   nonskid shoes/slippers when out of bed   clutter free environment maintained   assistive device/personal items within reach  Taken 3/3/2023 1008 by Miladis Cuba RN  Safety Promotion/Fall Prevention:   safety round/check completed   room organization consistent   nonskid shoes/slippers when out of bed   clutter free environment maintained   assistive device/personal items within reach  Taken 3/3/2023 0800 by Miladis Cuba RN  Safety Promotion/Fall Prevention:   safety round/check completed   room organization consistent   nonskid shoes/slippers when out of bed   clutter free environment maintained   assistive device/personal items within reach   Goal Outcome Evaluation:  Plan of Care Reviewed With: patient        Progress: no change  Outcome Evaluation: Alert, oriented x 4, vital signs stable. Abdomen round, obese and distended, bowel tones audible all  quadrants. Patient complains of abdominal cramping and unable to have BM today. Dr. Pichardo to bedside today to notify patient her pathology shows cancer in the small intestine. Patient wants to go home today, will DC when order placed. Called Hematology for outpatient follow up appointment. Patient tearful due to diagnosis of cancer. No distress noted. Will continue to follow the careplan.

## 2023-03-03 NOTE — PLAN OF CARE
Problem: Adult Inpatient Plan of Care  Goal: Plan of Care Review  Outcome: Ongoing, Progressing  Goal: Patient-Specific Goal (Individualized)  Outcome: Ongoing, Progressing  Goal: Absence of Hospital-Acquired Illness or Injury  Outcome: Ongoing, Progressing  Intervention: Identify and Manage Fall Risk  Recent Flowsheet Documentation  Taken 3/2/2023 2006 by Swati Gandara RN  Safety Promotion/Fall Prevention: safety round/check completed  Intervention: Prevent Skin Injury  Recent Flowsheet Documentation  Taken 3/2/2023 2006 by Swati Gandara RN  Body Position: position changed independently  Intervention: Prevent and Manage VTE (Venous Thromboembolism) Risk  Recent Flowsheet Documentation  Taken 3/2/2023 2006 by Swati Gandara RN  Activity Management: up ad gisela  Goal: Optimal Comfort and Wellbeing  Outcome: Ongoing, Progressing  Intervention: Provide Person-Centered Care  Recent Flowsheet Documentation  Taken 3/2/2023 2006 by Swati Gandara RN  Trust Relationship/Rapport:   thoughts/feelings acknowledged   questions encouraged   questions answered   empathic listening provided   care explained   choices provided  Goal: Readiness for Transition of Care  Outcome: Ongoing, Progressing     Problem: Pain Acute  Goal: Acceptable Pain Control and Functional Ability  Outcome: Ongoing, Progressing  Intervention: Prevent or Manage Pain  Recent Flowsheet Documentation  Taken 3/2/2023 2006 by Swati Gandara RN  Medication Review/Management: medications reviewed  Intervention: Optimize Psychosocial Wellbeing  Recent Flowsheet Documentation  Taken 3/2/2023 2006 by Swati Gandara RN  Diversional Activities: television     Problem: Fluid Imbalance (Pancreatitis)  Goal: Fluid Balance  Outcome: Ongoing, Progressing     Problem: Infection (Pancreatitis)  Goal: Infection Symptom Resolution  Outcome: Ongoing, Progressing     Problem: Nutrition Impaired (Pancreatitis)  Goal: Optimal Nutrition Intake  Outcome: Ongoing,  Progressing     Problem: Pain (Pancreatitis)  Goal: Acceptable Pain Control  Outcome: Ongoing, Progressing  Intervention: Monitor and Manage Pain  Recent Flowsheet Documentation  Taken 3/2/2023 2006 by Swati Gandara RN  Diversional Activities: television     Problem: Respiratory Compromise (Pancreatitis)  Goal: Effective Oxygenation and Ventilation  Outcome: Ongoing, Progressing  Intervention: Optimize Oxygenation and Ventilation  Recent Flowsheet Documentation  Taken 3/2/2023 2006 by Swati Gandara RN  Activity Management: up ad gisela     Problem: Fall Injury Risk  Goal: Absence of Fall and Fall-Related Injury  Outcome: Ongoing, Progressing  Intervention: Identify and Manage Contributors  Recent Flowsheet Documentation  Taken 3/2/2023 2006 by Swati Gandara RN  Medication Review/Management: medications reviewed  Intervention: Promote Injury-Free Environment  Recent Flowsheet Documentation  Taken 3/2/2023 2006 by Swati Gandara RN  Safety Promotion/Fall Prevention: safety round/check completed   Goal Outcome Evaluation:      Pt rested well throughout shift. Pain controlled w PRN pain medication. Pt refused IV fluids. IV in AC kept setting off occlusion alarm. RN offered to start IV in new site, pt refused.

## 2023-03-03 NOTE — PAYOR COMM NOTE
"NOTIFICATION OF CHANGE IN STATUS:        PATIENT WAS CHANGED TO OBSERVATION STATUS TODAY 03/03/2023.          Caterina Mason (67 y.o. Female) 1955  INPT DENIED AUTH # [id number]      Start   Ordered   03/03/23 1409  Initiate Observation Status  Once     Completed     Level of Care: Telemetry    Diagnosis: Acute pancreatitis, unspecified complication status, unspecified pancreatitis type [[id number]]    Admitting Physician: LILY LOZANO [[id number]]    Attending Physician: LILY LOZANO [[id number]]                           Deb Cherry RN MSN  /UR  [facility]  [phone] office  [phone] fax  [email or web address]    [facility]  NPI: [id number]  Tax: [phone]          Caterina Mason (67 y.o. Female)     Date of Birth   1955    Social Security Number       Address   [address] [place] [address] [place] [address]    Home Phone   [phone]    N   [id number]       Mandaen   None    Marital Status                               Admission Date   2/28/23    Admission Type   Emergency    Admitting Provider   Lily Lozano MD    Attending Provider   Lily Lozano MD    Department, Room/Bed   [facility], 209/1       Discharge Date       Discharge Disposition       Discharge Destination                               Attending Provider: Lily Lozano MD    Allergies: Ibuprofen, Prednisone, Pregabalin, Tramadol, Adhesive Tape, Levofloxacin, Sulfamethoxazole-trimethoprim    Isolation: None   Infection: None   Code Status: CPR    Ht: 162.6 cm (64\")   Wt: 104 kg (229 lb 4.5 oz)    Admission Cmt: None   Principal Problem: Acute pancreatitis, unspecified complication status, unspecified pancreatitis type [K85.90]                 Active Insurance as of 2/28/2023     Primary Coverage     Payor Plan Insurance Group Employer/Plan Group    [employer] [place] [id number]     Payor Plan Address Payor Plan Phone " Number Payor Plan Fax Number Effective Dates    MAIL STOP:   4/1/2017 - None Entered    [address] [address]       [facility] [id number]       Subscriber Name Subscriber Birth Date Member ID       MARTIN BORJAS 1955 [id number]                 Emergency Contacts      (Rel.) Home Phone Work Phone Mobile Phone    FABIÁN PEREZELLE (Daughter) [phone] -- [phone]    BARBARA BORJAS (Spouse) [phone] -- --    Agnes Moncada (Daughter) -- -- [phone]

## 2023-03-03 NOTE — PROGRESS NOTES
Hematology/Oncology Inpatient Progress Note    PATIENT NAME: Caterina Mason  : 1955  MRN: [id number]    CHIEF COMPLAINT: Abdominal pain    HISTORY OF PRESENT ILLNESS:    Caterina Mason is a 67 y.o. female who presented to [facility] on 2023 with complaints of abdominal pain that was intermittent over the past 6 weeks but became more acute and severe over the past week.  Past medical history of type 2 diabetes mellitus, COPD, hypertension, hyperlipidemia, and atrial fibrillation on anticoagulation with apixaban.  She reported the abdominal pain was also accompanied by persistent diarrhea and intermittent vomiting.  A recent CT of the abdomen and pelvis dated 2023 showed cholelithiasis; bilateral adrenal nodules, adenomas or metastasis; bilateral hypoattenuating renal lesions, proteinaceous cysts versus solid masses; possible filling defect in the duodenal bulb; small amount of ascites; retroperitoneal and upper abdominal adenopathy, mildly enlarged lymph nodes in the lower thorax.     Evaluation in the ED: WBC 8.70, hemoglobin 11.5, MCV 89.6, platelets 285,000.  Normal LFTs and renal function.  She was diagnosed with suspected pancreatitis and admitted to inpatient for evaluation by gastroenterology.     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     23  Hematology/Oncology was consulted for the malignant appearing infiltrative and ulcerated lesion of the pylorus and duodenal bulb.     He/She  has a past medical history of Arthritis, Atrial fibrillation (HCC), Bradycardia, Cataract, COPD (chronic obstructive pulmonary disease) (HCC), Diabetes mellitus, type 2 (HCC), DJD (degenerative joint disease), Emphysema of lung (HCC), , GERD (gastroesophageal reflux disease), History of combined right and left heart  "catheterization (08/02/2018), Hyperlipidemia, Hypertension, Pain, and Sleep apnea.     PCP: Lou Curran MD    Subjective     ROS:  Review of Systems     MEDICATIONS:    Scheduled Meds:  atorvastatin, 20 mg, Oral, Daily  budesonide-formoterol, 2 puff, Inhalation, BID  dicyclomine, 10 mg, Oral, 4x Daily AC & at Bedtime  digoxin, 125 mcg, Oral, Daily  dilTIAZem CD, 180 mg, Oral, Daily  fenofibrate, 145 mg, Oral, Daily  furosemide, 40 mg, Oral, Daily  insulin glargine, 40 Units, Subcutaneous, Nightly  insulin lispro, 14 Units, Subcutaneous, TID With Meals  lisinopril, 20 mg, Oral, Daily  magnesium oxide, 400 mg, Oral, BID  metoprolol tartrate, 100 mg, Oral, Q12H  pantoprazole, 40 mg, Intravenous, QAM AC  potassium chloride, 10 mEq, Oral, Daily  sodium chloride, 10 mL, Intravenous, Q12H  sorbitol, 50 mL, Oral, Once       Continuous Infusions:      PRN Meds:  •  acetaminophen **OR** acetaminophen **OR** acetaminophen  •  albuterol sulfate HFA  •  HYDROcodone-acetaminophen  •  ondansetron **OR** ondansetron  •  prochlorperazine  •  [COMPLETED] Insert Peripheral IV **AND** sodium chloride  •  sodium chloride  •  sodium chloride  •  sodium chloride     ALLERGIES:    Allergies   Allergen Reactions   • Ibuprofen GI Intolerance   • Prednisone Other (See Comments)   • Pregabalin Other (See Comments)     jerking   • Tramadol Other (See Comments)     Legs jerked   • Adhesive Tape Other (See Comments)     irritation   • Levofloxacin Other (See Comments)     Increase sunburn   • Sulfamethoxazole-Trimethoprim Swelling       Objective    VITALS:   /59 (BP Location: Left arm, Patient Position: Sitting)   Pulse 84   Temp 98.2 °F (36.8 °C) (Oral)   Resp 13   Ht 162.6 cm (64\")   Wt 104 kg (229 lb 4.5 oz)   SpO2 97%   BMI 39.36 kg/m²     PHYSICAL EXAM: (performed by MD)  Physical Exam      RECENT LABS:  Lab Results (last 24 hours)     Procedure Component Value Units Date/Time    POC Glucose Once [[id number]]  (Abnormal) " Collected: 03/03/23 0802    Specimen: Blood Updated: 03/03/23 0805     Glucose 199 mg/dL      Comment: Serial Number: [id number]:  [id number]       Comprehensive Metabolic Panel [[id number]]  (Abnormal) Collected: 03/03/23 0634    Specimen: Blood Updated: 03/03/23 0737     Glucose 213 mg/dL      BUN 9 mg/dL      Creatinine 0.69 mg/dL      Sodium 143 mmol/L      Potassium 4.4 mmol/L      Chloride 106 mmol/L      CO2 28.0 mmol/L      Calcium 9.0 mg/dL      Total Protein 6.5 g/dL      Albumin 3.5 g/dL      ALT (SGPT) 15 U/L      AST (SGOT) 26 U/L      Alkaline Phosphatase 51 U/L      Total Bilirubin 0.5 mg/dL      Globulin 3.0 gm/dL      A/G Ratio 1.2 g/dL      BUN/Creatinine Ratio 13.0     Anion Gap 9.0 mmol/L      eGFR 95.3 mL/min/1.73     Narrative:      GFR Normal >60  Chronic Kidney Disease <60  Kidney Failure <15      Phosphorus [[id number]]  (Normal) Collected: 03/03/23 0634    Specimen: Blood Updated: 03/03/23 0737     Phosphorus 2.7 mg/dL     Magnesium [[id number]]  (Normal) Collected: 03/03/23 0634    Specimen: Blood Updated: 03/03/23 0737     Magnesium 1.7 mg/dL     CBC & Differential [[id number]]  (Abnormal) Collected: 03/03/23 0634    Specimen: Blood Updated: 03/03/23 0722    Narrative:      The following orders were created for panel order CBC & Differential.  Procedure                               Abnormality         Status                     ---------                               -----------         ------                     CBC Auto Differential[[id number]]        Abnormal            Final result                 Please view results for these tests on the individual orders.    CBC Auto Differential [[id number]]  (Abnormal) Collected: 03/03/23 0634    Specimen: Blood Updated: 03/03/23 0722     WBC 8.40 10*3/mm3      RBC 3.83 10*6/mm3      Hemoglobin 11.1 g/dL      Hematocrit 34.0 %      MCV 88.8 fL      MCH 29.0 pg      MCHC 32.7 g/dL      RDW 14.4 %      RDW-SD 44.6 fl      MPV 8.1 fL      Platelets  266 10*3/mm3      Neutrophil % 67.3 %      Lymphocyte % 19.8 %      Monocyte % 10.6 %      Eosinophil % 2.0 %      Basophil % 0.3 %      Neutrophils, Absolute 5.60 10*3/mm3      Lymphocytes, Absolute 1.70 10*3/mm3      Monocytes, Absolute 0.90 10*3/mm3      Eosinophils, Absolute 0.20 10*3/mm3      Basophils, Absolute 0.00 10*3/mm3      nRBC 0.1 /100 WBC     POC Glucose Once [[id number]]  (Abnormal) Collected: 03/02/23 2102    Specimen: Blood Updated: 03/02/23 2104     Glucose 185 mg/dL      Comment: Serial Number: [id number]:  [id number]       POC Glucose Once [[id number]]  (Abnormal) Collected: 03/02/23 1813    Specimen: Blood Updated: 03/02/23 1814     Glucose 174 mg/dL      Comment: Serial Number: [id number]:  [id number]       Comprehensive Metabolic Panel [[id number]]  (Abnormal) Collected: 03/02/23 1323    Specimen: Blood Updated: 03/02/23 1410     Glucose 150 mg/dL      BUN 10 mg/dL      Creatinine 0.84 mg/dL      Sodium 141 mmol/L      Potassium 3.6 mmol/L      Chloride 105 mmol/L      CO2 27.0 mmol/L      Calcium 8.9 mg/dL      Total Protein 6.8 g/dL      Albumin 3.5 g/dL      ALT (SGPT) 16 U/L      AST (SGOT) 26 U/L      Alkaline Phosphatase 47 U/L      Total Bilirubin 0.5 mg/dL      Globulin 3.3 gm/dL      A/G Ratio 1.1 g/dL      BUN/Creatinine Ratio 11.9     Anion Gap 9.0 mmol/L      eGFR 76.3 mL/min/1.73     Narrative:      GFR Normal >60  Chronic Kidney Disease <60  Kidney Failure <15      Phosphorus [[id number]]  (Abnormal) Collected: 03/02/23 1323    Specimen: Blood Updated: 03/02/23 1410     Phosphorus 2.4 mg/dL     Magnesium [[id number]]  (Normal) Collected: 03/02/23 1323    Specimen: Blood Updated: 03/02/23 1410     Magnesium 1.7 mg/dL     Lipase [[id number]]  (Abnormal) Collected: 03/02/23 1323    Specimen: Blood Updated: 03/02/23 1410     Lipase 173 U/L     CBC & Differential [[id number]]  (Abnormal) Collected: 03/02/23 1323    Specimen: Blood Updated: 03/02/23 1349    Narrative:      The  following orders were created for panel order CBC & Differential.  Procedure                               Abnormality         Status                     ---------                               -----------         ------                     CBC Auto Differential[[id number]]        Abnormal            Final result                 Please view results for these tests on the individual orders.    CBC Auto Differential [[id number]]  (Abnormal) Collected: 03/02/23 1323    Specimen: Blood Updated: 03/02/23 1349     WBC 7.90 10*3/mm3      RBC 3.87 10*6/mm3      Hemoglobin 11.2 g/dL      Hematocrit 34.5 %      MCV 89.1 fL      MCH 28.9 pg      MCHC 32.4 g/dL      RDW 14.3 %      RDW-SD 44.6 fl      MPV 7.6 fL      Platelets 289 10*3/mm3      Neutrophil % 75.5 %      Lymphocyte % 13.7 %      Monocyte % 9.1 %      Eosinophil % 1.4 %      Basophil % 0.3 %      Neutrophils, Absolute 6.00 10*3/mm3      Lymphocytes, Absolute 1.10 10*3/mm3      Monocytes, Absolute 0.70 10*3/mm3      Eosinophils, Absolute 0.10 10*3/mm3      Basophils, Absolute 0.00 10*3/mm3      nRBC 0.0 /100 WBC     POC Glucose Once [[id number]]  (Abnormal) Collected: 03/02/23 1315    Specimen: Blood Updated: 03/02/23 1316     Glucose 144 mg/dL      Comment: Serial Number: [id number]:  [id number]       Tissue Pathology Exam [[id number]] Collected: 03/02/23 1146    Specimen: Tissue from Small Intestine, Duodenum; Tissue from Gastric, Body; Tissue from Large Intestine, Cecum; Tissue from Large Intestine, Right / Ascending Colon; Tissue from Large Intestine; Tissue from Large Intestine, Rectum Updated: 03/02/23 1310    POC Glucose Once [[id number]]  (Abnormal) Collected: 03/02/23 1008    Specimen: Blood Updated: 03/02/23 1009     Glucose 187 mg/dL      Comment: Serial Number: [id number]:  [id number]             PENDING RESULTS: Tissue pathology    IMAGING REVIEWED:  CT Chest Without Contrast Diagnostic    Result Date: 3/2/2023  Impression: 1. Interval  development of small lymph nodes in the epicardial space on the right measuring up to about 13 mm in greatest diameter. There has also been interval development of a small node in the anterior mediastinum measuring 11 x 7 mm. In the upper abdomen, there is clearly periportal and retroperitoneal lymphadenopathy. The findings raise the question of lymphoma or metastatic disease. No suggestion of a primary lesion in the chest. 2. Coronary atherosclerotic calcifications. 3. Ascites Electronically Signed: Ronak ROSS Romie  3/2/2023 2:48 PM EST  Workstation ID: [id number]    MRI Abdomen With & Without Contrast    Result Date: 3/1/2023  Impression: 1. Abnormal hypoenhancing mass measuring 3.5 x 3.1 cm within the duodenal bulb. This appears partially intramural and partially endoluminal. Differential considerations would include gastric or duodenal malignancy, leiomyoma, or less likely GIST. Recommend further evaluation with endoscopy. 2. Enlarged celiac axis, portacaval, and retroperitoneal lymph nodes. Further management would be dependent on pathologic diagnosis of the duodenal mass. 3. Hemorrhagic or proteinaceous cyst within the posterior mid left kidney and exophytic arising from the posterior inferior aspect of the right kidney. No evidence of enhancing renal mass. Additional simple appearing renal cysts are also present. 4. Limited assessment of the small bilateral adrenal nodules. There is no definite loss of signal to suggest clear lipid rich adrenal adenomas. These could be followed with serial imaging. 5. Cholelithiasis without MR findings of acute cholecystitis. No evidence of choledocholithiasis. 6. Small volume perihepatic ascites. Electronically Signed: Milton Carter  3/1/2023 6:41 PM EST  Workstation ID: [id number]    US Abdomen Limited    Result Date: 3/1/2023  Impression: 1. Cholelithiasis, without sonographic evidence of cholecystitis. 2. Common bile duct measures 8 mm, just slightly above upper  limits normal for the patient's stated age. No intrahepatic biliary ductal dilation is seen. No obstructing biliary abnormality is identified. 3. Hepatomegaly with features of diffuse hepatic steatosis. 4. The visualized is the pancreas have a normal sonographic appearance. 5. The right renal cyst seen on the prior CT are not visible on today's ultrasound. Electronically Signed: Emeli Raemary  3/1/2023 10:45 AM EST  Workstation ID: [id number]      Assessment & Plan   ASSESSMENT:  1. 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  MRI of the abdomen and pelvis showed a 3.5 x 3.1 cm mass within the duodenal bulb.  Differential considerations would include gastric or duodenal malignancy, leiomyoma, or GIST.  CT scans showed enlarged celiac axis, portocaval, retroperitoneal, epicardial and anterior mediastinum lymph node enlargement.  Follow-up pathology.  Depending on results we will likely need a PET/CT as an outpatient.  2. Bilateral adrenal nodules of unknown significance  3. Mild normocytic anemia: Stable hemoglobin at 11.1 g/dL with no need for intervention at this time  4. Bilateral renal cysts: Urology following and planning to follow-up outpatient  5. Multiple chronic conditions: Type 2 diabetes mellitus, COPD, hypertension, hyperlipidemia, atrial fibrillation on anticoagulation with apixaban    PLAN:  1. As above        All the information was obtained from a review of the medical record and a conversation with Ms. Mason's spouse, who was in the room with her.  Ms. Mason was admitted for investigation and treatment of persistent abdominal pain that had been getting progressively worse for at least a few weeks. It was, particularly in the last week, accompanied by nausea and vomiting that made oral intake progressively more difficult. In addition she had been having diarrhea. For a long period of time, at least 5 years,  according to her spouse, her performance status had been declining and she had been spending the majority of her time in bed sleeping. A clear explanation for this was not available. She had several medical problems that included chronic lung disease and sleep apnea and diabetes mellitus. In spite of the difficulties eating and the persistent nausea and vomiting she had not seemed to lose weight. She had been afebrile. 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 Biopsies were sent. Imaging suggested metastatic involvement of several lymph nodes in the retroperitoneum and also in the mediastinum. Bilateral adrenal nodules of undetermined significance were present, as well. On exam she was sleeping and did not wake up for my exam. The lungs were diminished bilaterally and the heart regular. The abdomen soft. No edema. Reviewed the images of the scans and their reports. Reviewed the laboratory exams. She has mild normocytic anemia. At this point there is no need for intervention from this point of view. Awaiting the report of pathology. Discussed with her spouse and answered questions. Will follow.

## 2023-03-06 ENCOUNTER — TRANSITIONAL CARE MANAGEMENT TELEPHONE ENCOUNTER (OUTPATIENT)
Dept: CALL CENTER | Facility: HOSPITAL | Age: 68
End: 2023-03-06
Payer: MEDICAID

## 2023-03-06 DIAGNOSIS — C17.9 ADENOCARCINOMA OF SMALL BOWEL: Primary | ICD-10-CM

## 2023-03-06 NOTE — OUTREACH NOTE
Call Center TCM Note    Flowsheet Row Responses   [facility] patient discharged from? Doc   Does the patient have one of the following disease processes/diagnoses(primary or secondary)? Other   TCM attempt successful? No  [fernando Whitaker]   Unsuccessful attempts Attempt 1   Comments Oncology 3/10/23 at 2:00 PM,  GI fu needed for 3 weeks   Does the patient have an appointment with their PCP within 7 days of discharge? Yes  [3/8/23 at 1:15 PM]          Maria G Muniz RN    3/6/2023, 15:02 EST

## 2023-03-06 NOTE — OUTREACH NOTE
Call Center TCM Note    Flowsheet Row Responses   [facility] patient discharged from? Doc   Does the patient have one of the following disease processes/diagnoses(primary or secondary)? Other   TCM attempt successful? No   Unsuccessful attempts Attempt 2   Comments Oncology 3/10/23 at 2:00 PM,  GI fu needed for 3 weeks   Does the patient have an appointment with their PCP within 7 days of discharge? Yes  [3/8/2023  1:15 PM]          Maria G Muniz RN    3/6/2023, 17:04 EST

## 2023-03-06 NOTE — CASE MANAGEMENT/SOCIAL WORK
Case Management Discharge Note      Final Note: Home    Provided Post Acute Provider List?: N/A  N/A Provider List Comment: denies dc needs    Selected Continued Care - Discharged on 3/3/2023 Admission date: 2/28/2023 - Discharge disposition: Home or Self Care                  Transportation Services  Private: Car    Final Discharge Disposition Code: 01 - home or self-care

## 2023-03-07 ENCOUNTER — TRANSITIONAL CARE MANAGEMENT TELEPHONE ENCOUNTER (OUTPATIENT)
Dept: CALL CENTER | Facility: HOSPITAL | Age: 68
End: 2023-03-07
Payer: MEDICAID

## 2023-03-07 ENCOUNTER — APPOINTMENT (OUTPATIENT)
Dept: CT IMAGING | Facility: HOSPITAL | Age: 68
End: 2023-03-07
Payer: MEDICAID

## 2023-03-07 ENCOUNTER — READMISSION MANAGEMENT (OUTPATIENT)
Dept: CALL CENTER | Facility: HOSPITAL | Age: 68
End: 2023-03-07
Payer: MEDICAID

## 2023-03-07 ENCOUNTER — HOSPITAL ENCOUNTER (OUTPATIENT)
Facility: HOSPITAL | Age: 68
Setting detail: OBSERVATION
Discharge: HOME HEALTH | End: 2023-03-10
Attending: EMERGENCY MEDICINE | Admitting: INTERNAL MEDICINE
Payer: MEDICAID

## 2023-03-07 DIAGNOSIS — N28.89 RENAL MASS: ICD-10-CM

## 2023-03-07 DIAGNOSIS — R10.9 CONTINUOUS SEVERE ABDOMINAL PAIN: Primary | ICD-10-CM

## 2023-03-07 DIAGNOSIS — R53.81 DEBILITY: ICD-10-CM

## 2023-03-07 DIAGNOSIS — E27.8 ADRENAL MASS: ICD-10-CM

## 2023-03-07 LAB
ALBUMIN SERPL-MCNC: 3.7 G/DL (ref 3.5–5.2)
ALBUMIN/GLOB SERPL: 1.1 G/DL
ALP SERPL-CCNC: 59 U/L (ref 39–117)
ALT SERPL W P-5'-P-CCNC: 12 U/L (ref 1–33)
ANION GAP SERPL CALCULATED.3IONS-SCNC: 13 MMOL/L (ref 5–15)
AST SERPL-CCNC: 17 U/L (ref 1–32)
BASOPHILS # BLD AUTO: 0 10*3/MM3 (ref 0–0.2)
BASOPHILS NFR BLD AUTO: 0.4 % (ref 0–1.5)
BILIRUB SERPL-MCNC: 0.5 MG/DL (ref 0–1.2)
BILIRUB UR QL STRIP: NEGATIVE
BUN SERPL-MCNC: 13 MG/DL (ref 8–23)
BUN/CREAT SERPL: 18.8 (ref 7–25)
CALCIUM SPEC-SCNC: 10.1 MG/DL (ref 8.6–10.5)
CHLORIDE SERPL-SCNC: 101 MMOL/L (ref 98–107)
CLARITY UR: CLEAR
CO2 SERPL-SCNC: 27 MMOL/L (ref 22–29)
COLOR UR: YELLOW
CREAT SERPL-MCNC: 0.69 MG/DL (ref 0.57–1)
DEPRECATED RDW RBC AUTO: 44.2 FL (ref 37–54)
DIGOXIN SERPL-MCNC: 0.8 NG/ML (ref 0.6–1.2)
EGFRCR SERPLBLD CKD-EPI 2021: 95.3 ML/MIN/1.73
EOSINOPHIL # BLD AUTO: 0.2 10*3/MM3 (ref 0–0.4)
EOSINOPHIL NFR BLD AUTO: 1.8 % (ref 0.3–6.2)
ERYTHROCYTE [DISTWIDTH] IN BLOOD BY AUTOMATED COUNT: 14.4 % (ref 12.3–15.4)
GLOBULIN UR ELPH-MCNC: 3.4 GM/DL
GLUCOSE SERPL-MCNC: 229 MG/DL (ref 65–99)
GLUCOSE UR STRIP-MCNC: NEGATIVE MG/DL
HCT VFR BLD AUTO: 37 % (ref 34–46.6)
HGB BLD-MCNC: 11.9 G/DL (ref 12–15.9)
HGB UR QL STRIP.AUTO: NEGATIVE
HOLD SPECIMEN: NORMAL
KETONES UR QL STRIP: NEGATIVE
LEUKOCYTE ESTERASE UR QL STRIP.AUTO: NEGATIVE
LIPASE SERPL-CCNC: 117 U/L (ref 13–60)
LYMPHOCYTES # BLD AUTO: 1.3 10*3/MM3 (ref 0.7–3.1)
LYMPHOCYTES NFR BLD AUTO: 14.4 % (ref 19.6–45.3)
MCH RBC QN AUTO: 28.5 PG (ref 26.6–33)
MCHC RBC AUTO-ENTMCNC: 32.2 G/DL (ref 31.5–35.7)
MCV RBC AUTO: 88.4 FL (ref 79–97)
MONOCYTES # BLD AUTO: 1 10*3/MM3 (ref 0.1–0.9)
MONOCYTES NFR BLD AUTO: 11.2 % (ref 5–12)
NEUTROPHILS NFR BLD AUTO: 6.7 10*3/MM3 (ref 1.7–7)
NEUTROPHILS NFR BLD AUTO: 72.2 % (ref 42.7–76)
NITRITE UR QL STRIP: NEGATIVE
NRBC BLD AUTO-RTO: 0 /100 WBC (ref 0–0.2)
PH UR STRIP.AUTO: <=5 [PH] (ref 5–8)
PLATELET # BLD AUTO: 249 10*3/MM3 (ref 140–450)
PMV BLD AUTO: 7.8 FL (ref 6–12)
POTASSIUM SERPL-SCNC: 4.2 MMOL/L (ref 3.5–5.2)
PROT SERPL-MCNC: 7.1 G/DL (ref 6–8.5)
PROT UR QL STRIP: NEGATIVE
RBC # BLD AUTO: 4.18 10*6/MM3 (ref 3.77–5.28)
SODIUM SERPL-SCNC: 141 MMOL/L (ref 136–145)
SP GR UR STRIP: 1.02 (ref 1–1.03)
UROBILINOGEN UR QL STRIP: NORMAL
WBC NRBC COR # BLD: 9.3 10*3/MM3 (ref 3.4–10.8)
WHOLE BLOOD HOLD COAG: NORMAL

## 2023-03-07 PROCEDURE — G0378 HOSPITAL OBSERVATION PER HR: HCPCS

## 2023-03-07 PROCEDURE — 25010000002 MORPHINE PER 10 MG: Performed by: EMERGENCY MEDICINE

## 2023-03-07 PROCEDURE — 25010000002 HYDROMORPHONE 1 MG/ML SOLUTION: Performed by: EMERGENCY MEDICINE

## 2023-03-07 PROCEDURE — 25010000002 ONDANSETRON PER 1 MG: Performed by: EMERGENCY MEDICINE

## 2023-03-07 PROCEDURE — 80162 ASSAY OF DIGOXIN TOTAL: CPT | Performed by: EMERGENCY MEDICINE

## 2023-03-07 PROCEDURE — 99285 EMERGENCY DEPT VISIT HI MDM: CPT

## 2023-03-07 PROCEDURE — 85025 COMPLETE CBC W/AUTO DIFF WBC: CPT | Performed by: EMERGENCY MEDICINE

## 2023-03-07 PROCEDURE — 83690 ASSAY OF LIPASE: CPT | Performed by: EMERGENCY MEDICINE

## 2023-03-07 PROCEDURE — 81003 URINALYSIS AUTO W/O SCOPE: CPT | Performed by: EMERGENCY MEDICINE

## 2023-03-07 PROCEDURE — 96375 TX/PRO/DX INJ NEW DRUG ADDON: CPT

## 2023-03-07 PROCEDURE — 74176 CT ABD & PELVIS W/O CONTRAST: CPT

## 2023-03-07 PROCEDURE — 80053 COMPREHEN METABOLIC PANEL: CPT | Performed by: EMERGENCY MEDICINE

## 2023-03-07 RX ORDER — ATORVASTATIN CALCIUM 20 MG/1
20 TABLET, FILM COATED ORAL DAILY
Status: DISCONTINUED | OUTPATIENT
Start: 2023-03-08 | End: 2023-03-10 | Stop reason: HOSPADM

## 2023-03-07 RX ORDER — ONDANSETRON 2 MG/ML
4 INJECTION INTRAMUSCULAR; INTRAVENOUS ONCE
Status: COMPLETED | OUTPATIENT
Start: 2023-03-07 | End: 2023-03-07

## 2023-03-07 RX ORDER — METOPROLOL TARTRATE 50 MG/1
100 TABLET, FILM COATED ORAL EVERY 12 HOURS SCHEDULED
Status: DISCONTINUED | OUTPATIENT
Start: 2023-03-08 | End: 2023-03-10 | Stop reason: HOSPADM

## 2023-03-07 RX ORDER — BUDESONIDE AND FORMOTEROL FUMARATE DIHYDRATE 160; 4.5 UG/1; UG/1
2 AEROSOL RESPIRATORY (INHALATION) 2 TIMES DAILY
Status: DISCONTINUED | OUTPATIENT
Start: 2023-03-07 | End: 2023-03-10 | Stop reason: HOSPADM

## 2023-03-07 RX ORDER — SODIUM CHLORIDE 0.9 % (FLUSH) 0.9 %
10 SYRINGE (ML) INJECTION EVERY 12 HOURS SCHEDULED
Status: DISCONTINUED | OUTPATIENT
Start: 2023-03-07 | End: 2023-03-10 | Stop reason: HOSPADM

## 2023-03-07 RX ORDER — SODIUM CHLORIDE 0.9 % (FLUSH) 0.9 %
10 SYRINGE (ML) INJECTION AS NEEDED
Status: DISCONTINUED | OUTPATIENT
Start: 2023-03-07 | End: 2023-03-10 | Stop reason: HOSPADM

## 2023-03-07 RX ORDER — DEXTROSE MONOHYDRATE 25 G/50ML
25 INJECTION, SOLUTION INTRAVENOUS
Status: DISCONTINUED | OUTPATIENT
Start: 2023-03-07 | End: 2023-03-10 | Stop reason: HOSPADM

## 2023-03-07 RX ORDER — INSULIN LISPRO 100 [IU]/ML
2-9 INJECTION, SOLUTION INTRAVENOUS; SUBCUTANEOUS
Status: DISCONTINUED | OUTPATIENT
Start: 2023-03-08 | End: 2023-03-09

## 2023-03-07 RX ORDER — NICOTINE POLACRILEX 4 MG
15 LOZENGE BUCCAL
Status: DISCONTINUED | OUTPATIENT
Start: 2023-03-07 | End: 2023-03-10 | Stop reason: HOSPADM

## 2023-03-07 RX ORDER — METOCLOPRAMIDE HYDROCHLORIDE 5 MG/ML
10 INJECTION INTRAMUSCULAR; INTRAVENOUS EVERY 6 HOURS
Status: DISCONTINUED | OUTPATIENT
Start: 2023-03-07 | End: 2023-03-10 | Stop reason: HOSPADM

## 2023-03-07 RX ORDER — NITROGLYCERIN 0.4 MG/1
0.4 TABLET SUBLINGUAL
Status: DISCONTINUED | OUTPATIENT
Start: 2023-03-07 | End: 2023-03-10 | Stop reason: HOSPADM

## 2023-03-07 RX ORDER — SODIUM CHLORIDE, SODIUM LACTATE, POTASSIUM CHLORIDE, CALCIUM CHLORIDE 600; 310; 30; 20 MG/100ML; MG/100ML; MG/100ML; MG/100ML
75 INJECTION, SOLUTION INTRAVENOUS CONTINUOUS
Status: DISCONTINUED | OUTPATIENT
Start: 2023-03-07 | End: 2023-03-10 | Stop reason: HOSPADM

## 2023-03-07 RX ORDER — PANTOPRAZOLE SODIUM 40 MG/10ML
40 INJECTION, POWDER, LYOPHILIZED, FOR SOLUTION INTRAVENOUS 2 TIMES DAILY
Status: DISCONTINUED | OUTPATIENT
Start: 2023-03-08 | End: 2023-03-08

## 2023-03-07 RX ORDER — DIGOXIN 125 MCG
125 TABLET ORAL
Status: DISCONTINUED | OUTPATIENT
Start: 2023-03-08 | End: 2023-03-10 | Stop reason: HOSPADM

## 2023-03-07 RX ORDER — IPRATROPIUM BROMIDE AND ALBUTEROL SULFATE 2.5; .5 MG/3ML; MG/3ML
3 SOLUTION RESPIRATORY (INHALATION) EVERY 4 HOURS PRN
Status: DISCONTINUED | OUTPATIENT
Start: 2023-03-07 | End: 2023-03-10 | Stop reason: HOSPADM

## 2023-03-07 RX ORDER — ONDANSETRON 2 MG/ML
4 INJECTION INTRAMUSCULAR; INTRAVENOUS EVERY 6 HOURS PRN
Status: DISCONTINUED | OUTPATIENT
Start: 2023-03-07 | End: 2023-03-10 | Stop reason: HOSPADM

## 2023-03-07 RX ORDER — ONDANSETRON 4 MG/1
4 TABLET, FILM COATED ORAL EVERY 6 HOURS PRN
Status: DISCONTINUED | OUTPATIENT
Start: 2023-03-07 | End: 2023-03-10 | Stop reason: HOSPADM

## 2023-03-07 RX ORDER — SODIUM CHLORIDE 9 MG/ML
40 INJECTION, SOLUTION INTRAVENOUS AS NEEDED
Status: DISCONTINUED | OUTPATIENT
Start: 2023-03-07 | End: 2023-03-10 | Stop reason: HOSPADM

## 2023-03-07 RX ORDER — DILTIAZEM HYDROCHLORIDE 180 MG/1
180 CAPSULE, COATED, EXTENDED RELEASE ORAL DAILY
Status: DISCONTINUED | OUTPATIENT
Start: 2023-03-08 | End: 2023-03-10 | Stop reason: HOSPADM

## 2023-03-07 RX ORDER — FENOFIBRATE 145 MG/1
145 TABLET, COATED ORAL DAILY
Status: DISCONTINUED | OUTPATIENT
Start: 2023-03-08 | End: 2023-03-08

## 2023-03-07 RX ORDER — OLANZAPINE 10 MG/2ML
1 INJECTION, POWDER, LYOPHILIZED, FOR SOLUTION INTRAMUSCULAR
Status: DISCONTINUED | OUTPATIENT
Start: 2023-03-07 | End: 2023-03-10 | Stop reason: HOSPADM

## 2023-03-07 RX ADMIN — HYDROMORPHONE HYDROCHLORIDE 1 MG: 1 INJECTION, SOLUTION INTRAMUSCULAR; INTRAVENOUS; SUBCUTANEOUS at 21:12

## 2023-03-07 RX ADMIN — ONDANSETRON 4 MG: 2 INJECTION INTRAMUSCULAR; INTRAVENOUS at 19:31

## 2023-03-07 RX ADMIN — MORPHINE SULFATE 4 MG: 4 INJECTION INTRAVENOUS at 19:30

## 2023-03-07 RX ADMIN — Medication 10 ML: at 21:12

## 2023-03-07 NOTE — OUTREACH NOTE
Call Center TCM Note    Flowsheet Row Responses   [facility] patient discharged from? Doc   Does the patient have one of the following disease processes/diagnoses(primary or secondary)? Other   TCM attempt successful? Yes   Call start time 1602   Call end time 1609   Is patient permission given to speak with other caregiver? Yes   List who call center can speak with Claude Walker.   Person spoke with today (if not patient) and relationship Claude Walker-[phone].   Meds reviewed with patient/caregiver? Yes   Is the patient having any side effects they believe may be caused by any medication additions or changes? No   Does the patient have all medications ordered at discharge? Yes   Is the patient taking all medications as directed (includes completed medication regime)? Yes   Medication comments Melanie richey was not able to get cholcalciferol rx filled due to insurance-states was prescribed by PCP after discharge. Will forward message to PCP office.   Comments PCP hospital f/u appt 03/08/23 at 1:15 pm.   Does the patient have an appointment with their PCP within 7 days of discharge? Yes   Has home health visited the patient within 72 hours of discharge? N/A   Psychosocial issues? No   Did the patient receive a copy of their discharge instructions? Yes   Nursing interventions Reviewed instructions with patient   What is the patient's perception of their health status since discharge? Same   Is the patient/caregiver able to teach back signs and symptoms related to disease process for when to call PCP? Yes   Is the patient/caregiver able to teach back signs and symptoms related to disease process for when to call 911? Yes   Is the patient/caregiver able to teach back the hierarchy of who to call/visit for symptoms/problems? PCP, Specialist, Home health nurse, Urgent Care, ED, 911 Yes   If the patient is a current smoker, are they able to teach back resources for cessation? Not a smoker    TCM call completed? Yes   Wrap up additional comments Melanie states patient is about the same, continues with abdominal pain/nausea. States appetite decreased, and has discuss with PCP. States will keep appt tomorrow with PCP, or will return to ER if any worsening of s/s.    Call end time 1609   Would this patient benefit from a Referral to [facility] Social Work? No   Is the patient interested in additional calls from an ambulatory ?  NOTE:  applies to high risk patients requiring additional follow-up. No          Devora Toure RN    3/7/2023, 16:10 EST

## 2023-03-08 ENCOUNTER — TELEPHONE (OUTPATIENT)
Dept: FAMILY MEDICINE CLINIC | Facility: CLINIC | Age: 68
End: 2023-03-08

## 2023-03-08 LAB
ANION GAP SERPL CALCULATED.3IONS-SCNC: 11 MMOL/L (ref 5–15)
BUN SERPL-MCNC: 13 MG/DL (ref 8–23)
BUN/CREAT SERPL: 18.6 (ref 7–25)
CALCIUM SPEC-SCNC: 10 MG/DL (ref 8.6–10.5)
CHLORIDE SERPL-SCNC: 99 MMOL/L (ref 98–107)
CO2 SERPL-SCNC: 28 MMOL/L (ref 22–29)
CREAT SERPL-MCNC: 0.7 MG/DL (ref 0.57–1)
DEPRECATED RDW RBC AUTO: 44.6 FL (ref 37–54)
EGFRCR SERPLBLD CKD-EPI 2021: 94.9 ML/MIN/1.73
ERYTHROCYTE [DISTWIDTH] IN BLOOD BY AUTOMATED COUNT: 14.5 % (ref 12.3–15.4)
GLUCOSE BLDC GLUCOMTR-MCNC: 170 MG/DL (ref 70–105)
GLUCOSE BLDC GLUCOMTR-MCNC: 197 MG/DL (ref 70–105)
GLUCOSE BLDC GLUCOMTR-MCNC: 265 MG/DL (ref 70–105)
GLUCOSE BLDC GLUCOMTR-MCNC: 267 MG/DL (ref 70–105)
GLUCOSE BLDC GLUCOMTR-MCNC: 277 MG/DL (ref 70–105)
GLUCOSE SERPL-MCNC: 243 MG/DL (ref 65–99)
HCT VFR BLD AUTO: 36.4 % (ref 34–46.6)
HGB BLD-MCNC: 11.8 G/DL (ref 12–15.9)
MCH RBC QN AUTO: 28.6 PG (ref 26.6–33)
MCHC RBC AUTO-ENTMCNC: 32.4 G/DL (ref 31.5–35.7)
MCV RBC AUTO: 88.3 FL (ref 79–97)
PLATELET # BLD AUTO: 243 10*3/MM3 (ref 140–450)
PMV BLD AUTO: 8.5 FL (ref 6–12)
POTASSIUM SERPL-SCNC: 3.9 MMOL/L (ref 3.5–5.2)
RBC # BLD AUTO: 4.13 10*6/MM3 (ref 3.77–5.28)
SODIUM SERPL-SCNC: 138 MMOL/L (ref 136–145)
WBC NRBC COR # BLD: 9.1 10*3/MM3 (ref 3.4–10.8)

## 2023-03-08 PROCEDURE — G0378 HOSPITAL OBSERVATION PER HR: HCPCS

## 2023-03-08 PROCEDURE — 96375 TX/PRO/DX INJ NEW DRUG ADDON: CPT

## 2023-03-08 PROCEDURE — 82962 GLUCOSE BLOOD TEST: CPT

## 2023-03-08 PROCEDURE — 92610 EVALUATE SWALLOWING FUNCTION: CPT

## 2023-03-08 PROCEDURE — 96376 TX/PRO/DX INJ SAME DRUG ADON: CPT

## 2023-03-08 PROCEDURE — 94799 UNLISTED PULMONARY SVC/PX: CPT

## 2023-03-08 PROCEDURE — 25010000002 METOCLOPRAMIDE PER 10 MG: Performed by: STUDENT IN AN ORGANIZED HEALTH CARE EDUCATION/TRAINING PROGRAM

## 2023-03-08 PROCEDURE — 96361 HYDRATE IV INFUSION ADD-ON: CPT

## 2023-03-08 PROCEDURE — 80048 BASIC METABOLIC PNL TOTAL CA: CPT | Performed by: STUDENT IN AN ORGANIZED HEALTH CARE EDUCATION/TRAINING PROGRAM

## 2023-03-08 PROCEDURE — 25010000002 HYDROMORPHONE 1 MG/ML SOLUTION: Performed by: STUDENT IN AN ORGANIZED HEALTH CARE EDUCATION/TRAINING PROGRAM

## 2023-03-08 PROCEDURE — 63710000001 INSULIN GLARGINE PER 5 UNITS: Performed by: STUDENT IN AN ORGANIZED HEALTH CARE EDUCATION/TRAINING PROGRAM

## 2023-03-08 PROCEDURE — 99232 SBSQ HOSP IP/OBS MODERATE 35: CPT | Performed by: INTERNAL MEDICINE

## 2023-03-08 PROCEDURE — 97162 PT EVAL MOD COMPLEX 30 MIN: CPT

## 2023-03-08 PROCEDURE — 25010000002 ONDANSETRON PER 1 MG: Performed by: STUDENT IN AN ORGANIZED HEALTH CARE EDUCATION/TRAINING PROGRAM

## 2023-03-08 PROCEDURE — 36415 COLL VENOUS BLD VENIPUNCTURE: CPT | Performed by: STUDENT IN AN ORGANIZED HEALTH CARE EDUCATION/TRAINING PROGRAM

## 2023-03-08 PROCEDURE — 63710000001 INSULIN LISPRO (HUMAN) PER 5 UNITS: Performed by: STUDENT IN AN ORGANIZED HEALTH CARE EDUCATION/TRAINING PROGRAM

## 2023-03-08 PROCEDURE — 85027 COMPLETE CBC AUTOMATED: CPT | Performed by: STUDENT IN AN ORGANIZED HEALTH CARE EDUCATION/TRAINING PROGRAM

## 2023-03-08 RX ORDER — POLYETHYLENE GLYCOL 3350 17 G/17G
17 POWDER, FOR SOLUTION ORAL DAILY
Status: DISCONTINUED | OUTPATIENT
Start: 2023-03-08 | End: 2023-03-10 | Stop reason: HOSPADM

## 2023-03-08 RX ORDER — PANTOPRAZOLE SODIUM 40 MG/1
40 TABLET, DELAYED RELEASE ORAL
Status: DISCONTINUED | OUTPATIENT
Start: 2023-03-09 | End: 2023-03-10 | Stop reason: HOSPADM

## 2023-03-08 RX ORDER — SODIUM PHOSPHATE, DIBASIC AND SODIUM PHOSPHATE, MONOBASIC 7; 19 G/133ML; G/133ML
1 ENEMA RECTAL EVERY 4 HOURS PRN
Status: DISCONTINUED | OUTPATIENT
Start: 2023-03-08 | End: 2023-03-10 | Stop reason: HOSPADM

## 2023-03-08 RX ORDER — HYDROCODONE BITARTRATE AND ACETAMINOPHEN 5; 325 MG/1; MG/1
1 TABLET ORAL EVERY 6 HOURS PRN
Status: DISCONTINUED | OUTPATIENT
Start: 2023-03-08 | End: 2023-03-09

## 2023-03-08 RX ORDER — LACTULOSE 10 G/15ML
20 SOLUTION ORAL DAILY
Status: DISCONTINUED | OUTPATIENT
Start: 2023-03-08 | End: 2023-03-10 | Stop reason: HOSPADM

## 2023-03-08 RX ADMIN — FENOFIBRATE 145 MG: 145 TABLET ORAL at 08:00

## 2023-03-08 RX ADMIN — HYDROMORPHONE HYDROCHLORIDE 0.5 MG: 1 INJECTION, SOLUTION INTRAMUSCULAR; INTRAVENOUS; SUBCUTANEOUS at 16:37

## 2023-03-08 RX ADMIN — METOCLOPRAMIDE HYDROCHLORIDE 10 MG: 5 INJECTION INTRAMUSCULAR; INTRAVENOUS at 00:00

## 2023-03-08 RX ADMIN — DILTIAZEM HYDROCHLORIDE 180 MG: 180 CAPSULE, COATED, EXTENDED RELEASE ORAL at 08:01

## 2023-03-08 RX ADMIN — ONDANSETRON 4 MG: 2 INJECTION INTRAMUSCULAR; INTRAVENOUS at 06:47

## 2023-03-08 RX ADMIN — INSULIN LISPRO 6 UNITS: 100 INJECTION, SOLUTION INTRAVENOUS; SUBCUTANEOUS at 16:37

## 2023-03-08 RX ADMIN — HYDROMORPHONE HYDROCHLORIDE 0.5 MG: 1 INJECTION, SOLUTION INTRAMUSCULAR; INTRAVENOUS; SUBCUTANEOUS at 20:22

## 2023-03-08 RX ADMIN — INSULIN GLARGINE 30 UNITS: 100 INJECTION, SOLUTION SUBCUTANEOUS at 00:44

## 2023-03-08 RX ADMIN — PANTOPRAZOLE SODIUM 40 MG: 40 INJECTION, POWDER, FOR SOLUTION INTRAVENOUS at 08:00

## 2023-03-08 RX ADMIN — INSULIN LISPRO 2 UNITS: 100 INJECTION, SOLUTION INTRAVENOUS; SUBCUTANEOUS at 08:51

## 2023-03-08 RX ADMIN — HYDROMORPHONE HYDROCHLORIDE 0.5 MG: 1 INJECTION, SOLUTION INTRAMUSCULAR; INTRAVENOUS; SUBCUTANEOUS at 00:00

## 2023-03-08 RX ADMIN — SODIUM CHLORIDE, POTASSIUM CHLORIDE, SODIUM LACTATE AND CALCIUM CHLORIDE 75 ML/HR: 600; 310; 30; 20 INJECTION, SOLUTION INTRAVENOUS at 00:44

## 2023-03-08 RX ADMIN — Medication 10 ML: at 20:22

## 2023-03-08 RX ADMIN — HYDROMORPHONE HYDROCHLORIDE 0.5 MG: 1 INJECTION, SOLUTION INTRAMUSCULAR; INTRAVENOUS; SUBCUTANEOUS at 03:04

## 2023-03-08 RX ADMIN — LACTULOSE 20 G: 20 SOLUTION ORAL at 16:53

## 2023-03-08 RX ADMIN — HYDROCODONE BITARTRATE AND ACETAMINOPHEN 1 TABLET: 5; 325 TABLET ORAL at 18:55

## 2023-03-08 RX ADMIN — HYDROMORPHONE HYDROCHLORIDE 0.5 MG: 1 INJECTION, SOLUTION INTRAMUSCULAR; INTRAVENOUS; SUBCUTANEOUS at 22:36

## 2023-03-08 RX ADMIN — POLYETHYLENE GLYCOL 3350 17 G: 17 POWDER, FOR SOLUTION ORAL at 11:16

## 2023-03-08 RX ADMIN — INSULIN GLARGINE 30 UNITS: 100 INJECTION, SOLUTION SUBCUTANEOUS at 20:22

## 2023-03-08 RX ADMIN — DIGOXIN 125 MCG: 125 TABLET ORAL at 11:21

## 2023-03-08 RX ADMIN — SODIUM CHLORIDE, POTASSIUM CHLORIDE, SODIUM LACTATE AND CALCIUM CHLORIDE 75 ML/HR: 600; 310; 30; 20 INJECTION, SOLUTION INTRAVENOUS at 13:59

## 2023-03-08 RX ADMIN — Medication 10 ML: at 00:01

## 2023-03-08 RX ADMIN — HYDROMORPHONE HYDROCHLORIDE 0.5 MG: 1 INJECTION, SOLUTION INTRAMUSCULAR; INTRAVENOUS; SUBCUTANEOUS at 11:16

## 2023-03-08 RX ADMIN — METOCLOPRAMIDE HYDROCHLORIDE 10 MG: 5 INJECTION INTRAMUSCULAR; INTRAVENOUS at 16:38

## 2023-03-08 RX ADMIN — METOCLOPRAMIDE HYDROCHLORIDE 10 MG: 5 INJECTION INTRAMUSCULAR; INTRAVENOUS at 11:16

## 2023-03-08 RX ADMIN — METOCLOPRAMIDE HYDROCHLORIDE 10 MG: 5 INJECTION INTRAMUSCULAR; INTRAVENOUS at 22:30

## 2023-03-08 RX ADMIN — METOPROLOL TARTRATE 100 MG: 50 TABLET, FILM COATED ORAL at 08:00

## 2023-03-08 RX ADMIN — Medication 10 ML: at 08:01

## 2023-03-08 RX ADMIN — ATORVASTATIN CALCIUM 20 MG: 20 TABLET, FILM COATED ORAL at 08:01

## 2023-03-08 RX ADMIN — METOPROLOL TARTRATE 100 MG: 50 TABLET, FILM COATED ORAL at 20:22

## 2023-03-08 RX ADMIN — METOCLOPRAMIDE HYDROCHLORIDE 10 MG: 5 INJECTION INTRAMUSCULAR; INTRAVENOUS at 04:39

## 2023-03-08 RX ADMIN — HYDROMORPHONE HYDROCHLORIDE 0.5 MG: 1 INJECTION, SOLUTION INTRAMUSCULAR; INTRAVENOUS; SUBCUTANEOUS at 08:00

## 2023-03-08 RX ADMIN — INSULIN LISPRO 6 UNITS: 100 INJECTION, SOLUTION INTRAVENOUS; SUBCUTANEOUS at 13:56

## 2023-03-08 NOTE — OUTREACH NOTE
Medical Week 2 Survey    Flowsheet Row Responses   [facility] facility patient discharged from? Doc   Does the patient have one of the following disease processes/diagnoses(primary or secondary)? Other   Week 2 attempt successful? No   Unsuccessful attempts Attempt 2   Revoke Readmitted          Chante PAGE - Registered Nurse

## 2023-03-08 NOTE — TELEPHONE ENCOUNTER
I am glad she is in the hospital.  Does she need anything from me or is this message just to let me know what is going on?

## 2023-03-08 NOTE — SIGNIFICANT NOTE
03/08/23 1055   Living Situation   Current Living Arrangements home   Potentially Unsafe Housing Conditions none   Food Insecurity   Within the past 12 months, you worried that your food would run out before you got the money to buy more. Never true   Within the past 12 months, the food you bought just didn't last and you didn't have money to get more. Never true   Transportation Needs   In the past 12 months, has lack of transportation kept you from medical appointments or from getting medications? no   In the past 12 months, has lack of transportation kept you from meetings, work, or from getting things needed for daily living? No   Utilities   In the past 12 months has the electric, gas, oil, or water company threatened to shut off services in your home? No   Abuse Screen (yes response referral indicated)   Feels Unsafe at Home or Work/School no   Feels Threatened by Someone no   Does Anyone Try to Keep You From Having Contact with Others or Doing Things Outside Your Home? no   Physical Signs of Abuse Present no   Financial Resource Strain   How hard is it for you to pay for the very basics like food, housing, medical care, and heating? Not hard   Employment   Do you want help finding or keeping work or a job? I do not need or want help   Family and Community Support   If for any reason you need help with day-to-day activities such as bathing, preparing meals, shopping, managing finances, etc., do you get the help you need? I don't need any help   How often do you feel lonely or isolated from those around you? Rarely   Education   Preferred Language English   Do you want help with school or training? For example, starting or completing job training or getting a high school diploma, GED or equivalent No   Physical Activity   On average, how many days per week do you engage in moderate to strenuous exercise (like a brisk walk)? 0 days   On average, how many minutes do you engage in exercise at this level? 0 min    Number of minutes of exercise per week (!) 0   Alcohol Use   Q1: How often do you have a drink containing alcohol? Never   Q2: How many drinks containing alcohol do you have on a typical day when you are drinking? None   Q3: How often do you have six or more drinks on one occasion? Never   Mental Health   Little Interest or Pleasure in Doing Things 1-->several days   Feeling Down, Depressed or Hopeless 0-->not at all   Disabilities   Concentrating, Remembering or Making Decisions Difficulty yes   Doing Errands Independently Difficulty (such as shopping) yes

## 2023-03-08 NOTE — THERAPY EVALUATION
Patient Name: Caterina Mason  : 1955    MRN: [id number]                              Today's Date: 3/8/2023       Admit Date: 3/7/2023    Visit Dx:     ICD-10-CM ICD-9-CM   1. Continuous severe abdominal pain  R10.9 789.00     Patient Active Problem List   Diagnosis   • Arthritis   • Bradycardia   • Chronic obstructive pulmonary disease (HCC)   • Depression   • Diabetic peripheral neuropathy ([facility])   • Gastroesophageal reflux disease   • Mixed hyperlipidemia   • Hypomagnesemia   • Lumbar radiculopathy   • Myalgia   • Paroxysmal atrial fibrillation ([facility])   • Shoulder pain, right   • Sleep apnea   • Type 2 diabetes mellitus with hyperglycemia, with long-term current use of insulin ([facility])   • Vitamin D deficiency   • Palpitations   • PVC's (premature ventricular contractions)   • Essential hypertension   • Cervical radiculitis   • Arthralgia of right temporomandibular joint   • Shortness of breath   • Bilateral leg edema   • Chronic diastolic CHF (congestive heart failure) ([facility])   • OAB (overactive bladder)   • Need for vaccination   • Class 2 obesity due to excess calories without serious comorbidity with body mass index (BMI) of 39.0 to 39.9 in adult   • Hip pain   • Medicare annual wellness visit, subsequent   • Onychomycosis   • Atrial fibrillation with RVR ([facility])   • Chest pain, atypical   • Elevated LFTs   • Cholelithiasis   • Vagina, candidiasis   • Tinea corporis   • Pre-op examination   • Bacteremia due to methicillin susceptible Staphylococcus aureus (MSSA)   • Cellulitis of right breast   • Generalized abdominal pain   • Hematochezia   • Renal mass   • Adrenal mass (HCC)   • Calculus of gallbladder without cholecystitis without obstruction   • Acute pancreatitis, unspecified complication status, unspecified pancreatitis type   • Abnormal CT of the abdomen   • Continuous severe abdominal pain     Past Medical History:   Diagnosis Date   • Arthritis    • Atrial fibrillation ([facility])    • Bradycardia      Dr. Charles   • Cataract    • COPD (chronic obstructive pulmonary disease) ([facility])     Dr. Rob   • Diabetes mellitus, type 2 ([facility])     sees Dr. Archibald at [place]   • DJD (degenerative joint disease)     possible herniated disc, sees Pain Mgmt in [place]   • Emphysema of lung ([facility])    •     • GERD (gastroesophageal reflux disease)    • History of combined right and left heart catheterization 2018    minimal CAD on heart cath done    • Hyperlipidemia    • Hypertension    • Pain    • Sleep apnea     wears CPAP machine, sees Darron     Past Surgical History:   Procedure Laterality Date   • ABLATION OF DYSRHYTHMIC FOCUS     • CARDIAC CATHETERIZATION      and Angiograph    • CARDIAC ELECTROPHYSIOLOGY PROCEDURE N/A 12/10/2019    Procedure: pvc ablation;  Surgeon: Alfredo Iglesias MD;  Location: [facility] CATH INVASIVE LOCATION;  Service: Cardiovascular   •  SECTION      x 1   • COLON RESECTION     • COLONOSCOPY  2012   • COLONOSCOPY N/A 3/2/2023    Procedure: COLONOSCOPY WITH POLYPECTOMY X 3 AND RANDOM COLON BIOPSIES;  Surgeon: Mary Jo Pichardo MD;  Location: [facility] ENDOSCOPY;  Service: Gastroenterology;  Laterality: N/A;  POST: POLYPS, HEMORRHOIDS   • COLOSTOMY      and reversal in her 20's due to problems with childbirth   • COLOSTOMY CLOSURE     • ENDOSCOPY N/A 3/2/2023    Procedure: ESOPHAGOGASTRODUODENOSCOPY WITH BIOPSY X2 AREAS;  Surgeon: Mary Jo Pichardo MD;  Location: [facility] ENDOSCOPY;  Service: Gastroenterology;  Laterality: N/A;  POST: DUODENAL BULB MASS, HIATAL HERNIA   • OVARIAN CYST SURGERY     • ROTATOR CUFF REPAIR Left 2009    x2    • TOTAL ABDOMINAL HYSTERECTOMY WITH SALPINGO OOPHORECTOMY        General Information     Row Name 23 1124          Physical Therapy Time and Intention    Document Type evaluation  -     Mode of Treatment physical therapy  -     Row Name 23 1124          General Information    Prior Level of Function independent:;all household  mobility;gait;transfer;bed mobility;driving  Utilizes straight cane for household ambulation, utilizes motorized scooter when grocery shopping, reports no falls. Has rollator  -     Row Name 03/08/23 1124          Living Environment    People in Home spouse  -     Row Name 03/08/23 1124          Home Main Entrance    Number of Stairs, Main Entrance one;other (see comments)  one step to ramp  -     Row Name 03/08/23 1124          Stairs Within Home, Primary    Number of Stairs, Within Home, Primary none  -     Row Name 03/08/23 1124          Cognition    Orientation Status (Cognition) oriented x 4  -     Row Name 03/08/23 1124          Safety Issues, Functional Mobility    Impairments Affecting Function (Mobility) balance;endurance/activity tolerance;shortness of breath  -           User Key  (r) = Recorded By, (t) = Taken By, (c) = Cosigned By    Initials Name Provider Type    Aisha Beauchamp, PT Physical Therapist               Mobility     Row Name 03/08/23 1126          Bed Mobility    Comment, (Bed Mobility) Pt was seated at EOB upon entry  -     Row Name 03/08/23 1126          Sit-Stand Transfer    Sit-Stand [place] (Transfers) contact guard  -     Comment, (Sit-Stand Transfer) STS from EOB  -[facility] Name 03/08/23 1126          Gait/Stairs (Locomotion)    [place] Level (Gait) contact guard  -DAYAN     Distance in Feet (Gait) 50ft w/out AD, no LOB, but min trunk instability noted, wide LILY, furniture walks, would benefit from use of RW during hospital stay  -DAYAN     Deviations/Abnormal Patterns (Gait) stride length decreased;gait speed decreased  -DAYAN     Bilateral Gait Deviations --  -           User Key  (r) = Recorded By, (t) = Taken By, (c) = Cosigned By    Initials Name Provider Type    Aisha Beauchamp, PT Physical Therapist               Obj/Interventions     Row Name 03/08/23 1128          Range of Motion Comprehensive    General Range of Motion no range of motion deficits  identified  -     Row Name 03/08/23 1128          Strength Comprehensive (MMT)    General Manual Muscle Testing (MMT) Assessment no strength deficits identified  -     Comment, General Manual Muscle Testing (MMT) Assessment BLE appear to be at baseline; grossly 4-/5  -     Row Name 03/08/23 1128          Balance    Balance Assessment sitting static balance;sitting dynamic balance;standing dynamic balance;standing static balance  -     Static Sitting Balance independent  -     Dynamic Sitting Balance supervision  -     Position, Sitting Balance sitting edge of bed  -     Static Standing Balance contact guard  -     Dynamic Standing Balance contact guard  -     Position/Device Used, Standing Balance unsupported  -     Row Name 03/08/23 1128          Sensory Assessment (Somatosensory)    Sensory Assessment (Somatosensory) sensation intact  -           User Key  (r) = Recorded By, (t) = Taken By, (c) = Cosigned By    Initials Name Provider Type    Aisha Beauchamp, PT Physical Therapist               Goals/Plan     Row Name 03/08/23 1131          Bed Mobility Goal 1 (PT)    Activity/Assistive Device (Bed Mobility Goal 1, PT) bed mobility activities, all  -DAYAN     [place] Level/Cues Needed (Bed Mobility Goal 1, PT) independent  -DAYAN     Time Frame (Bed Mobility Goal 1, PT) long term goal (LTG);2 weeks  -     Row Name 03/08/23 1131          Transfer Goal 1 (PT)    Activity/Assistive Device (Transfer Goal 1, PT) sit-to-stand/stand-to-sit;bed-to-chair/chair-to-bed  -     [place] Level/Cues Needed (Transfer Goal 1, PT) modified independence  -DAYAN     Time Frame (Transfer Goal 1, PT) long term goal (LTG);2 weeks  -     Row Name 03/08/23 1131          Gait Training Goal 1 (PT)    Activity/Assistive Device (Gait Training Goal 1, PT) gait (walking locomotion)  -     [place] Level (Gait Training Goal 1, PT) modified independence  -DAYAN     Distance (Gait Training Goal 1, PT) 100ft  -      Time Frame (Gait Training Goal 1, PT) long term goal (LTG);2 weeks  -     Row Name 03/08/23 1131          Stairs Goal 1 (PT)    Activity/Assistive Device (Stairs Goal 1, PT) stairs, all skills  -DAYAN     [place] Level/Cues Needed (Stairs Goal 1, PT) supervision required  -DAYAN     Number of Stairs (Stairs Goal 1, PT) 1 step  -     Time Frame (Stairs Goal 1, PT) long term goal (LTG);2 weeks  -     Row Name 03/08/23 1131          Therapy Assessment/Plan (PT)    Planned Therapy Interventions (PT) balance training;bed mobility training;gait training;home exercise program;patient/family education;strengthening;stair training;transfer training  -           User Key  (r) = Recorded By, (t) = Taken By, (c) = Cosigned By    Initials Name Provider Type    Aisha Beauchamp, PT Physical Therapist               Clinical Impression     Row Name 03/08/23 1130          Pain    Pretreatment Pain Rating 2/10  -DAYAN     Posttreatment Pain Rating 2/10  -     Pain Location - abdomen  -     Pain Intervention(s) Repositioned  -     Row Name 03/08/23 1130          Plan of Care Review    Plan of Care Reviewed With patient  -     Outcome Evaluation Pt is a 68 y/o female who presents to [facility] with reports of abdominal pain. 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 CT a/p shows unchanged soft tissue mass in the duodenal bulb and unchanged adrenal gland nodules complicated by extensive abdominal lymphadenopathy in the setting of cholelithiasis without acute cholecystitis along with hyperdense lesions within the right kidney. At [facility] pt reports living with her spouse in a H with one step to ramp. She utilizes a straight cane for household mobility and has rollator, but does not utilize. She ambulates x50ft w/out an AD, CGA. Pt furniture walks with ambulation w/out AD and would benefit from use of RW during hospital stay. Pt does not utilize  home O2, but at this time SpO2 87-88% following ambulation and on room air. Pt may benefit from a 6MWT prior to d/c. Pt appears to be at baseline. Recommend assist from spouse as needed and HHPT at this time.  -     Row Name 03/08/23 1130          Therapy Assessment/Plan (PT)    Criteria for Skilled Interventions Met (PT) yes;meets criteria  -     Therapy Frequency (PT) 5 times/wk  -     Predicted Duration of Therapy Intervention (PT) Until d/c  -     Row Name 03/08/23 1130          Vital Signs    Post Systolic BP Rehab 125  -DAYAN     Post Treatment Diastolic BP 66  -DAYAN     Pre SpO2 (%) 92  -DAYAN     O2 Delivery Pre Treatment room air  -     Intra SpO2 (%) 87  -DAYAN     O2 Delivery Intra Treatment room air  -     Post SpO2 (%) 97  -DAYAN     O2 Delivery Post Treatment supplemental O2  -     Row Name 03/08/23 1130          Positioning and Restraints    Pre-Treatment Position in bed  -DAYAN     Post Treatment Position chair  -DAYAN     In Chair sitting;call light within reach;encouraged to call for assist;exit alarm on  -           User Key  (r) = Recorded By, (t) = Taken By, (c) = Cosigned By    Initials Name Provider Type    Aisha Beauchamp, PT Physical Therapist               Outcome Measures     Row Name 03/08/23 1132 03/08/23 0830       How much help from another person do you currently need...    Turning from your back to your side while in flat bed without using bedrails? 4  -DAYAN 4  -RH    Moving from lying on back to sitting on the side of a flat bed without bedrails? 4  -DAYAN 4  -RH    Moving to and from a bed to a chair (including a wheelchair)? 3  -DAYAN 4  -RH    Standing up from a chair using your arms (e.g., wheelchair, bedside chair)? 3  -DAYAN 4  -RH    Climbing 3-5 steps with a railing? 3  -DAYAN 4  -RH    To walk in hospital room? 3  -DAYAN 4  -RH    AM-PAC 6 Clicks Score (PT) 20  - 24  -RH    Highest level of mobility 6 --> Walked 10 steps or more  - 8 --> Walked 250 feet or more  -    Row Name 03/08/23  1132          Functional Assessment    Outcome Measure Options AM-PAC 6 Clicks Basic Mobility (PT)  -DAYAN           User Key  (r) = Recorded By, (t) = Taken By, (c) = Cosigned By    Initials Name Provider Type    RH Eve Urena, RN Registered Nurse    Aisha Beauchamp, PT Physical Therapist                             Physical Therapy Education     Title: PT OT SLP Therapies (In Progress)     Topic: Physical Therapy (In Progress)     Point: Mobility training (Done)     Learning Progress Summary           Patient Acceptance, E,TB, VU by DAYAN at 3/8/2023 1132                   Point: Home exercise program (Not Started)     Learner Progress:  Not documented in this visit.          Point: Body mechanics (Done)     Learning Progress Summary           Patient Acceptance, E,TB, VU by DAYAN at 3/8/2023 1132                   Point: Precautions (Done)     Learning Progress Summary           Patient Acceptance, E,TB, VU by DAYAN at 3/8/2023 1132                               User Key     Initials Effective Dates Name Provider Type Discipline     08/23/21 -  Aisha Escamilla PT Physical Therapist PT              PT Recommendation and Plan  Planned Therapy Interventions (PT): balance training, bed mobility training, gait training, home exercise program, patient/family education, strengthening, stair training, transfer training  Plan of Care Reviewed With: patient  Outcome Evaluation: Pt is a 68 y/o female who presents to [facility] with reports of abdominal pain. 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 CT a/p shows unchanged soft tissue mass in the duodenal bulb and unchanged adrenal gland nodules complicated by extensive abdominal lymphadenopathy in the setting of cholelithiasis without acute cholecystitis along with hyperdense lesions within the right kidney. At [facility] pt reports living with her spouse in a H with one step to ramp. She utilizes a  straight cane for household mobility and has rollator, but does not utilize. She ambulates x50ft w/out an AD, CGA. Pt furniture walks with ambulation w/out AD and would benefit from use of RW during hospital stay. Pt does not utilize home O2, but at this time SpO2 87-88% following ambulation and on room air. Pt may benefit from a 6MWT prior to d/c. Pt appears to be at baseline. Recommend assist from spouse as needed and HHPT at this time.     Time Calculation:    PT Charges     Row Name 03/08/23 1132             Time Calculation    Start Time 0942  -DAYAN      Stop Time 1000  -DAYAN      Time Calculation (min) 18 min  -DAYAN      PT Received On 03/08/23  -DAYAN      PT - Next Appointment 03/09/23  -DAYAN      PT Goal Re-Cert Due Date 03/22/23  -DAYAN            User Key  (r) = Recorded By, (t) = Taken By, (c) = Cosigned By    Initials Name Provider Type    DAYAN Aisha Escamilla, PT Physical Therapist              Therapy Charges for Today     Code Description Service Date Service Provider Modifiers Qty    [id number] HC PT EVAL MOD COMPLEXITY 3 3/8/2023 Aisha Escamilla, PT GP 1          PT G-Codes  Outcome Measure Options: AM-PAC 6 Clicks Basic Mobility (PT)  AM-PAC 6 Clicks Score (PT): 20  PT Discharge Summary  Anticipated Discharge Disposition (PT): home with home health, home with assist    Aisha Escamilla, LIANET  3/8/2023

## 2023-03-08 NOTE — PLAN OF CARE
Physician informed that patient now states she is hungry, wishes for diet, and not having difficulty swallowing.  Patient passed bedside nursing swallow eval.  Will start GI soft/bland diet with nectar thick liquids until SLP can evaluate.  Possible that Reglan started is stimulating appetite.    Electronically signed by Steffany Christie DO, 03/08/23, 4:37 AM EST.

## 2023-03-08 NOTE — H&P
[facility] Medicine Services      Patient Name: Caterina Mason  : 1955  MRN: [id number]  Primary Care Physician:  Lou Curran MD  Date of admission: 3/7/2023      Subjective      Chief Complaint: Abdominal pain    History of Present Illness: Caterina Mason is a 67 y.o. female who presented to [facility] on 3/7/2023 complaining of Abdominal pain.  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  Admits to 10/10 constant non-radiating stabbing abdominal pain complicated by distension that nothing appears to make better. Denies fevers, chills,  dyspnea, chest pain.  Admits to abdominal swelling and distension, states she has no appetite. Patient has occasional dysphagia with solids and liquids, causing her to vomit, but worse with solids.  States she is able to tolerate most pills.  Presented to [facility] for evaluation.    In the ER, Vitals 98.2, HR 88, RR 16, /54, 93 RA.  Labs notable for glucose 229, lipase 117, hgb 11.9.  CT a/p shows unchanged soft tissue mass in the duodenal bulb and unchanged adrenal gland nodules complicated by extensive abdominal lymphadenopathy in the setting of cholelithiasis without acute cholecystitis along with hyperdense lesions within the right kidney.  She is to be admitted for intractable abdominal pain 2/2 duodenal bulb mass.      ROS   12 point ROS reviewed and negative except as mentioned above      Personal History     Past Medical History:   Diagnosis Date   • Arthritis    • Atrial fibrillation (HCC)    • Bradycardia     Dr. Charles   • Cataract    • COPD (chronic obstructive pulmonary disease) ([facility])     Dr. Rob   • Diabetes mellitus, type 2 (HCC)     sees Dr. Archibald at [place]   • DJD (degenerative joint disease)     possible herniated disc, sees Pain Mgmt in [place]   • Emphysema of lung (HCC)    •     • GERD (gastroesophageal reflux  disease)    • History of combined right and left heart catheterization 2018    minimal CAD on heart cath done    • Hyperlipidemia    • Hypertension    • Pain    • Sleep apnea     wears CPAP machine, sees [email or web address]       Past Surgical History:   Procedure Laterality Date   • ABLATION OF DYSRHYTHMIC FOCUS     • CARDIAC CATHETERIZATION      and Angiograph    • CARDIAC ELECTROPHYSIOLOGY PROCEDURE N/A 12/10/2019    Procedure: pvc ablation;  Surgeon: Alfredo Iglesias MD;  Location: [facility] CATH INVASIVE LOCATION;  Service: Cardiovascular   •  SECTION      x 1   • COLON RESECTION     • COLONOSCOPY  2012   • COLONOSCOPY N/A 3/2/2023    Procedure: COLONOSCOPY WITH POLYPECTOMY X 3 AND RANDOM COLON BIOPSIES;  Surgeon: Mary Jo Pichardo MD;  Location: [facility] ENDOSCOPY;  Service: Gastroenterology;  Laterality: N/A;  POST: POLYPS, HEMORRHOIDS   • COLOSTOMY      and reversal in her 20's due to problems with childbirth   • COLOSTOMY CLOSURE     • ENDOSCOPY N/A 3/2/2023    Procedure: ESOPHAGOGASTRODUODENOSCOPY WITH BIOPSY X2 AREAS;  Surgeon: Mary Jo Pichardo MD;  Location: [facility] ENDOSCOPY;  Service: Gastroenterology;  Laterality: N/A;  POST: DUODENAL BULB MASS, HIATAL HERNIA   • OVARIAN CYST SURGERY     • ROTATOR CUFF REPAIR Left 2009    x2    • TOTAL ABDOMINAL HYSTERECTOMY WITH SALPINGO OOPHORECTOMY         Family History: family history includes Cancer in her sister; Diabetes in her brother; Heart disease in her father and mother; Hyperlipidemia in an other family member; Hypertension in her brother, father, and mother; Lung disease in her father; Seizures in her mother; Stroke in her father and mother. Otherwise pertinent FHx was reviewed and not pertinent to current issue.    Social History:  reports that she quit smoking about 3 years ago. Her smoking use included cigarettes. She has a 48.00 pack-year smoking history. She has never used smokeless tobacco. She reports that she does not drink  alcohol and does not use drugs.    Home Medications:  Prior to Admission Medications     Prescriptions Last Dose Informant Patient Reported? Taking?    albuterol sulfate  (90 Base) MCG/ACT inhaler   No No    INHALE 2 PUFFS EVERY 4 HOURS AS NEEDED FOR WHEEZING OR SHORTNESS OF AIR    atorvastatin (LIPITOR) 40 MG tablet   No No    TAKE 1/2 TABLET BY MOUTH EVERY DAY    budesonide-formoterol (SYMBICORT) 160-4.5 MCG/ACT inhaler   Yes No    Inhale 2 puffs 2 (Two) Times a Day.    Cholecalciferol 25 MCG (1000 UT) capsule   No No    TAKE 1 TABLET BY MOUTH TWICE A DAY *OTC NOT COVERED*    dicyclomine (BENTYL) 10 MG capsule   No No    TAKE 1 CAPSULE BY MOUTH FOUR TIMES A DAY BEFORE MEALS AND AT BEDTIME    digoxin (LANOXIN) 125 MCG tablet   No No    Take 1 tablet by mouth Daily.    dilTIAZem CD (CARDIZEM CD) 180 MG 24 hr capsule   No No    Take 1 capsule by mouth Daily.    fenofibrate 160 MG tablet   No No    TAKE 1 TABLET BY MOUTH EVERY DAY    furosemide (LASIX) 40 MG tablet   No No    TAKE 1 TABLET BY MOUTH EVERY DAY    HumaLOG KwikPen 100 UNIT/ML solution pen-injector   No No    Inject 14 Units under the skin into the appropriate area as directed 3 (Three) Times a Day Before Meals.    HYDROcodone-acetaminophen (NORCO) 7.5-325 MG per tablet   Yes No    Take 1 tablet by mouth 3 (Three) Times a Day As Needed for Moderate Pain.    Insulin Glargine (BASAGLAR KWIKPEN) 100 UNIT/ML injection pen   No No    INJECT 40 UNITS UNDER THE SKIN INTO THE APPROPRIATE AREA AS DIRECTED EVERY NIGHT.    Janumet XR  MG tablet   No No    TAKE 2 TABLETS BY MOUTH EVERY DAY    lisinopril (PRINIVIL,ZESTRIL) 20 MG tablet   No No    TAKE 1 TABLET BY MOUTH EVERY DAY    magnesium oxide (MAG-OX) 400 MG tablet   No No    TAKE 1 TABLET BY MOUTH TWICE A DAY    metoprolol tartrate (LOPRESSOR) 100 MG tablet   No No    Take 1 tablet by mouth Every 12 (Twelve) Hours.    Omeprazole Magnesium 20.6 (20 Base) MG capsule delayed-release   No No    Take 2  capsules by mouth every morning before breakfast    potassium chloride (MICRO-K) 10 MEQ CR capsule   No No    TAKE 1 CAPSULE BY MOUTH EVERY DAY            Allergies:  Allergies   Allergen Reactions   • Ibuprofen GI Intolerance   • Prednisone Other (See Comments)   • Pregabalin Other (See Comments)     jerking   • Tramadol Other (See Comments)     Legs jerked   • Adhesive Tape Other (See Comments)     irritation   • Levofloxacin Other (See Comments)     Increase sunburn   • Sulfamethoxazole-Trimethoprim Swelling       Objective      Vitals:   Temp:  [98.2 °F (36.8 °C)] 98.2 °F (36.8 °C)  Heart Rate:  [] 88  Resp:  [16] 16  BP: (137-145)/(54-77) 145/54    Physical Exam  Constitutional:       General: She is not in acute distress.     Appearance: Normal appearance. She is obese.   HENT:      Head: Normocephalic and atraumatic.      Nose: Nose normal. No congestion.      Mouth/Throat:      Pharynx: Oropharynx is clear. No oropharyngeal exudate.   Eyes:      General: No scleral icterus.  Cardiovascular:      Rate and Rhythm: Normal rate and regular rhythm.      Heart sounds: No murmur heard.    No friction rub. No gallop.   Pulmonary:      Effort: No respiratory distress.      Breath sounds: No wheezing or rales.   Abdominal:      General: There is distension.      Palpations: There is no mass.      Tenderness: There is abdominal tenderness.   Musculoskeletal:         General: No swelling or deformity.      Cervical back: Normal range of motion. No rigidity.      Right lower leg: No edema.      Left lower leg: No edema.   Skin:     Coloration: Skin is not jaundiced.      Findings: No bruising or lesion.   Neurological:      General: No focal deficit present.      Mental Status: She is alert and oriented to person, place, and time.      Motor: No weakness.          Result Review    Result Review:  I have personally reviewed the results from the time of this admission to 3/7/2023 21:33 EST and agree with these  findings:  [x]  Laboratory  []  Microbiology  [x]  Radiology  []  EKG/Telemetry   []  Cardiology/Vascular   []  Pathology  [x]  Old records  []  Other:        Assessment & Plan        Active Hospital Problems:  There are no active hospital problems to display for this patient.    Plan:     #Intractable abdominal pain  #Pyloric Signet Ring Cell Adenocarcinoma  #Dysphagia  #Malnutrition    - CT a/p shows unchanged soft tissue mass in the duodenal bulb and unchanged adrenal gland nodules complicated by extensive abdominal lymphadenopathy in the setting of cholelithiasis without acute cholecystitis along with hyperdense lesions within the right kidney.    - pathology on 3/2/23 positive for signet ring cell type adenocarcinoma of duodenal bulb mass    - NPO    - Zofran PRN    - Dilaudid IV PRN pain    - Heme/onc consulted    - pt    - palliative consulted    - LR @ 75/hr    - lipase 117, decreased from previous admission, pancreas normal on imaging so doubt pancreatitis    - Needs PET scan as otpt    - SLP consulted    - nutrition consulted    - due to poor appetite, start Reglan 10mg IV q6h    - Admits to occasional dysphagia with solids and liquids, worse with solids    - Patient does not want feeding tube    - GI consulted to consider if intervention warranted 2/2 dysphagia      #A. Fib    - resume home Cardizem    - resume home Digoxin    - hold Eliquis in case intervention necessary    #COPD    -stable on room air    - duoneb PRN    #CAD    - resume home statin and fibrate    #DM    - Lantus 30u SC QHS (home dose is 40u)    - ISS    #RUSLAN    - may use home CPAP    #GERD    - PPI BID    #HTN    - resume home lopressor    - Hold home lisinopril to prevent nephrotoxicity    #HLD    - resume home statin and fibrate    #Anemia    - no overt bleeding, stable    DVT prophylaxis: SCDs    CODE STATUS:       Admission Status:  I believe this patient meets observation status.    I discussed the patient's findings and my  recommendations with patient.    This patient has been examined wearing appropriate Personal Protective Equipment and discussed with Patient. 03/07/23      Signature: Electronically signed by Steffany Christie DO, 03/07/23, 9:55 PM EST.

## 2023-03-08 NOTE — PLAN OF CARE
Goal Outcome Evaluation:               Swallow evaluation completed this date. Pt awake, alert and has no c/o swallow difficulty. Pt c/o constant stomach pain. Pt is edentulous though eats regular solids at baseline. Pt amenable to trials of thin water, puree applesauce, mechanical soft mixed consistency peaches and regular solid peanut butter crackers. Pt feeds herself independently and functionally. Oral transit is WFL for liquids, puree and solids. No overt clinical s/s of aspiration demonstrated at anytime.     Recommend a regular diet w/thin liquids. ST to sign off as oral and pharyngeal stage WFL and no skilled dysphagia therapy appears indicated at this time. Please re-consult if needs arise.

## 2023-03-08 NOTE — PLAN OF CARE
Goal Outcome Evaluation:   Pt having abdominal pain not relieved with IV pain medications.

## 2023-03-08 NOTE — ED PROVIDER NOTES
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  Nausea and some vomiting unable to eat or drink.  Bowel movements okay.  She denies any fever chills no chest pain neck arm or jaw pain no urinary complaints.  No one in the home with similar illness no injury or foreign travels.  She states symptoms of not being controlled at home.  Scheduled for a PET scan in a couple weeks.  And an upcoming oncology appointment.        Review of Systems   Constitutional: Negative for chills and fever.   Respiratory: Negative for chest tightness and shortness of breath.    Gastrointestinal: Positive for abdominal pain and vomiting.   Genitourinary: Negative for difficulty urinating and dysuria.   Musculoskeletal: Negative for back pain and neck pain.   Skin: Negative for rash and wound.   Neurological: Negative for dizziness and light-headedness.   Psychiatric/Behavioral: Negative for agitation and confusion.       Past Medical History:   Diagnosis Date   • Arthritis    • Atrial fibrillation ([facility])    • Bradycardia     Dr. Charles   • Cataract    • COPD (chronic obstructive pulmonary disease) ([facility]ly [facility])     Dr. Rob   • Diabetes mellitus, type 2 ([facility]ly [facility])     sees Dr. Archibald at [place]   • DJD (degenerative joint disease)     possible herniated disc, sees Pain Mgmt in [place]   • Emphysema of lung ([facility])    •     • GERD (gastroesophageal reflux disease)    • History of combined right and left heart catheterization 2018    minimal CAD on heart cath done    • Hyperlipidemia    • Hypertension     • Pain    • Sleep apnea     wears CPAP machine, sees Darron       Allergies   Allergen Reactions   • Ibuprofen GI Intolerance   • Prednisone Other (See Comments)   • Pregabalin Other (See Comments)     jerking   • Tramadol Other (See Comments)     Legs jerked   • Adhesive Tape Other (See Comments)     irritation   • Levofloxacin Other (See Comments)     Increase sunburn   • Sulfamethoxazole-Trimethoprim Swelling       Past Surgical History:   Procedure Laterality Date   • ABLATION OF DYSRHYTHMIC FOCUS     • CARDIAC CATHETERIZATION      and Angiograph    • CARDIAC ELECTROPHYSIOLOGY PROCEDURE N/A 12/10/2019    Procedure: pvc ablation;  Surgeon: Alfredo Iglesias MD;  Location: [facility] CATH INVASIVE LOCATION;  Service: Cardiovascular   •  SECTION      x 1   • COLON RESECTION     • COLONOSCOPY  2012   • COLONOSCOPY N/A 3/2/2023    Procedure: COLONOSCOPY WITH POLYPECTOMY X 3 AND RANDOM COLON BIOPSIES;  Surgeon: Mary Jo Pichardo MD;  Location: [facility] ENDOSCOPY;  Service: Gastroenterology;  Laterality: N/A;  POST: POLYPS, HEMORRHOIDS   • COLOSTOMY      and reversal in her 20's due to problems with childbirth   • COLOSTOMY CLOSURE     • ENDOSCOPY N/A 3/2/2023    Procedure: ESOPHAGOGASTRODUODENOSCOPY WITH BIOPSY X2 AREAS;  Surgeon: Mary Jo Pichardo MD;  Location: [facility] ENDOSCOPY;  Service: Gastroenterology;  Laterality: N/A;  POST: DUODENAL BULB MASS, HIATAL HERNIA   • OVARIAN CYST SURGERY     • ROTATOR CUFF REPAIR Left 2009    x2    • TOTAL ABDOMINAL HYSTERECTOMY WITH SALPINGO OOPHORECTOMY         Family History   Problem Relation Age of Onset   • Heart disease Mother    • Hypertension Mother    • Stroke Mother    • Seizures Mother    • Heart disease Father    • Hypertension Father    • Lung disease Father    • Stroke Father    • Cancer Sister         unknown   • Hypertension Brother    • Diabetes Brother    • Hyperlipidemia Other        Social History     Socioeconomic History   •  Marital status:    Tobacco Use   • Smoking status: Former     Packs/day: 1.00     Years: 48.00     Pack years: 48.00     Types: Cigarettes     Quit date: 3/1/2020     Years since quitting: 3.0   • Smokeless tobacco: Never   Vaping Use   • Vaping Use: Never used   Substance and Sexual Activity   • Alcohol use: No   • Drug use: No   • Sexual activity: Defer     Prior to Admission medications    Medication Sig Start Date End Date Taking? Authorizing Provider   albuterol sulfate  (90 Base) MCG/ACT inhaler INHALE 2 PUFFS EVERY 4 HOURS AS NEEDED FOR WHEEZING OR SHORTNESS OF AIR 8/22/22   Lou Curran MD   atorvastatin (LIPITOR) 40 MG tablet TAKE 1/2 TABLET BY MOUTH EVERY DAY 2/20/23   Lou Curran MD   budesonide-formoterol (SYMBICORT) 160-4.5 MCG/ACT inhaler Inhale 2 puffs 2 (Two) Times a Day. 5/5/22   Robson Boothe MD   Cholecalciferol 25 MCG (1000 UT) capsule TAKE 1 TABLET BY MOUTH TWICE A DAY *OTC NOT COVERED* 12/27/22   Lou Curran MD   dicyclomine (BENTYL) 10 MG capsule TAKE 1 CAPSULE BY MOUTH FOUR TIMES A DAY BEFORE MEALS AND AT BEDTIME 3/1/23   Lou Curran MD   digoxin (LANOXIN) 125 MCG tablet Take 1 tablet by mouth Daily. 3/4/23   Michael Lozano MD   dilTIAZem CD (CARDIZEM CD) 180 MG 24 hr capsule Take 1 capsule by mouth Daily. 3/4/23   Michael Lozano MD   fenofibrate 160 MG tablet TAKE 1 TABLET BY MOUTH EVERY DAY 1/16/23   Lou Curran MD   furosemide (LASIX) 40 MG tablet TAKE 1 TABLET BY MOUTH EVERY DAY 10/19/22   Lou Curran MD   HumaLOG KwikPen 100 UNIT/ML solution pen-injector Inject 14 Units under the skin into the appropriate area as directed 3 (Three) Times a Day Before Meals. 12/20/22   Bautista Archibald MD   HYDROcodone-acetaminophen (NORCO) 7.5-325 MG per tablet Take 1 tablet by mouth 3 (Three) Times a Day As Needed for Moderate Pain. 5/22/20   Provider, MD Robson   Insulin Glargine (BASAGLAR KWIKPEN) 100 UNIT/ML injection pen INJECT 40  UNITS UNDER THE SKIN INTO THE APPROPRIATE AREA AS DIRECTED EVERY NIGHT. 11/14/22   Bautista Archibald MD   Janumet XR  MG tablet TAKE 2 TABLETS BY MOUTH EVERY DAY 1/16/23   Lou Curran MD   lisinopril (PRINIVIL,ZESTRIL) 20 MG tablet TAKE 1 TABLET BY MOUTH EVERY DAY 2/6/23   Lou Curran MD   magnesium oxide (MAG-OX) 400 MG tablet TAKE 1 TABLET BY MOUTH TWICE A DAY 10/25/22   Bautista Archibald MD   metoprolol tartrate (LOPRESSOR) 100 MG tablet Take 1 tablet by mouth Every 12 (Twelve) Hours. 3/3/23   Michael Lozano MD   Omeprazole Magnesium 20.6 (20 Base) MG capsule delayed-release Take 2 capsules by mouth every morning before breakfast 3/4/23   Michael Lozano MD   potassium chloride (MICRO-K) 10 MEQ CR capsule TAKE 1 CAPSULE BY MOUTH EVERY DAY 1/23/23   Bautista Archibald MD           Objective   Physical Exam  Bacitracin is a 67-year-old female awake alert no distress triage vital signs have been reviewed.  Heart rate 109 temperature 98.2 blood pressure 137/77.  HEENT extraocular muscles are intact pupils equal round reactive sclera clear mouth clear.  Neck supple no adenopathy no JVD.  Lungs clear no retractions back no CVA tenderness.  Heart regular without murmur.  Abdomen soft diffuse tenderness moderate no rebound no guarding good bowel sounds no peritoneal findings or pulsatile masses extremities no edema cords or Homans' sign pulses equal in lower extremities.  Skin warm and dry without rashes or cellulitic changes.  Neurologic awake alert and follows commands without focal weak  Procedures           ED Course      Results for orders placed or performed during the hospital encounter of 03/07/23   Lipase    Specimen: Blood   Result Value Ref Range    Lipase 117 (H) 13 - 60 U/L   Urinalysis With Microscopic If Indicated (No Culture) - Urine, Clean Catch    Specimen: Urine, Clean Catch   Result Value Ref Range    Color, UA Yellow Yellow, Straw    Appearance, UA Clear Clear    pH, UA <=5.0 5.0 - 8.0     Specific Gravity, UA 1.016 1.005 - 1.030    Glucose, UA Negative Negative    Ketones, UA Negative Negative    Bilirubin, UA Negative Negative    Blood, UA Negative Negative    Protein, UA Negative Negative    Leuk Esterase, UA Negative Negative    Nitrite, UA Negative Negative    Urobilinogen, UA 0.2 E.U./dL 0.2 - 1.0 E.U./dL   CBC Auto Differential    Specimen: Blood   Result Value Ref Range    WBC 9.30 3.40 - 10.80 10*3/mm3    RBC 4.18 3.77 - 5.28 10*6/mm3    Hemoglobin 11.9 (L) 12.0 - 15.9 g/dL    Hematocrit 37.0 34.0 - 46.6 %    MCV 88.4 79.0 - 97.0 fL    MCH 28.5 26.6 - 33.0 pg    MCHC 32.2 31.5 - 35.7 g/dL    RDW 14.4 12.3 - 15.4 %    RDW-SD 44.2 37.0 - 54.0 fl    MPV 7.8 6.0 - 12.0 fL    Platelets 249 140 - 450 10*3/mm3    Neutrophil % 72.2 42.7 - 76.0 %    Lymphocyte % 14.4 (L) 19.6 - 45.3 %    Monocyte % 11.2 5.0 - 12.0 %    Eosinophil % 1.8 0.3 - 6.2 %    Basophil % 0.4 0.0 - 1.5 %    Neutrophils, Absolute 6.70 1.70 - 7.00 10*3/mm3    Lymphocytes, Absolute 1.30 0.70 - 3.10 10*3/mm3    Monocytes, Absolute 1.00 (H) 0.10 - 0.90 10*3/mm3    Eosinophils, Absolute 0.20 0.00 - 0.40 10*3/mm3    Basophils, Absolute 0.00 0.00 - 0.20 10*3/mm3    nRBC 0.0 0.0 - 0.2 /100 WBC   Digoxin Level    Specimen: Blood   Result Value Ref Range    Digoxin 0.80 0.60 - 1.20 ng/mL   Comprehensive Metabolic Panel    Specimen: Blood   Result Value Ref Range    Glucose 229 (H) 65 - 99 mg/dL    BUN 13 8 - 23 mg/dL    Creatinine 0.69 0.57 - 1.00 mg/dL    Sodium 141 136 - 145 mmol/L    Potassium 4.2 3.5 - 5.2 mmol/L    Chloride 101 98 - 107 mmol/L    CO2 27.0 22.0 - 29.0 mmol/L    Calcium 10.1 8.6 - 10.5 mg/dL    Total Protein 7.1 6.0 - 8.5 g/dL    Albumin 3.7 3.5 - 5.2 g/dL    ALT (SGPT) 12 1 - 33 U/L    AST (SGOT) 17 1 - 32 U/L    Alkaline Phosphatase 59 39 - 117 U/L    Total Bilirubin 0.5 0.0 - 1.2 mg/dL    Globulin 3.4 gm/dL    A/G Ratio 1.1 g/dL    BUN/Creatinine Ratio 18.8 7.0 - 25.0    Anion Gap 13.0 5.0 - 15.0 mmol/L    eGFR 95.3  >60.0 mL/min/1.73   Gold Top - SST   Result Value Ref Range    Extra Tube Hold for add-ons.    Light Blue Top   Result Value Ref Range    Extra Tube Hold for add-ons.      CT Abdomen Pelvis Without Contrast    Result Date: 3/7/2023  1.Unchanged intraluminal soft tissue mass in the duodenal bulb 2.Unchanged small indeterminate adrenal gland nodules. Nonemergent MRI of the adrenal protocol can be performed for further evaluation. 3.Extensive abdominal lymphadenopathy is detected above. Nodular soft tissue deposits within the abdominal mesentery may represent metastatic deposits. 4.Cholelithiasis without acute cholecystitis. 5.Hyperdense lesions within right kidney. Follow-up recommended to exclude malignancy. Electronically Signed: Damián Liu  3/7/2023 9:06 PM EST  Workstation ID: [id number]    Medications   sodium chloride 0.9 % flush 10 mL (10 mL Intravenous Given 3/7/23 2112)   morphine injection 4 mg (4 mg Intravenous Given 3/7/23 1930)   ondansetron (ZOFRAN) injection 4 mg (4 mg Intravenous Given 3/7/23 1931)   HYDROmorphone (DILAUDID) injection 1 mg (1 mg Intravenous Given 3/7/23 2112)                                            Medical Decision Making  Medical decision making IV established monitor placed showed some atrial fibrillation with a rate about 88 on my evaluation.  Patient was given morphine 4 IV and 4 Zofran IV labs obtained independently reviewed by me.  CBC and electrolytes unremarkable globin was 11.9 comprehensive metabolic profile unremarkable with a sugar of 229 liver and unremarkable lipase is 117 this is down from 5 days ago on review of her records when her lipase was 1 7.  Dig level was 0.8.  Patient was still continued to have pain she was given additional milligram of Dilaudid IV.  CT abdomen pelvis my review shows this mass and I do not see evidence of acute perforation or diverticulitis or acute pancreatitis.  Radiology review states unchanged intraluminal soft tissue mass in the  duodenal bulb.  Unchanged small indeterminate adrenal gland nodule.  The patient had extensive abdominal lymphadenopathy.  Patient had some cholelithiasis without cholecystitis hyperdense lesion in the right kidney as well.  The patient was feeling better after Dilaudid.  On repeat exam and was soft with mild to moderate diffuse abdominal tenderness but no rebound no guarding no peritoneal findings.  Patient has taken hydrocodone at home without pain relief.  She is unable to eat or drink or tolerate anything.  Her symptoms or not controlled I do not see evidence of acute peritonitis or perforation diverticulitis cholecystitis pancreatitis appendicitis based on my history physical clinical findings and CT.  Suspect her pain is related to this adenocarcinoma and possible metastatic disease.  We talked about the findings I talked to the hospitalist we discussed the case patient will be admitted for further care stable otherwise unremarkable ER course improved.  Patient and family made aware of findings.  Review of records show she was just discharged in 3 March after being admitted.  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  She has been seen by oncology.  They stopped her Xarelto for now.  And was scheduled for outpatient treatment management after discharge at that time.    Continuous severe abdominal pain: acute illness or injury  Amount and/or Complexity of Data Reviewed  External Data Reviewed: notes.  Labs: ordered. Decision-making details documented in ED Course.  Radiology: ordered and independent interpretation performed. Decision-making details documented in ED Course.      Risk  Prescription drug management.  Parenteral controlled substances.  Decision regarding hospitalization.          Final diagnoses:   Continuous severe abdominal pain       ED Disposition  ED Disposition     ED Disposition   Decision to Admit     Condition   --    Comment   Level of Care: Med/Surg [1]   Admitting Physician: SOFÍA CURIEL [[id number]]   Attending Physician: SOFÍA CURIEL [[id number]]               No follow-up provider specified.       Medication List      No changes were made to your prescriptions during this visit.          Ronak Ruiz MD  03/07/23 5244       Ronak Ruiz MD  03/07/23 1597

## 2023-03-08 NOTE — TELEPHONE ENCOUNTER
Caller: Agnes Moncada    Relationship: Emergency Contact    Best call back number: [phone]     What is the best time to reach you: ANY TIME     Who are you requesting to speak with (clinical staff, provider,  specific staff member): DR SNOW SPECIFICALLY     What was the call regarding: PATIENT BEING ADMITTED TO [facility]- PATIENT CANNOT EAT, SHE HAS CANCER IN HER SMALL INTESTINE- THERES SWELLING AND ITS HARD FOR PATIENT TO EVEN EAT AND THE FOOD TO NAVIGATE HER BODY CORRECTLY. PATIENT ISN'T EATING BECAUSE OF THIS.    THEY HAVE PATIENT ON [facility]CT[facility] RIGHT NOW     Do you require a callback: YES PLEASE

## 2023-03-08 NOTE — THERAPY EVALUATION
Acute Care - Speech Language Pathology   Swallow Initial Evaluation  Doc     Patient Name: Caterina Mason  : 1955  MRN: [id number]  Today's Date: 3/8/2023               Admit Date: 3/7/2023    Visit Dx:     ICD-10-CM ICD-9-CM   1. Continuous severe abdominal pain  R10.9 789.00     Patient Active Problem List   Diagnosis   • Arthritis   • Bradycardia   • Chronic obstructive pulmonary disease ([facility])   • Depression   • Diabetic peripheral neuropathy ([facility])   • Gastroesophageal reflux disease   • Mixed hyperlipidemia   • Hypomagnesemia   • Lumbar radiculopathy   • Myalgia   • Paroxysmal atrial fibrillation ([facility])   • Shoulder pain, right   • Sleep apnea   • Type 2 diabetes mellitus with hyperglycemia, with long-term current use of insulin ([facility])   • Vitamin D deficiency   • Palpitations   • PVC's (premature ventricular contractions)   • Essential hypertension   • Cervical radiculitis   • Arthralgia of right temporomandibular joint   • Shortness of breath   • Bilateral leg edema   • Chronic diastolic CHF (congestive heart failure) ([facility])   • OAB (overactive bladder)   • Need for vaccination   • Class 2 obesity due to excess calories without serious comorbidity with body mass index (BMI) of 39.0 to 39.9 in adult   • Hip pain   • Medicare annual wellness visit, subsequent   • Onychomycosis   • Atrial fibrillation with RVR ([facility])   • Chest pain, atypical   • Elevated LFTs   • Cholelithiasis   • Vagina, candidiasis   • Tinea corporis   • Pre-op examination   • Bacteremia due to methicillin susceptible Staphylococcus aureus (MSSA)   • Cellulitis of right breast   • Generalized abdominal pain   • Hematochezia   • Renal mass   • Adrenal mass ([facility])   • Calculus of gallbladder without cholecystitis without obstruction   • Acute pancreatitis, unspecified complication status, unspecified pancreatitis type   • Abnormal CT of the abdomen   • Continuous severe abdominal pain     Past Medical History:   Diagnosis Date   •  Arthritis    • Atrial fibrillation (HCC)    • Bradycardia     Dr. Charles   • Cataract    • COPD (chronic obstructive pulmonary disease) ([facility])     Dr. Rob   • Diabetes mellitus, type 2 (HCC)     sees Dr. Archibald at [place]   • DJD (degenerative joint disease)     possible herniated disc, sees Pain Mgmt in [place]   • Emphysema of lung (HCC)    •     • GERD (gastroesophageal reflux disease)    • History of combined right and left heart catheterization 2018    minimal CAD on heart cath done    • Hyperlipidemia    • Hypertension    • Pain    • Sleep apnea     wears CPAP machine, sees Darron     Past Surgical History:   Procedure Laterality Date   • ABLATION OF DYSRHYTHMIC FOCUS     • CARDIAC CATHETERIZATION      and Angiograph    • CARDIAC ELECTROPHYSIOLOGY PROCEDURE N/A 12/10/2019    Procedure: pvc ablation;  Surgeon: Alfredo Iglesias MD;  Location: [facility] CATH INVASIVE LOCATION;  Service: Cardiovascular   •  SECTION      x 1   • COLON RESECTION     • COLONOSCOPY  2012   • COLONOSCOPY N/A 3/2/2023    Procedure: COLONOSCOPY WITH POLYPECTOMY X 3 AND RANDOM COLON BIOPSIES;  Surgeon: Mary Jo Pichardo MD;  Location: [facility] ENDOSCOPY;  Service: Gastroenterology;  Laterality: N/A;  POST: POLYPS, HEMORRHOIDS   • COLOSTOMY      and reversal in her 20's due to problems with childbirth   • COLOSTOMY CLOSURE     • ENDOSCOPY N/A 3/2/2023    Procedure: ESOPHAGOGASTRODUODENOSCOPY WITH BIOPSY X2 AREAS;  Surgeon: Mary Jo Pichardo MD;  Location: [facility] ENDOSCOPY;  Service: Gastroenterology;  Laterality: N/A;  POST: DUODENAL BULB MASS, HIATAL HERNIA   • OVARIAN CYST SURGERY     • ROTATOR CUFF REPAIR Left 2009    x2    • TOTAL ABDOMINAL HYSTERECTOMY WITH SALPINGO OOPHORECTOMY         SLP Recommendation and Plan  SLP Swallowing Diagnosis: functional oral phase, functional pharyngeal phase (23 1000)  SLP Diet Recommendation: regular textures, thin liquids (23 1000)  Recommended  Precautions and Strategies: upright posture during/after eating, general aspiration precautions (03/08/23 1000)  SLP Rec. for Method of Medication Administration: as tolerated (03/08/23 1000)     Monitor for Signs of Aspiration: yes, notify SLP if any concerns (03/08/23 1000)  Recommended Diagnostics: No further SLP services recommended (03/08/23 1000)  Swallow Criteria for Skilled Therapeutic Interventions Met: no problems identified which require skilled intervention (03/08/23 1000)  Anticipated Discharge Disposition (SLP): unknown (03/08/23 1000)     Therapy Frequency (Swallow): evaluation only (03/08/23 1000)                                                  SWALLOW EVALUATION (last 72 hours)     SLP Adult Swallow Evaluation     Row Name 03/08/23 1000          Document Type evaluation  -EC    Subjective Information complains of  stomach pain  -EC    Patient Observations alert;cooperative;agree to therapy  -EC    Patient Effort excellent  -EC    Symptoms Noted During/After Treatment none  -EC          Patient Profile Reviewed yes  -EC    Pertinent History Of Current Problem Caterina Mason is a 67 y.o. female who presented to [facility] on 3/7/2023 complaining of Abdominal pain.  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  Admits to 10/10 constant non-radiating stabbing abdominal pain complicated by distension that nothing appears to make better. Denies fevers, chills,  dyspnea, chest pain.  Admits to abdominal swelling and distension, states she has no appetite. Patient has occasional dysphagia with solids and liquids, causing her to vomit, but worse with solids.  States she is able to tolerate most pills.  Pt is admitted for intractable abdominal pain 2/2 duodenal bulb mass  -EC    Current Method of Nutrition nectar/syrup-thick liquids;soft to chew textures  -EC    Precautions/Limitations, Vision WFL;for  purposes of eval  -EC    Precautions/Limitations, Hearing WFL;for purposes of eval  -EC    Prior Level of Function-Communication WFL  -EC    Prior Level of Function-Swallowing no diet consistency restrictions  -EC    Plans/Goals Discussed with patient  -EC          Dentition Assessment edentulous, does not have dentures  -EC    Secretion Management WNL/WFL  -EC    Mucosal Quality moist, healthy  -EC          Oral Motor General Assessment WFL  -EC          Respiratory Support Currently in Use nasal cannula  -EC    O2 Liters 3L  -EC    Eating/Swallowing Skills self-fed;appropriate self-feeding skills observed  -EC    Positioning During Eating upright 90 degree  sitting side of bed independently  -EC    Utensils Used spoon;straw  -EC    Consistencies Trialed regular textures;chopped;mixed consistency;pureed;thin liquids  -EC          Respiratory Status WFL;during swallowing/eating  -EC          Clinical Swallow Evaluation Summary Swallow evaluation completed this date. Pt awake, alert and has no c/o swallow difficulty. Pt c/o constant stomach pain. Pt is edentulous though eats regular solids at baseline. Pt amenable to trials of thin water, puree applesauce, mechanical soft mixed consistency peaches and regular solid peanut butter crackers. Pt feeds herself independently and functionally. Oral transit is WFL for liquids, puree and solids. No overt clinical s/s of aspiration demonstrated at anytime. Recommend a regular diet w/thin liquids. ST to sign off as no skilled dysphagia therapy appears indicated at this time. Please re-consult if needs arise.  -EC          SLP Swallowing Diagnosis functional oral phase;functional pharyngeal phase  -EC    Functional Impact no impact on function  -EC    Swallow Criteria for Skilled Therapeutic Interventions Met no problems identified which require skilled intervention  -EC          Care Plan Review evaluation/treatment results reviewed;patient/other agree to care plan  -EC           Therapy Frequency (Swallow) evaluation only  -EC    SLP Diet Recommendation regular textures;thin liquids  -EC    Recommended Diagnostics No further SLP services recommended  -EC    Recommended Precautions and Strategies upright posture during/after eating;general aspiration precautions  -EC    Oral Care Recommendations Oral Care BID/PRN  -EC    SLP Rec. for Method of Medication Administration as tolerated  -EC    Monitor for Signs of Aspiration yes;notify SLP if any concerns  -EC    Anticipated Discharge Disposition (SLP) unknown  -EC          User Key  (r) = Recorded By, (t) = Taken By, (c) = Cosigned By    Initials Name Effective Dates    Deb Underwood 06/16/21 -                 EDUCATION  The patient has been educated in the following areas:   Dysphagia (Swallowing Impairment).              Time Calculation:                Deb Baez  3/8/2023

## 2023-03-08 NOTE — CASE MANAGEMENT/SOCIAL WORK
Discharge Planning Assessment   Doc     Patient Name: Caterina Mason  MRN: [id number]  Today's Date: 3/8/2023    Admit Date: 3/7/2023    Plan: Anticipate routine home. Current home CPAP w/ O2 through [employer].   Discharge Needs Assessment     Row Name 03/08/23 1053       Living Environment    People in Home spouse    Name(s) of People in Home Theresa    Current Living Arrangements home    Potentially Unsafe Housing Conditions none    Primary Care Provided by self    Provides Primary Care For no one    Family Caregiver if Needed spouse    Family Caregiver Names Theresa    Quality of Family Relationships helpful;involved;supportive    Able to Return to Prior Arrangements yes       Resource/Environmental Concerns    Resource/Environmental Concerns none    Transportation Concerns none       Transition Planning    Patient/Family Anticipates Transition to home;home with family    Patient/Family Anticipated Services at Transition none    Transportation Anticipated car, drives self;family or friend will provide       Discharge Needs Assessment    Readmission Within the Last 30 Days other (see comments)  Previous hospitalization 2/28-3/3    Equipment Currently Used at Home cane, straight;cpap;oxygen;glucometer;shower chair    Concerns to be Addressed denies needs/concerns at this time;no discharge needs identified    Anticipated Changes Related to Illness none    Equipment Needed After Discharge none    Provided Post Acute Provider List? N/A    Provided Post Acute Provider Quality & Resource List? N/A               Discharge Plan     Row Name 03/08/23 1107       Plan    Plan Anticipate routine home. Current home CPAP w/ O2 through [employer].    Patient/Family in Agreement with Plan yes    Plan Comments CM met with patient at bedside. Pt lives at home with spouse Waqas; she drives and is independent with ADL's. PCP and pharmacy confirmed. DME includes straight cane, glucometer, shower chair, nebulizer, and  CPAP w/ O2. Pt denies issues affording medications. Spouse or niece Agnes will provide transportation at discharge.              Expected Discharge Date and Time     Expected Discharge Date Expected Discharge Time    Mar 9, 2023          Demographic Summary     Row Name 03/08/23 1052       General Information    Admission Type observation    Arrived From emergency department    Referral Source admission list    Reason for Consult discharge planning;care coordination/care conference    Preferred Language English       Contact Information    Permission Granted to Share Info With                Functional Status     Row Name 03/08/23 1052       Functional Status    Usual Activity Tolerance moderate    Current Activity Tolerance moderate       Functional Status, IADL    Medications assistive person    Meal Preparation assistive person    Housekeeping assistive person    Laundry assistive person    Shopping assistive person              Met with patient in room wearing PPE: mask, face shield/goggles.      Maintained distance greater than six feet and spent less than 15 minutes in the room.      Megan Naegele, RN      Office Phone: [phone]  Office Cell: [phone]

## 2023-03-08 NOTE — CONSULTS
"GI CONSULT  NOTE:    Referring Provider:  Dr. Ling    Chief complaint: Dysphagia    Subjective . \"I was having worsening pain\"    History of present illness: Caterina Mason is a 67 y.o. female who has a history of A-fib on Eliquis, COPD, DMII, RUSLAN and previous colon resection (reportedly following a complication in childbirth).  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  Plan was for PET scan as an outpatient and follow-up with oncology.  Unfortunately, she developed worsening upper abdominal pain that is uncontrolled on her current oral regimen and she presented back to the hospital.  She has occasional nausea but no vomiting and reports decreased appetite secondary to the pain.  She denies any dysphagia or heartburn.  She denies constipation or diarrhea but last bowel movement several days ago without melena or rectal bleeding.  She is feeling better today overall.    Endoscopy  3/2/23 EGD/colonoscopy by Dr. Pichardo -abnormal mucosa of the pylorus/duodenum with biopsies H. pylori negative, pathology showed poorly differentiated adenocarcinoma signet ring type, small hiatal hernia, colon polyps x3 (TA/HP), hemorrhoids, random colon biopsies benign    Past Medical History:  Past Medical History:   Diagnosis Date   • Arthritis    • Atrial fibrillation (HCC)    • Bradycardia     Dr. Charles   • Cataract    • COPD (chronic obstructive pulmonary disease) ([facility])     Dr. Rob   • Diabetes mellitus, type 2 (HCC)     sees Dr. Archibald at [place]   • DJD (degenerative joint disease)     possible herniated disc, sees Pain Mgmt in [place]   • Emphysema of lung (HCC)    •     • GERD (gastroesophageal reflux disease)    • History of combined right and left heart catheterization 2018    minimal CAD on heart cath done    • Hyperlipidemia    • Hypertension    • Pain    • Sleep apnea     wears CPAP machine, sees Darron "       Past Surgical History:  Past Surgical History:   Procedure Laterality Date   • ABLATION OF DYSRHYTHMIC FOCUS     • CARDIAC CATHETERIZATION      and Angiograph    • CARDIAC ELECTROPHYSIOLOGY PROCEDURE N/A 12/10/2019    Procedure: pvc ablation;  Surgeon: Alfredo Iglesisa MD;  Location: [facility] CATH INVASIVE LOCATION;  Service: Cardiovascular   •  SECTION      x 1   • COLON RESECTION     • COLONOSCOPY  2012   • COLONOSCOPY N/A 3/2/2023    Procedure: COLONOSCOPY WITH POLYPECTOMY X 3 AND RANDOM COLON BIOPSIES;  Surgeon: Mary Jo Pichardo MD;  Location: [facility] ENDOSCOPY;  Service: Gastroenterology;  Laterality: N/A;  POST: POLYPS, HEMORRHOIDS   • COLOSTOMY      and reversal in her 20's due to problems with childbirth   • COLOSTOMY CLOSURE     • ENDOSCOPY N/A 3/2/2023    Procedure: ESOPHAGOGASTRODUODENOSCOPY WITH BIOPSY X2 AREAS;  Surgeon: Mary Jo Pichardo MD;  Location: [facility] ENDOSCOPY;  Service: Gastroenterology;  Laterality: N/A;  POST: DUODENAL BULB MASS, HIATAL HERNIA   • OVARIAN CYST SURGERY     • ROTATOR CUFF REPAIR Left 2009    x2    • TOTAL ABDOMINAL HYSTERECTOMY WITH SALPINGO OOPHORECTOMY         Social History:  Social History     Tobacco Use   • Smoking status: Former     Packs/day: 1.00     Years: 48.00     Pack years: 48.00     Types: Cigarettes     Quit date: 3/1/2020     Years since quitting: 3.0   • Smokeless tobacco: Never   Vaping Use   • Vaping Use: Never used   Substance Use Topics   • Alcohol use: No   • Drug use: No       Family History:  Family History   Problem Relation Age of Onset   • Heart disease Mother    • Hypertension Mother    • Stroke Mother    • Seizures Mother    • Heart disease Father    • Hypertension Father    • Lung disease Father    • Stroke Father    • Cancer Sister         unknown   • Hypertension Brother    • Diabetes Brother    • Hyperlipidemia Other        Medications:  Medications Prior to Admission   Medication Sig Dispense Refill Last  Dose   • atorvastatin (LIPITOR) 40 MG tablet TAKE 1/2 TABLET BY MOUTH EVERY DAY 45 tablet 1 3/7/2023   • Cholecalciferol 25 MCG (1000 UT) capsule TAKE 1 TABLET BY MOUTH TWICE A DAY *OTC NOT COVERED* 60 capsule 5 3/7/2023   • digoxin (LANOXIN) 125 MCG tablet Take 1 tablet by mouth Daily. 30 tablet 0 3/7/2023 at 1230   • dilTIAZem CD (CARDIZEM CD) 180 MG 24 hr capsule Take 1 capsule by mouth Daily. 30 capsule 0 3/7/2023   • fenofibrate 160 MG tablet TAKE 1 TABLET BY MOUTH EVERY DAY 90 tablet 1 3/7/2023   • furosemide (LASIX) 40 MG tablet TAKE 1 TABLET BY MOUTH EVERY DAY 90 tablet 1 3/7/2023   • HumaLOG KwikPen 100 UNIT/ML solution pen-injector Inject 14 Units under the skin into the appropriate area as directed 3 (Three) Times a Day Before Meals. 15 mL 1 3/7/2023   • lisinopril (PRINIVIL,ZESTRIL) 20 MG tablet TAKE 1 TABLET BY MOUTH EVERY DAY 90 tablet 1 3/7/2023   • magnesium oxide (MAG-OX) 400 MG tablet TAKE 1 TABLET BY MOUTH TWICE A DAY 60 tablet 6 3/7/2023   • metoprolol tartrate (LOPRESSOR) 100 MG tablet Take 1 tablet by mouth Every 12 (Twelve) Hours. 60 tablet 0 3/7/2023   • Omeprazole Magnesium 20.6 (20 Base) MG capsule delayed-release Take 2 capsules by mouth every morning before breakfast 60 capsule 1 3/7/2023   • potassium chloride (MICRO-K) 10 MEQ CR capsule TAKE 1 CAPSULE BY MOUTH EVERY DAY 90 capsule 1 3/7/2023   • albuterol sulfate  (90 Base) MCG/ACT inhaler INHALE 2 PUFFS EVERY 4 HOURS AS NEEDED FOR WHEEZING OR SHORTNESS OF AIR 18 g 1    • HYDROcodone-acetaminophen (NORCO) 7.5-325 MG per tablet Take 1 tablet by mouth 3 (Three) Times a Day As Needed for Moderate Pain.      • Insulin Glargine (BASAGLAR KWIKPEN) 100 UNIT/ML injection pen INJECT 40 UNITS UNDER THE SKIN INTO THE APPROPRIATE AREA AS DIRECTED EVERY NIGHT. 45 mL 0        Scheduled Meds:atorvastatin, 20 mg, Oral, Daily  budesonide-formoterol, 2 puff, Inhalation, BID  digoxin, 125 mcg, Oral, Daily  dilTIAZem CD, 180 mg, Oral, Daily  insulin  "glargine, 30 Units, Subcutaneous, Nightly  insulin lispro, 2-9 Units, Subcutaneous, TID With Meals  metoclopramide, 10 mg, Intravenous, Q6H  metoprolol tartrate, 100 mg, Oral, Q12H  [START ON 3/9/2023] pantoprazole, 40 mg, Oral, QAM AC  polyethylene glycol, 17 g, Oral, Daily  sodium chloride, 10 mL, Intravenous, Q12H      Continuous Infusions:lactated ringers, 75 mL/hr, Last Rate: 75 mL/hr (03/08/23 0807)      PRN Meds:.•  dextrose  •  dextrose  •  glucagon (human recombinant)  •  HYDROmorphone  •  ipratropium-albuterol  •  nitroglycerin  •  ondansetron **OR** ondansetron  •  [COMPLETED] Insert Peripheral IV **AND** sodium chloride  •  sodium chloride  •  sodium chloride    ALLERGIES:  Ibuprofen, Prednisone, Pregabalin, Tramadol, Adhesive tape, Levofloxacin, and Sulfamethoxazole-trimethoprim    ROS:  The following systems were reviewed  Constitution:  No fevers, chills, no unintentional weight loss  Skin: no rash, no jaundice  Eyes:  No blurry vision, no eye pain  HENT:  No change in hearing or smell  Resp:  No dyspnea or cough  CV:  No chest pain or palpitations  :  No dysuria, hematuria  Musculoskeletal:  No leg cramps or arthralgias  Neuro:  No tremor, no numbness  Psych:  No depression or confusion    Objective Resting on side of bed, no acute distress    Vital Signs:   Vitals:    03/07/23 2332 03/08/23 0300 03/08/23 0710 03/08/23 0729   BP: 137/62 127/62 143/70    BP Location: Left arm Left arm Left arm    Patient Position: Lying Sitting Sitting    Pulse: 112 102 104    Resp: 17 19 20    Temp: 98.5 °F (36.9 °C) 97.5 °F (36.4 °C) 98.3 °F (36.8 °C)    TempSrc: Oral Oral Oral    SpO2: 96% 96% 97% 96%   Weight: 98.4 kg (216 lb 14.9 oz) 81.6 kg (180 lb)     Height: 162.6 cm (64.02\")          Physical Exam:     General Appearance:    Awake and alert, in no acute distress   Head:    Normocephalic, without obvious abnormality, atraumatic   Throat:   No oral lesions, no thrush, oral mucosa moist   Lungs:     " Respirations regular, even and unlabored   Chest Wall:    No abnormalities observed       Rectal:     Deferred   Extremities:   Moves all extremities, no cyanosis       Skin:   No rash, no jaundice, normal palpation       Neurologic:   Cranial nerves 2 - 12 grossly intact       Results Review:   I reviewed the patient's labs and imaging.  CBC    Results from last 7 days   Lab Units 03/08/23  0933 03/07/23 1919 03/03/23  0634 03/02/23  1323   WBC 10*3/mm3 9.10 9.30 8.40 7.90   HEMOGLOBIN g/dL 11.8* 11.9* 11.1* 11.2*   PLATELETS 10*3/mm3 243 249 266 289     CMP   Results from last 7 days   Lab Units 03/07/23 1919 03/03/23  0634 03/02/23  1323   SODIUM mmol/L 141 143 141   POTASSIUM mmol/L 4.2 4.4 3.6   CHLORIDE mmol/L 101 106 105   CO2 mmol/L 27.0 28.0 27.0   BUN mg/dL 13 9 10   CREATININE mg/dL 0.69 0.69 0.84   GLUCOSE mg/dL 229* 213* 150*   ALBUMIN g/dL 3.7 3.5 3.5   BILIRUBIN mg/dL 0.5 0.5 0.5   ALK PHOS U/L 59 51 47   AST (SGOT) U/L 17 26 26   ALT (SGPT) U/L 12 15 16   MAGNESIUM mg/dL  --  1.7 1.7   PHOSPHORUS mg/dL  --  2.7 2.4*   LIPASE U/L 117*  --  173*     Cr Clearance Estimated Creatinine Clearance: 81.8 mL/min (by C-G formula based on SCr of 0.69 mg/dL).  Coag     HbA1C   Lab Results   Component Value Date    HGBA1C 11.8 (H) 12/01/2022    HGBA1C 11.5 (H) 05/31/2022    HGBA1C 11.6 (H) 11/16/2021     Blood Glucose   Glucose   Date/Time Value Ref Range Status   03/08/2023 0708 197 (H) 70 - 105 mg/dL Final     Comment:     Serial Number: [id number]:  [id number]   03/08/2023 0008 170 (H) 70 - 105 mg/dL Final     Comment:     Serial Number: [id number]:  [id number]     Infection     UA    Results from last 7 days   Lab Units 03/07/23 2002   NITRITE UA  Negative     Radiology(recent) CT Abdomen Pelvis Without Contrast    Result Date: 3/7/2023  1.Unchanged intraluminal soft tissue mass in the duodenal bulb 2.Unchanged small indeterminate adrenal gland nodules. Nonemergent MRI of the adrenal protocol  can be performed for further evaluation. 3.Extensive abdominal lymphadenopathy is detected above. Nodular soft tissue deposits within the abdominal mesentery may represent metastatic deposits. 4.Cholelithiasis without acute cholecystitis. 5.Hyperdense lesions within right kidney. Follow-up recommended to exclude malignancy. Electronically Signed: Damián Liu  3/7/2023 9:06 PM EST  Workstation ID: [id number]         ASSESSMENT:  Upper abdominal pain -likely secondary to malignancy  Pyloric/duodenal bulb adenocarcinoma (signet ring type)  Extensive abdominal lymphadenopathy/adrenal gland nodules/right kidney lesions  Cholelithiasis  History of A-fib on Eliquis  COPD  DMII  History of colon resection - ?  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  CT without any acute changes.  Would recommend adding MiraLAX to prevent constipation with narcotic use.  No plans for further endoscopic evaluation.  Pain management per primary/oncology.  Okay for diet as tolerated.  Little more for GI to offer at this time, call with questions or concerns.  We will see inpatient as needed.    I discussed the patients findings and my recommendations with the patient.    We appreciate the referral    Electronically signed by YAW Wheeler, 03/08/23, 10:58 AM EST.

## 2023-03-08 NOTE — PLAN OF CARE
Goal Outcome Evaluation:     Pt is a 66 y/o female who presents to [facility] with reports of abdominal pain. 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 CT a/p shows unchanged soft tissue mass in the duodenal bulb and unchanged adrenal gland nodules complicated by extensive abdominal lymphadenopathy in the setting of cholelithiasis without acute cholecystitis along with hyperdense lesions within the right kidney. At [facility] pt reports living with her spouse in a SSH with one step to ramp. She utilizes a straight cane for household mobility and has rollator, but does not utilize. She ambulates x50ft w/out an AD, CGA. Pt furniture walks with ambulation w/out AD and would benefit from use of RW during hospital stay. Pt does not utilize home O2, but at this time SpO2 87-88% following ambulation and on room air. Pt may benefit from a 6MWT prior to d/c. Pt appears to be at baseline. Recommend assist from spouse as needed and HHPT at this time.

## 2023-03-08 NOTE — PROGRESS NOTES
[facility] Medicine Services   Daily Progress Note    Patient Name: Caterina Mason  : 1955  MRN: [id number]  Primary Care Physician:  Lou Curran MD  Date of admission: 3/7/2023  Date and Time of Service: 3/8/2023      Subjective      Chief Complaint: Abdominal pain    Patient Reports patient has constipation for more than 7 days now, she has abdominal pain which is diffuse and crampy, no chest pain shortness of breath nausea vomiting.    ROS all review of systems negative except as above      Objective      Vitals:   Temp:  [97.5 °F (36.4 °C)-98.5 °F (36.9 °C)] 97.6 °F (36.4 °C)  Heart Rate:  [] 86  Resp:  [16-23] 23  BP: (117-145)/(54-78) 137/55  Flow (L/min):  [3] 3    Physical Exam  Constitutional:       Appearance: Normal appearance.   HENT:      Head: Normocephalic.      Right Ear: Tympanic membrane normal.      Nose: Nose normal.      Mouth/Throat:      Mouth: Mucous membranes are moist.   Eyes:      Pupils: Pupils are equal, round, and reactive to light.   Cardiovascular:      Rate and Rhythm: Normal rate.      Pulses: Normal pulses.   Pulmonary:      Effort: Pulmonary effort is normal.   Abdominal:      General: Abdomen is flat.   Musculoskeletal:         General: Normal range of motion.   Skin:     General: Skin is warm.      Capillary Refill: Capillary refill takes less than 2 seconds.   Neurological:      General: No focal deficit present.      Mental Status: She is alert.   Psychiatric:         Mood and Affect: Mood normal.            Result Review    Result Review:  I have personally reviewed the results from the time of this admission to 3/8/2023 16:49 EST and agree with these findings:  [x]  Laboratory  []  Microbiology  []  Radiology  []  EKG/Telemetry   []  Cardiology/Vascular   []  Pathology  []  Old records  []  Other:  Most notable findings include: Glucose 243 sodium 138 potassium 3.9 chloride 99 BUN 11 creatinine 0.7.  White blood cell count 9.10  hemoglobin 11.8 hematocrit 36.4 platelets 243.  CT of abdomen showed unchanged intraluminal soft tissue mass in the duodenal bulb with unchanged abdominal nodule and extensive abdominal lymphadenopathy cholelithiasis hypodense lesion left kidney        Assessment & Plan      Brief Patient Summary:  Caterina Mason is a 67 y.o. female who this patient presents hospital just last for a diagnosis of signet ring  cell adenocarcinoma abdominal pain was 10/10 pain medications, she was put initially n.p.o., then later on last night she requested food and fluid was tolerated very well, I spoke with the patient today now she is having abdominal pain again, she just did not have bowel movement she said for 7 days.      atorvastatin, 20 mg, Oral, Daily  budesonide-formoterol, 2 puff, Inhalation, BID  digoxin, 125 mcg, Oral, Daily  dilTIAZem CD, 180 mg, Oral, Daily  insulin glargine, 30 Units, Subcutaneous, Nightly  insulin lispro, 2-9 Units, Subcutaneous, TID With Meals  lactulose, 20 g, Oral, Daily  metoclopramide, 10 mg, Intravenous, Q6H  metoprolol tartrate, 100 mg, Oral, Q12H  [START ON 3/9/2023] pantoprazole, 40 mg, Oral, QAM AC  polyethylene glycol, 17 g, Oral, Daily  sodium chloride, 10 mL, Intravenous, Q12H       lactated ringers, 75 mL/hr, Last Rate: 75 mL/hr (03/08/23 1359)         Active Hospital Problems:  Active Hospital Problems    Diagnosis    • **Continuous severe abdominal pain      Plan:   1. Intractable abdominal pain  2. Pyloric Signet Ring Cell Adenocarcinoma  3. Dysphagia  4. Malnutrition    - CT a/p shows unchanged soft tissue mass in the duodenal bulb and unchanged adrenal gland nodules complicated by extensive abdominal lymphadenopathy in the setting of cholelithiasis without acute cholecystitis along with hyperdense lesions within the right kidney.    - pathology on 3/2/23 positive for signet ring cell type adenocarcinoma of duodenal bulb mass    - NPO    - Zofran PRN    - Dilaudid IV PRN pain    -  Heme/onc consulted    - pt    - palliative consulted    - LR @ 75/hr    - lipase 117, decreased from previous admission, pancreas normal on imaging so doubt pancreatitis    - Needs PET scan as otpt    - SLP consulted    - nutrition consulted    - due to poor appetite, start Reglan 10mg IV q6h    - Admits to occasional dysphagia with solids and liquids, worse with solids    - Patient does not want feeding tube    - GI consulted to consider if intervention warranted 2/2 dysphagia      5. A. Fib    - resume home Cardizem    - resume home Digoxin    - hold Eliquis in case intervention necessary     6. COPD    -stable on room air    - duoneb PRN     7. CAD    - resume home statin and fibrate     8. DM    - Lantus 30u SC QHS (home dose is 40u)    - ISS     9. RUSLAN    - may use home CPAP     10. GERD    - PPI BID     11. HTN    - resume home lopressor    - Hold home lisinopril to prevent nephrotoxicity     12. HLD    - resume home statin and fibrate     13. Anemia    - no overt bleeding, stable    DVT prophylaxis:  Mechanical DVT prophylaxis orders are present.    CODE STATUS:    Code Status (Patient has no pulse and is not breathing): CPR (Attempt to Resuscitate)  Medical Interventions (Patient has pulse or is breathing): Full Support      Disposition:  I expect patient to be discharged 2 days.    This patient has been examined wearing appropriate Personal Protective Equipment and discussed with hospital infection control department. 03/08/23      Electronically signed by Antonio Ling MD, 03/08/23, 16:49 EST.  [facility] Hospitalist Team

## 2023-03-08 NOTE — ED NOTES
Pt reports that she was dx with cancer last week, follows up with oncology on Friday; here today for increased pain and abd swelling

## 2023-03-08 NOTE — CONSULTS
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 female who presented to [facility] on 3/7/2023 with complaints of constant severe abdominal pain accompanied by vomiting. In ER, CT abdomen/pelvis shows sodt tissue mass in duodenal bulb.  She was admitted with intractable abdominal pain related to her duodenal mass.    CT abdomen/pelvis 3/7/2023- unchanged soft tissue mass\" duodenal bulb, unchanged intermediate adrenal gland nodules, extensive abdominal lymphadenopathy with nodular soft tissue deposits within the abdominal mesentery, hyperdense lesions in the right kidney    23  Hematology/Oncology was consulted for established patient of Dr. Landry with adenocarcinoma of duodenal bulb, and normocytic anemia.  Patient was originally admitted with abdominal pain and found to have bilateral adrenal nodules, bilateral hypoattenuating renal lesions, duodenal bulb abnormality, retroperitoneal and upper abdominal adenopathy as well as mildly enlarged lymph nodes in lower thorax on CT abdomen/pelvis 2023.  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 At discharge from recent hospital stay, she was to have PET scan and then follow up appt with Dr Landry on 3/10/23.    She  has a past medical history of Arthritis, Atrial fibrillation (HCC), Bradycardia, Cataract, COPD (chronic obstructive pulmonary disease) (HCC), Diabetes mellitus, type 2 (HCC), DJD (degenerative joint disease), Emphysema of lung (HCC), , GERD (gastroesophageal reflux " disease), History of combined right and left heart catheterization (2018), Hyperlipidemia, Hypertension, Pain, and Sleep apnea.    PCP: Lou Curran MD    History:  Past Medical History:   Diagnosis Date   • Arthritis    • Atrial fibrillation ([facility])    • Bradycardia     Dr. Charles   • Cataract    • COPD (chronic obstructive pulmonary disease) ([facility])     Dr. Rob   • Diabetes mellitus, type 2 ([facility])     sees Dr. Archibald at [place]   • DJD (degenerative joint disease)     possible herniated disc, sees Pain Mgmt in [place]   • Emphysema of lung (HCC)    •     • GERD (gastroesophageal reflux disease)    • History of combined right and left heart catheterization 2018    minimal CAD on heart cath done    • Hyperlipidemia    • Hypertension    • Pain    • Sleep apnea     wears CPAP machine, sees Darron   ,   Past Surgical History:   Procedure Laterality Date   • ABLATION OF DYSRHYTHMIC FOCUS     • CARDIAC CATHETERIZATION      and Angiograph    • CARDIAC ELECTROPHYSIOLOGY PROCEDURE N/A 12/10/2019    Procedure: pvc ablation;  Surgeon: Alfredo Iglesias MD;  Location: [facility] CATH INVASIVE LOCATION;  Service: Cardiovascular   •  SECTION      x 1   • COLON RESECTION     • COLONOSCOPY  2012   • COLONOSCOPY N/A 3/2/2023    Procedure: COLONOSCOPY WITH POLYPECTOMY X 3 AND RANDOM COLON BIOPSIES;  Surgeon: Mary Jo Pichardo MD;  Location: [facility] ENDOSCOPY;  Service: Gastroenterology;  Laterality: N/A;  POST: POLYPS, HEMORRHOIDS   • COLOSTOMY      and reversal in her 20's due to problems with childbirth   • COLOSTOMY CLOSURE     • ENDOSCOPY N/A 3/2/2023    Procedure: ESOPHAGOGASTRODUODENOSCOPY WITH BIOPSY X2 AREAS;  Surgeon: Mary Jo Pichardo MD;  Location: [facility] ENDOSCOPY;  Service: Gastroenterology;  Laterality: N/A;  POST: DUODENAL BULB MASS, HIATAL HERNIA   • OVARIAN CYST SURGERY     • ROTATOR CUFF REPAIR Left 2009    x2    • TOTAL ABDOMINAL HYSTERECTOMY WITH SALPINGO OOPHORECTOMY      ,   Family History   Problem Relation Age of Onset   • Heart disease Mother    • Hypertension Mother    • Stroke Mother    • Seizures Mother    • Heart disease Father    • Hypertension Father    • Lung disease Father    • Stroke Father    • Cancer Sister         unknown   • Hypertension Brother    • Diabetes Brother    • Hyperlipidemia Other    ,   Social History     Tobacco Use   • Smoking status: Former     Packs/day: 1.00     Years: 48.00     Pack years: 48.00     Types: Cigarettes     Quit date: 3/1/2020     Years since quitting: 3.0   • Smokeless tobacco: Never   Vaping Use   • Vaping Use: Never used   Substance Use Topics   • Alcohol use: No   • Drug use: No   ,   Medications Prior to Admission   Medication Sig Dispense Refill Last Dose   • atorvastatin (LIPITOR) 40 MG tablet TAKE 1/2 TABLET BY MOUTH EVERY DAY 45 tablet 1 3/7/2023   • Cholecalciferol 25 MCG (1000 UT) capsule TAKE 1 TABLET BY MOUTH TWICE A DAY *OTC NOT COVERED* 60 capsule 5 3/7/2023   • digoxin (LANOXIN) 125 MCG tablet Take 1 tablet by mouth Daily. 30 tablet 0 3/7/2023 at 1230   • dilTIAZem CD (CARDIZEM CD) 180 MG 24 hr capsule Take 1 capsule by mouth Daily. 30 capsule 0 3/7/2023   • fenofibrate 160 MG tablet TAKE 1 TABLET BY MOUTH EVERY DAY 90 tablet 1 3/7/2023   • furosemide (LASIX) 40 MG tablet TAKE 1 TABLET BY MOUTH EVERY DAY 90 tablet 1 3/7/2023   • HumaLOG KwikPen 100 UNIT/ML solution pen-injector Inject 14 Units under the skin into the appropriate area as directed 3 (Three) Times a Day Before Meals. 15 mL 1 3/7/2023   • lisinopril (PRINIVIL,ZESTRIL) 20 MG tablet TAKE 1 TABLET BY MOUTH EVERY DAY 90 tablet 1 3/7/2023   • magnesium oxide (MAG-OX) 400 MG tablet TAKE 1 TABLET BY MOUTH TWICE A DAY 60 tablet 6 3/7/2023   • metoprolol tartrate (LOPRESSOR) 100 MG tablet Take 1 tablet by mouth Every 12 (Twelve) Hours. 60 tablet 0 3/7/2023   • Omeprazole Magnesium 20.6 (20 Base) MG capsule delayed-release Take 2 capsules by mouth every morning  "before breakfast 60 capsule 1 3/7/2023   • potassium chloride (MICRO-K) 10 MEQ CR capsule TAKE 1 CAPSULE BY MOUTH EVERY DAY 90 capsule 1 3/7/2023   • albuterol sulfate  (90 Base) MCG/ACT inhaler INHALE 2 PUFFS EVERY 4 HOURS AS NEEDED FOR WHEEZING OR SHORTNESS OF AIR 18 g 1    • HYDROcodone-acetaminophen (NORCO) 7.5-325 MG per tablet Take 1 tablet by mouth 3 (Three) Times a Day As Needed for Moderate Pain.      • Insulin Glargine (BASAGLAR KWIKPEN) 100 UNIT/ML injection pen INJECT 40 UNITS UNDER THE SKIN INTO THE APPROPRIATE AREA AS DIRECTED EVERY NIGHT. 45 mL 0    , Scheduled Meds:  atorvastatin, 20 mg, Oral, Daily  budesonide-formoterol, 2 puff, Inhalation, BID  digoxin, 125 mcg, Oral, Daily  dilTIAZem CD, 180 mg, Oral, Daily  fenofibrate, 145 mg, Oral, Daily  insulin glargine, 30 Units, Subcutaneous, Nightly  insulin lispro, 2-9 Units, Subcutaneous, TID With Meals  metoclopramide, 10 mg, Intravenous, Q6H  metoprolol tartrate, 100 mg, Oral, Q12H  pantoprazole, 40 mg, Intravenous, BID  sodium chloride, 10 mL, Intravenous, Q12H    , Continuous Infusions:  lactated ringers, 75 mL/hr, Last Rate: 75 mL/hr (03/08/23 0044)    , PRN Meds:  •  dextrose  •  dextrose  •  glucagon (human recombinant)  •  HYDROmorphone  •  ipratropium-albuterol  •  nitroglycerin  •  ondansetron **OR** ondansetron  •  [COMPLETED] Insert Peripheral IV **AND** sodium chloride  •  sodium chloride  •  sodium chloride   Allergies:  Ibuprofen, Prednisone, Pregabalin, Tramadol, Adhesive tape, Levofloxacin, and Sulfamethoxazole-trimethoprim    Subjective     ROS:  Review of Systems     Objective   Vital Signs:   /70 (BP Location: Left arm, Patient Position: Sitting)   Pulse 104   Temp 98.3 °F (36.8 °C) (Oral)   Resp 20   Ht 162.6 cm (64.02\")   Wt 81.6 kg (180 lb)   SpO2 96%   BMI 30.88 kg/m²     Physical Exam: (performed by MD)  Physical Exam    Results Review:  Lab Results (last 48 hours)     Procedure Component Value Units " Date/Time    POC Glucose Once [[id number]]  (Abnormal) Collected: 03/08/23 0708    Specimen: Blood Updated: 03/08/23 0709     Glucose 197 mg/dL      Comment: Serial Number: [id number]:  [id number]       POC Glucose Once [[id number]]  (Abnormal) Collected: 03/08/23 0008    Specimen: Blood Updated: 03/08/23 0009     Glucose 170 mg/dL      Comment: Serial Number: [id number]:  [id number]       Comprehensive Metabolic Panel [[id number]]  (Abnormal) Collected: 03/07/23 1919    Specimen: Blood Updated: 03/07/23 2106     Glucose 229 mg/dL      BUN 13 mg/dL      Creatinine 0.69 mg/dL      Sodium 141 mmol/L      Potassium 4.2 mmol/L      Chloride 101 mmol/L      CO2 27.0 mmol/L      Calcium 10.1 mg/dL      Total Protein 7.1 g/dL      Albumin 3.7 g/dL      ALT (SGPT) 12 U/L      AST (SGOT) 17 U/L      Alkaline Phosphatase 59 U/L      Total Bilirubin 0.5 mg/dL      Globulin 3.4 gm/dL      A/G Ratio 1.1 g/dL      BUN/Creatinine Ratio 18.8     Anion Gap 13.0 mmol/L      eGFR 95.3 mL/min/1.73     Narrative:      GFR Normal >60  Chronic Kidney Disease <60  Kidney Failure <15      Extra Tubes [[id number]] Collected: 03/07/23 1919    Specimen: Blood, Venous Line Updated: 03/07/23 2030    Narrative:      The following orders were created for panel order Extra Tubes.  Procedure                               Abnormality         Status                     ---------                               -----------         ------                     Gold Top - SST[[id number]]                                   Final result               Light Blue Top[[id number]]                                   Final result                 Please view results for these tests on the individual orders.    Gold Top - SST [[id number]] Collected: 03/07/23 1919    Specimen: Blood Updated: 03/07/23 2030     Extra Tube Hold for add-ons.     Comment: Auto resulted.       Light Blue Top [[id number]] Collected: 03/07/23 1919    Specimen: Blood Updated: 03/07/23 2030      Extra Tube Hold for add-ons.     Comment: Auto resulted       Urinalysis With Microscopic If Indicated (No Culture) - Urine, Clean Catch [[id number]]  (Normal) Collected: 03/07/23 2002    Specimen: Urine, Clean Catch Updated: 03/07/23 2020     Color, UA Yellow     Appearance, UA Clear     pH, UA <=5.0     Specific Gravity, UA 1.016     Glucose, UA Negative     Ketones, UA Negative     Bilirubin, UA Negative     Blood, UA Negative     Protein, UA Negative     Leuk Esterase, UA Negative     Nitrite, UA Negative     Urobilinogen, UA 0.2 E.U./dL    Narrative:      Urine microscopic not indicated.    Digoxin Level [[id number]]  (Normal) Collected: 03/07/23 1919    Specimen: Blood Updated: 03/07/23 1944     Digoxin 0.80 ng/mL     Lipase [[id number]]  (Abnormal) Collected: 03/07/23 1919    Specimen: Blood Updated: 03/07/23 1942     Lipase 117 U/L     CBC & Differential [[id number]]  (Abnormal) Collected: 03/07/23 1919    Specimen: Blood Updated: 03/07/23 1927    Narrative:      The following orders were created for panel order CBC & Differential.  Procedure                               Abnormality         Status                     ---------                               -----------         ------                     CBC Auto Differential[[id number]]        Abnormal            Final result                 Please view results for these tests on the individual orders.    CBC Auto Differential [[id number]]  (Abnormal) Collected: 03/07/23 1919    Specimen: Blood Updated: 03/07/23 1927     WBC 9.30 10*3/mm3      RBC 4.18 10*6/mm3      Hemoglobin 11.9 g/dL      Hematocrit 37.0 %      MCV 88.4 fL      MCH 28.5 pg      MCHC 32.2 g/dL      RDW 14.4 %      RDW-SD 44.2 fl      MPV 7.8 fL      Platelets 249 10*3/mm3      Neutrophil % 72.2 %      Lymphocyte % 14.4 %      Monocyte % 11.2 %      Eosinophil % 1.8 %      Basophil % 0.4 %      Neutrophils, Absolute 6.70 10*3/mm3      Lymphocytes, Absolute 1.30 10*3/mm3      Monocytes, Absolute  1.00 10*3/mm3      Eosinophils, Absolute 0.20 10*3/mm3      Basophils, Absolute 0.00 10*3/mm3      nRBC 0.0 /100 WBC          Imaging Reviewed:   CT Abdomen Pelvis Without Contrast    Result Date: 3/7/2023  1.Unchanged intraluminal soft tissue mass in the duodenal bulb 2.Unchanged small indeterminate adrenal gland nodules. Nonemergent MRI of the adrenal protocol can be performed for further evaluation. 3.Extensive abdominal lymphadenopathy is detected above. Nodular soft tissue deposits within the abdominal mesentery may represent metastatic deposits. 4.Cholelithiasis without acute cholecystitis. 5.Hyperdense lesions within right kidney. Follow-up recommended to exclude malignancy. Electronically Signed: Damián Liu  3/7/2023 9:06 PM EST  Workstation ID: [id number]    CT Chest Without Contrast Diagnostic    Result Date: 3/2/2023  Impression: 1. Interval development of small lymph nodes in the epicardial space on the right measuring up to about 13 mm in greatest diameter. There has also been interval development of a small node in the anterior mediastinum measuring 11 x 7 mm. In the upper abdomen, there is clearly periportal and retroperitoneal lymphadenopathy. The findings raise the question of lymphoma or metastatic disease. No suggestion of a primary lesion in the chest. 2. Coronary atherosclerotic calcifications. 3. Ascites Electronically Signed: Ronak Grubbs  3/2/2023 2:48 PM EST  Workstation ID: [id number]    MRI Abdomen With & Without Contrast    Result Date: 3/1/2023  Impression: 1. Abnormal hypoenhancing mass measuring 3.5 x 3.1 cm within the duodenal bulb. This appears partially intramural and partially endoluminal. Differential considerations would include gastric or duodenal malignancy, leiomyoma, or less likely GIST. Recommend further evaluation with endoscopy. 2. Enlarged celiac axis, portacaval, and retroperitoneal lymph nodes. Further management would be dependent on pathologic diagnosis of the duodenal  mass. 3. Hemorrhagic or proteinaceous cyst within the posterior mid left kidney and exophytic arising from the posterior inferior aspect of the right kidney. No evidence of enhancing renal mass. Additional simple appearing renal cysts are also present. 4. Limited assessment of the small bilateral adrenal nodules. There is no definite loss of signal to suggest clear lipid rich adrenal adenomas. These could be followed with serial imaging. 5. Cholelithiasis without MR findings of acute cholecystitis. No evidence of choledocholithiasis. 6. Small volume perihepatic ascites. Electronically Signed: Milton Carter  3/1/2023 6:41 PM EST  Workstation ID: [id number]    US Abdomen Limited    Result Date: 3/1/2023  Impression: 1. Cholelithiasis, without sonographic evidence of cholecystitis. 2. Common bile duct measures 8 mm, just slightly above upper limits normal for the patient's stated age. No intrahepatic biliary ductal dilation is seen. No obstructing biliary abnormality is identified. 3. Hepatomegaly with features of diffuse hepatic steatosis. 4. The visualized is the pancreas have a normal sonographic appearance. 5. The right renal cyst seen on the prior CT are not visible on today's ultrasound. Electronically Signed: Emeli Corona  3/1/2023 10:45 AM EST  Workstation ID: [id number]           Assessment & Plan   ASSESSMENT  A 67-year-old female with recent Hx of duodenal adenocarcinoma revealed per EGD with biopsy now presenting with uncontrolled abdominal pain, nausea, vomiting and not eating with complaints of dysphagia.  A PET scan was ordered and post hospital follow up appt with Dr Landry scheduled 3/10/23.  The patient's CT abdomen/pelvis this admission shows no changes, GI saw patient and recommends MiraLAX to prevent constipation with pain meds.  A swallowing evaluation revealed no abnormalities and recommendation for regular diet with thin liquids.  Physical therapy saw patient as well and recommends  physical therapy plan for strengthening, mobility, and exercise.  The patient has felt hungry and had success in eating after starting Reglan started.  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    I first met Ms. Mason a few days before this consultation. She had been admitted to the hospital with diffuse abdominal pain. Imaging suggested a metastatic process arising in the proximal duodenum. An upper gastrointestinal endoscopy confirmed a tumor in at the pylorus and the duodenal bulb. Biopsies reported a poorly differentiated adenocarcinoma with signet ring features. She ws discharged to continue investigations as outpatient and would have had a PET scan a few days ago. However, she had persisted with inability to eat and persistent abdominal pain in the epigastrium, radiating to both upper quadrants. She was readmitted. She reported today that she was still very uncomfortable and having to take analgesic medication continuously. She had not eaten anything and was still having nausea and vomiting. She had not had hematemesis. She denied more dyspnea than usual. No chest pain. On exam she appeared chronically ill and older than the  stated age. No palpable cervical adenopathy. The respirations not labored. The lungs diminished bilaterally. The heart regular. The abdomen protuberant and soft, but tender to palpation. Bowel sounds present. No edema. A scan done at the time of the admission again revealed the intraluminal tumor that did not seem to have hanged. The extensive retroperitoneal adenopathy was again demonstrated. The laboratory exams with mild normocytic anemia. Also persistent and severe hyperglycemia. Discussed with her and her niece. She is not a surgical candidate, given the evidence of metastatic adenopathy, but a PET scan is necessary to confirm this. She needs a port. Pain management will be important. Interventional pain management might prove beneficial but for now continue with systemic analgesics. Will calculate dose required based on today's need for morphine. She needs deep vein thrombosis prophylaxis. Consider insertion of a port before discharge. I have requested next generation sequencing on tumor tissue. Discussed with her and her niece.   I discussed with Ms. Guanakito TIDWELL and concur with her note. I formulated the analysis and the plan.     Teofilo Landry MD on 3/8/2023 at 17:43

## 2023-03-09 DIAGNOSIS — C17.9 ADENOCARCINOMA OF SMALL BOWEL: Primary | ICD-10-CM

## 2023-03-09 LAB
ANION GAP SERPL CALCULATED.3IONS-SCNC: 10 MMOL/L (ref 5–15)
BASOPHILS # BLD AUTO: 0 10*3/MM3 (ref 0–0.2)
BASOPHILS NFR BLD AUTO: 0.4 % (ref 0–1.5)
BUN SERPL-MCNC: 12 MG/DL (ref 8–23)
BUN/CREAT SERPL: 18.8 (ref 7–25)
CALCIUM SPEC-SCNC: 10 MG/DL (ref 8.6–10.5)
CHLORIDE SERPL-SCNC: 98 MMOL/L (ref 98–107)
CO2 SERPL-SCNC: 29 MMOL/L (ref 22–29)
CREAT SERPL-MCNC: 0.64 MG/DL (ref 0.57–1)
DEPRECATED RDW RBC AUTO: 45.1 FL (ref 37–54)
EGFRCR SERPLBLD CKD-EPI 2021: 97 ML/MIN/1.73
EOSINOPHIL # BLD AUTO: 0.2 10*3/MM3 (ref 0–0.4)
EOSINOPHIL NFR BLD AUTO: 1.7 % (ref 0.3–6.2)
ERYTHROCYTE [DISTWIDTH] IN BLOOD BY AUTOMATED COUNT: 14.5 % (ref 12.3–15.4)
GLUCOSE BLDC GLUCOMTR-MCNC: 220 MG/DL (ref 70–105)
GLUCOSE BLDC GLUCOMTR-MCNC: 290 MG/DL (ref 70–105)
GLUCOSE BLDC GLUCOMTR-MCNC: 331 MG/DL (ref 70–105)
GLUCOSE SERPL-MCNC: 314 MG/DL (ref 65–99)
HCT VFR BLD AUTO: 36.7 % (ref 34–46.6)
HGB BLD-MCNC: 11.9 G/DL (ref 12–15.9)
LYMPHOCYTES # BLD AUTO: 1.3 10*3/MM3 (ref 0.7–3.1)
LYMPHOCYTES NFR BLD AUTO: 13.3 % (ref 19.6–45.3)
MCH RBC QN AUTO: 28.8 PG (ref 26.6–33)
MCHC RBC AUTO-ENTMCNC: 32.4 G/DL (ref 31.5–35.7)
MCV RBC AUTO: 88.7 FL (ref 79–97)
MONOCYTES # BLD AUTO: 1.2 10*3/MM3 (ref 0.1–0.9)
MONOCYTES NFR BLD AUTO: 12 % (ref 5–12)
NEUTROPHILS NFR BLD AUTO: 7.3 10*3/MM3 (ref 1.7–7)
NEUTROPHILS NFR BLD AUTO: 72.6 % (ref 42.7–76)
NRBC BLD AUTO-RTO: 0.1 /100 WBC (ref 0–0.2)
PLATELET # BLD AUTO: 209 10*3/MM3 (ref 140–450)
PMV BLD AUTO: 7.9 FL (ref 6–12)
POTASSIUM SERPL-SCNC: 4.5 MMOL/L (ref 3.5–5.2)
RBC # BLD AUTO: 4.13 10*6/MM3 (ref 3.77–5.28)
SODIUM SERPL-SCNC: 137 MMOL/L (ref 136–145)
WBC NRBC COR # BLD: 10 10*3/MM3 (ref 3.4–10.8)

## 2023-03-09 PROCEDURE — 63710000001 INSULIN LISPRO (HUMAN) PER 5 UNITS: Performed by: INTERNAL MEDICINE

## 2023-03-09 PROCEDURE — 96361 HYDRATE IV INFUSION ADD-ON: CPT

## 2023-03-09 PROCEDURE — 63710000001 INSULIN GLARGINE PER 5 UNITS: Performed by: STUDENT IN AN ORGANIZED HEALTH CARE EDUCATION/TRAINING PROGRAM

## 2023-03-09 PROCEDURE — 80048 BASIC METABOLIC PNL TOTAL CA: CPT | Performed by: STUDENT IN AN ORGANIZED HEALTH CARE EDUCATION/TRAINING PROGRAM

## 2023-03-09 PROCEDURE — G0378 HOSPITAL OBSERVATION PER HR: HCPCS

## 2023-03-09 PROCEDURE — 25010000002 METOCLOPRAMIDE PER 10 MG: Performed by: STUDENT IN AN ORGANIZED HEALTH CARE EDUCATION/TRAINING PROGRAM

## 2023-03-09 PROCEDURE — 63710000001 INSULIN LISPRO (HUMAN) PER 5 UNITS: Performed by: STUDENT IN AN ORGANIZED HEALTH CARE EDUCATION/TRAINING PROGRAM

## 2023-03-09 PROCEDURE — 85025 COMPLETE CBC W/AUTO DIFF WBC: CPT | Performed by: INTERNAL MEDICINE

## 2023-03-09 PROCEDURE — 82962 GLUCOSE BLOOD TEST: CPT

## 2023-03-09 PROCEDURE — 96376 TX/PRO/DX INJ SAME DRUG ADON: CPT

## 2023-03-09 PROCEDURE — 97116 GAIT TRAINING THERAPY: CPT

## 2023-03-09 PROCEDURE — 25010000002 HYDROMORPHONE 1 MG/ML SOLUTION: Performed by: STUDENT IN AN ORGANIZED HEALTH CARE EDUCATION/TRAINING PROGRAM

## 2023-03-09 RX ORDER — HYDROCODONE BITARTRATE AND ACETAMINOPHEN 7.5; 325 MG/1; MG/1
1 TABLET ORAL EVERY 6 HOURS PRN
Status: DISCONTINUED | OUTPATIENT
Start: 2023-03-09 | End: 2023-03-10 | Stop reason: HOSPADM

## 2023-03-09 RX ORDER — INSULIN LISPRO 100 [IU]/ML
7 INJECTION, SOLUTION INTRAVENOUS; SUBCUTANEOUS
Status: DISCONTINUED | OUTPATIENT
Start: 2023-03-09 | End: 2023-03-10 | Stop reason: HOSPADM

## 2023-03-09 RX ORDER — AMOXICILLIN 250 MG
1 CAPSULE ORAL 2 TIMES DAILY
Status: DISCONTINUED | OUTPATIENT
Start: 2023-03-09 | End: 2023-03-10 | Stop reason: HOSPADM

## 2023-03-09 RX ORDER — OXYCODONE HCL 10 MG/1
10 TABLET, FILM COATED, EXTENDED RELEASE ORAL EVERY 12 HOURS SCHEDULED
Status: DISCONTINUED | OUTPATIENT
Start: 2023-03-09 | End: 2023-03-10 | Stop reason: HOSPADM

## 2023-03-09 RX ORDER — INSULIN LISPRO 100 [IU]/ML
3-14 INJECTION, SOLUTION INTRAVENOUS; SUBCUTANEOUS
Status: DISCONTINUED | OUTPATIENT
Start: 2023-03-09 | End: 2023-03-10 | Stop reason: HOSPADM

## 2023-03-09 RX ADMIN — INSULIN LISPRO 7 UNITS: 100 INJECTION, SOLUTION INTRAVENOUS; SUBCUTANEOUS at 12:47

## 2023-03-09 RX ADMIN — HYDROCODONE BITARTRATE AND ACETAMINOPHEN 1 TABLET: 7.5; 325 TABLET ORAL at 17:33

## 2023-03-09 RX ADMIN — METHYLNALTREXONE BROMIDE 150 MG: 150 TABLET ORAL at 17:33

## 2023-03-09 RX ADMIN — OXYCODONE HYDROCHLORIDE 10 MG: 10 TABLET, FILM COATED, EXTENDED RELEASE ORAL at 21:45

## 2023-03-09 RX ADMIN — HYDROMORPHONE HYDROCHLORIDE 0.5 MG: 1 INJECTION, SOLUTION INTRAMUSCULAR; INTRAVENOUS; SUBCUTANEOUS at 19:46

## 2023-03-09 RX ADMIN — SENNOSIDES AND DOCUSATE SODIUM 1 TABLET: 50; 8.6 TABLET ORAL at 12:47

## 2023-03-09 RX ADMIN — INSULIN LISPRO 4 UNITS: 100 INJECTION, SOLUTION INTRAVENOUS; SUBCUTANEOUS at 08:53

## 2023-03-09 RX ADMIN — Medication 10 ML: at 08:53

## 2023-03-09 RX ADMIN — APIXABAN 5 MG: 5 TABLET, FILM COATED ORAL at 21:46

## 2023-03-09 RX ADMIN — INSULIN GLARGINE 30 UNITS: 100 INJECTION, SOLUTION SUBCUTANEOUS at 21:46

## 2023-03-09 RX ADMIN — HYDROMORPHONE HYDROCHLORIDE 0.5 MG: 1 INJECTION, SOLUTION INTRAMUSCULAR; INTRAVENOUS; SUBCUTANEOUS at 01:24

## 2023-03-09 RX ADMIN — DILTIAZEM HYDROCHLORIDE 180 MG: 180 CAPSULE, COATED, EXTENDED RELEASE ORAL at 08:52

## 2023-03-09 RX ADMIN — SODIUM CHLORIDE, POTASSIUM CHLORIDE, SODIUM LACTATE AND CALCIUM CHLORIDE 75 ML/HR: 600; 310; 30; 20 INJECTION, SOLUTION INTRAVENOUS at 01:24

## 2023-03-09 RX ADMIN — METOCLOPRAMIDE HYDROCHLORIDE 10 MG: 5 INJECTION INTRAMUSCULAR; INTRAVENOUS at 17:33

## 2023-03-09 RX ADMIN — HYDROMORPHONE HYDROCHLORIDE 0.5 MG: 1 INJECTION, SOLUTION INTRAMUSCULAR; INTRAVENOUS; SUBCUTANEOUS at 05:35

## 2023-03-09 RX ADMIN — METOPROLOL TARTRATE 100 MG: 50 TABLET, FILM COATED ORAL at 08:52

## 2023-03-09 RX ADMIN — INSULIN LISPRO 7 UNITS: 100 INJECTION, SOLUTION INTRAVENOUS; SUBCUTANEOUS at 17:32

## 2023-03-09 RX ADMIN — HYDROCODONE BITARTRATE AND ACETAMINOPHEN 1 TABLET: 7.5; 325 TABLET ORAL at 10:26

## 2023-03-09 RX ADMIN — LACTULOSE 20 G: 20 SOLUTION ORAL at 08:53

## 2023-03-09 RX ADMIN — SENNOSIDES AND DOCUSATE SODIUM 1 TABLET: 50; 8.6 TABLET ORAL at 21:45

## 2023-03-09 RX ADMIN — INSULIN LISPRO 10 UNITS: 100 INJECTION, SOLUTION INTRAVENOUS; SUBCUTANEOUS at 17:33

## 2023-03-09 RX ADMIN — ATORVASTATIN CALCIUM 20 MG: 20 TABLET, FILM COATED ORAL at 08:52

## 2023-03-09 RX ADMIN — HYDROMORPHONE HYDROCHLORIDE 0.5 MG: 1 INJECTION, SOLUTION INTRAMUSCULAR; INTRAVENOUS; SUBCUTANEOUS at 12:47

## 2023-03-09 RX ADMIN — DIGOXIN 125 MCG: 125 TABLET ORAL at 12:47

## 2023-03-09 RX ADMIN — METOCLOPRAMIDE HYDROCHLORIDE 10 MG: 5 INJECTION INTRAMUSCULAR; INTRAVENOUS at 04:50

## 2023-03-09 RX ADMIN — METOCLOPRAMIDE HYDROCHLORIDE 10 MG: 5 INJECTION INTRAMUSCULAR; INTRAVENOUS at 12:47

## 2023-03-09 RX ADMIN — PANTOPRAZOLE SODIUM 40 MG: 40 TABLET, DELAYED RELEASE ORAL at 08:52

## 2023-03-09 RX ADMIN — APIXABAN 5 MG: 5 TABLET, FILM COATED ORAL at 12:47

## 2023-03-09 RX ADMIN — METOCLOPRAMIDE HYDROCHLORIDE 10 MG: 5 INJECTION INTRAMUSCULAR; INTRAVENOUS at 22:49

## 2023-03-09 RX ADMIN — METOPROLOL TARTRATE 100 MG: 50 TABLET, FILM COATED ORAL at 21:45

## 2023-03-09 RX ADMIN — POLYETHYLENE GLYCOL 3350 17 G: 17 POWDER, FOR SOLUTION ORAL at 08:52

## 2023-03-09 NOTE — CASE MANAGEMENT/SOCIAL WORK
Continued Stay Note  MARIEL Roach     Patient Name: Caterina Mason  MRN: [id number]  Today's Date: 3/9/2023    Admit Date: 3/7/2023    Plan: Anticipate routine home. Current home CPAP w/ O2 through [employer].   Discharge Plan     Row Name 03/09/23 1435       Plan    Plan Comments Pt with diffuse CA pain. Blood glucose elevated, resume Eliquis. PET scan as outpt.            Chart review only.           Expected Discharge Date and Time     Expected Discharge Date Expected Discharge Time    Mar 9, 2023             Denisa Sun RN

## 2023-03-09 NOTE — CONSULTS
Nutrition Services    Patient Name: Caterina Mason  YOB: 1955  MRN: [id number]  Admission date: 3/7/2023    Comment:    Starting Boost Glucose Control BID (Provides 380 kcals, 32 g protein if consumed)     PPE Documentation        PPE Worn By Provider mask, gloves and eye protection   PPE Worn By Patient  None      CLINICAL NUTRITION ASSESSMENT      Reason for Assessment 3/9: Chronic Poor Intake      H&P      Past Medical History:   Diagnosis Date   • Arthritis    • Atrial fibrillation (HCC)    • Bradycardia     Dr. Charles   • Cataract    • COPD (chronic obstructive pulmonary disease) ([facility])     Dr. Rob   • Diabetes mellitus, type 2 (HCC)     sees Dr. Archibald at [place]   • DJD (degenerative joint disease)     possible herniated disc, sees Pain Mgmt in [place]   • Emphysema of lung (HCC)    •     • GERD (gastroesophageal reflux disease)    • History of combined right and left heart catheterization 2018    minimal CAD on heart cath done    • Hyperlipidemia    • Hypertension    • Pain    • Sleep apnea     wears CPAP machine, sees Darron       Past Surgical History:   Procedure Laterality Date   • ABLATION OF DYSRHYTHMIC FOCUS     • CARDIAC CATHETERIZATION      and Angiograph    • CARDIAC ELECTROPHYSIOLOGY PROCEDURE N/A 12/10/2019    Procedure: pvc ablation;  Surgeon: Alfredo Iglesias MD;  Location: [facility] CATH INVASIVE LOCATION;  Service: Cardiovascular   •  SECTION      x 1   • COLON RESECTION     • COLONOSCOPY  2012   • COLONOSCOPY N/A 3/2/2023    Procedure: COLONOSCOPY WITH POLYPECTOMY X 3 AND RANDOM COLON BIOPSIES;  Surgeon: Mary Jo Pichardo MD;  Location: [facility] ENDOSCOPY;  Service: Gastroenterology;  Laterality: N/A;  POST: POLYPS, HEMORRHOIDS   • COLOSTOMY      and reversal in her 20's due to problems with childbirth   • COLOSTOMY CLOSURE     • ENDOSCOPY N/A 3/2/2023    Procedure: ESOPHAGOGASTRODUODENOSCOPY WITH BIOPSY X2 AREAS;  Surgeon: Mary Jo Pichardo  "MD [place];  Location: [facility] ENDOSCOPY;  Service: Gastroenterology;  Laterality: N/A;  POST: DUODENAL BULB MASS, HIATAL HERNIA   • OVARIAN CYST SURGERY     • ROTATOR CUFF REPAIR Left 2009    x2    • TOTAL ABDOMINAL HYSTERECTOMY WITH SALPINGO OOPHORECTOMY  2005        Current Problems   Intractable abdominal Pain  Pyloric Signet Ring Cell Adenocarcinoma   Dysphagia   Malnutrition   -GI consulted->signed off   -ST consulted->recommending regular diet   -Oncology consulted->EGD confirmed tumor in pylorus/duodenal bulb, not surgical candidate r/t metastasis and recommends pain management.   -Palliative consulted    Afib    COPD    CAD    DM    RUSLAN    GERD     HTN    HLD    Anemia        Encounter Information        Trending Narrative     3/9: Visited patient this afternoon with patient's daughter present. Patient did not elaborate on recent PO/appetite history, however daughter reports patients PO intakes have declined since mid January of this year. Most recently consuming GI friendly foods such as crackers, applesauce, soup and tuna. Patient does live at home with her , but her mobility prevents her from preparing meals for herself. Patient recently c/o of N/V/D and dry heaves, though this has improved since admission. Now c/o constipation. NFPE completed and not consistent with nutrition diagnosis of malnutrition at this time using AND/ASPEN criteria. Discussed the importance of protein as it relates to her poor PO/cancer. Patient is willing to try ONS. Diet education materials provided about foods that are easy on the stomach. Alerted daughter to dietitian services at oncology center.      Anthropometrics        Current Height, Weight Height: 162.6 cm (64.02\")  Weight: 104 kg (228 lb 9.6 oz) (03/09/23 0903)       Ideal Body Weight (IBW) 120 lbs (54.5 kg)    Usual Body Weight (UBW) 220-230 lbs        Trending Weight Hx     This admission: 3/9: Wt fluctuated greatly since admission (likely entry error), 225-228 " seems like baseline.                PTA: 220-230 lbs       Wt Readings from Last 30 Encounters:   03/09/23 0903 104 kg (228 lb 9.6 oz)   03/08/23 0300 81.6 kg (180 lb)   03/07/23 2332 98.4 kg (216 lb 14.9 oz)   03/07/23 1856 102 kg (225 lb 5 oz)   02/28/23 1257 104 kg (229 lb 4.5 oz)   02/24/23 1146 105 kg (230 lb 12.8 oz)   02/21/23 1907 105 kg (232 lb 9.4 oz)   02/07/23 1313 106 kg (234 lb)   01/27/23 1337 109 kg (240 lb)   01/17/23 0834 108 kg (239 lb)   11/30/22 1447 127 kg (280 lb)   11/29/22 2028 127 kg (280 lb)   10/26/22 1135 107 kg (234 lb 12.8 oz)   10/21/22 0936 108 kg (238 lb 3.2 oz)   09/19/22 1259 107 kg (236 lb)   07/25/22 1504 108 kg (238 lb)   06/27/22 1553 107 kg (236 lb)   04/22/22 1045 106 kg (234 lb)   04/11/22 1059 106 kg (234 lb)   11/23/21 1347 103 kg (228 lb)   11/05/21 1324 103 kg (227 lb)   10/07/21 0612 105 kg (231 lb 7.7 oz)   10/05/21 1637 103 kg (228 lb)   10/05/21 0452 104 kg (228 lb 2.8 oz)   09/24/21 0810 103 kg (228 lb)   08/04/21 1025 103 kg (228 lb)   05/27/21 1043 101 kg (223 lb)   05/26/21 0823 102 kg (224 lb)   04/30/21 1321 103 kg (228 lb)   02/12/21 0845 103 kg (226 lb)   02/05/21 0904 103 kg (226 lb)   11/30/20 0922 102 kg (224 lb)   09/14/20 1048 99.8 kg (220 lb)   08/28/20 1414 98.9 kg (218 lb)   08/07/20 0927 94.8 kg (209 lb)      BMI kg/m2 Body mass index is 39.22 kg/m².       Labs        Pertinent Labs    Results from last 7 days   Lab Units 03/09/23  0907 03/08/23 0933 03/07/23 1919 03/03/23 0634   SODIUM mmol/L 137 138 141 143   POTASSIUM mmol/L 4.5 3.9 4.2 4.4   CHLORIDE mmol/L 98 99 101 106   CO2 mmol/L 29.0 28.0 27.0 28.0   BUN mg/dL 12 13 13 9   CREATININE mg/dL 0.64 0.70 0.69 0.69   CALCIUM mg/dL 10.0 10.0 10.1 9.0   BILIRUBIN mg/dL  --   --  0.5 0.5   ALK PHOS U/L  --   --  59 51   ALT (SGPT) U/L  --   --  12 15   AST (SGOT) U/L  --   --  17 26   GLUCOSE mg/dL 314* 243* 229* 213*     Results from last 7 days   Lab Units 03/09/23 0907 03/07/23 1919  03/03/23  0634   MAGNESIUM mg/dL  --   --  1.7   PHOSPHORUS mg/dL  --   --  2.7   HEMOGLOBIN g/dL 11.9*   < > 11.1*   HEMATOCRIT % 36.7   < > 34.0    < > = values in this interval not displayed.     COVID19   Date Value Ref Range Status   11/29/2022 Detected (C) Not Detected - Ref. Range Final     Lab Results   Component Value Date    HGBA1C 11.8 (H) 12/01/2022        Medications    Scheduled Medications apixaban, 5 mg, Oral, Q12H  atorvastatin, 20 mg, Oral, Daily  budesonide-formoterol, 2 puff, Inhalation, BID  digoxin, 125 mcg, Oral, Daily  dilTIAZem CD, 180 mg, Oral, Daily  insulin glargine, 30 Units, Subcutaneous, Nightly  insulin lispro, 3-14 Units, Subcutaneous, TID With Meals  insulin lispro, 7 Units, Subcutaneous, TID With Meals  lactulose, 20 g, Oral, Daily  metoclopramide, 10 mg, Intravenous, Q6H  metoprolol tartrate, 100 mg, Oral, Q12H  pantoprazole, 40 mg, Oral, QAM AC  polyethylene glycol, 17 g, Oral, Daily  senna-docusate sodium, 1 tablet, Oral, BID  sodium chloride, 10 mL, Intravenous, Q12H        Infusions lactated ringers, 75 mL/hr, Last Rate: 75 mL/hr (03/09/23 0854)        PRN Medications •  dextrose  •  dextrose  •  fleet enema  •  glucagon (human recombinant)  •  HYDROcodone-acetaminophen  •  HYDROmorphone  •  ipratropium-albuterol  •  nitroglycerin  •  ondansetron **OR** ondansetron  •  [COMPLETED] Insert Peripheral IV **AND** sodium chloride  •  sodium chloride  •  sodium chloride     Physical Findings        Trending Physical   Appearance, NFPE 3/9: NFPE completed and not consistent with nutrition diagnosis of malnutrition at this time using AND/ASPEN criteria      --  Edema  None documented      Bowel Function + BM today 3/9      Tubes None      Chewing/Swallowing Hx of dysphagia Noted  ST evaluated and recommending regular diet      Skin Intact      --  Current Nutrition Orders & Evaluation of Intake       Oral Nutrition     Food Allergies NKFA    Current PO Diet Diet: Gastrointestinal  Diets; Fiber-Restricted, Low Irritant; Texture: Soft to Chew (NDD 3); Soft to Chew: Chopped Meat; Fluid Consistency: Thin (IDDSI 0)   Supplement None    PO Evaluation     Trending % PO Intake 3/9: 0-25% of meals    --  Nutritional Risk Screening        NRS-2002 Score          Nutrition Diagnosis         Nutrition Dx Problem 1 Inadequate oral intakes r/t GI distress AEB PO Intakes at 0-25%       Nutrition Dx Problem 2        Intervention Goal         Intervention Goal(s) PO intakes >50%    Acceptance of ONS      Nutrition Intervention        RD Action Started ONS    Diet education provided      Nutrition Prescription          Diet Prescription Soft to chew with chopped meats, low irritant, low fiber    Supplement Prescription Boost Glucose Control BID (Provides 380 kcals, 32 g protein if consumed) -chocolate    --  Monitor/Evaluation        Monitor I&O, PO intake, Supplement intake, Pertinent labs, Weight, Skin status, GI status, Symptoms       Electronically signed by:  Harriet Lopez RD  03/09/23 13:58 EST

## 2023-03-09 NOTE — THERAPY TREATMENT NOTE
Subjective: Pt agreeable to therapeutic plan of care.  Pt very tearful upon entry to her room.     Objective:     Bed mobility - N/A or Not attempted.   Pt already up in the chair  Transfers - CGA and with rolling walker  Ambulation - 40 feet CGA and with rolling walker    Vitals: WNL    Pain: 6 VAS   Location: right anterior flank pain.  (pt reported by her ribs)  Intervention for pain: Repositioned, RN notified and Therapeutic Presence    Education: Provided education on the importance of mobility in the acute care setting and Gait Training    Assessment: Caterina Mason presents with functional mobility impairments which indicate the need for skilled intervention. Tolerating session today without incident. Pt was having a lot of discomfort in her abdomen which limited treatment session. Will continue to follow and progress as tolerated.     Plan/Recommendations:   Low Intensity Therapy recommended post-acute care - This is recommended as therapy feels this patient would require 2-3 visits per week. HH would be recommended.  Pt requires no DME at discharge.     Pt desires Home with family assist and and Home Health at discharge. Pt cooperative; agreeable to therapeutic recommendations and plan of care.     Post-Tx Position: Up in Chair, Alarms activated and Call light and personal items within reach   PPE: gloves and surgical mask

## 2023-03-09 NOTE — PROGRESS NOTES
[facility] Medicine Services   Daily Progress Note    Patient Name: Caterina Mason  : 1955  MRN: [id number]  Primary Care Physician:  Lou Curran MD  Date of admission: 3/7/2023  Date and Time of Service: 3/9/2023      Subjective      Chief Complaint: Abdominal pain    Patient Reports patient has constipation for more than 7 days now, she has abdominal pain which is diffuse and crampy, no chest pain shortness of breath nausea vomiting.    3/9/2023: This patient continues to report diffuse abdominal pain, which I think is cancer related pain, however the constipation for more than a week now limited at home as narcotics continues, I started Relistor today, I discussed the situation with the patient I explained to her that staging work-up will be replaced as outpatient as indicated.  Her oncologist,    ROS   all review of systems negative except as above      Objective      Vitals:   Temp:  [97.2 °F (36.2 °C)-98.7 °F (37.1 °C)] 97.9 °F (36.6 °C)  Heart Rate:  [] 92  Resp:  [18-24] 18  BP: ()/(64-80) 139/67  Flow (L/min):  [2-3] 3    Physical Exam  Constitutional:       Appearance: Normal appearance.   HENT:      Head: Normocephalic.      Right Ear: Tympanic membrane normal.      Nose: Nose normal.      Mouth/Throat:      Mouth: Mucous membranes are moist.   Eyes:      Pupils: Pupils are equal, round, and reactive to light.   Cardiovascular:      Rate and Rhythm: Normal rate.      Pulses: Normal pulses.   Pulmonary:      Effort: Pulmonary effort is normal.   Abdominal:      General: Abdomen is flat.   Musculoskeletal:         General: Normal range of motion.   Skin:     General: Skin is warm.      Capillary Refill: Capillary refill takes less than 2 seconds.   Neurological:      General: No focal deficit present.      Mental Status: She is alert.   Psychiatric:         Mood and Affect: Mood normal.            Result Review    Result Review:  I have personally reviewed the  results from the time of this admission to 3/9/2023 16:56 EST and agree with these findings:  [x]  Laboratory  []  Microbiology  []  Radiology  []  EKG/Telemetry   []  Cardiology/Vascular   []  Pathology  []  Old records  []  Other:  Most notable findings include: Glucose 243 sodium 138 potassium 3.9 chloride 99 BUN 11 creatinine 0.7.  White blood cell count 9.10 hemoglobin 11.8 hematocrit 36.4 platelets 243.  CT of abdomen showed unchanged intraluminal soft tissue mass in the duodenal bulb with unchanged abdominal nodule and extensive abdominal lymphadenopathy cholelithiasis hypodense lesion left kidney        Assessment & Plan      Brief Patient Summary:  Caterina Mason is a 67 y.o. female who this patient presents hospital just last for a diagnosis of signet ring  cell adenocarcinoma abdominal pain was 10/10 pain medications, she was put initially n.p.o., then later on last night she requested food and fluid was tolerated very well, I spoke with the patient today now she is having abdominal pain again, she just did not have bowel movement she said for 7 days.      apixaban, 5 mg, Oral, Q12H  atorvastatin, 20 mg, Oral, Daily  budesonide-formoterol, 2 puff, Inhalation, BID  digoxin, 125 mcg, Oral, Daily  dilTIAZem CD, 180 mg, Oral, Daily  insulin glargine, 30 Units, Subcutaneous, Nightly  insulin lispro, 3-14 Units, Subcutaneous, TID With Meals  insulin lispro, 7 Units, Subcutaneous, TID With Meals  lactulose, 20 g, Oral, Daily  Methylnaltrexone Bromide, 150 mg, Oral, Daily  metoclopramide, 10 mg, Intravenous, Q6H  metoprolol tartrate, 100 mg, Oral, Q12H  oxyCODONE, 10 mg, Oral, Q12H  pantoprazole, 40 mg, Oral, QAM AC  polyethylene glycol, 17 g, Oral, Daily  senna-docusate sodium, 1 tablet, Oral, BID  sodium chloride, 10 mL, Intravenous, Q12H       lactated ringers, 75 mL/hr, Last Rate: 75 mL/hr (03/09/23 0854)         Active Hospital Problems:  Active Hospital Problems    Diagnosis    • **Continuous severe  abdominal pain      Plan:   1. Intractable abdominal pain  2. Pyloric Signet Ring Cell Adenocarcinoma  3. Dysphagia  4. Malnutrition    - CT a/p shows unchanged soft tissue mass in the duodenal bulb and unchanged adrenal gland nodules complicated by extensive abdominal lymphadenopathy in the setting of cholelithiasis without acute cholecystitis along with hyperdense lesions within the right kidney.    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 A. Fib    - resume home Cardizem    - resume home Digoxin    - hold Eliquis in case intervention necessary     6. COPD    -stable on room air    - duoneb PRN     7. CAD    - resume home statin and fibrate     8. DM    - Lantus 30u SC QHS (home dose is 40u)    - ISS     9. RUSLAN    - may use home CPAP     10. GERD    - PPI BID     11. HTN    - resume home lopressor    - Hold home lisinopril to prevent nephrotoxicity     12. HLD    - resume home statin and fibrate     13. Anemia    - no overt bleeding, stable    DVT prophylaxis:  Medical and mechanical DVT prophylaxis orders are present.    CODE STATUS:    Code Status (Patient has no pulse and is not breathing): CPR (Attempt to Resuscitate)  Medical Interventions (Patient has pulse or is breathing): Full Support      Disposition:  I expect patient to be discharged 2 days.    This patient has been examined wearing appropriate Personal Protective  Equipment and discussed with hospital infection control department. 03/09/23      Electronically signed by Antonio Ling MD, 03/09/23, 16:56 EST.  [facility] Hospitalist Team

## 2023-03-09 NOTE — PLAN OF CARE
Goal Outcome Evaluation:     Bed mobility - N/A or Not attempted.   Pt already up in the chair  Transfers - CGA and with rolling walker  Ambulation - 40 feet CGA and with rolling walker    Low Intensity Therapy recommended post-acute care - This is recommended as therapy feels this patient would require 2-3 visits per week. HH would be recommended.  Pt requires no DME at discharge.     Pt desires Home with family assist and and Home Health at discharge. Pt cooperative; agreeable to therapeutic recommendations and plan of care.

## 2023-03-09 NOTE — PROGRESS NOTES
"      Hematology/Oncology Inpatient Progress Note    PATIENT NAME: Caterina Mason  : 1955  MRN: [id number]    CHIEF COMPLAINT:  Abdominal Pain    HISTORY OF PRESENT ILLNESS:    Caterina Mason is a 67 y.o. female who presented to [facility] on 3/7/2023 with complaints of constant severe abdominal pain accompanied by vomiting. In ER, CT abdomen/pelvis shows sodt tissue mass in duodenal bulb.  She was admitted with intractable abdominal pain related to her duodenal mass.     CT abdomen/pelvis 3/7/2023- unchanged soft tissue mass\" duodenal bulb, unchanged intermediate adrenal gland nodules, extensive abdominal lymphadenopathy with nodular soft tissue deposits within the abdominal mesentery, hyperdense lesions in the right kidney     23  Hematology/Oncology was consulted for established patient of Dr. Landry with adenocarcinoma of duodenal bulb, and normocytic anemia.  Patient was originally admitted with abdominal pain and found to have bilateral adrenal nodules, bilateral hypoattenuating renal lesions, duodenal bulb abnormality, retroperitoneal and upper abdominal adenopathy as well as mildly enlarged lymph nodes in lower thorax on CT abdomen/pelvis 2023.  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 At discharge from recent hospital stay, she was to have PET scan and then follow up appt with Dr Landry on 3/10/23. Oncology will consult pain management with Dr Obregon for possibility of celiac plexus block for abdominal pain control.     She  has a past medical history of Arthritis, Atrial fibrillation (HCC), Bradycardia, Cataract, COPD (chronic obstructive pulmonary disease) (HCC), Diabetes mellitus, type 2 (HCC), DJD (degenerative joint disease), Emphysema of lung (HCC), , GERD (gastroesophageal reflux " disease), History of combined right and left heart catheterization (08/02/2018), Hyperlipidemia, Hypertension, Pain, and Sleep apnea.     PCP: Lou Curran MD    History of present illness was reviewed and is unchanged from the previous visit. 03/09/23    Subjective       ROS:  Review of Systems   Constitutional: Negative for fever.   HENT: Negative for congestion and nosebleeds.    Eyes: Negative for pain.   Respiratory: Negative for cough and shortness of breath.    Cardiovascular: Negative for palpitations.   Gastrointestinal: Positive for abdominal pain and constipation.   Endocrine: Negative.    Genitourinary: Negative.    Musculoskeletal: Negative.    Skin: Negative for rash and wound.   Neurological: Negative for dizziness and headaches.   Psychiatric/Behavioral: Negative for behavioral problems.        MEDICATIONS:    Scheduled Meds:  atorvastatin, 20 mg, Oral, Daily  budesonide-formoterol, 2 puff, Inhalation, BID  digoxin, 125 mcg, Oral, Daily  dilTIAZem CD, 180 mg, Oral, Daily  insulin glargine, 30 Units, Subcutaneous, Nightly  insulin lispro, 2-9 Units, Subcutaneous, TID With Meals  lactulose, 20 g, Oral, Daily  metoclopramide, 10 mg, Intravenous, Q6H  metoprolol tartrate, 100 mg, Oral, Q12H  pantoprazole, 40 mg, Oral, QAM AC  polyethylene glycol, 17 g, Oral, Daily  sodium chloride, 10 mL, Intravenous, Q12H       Continuous Infusions:  lactated ringers, 75 mL/hr, Last Rate: 75 mL/hr (03/09/23 0124)       PRN Meds:  •  dextrose  •  dextrose  •  fleet enema  •  glucagon (human recombinant)  •  HYDROcodone-acetaminophen  •  HYDROmorphone  •  ipratropium-albuterol  •  nitroglycerin  •  ondansetron **OR** ondansetron  •  [COMPLETED] Insert Peripheral IV **AND** sodium chloride  •  sodium chloride  •  sodium chloride     ALLERGIES:    Allergies   Allergen Reactions   • Ibuprofen GI Intolerance   • Prednisone Other (See Comments)   • Pregabalin Other (See Comments)     jerking   • Tramadol Other (See  "Comments)     Legs jerked   • Adhesive Tape Other (See Comments)     irritation   • Levofloxacin Other (See Comments)     Increase sunburn   • Sulfamethoxazole-Trimethoprim Swelling       Objective    VITALS:   BP 90/71 (BP Location: Right arm, Patient Position: Sitting)   Pulse 94   Temp 98.1 °F (36.7 °C) (Oral)   Resp 20   Ht 162.6 cm (64.02\")   Wt 81.6 kg (180 lb)   SpO2 92%   BMI 30.88 kg/m²     PHYSICAL EXAM: (performed by MD)  Physical Exam  Vitals and nursing note reviewed.   Constitutional:       Appearance: She is ill-appearing.   HENT:      Head: Normocephalic.      Nose: Nose normal.      Mouth/Throat:      Pharynx: Oropharynx is clear.   Eyes:      Pupils: Pupils are equal, round, and reactive to light.   Cardiovascular:      Rate and Rhythm: Normal rate and regular rhythm.      Pulses: Normal pulses.      Heart sounds: No murmur heard.  Pulmonary:      Effort: Pulmonary effort is normal. No respiratory distress.      Comments: Decreased bilaterally  Abdominal:      General: Bowel sounds are normal. There is no distension.      Palpations: There is no mass.      Tenderness: There is no abdominal tenderness.   Musculoskeletal:         General: Normal range of motion.      Cervical back: Normal range of motion and neck supple.      Right lower leg: No edema.      Left lower leg: No edema.   Lymphadenopathy:      Cervical: No cervical adenopathy.   Skin:     General: Skin is warm and dry.      Findings: No erythema or rash.   Neurological:      Mental Status: She is alert and oriented to person, place, and time.   Psychiatric:         Behavior: Behavior normal.           RECENT LABS:  Lab Results (last 24 hours)     Procedure Component Value Units Date/Time    POC Glucose Once [[id number]]  (Abnormal) Collected: 03/09/23 0713    Specimen: Blood Updated: 03/09/23 0715     Glucose 220 mg/dL      Comment: Serial Number: [id number]:  [id number]       POC Glucose Once [[id number]]  (Abnormal) " Collected: 03/08/23 2156    Specimen: Blood Updated: 03/08/23 2158     Glucose 277 mg/dL      Comment: Serial Number: [id number]:  [id number]       POC Glucose Once [[id number]]  (Abnormal) Collected: 03/08/23 1610    Specimen: Blood Updated: 03/08/23 1611     Glucose 265 mg/dL      Comment: Serial Number: [id number]:  [id number]       Basic Metabolic Panel [[id number]]  (Abnormal) Collected: 03/08/23 0933    Specimen: Blood Updated: 03/08/23 1208     Glucose 243 mg/dL      BUN 13 mg/dL      Creatinine 0.70 mg/dL      Sodium 138 mmol/L      Potassium 3.9 mmol/L      Chloride 99 mmol/L      CO2 28.0 mmol/L      Calcium 10.0 mg/dL      BUN/Creatinine Ratio 18.6     Anion Gap 11.0 mmol/L      eGFR 94.9 mL/min/1.73     Narrative:      GFR Normal >60  Chronic Kidney Disease <60  Kidney Failure <15      POC Glucose Once [[id number]]  (Abnormal) Collected: 03/08/23 1110    Specimen: Blood Updated: 03/08/23 1112     Glucose 267 mg/dL      Comment: Serial Number: [id number]:  [id number]       CBC (No Diff) [[id number]]  (Abnormal) Collected: 03/08/23 0933    Specimen: Blood Updated: 03/08/23 1040     WBC 9.10 10*3/mm3      RBC 4.13 10*6/mm3      Hemoglobin 11.8 g/dL      Hematocrit 36.4 %      MCV 88.3 fL      MCH 28.6 pg      MCHC 32.4 g/dL      RDW 14.5 %      RDW-SD 44.6 fl      MPV 8.5 fL      Platelets 243 10*3/mm3           PENDING RESULTS:     IMAGING REVIEWED:  CT Abdomen Pelvis Without Contrast    Result Date: 3/7/2023  1.Unchanged intraluminal soft tissue mass in the duodenal bulb 2.Unchanged small indeterminate adrenal gland nodules. Nonemergent MRI of the adrenal protocol can be performed for further evaluation. 3.Extensive abdominal lymphadenopathy is detected above. Nodular soft tissue deposits within the abdominal mesentery may represent metastatic deposits. 4.Cholelithiasis without acute cholecystitis. 5.Hyperdense lesions within right kidney. Follow-up recommended to exclude malignancy.  Electronically Signed: Damián Liu  3/7/2023 9:06 PM EST  Workstation ID: [id number]      Assessment & Plan   ASSESSMENT:  A 67-year-old female admitted with diffuse abdominal pain, imaging suggesting metastatic process arising in the proximal duodenum.  An EGD confirmed a tumor at the pylorus and duodenal bulb.  The biopsies reported poorly differentiated adenocarcinoma with signet ring features.  She was then discharged for investigations as outpatient with a PET scan.  However, she had persisted with inability to eat and persistent abdominal pain in the epigastrium, radiating to both upper quadrants, and readmitted.  Patient has continued to have anorexia and nausea and vomiting, but no hematic emesis.  A CT scan done on admission revealed the intraluminal tumor and did not seem to have changed.  The extensive retroperitoneal adenopathy was again noted.  She is also noted to have mild normocytic anemia in her labs and severe hyperglycemia.  Discussion was held with patient's niece that patient is not a surgical candidate given the metastatic adenopathy, positive PET scan is necessary to confirm.  Patient will need appointment, and pain management is important, possibly with interventional pain management.  Next generation sequencing on tumor tissue has been requested.      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 Plan  PET scan after discharge  2. Interventional pain management for possible celiac plexus block - oncology to set up  3. NexGen Sequencing on tumor tissue  4. Monitor CBC daily    Electronically signed by YAW Mae, 03/09/23, 8:27 AM EST.    Feeling somewhat better this morning. Had a bad night because she couldn't sleep.Her pain is somewhat controlled. Woke up with an appetite and I found her sitting up in bed eating. No more dyspnea than usual and no dysphagia. On exam not jaundiced or pale. Oriented and conversant. No oral lesions and respirations not labored. There is no palpable adenopathy in the neck. The lungs are diminished bilaterally and the heart is regular. The abdomen is rounded and soft. No edema. The laboratory exams reveal mild anemia. The white cell count and the platelets within normal limits. The chemistry revealed only significant hyperglycemia. At this point receiving hydrocodone/acetaminophen and hydromorphone intravenously. Will convert to sustained release morphine and breakthrough oxycodone so that she can be discharged

## 2023-03-10 ENCOUNTER — READMISSION MANAGEMENT (OUTPATIENT)
Dept: CALL CENTER | Facility: HOSPITAL | Age: 68
End: 2023-03-10
Payer: MEDICAID

## 2023-03-10 ENCOUNTER — HOME HEALTH ADMISSION (OUTPATIENT)
Dept: HOME HEALTH SERVICES | Facility: HOME HEALTHCARE | Age: 68
End: 2023-03-10
Payer: MEDICAID

## 2023-03-10 ENCOUNTER — TELEPHONE (OUTPATIENT)
Dept: FAMILY MEDICINE CLINIC | Facility: CLINIC | Age: 68
End: 2023-03-10

## 2023-03-10 VITALS
WEIGHT: 230.4 LBS | HEART RATE: 103 BPM | SYSTOLIC BLOOD PRESSURE: 151 MMHG | BODY MASS INDEX: 39.34 KG/M2 | DIASTOLIC BLOOD PRESSURE: 69 MMHG | TEMPERATURE: 98 F | RESPIRATION RATE: 19 BRPM | HEIGHT: 64 IN | OXYGEN SATURATION: 97 %

## 2023-03-10 PROBLEM — R10.9 CONTINUOUS SEVERE ABDOMINAL PAIN: Status: RESOLVED | Noted: 2023-03-07 | Resolved: 2023-03-10

## 2023-03-10 LAB
ANION GAP SERPL CALCULATED.3IONS-SCNC: 10 MMOL/L (ref 5–15)
BUN SERPL-MCNC: 11 MG/DL (ref 8–23)
BUN/CREAT SERPL: 19.6 (ref 7–25)
CALCIUM SPEC-SCNC: 10.2 MG/DL (ref 8.6–10.5)
CHLORIDE SERPL-SCNC: 100 MMOL/L (ref 98–107)
CO2 SERPL-SCNC: 27 MMOL/L (ref 22–29)
CREAT SERPL-MCNC: 0.56 MG/DL (ref 0.57–1)
DEPRECATED RDW RBC AUTO: 45.9 FL (ref 37–54)
EGFRCR SERPLBLD CKD-EPI 2021: 100.2 ML/MIN/1.73
ERYTHROCYTE [DISTWIDTH] IN BLOOD BY AUTOMATED COUNT: 14.4 % (ref 12.3–15.4)
GLUCOSE BLDC GLUCOMTR-MCNC: 284 MG/DL (ref 70–105)
GLUCOSE BLDC GLUCOMTR-MCNC: 286 MG/DL (ref 70–105)
GLUCOSE BLDC GLUCOMTR-MCNC: 314 MG/DL (ref 70–105)
GLUCOSE SERPL-MCNC: 302 MG/DL (ref 65–99)
HCT VFR BLD AUTO: 33.7 % (ref 34–46.6)
HGB BLD-MCNC: 11.4 G/DL (ref 12–15.9)
MAGNESIUM SERPL-MCNC: 1.4 MG/DL (ref 1.6–2.4)
MCH RBC QN AUTO: 29 PG (ref 26.6–33)
MCHC RBC AUTO-ENTMCNC: 33.8 G/DL (ref 31.5–35.7)
MCV RBC AUTO: 85.7 FL (ref 79–97)
PLATELET # BLD AUTO: 216 10*3/MM3 (ref 140–450)
PMV BLD AUTO: 8 FL (ref 6–12)
POTASSIUM SERPL-SCNC: 4.3 MMOL/L (ref 3.5–5.2)
RBC # BLD AUTO: 3.94 10*6/MM3 (ref 3.77–5.28)
SODIUM SERPL-SCNC: 137 MMOL/L (ref 136–145)
WBC NRBC COR # BLD: 9.7 10*3/MM3 (ref 3.4–10.8)

## 2023-03-10 PROCEDURE — 80048 BASIC METABOLIC PNL TOTAL CA: CPT | Performed by: STUDENT IN AN ORGANIZED HEALTH CARE EDUCATION/TRAINING PROGRAM

## 2023-03-10 PROCEDURE — 82962 GLUCOSE BLOOD TEST: CPT

## 2023-03-10 PROCEDURE — 97116 GAIT TRAINING THERAPY: CPT | Performed by: PHYSICAL THERAPIST

## 2023-03-10 PROCEDURE — 96361 HYDRATE IV INFUSION ADD-ON: CPT

## 2023-03-10 PROCEDURE — 25010000002 MAGNESIUM SULFATE 2 GM/50ML SOLUTION: Performed by: INTERNAL MEDICINE

## 2023-03-10 PROCEDURE — G0378 HOSPITAL OBSERVATION PER HR: HCPCS

## 2023-03-10 PROCEDURE — 83735 ASSAY OF MAGNESIUM: CPT | Performed by: INTERNAL MEDICINE

## 2023-03-10 PROCEDURE — 25010000002 METOCLOPRAMIDE PER 10 MG: Performed by: STUDENT IN AN ORGANIZED HEALTH CARE EDUCATION/TRAINING PROGRAM

## 2023-03-10 PROCEDURE — 96365 THER/PROPH/DIAG IV INF INIT: CPT

## 2023-03-10 PROCEDURE — 63710000001 INSULIN LISPRO (HUMAN) PER 5 UNITS: Performed by: INTERNAL MEDICINE

## 2023-03-10 PROCEDURE — 96376 TX/PRO/DX INJ SAME DRUG ADON: CPT

## 2023-03-10 PROCEDURE — 85027 COMPLETE CBC AUTOMATED: CPT | Performed by: STUDENT IN AN ORGANIZED HEALTH CARE EDUCATION/TRAINING PROGRAM

## 2023-03-10 PROCEDURE — 99232 SBSQ HOSP IP/OBS MODERATE 35: CPT | Performed by: INTERNAL MEDICINE

## 2023-03-10 PROCEDURE — 36415 COLL VENOUS BLD VENIPUNCTURE: CPT | Performed by: STUDENT IN AN ORGANIZED HEALTH CARE EDUCATION/TRAINING PROGRAM

## 2023-03-10 RX ORDER — SODIUM PHOSPHATE, DIBASIC AND SODIUM PHOSPHATE, MONOBASIC 7; 19 G/133ML; G/133ML
1 ENEMA RECTAL ONCE
Status: COMPLETED | OUTPATIENT
Start: 2023-03-10 | End: 2023-03-10

## 2023-03-10 RX ORDER — NALOXONE HYDROCHLORIDE 4 MG/.1ML
1 SPRAY NASAL AS NEEDED
Qty: 10 EACH | Refills: 0 | Status: SHIPPED | OUTPATIENT
Start: 2023-03-10

## 2023-03-10 RX ORDER — AMOXICILLIN 250 MG
1 CAPSULE ORAL 2 TIMES DAILY
Qty: 60 TABLET | Refills: 0 | Status: SHIPPED | OUTPATIENT
Start: 2023-03-10 | End: 2023-04-09

## 2023-03-10 RX ORDER — OXYCODONE HCL 10 MG/1
10 TABLET, FILM COATED, EXTENDED RELEASE ORAL EVERY 12 HOURS SCHEDULED
Qty: 24 TABLET | Refills: 0 | Status: SHIPPED | OUTPATIENT
Start: 2023-03-10 | End: 2023-03-22

## 2023-03-10 RX ORDER — MAGNESIUM SULFATE HEPTAHYDRATE 40 MG/ML
2 INJECTION, SOLUTION INTRAVENOUS ONCE
Status: COMPLETED | OUTPATIENT
Start: 2023-03-10 | End: 2023-03-10

## 2023-03-10 RX ORDER — BUDESONIDE AND FORMOTEROL FUMARATE DIHYDRATE 160; 4.5 UG/1; UG/1
2 AEROSOL RESPIRATORY (INHALATION) 2 TIMES DAILY
Refills: 12
Start: 2023-03-10

## 2023-03-10 RX ADMIN — SODIUM CHLORIDE, POTASSIUM CHLORIDE, SODIUM LACTATE AND CALCIUM CHLORIDE 75 ML/HR: 600; 310; 30; 20 INJECTION, SOLUTION INTRAVENOUS at 04:26

## 2023-03-10 RX ADMIN — MAGNESIUM SULFATE HEPTAHYDRATE 2 G: 2 INJECTION, SOLUTION INTRAVENOUS at 12:46

## 2023-03-10 RX ADMIN — SENNOSIDES AND DOCUSATE SODIUM 1 TABLET: 50; 8.6 TABLET ORAL at 10:21

## 2023-03-10 RX ADMIN — INSULIN LISPRO 10 UNITS: 100 INJECTION, SOLUTION INTRAVENOUS; SUBCUTANEOUS at 12:47

## 2023-03-10 RX ADMIN — METOPROLOL TARTRATE 100 MG: 50 TABLET, FILM COATED ORAL at 10:21

## 2023-03-10 RX ADMIN — METHYLNALTREXONE BROMIDE 150 MG: 150 TABLET ORAL at 10:20

## 2023-03-10 RX ADMIN — OXYCODONE HYDROCHLORIDE 10 MG: 10 TABLET, FILM COATED, EXTENDED RELEASE ORAL at 10:20

## 2023-03-10 RX ADMIN — DILTIAZEM HYDROCHLORIDE 180 MG: 180 CAPSULE, COATED, EXTENDED RELEASE ORAL at 10:21

## 2023-03-10 RX ADMIN — POLYETHYLENE GLYCOL 3350 17 G: 17 POWDER, FOR SOLUTION ORAL at 10:21

## 2023-03-10 RX ADMIN — PANTOPRAZOLE SODIUM 40 MG: 40 TABLET, DELAYED RELEASE ORAL at 10:20

## 2023-03-10 RX ADMIN — APIXABAN 5 MG: 5 TABLET, FILM COATED ORAL at 10:21

## 2023-03-10 RX ADMIN — ATORVASTATIN CALCIUM 20 MG: 20 TABLET, FILM COATED ORAL at 10:21

## 2023-03-10 RX ADMIN — SODIUM PHOSPHATE 1 ENEMA: 7; 19 ENEMA RECTAL at 12:57

## 2023-03-10 RX ADMIN — METOCLOPRAMIDE HYDROCHLORIDE 10 MG: 5 INJECTION INTRAMUSCULAR; INTRAVENOUS at 04:26

## 2023-03-10 RX ADMIN — INSULIN LISPRO 8 UNITS: 100 INJECTION, SOLUTION INTRAVENOUS; SUBCUTANEOUS at 10:21

## 2023-03-10 RX ADMIN — LACTULOSE 20 G: 20 SOLUTION ORAL at 10:23

## 2023-03-10 RX ADMIN — INSULIN LISPRO 7 UNITS: 100 INJECTION, SOLUTION INTRAVENOUS; SUBCUTANEOUS at 12:47

## 2023-03-10 RX ADMIN — METOCLOPRAMIDE HYDROCHLORIDE 10 MG: 5 INJECTION INTRAMUSCULAR; INTRAVENOUS at 10:21

## 2023-03-10 RX ADMIN — Medication 10 ML: at 10:22

## 2023-03-10 RX ADMIN — INSULIN LISPRO 7 UNITS: 100 INJECTION, SOLUTION INTRAVENOUS; SUBCUTANEOUS at 10:22

## 2023-03-10 RX ADMIN — DIGOXIN 125 MCG: 125 TABLET ORAL at 12:46

## 2023-03-10 NOTE — THERAPY TREATMENT NOTE
"Subjective: Pt agreeable to therapeutic plan of care.  Reports she is having pain in her back really bad.  Feels like her ribs are broken.     Objective:     Bed mobility - N/A or Not attempted.  Transfers - SBA, CGA and with rolling walker  Ambulation - 24 feet CGA and with rolling walker   *Gait belt applied and non-skid socks worn during treatment.       Vitals: WNL    Pain: 10 VAS   Location: low back and ribs  Intervention for pain: Repositioned and Therapeutic Presence    Education: Provided education on the importance of mobility in the acute care setting and Verbal/Tactile Cues    Assessment: Caterina Mason presents with functional mobility impairments which indicate the need for skilled intervention. Tolerating session today without incident.  Limited due to low back and rib pain.  Pt slow in movement this date. Will continue to follow and progress as tolerated.     Plan/Recommendations:   Low Intensity Therapy recommended post-acute care - This is recommended as therapy feels this patient would require 2-3 visits per week. OP or HH would be the best option depending on patient's home bound status. Consider, if the patient has other  \"skilled\" needs such as wounds, IV antibiotics, etc. Combined with \"low intensity\" could also equate to a SNF. If patient is medically complex, consider LTAC.. Pt requires no DME at discharge.     Pt desires Home with Home Health and Home with family assist at discharge. Pt cooperative; agreeable to therapeutic recommendations and plan of care.         Basic Mobility 6-click:  Rollin = Total, A lot = 2, A little = 3; 4 = None  Supine>Sit:   1 = Total, A lot = 2, A little = 3; 4 = None   Sit>Stand with arms:  1 = Total, A lot = 2, A little = 3; 4 = None  Bed>Chair:   1 = Total, A lot = 2, A little = 3; 4 = None  Ambulate in room:  1 = Total, A lot = 2, A little = 3; 4 = None  3-5 Steps with railin = Total, A lot = 2, A little = 3; 4 = None  Score: " 17    Modified [place]: N/A = No pre-op stroke/TIA    Post-Tx Position: Up in Chair, Alarms activated and Call light and personal items within reach  PPE: gloves and surgical mask

## 2023-03-10 NOTE — CASE MANAGEMENT/SOCIAL WORK
Continued Stay Note  [facility]     Patient Name: Caterina Mason  MRN: [id number]  Today's Date: 3/10/2023    Admit Date: 3/7/2023    Plan: Routine home. F HH accepted. Current home CPAP w/ O2 through [facility].   Discharge Plan     Row Name 03/10/23 1406       Plan    Plan Routine home. [facility] HH accepted. Current home CPAP w/ O2 through [facility].    Provided Post Acute Provider List? N/A    Provided Post Acute Provider Quality & Resource List? N/A    Plan Comments [facility] denied the patient due to insurance. [facility] does not see patient's where the patient lives.  [facility] has accepted the patient will be able to see the patient once and then not again for 15 days due to insurance. CM reached out about other possible HH options but was decline due to patient's insurance. HH order is in and discharge orders are in. The nurse and patient have been updated. The patient will be transported home by her daughter around 3pm today. No d/c barriers.                     Expected Discharge Date and Time     Expected Discharge Date Expected Discharge Time    Mar 10, 2023         Met with patient in room wearing PPE: mask,      Maintained distance greater than six feet and spent less than 15 minutes in the room.        Sera Sprague RN     [address] [address]  Phone: [phone]  Fax: [phone]

## 2023-03-10 NOTE — PLAN OF CARE
"Goal Outcome Evaluation:         Assessment: Caterina Mason presents with functional mobility impairments which indicate the need for skilled intervention. Tolerating session today without incident.  Limited due to low back and rib pain.  Pt slow in movement this date. Will continue to follow and progress as tolerated.     Plan/Recommendations:   Low Intensity Therapy recommended post-acute care - This is recommended as therapy feels this patient would require 2-3 visits per week. OP or HH would be the best option depending on patient's home bound status. Consider, if the patient has other  \"skilled\" needs such as wounds, IV antibiotics, etc. Combined with \"low intensity\" could also equate to a SNF. If patient is medically complex, consider LTAC.. Pt requires no DME at discharge.         "

## 2023-03-10 NOTE — OUTREACH NOTE
Prep Survey    Flowsheet Row Responses   [facility] Doc   Is LACE score < 7 ? No   Eligibility TCM   Hospital Doc   Date of Admission 03/07/23   Date of Discharge 03/10/23   Discharge Disposition Home-Health Care Svc   Discharge diagnosis Severe abdominal pain most likely related to metastatic cancer, severe constipation   Does the patient have one of the following disease processes/diagnoses(primary or secondary)? Other   Does the patient have Home health ordered? Yes   What is the Home health agency?  [facility]   Is there a DME ordered? No   Prep survey completed? Yes          Karina BERUMEN - Registered Nurse

## 2023-03-10 NOTE — PROGRESS NOTES
Spoke to dtr Estella Demographics verified  No other agencies in home [facility] PT eval   Please call daughter or Agnes to arrange visits so they can be there   Dr Curran will follow and sign per secure chat   PT admit

## 2023-03-10 NOTE — PROGRESS NOTES
"      Hematology/Oncology Inpatient Progress Note    PATIENT NAME: Caterina Mason  : 1955  MRN: [id number]    CHIEF COMPLAINT:  Abdominal Pain    HISTORY OF PRESENT ILLNESS:    Caterina Mason is a 67 y.o. female who [facility] with complaints of constant severe abdominal pain accompanied by vomiting. In ER, CT abdomen/pelvis shows sodt tissue mass in duodenal bulb.  She was admitted with intractable abdominal pain related to her duodenal mass.     CT abdomen/pelvis 3/7/2023- unchanged soft tissue mass\" duodenal bulb, unchanged intermediate adrenal gland nodules, extensive abdominal lymphadenopathy with nodular soft tissue deposits within the abdominal mesentery, hyperdense lesions in the right kidney     23  Hematology/Oncology was consulted for established patient of Dr. Landry with adenocarcinoma of duodenal bulb, and normocytic anemia.  Patient was originally admitted with abdominal pain and found to have bilateral adrenal nodules, bilateral hypoattenuating renal lesions, duodenal bulb abnormality, retroperitoneal and upper abdominal adenopathy as well as mildly enlarged lymph nodes in lower thorax on CT abdomen/pelvis 2023.  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 At discharge from recent hospital stay, she was to have PET scan and then follow up appt with Dr Landry on 3/10/23. Oncology will consult pain management with Dr Obregon for possibility of celiac plexus block for abdominal pain control.     She  has a past medical history of Arthritis, Atrial fibrillation (HCC), Bradycardia, Cataract, COPD (chronic obstructive pulmonary disease) (HCC), Diabetes mellitus, type 2 (HCC), DJD (degenerative joint disease), Emphysema of lung (HCC), , GERD (gastroesophageal reflux " disease), History of combined right and left heart catheterization (08/02/2018), Hyperlipidemia, Hypertension, Pain, and Sleep apnea.     PCP: Lou Curran MD    History of present illness was reviewed and is unchanged from the previous visit. 03/09/23    Subjective   3/10/2023: Feeling better. Stronger and having less pain. Able to eat some.     ROS:  Review of Systems   Constitutional: Negative for activity change, appetite change, chills, diaphoresis, fatigue, fever and unexpected weight change.   HENT: Negative for congestion, dental problem, drooling, ear discharge, ear pain, facial swelling, hearing loss, mouth sores, nosebleeds, postnasal drip, rhinorrhea, sinus pressure, sinus pain, sneezing, sore throat, tinnitus, trouble swallowing and voice change.    Eyes: Negative for photophobia, pain, discharge, redness, itching and visual disturbance.   Respiratory: Negative for apnea, cough, choking, chest tightness, shortness of breath, wheezing and stridor.    Cardiovascular: Negative for chest pain, palpitations and leg swelling.   Gastrointestinal: Positive for abdominal pain and constipation. Negative for abdominal distention, anal bleeding, blood in stool, diarrhea, nausea, rectal pain and vomiting.   Endocrine: Negative.  Negative for cold intolerance, heat intolerance, polydipsia and polyuria.   Genitourinary: Negative.  Negative for decreased urine volume, difficulty urinating, dysuria, flank pain, frequency, genital sores, hematuria and urgency.   Musculoskeletal: Negative.  Negative for arthralgias, back pain, gait problem, joint swelling, myalgias, neck pain and neck stiffness.   Skin: Negative for color change, pallor, rash and wound.   Neurological: Negative for dizziness, tremors, seizures, syncope, facial asymmetry, speech difficulty, weakness, light-headedness, numbness and headaches.   Hematological: Negative for adenopathy. Does not bruise/bleed easily.   Psychiatric/Behavioral: Negative for  "agitation, behavioral problems, confusion, decreased concentration, hallucinations, self-injury, sleep disturbance and suicidal ideas. The patient is not nervous/anxious.       MEDICATIONS:    Scheduled Meds:  apixaban, 5 mg, Oral, Q12H  atorvastatin, 20 mg, Oral, Daily  budesonide-formoterol, 2 puff, Inhalation, BID  digoxin, 125 mcg, Oral, Daily  dilTIAZem CD, 180 mg, Oral, Daily  insulin glargine, 30 Units, Subcutaneous, Nightly  insulin lispro, 3-14 Units, Subcutaneous, TID With Meals  insulin lispro, 7 Units, Subcutaneous, TID With Meals  lactulose, 20 g, Oral, Daily  Methylnaltrexone Bromide, 150 mg, Oral, Daily  metoclopramide, 10 mg, Intravenous, Q6H  metoprolol tartrate, 100 mg, Oral, Q12H  oxyCODONE, 10 mg, Oral, Q12H  pantoprazole, 40 mg, Oral, QAM AC  polyethylene glycol, 17 g, Oral, Daily  senna-docusate sodium, 1 tablet, Oral, BID  sodium chloride, 10 mL, Intravenous, Q12H       Continuous Infusions:  lactated ringers, 75 mL/hr, Last Rate: 75 mL/hr (03/10/23 1217)       PRN Meds:  •  dextrose  •  dextrose  •  fleet enema  •  glucagon (human recombinant)  •  HYDROcodone-acetaminophen  •  HYDROmorphone  •  ipratropium-albuterol  •  nitroglycerin  •  ondansetron **OR** ondansetron  •  [COMPLETED] Insert Peripheral IV **AND** sodium chloride  •  sodium chloride  •  sodium chloride     ALLERGIES:    Allergies   Allergen Reactions   • Ibuprofen GI Intolerance   • Prednisone Other (See Comments)   • Pregabalin Other (See Comments)     jerking   • Tramadol Other (See Comments)     Legs jerked   • Adhesive Tape Other (See Comments)     irritation   • Levofloxacin Other (See Comments)     Increase sunburn   • Sulfamethoxazole-Trimethoprim Swelling       Objective    VITALS:   /69 (BP Location: Right arm, Patient Position: Sitting)   Pulse 103   Temp 98 °F (36.7 °C) (Oral)   Resp 19   Ht 162.6 cm (64.02\")   Wt 105 kg (230 lb 6.4 oz)   SpO2 97%   BMI 39.53 kg/m²     PHYSICAL EXAM: (performed by " MD)  Physical Exam  Vitals and nursing note reviewed.   Constitutional:       General: She is not in acute distress.     Appearance: She is ill-appearing. She is not toxic-appearing or diaphoretic.   HENT:      Head: Normocephalic.      Right Ear: External ear normal.      Left Ear: External ear normal.      Nose: Nose normal. No congestion or rhinorrhea.      Mouth/Throat:      Pharynx: Oropharynx is clear. No oropharyngeal exudate or posterior oropharyngeal erythema.      Comments: Edentulous  Eyes:      General: No scleral icterus.        Right eye: No discharge.         Left eye: No discharge.      Pupils: Pupils are equal, round, and reactive to light.   Cardiovascular:      Rate and Rhythm: Regular rhythm. Tachycardia present.      Pulses: Normal pulses.      Heart sounds: No murmur heard.    No friction rub. No gallop.   Pulmonary:      Effort: Pulmonary effort is normal. No respiratory distress.      Breath sounds: No stridor. No wheezing, rhonchi or rales.      Comments: Decreased bilaterally  Abdominal:      General: Bowel sounds are normal. There is no distension.      Palpations: There is no mass.      Tenderness: There is no abdominal tenderness. There is no rebound.   Musculoskeletal:         General: Normal range of motion.      Cervical back: Normal range of motion and neck supple. No rigidity or tenderness.      Right lower leg: No edema.      Left lower leg: No edema.   Lymphadenopathy:      Cervical: No cervical adenopathy.   Skin:     General: Skin is warm and dry.      Coloration: Skin is not jaundiced or pale.      Findings: No erythema or rash.   Neurological:      General: No focal deficit present.      Mental Status: She is alert and oriented to person, place, and time.      Cranial Nerves: No cranial nerve deficit.   Psychiatric:         Mood and Affect: Mood normal.         Behavior: Behavior normal.         Thought Content: Thought content normal.         Judgment: Judgment normal.     I  Teofilo Landry MD performed the physical exam on 3/10/2023 as documented above.      RECENT LABS:  Lab Results (last 24 hours)     Procedure Component Value Units Date/Time    POC Glucose Once [[id number]]  (Abnormal) Collected: 03/10/23 1131    Specimen: Blood Updated: 03/10/23 1132     Glucose 314 mg/dL      Comment: Serial Number: [id number]:  [id number]       Basic Metabolic Panel [[id number]]  (Abnormal) Collected: 03/10/23 0749    Specimen: Blood Updated: 03/10/23 0831     Glucose 302 mg/dL      BUN 11 mg/dL      Creatinine 0.56 mg/dL      Sodium 137 mmol/L      Potassium 4.3 mmol/L      Chloride 100 mmol/L      CO2 27.0 mmol/L      Calcium 10.2 mg/dL      BUN/Creatinine Ratio 19.6     Anion Gap 10.0 mmol/L      eGFR 100.2 mL/min/1.73     Narrative:      GFR Normal >60  Chronic Kidney Disease <60  Kidney Failure <15      Magnesium [[id number]]  (Abnormal) Collected: 03/10/23 0749    Specimen: Blood Updated: 03/10/23 0831     Magnesium 1.4 mg/dL     CBC (No Diff) [[id number]]  (Abnormal) Collected: 03/10/23 0749    Specimen: Blood Updated: 03/10/23 0811     WBC 9.70 10*3/mm3      RBC 3.94 10*6/mm3      Hemoglobin 11.4 g/dL      Hematocrit 33.7 %      MCV 85.7 fL      MCH 29.0 pg      MCHC 33.8 g/dL      RDW 14.4 %      RDW-SD 45.9 fl      MPV 8.0 fL      Platelets 216 10*3/mm3     POC Glucose Once [[id number]]  (Abnormal) Collected: 03/10/23 0807    Specimen: Blood Updated: 03/10/23 0808     Glucose 286 mg/dL      Comment: Serial Number: [id number]:  [id number]       POC Glucose Once [[id number]]  (Abnormal) Collected: 03/10/23 0052    Specimen: Blood Updated: 03/10/23 0054     Glucose 284 mg/dL      Comment: Serial Number: [id number]:  [id number]       POC Glucose Once [[id number]]  (Abnormal) Collected: 03/09/23 2123    Specimen: Blood Updated: 03/09/23 2124     Glucose 290 mg/dL      Comment: Serial Number: [id number]:  [id number]           IMAGING REVIEWED:  No radiology results for the  last day    Assessment & Plan   ASSESSMENT:  A 67-year-old female admitted with diffuse abdominal pain, imaging suggesting metastatic process arising in the proximal duodenum.  An EGD confirmed a tumor at the pylorus and duodenal bulb.  The biopsies reported poorly differentiated adenocarcinoma with signet ring features.  She was then discharged for investigations as outpatient with a PET scan.  However, she had persisted with inability to eat and persistent abdominal pain in the epigastrium, radiating to both upper quadrants, and readmitted.  Patient has continued to have anorexia and nausea and vomiting, but no hematic emesis.  A CT scan done on admission revealed the intraluminal tumor and did not seem to have changed.  The extensive retroperitoneal adenopathy was again noted.  She is also noted to have mild normocytic anemia in her labs and severe hyperglycemia.  Discussion was held with patient's niece that patient is not a surgical candidate given the metastatic adenopathy, positive PET scan is necessary to confirm.  Patient will need appointment, and pain management is important, possibly with interventional pain management.  Next generation sequencing on tumor tissue has been requested.      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 This has allowed her to eat and be more active.      Anemia: Hgb 11.8, mild, normocytic, normochromic  Likely related to chronic disease, duodenal bulb malignancy     Bilateral adrenal [id number] CT recommended     Hyperdense Lesions Right Kidney - follow up CT recommended     Chronic conditions: DM type II, COPD, HTN, CAD, HLD, atrial  fib-apixaban, GERD, RUSLAN     PLAN:  1.  As above.     Teofilo Landry MD on 3/10/2023 at 12:53 PM.

## 2023-03-10 NOTE — PLAN OF CARE
Goal Outcome Evaluation:   Pt restless most of the night. Pt feeling hopeless and frustrated about new diagnosis.

## 2023-03-10 NOTE — TELEPHONE ENCOUNTER
Caller: Caterina Mason    Relationship to patient:   KRISTEN IGLESIAS IS CALLING    Best call back number: [phone]    Patient is needing: PATIENT IS CALLING TO REPORT THAT   HOSPITAL GAVE HER 6     oxyCODONE (oxyCONTIN) 10 MG 12 hr tablet      AND NASAL SPRAY PLEASE CALL PATIENT SHE HAS CONCERNS ABOUT HER MEDICATION

## 2023-03-10 NOTE — DISCHARGE SUMMARY
[facility] Medicine Services  Discharge Summary    Date of Service: 3/10/2023  Patient Name: Caterina Mason  : 1955  MRN: [id number]    Date of Admission: 3/7/2023  Discharge Diagnosis:  1.  Severe abdominal pain most likely related to metastatic cancer.  2.  Severe constipation resolved.  3.  Atrial fibrillation Rate is controlled.  4.  Chronic hypoxemic respiratory failure she was oxygen 2 L/min at bedtime on her baseline.  5.  Morbid obesity BMI 39.53.  6.  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  7.  Solid mass in the kidney oncology recommended.  8.  COPD.  9.  History artery disease.  10.  Diabetes mellitus.  11.  Obstructive sleep apnea.  10.  GERD     repeating CT scan  Date of Discharge: 393 10/20/2023  Primary Care Physician: Lou Curran MD      Presenting Problem:   Continuous severe abdominal pain [R10.9]    Active and Resolved Hospital Problems:  Active Hospital Problems   No active problems to display.      Resolved Hospital Problems    Diagnosis POA   • **Continuous severe abdominal pain [R10.9] Yes         Hospital Course     Hospital Course:  Caterina Mason is a 67 y.o. female who was recently diagnosed of colonic signet ring adenocarcinoma, she was discharged home for follow-up with staging and PET scan as outpatient however patient came back with abdominal pain, she has constipation, she has very complicated medical history also including obesity obstructive sleep apnea and COPD, she is on oxygen 2 L at bedtime, treatment with pain medication has been associated she is already on Norco and Tylenol, OxyContin 10 twice a day was added, her pain is very well controlled now, constipation has resolved and she was given laxatives on discharge, she was also prescribed Narcan spray while she is on narcotics and her condition, she was seen by oncologist during this  hospitalization and staging for outside also recommended that she do PET scan repeat CT scan for the solid mass of the kidney.  Patient would like to go home today and follow-up on staging as outpatient.  Discharge required 45 minutes        DISCHARGE Follow Up Recommendations for labs and diagnostics: Close follow-up with oncology      Reasons For Change In Medications and Indications for New Medications:      Day of Discharge     Vital Signs:  Temp:  [97.9 °F (36.6 °C)-99 °F (37.2 °C)] 98 °F (36.7 °C)  Heart Rate:  [] 103  Resp:  [17-22] 19  BP: (139-164)/(67-86) 151/69  Flow (L/min):  [2-3] 2.5    Physical Exam:  Physical Exam  Constitutional:       Appearance: Normal appearance.   HENT:      Head: Normocephalic.      Right Ear: Tympanic membrane normal.      Left Ear: Tympanic membrane normal.      Nose: Nose normal.      Mouth/Throat:      Mouth: Mucous membranes are moist.   Cardiovascular:      Rate and Rhythm: Normal rate.      Pulses: Normal pulses.   Pulmonary:      Effort: Pulmonary effort is normal.   Abdominal:      General: Abdomen is flat.   Musculoskeletal:         General: Normal range of motion.      Cervical back: Normal range of motion.   Skin:     General: Skin is warm.      Capillary Refill: Capillary refill takes less than 2 seconds.   Neurological:      General: No focal deficit present.      Mental Status: She is alert.   Psychiatric:         Mood and Affect: Mood normal.            Pertinent  and/or Most Recent Results     LAB RESULTS:      Lab 03/10/23  0749 03/09/23  0907 03/08/23  0933 03/07/23 1919   WBC 9.70 10.00 9.10 9.30   HEMOGLOBIN 11.4* 11.9* 11.8* 11.9*   HEMATOCRIT 33.7* 36.7 36.4 37.0   PLATELETS 216 209 243 249   NEUTROS ABS  --  7.30*  --  6.70   LYMPHS ABS  --  1.30  --  1.30   MONOS ABS  --  1.20*  --  1.00*   EOS ABS  --  0.20  --  0.20   MCV 85.7 88.7 88.3 88.4         Lab 03/10/23  0749 03/09/23  0907 03/08/23  0933 03/07/23 1919   SODIUM 137 137 138 141    POTASSIUM 4.3 4.5 3.9 4.2   CHLORIDE 100 98 99 101   CO2 27.0 29.0 28.0 27.0   ANION GAP 10.0 10.0 11.0 13.0   BUN 11 12 13 13   CREATININE 0.56* 0.64 0.70 0.69   EGFR 100.2 97.0 94.9 95.3   GLUCOSE 302* 314* 243* 229*   CALCIUM 10.2 10.0 10.0 10.1   MAGNESIUM 1.4*  --   --   --          Lab 03/07/23  1919   TOTAL PROTEIN 7.1   ALBUMIN 3.7   GLOBULIN 3.4   ALT (SGPT) 12   AST (SGOT) 17   BILIRUBIN 0.5   ALK PHOS 59   LIPASE 117*                     Brief Urine Lab Results  (Last result in the past 365 days)      Color   Clarity   Blood   Leuk Est   Nitrite   Protein   CREAT   Urine HCG        03/07/23 2002 Yellow   Clear   Negative   Negative   Negative   Negative               Microbiology Results (last 10 days)     ** No results found for the last 240 hours. **          CT Abdomen Pelvis Without Contrast    Result Date: 3/7/2023  Impression: 1.Unchanged intraluminal soft tissue mass in the duodenal bulb 2.Unchanged small indeterminate adrenal gland nodules. Nonemergent MRI of the adrenal protocol can be performed for further evaluation. 3.Extensive abdominal lymphadenopathy is detected above. Nodular soft tissue deposits within the abdominal mesentery may represent metastatic deposits. 4.Cholelithiasis without acute cholecystitis. 5.Hyperdense lesions within right kidney. Follow-up recommended to exclude malignancy. Electronically Signed: Damián Liu  3/7/2023 9:06 PM EST  Workstation ID: [id number]    CT Chest Without Contrast Diagnostic    Result Date: 3/2/2023  Impression: Impression: 1. Interval development of small lymph nodes in the epicardial space on the right measuring up to about 13 mm in greatest diameter. There has also been interval development of a small node in the anterior mediastinum measuring 11 x 7 mm. In the upper abdomen, there is clearly periportal and retroperitoneal lymphadenopathy. The findings raise the question of lymphoma or metastatic disease. No suggestion of a primary lesion in the  chest. 2. Coronary atherosclerotic calcifications. 3. Ascites Electronically Signed: Ronak Grubbs  3/2/2023 2:48 PM EST  Workstation ID: [id number]    MRI Abdomen With & Without Contrast    Result Date: 3/1/2023  Impression: Impression: 1. Abnormal hypoenhancing mass measuring 3.5 x 3.1 cm within the duodenal bulb. This appears partially intramural and partially endoluminal. Differential considerations would include gastric or duodenal malignancy, leiomyoma, or less likely GIST. Recommend further evaluation with endoscopy. 2. Enlarged celiac axis, portacaval, and retroperitoneal lymph nodes. Further management would be dependent on pathologic diagnosis of the duodenal mass. 3. Hemorrhagic or proteinaceous cyst within the posterior mid left kidney and exophytic arising from the posterior inferior aspect of the right kidney. No evidence of enhancing renal mass. Additional simple appearing renal cysts are also present. 4. Limited assessment of the small bilateral adrenal nodules. There is no definite loss of signal to suggest clear lipid rich adrenal adenomas. These could be followed with serial imaging. 5. Cholelithiasis without MR findings of acute cholecystitis. No evidence of choledocholithiasis. 6. Small volume perihepatic ascites. Electronically Signed: Milton Carter  3/1/2023 6:41 PM EST  Workstation ID: [id number]    CT Abdomen Pelvis With Contrast    Result Date: 2/21/2023  Impression: 1. Cholelithiasis. The gallbladder is not distended. However, there is some haziness of the pericholecystic fat which could be caused by cholecystitis, correlate clinically 2. Bilateral adrenal nodules, adenomas or metastases. MRI may help clarify 3. Bilateral hypoattenuating renal lesions, proteinaceous cysts versus solid masses. Contrast-enhanced MRI may help clarify 4. Possible filling defect in the duodenal bulb. Consider upper GI examination 5. Small amount of ascites 6. Retroperitoneal and upper abdominal  adenopathy, and mildly enlarged lymph nodes in the lower thorax. Considerations include hematologic malignancy and metastatic disease Electronically Signed: Son Jayda  2/21/2023 9:58 PM EST  Workstation ID: [id number]    US Abdomen Limited    Result Date: 3/1/2023  Impression: Impression: 1. Cholelithiasis, without sonographic evidence of cholecystitis. 2. Common bile duct measures 8 mm, just slightly above upper limits normal for the patient's stated age. No intrahepatic biliary ductal dilation is seen. No obstructing biliary abnormality is identified. 3. Hepatomegaly with features of diffuse hepatic steatosis. 4. The visualized is the pancreas have a normal sonographic appearance. 5. The right renal cyst seen on the prior CT are not visible on today's ultrasound. Electronically Signed: Emeli Corona  3/1/2023 10:45 AM EST  Workstation ID: [id number]              Results for orders placed during the hospital encounter of 11/30/22    Adult Transthoracic Echo Limited W/ Cont if Necessary Per Protocol    Interpretation Summary  •  Left ventricular systolic function is normal. Left ventricular ejection fraction appears to be 56 - 60%.  •  Left ventricular diastolic function was normal.      Labs Pending at Discharge:      Procedures Performed           Consults:   Consults     Date and Time Order Name Status Description    3/7/2023 10:24 PM Hematology & Oncology Inpatient Consult      3/7/2023 10:24 PM Inpatient Gastroenterology Consult Completed     3/7/2023  9:29 PM Hospitalist (on-call MD unless specified)      3/2/2023 12:51 PM Hematology & Oncology Inpatient Consult Completed     3/2/2023 10:04 AM Inpatient Cardiology Consult Completed     3/1/2023  9:07 AM Inpatient Urology Consult Completed     2/28/2023  4:31 PM Inpatient Gastroenterology Consult Completed             Discharge Details        Discharge Medications      New Medications      Instructions Start Date   apixaban 5 MG tablet tablet  Commonly known  as: ELIQUIS   5 mg, Oral, Every 12 Hours Scheduled      naloxone 4 MG/0.1ML nasal spray  Commonly known as: NARCAN   instill 1 spray into the nostril as directed by provider As Needed (lethargy caused by pain meds)      oxyCODONE 10 MG 12 hr tablet  Commonly known as: oxyCONTIN   10 mg, Oral, Every 12 Hours Scheduled      sennosides-docusate 8.6-50 MG per tablet  Commonly known as: PERICOLACE   1 tablet, Oral, 2 Times Daily         Continue These Medications      Instructions Start Date   Acid Reducer 20.6 (20 Base) MG capsule delayed-release  Generic drug: Omeprazole Magnesium   Take 2 capsules by mouth every morning before breakfast      albuterol sulfate  (90 Base) MCG/ACT inhaler  Commonly known as: PROVENTIL HFA;VENTOLIN HFA;PROAIR HFA   INHALE 2 PUFFS EVERY 4 HOURS AS NEEDED FOR WHEEZING OR SHORTNESS OF AIR      atorvastatin 40 MG tablet  Commonly known as: LIPITOR   TAKE 1/2 TABLET BY MOUTH EVERY DAY      BASAGLAR KWIKPEN 100 UNIT/ML injection pen   40 Units, Subcutaneous, Nightly      budesonide-formoterol 160-4.5 MCG/ACT inhaler  Commonly known as: SYMBICORT   2 puffs, Inhalation, 2 Times Daily      Cholecalciferol 25 MCG (1000 UT) capsule   TAKE 1 TABLET BY MOUTH TWICE A DAY *OTC NOT COVERED*      digoxin 125 MCG tablet  Commonly known as: LANOXIN   125 mcg, Oral, Daily Digoxin      dilTIAZem  MG 24 hr capsule  Commonly known as: CARDIZEM CD   180 mg, Oral, Daily      fenofibrate 160 MG tablet   TAKE 1 TABLET BY MOUTH EVERY DAY      furosemide 40 MG tablet  Commonly known as: LASIX   TAKE 1 TABLET BY MOUTH EVERY DAY      HumaLOG KwikPen 100 UNIT/ML solution pen-injector  Generic drug: Insulin Lispro (1 Unit Dial)   14 Units, Subcutaneous, 3 Times Daily Before Meals      HYDROcodone-acetaminophen 7.5-325 MG per tablet  Commonly known as: NORCO   1 tablet, Oral, 3 Times Daily PRN      lisinopril 20 MG tablet  Commonly known as: PRINIVIL,ZESTRIL   TAKE 1 TABLET BY MOUTH EVERY DAY      magnesium  oxide 400 MG tablet  Commonly known as: MAG-OX   TAKE 1 TABLET BY MOUTH TWICE A DAY      metoprolol tartrate 100 MG tablet  Commonly known as: LOPRESSOR   100 mg, Oral, Every 12 Hours Scheduled      potassium chloride 10 MEQ CR capsule  Commonly known as: MICRO-K   TAKE 1 CAPSULE BY MOUTH EVERY DAY             Allergies   Allergen Reactions   • Ibuprofen GI Intolerance   • Prednisone Other (See Comments)   • Pregabalin Other (See Comments)     jerking   • Tramadol Other (See Comments)     Legs jerked   • Adhesive Tape Other (See Comments)     irritation   • Levofloxacin Other (See Comments)     Increase sunburn   • Sulfamethoxazole-Trimethoprim Swelling         Discharge Disposition:   Home or Self Care    Diet:  Hospital:  Diet Order   Procedures   • Diet: Gastrointestinal Diets; Fiber-Restricted, Low Irritant; Texture: Soft to Chew (NDD 3); Soft to Chew: Chopped Meat; Fluid Consistency: Thin (IDDSI 0)         Discharge Activity:   Activity Instructions     Activity as Tolerated              CODE STATUS:  Code Status and Medical Interventions:   Ordered at: 03/07/23 2224     Code Status (Patient has no pulse and is not breathing):    CPR (Attempt to Resuscitate)     Medical Interventions (Patient has pulse or is breathing):    Full Support         Future Appointments   Date Time Provider Department Center   4/18/2023 10:30 AM Lou Curran MD [facility]   7/28/2023  2:15 PM Jesse Charles MD [facility]       Additional Instructions for the Follow-ups that You Need to Schedule     Discharge Follow-up with PCP   As directed       Currently Documented PCP:    Lou Cruran MD    PCP Phone Number:    [phone]     Follow Up Details: 7 days         Discharge Follow-up with Specified Provider: oncology; 1 Week   As directed      To: oncology    Follow Up: 1 Week               Time spent on Discharge including face to face service: 45 minutes    This patient has been examined wearing appropriate  Personal Protective Equipment and discussed with hospital infection control department. 03/10/23      Signature: Electronically signed by Antonio Ling MD, 03/10/23, 13:29 EST.  [facility] Hospitalist Team

## 2023-03-10 NOTE — TELEPHONE ENCOUNTER
"Spoke with agnes she stated that Caterina is not acting like herself she seems  \" out of it \" I did advise her if that was the case they need to make sure someone else is in charge of her medications. Agnes asked me if they sent her home to die and I told her no that is not the case they would offer hospice and theres no way they would do that I told her I do understand how concerned and scared she is. She stated a scan was suppose to be done but she is not sure if that was done she asked if you could review Caterina's chart and give her some answers or advise on what is going on   "

## 2023-03-10 NOTE — CONSULTS
" visited with patient at bedside.    Patient reports that she was recently diagnosied with cancer. Patient reports that her sister  from cancer many years ago. Patient stated \"I did not think this would happen to me\". Patient reports that she was told nothing could be done about her cancer, but she just specifically mentioned surgery. Patient also reports that her son  8-9 months ago, patient grieved passing with me. She reports the desire to continue to live and values her family relationships. Patient pondering if she has done something morally wrong to be so sick. Patient inquired if the  knows if she has done something morally wrong to be so sick. Discussed with patient I am unable to answer that question. Explored if patient has a world view or spirituality that informs about morality. Patient reports she does not. Patient reports she does have supportive relationships.  encouraged patient to engage with her support about thoughts/feelings.    Will continue to follow.    Chaplain Kd Wagner  "

## 2023-03-10 NOTE — PROGRESS NOTES
[facility] Medicine Services   Daily Progress Note    Patient Name: Caterina Mason  : 1955  MRN: [id number]  Primary Care Physician:  Lou Curran MD  Date of admission: 3/7/2023  Date and Time of Service: 3/10/2023      Subjective      Chief Complaint: Abdominal pain    Patient Reports patient has constipation for more than 7 days now, she has abdominal pain which is diffuse and crampy, no chest pain shortness of breath nausea vomiting.    3/9/2023: This patient continues to report diffuse abdominal pain, which I think is cancer related pain, however the constipation for more than a week now limited at home as narcotics continues, I started Relistor today, I discussed the situation with the patient I explained to her that staging work-up will be replaced as outpatient as indicated.  Her oncologist,    3/10/2023: patient is seen and examined, abdominal pain is better, had another BM yesterday night    ROS   all review of systems negative except as above      Objective      Vitals:   Temp:  [97.9 °F (36.6 °C)-99 °F (37.2 °C)] 98 °F (36.7 °C)  Heart Rate:  [] 103  Resp:  [17-22] 19  BP: (139-164)/(67-86) 151/69  Flow (L/min):  [2-3] 2.5    Physical Exam  Constitutional:       Appearance: Normal appearance.   HENT:      Head: Normocephalic.      Right Ear: Tympanic membrane normal.      Nose: Nose normal.      Mouth/Throat:      Mouth: Mucous membranes are moist.   Eyes:      Pupils: Pupils are equal, round, and reactive to light.   Cardiovascular:      Rate and Rhythm: Normal rate.      Pulses: Normal pulses.   Pulmonary:      Effort: Pulmonary effort is normal.   Abdominal:      General: Abdomen is flat.   Musculoskeletal:         General: Normal range of motion.   Skin:     General: Skin is warm.      Capillary Refill: Capillary refill takes less than 2 seconds.   Neurological:      General: No focal deficit present.      Mental Status: She is alert.   Psychiatric:         Mood  and Affect: Mood normal.            Result Review    Result Review:  I have personally reviewed the results from the time of this admission to 3/10/2023 11:33 EST and agree with these findings:  [x]  Laboratory  []  Microbiology  []  Radiology  []  EKG/Telemetry   []  Cardiology/Vascular   []  Pathology  []  Old records  []  Other:  Most notable findings include: mag level is 1.4     Assessment & Plan      Brief Patient Summary:  Caterina Mason is a 67 y.o. female who this patient presents hospital just last for a diagnosis of signet ring  cell adenocarcinoma abdominal pain was 10/10 pain medications, she was put initially n.p.o., then later on last night she requested food and fluid was tolerated very well, I spoke with the patient today now she is having abdominal pain again, she just did not have bowel movement she said for 7 days.    3/10/2023: patient feels ok abd pain has improved still severe in ambulation mainly, one BM yesterday, replace Mag today     apixaban, 5 mg, Oral, Q12H  atorvastatin, 20 mg, Oral, Daily  budesonide-formoterol, 2 puff, Inhalation, BID  digoxin, 125 mcg, Oral, Daily  dilTIAZem CD, 180 mg, Oral, Daily  insulin glargine, 30 Units, Subcutaneous, Nightly  insulin lispro, 3-14 Units, Subcutaneous, TID With Meals  insulin lispro, 7 Units, Subcutaneous, TID With Meals  lactulose, 20 g, Oral, Daily  Methylnaltrexone Bromide, 150 mg, Oral, Daily  metoclopramide, 10 mg, Intravenous, Q6H  metoprolol tartrate, 100 mg, Oral, Q12H  oxyCODONE, 10 mg, Oral, Q12H  pantoprazole, 40 mg, Oral, QAM AC  polyethylene glycol, 17 g, Oral, Daily  senna-docusate sodium, 1 tablet, Oral, BID  sodium chloride, 10 mL, Intravenous, Q12H       lactated ringers, 75 mL/hr, Last Rate: 75 mL/hr (03/10/23 0700)         Active Hospital Problems:  Active Hospital Problems    Diagnosis    • **Continuous severe abdominal pain      Plan:   1. Intractable abdominal pain  2. Pyloric Signet Ring Cell Adenocarcinoma  3.  Dysphagia  4. Malnutrition    - CT a/p shows unchanged soft tissue mass in the duodenal bulb and unchanged adrenal gland nodules complicated by extensive abdominal lymphadenopathy in the setting of cholelithiasis without acute cholecystitis along with hyperdense lesions within the right kidney.    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 A. Fib    - resume home Cardizem    - resume home Digoxin    - hold Eliquis in case intervention necessary     6. COPD    -stable on room air    - duoneb PRN     7. CAD    - resume home statin and fibrate     8. DM    - Lantus 30u SC QHS (home dose is 40u)    - ISS     9. RUSLAN    - may use home CPAP     10. GERD    - PPI BID     11. HTN    - resume home lopressor    - Hold home lisinopril to prevent nephrotoxicity     12. HLD    - resume home statin and fibrate     13. Anemia    - no overt bleeding, stable    14. Hypomagnesemia    DVT prophylaxis:  Medical and mechanical DVT prophylaxis orders are present.    CODE STATUS:    Code Status (Patient has no pulse and is not breathing): CPR (Attempt to Resuscitate)  Medical Interventions (Patient has pulse or is breathing): Full Support      Disposition:  I expect patient to be discharged within 24 hrs.    This patient has been examined wearing appropriate Personal Protective Equipment and discussed with hospital infection control department.  03/10/23      Electronically signed by Antonio Ling MD, 03/10/23, 11:33 EST.  [facility] Hospitalist Team

## 2023-03-10 NOTE — DISCHARGE PLACEMENT REQUEST
"Martin Mason (67 y.o. Female)     Date of Birth   1955    Social Security Number       Address   [address] [place] [address] [place] [address]    Home Phone   [phone]    MRN   [id number]       [employer]   None    Marital Status                               Admission Date   3/7/23    Admission Type   Emergency    Admitting Provider   Steffany Christie DO    Attending Provider   Antonio Ling MD    Department, Room/Bed   [facility] 3C MEDICAL INPATIENT, 358/1       Discharge Date       Discharge Disposition       Discharge Destination                               Attending Provider: Antonio Ling MD    Allergies: Ibuprofen, Prednisone, Pregabalin, Tramadol, Adhesive Tape, Levofloxacin, Sulfamethoxazole-trimethoprim    Isolation: None   Infection: None   Code Status: CPR    Ht: 162.6 cm (64.02\")   Wt: 105 kg (230 lb 6.4 oz)    Admission Cmt: None   Principal Problem: Continuous severe abdominal pain [R10.9]                 Active Insurance as of 3/7/2023     Primary Coverage     Payor Plan Insurance Group Employer/Plan Group    [employer] [id number]     Payor Plan Address Payor Plan Phone Number Payor Plan Fax Number Effective Dates    MAIL STOP:   4/1/2017 - None Entered    [address] [address]       [facility] [id number]       Subscriber Name Subscriber Birth Date Member ID       MARTIN MASON 1955 [id number]                 Emergency Contacts      (Rel.) Home Phone Work Phone Mobile Phone    BARBARA MASON (Spouse) [phone] -- --    LEIDY PEREZ (Daughter) [phone] -- [phone]    Agnes Moncada (Daughter) -- -- [phone]          "

## 2023-03-10 NOTE — CASE MANAGEMENT/SOCIAL WORK
Continued Stay Note  [facility]     Patient Name: Caterina Mason  MRN: [id number]  Today's Date: 3/10/2023    Admit Date: 3/7/2023    Plan: Anticipate routine home. [facility] referral pending acceptance. Current home CPAP w/ O2 through [facility].   Discharge Plan     Row Name 03/10/23 1113       Plan    Plan Anticipate routine home. [facility] referral pending acceptance. Current home CPAP w/ O2 through [facility] [place].    Provided Post Acute Provider List? Yes    Post Acute Provider List Home Health    Provided Post Acute Provider Quality & Resource List? N/A    Delivered To Patient    Method of Delivery In person    Plan Comments CM met with the patient at bedside to review HH options. The paitent chose 1) [facility]  2) [facility]  3) [facility]. CM placed a referral for [facility] and notfied the liaison. D/C Barriers: IV Reglan, IVF.                Expected Discharge Date and Time     Expected Discharge Date Expected Discharge Time    Mar 11, 2023         Met with patient in room wearing PPE: mask,      Maintained distance greater than six feet and spent less than 15 minutes in the room.        Sera Sprague RN      [address] [address]  Phone: [phone]  Fax: [phone]

## 2023-03-11 ENCOUNTER — HOME CARE VISIT (OUTPATIENT)
Dept: HOME HEALTH SERVICES | Facility: HOME HEALTHCARE | Age: 68
End: 2023-03-11

## 2023-03-13 ENCOUNTER — TRANSITIONAL CARE MANAGEMENT TELEPHONE ENCOUNTER (OUTPATIENT)
Dept: CALL CENTER | Facility: HOSPITAL | Age: 68
End: 2023-03-13
Payer: MEDICAID

## 2023-03-13 NOTE — OUTREACH NOTE
Call Center TCM Note    Flowsheet Row Responses   [facility] patient discharged from? Doc   Does the patient have one of the following disease processes/diagnoses(primary or secondary)? Other   TCM attempt successful? Yes   Call start time 1422   Call end time 1425   Discharge diagnosis Severe abdominal pain most likely related to metastatic cancer, severe constipation   Person spoke with today (if not patient) and relationship Waqas-spouse   Meds reviewed with patient/caregiver? Yes   Is the patient having any side effects they believe may be caused by any medication additions or changes? No   Does the patient have all medications ordered at discharge? No   What is keeping the patient from filling the prescriptions? --  [some medication changes have been made per hospice]   Is the patient taking all medications as directed (includes completed medication regime)? Yes   Comments Pt is under hospice care   Does the patient have an appointment with their PCP within 7 days of discharge? No   Nursing Interventions Patient declined scheduling/rescheduling appointment at this time   What is the Home health agency?  [facility]   Has home health visited the patient within 72 hours of discharge? Yes   Psychosocial issues? No   What is the patient's perception of their health status since discharge? Same   TCM call completed? Yes   Wrap up additional comments Spouse reports pt is under hospice care now.   Call end time 1425   Would this patient benefit from a Referral to Amb Social Work? No   Is the patient interested in additional calls from an ambulatory ?  NOTE:  applies to high risk patients requiring additional follow-up. No          Shanice Blue RN    3/13/2023, 14:26 EDT

## 2023-03-13 NOTE — OUTREACH NOTE
Call Center TCM Note    Flowsheet Row Responses   [facility] patient discharged from? Doc   Does the patient have one of the following disease processes/diagnoses(primary or secondary)? Other   TCM attempt successful? No   Unsuccessful attempts Attempt 1          Shanice Blue RN    3/13/2023, 12:12 EDT

## 2023-03-13 NOTE — CASE MANAGEMENT/SOCIAL WORK
Case Management Discharge Note      Final Note: [facility].    Selected Continued Care - Discharged on 3/10/2023 Admission date: 3/7/2023 - Discharge disposition: Home or Self Care       Home Medical Care Coordination complete.    Service Provider Selected Services Address Phone Fax Patient Preferred    [facility] [address] [facility] [place] [id number] [phone] --           Transportation Services  Private: Car    Final Discharge Disposition Code: 06 - home with home health care

## 2023-03-16 LAB
LAB AP CARIS, ADDENDUM: NORMAL
LAB AP CASE REPORT: NORMAL
LAB AP DIAGNOSIS COMMENT: NORMAL
PATH REPORT.ADDENDUM SPEC: NORMAL
PATH REPORT.FINAL DX SPEC: NORMAL
PATH REPORT.GROSS SPEC: NORMAL

## 2023-03-24 LAB
LAB AP CASE REPORT: NORMAL
PATH REPORT.FINAL DX SPEC: NORMAL
PATH REPORT.GROSS SPEC: NORMAL

## 2023-03-27 ENCOUNTER — READMISSION MANAGEMENT (OUTPATIENT)
Dept: CALL CENTER | Facility: HOSPITAL | Age: 68
End: 2023-03-27
Payer: MEDICAID

## 2023-03-27 NOTE — OUTREACH NOTE
Medical Week 3 Survey    Flowsheet Row Responses   [facility] facility patient discharged from? Doc   Does the patient have one of the following disease processes/diagnoses(primary or secondary)? Other   Week 3 attempt successful? No   Unsuccessful attempts Attempt 1          Beatriz BERUMEN - Registered Nurse

## 2023-03-30 ENCOUNTER — READMISSION MANAGEMENT (OUTPATIENT)
Dept: CALL CENTER | Facility: HOSPITAL | Age: 68
End: 2023-03-30
Payer: MEDICAID

## 2023-03-30 NOTE — OUTREACH NOTE
Medical Week 3 Survey    Flowsheet Row Responses   [facility] patient discharged from? Doc   Does the patient have one of the following disease processes/diagnoses(primary or secondary)? Other   Week 3 attempt successful? No   Call start time 1316   Unsuccessful attempts Attempt 2   Revoke Patient    Discharge diagnosis Severe abdominal pain most likely related to metastatic cancer, severe constipation   Person spoke with today (if not patient) and relationship Daughter-          ASHIA SOLIS - Registered Nurse

## 2023-04-04 DIAGNOSIS — R10.84 GENERALIZED ABDOMINAL PAIN: ICD-10-CM

## 2023-04-04 RX ORDER — DICYCLOMINE HYDROCHLORIDE 10 MG/1
CAPSULE ORAL
Qty: 120 CAPSULE | Refills: 0 | OUTPATIENT
Start: 2023-04-04

## 2023-04-13 RX ORDER — FUROSEMIDE 40 MG/1
TABLET ORAL
Qty: 90 TABLET | Refills: 1 | OUTPATIENT
Start: 2023-04-13

## 2024-10-13 NOTE — PROGRESS NOTES
Discharge Summary  Discharge Summary from Physical Therapy Report      Dates  PT visit: 3/1/2022 - 5/2/2022  Number of Visits: 5     Discharge Status of Patient: See Progress Note dated 5/2/2022    Goals: Not Met    Discharge Plan: Continue with current home exercise program as instructed    Comments : Pt was making minimal progress. Prior to last visit, pt has several no shows and cancellations. After last visit, pt had 2 no shows. Remaining appts were scheduled due to poor attendance.    Date of Discharge 12/22/2022        Estella Lewis, PT  Physical Therapist  [place] Lic# [id number]                       Other Specify

## (undated) DEVICE — PINNACLE INTRODUCER SHEATH: Brand: PINNACLE

## (undated) DEVICE — BITEBLOCK ENDO W/STRAP 60F A/ LF DISP

## (undated) DEVICE — Device: Brand: CARTO 3

## (undated) DEVICE — TRAP WIDEEYE POLYP

## (undated) DEVICE — ERBE NESSY®PLATE 170 SPLIT; 168CM²; CABLE 3M: Brand: ERBE

## (undated) DEVICE — SINGLE-USE BIOPSY FORCEPS: Brand: RADIAL JAW 4

## (undated) DEVICE — Device: Brand: SMARTABLATE

## (undated) DEVICE — Device: Brand: REFERENCE PATCH CARTO 3

## (undated) DEVICE — Device: Brand: WEBSTER CS

## (undated) DEVICE — Device: Brand: THERMOCOOL SMARTTOUCH SF

## (undated) DEVICE — TBG PRESS/MONITOR FIX M/F LL A/ 24IN STRL

## (undated) DEVICE — SNAR POLYP HOTSNARE/BRAIDED OVL/MINI 7F 2.8X10MM 230CM 1P/U

## (undated) DEVICE — PK ENDO GI 50

## (undated) DEVICE — ELECTRD DEFIB M/FUNC PROPADZ RADIOL 2PK

## (undated) DEVICE — Device: Brand: SOUNDSTAR

## (undated) DEVICE — PK TRY HEART CATH 50